# Patient Record
Sex: MALE | Race: WHITE | NOT HISPANIC OR LATINO | Employment: OTHER | URBAN - METROPOLITAN AREA
[De-identification: names, ages, dates, MRNs, and addresses within clinical notes are randomized per-mention and may not be internally consistent; named-entity substitution may affect disease eponyms.]

---

## 2017-02-21 DIAGNOSIS — I51.89 DIASTOLIC DYSFUNCTION: ICD-10-CM

## 2017-02-21 DIAGNOSIS — I10 ESSENTIAL HYPERTENSION: ICD-10-CM

## 2017-02-21 RX ORDER — VALSARTAN 160 MG/1
160 TABLET ORAL DAILY
Qty: 90 TABLET | Refills: 0 | Status: SHIPPED | OUTPATIENT
Start: 2017-02-21 | End: 2017-06-07 | Stop reason: SDUPTHER

## 2017-03-20 ENCOUNTER — CLINICAL SUPPORT (OUTPATIENT)
Dept: CARDIOLOGY | Facility: CLINIC | Age: 67
End: 2017-03-20
Payer: MEDICARE

## 2017-03-20 DIAGNOSIS — I42.9 CARDIOMYOPATHY: ICD-10-CM

## 2017-03-20 DIAGNOSIS — Z95.810 ICD (IMPLANTABLE CARDIOVERTER-DEFIBRILLATOR) IN PLACE: ICD-10-CM

## 2017-03-20 DIAGNOSIS — I47.29 PAROXYSMAL VT: ICD-10-CM

## 2017-03-20 PROCEDURE — 93296 REM INTERROG EVL PM/IDS: CPT | Mod: PBBFAC,PO | Performed by: INTERNAL MEDICINE

## 2017-03-20 PROCEDURE — 93295 DEV INTERROG REMOTE 1/2/MLT: CPT | Mod: ,,, | Performed by: INTERNAL MEDICINE

## 2017-04-10 DIAGNOSIS — I10 ESSENTIAL HYPERTENSION: ICD-10-CM

## 2017-04-10 DIAGNOSIS — I51.89 DIASTOLIC DYSFUNCTION: ICD-10-CM

## 2017-04-10 RX ORDER — FUROSEMIDE 20 MG/1
20 TABLET ORAL EVERY MORNING
Qty: 90 TABLET | Refills: 3 | Status: SHIPPED | OUTPATIENT
Start: 2017-04-10 | End: 2018-04-01 | Stop reason: SDUPTHER

## 2017-04-21 ENCOUNTER — OFFICE VISIT (OUTPATIENT)
Dept: CARDIOLOGY | Facility: CLINIC | Age: 67
End: 2017-04-21
Payer: MEDICARE

## 2017-04-21 VITALS
DIASTOLIC BLOOD PRESSURE: 74 MMHG | HEART RATE: 73 BPM | WEIGHT: 276.69 LBS | OXYGEN SATURATION: 94 % | SYSTOLIC BLOOD PRESSURE: 122 MMHG | BODY MASS INDEX: 39.61 KG/M2 | HEIGHT: 70 IN

## 2017-04-21 DIAGNOSIS — Z45.02 ICD (IMPLANTABLE CARDIOVERTER-DEFIBRILLATOR) DISCHARGE: ICD-10-CM

## 2017-04-21 DIAGNOSIS — I47.29 PAROXYSMAL VT: ICD-10-CM

## 2017-04-21 DIAGNOSIS — I43 CARDIOMYOPATHY DUE TO SYSTEMIC DISEASE: Primary | ICD-10-CM

## 2017-04-21 DIAGNOSIS — I10 ESSENTIAL HYPERTENSION: ICD-10-CM

## 2017-04-21 DIAGNOSIS — E66.01 MORBID OBESITY DUE TO EXCESS CALORIES: ICD-10-CM

## 2017-04-21 DIAGNOSIS — G47.33 OBSTRUCTIVE SLEEP APNEA SYNDROME: ICD-10-CM

## 2017-04-21 PROCEDURE — 99214 OFFICE O/P EST MOD 30 MIN: CPT | Mod: S$PBB,,, | Performed by: INTERNAL MEDICINE

## 2017-04-21 PROCEDURE — 99999 PR PBB SHADOW E&M-EST. PATIENT-LVL III: CPT | Mod: PBBFAC,,, | Performed by: INTERNAL MEDICINE

## 2017-04-21 PROCEDURE — 99213 OFFICE O/P EST LOW 20 MIN: CPT | Mod: PBBFAC,PO | Performed by: INTERNAL MEDICINE

## 2017-04-21 NOTE — PROGRESS NOTES
"  Subjective:   Patient ID:  Randy Yap is a 67 y.o. male     Chief complaint: VT    HPI  From my last note (12/14/16 ):  Background:  66 man  Frequent, Sxc PMVT related to R on T V pacing in the setting of DDD programming and junctional beats.  Last visit we DCed Sotalol and reprogrammed pacer to VVI back up  Since then, he has had no N/S Sx and no VT per his ICD records  He is feeling very well  2 months ago we decreased amio to 200 a day(but there seems to be some confusion with what he is taking-- he seems to be on 400 a day) and he still is feeling well from a PVC point of view but his wife noticed that he was retaining fluid at a time when he was c/o ALDANA-- this was last week and he increased his lasix and he felt better enough to go back to 1 pill a day  ICD eval today. He is in AAI mode at 67 and he paces 67%-- no VTNS   He does exercise now -- he has been able to swim back and forth in his pool -- which he could not do even a half a pool a few months ago.     Update on 12/14/16:  We were planning to gradually wean amio and we have decreased The dose to 200 a day so far -- no issues since then and ICD shows no VT  He has had PFT with [facility] -- results pending.   He wants to switch to this practice.   He has an ICD that is on battery advisopry -- he has darci educated and he is on Merlin remote monitoring -- vibration alert turned on    Update since then:  " I am doing fantastic"  Exercising and working in the yard like he had not done in a long time.   He is now on 100 mg amio a day  He is losing inches but not wt  Last Echo had a normal Ef (up from 40 initialy)  Current Outpatient Prescriptions   Medication Sig    acetaminophen (TYLENOL) 325 MG tablet Take 650 mg by mouth as needed.     carvedilol (COREG) 25 MG tablet Take 25 mg by mouth 2 (two) times daily.     co-enzyme Q-10 30 mg capsule Take 30 mg by mouth once daily.     FLUVIRIN [id number], PF, 45 mcg (15 mcg x 3)/0.5 mL Syrg inject 0.5 " milliliter intramuscularly    furosemide (LASIX) 20 MG tablet Take 1 tablet (20 mg total) by mouth every morning.    glucosamine-chondroitin 500-400 mg tablet Take 1 tablet by mouth once daily.     multivitamin (THERAGRAN) tablet Take 1 tablet by mouth once daily.      spironolactone (ALDACTONE) 25 MG tablet Take 1 tablet (25 mg total) by mouth once daily.    trazodone (DESYREL) 150 MG tablet Take 1 tablet (150 mg total) by mouth every evening.    valsartan (DIOVAN) 160 MG tablet Take 1 tablet (160 mg total) by mouth once daily.     No current facility-administered medications for this visit.      Review of Systems   Constitution: Negative. Negative for decreased appetite, weakness, malaise/fatigue, weight gain and weight loss.   HENT: Negative.  Negative for headaches.    Eyes: Negative.  Negative for blurred vision.   Cardiovascular: Negative.  Negative for chest pain, claudication, cyanosis, dyspnea on exertion, irregular heartbeat, leg swelling, near-syncope, orthopnea and palpitations.   Respiratory: Negative.  Negative for cough, shortness of breath, sleep disturbances due to breathing, snoring and wheezing.    Endocrine: Negative.  Negative for heat intolerance.   Hematologic/Lymphatic: Negative.  Does not bruise/bleed easily.   Skin: Negative.    Musculoskeletal: Negative.  Negative for muscle weakness and myalgias.   Gastrointestinal: Negative.  Negative for melena, nausea and vomiting.   Genitourinary: Negative.  Negative for nocturia.   Neurological: Negative.  Negative for excessive daytime sleepiness, dizziness and light-headedness.   Psychiatric/Behavioral: Negative.  Negative for depression, memory loss and substance abuse. The patient does not have insomnia and is not nervous/anxious.    Allergic/Immunologic: Negative.        Objective:   Physical Exam   Constitutional: He is oriented to person, place, and time. He appears well-developed and well-nourished.   overweight   HENT:   Head:  Normocephalic and atraumatic.   Right Ear: External ear normal.   Left Ear: External ear normal.   Eyes: Conjunctivae are normal. Pupils are equal, round, and reactive to light. Right eye exhibits no discharge. Left eye exhibits no discharge. Right conjunctiva is not injected. Left conjunctiva is not injected. Left conjunctiva has no hemorrhage.   Neck: Neck supple. No JVD present. No thyromegaly present.   Cardiovascular: Normal rate, regular rhythm, normal heart sounds and intact distal pulses.  PMI is not displaced.  Exam reveals no gallop, no friction rub, no midsystolic click and no opening snap.    No murmur heard.  Pulses:       Carotid pulses are 2+ on the right side, and 2+ on the left side.       Radial pulses are 2+ on the right side, and 2+ on the left side.        Dorsalis pedis pulses are 2+ on the right side, and 2+ on the left side.        Posterior tibial pulses are 2+ on the right side, and 2+ on the left side.   Pulmonary/Chest: Effort normal and breath sounds normal. No respiratory distress. He has no wheezes. He has no rales. He exhibits no tenderness.   Device pocket is in excellent repair     Abdominal: Soft. Normal appearance. He exhibits no pulsatile liver. There is no hepatomegaly. There is no tenderness. There is no rigidity, no rebound and no guarding.   Obese abdomen   Musculoskeletal: Normal range of motion. He exhibits no edema or tenderness.        Right knee: He exhibits no swelling.        Left knee: He exhibits no swelling.        Right ankle: He exhibits no swelling.        Left ankle: He exhibits no swelling.        Right lower leg: He exhibits no swelling.        Left lower leg: He exhibits no swelling.        Right foot: There is no swelling.        Left foot: There is no swelling.   Neurological: He is alert and oriented to person, place, and time. He has normal strength and normal reflexes. No cranial nerve deficit. Coordination normal.   Skin: Skin is warm, dry and intact.  "No rash noted. He is not diaphoretic. No cyanosis. No pallor.   Psychiatric: He has a normal mood and affect. His behavior is normal.   Nursing note and vitals reviewed.    /74 (BP Location: Right arm, Patient Position: Sitting, BP Method: Manual)  Pulse 73  Ht 5' 10" (1.778 m)  Wt 125.5 kg (276 lb 10.8 oz)  SpO2 (!) 94%  BMI 39.7 kg/m2     Assessment:      1. 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 ICD (implantable cardioverter-defibrillator) discharge    3. Obstructive sleep apnea syndrome    4. Essential hypertension    5. Paroxysmal VT    6. Morbid obesity due to excess calories        Plan:    Merlin transmissions to be set up with us   No orders of the defined types were placed in this encounter.    Return in about 1 year (around 4/21/2018), or if symptoms worsen or fail to improve.  Medications Discontinued During This Encounter   Medication Reason    amiodarone (PACERONE) 100 MG Tab Therapy completed     New Prescriptions    No medications on file     Modified Medications    No medications on file              "

## 2017-04-21 NOTE — MR AVS SNAPSHOT
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    -  Primary     ICD (implantable cardioverter-defibrillator) discharge         Obstructive sleep apnea syndrome         Essential hypertension         Paroxysmal VT         Morbid obesity due to excess calories                To Do List           Goals (5 Years of Data)              Today    16    Cut out extra servings           Increase physical activity           Reduce fat intake to X grams per day           Weight < 230 lb (104.327 kg)   125.5 kg (276 lb 10.8 oz)  125.1 kg (275 lb 12.7 oz)  126.6 kg (279 lb 1.6 oz)      Follow-Up and Disposition     Return in about 1 year (around 2018), or if symptoms worsen or fail to improve.    Follow-up and Disposition History      [facility]     [facility]  [facility]istance  Unless otherwise directed by your provider, please contact [facility], our nurse care line that is available for  assistance.     Registered nurses in the [facility] provide: appointment scheduling, clinical advisement, health education, and other advisory services.  Call: [phone] (toll free)               Medications           Message regarding Medications     Verify the changes and/or additions to your medication regime listed below are the same as discussed with your clinician today.  If any of these changes or additions are incorrect, please notify your healthcare provider.        STOP taking these medications     amiodarone (PACERONE) 100 MG Tab Take 1 tablet (100 mg total) by mouth once daily.           Verify that the below list of medications is an  "accurate representation of the medications you are currently taking.  If none reported, the list may be blank. If incorrect, please contact your healthcare provider. Carry this list with you in case of emergency.           Current Medications     acetaminophen (TYLENOL) 325 MG tablet Take 650 mg by mouth as needed.     carvedilol (COREG) 25 MG tablet Take 25 mg by mouth 2 (two) times daily.     co-enzyme Q-10 30 mg capsule Take 30 mg by mouth once daily.     FLUVIRIN [id number], PF, 45 mcg (15 mcg x 3)/0.5 mL Syrg inject 0.5 milliliter intramuscularly    furosemide (LASIX) 20 MG tablet Take 1 tablet (20 mg total) by mouth every morning.    glucosamine-chondroitin 500-400 mg tablet Take 1 tablet by mouth once daily.     multivitamin (THERAGRAN) tablet Take 1 tablet by mouth once daily.      spironolactone (ALDACTONE) 25 MG tablet Take 1 tablet (25 mg total) by mouth once daily.    trazodone (DESYREL) 150 MG tablet Take 1 tablet (150 mg total) by mouth every evening.    valsartan (DIOVAN) 160 MG tablet Take 1 tablet (160 mg total) by mouth once daily.           Clinical Reference Information           Your Vitals Were     BP Pulse Height Weight SpO2 BMI    122/74 (BP Location: Right arm, Patient Position: Sitting, BP Method: Manual) 73 5' 10" (1.778 m) 125.5 kg (276 lb 10.8 oz) 94% 39.7 kg/m2      Blood Pressure          Most Recent Value    BP  122/74      Allergies as of 4/21/2017     No Known Allergies      Immunizations Administered on Date of Encounter - 4/21/2017     None      Language Assistance Services     ATTENTION: Language assistance services are available, free of charge. Please call [phone].      ATENCIÓN: Si arabella dunn, tiene a tineo disposición servicios gratuitos de asistencia lingüística. Llame al [phone].     CHÚ Ý: N?u b?n nói Ti?ng Vi?t, có các d?ch v? h? tr? ngôn ng? mi?n phí dành cho b?n. G?i s? [phone].         [facility] complies with applicable Federal civil rights " laws and does not discriminate on the basis of race, color, national origin, age, disability, or sex.

## 2017-04-25 DIAGNOSIS — I10 ESSENTIAL HYPERTENSION: ICD-10-CM

## 2017-04-25 DIAGNOSIS — I51.89 DIASTOLIC DYSFUNCTION: ICD-10-CM

## 2017-04-25 RX ORDER — SPIRONOLACTONE 25 MG/1
25 TABLET ORAL DAILY
Qty: 90 TABLET | Refills: 0 | Status: SHIPPED | OUTPATIENT
Start: 2017-04-25 | End: 2017-08-20 | Stop reason: SDUPTHER

## 2017-05-04 RX ORDER — TRAZODONE HYDROCHLORIDE 150 MG/1
150 TABLET ORAL NIGHTLY
Qty: 90 TABLET | Refills: 3 | Status: SHIPPED | OUTPATIENT
Start: 2017-05-04 | End: 2018-05-13 | Stop reason: SDUPTHER

## 2017-06-07 DIAGNOSIS — I51.89 DIASTOLIC DYSFUNCTION: ICD-10-CM

## 2017-06-07 DIAGNOSIS — I10 ESSENTIAL HYPERTENSION: ICD-10-CM

## 2017-06-07 RX ORDER — VALSARTAN 160 MG/1
160 TABLET ORAL DAILY
Qty: 90 TABLET | Refills: 4 | Status: SHIPPED | OUTPATIENT
Start: 2017-06-07 | End: 2018-08-03 | Stop reason: RX

## 2017-06-07 NOTE — TELEPHONE ENCOUNTER
----- Message from Ryan Winchester sent at 6/7/2017  1:59 PM CDT -----  Contact: angella from [employer]   States she is calling to get a refill on valsartin 160mg and can be reached at [phone]//thanks/dbw

## 2017-07-18 DIAGNOSIS — I43 CARDIOMYOPATHY DUE TO SYSTEMIC DISEASE: ICD-10-CM

## 2017-07-18 DIAGNOSIS — Z95.810 ICD (IMPLANTABLE CARDIOVERTER-DEFIBRILLATOR) IN PLACE: Primary | ICD-10-CM

## 2017-07-18 DIAGNOSIS — I47.29 PAROXYSMAL VT: ICD-10-CM

## 2017-08-07 ENCOUNTER — CLINICAL SUPPORT (OUTPATIENT)
Dept: CARDIOLOGY | Facility: CLINIC | Age: 67
End: 2017-08-07
Payer: MEDICARE

## 2017-08-07 DIAGNOSIS — Z95.810 ICD (IMPLANTABLE CARDIOVERTER-DEFIBRILLATOR) IN PLACE: ICD-10-CM

## 2017-08-07 DIAGNOSIS — I47.29 PAROXYSMAL VT: ICD-10-CM

## 2017-08-07 DIAGNOSIS — I42.9 CARDIOMYOPATHY: ICD-10-CM

## 2017-08-07 PROCEDURE — 93296 REM INTERROG EVL PM/IDS: CPT | Mod: PBBFAC,PO | Performed by: INTERNAL MEDICINE

## 2017-08-07 PROCEDURE — 93295 DEV INTERROG REMOTE 1/2/MLT: CPT | Mod: ,,, | Performed by: INTERNAL MEDICINE

## 2017-08-18 ENCOUNTER — CLINICAL SUPPORT (OUTPATIENT)
Dept: CARDIOLOGY | Facility: CLINIC | Age: 67
End: 2017-08-18
Payer: MEDICARE

## 2017-08-18 DIAGNOSIS — I47.29 PAROXYSMAL VT: ICD-10-CM

## 2017-08-18 DIAGNOSIS — I43 CARDIOMYOPATHY DUE TO SYSTEMIC DISEASE: ICD-10-CM

## 2017-08-18 DIAGNOSIS — Z95.810 ICD (IMPLANTABLE CARDIOVERTER-DEFIBRILLATOR) IN PLACE: ICD-10-CM

## 2017-08-18 PROCEDURE — 93283 PRGRMG EVAL IMPLANTABLE DFB: CPT | Mod: 26,,, | Performed by: INTERNAL MEDICINE

## 2017-08-18 PROCEDURE — 93290 INTERROG DEV EVAL ICPMS IP: CPT | Mod: 26,,, | Performed by: INTERNAL MEDICINE

## 2017-08-20 DIAGNOSIS — I51.89 DIASTOLIC DYSFUNCTION: ICD-10-CM

## 2017-08-20 DIAGNOSIS — I10 ESSENTIAL HYPERTENSION: ICD-10-CM

## 2017-08-21 RX ORDER — SPIRONOLACTONE 25 MG/1
TABLET ORAL
Qty: 90 TABLET | Refills: 1 | Status: SHIPPED | OUTPATIENT
Start: 2017-08-21 | End: 2018-02-11 | Stop reason: SDUPTHER

## 2017-08-22 ENCOUNTER — TELEPHONE (OUTPATIENT)
Dept: FAMILY MEDICINE | Facility: CLINIC | Age: 67
End: 2017-08-22

## 2017-08-22 DIAGNOSIS — E78.5 HYPERLIPIDEMIA, UNSPECIFIED HYPERLIPIDEMIA TYPE: Primary | ICD-10-CM

## 2017-08-22 DIAGNOSIS — I10 ESSENTIAL HYPERTENSION: ICD-10-CM

## 2017-09-06 ENCOUNTER — LAB VISIT (OUTPATIENT)
Dept: LAB | Facility: HOSPITAL | Age: 67
End: 2017-09-06
Attending: FAMILY MEDICINE
Payer: MEDICARE

## 2017-09-06 DIAGNOSIS — I10 ESSENTIAL HYPERTENSION: ICD-10-CM

## 2017-09-06 DIAGNOSIS — E78.5 HYPERLIPIDEMIA, UNSPECIFIED HYPERLIPIDEMIA TYPE: ICD-10-CM

## 2017-09-06 DIAGNOSIS — Z11.59 NEED FOR HEPATITIS C SCREENING TEST: ICD-10-CM

## 2017-09-06 LAB
ALBUMIN SERPL BCP-MCNC: 3.6 G/DL
ALP SERPL-CCNC: 54 U/L
ALT SERPL W/O P-5'-P-CCNC: 35 U/L
ANION GAP SERPL CALC-SCNC: 7 MMOL/L
AST SERPL-CCNC: 25 U/L
BASOPHILS # BLD AUTO: 0.04 K/UL
BASOPHILS NFR BLD: 0.6 %
BILIRUB SERPL-MCNC: 0.6 MG/DL
BUN SERPL-MCNC: 12 MG/DL
CALCIUM SERPL-MCNC: 8.8 MG/DL
CHLORIDE SERPL-SCNC: 103 MMOL/L
CHOLEST SERPL-MCNC: 188 MG/DL
CHOLEST/HDLC SERPL: 5.5 {RATIO}
CO2 SERPL-SCNC: 28 MMOL/L
CREAT SERPL-MCNC: 1 MG/DL
DIFFERENTIAL METHOD: ABNORMAL
EOSINOPHIL # BLD AUTO: 0.1 K/UL
EOSINOPHIL NFR BLD: 1.4 %
ERYTHROCYTE [DISTWIDTH] IN BLOOD BY AUTOMATED COUNT: 14.5 %
EST. GFR  (AFRICAN AMERICAN): >60 ML/MIN/1.73 M^2
EST. GFR  (NON AFRICAN AMERICAN): >60 ML/MIN/1.73 M^2
GLUCOSE SERPL-MCNC: 104 MG/DL
HCT VFR BLD AUTO: 38.5 %
HDLC SERPL-MCNC: 34 MG/DL
HDLC SERPL: 18.1 %
HGB BLD-MCNC: 12.6 G/DL
LDLC SERPL CALC-MCNC: 124.4 MG/DL
LYMPHOCYTES # BLD AUTO: 2.1 K/UL
LYMPHOCYTES NFR BLD: 33.6 %
MCH RBC QN AUTO: 28.6 PG
MCHC RBC AUTO-ENTMCNC: 32.7 G/DL
MCV RBC AUTO: 88 FL
MONOCYTES # BLD AUTO: 0.7 K/UL
MONOCYTES NFR BLD: 10.7 %
NEUTROPHILS # BLD AUTO: 3.4 K/UL
NEUTROPHILS NFR BLD: 53.7 %
NONHDLC SERPL-MCNC: 154 MG/DL
PLATELET # BLD AUTO: 398 K/UL
PMV BLD AUTO: 8.8 FL
POTASSIUM SERPL-SCNC: 4.5 MMOL/L
PROT SERPL-MCNC: 7.9 G/DL
RBC # BLD AUTO: 4.4 M/UL
SODIUM SERPL-SCNC: 138 MMOL/L
TRIGL SERPL-MCNC: 148 MG/DL
WBC # BLD AUTO: 6.33 K/UL

## 2017-09-06 PROCEDURE — 80053 COMPREHEN METABOLIC PANEL: CPT

## 2017-09-06 PROCEDURE — 80061 LIPID PANEL: CPT

## 2017-09-06 PROCEDURE — 86803 HEPATITIS C AB TEST: CPT

## 2017-09-06 PROCEDURE — 85025 COMPLETE CBC W/AUTO DIFF WBC: CPT

## 2017-09-06 PROCEDURE — 36415 COLL VENOUS BLD VENIPUNCTURE: CPT | Mod: PO

## 2017-09-07 LAB — HCV AB SERPL QL IA: NEGATIVE

## 2017-09-11 ENCOUNTER — LAB VISIT (OUTPATIENT)
Dept: LAB | Facility: HOSPITAL | Age: 67
End: 2017-09-11
Attending: FAMILY MEDICINE
Payer: MEDICARE

## 2017-09-11 ENCOUNTER — OFFICE VISIT (OUTPATIENT)
Dept: FAMILY MEDICINE | Facility: CLINIC | Age: 67
End: 2017-09-11
Payer: MEDICARE

## 2017-09-11 VITALS
BODY MASS INDEX: 38.7 KG/M2 | HEART RATE: 78 BPM | WEIGHT: 270.31 LBS | HEIGHT: 70 IN | SYSTOLIC BLOOD PRESSURE: 92 MMHG | DIASTOLIC BLOOD PRESSURE: 62 MMHG

## 2017-09-11 DIAGNOSIS — D64.9 ANEMIA, UNSPECIFIED TYPE: ICD-10-CM

## 2017-09-11 DIAGNOSIS — I51.89 DIASTOLIC DYSFUNCTION: ICD-10-CM

## 2017-09-11 DIAGNOSIS — I10 ESSENTIAL HYPERTENSION: ICD-10-CM

## 2017-09-11 DIAGNOSIS — Z00.00 ROUTINE CHECK-UP: Primary | ICD-10-CM

## 2017-09-11 DIAGNOSIS — I43 CARDIOMYOPATHY DUE TO SYSTEMIC DISEASE: ICD-10-CM

## 2017-09-11 DIAGNOSIS — E53.8 B12 DEFICIENCY: ICD-10-CM

## 2017-09-11 DIAGNOSIS — E78.5 HYPERLIPIDEMIA LDL GOAL <100: ICD-10-CM

## 2017-09-11 LAB — IRON SERPL-MCNC: 30 UG/DL

## 2017-09-11 PROCEDURE — 82607 VITAMIN B-12: CPT

## 2017-09-11 PROCEDURE — 82746 ASSAY OF FOLIC ACID SERUM: CPT

## 2017-09-11 PROCEDURE — 99397 PER PM REEVAL EST PAT 65+ YR: CPT | Mod: S$PBB,,, | Performed by: FAMILY MEDICINE

## 2017-09-11 PROCEDURE — 99213 OFFICE O/P EST LOW 20 MIN: CPT | Mod: PBBFAC,PO | Performed by: FAMILY MEDICINE

## 2017-09-11 PROCEDURE — 99999 PR PBB SHADOW E&M-EST. PATIENT-LVL III: CPT | Mod: PBBFAC,,, | Performed by: FAMILY MEDICINE

## 2017-09-11 PROCEDURE — 36415 COLL VENOUS BLD VENIPUNCTURE: CPT | Mod: PO

## 2017-09-11 PROCEDURE — 83540 ASSAY OF IRON: CPT

## 2017-09-11 NOTE — PROGRESS NOTES
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     Colon polyp 5/2013    repeat 5/2018    Depression, recurrent     Diastolic dysfunction     DJD (degenerative joint disease) of knee 10/14/2013    Hyperlipidemia     Hypertension     Murmur     Pericarditis with effusion     Sleep apnea      Past Surgical History:   Procedure Laterality Date    CARDIAC DEFIBRILLATOR PLACEMENT  10/6/2014    COLONOSCOPY W/ POLYPECTOMY  5/21/2013    repeat 5/21/2018    HERNIA REPAIR      R inguinal    pace maker   10/6/2014    PERICARDIAL WINDOW  12-15 years ago    tonsillectomy      VARICOCELECTOMY       Review of patient's allergies indicates:  No Known Allergies  Current Outpatient Prescriptions on File Prior to Visit   Medication Sig Dispense Refill    acetaminophen (TYLENOL) 325 MG tablet Take 650 mg by mouth as needed.       carvedilol (COREG) 25 MG tablet Take 25 mg by mouth 2 (two) times daily.   0    co-enzyme Q-10 30 mg capsule Take 30 mg by mouth once daily.       furosemide (LASIX) 20 MG tablet Take 1 tablet (20 mg total) by mouth every morning. 90 tablet 3    glucosamine-chondroitin 500-400 mg tablet Take 1 tablet by mouth once daily.       multivitamin (THERAGRAN) tablet Take 1 tablet by mouth once daily.        spironolactone (ALDACTONE) 25 MG tablet take 1 tablet by mouth once daily 90 tablet 1    trazodone (DESYREL) 150 MG tablet Take 1 tablet (150 mg total) by mouth every evening. 90 tablet 3    valsartan (DIOVAN) 160 MG tablet Take 1 tablet (160 mg total) by mouth once daily. 90 tablet 4    [DISCONTINUED] FLUVIRIN [id number], PF, 45 mcg (15 mcg x 3)/0.5 mL Syrg inject 0.5 milliliter intramuscularly  0     No current facility-administered medications on file prior to visit.      Social History     Social History    Marital status:      Spouse  name: N/A    Number of children: N/A    Years of education: N/A     Occupational History    Not on file.     Social History Main Topics    Smoking status: Never Smoker    Smokeless tobacco: Never Used    Alcohol use 0.5 oz/week     1 Standard drinks or equivalent per week      Comment: occasional glass of wine on social occasions    Drug use: No    Sexual activity: Not on file     Other Topics Concern    Not on file     Social History Narrative    No narrative on file     Family History   Problem Relation Age of Onset    Hyperlipidemia Mother     Arrhythmia Mother     Heart disease Father 48    Heart attack Father 48    Hypertension Father     Hypertension Maternal Grandmother     Hypertension Maternal Grandfather     Heart disease Paternal Grandmother     Hypertension Paternal Grandmother     Heart attack Paternal Grandmother     Heart disease Paternal Grandfather     Hypertension Paternal Grandfather     Heart attack Paternal Grandfather          ROS:GENERAL: No fever, chills, fatigability or weight loss.  SKIN: No rashes, itching or changes in color or texture of skin.  HEAD: No headaches or recent head trauma.EYES: Visual acuity fine. No photophobia, ocular pain or diplopia.EARS: Denies ear pain, discharge or vertigo.NOSE: No loss of smell, no epistaxis or postnasal drip.MOUTH & THROAT: No hoarseness or change in voice. No excessive gum bleeding.NODES: Denies swollen glands.  CHEST: Denies ALDANA, cyanosis, wheezing, cough and sputum production.  CARDIOVASCULAR: Denies chest pain, PND, orthopnea or reduced exercise tolerance.  ABDOMEN: Appetite fine. No weight loss. Denies diarrhea, abdominal pain, hematemesis or blood in stool.  URINARY: No flank pain, dysuria or hematuria.  PERIPHERAL VASCULAR: No claudication or cyanosis.  MUSCULOSKELETAL: See above.  NEUROLOGIC: No history of seizures, paralysis, alteration of gait or coordination.  PE:   HEAD: Normocephalic, atraumatic.EYES: PERRL. EOMI.    EARS: TM's intact. Light reflex normal. No retraction or perforation.   NOSE: Mucosa pink. Airway clear.MOUTH & THROAT: No tonsillar enlargement. No pharyngeal erythema or exudate. No stridor.  NODES: No cervical, axillary or inguinal lymph node enlargement.  CHEST: Lungs clear to auscultation.  CARDIOVASCULAR: Normal S1, S2. No rubs, murmurs or gallops.  ABDOMEN: Bowel sounds normal. Not distended. Soft. No tenderness or masses.  MUSCULOSKELETAL: No palpable abnormality  NEUROLOGIC: Cranial Nerves: II-XII grossly intact.  Motor: 5/5 strength major flexors/extensors.  DTR's: Knees, Ankles 2+ and equal bilaterally; downgoing toes.  Sensory: Intact to light touch distally.  Gait & Posture: Normal gait and fine motion. No cerebellar signs.     Impression:HBP  HF Mild anemia   Plan:Lab eval  Rec diet and ex recs  Rev age appropriate screenings    Stool hematest

## 2017-09-12 ENCOUNTER — TELEPHONE (OUTPATIENT)
Dept: FAMILY MEDICINE | Facility: CLINIC | Age: 67
End: 2017-09-12

## 2017-09-12 DIAGNOSIS — D50.9 IRON DEFICIENCY ANEMIA, UNSPECIFIED IRON DEFICIENCY ANEMIA TYPE: Primary | ICD-10-CM

## 2017-09-12 LAB
FOLATE SERPL-MCNC: 10.9 NG/ML
VIT B12 SERPL-MCNC: 414 PG/ML

## 2017-09-21 ENCOUNTER — APPOINTMENT (OUTPATIENT)
Dept: LAB | Facility: HOSPITAL | Age: 67
End: 2017-09-21
Attending: FAMILY MEDICINE
Payer: MEDICARE

## 2017-09-21 ENCOUNTER — TELEPHONE (OUTPATIENT)
Dept: FAMILY MEDICINE | Facility: CLINIC | Age: 67
End: 2017-09-21

## 2017-09-21 DIAGNOSIS — D50.9 IRON DEFICIENCY ANEMIA, UNSPECIFIED IRON DEFICIENCY ANEMIA TYPE: Primary | ICD-10-CM

## 2017-09-21 NOTE — TELEPHONE ENCOUNTER
----- Message from Fatoumata Abdullahi sent at 9/21/2017  8:25 AM CDT -----  Contact: [phone]  Pt is dropping off a stool and orders need to be placed into the system for the lab to accept the sample/please call to advise/thanks

## 2017-10-03 RX ORDER — CARVEDILOL 25 MG/1
TABLET ORAL
Qty: 60 TABLET | Refills: 2 | Status: SHIPPED | OUTPATIENT
Start: 2017-10-03 | End: 2017-12-31 | Stop reason: SDUPTHER

## 2017-10-09 ENCOUNTER — CLINICAL SUPPORT (OUTPATIENT)
Dept: CARDIOLOGY | Facility: CLINIC | Age: 67
End: 2017-10-09
Payer: MEDICARE

## 2017-10-09 DIAGNOSIS — I47.29 PAROXYSMAL VT: ICD-10-CM

## 2017-10-09 DIAGNOSIS — I42.9 CARDIOMYOPATHY: ICD-10-CM

## 2017-10-09 DIAGNOSIS — Z95.810 ICD (IMPLANTABLE CARDIOVERTER-DEFIBRILLATOR) IN PLACE: ICD-10-CM

## 2017-10-09 PROCEDURE — 93295 DEV INTERROG REMOTE 1/2/MLT: CPT | Mod: ,,, | Performed by: INTERNAL MEDICINE

## 2017-10-09 PROCEDURE — 93297 REM INTERROG DEV EVAL ICPMS: CPT | Mod: ,,, | Performed by: INTERNAL MEDICINE

## 2017-10-09 PROCEDURE — 93296 REM INTERROG EVL PM/IDS: CPT | Mod: PBBFAC,PO | Performed by: INTERNAL MEDICINE

## 2017-10-11 DIAGNOSIS — I49.3 PVC (PREMATURE VENTRICULAR CONTRACTION): Primary | ICD-10-CM

## 2017-10-17 ENCOUNTER — CLINICAL SUPPORT (OUTPATIENT)
Dept: CARDIOLOGY | Facility: CLINIC | Age: 67
End: 2017-10-17
Payer: MEDICARE

## 2017-10-17 DIAGNOSIS — I49.3 PVC (PREMATURE VENTRICULAR CONTRACTION): ICD-10-CM

## 2017-10-17 PROCEDURE — 93227 XTRNL ECG REC<48 HR R&I: CPT | Mod: S$PBB,,, | Performed by: INTERNAL MEDICINE

## 2017-10-17 PROCEDURE — 93226 XTRNL ECG REC<48 HR SCAN A/R: CPT | Mod: PBBFAC,PO | Performed by: INTERNAL MEDICINE

## 2017-10-30 ENCOUNTER — TELEPHONE (OUTPATIENT)
Dept: CARDIOLOGY | Facility: CLINIC | Age: 67
End: 2017-10-30

## 2017-10-30 ENCOUNTER — TELEPHONE (OUTPATIENT)
Dept: FAMILY MEDICINE | Facility: CLINIC | Age: 67
End: 2017-10-30

## 2017-10-30 DIAGNOSIS — I47.29 PVT (PAROXYSMAL VENTRICULAR TACHYCARDIA): ICD-10-CM

## 2017-10-30 DIAGNOSIS — I49.3 PVC (PREMATURE VENTRICULAR CONTRACTION): Primary | ICD-10-CM

## 2017-10-30 RX ORDER — PROMETHAZINE HYDROCHLORIDE 25 MG/1
25 TABLET ORAL EVERY 6 HOURS PRN
Qty: 6 TABLET | Refills: 0 | Status: SHIPPED | OUTPATIENT
Start: 2017-10-30 | End: 2018-05-16

## 2017-10-30 RX ORDER — SODIUM, POTASSIUM,MAG SULFATES 17.5-3.13G
SOLUTION, RECONSTITUTED, ORAL ORAL
Qty: 354 ML | Refills: 0 | Status: SHIPPED | OUTPATIENT
Start: 2017-10-30 | End: 2018-05-16

## 2017-10-30 NOTE — TELEPHONE ENCOUNTER
----- Message from Nirav Baldwin MD sent at 10/30/2017 10:03 AM CDT -----  Please see comments below and call patient.  In general you do not need to route back to me.  Juany, please bring him in for a long 12 lead rhythm strip so that we can document the PVC morphology.  Tx

## 2017-10-30 NOTE — TELEPHONE ENCOUNTER
----- Message from Georgina Morales sent at 10/30/2017  9:37 AM CDT -----  Contact: self  Colon booked 11/8/17. Please call in suprep and rhina to [facility] in [place] pls advise w/pt

## 2017-10-30 NOTE — TELEPHONE ENCOUNTER
Dr. Yung,   Please advise if okay to proceed with Colonoscopy and if patient needs to hold any medications

## 2017-10-30 NOTE — TELEPHONE ENCOUNTER
Telephoned patient to schedule confirm Ekg with long rhythm strip to try and catch PVC morphology.  Scheduled for 11/1/17 at 845 am.  Also advised okay for Colonoscopy with ICD precautions

## 2017-10-30 NOTE — TELEPHONE ENCOUNTER
----- Message from Georgina Morales sent at 10/30/2017  9:34 AM CDT -----  Contact: georgina x [phone] [facility]  Pt is having colonoscopy on 11/8/17. Can I please get a surgery clearance on him and will he need to stop any meds that you gave him.  Please advise.  Thank you

## 2017-11-01 ENCOUNTER — CLINICAL SUPPORT (OUTPATIENT)
Dept: CARDIOLOGY | Facility: CLINIC | Age: 67
End: 2017-11-01
Payer: MEDICARE

## 2017-11-01 DIAGNOSIS — I49.3 PVC (PREMATURE VENTRICULAR CONTRACTION): ICD-10-CM

## 2017-11-01 DIAGNOSIS — I47.29 PVT (PAROXYSMAL VENTRICULAR TACHYCARDIA): ICD-10-CM

## 2017-11-01 PROCEDURE — 93010 ELECTROCARDIOGRAM REPORT: CPT | Mod: S$PBB,,, | Performed by: INTERNAL MEDICINE

## 2017-11-01 PROCEDURE — 93010 ELECTROCARDIOGRAM REPORT: CPT | Mod: S$PBB,76,, | Performed by: INTERNAL MEDICINE

## 2017-11-01 PROCEDURE — 93005 ELECTROCARDIOGRAM TRACING: CPT | Mod: PBBFAC,PO | Performed by: INTERNAL MEDICINE

## 2017-11-08 ENCOUNTER — DOCUMENTATION ONLY (OUTPATIENT)
Dept: FAMILY MEDICINE | Facility: CLINIC | Age: 67
End: 2017-11-08

## 2017-11-08 LAB — CRC RECOMMENDATION EXT: NORMAL

## 2017-11-08 NOTE — OP NOTE
COLONOSCOPY NOTE  11/8/2017  7:40 AM    Randy Lozavais  [address] [place] [address]  YOB: 1950  [phone] (home)     A colonoscopy was performed on this patient at [facility] in [place], [place].  PCP: Randy Stapleton MD    Indication: previous adenomatous polyp  iron deficiency anemia.  Last Colonoscopy:4 years ago.  Informed signed consent was obtained from the patient and the risks and the benefits were discussed.  Alternative evaluation methods were discussed.   After this, the patient was laid in the left lateral decubitus position and sedation was obtained using MAC anesthesia.    A rectal exam was performed. Findings: No masses or hemorrhoids were noted.     The colonoscope was then introduced into the colon and was advanced all of the way to the cecum with ease. The ileocecal valve and the appendiceal area was identified.    Insertion Time:2:53 minutes.  Withdrawal Time:14:02 minutes.    Findings:  Anus: Normal on retroflexion.  Rectum: Normal.  Sigmoid Colon: Diverticulosis was noted in this region of the colon.  Descending Colon: he had 4 polyps in the descending colon.  Two were removed via hot snare polypectomy and 2 were removed via cold biopsy polypectomy.  They were <1mm-1mm in size.  Diverticulosis was noted in this region of the colon.  Transverse Colon: Normal.  Ascending Colon: Diverticulosis was noted in this region of the colon.  Cecum: Normal.  IC Valve:Normal    The patient tolerated the procedure well.  The patient's prep was excellent.    Impression:  1. Colon polyps  2.  Diverticulosis of the colon   3.  Iron deficiency anemia  4.  History of colon polyps    Plan:  Repeat the colonoscopy in 5 years for screening purposes.  Send a copy of this note to the referring physician.  Consider an EGD and further workup of his iron deficiency anemia.

## 2017-11-13 ENCOUNTER — LAB VISIT (OUTPATIENT)
Dept: LAB | Facility: HOSPITAL | Age: 67
End: 2017-11-13
Attending: FAMILY MEDICINE
Payer: MEDICARE

## 2017-11-13 DIAGNOSIS — D50.9 IRON DEFICIENCY ANEMIA, UNSPECIFIED IRON DEFICIENCY ANEMIA TYPE: ICD-10-CM

## 2017-11-13 LAB
BASOPHILS # BLD AUTO: 0.05 K/UL
BASOPHILS NFR BLD: 0.7 %
DIFFERENTIAL METHOD: ABNORMAL
EOSINOPHIL # BLD AUTO: 0.2 K/UL
EOSINOPHIL NFR BLD: 2 %
ERYTHROCYTE [DISTWIDTH] IN BLOOD BY AUTOMATED COUNT: 13.8 %
HCT VFR BLD AUTO: 41.9 %
HGB BLD-MCNC: 13.7 G/DL
IMM GRANULOCYTES # BLD AUTO: 0.03 K/UL
IMM GRANULOCYTES NFR BLD AUTO: 0.4 %
IRON SERPL-MCNC: 57 UG/DL
LYMPHOCYTES # BLD AUTO: 2.3 K/UL
LYMPHOCYTES NFR BLD: 30.5 %
MCH RBC QN AUTO: 29.3 PG
MCHC RBC AUTO-ENTMCNC: 32.7 G/DL
MCV RBC AUTO: 90 FL
MONOCYTES # BLD AUTO: 0.8 K/UL
MONOCYTES NFR BLD: 11.1 %
NEUTROPHILS # BLD AUTO: 4.2 K/UL
NEUTROPHILS NFR BLD: 55.3 %
NRBC BLD-RTO: 0 /100 WBC
PLATELET # BLD AUTO: 384 K/UL
PMV BLD AUTO: 9 FL
RBC # BLD AUTO: 4.68 M/UL
WBC # BLD AUTO: 7.55 K/UL

## 2017-11-13 PROCEDURE — 83540 ASSAY OF IRON: CPT

## 2017-11-13 PROCEDURE — 85025 COMPLETE CBC W/AUTO DIFF WBC: CPT

## 2017-11-13 PROCEDURE — 36415 COLL VENOUS BLD VENIPUNCTURE: CPT | Mod: PO

## 2017-11-14 ENCOUNTER — TELEPHONE (OUTPATIENT)
Dept: FAMILY MEDICINE | Facility: CLINIC | Age: 67
End: 2017-11-14

## 2017-11-14 DIAGNOSIS — D64.9 ANEMIA, UNSPECIFIED TYPE: Primary | ICD-10-CM

## 2017-11-20 ENCOUNTER — CLINICAL SUPPORT (OUTPATIENT)
Dept: CARDIOLOGY | Facility: CLINIC | Age: 67
End: 2017-11-20
Payer: MEDICARE

## 2017-11-20 DIAGNOSIS — I47.29 PAROXYSMAL VT: ICD-10-CM

## 2017-11-20 DIAGNOSIS — Z95.810 ICD (IMPLANTABLE CARDIOVERTER-DEFIBRILLATOR) IN PLACE: ICD-10-CM

## 2017-11-20 DIAGNOSIS — I42.9 CARDIOMYOPATHY: ICD-10-CM

## 2017-11-20 PROCEDURE — 93295 DEV INTERROG REMOTE 1/2/MLT: CPT | Mod: ,,, | Performed by: INTERNAL MEDICINE

## 2017-11-20 PROCEDURE — 93297 REM INTERROG DEV EVAL ICPMS: CPT | Mod: ,,, | Performed by: INTERNAL MEDICINE

## 2017-11-20 PROCEDURE — 93296 REM INTERROG EVL PM/IDS: CPT | Mod: PBBFAC,PO | Performed by: INTERNAL MEDICINE

## 2017-11-20 NOTE — PROGRESS NOTES
Routine Merlin ICD transmission.  Pt had ~6.5-7 hours of AF since last check, longest single episode 3h 32m; EGM examples below.

## 2018-01-01 RX ORDER — CARVEDILOL 25 MG/1
TABLET ORAL
Qty: 60 TABLET | Refills: 12 | Status: SHIPPED | OUTPATIENT
Start: 2018-01-01 | End: 2019-01-20 | Stop reason: SDUPTHER

## 2018-02-11 DIAGNOSIS — I10 ESSENTIAL HYPERTENSION: ICD-10-CM

## 2018-02-11 DIAGNOSIS — I51.89 DIASTOLIC DYSFUNCTION: ICD-10-CM

## 2018-02-12 RX ORDER — SPIRONOLACTONE 25 MG/1
TABLET ORAL
Qty: 90 TABLET | Refills: 4 | Status: SHIPPED | OUTPATIENT
Start: 2018-02-12 | End: 2020-07-31 | Stop reason: SDUPTHER

## 2018-02-16 ENCOUNTER — LAB VISIT (OUTPATIENT)
Dept: LAB | Facility: HOSPITAL | Age: 68
End: 2018-02-16
Attending: FAMILY MEDICINE
Payer: MEDICARE

## 2018-02-16 DIAGNOSIS — D64.9 ANEMIA, UNSPECIFIED TYPE: ICD-10-CM

## 2018-02-16 LAB
BASOPHILS # BLD AUTO: 0.04 K/UL
BASOPHILS NFR BLD: 0.6 %
DIFFERENTIAL METHOD: ABNORMAL
EOSINOPHIL # BLD AUTO: 0.1 K/UL
EOSINOPHIL NFR BLD: 1.8 %
ERYTHROCYTE [DISTWIDTH] IN BLOOD BY AUTOMATED COUNT: 13.3 %
HCT VFR BLD AUTO: 39.1 %
HGB BLD-MCNC: 13 G/DL
IMM GRANULOCYTES # BLD AUTO: 0.01 K/UL
IMM GRANULOCYTES NFR BLD AUTO: 0.2 %
IRON SERPL-MCNC: 149 UG/DL
LYMPHOCYTES # BLD AUTO: 2.2 K/UL
LYMPHOCYTES NFR BLD: 35.4 %
MCH RBC QN AUTO: 30.2 PG
MCHC RBC AUTO-ENTMCNC: 33.2 G/DL
MCV RBC AUTO: 91 FL
MONOCYTES # BLD AUTO: 0.8 K/UL
MONOCYTES NFR BLD: 12.6 %
NEUTROPHILS # BLD AUTO: 3.1 K/UL
NEUTROPHILS NFR BLD: 49.4 %
NRBC BLD-RTO: 0 /100 WBC
PLATELET # BLD AUTO: 343 K/UL
PMV BLD AUTO: 9 FL
RBC # BLD AUTO: 4.31 M/UL
WBC # BLD AUTO: 6.28 K/UL

## 2018-02-16 PROCEDURE — 83540 ASSAY OF IRON: CPT

## 2018-02-16 PROCEDURE — 85025 COMPLETE CBC W/AUTO DIFF WBC: CPT

## 2018-02-16 PROCEDURE — 36415 COLL VENOUS BLD VENIPUNCTURE: CPT | Mod: PO

## 2018-02-19 ENCOUNTER — TELEPHONE (OUTPATIENT)
Dept: FAMILY MEDICINE | Facility: CLINIC | Age: 68
End: 2018-02-19

## 2018-02-19 DIAGNOSIS — D64.9 ANEMIA, UNSPECIFIED TYPE: Primary | ICD-10-CM

## 2018-02-19 NOTE — TELEPHONE ENCOUNTER
----- Message from Randy Stapleton MD sent at 2/17/2018  9:57 AM CST -----  Fe level good but still borderline anemic. Since c scope was ok (polyp-tub adenoma) last fall I'd just pamela Fe CBC in 3 m but if it doesn't get back to nl we may rec heme con

## 2018-03-23 ENCOUNTER — OFFICE VISIT (OUTPATIENT)
Dept: CARDIOLOGY | Facility: CLINIC | Age: 68
End: 2018-03-23
Payer: MEDICARE

## 2018-03-23 ENCOUNTER — CLINICAL SUPPORT (OUTPATIENT)
Dept: CARDIOLOGY | Facility: CLINIC | Age: 68
End: 2018-03-23
Attending: INTERNAL MEDICINE
Payer: MEDICARE

## 2018-03-23 VITALS
HEIGHT: 70 IN | BODY MASS INDEX: 38.38 KG/M2 | DIASTOLIC BLOOD PRESSURE: 80 MMHG | SYSTOLIC BLOOD PRESSURE: 142 MMHG | WEIGHT: 268.06 LBS

## 2018-03-23 DIAGNOSIS — I47.29 PAROXYSMAL VT: ICD-10-CM

## 2018-03-23 DIAGNOSIS — Z45.02 ICD (IMPLANTABLE CARDIOVERTER-DEFIBRILLATOR) DISCHARGE: Primary | ICD-10-CM

## 2018-03-23 DIAGNOSIS — E66.01 MORBID OBESITY: ICD-10-CM

## 2018-03-23 DIAGNOSIS — R07.89 CHEST PRESSURE: ICD-10-CM

## 2018-03-23 DIAGNOSIS — Z95.810 ICD (IMPLANTABLE CARDIOVERTER-DEFIBRILLATOR) IN PLACE: ICD-10-CM

## 2018-03-23 DIAGNOSIS — I43 CARDIOMYOPATHY DUE TO SYSTEMIC DISEASE: ICD-10-CM

## 2018-03-23 DIAGNOSIS — G47.33 OBSTRUCTIVE SLEEP APNEA SYNDROME: ICD-10-CM

## 2018-03-23 DIAGNOSIS — I48.0 PAF (PAROXYSMAL ATRIAL FIBRILLATION): ICD-10-CM

## 2018-03-23 PROCEDURE — 99214 OFFICE O/P EST MOD 30 MIN: CPT | Mod: S$PBB,,, | Performed by: INTERNAL MEDICINE

## 2018-03-23 PROCEDURE — 99213 OFFICE O/P EST LOW 20 MIN: CPT | Mod: PBBFAC,PO,25 | Performed by: INTERNAL MEDICINE

## 2018-03-23 PROCEDURE — 93290 INTERROG DEV EVAL ICPMS IP: CPT | Mod: 26,S$PBB,, | Performed by: INTERNAL MEDICINE

## 2018-03-23 PROCEDURE — 93283 PRGRMG EVAL IMPLANTABLE DFB: CPT | Mod: PBBFAC,PO | Performed by: INTERNAL MEDICINE

## 2018-03-23 PROCEDURE — 99999 PR PBB SHADOW E&M-EST. PATIENT-LVL III: CPT | Mod: PBBFAC,,, | Performed by: INTERNAL MEDICINE

## 2018-03-23 NOTE — PROGRESS NOTES
"  Subjective:   Patient ID:  Randy Yap is a 68 y.o. male     Chief complaint:Pacemaker Check      HPI  Background as recorded in my last note (4/21/17 ):    Background:  68 man  Frequent, Sxc PMVT related to R on T V pacing in the setting of DDD programming and junctional beats.    From my note on 9/14/16:  Last visit we DCed Sotalol and reprogrammed pacer to VVI back up  Since then, he has had no N/S Sx and no VT per his ICD records  He is feeling very well  2 months ago we decreased amio to 200 a day(but there seems to be some confusion with what he is taking-- he seems to be on 400 a day) and he still is feeling well from a PVC point of view but his wife noticed that he was retaining fluid at a time when he was c/o ALDANA-- this was last week and he increased his lasix and he felt better enough to go back to 1 pill a day  ICD eval today. He is in AAI mode at 67 and he paces 67%-- no VTNS   He does exercise now -- he has been able to swim back and forth in his pool -- which he could not do even a half a pool a few months ago.     Update on 12/14/16:  We were planning to gradually wean amio and we have decreased the dose to 200 a day so far -- no issues since then and ICD shows no VT  He has had PFT with [facility] -- results pending.   He wants to switch to this practice.   He has an ICD that is on battery advisory -- he has darci educated and he is on Merlin remote monitoring -- vibration alert turned on     Update 4/21/17:  " I am doing fantastic"  Exercising and working in the yard like he had not done in a long time.   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     fluticasone furoate (ARNUITY ELLIPTA) 100 mcg/actuation DsDv Inhale into the lungs. Controller    furosemide (LASIX) 20 MG tablet Take 1 tablet (20 mg total) by mouth every morning.    spironolactone (ALDACTONE) 25 MG tablet take 1 tablet by mouth once daily    trazodone (DESYREL) 150 MG tablet Take 1 tablet (150 mg total) by mouth every evening.    valsartan (DIOVAN) 160 MG tablet Take 1 tablet (160 mg total) by mouth once daily.    acetaminophen (TYLENOL) 325 MG tablet Take 650 mg by mouth as needed.     glucosamine-chondroitin 500-400 mg tablet Take 1 tablet by mouth once daily.     multivitamin (THERAGRAN) tablet Take 1 tablet by mouth once daily.      promethazine (PHENERGAN) 25 MG tablet Take 1 tablet (25 mg total) by mouth every 6 (six) hours as needed for Nausea.    sodium,potassium,mag sulfates (SUPREP BOWEL PREP KIT) 17.5-3.13-1.6 gram SolR Take as instructed on prep sheet     No current facility-administered medications for this visit.      Review of Systems   Constitution: Positive for weight loss. Negative for decreased appetite, weakness, malaise/fatigue and weight gain.   Eyes: Negative for blurred vision.   Cardiovascular: Positive for leg swelling. Negative for chest pain, claudication, cyanosis, dyspnea on exertion, irregular heartbeat, near-syncope, orthopnea and palpitations.   Respiratory: Negative for cough, shortness of breath, sleep disturbances due to breathing, snoring and wheezing.    Endocrine: Negative for heat intolerance.   Hematologic/Lymphatic: Does not bruise/bleed easily.   Musculoskeletal: Negative for muscle weakness and myalgias.    Gastrointestinal: Negative for melena, nausea and vomiting.   Genitourinary: Negative for nocturia.   Neurological: Negative for excessive daytime sleepiness, dizziness, headaches and light-headedness.   Psychiatric/Behavioral: Negative for depression, memory loss and substance abuse. The patient does not have insomnia and is not nervous/anxious.        Objective:   Physical Exam   Constitutional: He is oriented to person, place, and time. He appears well-developed and well-nourished.   overweight   HENT:   Head: Normocephalic and atraumatic.   Right Ear: External ear normal.   Left Ear: External ear normal.   Eyes: Conjunctivae are normal. Pupils are equal, round, and reactive to light. Left conjunctiva is not injected. Left conjunctiva has no hemorrhage.   Neck: Neck supple. No JVD present. No thyromegaly present.   Cardiovascular: Normal rate, regular rhythm, normal heart sounds and intact distal pulses.  PMI is not displaced.  Exam reveals no gallop, no friction rub, no midsystolic click and no opening snap.    No murmur heard.  Pulses:       Carotid pulses are 2+ on the right side, and 2+ on the left side.       Radial pulses are 2+ on the right side, and 2+ on the left side.        Dorsalis pedis pulses are 2+ on the right side, and 2+ on the left side.        Posterior tibial pulses are 2+ on the right side, and 2+ on the left side.   Pulmonary/Chest: Effort normal and breath sounds normal. No respiratory distress. He has no wheezes. He has no rales. He exhibits no tenderness.   Abdominal: Soft. Normal appearance. He exhibits no pulsatile liver. There is no hepatomegaly. There is no tenderness. There is no rigidity and no guarding.   Obese abdomen   Musculoskeletal: Normal range of motion. He exhibits no edema or tenderness.        Right knee: He exhibits no swelling.        Left knee: He exhibits no swelling.        Right ankle: He exhibits no swelling.        Left ankle: He exhibits no swelling.         "Right lower leg: He exhibits no swelling.        Left lower leg: He exhibits no swelling.        Right foot: There is no swelling.        Left foot: There is no swelling.   Neurological: He is alert and oriented to person, place, and time. He has normal strength and normal reflexes. No cranial nerve deficit. Coordination normal.   Skin: Skin is warm and dry. No rash noted. No cyanosis. No pallor.   Psychiatric: He has a normal mood and affect. His behavior is normal.   Nursing note and vitals reviewed.    BP (!) 142/80 (BP Location: Right arm, Patient Position: Sitting)   Ht 5' 10" (1.778 m)   Wt 121.6 kg (268 lb 1.3 oz)   BMI 38.47 kg/m²      Assessment:      1. ICD (implantable cardioverter-defibrillator) discharge    2. Morbid obesity    3. Obstructive sleep apnea syndrome    4. Paroxysmal VT    5. 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 PAF (paroxysmal atrial fibrillation)    7. Chest pressure        Plan:    Will enroll him in [facility] (blinded ASA vs Eliquis for SCAF)-- he agrees  Orders Placed This Encounter   Procedures    Cardiac PET Scan Stress     Standing Status:   Future     Standing Expiration Date:   3/23/2019    EKG 12-lead     Standing Status:   Future     Standing Expiration Date:   3/23/2019     Order Specific Question:   Diagnosis     Answer:   Chest discomfort [[id number]]     Follow-up in about 1 year (around 3/23/2019).  There are no discontinued medications.  New Prescriptions    No medications on file     Modified Medications    No medications on file              "

## 2018-04-01 DIAGNOSIS — I51.89 DIASTOLIC DYSFUNCTION: ICD-10-CM

## 2018-04-01 DIAGNOSIS — I10 ESSENTIAL HYPERTENSION: ICD-10-CM

## 2018-04-02 RX ORDER — FUROSEMIDE 20 MG/1
TABLET ORAL
Qty: 90 TABLET | Refills: 3 | Status: SHIPPED | OUTPATIENT
Start: 2018-04-02 | End: 2019-04-14 | Stop reason: SDUPTHER

## 2018-04-20 ENCOUNTER — CLINICAL SUPPORT (OUTPATIENT)
Dept: CARDIOLOGY | Facility: CLINIC | Age: 68
End: 2018-04-20
Attending: INTERNAL MEDICINE
Payer: MEDICARE

## 2018-04-20 DIAGNOSIS — I42.9 CARDIOMYOPATHY: ICD-10-CM

## 2018-04-20 DIAGNOSIS — Z95.810 ICD (IMPLANTABLE CARDIOVERTER-DEFIBRILLATOR) IN PLACE: ICD-10-CM

## 2018-04-20 DIAGNOSIS — I47.29 PAROXYSMAL VT: ICD-10-CM

## 2018-04-20 PROCEDURE — 93295 DEV INTERROG REMOTE 1/2/MLT: CPT | Mod: ,,, | Performed by: INTERNAL MEDICINE

## 2018-04-20 PROCEDURE — 93297 REM INTERROG DEV EVAL ICPMS: CPT | Mod: ,,, | Performed by: INTERNAL MEDICINE

## 2018-04-20 PROCEDURE — 93296 REM INTERROG EVL PM/IDS: CPT | Mod: PBBFAC,PO | Performed by: INTERNAL MEDICINE

## 2018-05-14 RX ORDER — TRAZODONE HYDROCHLORIDE 150 MG/1
TABLET ORAL
Qty: 90 TABLET | Refills: 3 | Status: SHIPPED | OUTPATIENT
Start: 2018-05-14 | End: 2019-05-18 | Stop reason: SDUPTHER

## 2018-05-16 ENCOUNTER — OFFICE VISIT (OUTPATIENT)
Dept: FAMILY MEDICINE | Facility: CLINIC | Age: 68
End: 2018-05-16
Payer: MEDICARE

## 2018-05-16 VITALS
WEIGHT: 265 LBS | BODY MASS INDEX: 37.94 KG/M2 | SYSTOLIC BLOOD PRESSURE: 106 MMHG | DIASTOLIC BLOOD PRESSURE: 65 MMHG | HEART RATE: 84 BPM | OXYGEN SATURATION: 97 % | TEMPERATURE: 99 F | HEIGHT: 70 IN

## 2018-05-16 DIAGNOSIS — J40 BRONCHITIS: Primary | ICD-10-CM

## 2018-05-16 DIAGNOSIS — R06.02 SOB (SHORTNESS OF BREATH): ICD-10-CM

## 2018-05-16 DIAGNOSIS — R05.9 COUGH: ICD-10-CM

## 2018-05-16 DIAGNOSIS — R06.2 WHEEZING: ICD-10-CM

## 2018-05-16 PROCEDURE — 99999 PR PBB SHADOW E&M-EST. PATIENT-LVL IV: CPT | Mod: PBBFAC,,, | Performed by: NURSE PRACTITIONER

## 2018-05-16 PROCEDURE — 99213 OFFICE O/P EST LOW 20 MIN: CPT | Mod: S$PBB,,, | Performed by: NURSE PRACTITIONER

## 2018-05-16 PROCEDURE — 99214 OFFICE O/P EST MOD 30 MIN: CPT | Mod: PBBFAC,PO | Performed by: NURSE PRACTITIONER

## 2018-05-16 RX ORDER — PROMETHAZINE HYDROCHLORIDE AND DEXTROMETHORPHAN HYDROBROMIDE 6.25; 15 MG/5ML; MG/5ML
5 SYRUP ORAL 2 TIMES DAILY PRN
Qty: 118 ML | Refills: 0 | Status: SHIPPED | OUTPATIENT
Start: 2018-05-16 | End: 2018-05-26

## 2018-05-16 RX ORDER — CEFDINIR 300 MG/1
300 CAPSULE ORAL 2 TIMES DAILY
Qty: 20 CAPSULE | Refills: 0 | Status: SHIPPED | OUTPATIENT
Start: 2018-05-16 | End: 2018-05-26

## 2018-05-16 RX ORDER — ALBUTEROL SULFATE 90 UG/1
2 AEROSOL, METERED RESPIRATORY (INHALATION) EVERY 6 HOURS PRN
Qty: 1 INHALER | Refills: 0 | Status: SHIPPED | OUTPATIENT
Start: 2018-05-16 | End: 2018-06-22 | Stop reason: SDUPTHER

## 2018-05-16 RX ORDER — METHYLPREDNISOLONE 4 MG/1
TABLET ORAL
Qty: 1 PACKAGE | Refills: 0 | Status: SHIPPED | OUTPATIENT
Start: 2018-05-16 | End: 2018-06-06

## 2018-05-16 NOTE — PROGRESS NOTES
Subjective:       Patient ID: Randy Yap is a 68 y.o. male.    Chief Complaint: No chief complaint on file.    Cough   This is a new problem. The current episode started 1 to 4 weeks ago. The problem has been unchanged. The cough is productive of sputum. Associated symptoms include chest pain (with cough), nasal congestion, rhinorrhea, shortness of breath and wheezing. Pertinent negatives include no chills, ear congestion, ear pain, fever, headaches, heartburn, hemoptysis, myalgias, postnasal drip, rash, sore throat, sweats or weight loss. Nothing aggravates the symptoms. He has tried nothing for the symptoms. The treatment provided no relief. There is no history of asthma, bronchiectasis, bronchitis, COPD, emphysema, environmental allergies or pneumonia.     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     Social History Main Topics    Smoking status: Never Smoker    Smokeless tobacco: Never Used    Alcohol use 0.5 oz/week     1 Standard drinks or equivalent per week      Comment: occasional glass of wine on social occasions    Drug use: No    Sexual activity: Not on file     Social History Narrative    No narrative on file     Past Surgical History:   Procedure Laterality Date    CARDIAC DEFIBRILLATOR PLACEMENT  10/6/2014    COLONOSCOPY W/ POLYPECTOMY  5/21/2013    repeat 5/21/2018    HERNIA REPAIR      R inguinal    pace maker   10/6/2014    PERICARDIAL WINDOW  12-15 years ago    tonsillectomy      VARICOCELECTOMY          Review of Systems   Constitutional: Negative.  Negative for chills, fever and weight loss.   HENT: Positive for rhinorrhea. Negative for ear pain, postnasal drip and sore throat.    Eyes: Negative.    Respiratory: Positive for cough, shortness of breath and wheezing. Negative for hemoptysis.    Cardiovascular: Positive for chest pain (with cough).   Gastrointestinal: Negative.  Negative for heartburn.   Endocrine: Negative.    Genitourinary: Negative.    Musculoskeletal: Negative.  Negative for myalgias.   Skin: Negative.  Negative for rash.   Allergic/Immunologic: Negative.  Negative for environmental allergies.   Neurological: Negative.  Negative for headaches.   Psychiatric/Behavioral: Negative.        Objective:      Physical Exam   Constitutional: He is oriented to person, place, and time. He appears well-developed and well-nourished.   HENT:   Head: Normocephalic.   Right Ear: Hearing, tympanic membrane, external ear and ear canal normal.   Left Ear: Hearing, tympanic membrane, external ear and ear canal normal.   Nose: Mucosal edema and rhinorrhea present. Right sinus exhibits no maxillary sinus tenderness and no frontal sinus tenderness. Left sinus exhibits no maxillary sinus tenderness and no frontal sinus tenderness.   Mouth/Throat: Uvula is midline, oropharynx is clear and moist and mucous membranes are normal.   Eyes: Conjunctivae are normal. Pupils are equal, round, and reactive to light.   Neck: Normal range of motion. Neck supple.   Cardiovascular: Normal rate, regular rhythm and normal heart sounds.    Pulmonary/Chest: Effort normal. He has wheezes in the right upper field, the right lower field, the left upper field and the left lower field.   Abdominal: Soft. Bowel sounds are normal.   Musculoskeletal: Normal range of motion.   Neurological: He is alert and oriented to person, place, and time.   Skin: Skin is warm and dry. Capillary refill takes 2 to 3 seconds.   Psychiatric: He has a normal  mood and affect. His behavior is normal. Judgment and thought content normal.   Nursing note and vitals reviewed.      Assessment:       1. Bronchitis    2. Cough    3. Wheezing    4. SOB (shortness of breath)        Plan:           Diagnoses and all orders for this visit:    Bronchitis  Cough  Wheezing  SOB (shortness of breath)  -     cefdinir (OMNICEF) 300 MG capsule; Take 1 capsule (300 mg total) by mouth 2 (two) times daily.  -     albuterol 90 mcg/actuation inhaler; Inhale 2 puffs into the lungs every 6 (six) hours as needed for Wheezing or Shortness of Breath. Rescue  -     methylPREDNISolone (MEDROL DOSEPACK) 4 mg tablet; use as directed  -     promethazine-dextromethorphan (PROMETHAZINE-DM) 6.25-15 mg/5 mL Syrp; Take 5 mLs by mouth 2 (two) times daily as needed.        Report to ER immediately if symptoms worsen

## 2018-05-21 ENCOUNTER — LAB VISIT (OUTPATIENT)
Dept: LAB | Facility: HOSPITAL | Age: 68
End: 2018-05-21
Attending: FAMILY MEDICINE
Payer: MEDICARE

## 2018-05-21 DIAGNOSIS — D64.9 ANEMIA, UNSPECIFIED TYPE: ICD-10-CM

## 2018-05-21 LAB
BASOPHILS # BLD AUTO: 0.05 K/UL
BASOPHILS NFR BLD: 0.6 %
DIFFERENTIAL METHOD: ABNORMAL
EOSINOPHIL # BLD AUTO: 0.1 K/UL
EOSINOPHIL NFR BLD: 1.2 %
ERYTHROCYTE [DISTWIDTH] IN BLOOD BY AUTOMATED COUNT: 12.9 %
HCT VFR BLD AUTO: 41.7 %
HGB BLD-MCNC: 13.5 G/DL
IMM GRANULOCYTES # BLD AUTO: 0.05 K/UL
IMM GRANULOCYTES NFR BLD AUTO: 0.6 %
IRON SERPL-MCNC: 61 UG/DL
LYMPHOCYTES # BLD AUTO: 2.5 K/UL
LYMPHOCYTES NFR BLD: 32.5 %
MCH RBC QN AUTO: 30.5 PG
MCHC RBC AUTO-ENTMCNC: 32.4 G/DL
MCV RBC AUTO: 94 FL
MONOCYTES # BLD AUTO: 0.8 K/UL
MONOCYTES NFR BLD: 10.2 %
NEUTROPHILS # BLD AUTO: 4.3 K/UL
NEUTROPHILS NFR BLD: 54.9 %
NRBC BLD-RTO: 0 /100 WBC
PLATELET # BLD AUTO: 368 K/UL
PMV BLD AUTO: 9 FL
RBC # BLD AUTO: 4.43 M/UL
WBC # BLD AUTO: 7.81 K/UL

## 2018-05-21 PROCEDURE — 36415 COLL VENOUS BLD VENIPUNCTURE: CPT | Mod: PO

## 2018-05-21 PROCEDURE — 83540 ASSAY OF IRON: CPT

## 2018-05-21 PROCEDURE — 85025 COMPLETE CBC W/AUTO DIFF WBC: CPT

## 2018-06-22 ENCOUNTER — OFFICE VISIT (OUTPATIENT)
Dept: PULMONOLOGY | Facility: CLINIC | Age: 68
End: 2018-06-22
Payer: MEDICARE

## 2018-06-22 VITALS
DIASTOLIC BLOOD PRESSURE: 80 MMHG | SYSTOLIC BLOOD PRESSURE: 130 MMHG | BODY MASS INDEX: 38.98 KG/M2 | HEART RATE: 92 BPM | HEIGHT: 70 IN | OXYGEN SATURATION: 96 % | RESPIRATION RATE: 16 BRPM | WEIGHT: 272.25 LBS

## 2018-06-22 DIAGNOSIS — G47.33 OSA ON CPAP: ICD-10-CM

## 2018-06-22 DIAGNOSIS — J45.30 MILD PERSISTENT ASTHMA WITHOUT COMPLICATION: Primary | ICD-10-CM

## 2018-06-22 PROCEDURE — 99999 PR PBB SHADOW E&M-EST. PATIENT-LVL III: CPT | Mod: PBBFAC,,, | Performed by: INTERNAL MEDICINE

## 2018-06-22 PROCEDURE — 99213 OFFICE O/P EST LOW 20 MIN: CPT | Mod: PBBFAC,PO | Performed by: INTERNAL MEDICINE

## 2018-06-22 PROCEDURE — 99214 OFFICE O/P EST MOD 30 MIN: CPT | Mod: S$PBB,,, | Performed by: INTERNAL MEDICINE

## 2018-06-22 RX ORDER — ALBUTEROL SULFATE 90 UG/1
2 AEROSOL, METERED RESPIRATORY (INHALATION) EVERY 4 HOURS PRN
Qty: 1 INHALER | Refills: 6 | Status: SHIPPED | OUTPATIENT
Start: 2018-06-22 | End: 2020-01-02

## 2018-06-22 NOTE — PROGRESS NOTES
Pulmonary Outpatient Follow Up Visit     Subjective:    This patient was seen and evaluated by me previously at [facility].  Last evaluation was August 7, 2017.     Patient ID: Randy Yap is a 68 y.o. male.    Chief Complaint: Sleep Apnea      HPI     Patient known with obstructive sleep apnea as of 2014 on a sleep study which showed that his CPAP is 14 cm water.  He is also known with mild asthma and air trapping according to a PFT done on August 7, 2017.  He is to be on albuterol as needed and Arnuity 100 mcg 1 puff daily.  He had some worsening of his wheezing and coughing a month ago due to upper respiratory infection now he is feeling better, using  rarely albuterol, compliant with Arnuity 100 mcg daily.    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  Used to be on amiodarone now off amiodarone since his last visit to see me in August 2017.  Requesting a new machine and new supplies.    Review of Systems   Constitutional: Negative for fever.   Respiratory: Positive for apnea and snoring.    Musculoskeletal: Positive for arthralgias and back pain.   Gastrointestinal: Negative for vomiting.   Neurological: Negative for syncope.   Hematological: Negative for adenopathy.   Psychiatric/Behavioral: Positive for sleep disturbance. Negative for confusion.        History of depression         Outpatient Encounter Prescriptions as of 6/22/2018   Medication Sig Dispense Refill    acetaminophen (TYLENOL) 325 MG tablet Take 650 mg by mouth as needed.       carvedilol (COREG) 25 MG tablet take 1 tablet by mouth twice a day 60 tablet 12    fluticasone furoate (ARNUITY ELLIPTA) 100 mcg/actuation DsDv Inhale into the lungs. Controller      furosemide (LASIX) 20 MG tablet take 1 tablet by mouth every morning 90 tablet 3    glucosamine-chondroitin 500-400 mg tablet Take 1 tablet by mouth once daily.       multivitamin  "(THERAGRAN) tablet Take 1 tablet by mouth once daily.        spironolactone (ALDACTONE) 25 MG tablet take 1 tablet by mouth once daily 90 tablet 4    traZODone (DESYREL) 150 MG tablet take 1 tablet by mouth every evening 90 tablet 3    valsartan (DIOVAN) 160 MG tablet Take 1 tablet (160 mg total) by mouth once daily. 90 tablet 4    albuterol 90 mcg/actuation inhaler Inhale 2 puffs into the lungs every 4 (four) hours as needed for Wheezing or Shortness of Breath (cough). Rescue 1 Inhaler 6    co-enzyme Q-10 30 mg capsule Take 30 mg by mouth once daily.       [DISCONTINUED] albuterol 90 mcg/actuation inhaler Inhale 2 puffs into the lungs every 6 (six) hours as needed for Wheezing or Shortness of Breath. Rescue 1 Inhaler 0     No facility-administered encounter medications on file as of 6/22/2018.        Objective:     Vital Signs (Most Recent)  Vital Signs  Pulse: 92  Resp: 16  SpO2: 96 %  BP: 130/80  Height and Weight  Height: 5' 10" (177.8 cm)  Weight: 123.5 kg (272 lb 4.3 oz)  BSA (Calculated - sq m): 2.47 sq meters  BMI (Calculated): 39.1  Weight in (lb) to have BMI = 25: 173.9]  Wt Readings from Last 3 Encounters:   06/22/18 123.5 kg (272 lb 4.3 oz)   05/16/18 120.2 kg (265 lb)   03/23/18 121.6 kg (268 lb 1.3 oz)     Temp Readings from Last 3 Encounters:   05/16/18 99.1 °F (37.3 °C)   02/17/16 98.5 °F (36.9 °C) (Oral)   01/12/15 98.3 °F (36.8 °C) (Oral)     Height: 5' 10" (177.8 cm)          Physical Exam   Constitutional: He is oriented to person, place, and time. He appears well-developed and well-nourished.   HENT:   Head: Normocephalic.   Neck: Neck supple.   Cardiovascular: Normal rate and regular rhythm.    Pulmonary/Chest: Normal expansion and effort normal. He has decreased breath sounds. He has wheezes.   Abdominal: Soft. There is no tenderness.   Musculoskeletal: He exhibits no edema.   Lymphadenopathy:     He has no cervical adenopathy.   Neurological: He is alert and oriented to person, place, " and time.   Skin: Skin is warm.   Psychiatric: He has a normal mood and affect.       Laboratory    Lab Results   Component Value Date    WBC 7.81 05/21/2018    HGB 13.5 (L) 05/21/2018    HCT 41.7 05/21/2018    MCV 94 05/21/2018     (H) 05/21/2018     BMP  Lab Results   Component Value Date     09/06/2017    K 4.5 09/06/2017     09/06/2017    CO2 28 09/06/2017    BUN 12 09/06/2017    CREATININE 1.0 09/06/2017    CALCIUM 8.8 09/06/2017    ANIONGAP 7 (L) 09/06/2017    ESTGFRAFRICA >60.0 09/06/2017    EGFRNONAA >60.0 09/06/2017     BNP  @LABRCNTIP(BNP,BNPTRIAGEBLO)@  Lab Results   Component Value Date    TSH 1.493 09/14/2016     ABG  @LABRCNTIP(PH,PO2,PCO2,HCO3,BE)@    Diagnostic Results:  CPAP titration done in 2014 September.  CPAP 14 cm water.    PFT August 7, 2017 at [facility] showed air trapping.  Mild obstruction.    Chest x-ray 2016 showed cardiomegaly and ICD otherwise within normal.  Assessment/Plan:   Mild persistent asthma without complication  -     albuterol 90 mcg/actuation inhaler; Inhale 2 puffs into the lungs every 4 (four) hours as needed for Wheezing or Shortness of Breath (cough). Rescue  Dispense: 1 Inhaler; Refill: 6  -     Complete PFT with bronchodilator; Future; Expected date: 09/22/2018    HONEY on CPAP  -     CPAP FOR HOME USE    BMI 39.0-39.9,adult  -     Ambulatory Consult to [facility]      Follow-up in about 3 months (around 9/22/2018).    This note was prepared using voice recognition system and is likely to have sound alike errors that may have been overlooked even after proof reading.  Please call me with any questions    Discussed diagnosis, its evaluation, treatment and usual course. All questions answered.    Thank you for the courtesy of participating in the care of this patient    Kaur Jones MD

## 2018-06-22 NOTE — LETTER
June 22, 2018      Nirav Baldwin MD  [address]  [facility] [address]           [facility]  [address] [place] [address]  Phone: [phone]  Fax: [phone]          Patient: Randy Yap   MR Number: [id number]   YOB: 1950   Date of Visit: 6/22/2018       Dear Dr. Nirav Baldwin:    Thank you for referring Randy Yap to me for evaluation. Attached you will find relevant portions of my assessment and plan of care.    If you have questions, please do not hesitate to call me. I look forward to following Randy Yap along with you.    Sincerely,    Kaur Jones MD    Enclosure  CC:  No Recipients    If you would like to receive this communication electronically, please contact [email or web address] or [phone] to request more information on [employer] Link access.    For providers and/or their staff who would like to refer a patient to [facility], please contact us through our one-stop-shop provider referral line, [facility], at [phone].    If you feel you have received this communication in error or would no longer like to receive these types of communications, please e-mail [email or web address]

## 2018-06-25 ENCOUNTER — CLINICAL SUPPORT (OUTPATIENT)
Dept: CARDIOLOGY | Facility: CLINIC | Age: 68
End: 2018-06-25
Attending: INTERNAL MEDICINE
Payer: MEDICARE

## 2018-06-25 DIAGNOSIS — I42.9 CARDIOMYOPATHY: ICD-10-CM

## 2018-06-25 DIAGNOSIS — I47.29 PAROXYSMAL VT: ICD-10-CM

## 2018-06-25 DIAGNOSIS — Z95.810 ICD (IMPLANTABLE CARDIOVERTER-DEFIBRILLATOR) IN PLACE: ICD-10-CM

## 2018-06-25 PROCEDURE — 93295 DEV INTERROG REMOTE 1/2/MLT: CPT | Mod: ,,, | Performed by: INTERNAL MEDICINE

## 2018-06-25 PROCEDURE — 93296 REM INTERROG EVL PM/IDS: CPT | Mod: PBBFAC,PO | Performed by: INTERNAL MEDICINE

## 2018-06-25 PROCEDURE — 93297 REM INTERROG DEV EVAL ICPMS: CPT | Mod: ,,, | Performed by: INTERNAL MEDICINE

## 2018-06-27 ENCOUNTER — TELEPHONE (OUTPATIENT)
Dept: CARDIOLOGY | Facility: CLINIC | Age: 68
End: 2018-06-27

## 2018-06-27 DIAGNOSIS — Z00.6 EXAMINATION OF PARTICIPANT IN CLINICAL TRIAL: ICD-10-CM

## 2018-06-27 DIAGNOSIS — I43 CARDIOMYOPATHY DUE TO SYSTEMIC DISEASE: Primary | ICD-10-CM

## 2018-06-28 ENCOUNTER — CLINICAL SUPPORT (OUTPATIENT)
Dept: CARDIOLOGY | Facility: CLINIC | Age: 68
End: 2018-06-28
Attending: INTERNAL MEDICINE
Payer: MEDICARE

## 2018-06-28 ENCOUNTER — TELEPHONE (OUTPATIENT)
Dept: CARDIOLOGY | Facility: CLINIC | Age: 68
End: 2018-06-28

## 2018-06-28 ENCOUNTER — HOSPITAL ENCOUNTER (OUTPATIENT)
Dept: CARDIOLOGY | Facility: CLINIC | Age: 68
Discharge: HOME | End: 2018-06-28
Payer: MEDICARE

## 2018-06-28 ENCOUNTER — RESEARCH ENCOUNTER (OUTPATIENT)
Dept: RESEARCH | Facility: HOSPITAL | Age: 68
End: 2018-06-28
Payer: MEDICARE

## 2018-06-28 DIAGNOSIS — I48.0 PAROXYSMAL ATRIAL FIBRILLATION: Primary | ICD-10-CM

## 2018-06-28 DIAGNOSIS — Z00.6 EXAMINATION OF PARTICIPANT IN CLINICAL TRIAL: ICD-10-CM

## 2018-06-28 DIAGNOSIS — R07.89 CHEST PRESSURE: ICD-10-CM

## 2018-06-28 DIAGNOSIS — I48.0 PAROXYSMAL ATRIAL FIBRILLATION: ICD-10-CM

## 2018-06-28 LAB — DIASTOLIC DYSFUNCTION: NO

## 2018-06-28 PROCEDURE — 93005 ELECTROCARDIOGRAM TRACING: CPT | Mod: PBBFAC | Performed by: INTERNAL MEDICINE

## 2018-06-28 PROCEDURE — 93016 CV STRESS TEST SUPVJ ONLY: CPT | Mod: S$PBB,,, | Performed by: INTERNAL MEDICINE

## 2018-06-28 PROCEDURE — 93010 ELECTROCARDIOGRAM REPORT: CPT | Mod: S$PBB,,, | Performed by: INTERNAL MEDICINE

## 2018-06-28 PROCEDURE — 93018 CV STRESS TEST I&R ONLY: CPT | Mod: S$PBB,,, | Performed by: INTERNAL MEDICINE

## 2018-06-28 PROCEDURE — 78492 MYOCRD IMG PET MLT RST&STRS: CPT | Mod: PBBFAC | Performed by: INTERNAL MEDICINE

## 2018-06-28 NOTE — PROGRESS NOTES
Date Consent signed: 6/28/2018     Sponsor: [employer]     Study Title/IRB Number: [id number]     Principle Investigator: Nirav Baldwin MD     Present for Discussion: Patient & CRC     Is LAR Consenting for Subject: No     Prior to the Informed Consent (IC) being signed, or any study protocol required data collection, testing, procedure, or intervention being performed, the following was done and/or discussed:  · Patient was given a copy of the IC for review   · Purpose of the study and qualifications to participate   · Study design, Follow up schedule, and tests or procedures done at each visit  · Confidentiality and HIPAA Authorization for Release of Medical Records for the research trial/ subject's rights/research related injury  · Risk, Benefits, Alternative Treatments, Compensation and Costs  · Participation in the research trial is voluntary and patient may withdraw at anytime  · Contact information for study related questions     Patient verbalizes understanding of the above: Yes  Contact information for CRC and PI given to patient: Yes  Patient able to adequately summarize: the purpose of the study, the risks associated with the study, and all procedures, testing, and follow-ups associated with the study: Yes     Patient presented to clinic for consent into the ARTESiA study.    Each page of the consent form was reviewed with the patient; after all questions were answered the patient signed the informed consent form for the ARTESiA study with an IRB approval date of 11/7/2017. The patient received a copy of the signed consent form. The original consent was scanned into electronic medical records (EPIC) and filed into the subject's research study binder.  EKG and blood work completed per protocol after consent.

## 2018-06-28 NOTE — TELEPHONE ENCOUNTER
Dr. Yung- please note      ----- Message from Marshall Fonseca RN sent at 6/28/2018 11:58 AM CDT -----  Cardiac PET Resting images only. Pt found to be wheezing upon initial assessment. Notified Dr Janet Salgado, and was agreed upon to use dobutamine as stress agent. During dobutamine infusion pt began to have elevated diastolic pressures, runs of Vtach and inability to reach target HR. Pt had no complaints. Dobutamine was stopped. Unable to obtain stress images. Pt returned to baseline and was discharged. Please notify pt with updated plan of care.

## 2018-07-02 ENCOUNTER — TELEPHONE (OUTPATIENT)
Dept: CARDIOLOGY | Facility: CLINIC | Age: 68
End: 2018-07-02

## 2018-07-02 NOTE — TELEPHONE ENCOUNTER
----- Message from Macey Dos Santos MD sent at 6/30/2018  7:45 AM CDT -----  Please Niraj put him on my schedule in clinic next week so I can meet him and plan his heart cath.  ----- Message -----  From: Nirav Baldwin MD  Sent: 6/29/2018   6:15 PM  To: Macey Dos Santos MD, Rosy Holm RN    Results are abnormal.  Please see comments below and call patient.  In general you do not need to route back to me.  Refer to Dr Dos Santos for cath

## 2018-07-02 NOTE — TELEPHONE ENCOUNTER
Patient returned phone call and confirmed appointment with Dr. Dos Santos on Friday at 9:30am  Results of lab work given also    MD Juany Samayoa LPN             Reviewed results. All OK. Please open telephone encounter, call patient and inform him/ her of results,then document in encounter.   Please do not route back to me.   Thanks.

## 2018-07-06 ENCOUNTER — OFFICE VISIT (OUTPATIENT)
Dept: CARDIOLOGY | Facility: CLINIC | Age: 68
End: 2018-07-06
Payer: MEDICARE

## 2018-07-06 ENCOUNTER — HOSPITAL ENCOUNTER (OUTPATIENT)
Dept: RADIOLOGY | Facility: HOSPITAL | Age: 68
Discharge: HOME | End: 2018-07-06
Attending: INTERNAL MEDICINE
Payer: MEDICARE

## 2018-07-06 VITALS
HEIGHT: 70 IN | SYSTOLIC BLOOD PRESSURE: 112 MMHG | HEART RATE: 60 BPM | DIASTOLIC BLOOD PRESSURE: 62 MMHG | BODY MASS INDEX: 39.04 KG/M2 | WEIGHT: 272.69 LBS

## 2018-07-06 DIAGNOSIS — Z45.02 ICD (IMPLANTABLE CARDIOVERTER-DEFIBRILLATOR) DISCHARGE: ICD-10-CM

## 2018-07-06 DIAGNOSIS — I48.0 PAF (PAROXYSMAL ATRIAL FIBRILLATION): ICD-10-CM

## 2018-07-06 DIAGNOSIS — E66.01 MORBID OBESITY: ICD-10-CM

## 2018-07-06 DIAGNOSIS — I43 CARDIOMYOPATHY DUE TO SYSTEMIC DISEASE: ICD-10-CM

## 2018-07-06 DIAGNOSIS — R07.89 CHEST PRESSURE: ICD-10-CM

## 2018-07-06 DIAGNOSIS — E78.5 HYPERLIPIDEMIA LDL GOAL <100: ICD-10-CM

## 2018-07-06 DIAGNOSIS — F33.9 DEPRESSION, RECURRENT: ICD-10-CM

## 2018-07-06 DIAGNOSIS — R93.1 ABNORMAL NUCLEAR CARDIAC IMAGING TEST: Primary | ICD-10-CM

## 2018-07-06 DIAGNOSIS — R01.1 MURMUR: ICD-10-CM

## 2018-07-06 DIAGNOSIS — G47.33 OBSTRUCTIVE SLEEP APNEA SYNDROME: ICD-10-CM

## 2018-07-06 DIAGNOSIS — I47.29 PAROXYSMAL VT: ICD-10-CM

## 2018-07-06 DIAGNOSIS — I51.89 DIASTOLIC DYSFUNCTION: ICD-10-CM

## 2018-07-06 DIAGNOSIS — R93.1 ABNORMAL NUCLEAR CARDIAC IMAGING TEST: ICD-10-CM

## 2018-07-06 DIAGNOSIS — I10 ESSENTIAL HYPERTENSION: ICD-10-CM

## 2018-07-06 PROCEDURE — 71046 X-RAY EXAM CHEST 2 VIEWS: CPT | Mod: TC,FY,PO

## 2018-07-06 PROCEDURE — 71046 X-RAY EXAM CHEST 2 VIEWS: CPT | Mod: 26,,, | Performed by: RADIOLOGY

## 2018-07-06 PROCEDURE — 99214 OFFICE O/P EST MOD 30 MIN: CPT | Mod: S$PBB,,, | Performed by: INTERNAL MEDICINE

## 2018-07-06 PROCEDURE — 99213 OFFICE O/P EST LOW 20 MIN: CPT | Mod: PBBFAC,PO,25 | Performed by: INTERNAL MEDICINE

## 2018-07-06 PROCEDURE — 99999 PR PBB SHADOW E&M-EST. PATIENT-LVL III: CPT | Mod: PBBFAC,,, | Performed by: INTERNAL MEDICINE

## 2018-07-06 RX ORDER — PRAVASTATIN SODIUM 40 MG/1
40 TABLET ORAL DAILY
Qty: 90 TABLET | Refills: 3 | Status: SHIPPED | OUTPATIENT
Start: 2018-07-06 | End: 2019-07-06 | Stop reason: SDUPTHER

## 2018-07-06 NOTE — PROGRESS NOTES
Subjective:   Patient ID:  Randy Yap is a 68 y.o. male who presents for evaluation of Shortness of Breath; Dizziness; and Consult      HPI  A 69 yo male with h/o vtach s/p aicd afib enrolled in [facility] is here referred from DR BAUER due to abnormal pet stress cardiolite and vtach during test. He had the stress test performed due to new onset shortness of breath and chest tightness with exertiona dn having sex. The patient symptoms have been ongoing for the past 6 months.he have sleep apnea that is being treated,. He has functional class2-3. He has no orthopnea pnd  No leg edema. Has no recent palpitation. Has no previous cath done. 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     Colon polyp 5/2013    repeat 5/2018    Depression, recurrent     Diastolic dysfunction     DJD (degenerative joint disease) of knee 10/14/2013    Hyperlipidemia     Hypertension     Murmur     Pericarditis with effusion     Sleep apnea        Past Surgical History:   Procedure Laterality Date    CARDIAC DEFIBRILLATOR PLACEMENT  10/6/2014    COLONOSCOPY W/ POLYPECTOMY  5/21/2013    repeat 5/21/2018    HERNIA REPAIR      R inguinal    pace maker   10/6/2014    PERICARDIAL WINDOW  12-15 years ago    tonsillectomy      VARICOCELECTOMY         Social History   Substance Use Topics    Smoking status: Never Smoker    Smokeless tobacco: Never Used    Alcohol use 0.5 oz/week     1 Standard drinks or equivalent per week      Comment: occasional glass of wine on social occasions       Family History   Problem Relation Age of Onset    Hyperlipidemia Mother     Arrhythmia Mother     Heart disease Father 48    Heart attack Father 48    Hypertension Father     Hypertension Maternal Grandmother     Hypertension Maternal Grandfather     Heart disease Paternal Grandmother     Hypertension Paternal Grandmother     Heart attack Paternal Grandmother      Heart disease Paternal Grandfather     Hypertension Paternal Grandfather     Heart attack Paternal Grandfather        Current Outpatient Prescriptions   Medication Sig    acetaminophen (TYLENOL) 325 MG tablet Take 650 mg by mouth as needed.     albuterol 90 mcg/actuation inhaler Inhale 2 puffs into the lungs every 4 (four) hours as needed for Wheezing or Shortness of Breath (cough). Rescue    carvedilol (COREG) 25 MG tablet take 1 tablet by mouth twice a day    fluticasone furoate (ARNUITY ELLIPTA) 100 mcg/actuation DsDv Inhale into the lungs. Controller    furosemide (LASIX) 20 MG tablet take 1 tablet by mouth every morning    glucosamine-chondroitin 500-400 mg tablet Take 1 tablet by mouth once daily.     INV apixaban/placebo tablet Take 5 mg by mouth 2 (two) times daily. FOR INVESTIGATIONAL USE ONLY. Protocol [place]    INV aspirin/placebo tablet Take 1 tablet (81 mg total) by mouth once daily. FOR INVESTIGATIONAL USE ONLY. Protocol [place]    multivitamin (THERAGRAN) tablet Take 1 tablet by mouth once daily.      spironolactone (ALDACTONE) 25 MG tablet take 1 tablet by mouth once daily    traZODone (DESYREL) 150 MG tablet take 1 tablet by mouth every evening    valsartan (DIOVAN) 160 MG tablet Take 1 tablet (160 mg total) by mouth once daily.    co-enzyme Q-10 30 mg capsule Take 30 mg by mouth once daily.      No current facility-administered medications for this visit.      Current Outpatient Prescriptions on File Prior to Visit   Medication Sig    acetaminophen (TYLENOL) 325 MG tablet Take 650 mg by mouth as needed.     albuterol 90 mcg/actuation inhaler Inhale 2 puffs into the lungs every 4 (four) hours as needed for Wheezing or Shortness of Breath (cough). Rescue    carvedilol (COREG) 25 MG tablet take 1 tablet by mouth twice a day    fluticasone furoate (ARNUITY ELLIPTA) 100 mcg/actuation DsDv Inhale into the lungs. Controller    furosemide (LASIX) 20 MG tablet take 1 tablet by mouth  every morning    glucosamine-chondroitin 500-400 mg tablet Take 1 tablet by mouth once daily.     INV apixaban/placebo tablet Take 5 mg by mouth 2 (two) times daily. FOR INVESTIGATIONAL USE ONLY. Protocol [place]    INV aspirin/placebo tablet Take 1 tablet (81 mg total) by mouth once daily. FOR INVESTIGATIONAL USE ONLY. Protocol [place]    multivitamin (THERAGRAN) tablet Take 1 tablet by mouth once daily.      spironolactone (ALDACTONE) 25 MG tablet take 1 tablet by mouth once daily    traZODone (DESYREL) 150 MG tablet take 1 tablet by mouth every evening    valsartan (DIOVAN) 160 MG tablet Take 1 tablet (160 mg total) by mouth once daily.    co-enzyme Q-10 30 mg capsule Take 30 mg by mouth once daily.      No current facility-administered medications on file prior to visit.        Review of patient's allergies indicates:  No Known Allergies    Review of Systems   Constitution: Negative for weakness and malaise/fatigue.   Eyes: Negative for blurred vision.   Cardiovascular: Positive for chest pain and dyspnea on exertion. Negative for claudication, cyanosis, irregular heartbeat, leg swelling, near-syncope, orthopnea, palpitations and paroxysmal nocturnal dyspnea.   Respiratory: Positive for shortness of breath and snoring. Negative for cough and hemoptysis.    Hematologic/Lymphatic: Negative for bleeding problem. Does not bruise/bleed easily.   Skin: Negative for dry skin and itching.   Musculoskeletal: Negative for falls, muscle weakness and myalgias.   Gastrointestinal: Negative for abdominal pain, diarrhea, heartburn, hematemesis, hematochezia and melena.   Genitourinary: Negative for flank pain and hematuria.   Neurological: Negative for dizziness, focal weakness, headaches, light-headedness, numbness, paresthesias and seizures.   Psychiatric/Behavioral: Negative for altered mental status and memory loss. The patient is not nervous/anxious.    Allergic/Immunologic: Negative for hives.       Objective:  "  Physical Exam   Constitutional: He is oriented to person, place, and time. He appears well-developed and well-nourished. No distress.   HENT:   Head: Normocephalic and atraumatic.   Eyes: EOM are normal. Pupils are equal, round, and reactive to light. Right eye exhibits no discharge. Left eye exhibits no discharge.   Neck: Neck supple. No JVD present. No thyromegaly present.   Cardiovascular: Normal rate, regular rhythm, normal heart sounds and intact distal pulses.  Exam reveals no gallop and no friction rub.    No murmur heard.  Pulmonary/Chest: Effort normal and breath sounds normal. No respiratory distress. He has no wheezes. He has no rales. He exhibits no tenderness.   aicd site well healed.   Abdominal: Soft. Bowel sounds are normal. He exhibits no distension. There is no tenderness.   Obese abdomen   Musculoskeletal: Normal range of motion. He exhibits no edema.   Neurological: He is alert and oriented to person, place, and time. No cranial nerve deficit.   Skin: Skin is warm and dry. No rash noted. He is not diaphoretic. No erythema.   Psychiatric: He has a normal mood and affect. His behavior is normal.   Nursing note and vitals reviewed.    Vitals:    07/06/18 0901   BP: 112/62   BP Location: Right arm   Patient Position: Sitting   Pulse: 60   Weight: 123.7 kg (272 lb 11.3 oz)   Height: 5' 10" (1.778 m)     Lab Results   Component Value Date    CHOL 188 09/06/2017    CHOL 184 09/11/2013    CHOL 200 (H) 02/08/2013     Lab Results   Component Value Date    HDL 34 (L) 09/06/2017    HDL 39 (L) 09/11/2013    HDL 34 (L) 02/08/2013     Lab Results   Component Value Date    LDLCALC 124.4 09/06/2017    LDLCALC 111.0 09/11/2013    LDLCALC 137.0 02/08/2013     Lab Results   Component Value Date    TRIG 148 09/06/2017    TRIG 170 (H) 09/11/2013    TRIG 143 02/08/2013     Lab Results   Component Value Date    CHOLHDL 18.1 (L) 09/06/2017    CHOLHDL 21.2 09/11/2013    CHOLHDL 17.0 (L) 02/08/2013       Chemistry      "   Component Value Date/Time     06/28/2018 1007    K 4.5 06/28/2018 1007     06/28/2018 1007    CO2 26 06/28/2018 1007    BUN 15 06/28/2018 1007    CREATININE 0.8 06/28/2018 1007    GLU 93 06/28/2018 1007        Component Value Date/Time    CALCIUM 9.7 06/28/2018 1007    ALKPHOS 44 (L) 06/28/2018 1007    AST 24 06/28/2018 1007    ALT 33 06/28/2018 1007    BILITOT 0.5 06/28/2018 1007    ESTGFRAFRICA >60.0 06/28/2018 1007    EGFRNONAA >60.0 06/28/2018 1007          Lab Results   Component Value Date    TSH 1.493 09/14/2016     No results found for: INR, PROTIME  Lab Results   Component Value Date    WBC 7.81 05/21/2018    HGB 13.5 (L) 05/21/2018    HCT 41.7 05/21/2018    MCV 94 05/21/2018     (H) 05/21/2018     BNP  @LABRCNTIP(BNP,BNPTRIAGEBLO)@  CrCl cannot be calculated (Patient's most recent lab result is older than the maximum 7 days allowed.).  Assessment:     1. Abnormal nuclear cardiac imaging test    2. Essential hypertension    3. Obstructive sleep apnea syndrome    4. Murmur    5. 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 Hyperlipidemia LDL goal <100    7. Diastolic dysfunction    8. Depression, recurrent    9. ICD (implantable cardioverter-defibrillator) discharge    10. Paroxysmal VT    11. PAF (paroxysmal atrial fibrillation)    12. Morbid obesity    13. Chest pressure      Has new onset angina with abnormal pet stress test needing to delineate the coronary anatomy. Will plan on angiogRAphy via left radial approach. His bp is on the low side no room top add nitrates will start pravastatin 40 mg po daily.he is on [facility] protocol will hold protocol meds and start asa ec 81 mg po daily on satUrday am.discussed with DR BAUER.  Plan:   PRAVASTATIN 40 MG PO DAILY   ASA EC 81 MG PO DAILY   HOLD [facility] MEDS   PLAN ON LHC VIA LEFT RADIAL APPROACH ON Monday.  I have explained the risks, benefits , and alternatives of the procedure in detail.the patient voices understanding and all questions  have been answered.the patient agrees to proceed as planned.

## 2018-07-06 NOTE — LETTER
July 6, 2018      Nirav Baldwin MD  [address]  [facility] [address]           [facility]  [address] [place] [address]  Phone: [phone]  Fax: [phone]          Patient: Randy Yap   MR Number: [id number]   YOB: 1950   Date of Visit: 7/6/2018       Dear Dr. Nirav Baldwin:    Thank you for referring Randy Yap to me for evaluation. Attached you will find relevant portions of my assessment and plan of care.    If you have questions, please do not hesitate to call me. I look forward to following Randy Yap along with you.    Sincerely,    Macey Dos Santos MD    Enclosure  CC:  No Recipients    If you would like to receive this communication electronically, please contact [email or web address] or [phone] to request more information on [employer] Link access.    For providers and/or their staff who would like to refer a patient to [facility], please contact us through our one-stop-shop provider referral line, [facility], at [phone].    If you feel you have received this communication in error or would no longer like to receive these types of communications, please e-mail [email or web address]

## 2018-07-09 ENCOUNTER — HOSPITAL ENCOUNTER (OUTPATIENT)
Facility: HOSPITAL | Age: 68
Discharge: HOME | End: 2018-07-09
Attending: INTERNAL MEDICINE | Admitting: INTERNAL MEDICINE
Payer: MEDICARE

## 2018-07-09 ENCOUNTER — SURGERY (OUTPATIENT)
Age: 68
End: 2018-07-09

## 2018-07-09 VITALS
WEIGHT: 270 LBS | HEIGHT: 70 IN | OXYGEN SATURATION: 95 % | DIASTOLIC BLOOD PRESSURE: 65 MMHG | BODY MASS INDEX: 38.65 KG/M2 | SYSTOLIC BLOOD PRESSURE: 130 MMHG | TEMPERATURE: 99 F | RESPIRATION RATE: 16 BRPM | HEART RATE: 75 BPM

## 2018-07-09 DIAGNOSIS — I42.9 CARDIOMYOPATHY: ICD-10-CM

## 2018-07-09 DIAGNOSIS — R94.39 ABNORMAL RESULT OF OTHER CARDIOVASCULAR FUNCTION STUDY: ICD-10-CM

## 2018-07-09 DIAGNOSIS — I25.10 CORONARY ARTERY DISEASE, ANGINA PRESENCE UNSPECIFIED, UNSPECIFIED VESSEL OR LESION TYPE, UNSPECIFIED WHETHER NATIVE OR TRANSPLANTED HEART: ICD-10-CM

## 2018-07-09 DIAGNOSIS — R93.1 ABNORMAL NUCLEAR CARDIAC IMAGING TEST: Primary | ICD-10-CM

## 2018-07-09 PROCEDURE — 93458 L HRT ARTERY/VENTRICLE ANGIO: CPT

## 2018-07-09 PROCEDURE — 25000003 PHARM REV CODE 250

## 2018-07-09 PROCEDURE — 99152 MOD SED SAME PHYS/QHP 5/>YRS: CPT

## 2018-07-09 PROCEDURE — 99152 MOD SED SAME PHYS/QHP 5/>YRS: CPT | Mod: ,,, | Performed by: INTERNAL MEDICINE

## 2018-07-09 PROCEDURE — 63600175 PHARM REV CODE 636 W HCPCS

## 2018-07-09 PROCEDURE — 93458 L HRT ARTERY/VENTRICLE ANGIO: CPT | Mod: 26,,, | Performed by: INTERNAL MEDICINE

## 2018-07-09 RX ORDER — SODIUM CHLORIDE 9 MG/ML
INJECTION, SOLUTION INTRAVENOUS CONTINUOUS
Status: DISCONTINUED | OUTPATIENT
Start: 2018-07-09 | End: 2018-07-09 | Stop reason: HOSPADM

## 2018-07-09 RX ORDER — DIPHENHYDRAMINE HCL 50 MG
50 CAPSULE ORAL ONCE
Status: COMPLETED | OUTPATIENT
Start: 2018-07-09 | End: 2018-07-09

## 2018-07-09 RX ORDER — DIAZEPAM 5 MG/1
5 TABLET ORAL
Status: DISCONTINUED | OUTPATIENT
Start: 2018-07-09 | End: 2018-07-09 | Stop reason: HOSPADM

## 2018-07-09 RX ORDER — NAPROXEN SODIUM 220 MG/1
81 TABLET, FILM COATED ORAL ONCE
Status: COMPLETED | OUTPATIENT
Start: 2018-07-09 | End: 2018-07-09

## 2018-07-09 RX ADMIN — NAPROXEN SODIUM 81 MG: 220 TABLET, FILM COATED ORAL at 01:07

## 2018-07-09 RX ADMIN — SODIUM CHLORIDE: 9 INJECTION, SOLUTION INTRAVENOUS at 01:07

## 2018-07-09 RX ADMIN — DIAZEPAM 5 MG: 5 TABLET ORAL at 01:07

## 2018-07-09 RX ADMIN — Medication 50 MG: at 01:07

## 2018-07-09 NOTE — PLAN OF CARE
1630 VSS  DIET TAKEN 100% DISCHARGE INSTRUCTIONS GIVEN TO PT AND WIFE AMB IN UNIT LT WRIST DRESSING DRY AND INTACT CM AND PIV DC'D REDRESSED 1640 DISCHARGED PER W/C WITH BELONGINGS ACCOMPANIED BY THIS RN AND WIFE

## 2018-07-09 NOTE — H&P (VIEW-ONLY)
Subjective:   Patient ID:  Randy Yap is a 68 y.o. male who presents for evaluation of Shortness of Breath; Dizziness; and Consult      HPI  A 67 yo male with h/o vtach s/p aicd afib enrolled in [facility] is here referred from DR BAUER due to abnormal pet stress cardiolite and vtach during test. He had the stress test performed due to new onset shortness of breath and chest tightness with exertiona dn having sex. The patient symptoms have been ongoing for the past 6 months.he have sleep apnea that is being treated,. He has functional class2-3. He has no orthopnea pnd  No leg edema. Has no recent palpitation. Has no previous cath done. 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     Colon polyp 5/2013    repeat 5/2018    Depression, recurrent     Diastolic dysfunction     DJD (degenerative joint disease) of knee 10/14/2013    Hyperlipidemia     Hypertension     Murmur     Pericarditis with effusion     Sleep apnea        Past Surgical History:   Procedure Laterality Date    CARDIAC DEFIBRILLATOR PLACEMENT  10/6/2014    COLONOSCOPY W/ POLYPECTOMY  5/21/2013    repeat 5/21/2018    HERNIA REPAIR      R inguinal    pace maker   10/6/2014    PERICARDIAL WINDOW  12-15 years ago    tonsillectomy      VARICOCELECTOMY         Social History   Substance Use Topics    Smoking status: Never Smoker    Smokeless tobacco: Never Used    Alcohol use 0.5 oz/week     1 Standard drinks or equivalent per week      Comment: occasional glass of wine on social occasions       Family History   Problem Relation Age of Onset    Hyperlipidemia Mother     Arrhythmia Mother     Heart disease Father 48    Heart attack Father 48    Hypertension Father     Hypertension Maternal Grandmother     Hypertension Maternal Grandfather     Heart disease Paternal Grandmother     Hypertension Paternal Grandmother     Heart attack Paternal Grandmother      Heart disease Paternal Grandfather     Hypertension Paternal Grandfather     Heart attack Paternal Grandfather        Current Outpatient Prescriptions   Medication Sig    acetaminophen (TYLENOL) 325 MG tablet Take 650 mg by mouth as needed.     albuterol 90 mcg/actuation inhaler Inhale 2 puffs into the lungs every 4 (four) hours as needed for Wheezing or Shortness of Breath (cough). Rescue    carvedilol (COREG) 25 MG tablet take 1 tablet by mouth twice a day    fluticasone furoate (ARNUITY ELLIPTA) 100 mcg/actuation DsDv Inhale into the lungs. Controller    furosemide (LASIX) 20 MG tablet take 1 tablet by mouth every morning    glucosamine-chondroitin 500-400 mg tablet Take 1 tablet by mouth once daily.     INV apixaban/placebo tablet Take 5 mg by mouth 2 (two) times daily. FOR INVESTIGATIONAL USE ONLY. Protocol [place]    INV aspirin/placebo tablet Take 1 tablet (81 mg total) by mouth once daily. FOR INVESTIGATIONAL USE ONLY. Protocol [place]    multivitamin (THERAGRAN) tablet Take 1 tablet by mouth once daily.      spironolactone (ALDACTONE) 25 MG tablet take 1 tablet by mouth once daily    traZODone (DESYREL) 150 MG tablet take 1 tablet by mouth every evening    valsartan (DIOVAN) 160 MG tablet Take 1 tablet (160 mg total) by mouth once daily.    co-enzyme Q-10 30 mg capsule Take 30 mg by mouth once daily.      No current facility-administered medications for this visit.      Current Outpatient Prescriptions on File Prior to Visit   Medication Sig    acetaminophen (TYLENOL) 325 MG tablet Take 650 mg by mouth as needed.     albuterol 90 mcg/actuation inhaler Inhale 2 puffs into the lungs every 4 (four) hours as needed for Wheezing or Shortness of Breath (cough). Rescue    carvedilol (COREG) 25 MG tablet take 1 tablet by mouth twice a day    fluticasone furoate (ARNUITY ELLIPTA) 100 mcg/actuation DsDv Inhale into the lungs. Controller    furosemide (LASIX) 20 MG tablet take 1 tablet by mouth  every morning    glucosamine-chondroitin 500-400 mg tablet Take 1 tablet by mouth once daily.     INV apixaban/placebo tablet Take 5 mg by mouth 2 (two) times daily. FOR INVESTIGATIONAL USE ONLY. Protocol [place]    INV aspirin/placebo tablet Take 1 tablet (81 mg total) by mouth once daily. FOR INVESTIGATIONAL USE ONLY. Protocol [place]    multivitamin (THERAGRAN) tablet Take 1 tablet by mouth once daily.      spironolactone (ALDACTONE) 25 MG tablet take 1 tablet by mouth once daily    traZODone (DESYREL) 150 MG tablet take 1 tablet by mouth every evening    valsartan (DIOVAN) 160 MG tablet Take 1 tablet (160 mg total) by mouth once daily.    co-enzyme Q-10 30 mg capsule Take 30 mg by mouth once daily.      No current facility-administered medications on file prior to visit.        Review of patient's allergies indicates:  No Known Allergies    Review of Systems   Constitution: Negative for weakness and malaise/fatigue.   Eyes: Negative for blurred vision.   Cardiovascular: Positive for chest pain and dyspnea on exertion. Negative for claudication, cyanosis, irregular heartbeat, leg swelling, near-syncope, orthopnea, palpitations and paroxysmal nocturnal dyspnea.   Respiratory: Positive for shortness of breath and snoring. Negative for cough and hemoptysis.    Hematologic/Lymphatic: Negative for bleeding problem. Does not bruise/bleed easily.   Skin: Negative for dry skin and itching.   Musculoskeletal: Negative for falls, muscle weakness and myalgias.   Gastrointestinal: Negative for abdominal pain, diarrhea, heartburn, hematemesis, hematochezia and melena.   Genitourinary: Negative for flank pain and hematuria.   Neurological: Negative for dizziness, focal weakness, headaches, light-headedness, numbness, paresthesias and seizures.   Psychiatric/Behavioral: Negative for altered mental status and memory loss. The patient is not nervous/anxious.    Allergic/Immunologic: Negative for hives.       Objective:  "  Physical Exam   Constitutional: He is oriented to person, place, and time. He appears well-developed and well-nourished. No distress.   HENT:   Head: Normocephalic and atraumatic.   Eyes: EOM are normal. Pupils are equal, round, and reactive to light. Right eye exhibits no discharge. Left eye exhibits no discharge.   Neck: Neck supple. No JVD present. No thyromegaly present.   Cardiovascular: Normal rate, regular rhythm, normal heart sounds and intact distal pulses.  Exam reveals no gallop and no friction rub.    No murmur heard.  Pulmonary/Chest: Effort normal and breath sounds normal. No respiratory distress. He has no wheezes. He has no rales. He exhibits no tenderness.   aicd site well healed.   Abdominal: Soft. Bowel sounds are normal. He exhibits no distension. There is no tenderness.   Obese abdomen   Musculoskeletal: Normal range of motion. He exhibits no edema.   Neurological: He is alert and oriented to person, place, and time. No cranial nerve deficit.   Skin: Skin is warm and dry. No rash noted. He is not diaphoretic. No erythema.   Psychiatric: He has a normal mood and affect. His behavior is normal.   Nursing note and vitals reviewed.    Vitals:    07/06/18 0901   BP: 112/62   BP Location: Right arm   Patient Position: Sitting   Pulse: 60   Weight: 123.7 kg (272 lb 11.3 oz)   Height: 5' 10" (1.778 m)     Lab Results   Component Value Date    CHOL 188 09/06/2017    CHOL 184 09/11/2013    CHOL 200 (H) 02/08/2013     Lab Results   Component Value Date    HDL 34 (L) 09/06/2017    HDL 39 (L) 09/11/2013    HDL 34 (L) 02/08/2013     Lab Results   Component Value Date    LDLCALC 124.4 09/06/2017    LDLCALC 111.0 09/11/2013    LDLCALC 137.0 02/08/2013     Lab Results   Component Value Date    TRIG 148 09/06/2017    TRIG 170 (H) 09/11/2013    TRIG 143 02/08/2013     Lab Results   Component Value Date    CHOLHDL 18.1 (L) 09/06/2017    CHOLHDL 21.2 09/11/2013    CHOLHDL 17.0 (L) 02/08/2013       Chemistry      "   Component Value Date/Time     06/28/2018 1007    K 4.5 06/28/2018 1007     06/28/2018 1007    CO2 26 06/28/2018 1007    BUN 15 06/28/2018 1007    CREATININE 0.8 06/28/2018 1007    GLU 93 06/28/2018 1007        Component Value Date/Time    CALCIUM 9.7 06/28/2018 1007    ALKPHOS 44 (L) 06/28/2018 1007    AST 24 06/28/2018 1007    ALT 33 06/28/2018 1007    BILITOT 0.5 06/28/2018 1007    ESTGFRAFRICA >60.0 06/28/2018 1007    EGFRNONAA >60.0 06/28/2018 1007          Lab Results   Component Value Date    TSH 1.493 09/14/2016     No results found for: INR, PROTIME  Lab Results   Component Value Date    WBC 7.81 05/21/2018    HGB 13.5 (L) 05/21/2018    HCT 41.7 05/21/2018    MCV 94 05/21/2018     (H) 05/21/2018     BNP  @LABRCNTIP(BNP,BNPTRIAGEBLO)@  CrCl cannot be calculated (Patient's most recent lab result is older than the maximum 7 days allowed.).  Assessment:     1. Abnormal nuclear cardiac imaging test    2. Essential hypertension    3. Obstructive sleep apnea syndrome    4. Murmur    5. 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 Hyperlipidemia LDL goal <100    7. Diastolic dysfunction    8. Depression, recurrent    9. ICD (implantable cardioverter-defibrillator) discharge    10. Paroxysmal VT    11. PAF (paroxysmal atrial fibrillation)    12. Morbid obesity    13. Chest pressure      Has new onset angina with abnormal pet stress test needing to delineate the coronary anatomy. Will plan on angiogRAphy via left radial approach. His bp is on the low side no room top add nitrates will start pravastatin 40 mg po daily.he is on [facility] protocol will hold protocol meds and start asa ec 81 mg po daily on satUrday am.discussed with DR BAUER.  Plan:   PRAVASTATIN 40 MG PO DAILY   ASA EC 81 MG PO DAILY   HOLD [facility] MEDS   PLAN ON LHC VIA LEFT RADIAL APPROACH ON Monday.  I have explained the risks, benefits , and alternatives of the procedure in detail.the patient voices understanding and all questions  have been answered.the patient agrees to proceed as planned.

## 2018-07-09 NOTE — BRIEF OP NOTE
Date: 07/09/2018  Surgeon/Physician: Fanta Dos Santos MD  Assistants: none    Pre Op Diagnosis: abnormal cardiolite    Post OP Diagnosis: non obs cad    Procedure Performed:[facility]    ANESTHESIA:RN IV SEDATION    COMPLICATION: none    Specimen / Tissue Removed: No    Estimated Blood Loss: <50 cc    Prostetics/Devices: None    Findings / Operative Note:   Non obs cad pda 505  Ef 45-50%         PLAN:  REASSURE MEDICAL THERAPY    Discharge Note  Short Stay      SUMMARY     Admit Date: 7/9/2018    Attending Physician: Macey Dos Santos MD     Discharge Physician: Fanta Dos Santos MD     Discharge Date: 7/9/2018    Final Diagnosis: NON OBS CAD ABNORMAL CARDIOLITE    Outcome of Stay:PATIENT TOLERATED PROCEDURE WELL TAVAREZ SNO COMPLICATION HAS NON OBS CAD WITH LOW NORMAL TO MILDY DEPRESSED EF . HE IS ON APPROPRIATE THERAPY HE WILL FOLLOW UP WITH DR BAUER.    Disposition: Home or Self Care    Patient Instructions:   Current Discharge Medication List      CONTINUE these medications which have NOT CHANGED    Details   acetaminophen (TYLENOL) 325 MG tablet Take 650 mg by mouth as needed.       albuterol 90 mcg/actuation inhaler Inhale 2 puffs into the lungs every 4 (four) hours as needed for Wheezing or Shortness of Breath (cough). Rescue  Qty: 1 Inhaler, Refills: 6    Associated Diagnoses: Mild persistent asthma without complication      carvedilol (COREG) 25 MG tablet take 1 tablet by mouth twice a day  Qty: 60 tablet, Refills: 12      furosemide (LASIX) 20 MG tablet take 1 tablet by mouth every morning  Qty: 90 tablet, Refills: 3    Associated Diagnoses: Essential hypertension; Diastolic dysfunction      glucosamine-chondroitin 500-400 mg tablet Take 1 tablet by mouth once daily.       multivitamin (THERAGRAN) tablet Take 1 tablet by mouth once daily.        pravastatin (PRAVACHOL) 40 MG tablet Take 1 tablet (40 mg total) by mouth once daily.  Qty: 90 tablet, Refills: 3    Associated Diagnoses: Hyperlipidemia LDL goal <100; Abnormal  nuclear cardiac imaging test      spironolactone (ALDACTONE) 25 MG tablet take 1 tablet by mouth once daily  Qty: 90 tablet, Refills: 4    Associated Diagnoses: Essential hypertension; Diastolic dysfunction      traZODone (DESYREL) 150 MG tablet take 1 tablet by mouth every evening  Qty: 90 tablet, Refills: 3      valsartan (DIOVAN) 160 MG tablet Take 1 tablet (160 mg total) by mouth once daily.  Qty: 90 tablet, Refills: 4    Associated Diagnoses: Essential hypertension; Diastolic dysfunction      co-enzyme Q-10 30 mg capsule Take 30 mg by mouth once daily.       fluticasone furoate (ARNUITY ELLIPTA) 100 mcg/actuation DsDv Inhale into the lungs. Controller      INV apixaban/placebo tablet Take 5 mg by mouth 2 (two) times daily. FOR INVESTIGATIONAL USE ONLY. Protocol [place]  Qty: 400 tablet, Refills: 0      INV aspirin/placebo tablet Take 1 tablet (81 mg total) by mouth once daily. FOR INVESTIGATIONAL USE ONLY. Protocol [place]  Qty: 200 tablet, Refills: 0             Discharge Procedure Orders (must include Diet, Follow-up, Activity)    Discharge Procedure Orders (must include Diet, Follow-up, Activity)  Diet general     Call MD for:  temperature >100.4     Call MD for:  persistent nausea and vomiting     Call MD for:  severe uncontrolled pain     Call MD for:  difficulty breathing, headache or visual disturbances     Call MD for:  redness, tenderness, or signs of infection (pain, swelling, redness, odor or green/yellow discharge around incision site)     Call MD for:  hives     Call MD for:  persistent dizziness or light-headedness     Call MD for:  extreme fatigue       Follow-up Information     Nirav Baldwin MD In 2 weeks.    Specialties:  Electrophysiology, Cardiology  Contact information:  [facility]  [facility] [address] [phone]

## 2018-07-09 NOTE — INTERVAL H&P NOTE
The patient has been examined and the H&P has been reviewed:    I concur with the findings and no changes have occurred since H&P was written.    Anesthesia/Surgery risks, benefits and alternative options discussed and understood by patient/family.  I have explained the risks, benefits , and alternatives of the procedure in detail.the patient voices understanding and all questions have been answered.the patient agrees to proceed as planned.          Active Hospital Problems    Diagnosis  POA    Abnormal nuclear cardiac imaging test [R93.1]  Yes     Priority: Low      Resolved Hospital Problems    Diagnosis Date Resolved POA   No resolved problems to display.

## 2018-07-16 ENCOUNTER — CLINICAL SUPPORT (OUTPATIENT)
Dept: CARDIOLOGY | Facility: CLINIC | Age: 68
End: 2018-07-16
Attending: INTERNAL MEDICINE
Payer: MEDICARE

## 2018-07-16 DIAGNOSIS — I42.9 CARDIOMYOPATHY: ICD-10-CM

## 2018-07-16 DIAGNOSIS — I47.29 PAROXYSMAL VT: ICD-10-CM

## 2018-07-16 DIAGNOSIS — Z95.810 ICD (IMPLANTABLE CARDIOVERTER-DEFIBRILLATOR) IN PLACE: ICD-10-CM

## 2018-07-16 PROCEDURE — 93295 DEV INTERROG REMOTE 1/2/MLT: CPT | Mod: ,,, | Performed by: INTERNAL MEDICINE

## 2018-07-16 PROCEDURE — 93296 REM INTERROG EVL PM/IDS: CPT | Mod: PBBFAC,PO | Performed by: INTERNAL MEDICINE

## 2018-07-16 PROCEDURE — 93297 REM INTERROG DEV EVAL ICPMS: CPT | Mod: ,,, | Performed by: INTERNAL MEDICINE

## 2018-07-19 ENCOUNTER — TELEPHONE (OUTPATIENT)
Dept: PULMONOLOGY | Facility: CLINIC | Age: 68
End: 2018-07-19

## 2018-07-19 NOTE — TELEPHONE ENCOUNTER
Amanda with [facility] call and stated progress needs to have patient is using and benefiting  For use of CPAP per insurance guidelines. Can you add and addendum to note 06/22/18.  Thank you

## 2018-07-24 ENCOUNTER — PATIENT MESSAGE (OUTPATIENT)
Dept: FAMILY MEDICINE | Facility: CLINIC | Age: 68
End: 2018-07-24

## 2018-07-24 RX ORDER — LOSARTAN POTASSIUM 100 MG/1
100 TABLET ORAL DAILY
Qty: 90 TABLET | Refills: 3 | Status: SHIPPED | OUTPATIENT
Start: 2018-07-24 | End: 2019-07-21 | Stop reason: SDUPTHER

## 2018-07-31 ENCOUNTER — RESEARCH ENCOUNTER (OUTPATIENT)
Dept: RESEARCH | Facility: HOSPITAL | Age: 68
End: 2018-07-31

## 2018-07-31 NOTE — PROGRESS NOTES
A 30 day ARTESiA study follow up was conducted over the phone. The patient was in great spirits and answered all follow up questions for the study. Patient stated all was going well and there were no side effects from the medication. We discussed procedures and interrogations that have occurred since he has been enrolled in the study. Patient did state he had an interurption of medication from Saturday July 7th through July 9th, returning to taking medication on July 10th. Lastly, we discussed his 6 month follow up where he will come into the clinic for the follow up.

## 2018-08-03 ENCOUNTER — OFFICE VISIT (OUTPATIENT)
Dept: CARDIOLOGY | Facility: CLINIC | Age: 68
End: 2018-08-03
Payer: MEDICARE

## 2018-08-03 ENCOUNTER — LAB VISIT (OUTPATIENT)
Dept: LAB | Facility: HOSPITAL | Age: 68
End: 2018-08-03
Attending: INTERNAL MEDICINE
Payer: MEDICARE

## 2018-08-03 VITALS
HEIGHT: 70 IN | HEART RATE: 62 BPM | DIASTOLIC BLOOD PRESSURE: 72 MMHG | WEIGHT: 271.19 LBS | SYSTOLIC BLOOD PRESSURE: 134 MMHG | BODY MASS INDEX: 38.82 KG/M2

## 2018-08-03 DIAGNOSIS — I48.0 PAF (PAROXYSMAL ATRIAL FIBRILLATION): ICD-10-CM

## 2018-08-03 DIAGNOSIS — I10 ESSENTIAL HYPERTENSION: ICD-10-CM

## 2018-08-03 DIAGNOSIS — G47.33 OBSTRUCTIVE SLEEP APNEA SYNDROME: ICD-10-CM

## 2018-08-03 DIAGNOSIS — E78.5 HYPERLIPIDEMIA LDL GOAL <100: ICD-10-CM

## 2018-08-03 DIAGNOSIS — I51.89 DIASTOLIC DYSFUNCTION: ICD-10-CM

## 2018-08-03 DIAGNOSIS — I49.01 VF (VENTRICULAR FIBRILLATION): Primary | ICD-10-CM

## 2018-08-03 DIAGNOSIS — I47.29 PAROXYSMAL VT: ICD-10-CM

## 2018-08-03 DIAGNOSIS — Z45.02 ICD (IMPLANTABLE CARDIOVERTER-DEFIBRILLATOR) DISCHARGE: ICD-10-CM

## 2018-08-03 DIAGNOSIS — I43 CARDIOMYOPATHY DUE TO SYSTEMIC DISEASE: ICD-10-CM

## 2018-08-03 DIAGNOSIS — E66.01 CLASS 2 SEVERE OBESITY DUE TO EXCESS CALORIES WITH SERIOUS COMORBIDITY AND BODY MASS INDEX (BMI) OF 38.0 TO 38.9 IN ADULT: ICD-10-CM

## 2018-08-03 DIAGNOSIS — R93.1 ABNORMAL NUCLEAR CARDIAC IMAGING TEST: ICD-10-CM

## 2018-08-03 LAB — BNP SERPL-MCNC: 52 PG/ML

## 2018-08-03 PROCEDURE — 99999 PR PBB SHADOW E&M-EST. PATIENT-LVL III: CPT | Mod: PBBFAC,,, | Performed by: INTERNAL MEDICINE

## 2018-08-03 PROCEDURE — 99215 OFFICE O/P EST HI 40 MIN: CPT | Mod: S$PBB,,, | Performed by: INTERNAL MEDICINE

## 2018-08-03 PROCEDURE — 83880 ASSAY OF NATRIURETIC PEPTIDE: CPT

## 2018-08-03 PROCEDURE — 99213 OFFICE O/P EST LOW 20 MIN: CPT | Mod: PBBFAC,PO | Performed by: INTERNAL MEDICINE

## 2018-08-03 PROCEDURE — 36415 COLL VENOUS BLD VENIPUNCTURE: CPT | Mod: PO

## 2018-08-03 NOTE — PROGRESS NOTES
"  Subjective:   Patient ID:  Randy Yap is a 68 y.o. male     Chief complaint:Cardiomyopathy and Pacemaker Check      HPI  Background as recorded in my last note (3/23/18 ):  68 man  Frequent, Sxc PMVT related to R on T V pacing in the setting of DDD programming and junctional beats.    He was initially on sotalol and amiodarone and the sotalol was DCed in sept 2016 all the while reprogrammed pacer to VVI back up  Since then, he has had no N/S Sx and no VT per his ICD records  We then decreased amio to 200 a day(but there seemed to be some confusion with what he was taking-- he seemed to be on 400 a day) and he still was feeling well from a PVC point of view but his wife noticed that he was retaining fluid at a time when he was c/o ALDANA-- he increased his lasix and he felt better enough to go back to 1 pill a day  ICD eval sept 2016>> He is in AAI mode at 65 and he paces 67%-- no VTNS     Update on 12/14/16:  We were planning to gradually wean amio and we have decreased the dose to 200 a day so far -- no issues since then and ICD shows no VT  He has had PFT with [place] -- results pending.   He wants to switch to this practice.   He has an ICD that is on battery advisory -- he has darci educated and he is on [place] remote monitoring -- vibration alert turned on     Update 4/21/17:  " I am doing fantastic"  Exercising and working in the yard like he had not done in a long time.   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 He had a PET scan that was markedly abnormal:  >>  1. 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   2. Stress Rb-82 imaging was aborted secondary to inability to achieve target heart rate and ventricular tachycardia which increased in frequency and duration with dobutamine titration.  3. There is abnormal wall motion at rest showing severe global hypokinesis of the left ventricle.   3. There is resting LV dysfunction with a reduced ejection fraction of 29 %.  (normal is >= 51%)  4. The left ventricular volume is mildly increased at rest.   5. The extracardiac distribution of radioactivity is normal.   6. There was no previous study available to compare.  This prompted us to obtain a coronary angiogram with the following results:  >>  Luminal irregularities in all 3 vessels  EF 50%   He has enrolled in [employer] and has been doing well -- he feels f=great and has no apparent CV restrictions  Current Outpatient Medications   Medication Sig    acetaminophen (TYLENOL) 325 MG tablet Take 650 mg by mouth as needed.     albuterol 90 mcg/actuation inhaler Inhale 2 puffs into the lungs every 4 (four) hours as needed for Wheezing or Shortness of Breath (cough). Rescue    carvedilol (COREG) 25 MG tablet take 1 tablet by mouth twice a day    co-enzyme Q-10 30 mg capsule Take 30 mg by mouth once daily.     fluticasone furoate (ARNUITY ELLIPTA) 100 mcg/actuation DsDv Inhale into the lungs. Controller    furosemide (LASIX) 20 MG tablet take 1 tablet by mouth every morning    INV apixaban/placebo tablet Take 5 mg by mouth 2 (two) times daily. FOR INVESTIGATIONAL USE ONLY. Protocol [place]    INV aspirin/placebo tablet Take 1 tablet (81 mg total) by mouth once daily. FOR  INVESTIGATIONAL USE ONLY. Protocol [place]    losartan (COZAAR) 100 MG tablet Take 1 tablet (100 mg total) by mouth once daily.    multivitamin (THERAGRAN) tablet Take 1 tablet by mouth once daily.      pravastatin (PRAVACHOL) 40 MG tablet Take 1 tablet (40 mg total) by mouth once daily.    spironolactone (ALDACTONE) 25 MG tablet take 1 tablet by mouth once daily    traZODone (DESYREL) 150 MG tablet take 1 tablet by mouth every evening    glucosamine-chondroitin 500-400 mg tablet Take 1 tablet by mouth once daily.      No current facility-administered medications for this visit.      Review of Systems   Constitution: Negative for decreased appetite, weakness, malaise/fatigue, weight gain and weight loss.   Eyes: Negative for blurred vision.   Cardiovascular: Negative for chest pain, claudication, cyanosis, dyspnea on exertion, irregular heartbeat, leg swelling, near-syncope, orthopnea and palpitations.   Respiratory: Negative for cough, shortness of breath, sleep disturbances due to breathing, snoring and wheezing.    Endocrine: Negative for heat intolerance.   Hematologic/Lymphatic: Does not bruise/bleed easily.   Musculoskeletal: Negative for muscle weakness and myalgias.   Gastrointestinal: Negative for melena, nausea and vomiting.   Genitourinary: Negative for nocturia.   Neurological: Negative for excessive daytime sleepiness, dizziness, headaches and light-headedness.   Psychiatric/Behavioral: Negative for depression, memory loss and substance abuse. The patient does not have insomnia and is not nervous/anxious.        Objective:   Physical Exam   Constitutional: He is oriented to person, place, and time. He appears well-developed and well-nourished.   overweight   HENT:   Head: Normocephalic and atraumatic.   Right Ear: External ear normal.   Left Ear: External ear normal.   Eyes: Conjunctivae are normal. Pupils are equal, round, and reactive to light. Left conjunctiva is not injected. Left conjunctiva  "has no hemorrhage.   Neck: Neck supple. No JVD present. No thyromegaly present.   Cardiovascular: Normal rate, regular rhythm, normal heart sounds and intact distal pulses.  PMI is not displaced.  Exam reveals no gallop, no friction rub, no midsystolic click and no opening snap.    No murmur heard.  Pulses:       Carotid pulses are 2+ on the right side, and 2+ on the left side.       Radial pulses are 2+ on the right side, and 2+ on the left side.        Dorsalis pedis pulses are 2+ on the right side, and 2+ on the left side.        Posterior tibial pulses are 2+ on the right side, and 2+ on the left side.   Pulmonary/Chest: Effort normal and breath sounds normal. No respiratory distress. He has no wheezes. He has no rales. He exhibits no tenderness.   Device pocket is in excellent repair     Abdominal: Soft. Normal appearance. He exhibits no pulsatile liver. There is no hepatomegaly. There is no tenderness. There is no rigidity and no guarding.   Obese abdomen   Musculoskeletal: Normal range of motion. He exhibits no edema or tenderness.        Right knee: He exhibits no swelling.        Left knee: He exhibits no swelling.        Right ankle: He exhibits no swelling.        Left ankle: He exhibits no swelling.        Right lower leg: He exhibits no swelling.        Left lower leg: He exhibits no swelling.        Right foot: There is no swelling.        Left foot: There is no swelling.   Neurological: He is alert and oriented to person, place, and time. He has normal strength and normal reflexes. No cranial nerve deficit. Coordination normal.   Skin: Skin is warm, dry and intact. No rash noted. No cyanosis. No pallor.   Psychiatric: He has a normal mood and affect. His behavior is normal.   Nursing note and vitals reviewed.    /72   Pulse 62   Ht 5' 10" (1.778 m)   Wt 123 kg (271 lb 2.7 oz)   BMI 38.91 kg/m²      Assessment:   Not sure how to fully resolve the discrpancies between the PET stress results in " terms of EF and likely non transmural CFX territory MI vs echo results, Fc status and near normal coronaries- will update echo   1. VF (ventricular fibrillation)    2. Diastolic dysfunction    3. 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 PAF (paroxysmal atrial fibrillation)    5. Paroxysmal VT    6. Essential hypertension    7. Hyperlipidemia LDL goal <100    8. Obstructive sleep apnea syndrome    9. Class 2 severe obesity due to excess calories with serious comorbidity and body mass index (BMI) of 38.0 to 38.9 in adult    10. Abnormal nuclear cardiac imaging test    11. ICD (implantable cardioverter-defibrillator) discharge        Plan:      Orders Placed This Encounter   Procedures    Brain natriuretic peptide     Standing Status:   Future     Number of Occurrences:   1     Standing Expiration Date:   10/2/2019    2D echo with color flow doppler     Standing Status:   Future     Standing Expiration Date:   8/3/2019     Follow-up in about 6 months (around 2/3/2019), or if symptoms worsen or fail to improve.  Medications Discontinued During This Encounter   Medication Reason    valsartan (DIOVAN) 160 MG tablet Availability     New Prescriptions    No medications on file     Modified Medications    No medications on file

## 2018-08-08 ENCOUNTER — TELEPHONE (OUTPATIENT)
Dept: CARDIOLOGY | Facility: CLINIC | Age: 68
End: 2018-08-08

## 2018-08-08 NOTE — TELEPHONE ENCOUNTER
----- Message from Nirav Baldwin MD sent at 8/8/2018  9:32 AM CDT -----  See comments below and call patient to discuss.   Please close encounter when done -- no need to route back to me.  Thanks    Looks OK   Await echo results

## 2018-08-12 PROBLEM — I49.01 VF (VENTRICULAR FIBRILLATION): Status: ACTIVE | Noted: 2018-08-12

## 2018-08-13 ENCOUNTER — CLINICAL SUPPORT (OUTPATIENT)
Dept: CARDIOLOGY | Facility: CLINIC | Age: 68
End: 2018-08-13
Attending: INTERNAL MEDICINE
Payer: MEDICARE

## 2018-08-13 DIAGNOSIS — I51.89 DIASTOLIC DYSFUNCTION: ICD-10-CM

## 2018-08-13 DIAGNOSIS — I43 CARDIOMYOPATHY DUE TO SYSTEMIC DISEASE: ICD-10-CM

## 2018-08-13 LAB
DIASTOLIC DYSFUNCTION: NO
ESTIMATED PA SYSTOLIC PRESSURE: 30.25
MITRAL VALVE MOBILITY: NORMAL
RETIRED EF AND QEF - SEE NOTES: 50 (ref 55–65)
TRICUSPID VALVE REGURGITATION: NORMAL

## 2018-08-13 PROCEDURE — 93306 TTE W/DOPPLER COMPLETE: CPT | Mod: PBBFAC,PO | Performed by: NUCLEAR MEDICINE

## 2018-08-15 ENCOUNTER — TELEPHONE (OUTPATIENT)
Dept: CARDIOLOGY | Facility: CLINIC | Age: 68
End: 2018-08-15

## 2018-08-15 NOTE — TELEPHONE ENCOUNTER
----- Message from Nirav Baldwin MD sent at 8/14/2018  7:46 PM CDT -----  Reviewed results. All OK. Please open telephone encounter, call patient and inform him/ her of results,then document in encounter.  Please do not route back to me.   Thanks.

## 2018-09-18 ENCOUNTER — CLINICAL SUPPORT (OUTPATIENT)
Dept: CARDIOLOGY | Facility: CLINIC | Age: 68
End: 2018-09-18
Attending: INTERNAL MEDICINE
Payer: MEDICARE

## 2018-09-18 DIAGNOSIS — I47.29 PAROXYSMAL VT: ICD-10-CM

## 2018-09-18 DIAGNOSIS — I43 CARDIOMYOPATHY DUE TO SYSTEMIC DISEASE: ICD-10-CM

## 2018-09-18 DIAGNOSIS — I49.01 VF (VENTRICULAR FIBRILLATION): ICD-10-CM

## 2018-09-18 DIAGNOSIS — Z95.810 ICD (IMPLANTABLE CARDIOVERTER-DEFIBRILLATOR) IN PLACE: Primary | ICD-10-CM

## 2018-09-18 DIAGNOSIS — Z95.810 ICD (IMPLANTABLE CARDIOVERTER-DEFIBRILLATOR) IN PLACE: ICD-10-CM

## 2018-09-18 PROCEDURE — 93295 DEV INTERROG REMOTE 1/2/MLT: CPT | Mod: ,,, | Performed by: INTERNAL MEDICINE

## 2018-09-18 PROCEDURE — 93297 REM INTERROG DEV EVAL ICPMS: CPT | Mod: ,,, | Performed by: INTERNAL MEDICINE

## 2018-09-18 PROCEDURE — 93296 REM INTERROG EVL PM/IDS: CPT | Mod: PBBFAC,PO | Performed by: INTERNAL MEDICINE

## 2018-09-19 ENCOUNTER — OFFICE VISIT (OUTPATIENT)
Dept: PULMONOLOGY | Facility: CLINIC | Age: 68
End: 2018-09-19
Payer: MEDICARE

## 2018-09-19 ENCOUNTER — PROCEDURE VISIT (OUTPATIENT)
Dept: PULMONOLOGY | Facility: CLINIC | Age: 68
End: 2018-09-19
Payer: MEDICARE

## 2018-09-19 ENCOUNTER — TELEPHONE (OUTPATIENT)
Dept: CARDIOLOGY | Facility: CLINIC | Age: 68
End: 2018-09-19

## 2018-09-19 VITALS — HEIGHT: 70 IN | BODY MASS INDEX: 38.65 KG/M2 | OXYGEN SATURATION: 98 % | WEIGHT: 270 LBS | HEART RATE: 84 BPM

## 2018-09-19 DIAGNOSIS — G47.33 OSA ON CPAP: ICD-10-CM

## 2018-09-19 DIAGNOSIS — J45.30 MILD PERSISTENT ASTHMA WITHOUT COMPLICATION: ICD-10-CM

## 2018-09-19 DIAGNOSIS — I49.01 VENTRICULAR FIBRILLATION: Primary | ICD-10-CM

## 2018-09-19 DIAGNOSIS — I47.29 PAROXYSMAL VT: ICD-10-CM

## 2018-09-19 DIAGNOSIS — Z23 NEED FOR VACCINATION WITH 13-POLYVALENT PNEUMOCOCCAL CONJUGATE VACCINE: ICD-10-CM

## 2018-09-19 DIAGNOSIS — J45.30 MILD PERSISTENT ASTHMA WITHOUT COMPLICATION: Primary | ICD-10-CM

## 2018-09-19 PROCEDURE — 94726 PLETHYSMOGRAPHY LUNG VOLUMES: CPT | Mod: PBBFAC,PO

## 2018-09-19 PROCEDURE — 94726 PLETHYSMOGRAPHY LUNG VOLUMES: CPT | Mod: 26,S$PBB,, | Performed by: INTERNAL MEDICINE

## 2018-09-19 PROCEDURE — 99999 PR PBB SHADOW E&M-EST. PATIENT-LVL III: CPT | Mod: PBBFAC,,, | Performed by: INTERNAL MEDICINE

## 2018-09-19 PROCEDURE — 99214 OFFICE O/P EST MOD 30 MIN: CPT | Mod: 25,S$PBB,, | Performed by: INTERNAL MEDICINE

## 2018-09-19 PROCEDURE — 94010 BREATHING CAPACITY TEST: CPT | Mod: 26,S$PBB,, | Performed by: INTERNAL MEDICINE

## 2018-09-19 PROCEDURE — 99213 OFFICE O/P EST LOW 20 MIN: CPT | Mod: PBBFAC,PO | Performed by: INTERNAL MEDICINE

## 2018-09-19 PROCEDURE — 94010 BREATHING CAPACITY TEST: CPT | Mod: PBBFAC,PO

## 2018-09-19 PROCEDURE — 94729 DIFFUSING CAPACITY: CPT | Mod: 26,S$PBB,, | Performed by: INTERNAL MEDICINE

## 2018-09-19 PROCEDURE — G0009 ADMIN PNEUMOCOCCAL VACCINE: HCPCS | Mod: PBBFAC,PO

## 2018-09-19 PROCEDURE — 94729 DIFFUSING CAPACITY: CPT | Mod: PBBFAC,PO

## 2018-09-19 NOTE — PROGRESS NOTES
Pulmonary Outpatient Follow Up Visit     Subjective:       Patient ID: Randy Yap is a 68 y.o. male.    Chief Complaint: Asthma    HPI     68-year-old male presenting for follow-up.    He is known with mild asthma stable on albuterol p.r.n. and Arnuity 100 mcg daily.    Patient using albuterol twice a week on average.    Rare cough no chest pain.     Review of Systems   Constitutional: Negative for fever.   HENT: Negative for nosebleeds and hearing loss.    Respiratory: Positive for apnea, snoring, cough and use of rescue inhaler.    Genitourinary: Negative for hematuria.   Endocrine: Negative for cold intolerance.    Musculoskeletal: Positive for arthralgias and back pain.   Gastrointestinal: Negative for vomiting.   Neurological: Negative for syncope.   Hematological: Negative for adenopathy.   Psychiatric/Behavioral: Positive for sleep disturbance. Negative for confusion.        History of depression         Outpatient Encounter Medications as of 9/19/2018   Medication Sig Dispense Refill    acetaminophen (TYLENOL) 325 MG tablet Take 650 mg by mouth as needed.       albuterol 90 mcg/actuation inhaler Inhale 2 puffs into the lungs every 4 (four) hours as needed for Wheezing or Shortness of Breath (cough). Rescue 1 Inhaler 6    carvedilol (COREG) 25 MG tablet take 1 tablet by mouth twice a day 60 tablet 12    co-enzyme Q-10 30 mg capsule Take 30 mg by mouth once daily.       fluticasone furoate (ARNUITY ELLIPTA) 100 mcg/actuation DsDv Inhale into the lungs. Controller      furosemide (LASIX) 20 MG tablet take 1 tablet by mouth every morning 90 tablet 3    glucosamine-chondroitin 500-400 mg tablet Take 1 tablet by mouth once daily.       INV apixaban/placebo tablet Take 5 mg by mouth 2 (two) times daily. FOR INVESTIGATIONAL USE ONLY. Protocol [place] 400 tablet 0    INV aspirin/placebo tablet Take 1 tablet (81 mg total) by mouth once daily. FOR INVESTIGATIONAL  "USE ONLY. Protocol [place] 200 tablet 0    losartan (COZAAR) 100 MG tablet Take 1 tablet (100 mg total) by mouth once daily. 90 tablet 3    multivitamin (THERAGRAN) tablet Take 1 tablet by mouth once daily.        pravastatin (PRAVACHOL) 40 MG tablet Take 1 tablet (40 mg total) by mouth once daily. 90 tablet 3    spironolactone (ALDACTONE) 25 MG tablet take 1 tablet by mouth once daily 90 tablet 4    traZODone (DESYREL) 150 MG tablet take 1 tablet by mouth every evening 90 tablet 3    [DISCONTINUED] valsartan (DIOVAN) 160 MG tablet Take 1 tablet (160 mg total) by mouth once daily. 90 tablet 4    [] aspirin chewable tablet 81 mg       [] diphenhydrAMINE capsule 50 mg       [DISCONTINUED] 0.9%  NaCl infusion       [DISCONTINUED] diazePAM tablet 5 mg        No facility-administered encounter medications on file as of 2018.        Objective:     Vital Signs (Most Recent)  Vital Signs  Pulse: 84  SpO2: 98 %  Height and Weight  Height: 5' 10" (177.8 cm)  Weight: 122.5 kg (270 lb)  BSA (Calculated - sq m): 2.46 sq meters  BMI (Calculated): 38.8  Weight in (lb) to have BMI = 25: 173.9]  Wt Readings from Last 3 Encounters:   18 122.5 kg (270 lb)   18 123 kg (271 lb 2.7 oz)   18 122.5 kg (270 lb)     Temp Readings from Last 3 Encounters:   18 98.7 °F (37.1 °C) (Oral)   18 99.1 °F (37.3 °C)   16 98.5 °F (36.9 °C) (Oral)     Height: 5' 10" (177.8 cm)          Physical Exam   Constitutional: He is oriented to person, place, and time. He appears well-developed and well-nourished.   HENT:   Head: Normocephalic.   Neck: Neck supple.   Cardiovascular: Normal rate and regular rhythm.   Pulmonary/Chest: Normal expansion and effort normal. He has decreased breath sounds. He has no wheezes.   Abdominal: Soft. There is no tenderness.   Musculoskeletal: He exhibits no edema.   Lymphadenopathy:     He has no cervical adenopathy.   Neurological: He is alert and oriented to " person, place, and time.   Skin: Skin is warm.   Psychiatric: He has a normal mood and affect.       Laboratory    Lab Results   Component Value Date    WBC 7.86 07/06/2018    HGB 13.5 (L) 07/06/2018    HCT 40.0 07/06/2018    MCV 92 07/06/2018     07/06/2018     BMP  Lab Results   Component Value Date     06/28/2018    K 4.5 06/28/2018     06/28/2018    CO2 26 06/28/2018    BUN 15 06/28/2018    CREATININE 0.8 06/28/2018    CALCIUM 9.7 06/28/2018    ANIONGAP 7 (L) 06/28/2018    ESTGFRAFRICA >60.0 06/28/2018    EGFRNONAA >60.0 06/28/2018     BNP  @LABRCNTIP(BNP,BNPTRIAGEBLO)@  Lab Results   Component Value Date    TSH 1.493 09/14/2016     ABG  @LABRCNTIP(PH,PO2,PCO2,HCO3,BE)@    Diagnostic Results:    PFT done today personally reviewed small airways disease, mild obstruction, mild air trapping.  Stable when compared to August 2017 in [place]       CPAP titration done in 2014 September.  CPAP 14 cm water.     PFT August 7, 2017 at [place] showed air trapping.  Mild obstruction.     Chest x-ray July 2018 showed cardiomegaly and ICD otherwise within normal.    Assessment/Plan:   Mild persistent asthma without complication    HONEY on CPAP    BMI 38.0-38.9,adult    Need for vaccination with 13-polyvalent pneumococcal conjugate vaccine  -     Pneumococcal Conjugate Vaccine (13 Valent) (IM)      Continue albuterol p.r.n.    Arnuity 100 mcg 1 puff daily.    Continue CPAP during sleep.  Patient has no issues with his machine and is wearing it every night.    General weight loss/lifestyle modification strategies discussed (elicit support from others; identify saboteurs; non-food rewards).  Diet interventions: low calorie (1000 kCal/d) deficit diet      Follow-up in about 6 months (around 3/19/2019).    This note was prepared using voice recognition system and is likely to have sound alike errors that may have been overlooked even after proof reading.  Please call me with any questions    Discussed  diagnosis, its evaluation, treatment and usual course. All questions answered.    Thank you for the courtesy of participating in the care of this patient    Kaur Jones MD

## 2018-09-19 NOTE — TELEPHONE ENCOUNTER
----- Message from Nirav Baldwin MD sent at 9/18/2018  8:02 PM CDT -----  Regarding: RE: HV therapy  Needs BMP, Mag  Needs Holter 48 hrs  I need to see event     ----- Message -----  From: Corina Posey RN  Sent: 9/18/2018   1:12 PM  To: Nirav Baldwin MD  Subject: HV therapy                                       Pt rec'd 25J shock for VF episode on 9/17/18 that was triggered by PVC.  ATP unsuccessful, pt denies symptoms since he was asleep.  He feels well today.  Had NSVT episode 7/13/18 also triggered by PVC (that one resolved spontaneously).  Pt states he is compliant with all medications, no recent illness.  Not scheduled to see you until March next year.  Please advise.

## 2018-09-19 NOTE — TELEPHONE ENCOUNTER
Spoke with pt, discussed Dr. Yung's recommendations.  Scheduled 48 hour holter monitor at [facility], 8am on 9/21/18 per pt request.  Non-fasting lab work scheduled same day at [facility]..

## 2018-09-20 LAB
BRPFT: ABNORMAL
DLCO ADJ PRE: 27.17 ML/(MIN*MMHG) (ref 21.11–34.97)
DLCO PRE: 26.28 ML/(MIN*MMHG) (ref 21.11–34.97)
DLCO SINGLE BREATH LLN: 21.11
DLCO SINGLE BREATH PRE REF: 93.7 %
DLCO SINGLE BREATH REF: 28.04
DLCOC SBVA LLN: 2.76
DLCOC SBVA PRE REF: 147.7 %
DLCOC SBVA REF: 3.93
DLCOC SINGLE BREATH LLN: 21.11
DLCOC SINGLE BREATH PRE REF: 96.9 %
DLCOC SINGLE BREATH REF: 28.04
DLCOVA LLN: 2.76
DLCOVA PRE REF: 142.9 %
DLCOVA PRE: 5.61 ML/(MIN*MMHG*L) (ref 2.76–5.09)
DLCOVA REF: 3.93
DLVAADJ PRE: 5.8 ML/(MIN*MMHG*L) (ref 2.76–5.09)
ERV LLN: 1.12
ERV PRE REF: 50.8 %
ERV PRE: 0.57 L (ref 1.12–1.12)
ERV REF: 1.12
ERVN2 LLN: 1.12
ERVN2 REF: 1.12
FEF 25 75 LLN: 1.19
FEF 25 75 PRE REF: 54.5 %
FEF 25 75 PRE: 1.43 L/S (ref 1.19–4.05)
FEF 25 75 REF: 2.62
FET100 PRE: 15.91 SEC
FEV1 FVC LLN: 64
FEV1 FVC PRE REF: 86.2 %
FEV1 FVC PRE: 65.91 % (ref 63.54–89.37)
FEV1 FVC REF: 76
FEV1 LLN: 2.45
FEV1 PRE REF: 90.1 %
FEV1 PRE: 3.01 L (ref 2.45–4.24)
FEV1 REF: 3.35
FEV6 LLN: 3.44
FEV6 PRE REF: 92.7 %
FEV6 PRE: 4.03 L (ref 3.44–5.25)
FEV6 REF: 4.35
FRCN2 LLN: 2.68
FRCN2 REF: 3.67
FRCPL PRE: 4.24 L
FRCPLETH LLN: 2.68
FRCPLETH PREREF: 115.6 %
FRCPLETH REF: 3.67
FVC LLN: 3.29
FVC PRE REF: 104.2 %
FVC PRE: 4.57 L (ref 3.29–5.49)
FVC REF: 4.39
IVC PRE: 2.94 L (ref 3.29–5.49)
IVC SINGLE BREATH LLN: 3.29
IVC SINGLE BREATH PRE REF: 67.1 %
IVC SINGLE BREATH REF: 4.39
MVV LLN: 111
MVV PRE REF: 33.1 %
MVV PRE: 43 L/MIN (ref 110.5–149.5)
MVV REF: 130
PEF LLN: 6.43
PEF PRE REF: 88.3 %
PEF PRE: 7.73 L/S (ref 6.43–11.08)
PEF REF: 8.75
RAW LLN: 3.06
RAW PRE REF: 118.8 %
RAW PRE: 3.63 CMH2O*S/L (ref 3.06–3.06)
RAW REF: 3.06
RV LLN: 1.88
RV PRE REF: 132.2 %
RV PRE: 3.38 L (ref 1.88–3.23)
RV REF: 2.55
RVN2 LLN: 1.88
RVN2 REF: 2.55
RVN2TLCN2 LLN: 31
RVN2TLCN2 REF: 40
RVTLC LLN: 31
RVTLC PRE REF: 107 %
RVTLC PRE: 42.48 % (ref 30.72–48.68)
RVTLC REF: 40
TLC LLN: 5.99
TLC PRE REF: 111.3 %
TLC PRE: 7.95 L (ref 5.99–8.29)
TLC REF: 7.14
TLCN2 LLN: 5.99
TLCN2 REF: 7.14
VA PRE: 4.87 L (ref 6.99–6.99)
VA SINGLE BREATH LLN: 6.99
VA SINGLE BREATH PRE REF: 69.6 %
VA SINGLE BREATH REF: 6.99
VC LLN: 3.29
VC PRE REF: 104.2 %
VC PRE: 4.57 L (ref 3.29–5.49)
VC REF: 4.39
VCMAXN2 LLN: 3.29
VCMAXN2 REF: 4.39
VTGRAWPRE: 3.94 L

## 2018-09-21 ENCOUNTER — CLINICAL SUPPORT (OUTPATIENT)
Dept: CARDIOLOGY | Facility: CLINIC | Age: 68
End: 2018-09-21
Attending: INTERNAL MEDICINE
Payer: MEDICARE

## 2018-09-21 ENCOUNTER — LAB VISIT (OUTPATIENT)
Dept: LAB | Facility: HOSPITAL | Age: 68
End: 2018-09-21
Attending: INTERNAL MEDICINE
Payer: MEDICARE

## 2018-09-21 DIAGNOSIS — I47.29 PAROXYSMAL VT: ICD-10-CM

## 2018-09-21 DIAGNOSIS — I49.01 VENTRICULAR FIBRILLATION: ICD-10-CM

## 2018-09-21 LAB
ANION GAP SERPL CALC-SCNC: 10 MMOL/L
BUN SERPL-MCNC: 14 MG/DL
CALCIUM SERPL-MCNC: 9.4 MG/DL
CHLORIDE SERPL-SCNC: 105 MMOL/L
CO2 SERPL-SCNC: 23 MMOL/L
CREAT SERPL-MCNC: 0.9 MG/DL
EST. GFR  (AFRICAN AMERICAN): >60 ML/MIN/1.73 M^2
EST. GFR  (NON AFRICAN AMERICAN): >60 ML/MIN/1.73 M^2
GLUCOSE SERPL-MCNC: 142 MG/DL
MAGNESIUM SERPL-MCNC: 2.3 MG/DL
POTASSIUM SERPL-SCNC: 4.4 MMOL/L
SODIUM SERPL-SCNC: 138 MMOL/L

## 2018-09-21 PROCEDURE — 93226 XTRNL ECG REC<48 HR SCAN A/R: CPT | Mod: PBBFAC,PO | Performed by: INTERNAL MEDICINE

## 2018-09-21 PROCEDURE — 83735 ASSAY OF MAGNESIUM: CPT

## 2018-09-21 PROCEDURE — 36415 COLL VENOUS BLD VENIPUNCTURE: CPT | Mod: PO

## 2018-09-21 PROCEDURE — 80048 BASIC METABOLIC PNL TOTAL CA: CPT

## 2018-09-21 PROCEDURE — 93227 XTRNL ECG REC<48 HR R&I: CPT | Mod: S$PBB,,, | Performed by: INTERNAL MEDICINE

## 2018-09-24 ENCOUNTER — CLINICAL SUPPORT (OUTPATIENT)
Dept: CARDIOLOGY | Facility: CLINIC | Age: 68
End: 2018-09-24
Attending: INTERNAL MEDICINE
Payer: MEDICARE

## 2018-09-24 DIAGNOSIS — I42.9 CARDIOMYOPATHY: ICD-10-CM

## 2018-09-24 DIAGNOSIS — I47.29 PAROXYSMAL VT: ICD-10-CM

## 2018-09-24 DIAGNOSIS — Z95.810 ICD (IMPLANTABLE CARDIOVERTER-DEFIBRILLATOR) IN PLACE: ICD-10-CM

## 2018-09-24 PROCEDURE — 93296 REM INTERROG EVL PM/IDS: CPT | Mod: PBBFAC,PO | Performed by: INTERNAL MEDICINE

## 2018-09-24 PROCEDURE — 93295 DEV INTERROG REMOTE 1/2/MLT: CPT | Mod: ,,, | Performed by: INTERNAL MEDICINE

## 2018-09-24 PROCEDURE — 93297 REM INTERROG DEV EVAL ICPMS: CPT | Mod: ,,, | Performed by: INTERNAL MEDICINE

## 2018-09-25 ENCOUNTER — TELEPHONE (OUTPATIENT)
Dept: CARDIOLOGY | Facility: CLINIC | Age: 68
End: 2018-09-25

## 2018-09-25 NOTE — TELEPHONE ENCOUNTER
Left voicemail message for call back on direct contact number also released to patient portal.    ---- Message from Nirav Baldwin MD sent at 9/23/2018  9:28 AM CDT -----  Please see comments below and call patient.  In general you do not need to route back to me.  All OK  Keep same

## 2018-10-04 ENCOUNTER — TELEPHONE (OUTPATIENT)
Dept: CARDIOLOGY | Facility: CLINIC | Age: 68
End: 2018-10-04

## 2018-10-04 ENCOUNTER — PATIENT MESSAGE (OUTPATIENT)
Dept: CARDIOLOGY | Facility: CLINIC | Age: 68
End: 2018-10-04

## 2018-10-04 DIAGNOSIS — I49.3 PVC'S (PREMATURE VENTRICULAR CONTRACTIONS): ICD-10-CM

## 2018-10-04 DIAGNOSIS — I49.01 VF (VENTRICULAR FIBRILLATION): ICD-10-CM

## 2018-10-04 DIAGNOSIS — I47.20 VT (VENTRICULAR TACHYCARDIA): ICD-10-CM

## 2018-10-04 DIAGNOSIS — I48.0 PAROXYSMAL ATRIAL FIBRILLATION: Primary | ICD-10-CM

## 2018-10-04 DIAGNOSIS — I42.9 CARDIOMYOPATHY, UNSPECIFIED TYPE: Primary | ICD-10-CM

## 2018-10-04 RX ORDER — AMIODARONE HYDROCHLORIDE 200 MG/1
300 TABLET ORAL DAILY
COMMUNITY
End: 2019-05-31 | Stop reason: SDUPTHER

## 2018-10-04 NOTE — TELEPHONE ENCOUNTER
Telephoned patient results and new orders, will restart Amiodarone 200 bid and repeat Echo in about 2 wks  Confirmed Pharmacy and will proceed with new plan of care.

## 2018-10-04 NOTE — TELEPHONE ENCOUNTER
----- Message from Nirav Baldwin MD sent at 10/3/2018  7:08 PM CDT -----  Results reviewed. abnormalities as listed. Please see appended comments,create telephone encounter, call patient and inform him/her of results and action to take then document in encounter and close it.   In general there will be no need to route back to   me unless there is an issue that you need to discuss. Thanks    A lot of PVCs  [place] get another echo and start him back on amiodarone -- 200 bid

## 2018-10-09 ENCOUNTER — TELEPHONE (OUTPATIENT)
Dept: ELECTROPHYSIOLOGY | Facility: CLINIC | Age: 68
End: 2018-10-09

## 2018-10-09 NOTE — TELEPHONE ENCOUNTER
Fax received from [facility] with regards to patient being on amiodarone and trazadone- potential interaction. Message sent to Mery CARRION for  in [place].      Julissa GUPTA CCM

## 2018-10-09 NOTE — TELEPHONE ENCOUNTER
----- Message from Shelley Ramirez RN sent at 10/9/2018  3:37 PM CDT -----  [facility] sent a fax today regarding patient being on amiodarone and trazadone- potential interaction, they wanted to know If MD was aware.    Thank you,  Shelley Ramirez RN

## 2018-10-19 ENCOUNTER — LAB VISIT (OUTPATIENT)
Dept: LAB | Facility: HOSPITAL | Age: 68
End: 2018-10-19
Attending: INTERNAL MEDICINE
Payer: MEDICARE

## 2018-10-19 ENCOUNTER — CLINICAL SUPPORT (OUTPATIENT)
Dept: CARDIOLOGY | Facility: CLINIC | Age: 68
End: 2018-10-19
Attending: INTERNAL MEDICINE
Payer: MEDICARE

## 2018-10-19 DIAGNOSIS — I49.01 VF (VENTRICULAR FIBRILLATION): ICD-10-CM

## 2018-10-19 DIAGNOSIS — I49.3 PVC'S (PREMATURE VENTRICULAR CONTRACTIONS): ICD-10-CM

## 2018-10-19 DIAGNOSIS — I48.0 PAROXYSMAL ATRIAL FIBRILLATION: ICD-10-CM

## 2018-10-19 DIAGNOSIS — I42.9 CARDIOMYOPATHY, UNSPECIFIED TYPE: ICD-10-CM

## 2018-10-19 LAB
DIASTOLIC DYSFUNCTION: NO
MITRAL VALVE MOBILITY: NORMAL
RETIRED EF AND QEF - SEE NOTES: 50 (ref 55–65)

## 2018-10-19 PROCEDURE — 36415 COLL VENOUS BLD VENIPUNCTURE: CPT | Mod: PO

## 2018-10-19 PROCEDURE — 93306 TTE W/DOPPLER COMPLETE: CPT | Mod: PBBFAC,PO | Performed by: INTERNAL MEDICINE

## 2018-10-19 PROCEDURE — 80299 QUANTITATIVE ASSAY DRUG: CPT

## 2018-10-22 ENCOUNTER — PATIENT MESSAGE (OUTPATIENT)
Dept: CARDIOLOGY | Facility: CLINIC | Age: 68
End: 2018-10-22

## 2018-10-22 LAB
AMIODARONE FLD-MCNC: 0.6 UG/ML (ref 1–3)
N-DESETHYL-AMIODARONE: 0.2 UG/ML

## 2018-10-24 ENCOUNTER — CLINICAL SUPPORT (OUTPATIENT)
Dept: CARDIOLOGY | Facility: CLINIC | Age: 68
End: 2018-10-24
Payer: MEDICARE

## 2018-10-24 ENCOUNTER — OFFICE VISIT (OUTPATIENT)
Dept: CARDIOLOGY | Facility: CLINIC | Age: 68
End: 2018-10-24
Payer: MEDICARE

## 2018-10-24 VITALS
BODY MASS INDEX: 39.14 KG/M2 | SYSTOLIC BLOOD PRESSURE: 130 MMHG | WEIGHT: 273.38 LBS | HEART RATE: 74 BPM | HEIGHT: 70 IN | DIASTOLIC BLOOD PRESSURE: 80 MMHG

## 2018-10-24 DIAGNOSIS — E66.01 CLASS 2 SEVERE OBESITY DUE TO EXCESS CALORIES WITH SERIOUS COMORBIDITY AND BODY MASS INDEX (BMI) OF 39.0 TO 39.9 IN ADULT: ICD-10-CM

## 2018-10-24 DIAGNOSIS — I48.0 PAF (PAROXYSMAL ATRIAL FIBRILLATION): ICD-10-CM

## 2018-10-24 DIAGNOSIS — I49.01 VF (VENTRICULAR FIBRILLATION): ICD-10-CM

## 2018-10-24 DIAGNOSIS — I47.29 PAROXYSMAL VT: ICD-10-CM

## 2018-10-24 DIAGNOSIS — I43 CARDIOMYOPATHY DUE TO SYSTEMIC DISEASE: ICD-10-CM

## 2018-10-24 DIAGNOSIS — Z91.89 AT RISK FOR AMIODARONE TOXICITY WITH LONG TERM USE: ICD-10-CM

## 2018-10-24 DIAGNOSIS — I49.01 VF (VENTRICULAR FIBRILLATION): Primary | ICD-10-CM

## 2018-10-24 DIAGNOSIS — G47.33 OBSTRUCTIVE SLEEP APNEA SYNDROME: ICD-10-CM

## 2018-10-24 DIAGNOSIS — Z45.02 ICD (IMPLANTABLE CARDIOVERTER-DEFIBRILLATOR) DISCHARGE: ICD-10-CM

## 2018-10-24 DIAGNOSIS — I10 ESSENTIAL HYPERTENSION: ICD-10-CM

## 2018-10-24 DIAGNOSIS — Z79.899 AT RISK FOR AMIODARONE TOXICITY WITH LONG TERM USE: ICD-10-CM

## 2018-10-24 DIAGNOSIS — Z95.810 ICD (IMPLANTABLE CARDIOVERTER-DEFIBRILLATOR) IN PLACE: ICD-10-CM

## 2018-10-24 PROCEDURE — 99215 OFFICE O/P EST HI 40 MIN: CPT | Mod: S$PBB,,, | Performed by: INTERNAL MEDICINE

## 2018-10-24 PROCEDURE — 99213 OFFICE O/P EST LOW 20 MIN: CPT | Mod: PBBFAC,PO | Performed by: INTERNAL MEDICINE

## 2018-10-24 PROCEDURE — 93283 PRGRMG EVAL IMPLANTABLE DFB: CPT | Mod: 26,,, | Performed by: INTERNAL MEDICINE

## 2018-10-24 PROCEDURE — 93290 INTERROG DEV EVAL ICPMS IP: CPT | Mod: 26,,, | Performed by: INTERNAL MEDICINE

## 2018-10-24 PROCEDURE — 99999 PR PBB SHADOW E&M-EST. PATIENT-LVL III: CPT | Mod: PBBFAC,,, | Performed by: INTERNAL MEDICINE

## 2018-10-24 NOTE — PROGRESS NOTES
"  Subjective:   Patient ID:  Randy Yap is a 68 y.o. male     Chief complaint:Follow-up      HPI  Background as recorded in my last note ( ):    Background as recorded in my last note (3/23/18 ):  68 man  Frequent, Sxc PMVT related to R on TV pacing in the setting of DDD programming and junctional beats.     He was initially on sotalol and amiodarone and the sotalol was DCed in sept 2016 all the while reprogrammed pacer to VVI back up  Since then, he has had no N/S Sx and no VT per his ICD records  We then decreased amio to 200 a day(but there seemed to be some confusion with what he was taking-- he seemed to be on 400 a day) and he still was feeling well from a PVC point of view but his wife noticed that he was retaining fluid at a time when he was c/o ALDANA-- he increased his lasix and he felt better enough to go back to 1 pill a day  ICD eval sept 2016>> He is in AAI mode at 65 and he paces 67%-- no VTNS      Update on 12/14/16:  We were planning to gradually wean amio and we have decreased the dose to 200 a day so far -- no issues since then and ICD shows no VT  He has had PFT with [facility] -- results pending.   He wants to switch to this practice.   He has an ICD that is on battery advisory -- he has darci educated and he is on Merlin remote monitoring -- vibration alert turned on     Update 4/21/17:  " I am doing fantastic"  Exercising and working in the yard like he had not done in a long time.   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 He had a PET scan that was markedly abnormal:  >>  1. 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   2. Stress Rb-82 imaging was aborted secondary to inability to achieve target heart rate and ventricular tachycardia which increased in frequency and duration with dobutamine titration.  3. There is abnormal wall motion at rest showing severe global hypokinesis of the left ventricle.   3. There is resting LV dysfunction with a reduced ejection fraction of 29 %.  (normal is >= 51%)  4. The left ventricular volume is mildly increased at rest.   5. The extracardiac distribution of radioactivity is normal.   6. There was no previous study available to compare.  This prompted us to obtain a coronary angiogram with the following results:  >>  Luminal irregularities in all 3 vessels  EF 50%   He has enrolled in [employer] and has been doing well -- he feels great and has no apparent CV restrictions    Update since then:  He has had an echo that also disputes the results of the PET. He feels well   His holter and GXT show polymorphic JOAQUIN with frequent VTNS, PVCs etc.  No prolonged QTs  There is a preponderance of one PVC but when complex ectopy occurs it is almost always polymorphic and irregular    Current Outpatient Medications   Medication Sig    acetaminophen (TYLENOL) 325 MG tablet Take 650 mg by mouth as needed.     albuterol 90 mcg/actuation inhaler Inhale 2 puffs into the lungs every 4 (four) hours as needed for Wheezing or Shortness of Breath (cough). Rescue    amiodarone (PACERONE) 200 MG Tab Take 200 mg by mouth 2 (two) times daily.    carvedilol (COREG) 25 MG tablet take 1 tablet by mouth twice a day    co-enzyme Q-10 30 mg capsule Take 30 mg by  mouth once daily.     fluticasone furoate (ARNUITY ELLIPTA) 100 mcg/actuation DsDv Inhale into the lungs. Controller    furosemide (LASIX) 20 MG tablet take 1 tablet by mouth every morning    glucosamine-chondroitin 500-400 mg tablet Take 1 tablet by mouth once daily.     INV apixaban/placebo tablet Take 5 mg by mouth 2 (two) times daily. FOR INVESTIGATIONAL USE ONLY. Protocol [place]    INV aspirin/placebo tablet Take 1 tablet (81 mg total) by mouth once daily. FOR INVESTIGATIONAL USE ONLY. Protocol [place]    losartan (COZAAR) 100 MG tablet Take 1 tablet (100 mg total) by mouth once daily.    multivitamin (THERAGRAN) tablet Take 1 tablet by mouth once daily.      pravastatin (PRAVACHOL) 40 MG tablet Take 1 tablet (40 mg total) by mouth once daily.    spironolactone (ALDACTONE) 25 MG tablet take 1 tablet by mouth once daily    traZODone (DESYREL) 150 MG tablet take 1 tablet by mouth every evening     No current facility-administered medications for this visit.      Review of Systems   Constitution: Negative for decreased appetite, weakness, malaise/fatigue, weight gain and weight loss.   Eyes: Negative for blurred vision.   Cardiovascular: Negative for chest pain, claudication, cyanosis, dyspnea on exertion, irregular heartbeat, leg swelling, near-syncope, orthopnea and palpitations.   Respiratory: Negative for cough, shortness of breath, sleep disturbances due to breathing, snoring and wheezing.    Endocrine: Negative for heat intolerance.   Hematologic/Lymphatic: Does not bruise/bleed easily.   Musculoskeletal: Negative for muscle weakness and myalgias.   Gastrointestinal: Negative for melena, nausea and vomiting.   Genitourinary: Negative for nocturia.   Neurological: Negative for excessive daytime sleepiness, dizziness, headaches and light-headedness.   Psychiatric/Behavioral: Negative for depression, memory loss and substance abuse. The patient does not have insomnia and is not nervous/anxious.         Objective:   Physical Exam   Constitutional: He is oriented to person, place, and time. He appears well-developed and well-nourished.   overweight   HENT:   Head: Normocephalic and atraumatic.   Right Ear: External ear normal.   Left Ear: External ear normal.   Eyes: Conjunctivae are normal. Pupils are equal, round, and reactive to light. Left conjunctiva is not injected. Left conjunctiva has no hemorrhage.   Neck: Neck supple. No JVD present. No thyromegaly present.   Cardiovascular: Normal rate, regular rhythm, normal heart sounds and intact distal pulses. PMI is not displaced. Exam reveals no gallop, no friction rub, no midsystolic click and no opening snap.   No murmur heard.  Pulses:       Carotid pulses are 2+ on the right side, and 2+ on the left side.       Radial pulses are 2+ on the right side, and 2+ on the left side.        Dorsalis pedis pulses are 2+ on the right side, and 2+ on the left side.        Posterior tibial pulses are 2+ on the right side, and 2+ on the left side.   Pulmonary/Chest: Effort normal and breath sounds normal. No respiratory distress. He has no wheezes. He has no rales. He exhibits no tenderness.   Device pocket is in excellent repair     Abdominal: Soft. Normal appearance. He exhibits no pulsatile liver. There is no hepatomegaly. There is no tenderness. There is no rigidity and no guarding.   Obese abdomen   Musculoskeletal: Normal range of motion. He exhibits no edema or tenderness.        Right knee: He exhibits no swelling.        Left knee: He exhibits no swelling.        Right ankle: He exhibits no swelling.        Left ankle: He exhibits no swelling.        Right lower leg: He exhibits no swelling.        Left lower leg: He exhibits no swelling.        Right foot: There is no swelling.        Left foot: There is no swelling.   Neurological: He is alert and oriented to person, place, and time. He has normal strength and normal reflexes. No cranial nerve deficit.  "Coordination normal.   Skin: Skin is warm, dry and intact. No rash noted. No cyanosis. No pallor.   Psychiatric: He has a normal mood and affect. His behavior is normal.   Nursing note and vitals reviewed.    /80 (BP Location: Right arm, Patient Position: Sitting, BP Method: Large (Manual))   Pulse 74   Ht 5' 10" (1.778 m)   Wt 124 kg (273 lb 5.9 oz)   BMI 39.22 kg/m²      Assessment:    He has an electrical CMP that is not fully defined to me --   There is a possibility that this could be akin to idiopathic VF in which case he would be responsive to Quinidine but the past Hx of R on T VT/VF makes this choice a bit difficult.  For now we will stay with amio   1. VF (ventricular fibrillation)    2. At risk for amiodarone toxicity with long term use    3. Paroxysmal VT    4. PAF (paroxysmal atrial fibrillation)    5. Essential hypertension    6. Obstructive sleep apnea syndrome    7. ICD (implantable cardioverter-defibrillator) discharge    8. Class 2 severe obesity due to excess calories with serious comorbidity and body mass index (BMI) of 39.0 to 39.9 in adult        Plan:      Orders Placed This Encounter   Procedures    Amiodarone level     Standing Status:   Future     Standing Expiration Date:   12/23/2019     Follow-up in about 3 months (around 1/24/2019).  There are no discontinued medications.  This SmartLink is deprecated. Use PoshlyEDLIST instead to display the medication list for a patient.  This SmartLink is deprecated. Use AVSynarcEDLIST instead to display the medication list for a patient.           "

## 2018-11-12 ENCOUNTER — TELEPHONE (OUTPATIENT)
Dept: RESEARCH | Facility: HOSPITAL | Age: 68
End: 2018-11-12

## 2018-12-12 ENCOUNTER — TELEPHONE (OUTPATIENT)
Dept: RESEARCH | Facility: HOSPITAL | Age: 68
End: 2018-12-12

## 2019-01-09 ENCOUNTER — LAB VISIT (OUTPATIENT)
Dept: LAB | Facility: HOSPITAL | Age: 69
End: 2019-01-09
Attending: INTERNAL MEDICINE
Payer: MEDICARE

## 2019-01-09 ENCOUNTER — TELEPHONE (OUTPATIENT)
Dept: RESEARCH | Facility: HOSPITAL | Age: 69
End: 2019-01-09

## 2019-01-09 DIAGNOSIS — I48.0 PAF (PAROXYSMAL ATRIAL FIBRILLATION): ICD-10-CM

## 2019-01-09 DIAGNOSIS — I48.0 PAF (PAROXYSMAL ATRIAL FIBRILLATION): Primary | ICD-10-CM

## 2019-01-09 DIAGNOSIS — Z00.6 EXAMINATION OF PARTICIPANT IN CLINICAL TRIAL: ICD-10-CM

## 2019-01-09 LAB
ALBUMIN SERPL BCP-MCNC: 4.1 G/DL
ALP SERPL-CCNC: 47 U/L
ALT SERPL W/O P-5'-P-CCNC: 45 U/L
ANION GAP SERPL CALC-SCNC: 11 MMOL/L
ANION GAP SERPL CALC-SCNC: 11 MMOL/L
AST SERPL-CCNC: 31 U/L
BILIRUB SERPL-MCNC: 0.4 MG/DL
BUN SERPL-MCNC: 15 MG/DL
BUN SERPL-MCNC: 15 MG/DL
CALCIUM SERPL-MCNC: 9.6 MG/DL
CALCIUM SERPL-MCNC: 9.6 MG/DL
CHLORIDE SERPL-SCNC: 101 MMOL/L
CHLORIDE SERPL-SCNC: 101 MMOL/L
CO2 SERPL-SCNC: 24 MMOL/L
CO2 SERPL-SCNC: 24 MMOL/L
CREAT SERPL-MCNC: 0.9 MG/DL
CREAT SERPL-MCNC: 0.9 MG/DL
EST. GFR  (AFRICAN AMERICAN): >60 ML/MIN/1.73 M^2
EST. GFR  (AFRICAN AMERICAN): >60 ML/MIN/1.73 M^2
EST. GFR  (NON AFRICAN AMERICAN): >60 ML/MIN/1.73 M^2
EST. GFR  (NON AFRICAN AMERICAN): >60 ML/MIN/1.73 M^2
GLUCOSE SERPL-MCNC: 99 MG/DL
GLUCOSE SERPL-MCNC: 99 MG/DL
POTASSIUM SERPL-SCNC: 4.4 MMOL/L
POTASSIUM SERPL-SCNC: 4.4 MMOL/L
PROT SERPL-MCNC: 8 G/DL
SODIUM SERPL-SCNC: 136 MMOL/L
SODIUM SERPL-SCNC: 136 MMOL/L

## 2019-01-09 PROCEDURE — 36415 COLL VENOUS BLD VENIPUNCTURE: CPT

## 2019-01-09 PROCEDURE — 80053 COMPREHEN METABOLIC PANEL: CPT

## 2019-01-10 ENCOUNTER — RESEARCH ENCOUNTER (OUTPATIENT)
Dept: RESEARCH | Facility: HOSPITAL | Age: 69
End: 2019-01-10

## 2019-01-10 NOTE — PROGRESS NOTES
Met with Leia irving for 6 month follow up visit. Patient was doing well. No ER visits or hospital stays. No adverse events to report from study drug. Patient was redistributed study drug in a 6 month supply. Patient will return for follow up and drug redistribution in another 6 months.

## 2019-01-21 ENCOUNTER — CLINICAL SUPPORT (OUTPATIENT)
Dept: CARDIOLOGY | Facility: CLINIC | Age: 69
End: 2019-01-21
Attending: INTERNAL MEDICINE
Payer: MEDICARE

## 2019-01-21 DIAGNOSIS — I47.29 PAROXYSMAL VT: ICD-10-CM

## 2019-01-21 DIAGNOSIS — Z95.810 ICD (IMPLANTABLE CARDIOVERTER-DEFIBRILLATOR) IN PLACE: ICD-10-CM

## 2019-01-21 DIAGNOSIS — I43 CARDIOMYOPATHY DUE TO SYSTEMIC DISEASE: ICD-10-CM

## 2019-01-21 DIAGNOSIS — I49.01 VF (VENTRICULAR FIBRILLATION): ICD-10-CM

## 2019-01-21 PROCEDURE — 93295 ICD REMOTE: ICD-10-PCS | Mod: ,,, | Performed by: INTERNAL MEDICINE

## 2019-01-21 PROCEDURE — 93295 DEV INTERROG REMOTE 1/2/MLT: CPT | Mod: ,,, | Performed by: INTERNAL MEDICINE

## 2019-01-21 PROCEDURE — 93296 REM INTERROG EVL PM/IDS: CPT | Mod: PBBFAC,PN | Performed by: INTERNAL MEDICINE

## 2019-01-21 PROCEDURE — 93297 REM INTERROG DEV EVAL ICPMS: CPT | Mod: ,,, | Performed by: INTERNAL MEDICINE

## 2019-01-21 PROCEDURE — 93297 ICD REMOTE: ICD-10-PCS | Mod: ,,, | Performed by: INTERNAL MEDICINE

## 2019-01-21 RX ORDER — CARVEDILOL 25 MG/1
TABLET ORAL
Qty: 60 TABLET | Refills: 12 | Status: SHIPPED | OUTPATIENT
Start: 2019-01-21 | End: 2020-01-26

## 2019-01-23 ENCOUNTER — OFFICE VISIT (OUTPATIENT)
Dept: FAMILY MEDICINE | Facility: CLINIC | Age: 69
End: 2019-01-23
Payer: MEDICARE

## 2019-01-23 ENCOUNTER — HOSPITAL ENCOUNTER (OUTPATIENT)
Dept: RADIOLOGY | Facility: HOSPITAL | Age: 69
Discharge: HOME | End: 2019-01-23
Attending: NURSE PRACTITIONER
Payer: MEDICARE

## 2019-01-23 VITALS
TEMPERATURE: 99 F | SYSTOLIC BLOOD PRESSURE: 126 MMHG | DIASTOLIC BLOOD PRESSURE: 73 MMHG | BODY MASS INDEX: 39.8 KG/M2 | HEIGHT: 70 IN | OXYGEN SATURATION: 96 % | WEIGHT: 278 LBS | HEART RATE: 77 BPM

## 2019-01-23 DIAGNOSIS — H10.9 CONJUNCTIVITIS OF LEFT EYE, UNSPECIFIED CONJUNCTIVITIS TYPE: ICD-10-CM

## 2019-01-23 DIAGNOSIS — R06.2 WHEEZING: ICD-10-CM

## 2019-01-23 DIAGNOSIS — R05.9 COUGH: ICD-10-CM

## 2019-01-23 DIAGNOSIS — J32.9 SINUSITIS, UNSPECIFIED CHRONICITY, UNSPECIFIED LOCATION: Primary | ICD-10-CM

## 2019-01-23 DIAGNOSIS — M25.551 RIGHT HIP PAIN: ICD-10-CM

## 2019-01-23 PROCEDURE — 99213 OFFICE O/P EST LOW 20 MIN: CPT | Mod: S$PBB,,, | Performed by: NURSE PRACTITIONER

## 2019-01-23 PROCEDURE — 73502 X-RAY EXAM HIP UNI 2-3 VIEWS: CPT | Mod: 26,RT,, | Performed by: RADIOLOGY

## 2019-01-23 PROCEDURE — 71046 XR CHEST PA AND LATERAL: ICD-10-PCS | Mod: 26,,, | Performed by: RADIOLOGY

## 2019-01-23 PROCEDURE — 73502 XR HIP 2 VIEW RIGHT: ICD-10-PCS | Mod: 26,RT,, | Performed by: RADIOLOGY

## 2019-01-23 PROCEDURE — 71046 X-RAY EXAM CHEST 2 VIEWS: CPT | Mod: TC,PO

## 2019-01-23 PROCEDURE — 99213 PR OFFICE/OUTPT VISIT, EST, LEVL III, 20-29 MIN: ICD-10-PCS | Mod: S$PBB,,, | Performed by: NURSE PRACTITIONER

## 2019-01-23 PROCEDURE — 96372 THER/PROPH/DIAG INJ SC/IM: CPT | Mod: PBBFAC,PO

## 2019-01-23 PROCEDURE — 99999 PR PBB SHADOW E&M-EST. PATIENT-LVL V: CPT | Mod: PBBFAC,,, | Performed by: NURSE PRACTITIONER

## 2019-01-23 PROCEDURE — 99215 OFFICE O/P EST HI 40 MIN: CPT | Mod: PBBFAC,PO,25 | Performed by: NURSE PRACTITIONER

## 2019-01-23 PROCEDURE — 73502 X-RAY EXAM HIP UNI 2-3 VIEWS: CPT | Mod: TC,PO,RT

## 2019-01-23 PROCEDURE — 71046 X-RAY EXAM CHEST 2 VIEWS: CPT | Mod: 26,,, | Performed by: RADIOLOGY

## 2019-01-23 PROCEDURE — 99999 PR PBB SHADOW E&M-EST. PATIENT-LVL V: ICD-10-PCS | Mod: PBBFAC,,, | Performed by: NURSE PRACTITIONER

## 2019-01-23 RX ORDER — CEFDINIR 300 MG/1
300 CAPSULE ORAL 2 TIMES DAILY
Qty: 20 CAPSULE | Refills: 0 | Status: SHIPPED | OUTPATIENT
Start: 2019-01-23 | End: 2019-02-02

## 2019-01-23 RX ORDER — BENZONATATE 200 MG/1
200 CAPSULE ORAL 3 TIMES DAILY PRN
Qty: 30 CAPSULE | Refills: 0 | Status: SHIPPED | OUTPATIENT
Start: 2019-01-23 | End: 2019-02-02

## 2019-01-23 RX ORDER — DEXAMETHASONE SODIUM PHOSPHATE 4 MG/ML
8 INJECTION, SOLUTION INTRA-ARTICULAR; INTRALESIONAL; INTRAMUSCULAR; INTRAVENOUS; SOFT TISSUE
Status: COMPLETED | OUTPATIENT
Start: 2019-01-23 | End: 2019-01-23

## 2019-01-23 RX ADMIN — DEXAMETHASONE SODIUM PHOSPHATE 8 MG: 4 INJECTION, SOLUTION INTRAMUSCULAR; INTRAVENOUS at 09:01

## 2019-01-23 NOTE — PROGRESS NOTES
Subjective:       Patient ID: Randy Yap is a 69 y.o. male.    Chief Complaint: Cough; Nasal Congestion; Chest Congestion; and Hip Pain    Cough   This is a new problem. The current episode started 1 to 4 weeks ago. The problem has been unchanged. The problem occurs every few minutes. The cough is productive of sputum. Associated symptoms include eye redness, headaches, nasal congestion, rhinorrhea and wheezing. Pertinent negatives include no chest pain, chills, ear congestion, ear pain, fever, heartburn, hemoptysis, myalgias, postnasal drip, rash, sore throat, shortness of breath, sweats or weight loss. Nothing aggravates the symptoms. He has tried a beta-agonist inhaler for the symptoms. The treatment provided mild relief. His past medical history is significant for asthma and bronchitis. There is no history of bronchiectasis, COPD, emphysema, environmental allergies or pneumonia.   Conjunctivitis   This is a new (left eye) problem. The current episode started in the past 7 days. The problem occurs daily. The problem has been unchanged. Associated symptoms include coughing and headaches. Pertinent negatives include no abdominal pain, anorexia, arthralgias, change in bowel habit, chest pain, chills, congestion, diaphoresis, fatigue, fever, joint swelling, myalgias, nausea, neck pain, numbness, rash, sore throat, swollen glands, urinary symptoms, vertigo, visual change, vomiting or weakness. Nothing aggravates the symptoms. He has tried nothing for the symptoms. The treatment provided no relief.     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     Colon polyp 5/2013    repeat 5/2018    Depression, recurrent     Diastolic dysfunction     DJD (degenerative joint disease) of knee 10/14/2013    Hyperlipidemia     Hypertension     Murmur     Paroxysmal VT 5/21/2016    Pericarditis with effusion     Sleep apnea      Social History     Socioeconomic History     Marital status:      Spouse name: Not on file    Number of children: Not on file    Years of education: Not on file    Highest education level: Not on file   Social Needs    Financial resource strain: Not on file    Food insecurity - worry: Not on file    Food insecurity - inability: Not on file    Transportation needs - medical: Not on file    Transportation needs - non-medical: Not on file   Occupational History    Not on file   Tobacco Use    Smoking status: Never Smoker    Smokeless tobacco: Never Used   Substance and Sexual Activity    Alcohol use: Yes     Alcohol/week: 0.5 oz     Types: 1 Standard drinks or equivalent per week     Comment: occasional glass of wine on social occasions    Drug use: No    Sexual activity: Not on file   Other Topics Concern    Not on file   Social History Narrative    Not on file     Past Surgical History:   Procedure Laterality Date    CARDIAC DEFIBRILLATOR PLACEMENT  10/6/2014    CATHETERIZATION, HEART, LEFT Left 7/9/2018    [facility]    COLONOSCOPY W/ POLYPECTOMY  5/21/2013    repeat 5/21/2018    HERNIA REPAIR      R inguinal    pace maker   10/6/2014    PERICARDIAL WINDOW  12-15 years ago    tonsillectomy      VARICOCELECTOMY         Review of Systems   Constitutional: Negative.  Negative for chills, diaphoresis, fatigue, fever and weight loss.   HENT: Positive for rhinorrhea, sinus pressure and sinus pain. Negative for congestion, ear pain, postnasal drip and sore throat.    Eyes: Positive for discharge and redness.   Respiratory: Positive for cough and wheezing. Negative for hemoptysis and shortness of breath.    Cardiovascular: Negative.  Negative for chest pain.   Gastrointestinal: Negative.  Negative for abdominal pain, anorexia, change in bowel habit, heartburn, nausea and vomiting.   Endocrine: Negative.    Genitourinary: Negative.    Musculoskeletal: Negative.  Negative for arthralgias, joint  swelling, myalgias and neck pain.   Skin: Negative.  Negative for rash.   Allergic/Immunologic: Negative.  Negative for environmental allergies.   Neurological: Positive for headaches. Negative for vertigo, weakness and numbness.   Psychiatric/Behavioral: Negative.        Objective:      Physical Exam   Constitutional: He is oriented to person, place, and time. He appears well-developed and well-nourished.   HENT:   Head: Normocephalic.   Right Ear: External ear normal.   Left Ear: External ear normal.   Mouth/Throat: Oropharynx is clear and moist.   Eyes: Pupils are equal, round, and reactive to light. Right eye exhibits no chemosis, no discharge, no exudate and no hordeolum. No foreign body present in the right eye. Left eye exhibits no chemosis, no discharge, no exudate and no hordeolum. No foreign body present in the left eye. Right conjunctiva is not injected. Right conjunctiva has no hemorrhage. Left conjunctiva is injected. Left conjunctiva has no hemorrhage. No scleral icterus.   Neck: Normal range of motion. Neck supple.   Cardiovascular: Normal rate, regular rhythm and normal heart sounds.   Pulmonary/Chest: Effort normal. He has wheezes in the right upper field, the right lower field, the left upper field and the left lower field.   Abdominal: Soft. Bowel sounds are normal.   Musculoskeletal: Normal range of motion.   Neurological: He is alert and oriented to person, place, and time.   Skin: Skin is warm and dry. Capillary refill takes 2 to 3 seconds.   Psychiatric: He has a normal mood and affect. His behavior is normal. Judgment and thought content normal.   Nursing note and vitals reviewed.      Assessment:       1. Sinusitis, unspecified chronicity, unspecified location    2. Cough    3. Wheezing    4. Right hip pain    5.      Conjunctivitis of left eye, unspecified conjunctivitis type   Plan:           Randy was seen today for cough, nasal congestion, chest congestion and hip pain.    Diagnoses  and all orders for this visit:    Sinusitis, unspecified chronicity, unspecified location  Cough  Wheezing  -     X-Ray Chest PA And Lateral; Future  -     dexamethasone injection 8 mg  -     cefdinir (OMNICEF) 300 MG capsule; Take 1 capsule (300 mg total) by mouth 2 (two) times daily. for 10 days  -     benzonatate (TESSALON) 200 MG capsule; Take 1 capsule (200 mg total) by mouth 3 (three) times daily as needed.        Inhalers as prescribed        Report to ER immediately if symptoms worsen    Right hip pain  -     X-Ray Hip 2 View Right; Future  -     dexamethasone injection 8 mg    Conjunctivitis of left eye, unspecified conjunctivitis type  -     tobramycin sulfate 0.3% (TOBREX) 0.3 % ophthalmic solution; Place 1 drop into the left eye every 6 (six) hours. for 7 days

## 2019-01-24 RX ORDER — TOBRAMYCIN 3 MG/ML
1 SOLUTION/ DROPS OPHTHALMIC EVERY 6 HOURS
Qty: 5 ML | Refills: 0 | Status: SHIPPED | OUTPATIENT
Start: 2019-01-24 | End: 2019-01-31

## 2019-03-07 ENCOUNTER — LAB VISIT (OUTPATIENT)
Dept: LAB | Facility: HOSPITAL | Age: 69
End: 2019-03-07
Attending: INTERNAL MEDICINE
Payer: MEDICARE

## 2019-03-07 DIAGNOSIS — Z91.89 AT RISK FOR AMIODARONE TOXICITY WITH LONG TERM USE: ICD-10-CM

## 2019-03-07 DIAGNOSIS — Z79.899 AT RISK FOR AMIODARONE TOXICITY WITH LONG TERM USE: ICD-10-CM

## 2019-03-07 PROCEDURE — 36415 COLL VENOUS BLD VENIPUNCTURE: CPT | Mod: PO

## 2019-03-07 PROCEDURE — 80299 QUANTITATIVE ASSAY DRUG: CPT

## 2019-03-11 LAB
AMIODARONE SERPL-SCNC: 1.6 UG/ML (ref 1–3)
N-DESETHYL-AMIODARONE: 0.9 UG/ML

## 2019-03-12 ENCOUNTER — TELEPHONE (OUTPATIENT)
Dept: CARDIOLOGY | Facility: CLINIC | Age: 69
End: 2019-03-12

## 2019-03-12 NOTE — TELEPHONE ENCOUNTER
----- Message from Nirav Baldwin MD sent at 3/11/2019  6:05 PM CDT -----  See comments below and call patient to discuss.   Please close encounter when done -- no need to route back to me.  Thanks    R we really giving him 200 bid?

## 2019-03-22 ENCOUNTER — OFFICE VISIT (OUTPATIENT)
Dept: CARDIOLOGY | Facility: CLINIC | Age: 69
End: 2019-03-22
Payer: MEDICARE

## 2019-03-22 ENCOUNTER — LAB VISIT (OUTPATIENT)
Dept: LAB | Facility: HOSPITAL | Age: 69
End: 2019-03-22
Attending: INTERNAL MEDICINE
Payer: MEDICARE

## 2019-03-22 VITALS
SYSTOLIC BLOOD PRESSURE: 130 MMHG | HEART RATE: 76 BPM | WEIGHT: 276.25 LBS | HEIGHT: 70 IN | DIASTOLIC BLOOD PRESSURE: 82 MMHG | BODY MASS INDEX: 39.55 KG/M2

## 2019-03-22 DIAGNOSIS — I48.0 PAF (PAROXYSMAL ATRIAL FIBRILLATION): ICD-10-CM

## 2019-03-22 DIAGNOSIS — I49.01 VF (VENTRICULAR FIBRILLATION): ICD-10-CM

## 2019-03-22 DIAGNOSIS — E66.01 CLASS 2 SEVERE OBESITY DUE TO EXCESS CALORIES WITH SERIOUS COMORBIDITY AND BODY MASS INDEX (BMI) OF 39.0 TO 39.9 IN ADULT: ICD-10-CM

## 2019-03-22 DIAGNOSIS — I51.89 DIASTOLIC DYSFUNCTION: ICD-10-CM

## 2019-03-22 DIAGNOSIS — Z91.89 AT RISK FOR AMIODARONE TOXICITY WITH LONG TERM USE: Primary | ICD-10-CM

## 2019-03-22 DIAGNOSIS — Z45.02 ICD (IMPLANTABLE CARDIOVERTER-DEFIBRILLATOR) DISCHARGE: ICD-10-CM

## 2019-03-22 DIAGNOSIS — I10 ESSENTIAL HYPERTENSION: ICD-10-CM

## 2019-03-22 DIAGNOSIS — E78.5 HYPERLIPIDEMIA LDL GOAL <100: ICD-10-CM

## 2019-03-22 DIAGNOSIS — G47.33 OBSTRUCTIVE SLEEP APNEA SYNDROME: ICD-10-CM

## 2019-03-22 DIAGNOSIS — Z79.899 AT RISK FOR AMIODARONE TOXICITY WITH LONG TERM USE: ICD-10-CM

## 2019-03-22 DIAGNOSIS — Z91.89 AT RISK FOR AMIODARONE TOXICITY WITH LONG TERM USE: ICD-10-CM

## 2019-03-22 DIAGNOSIS — Z79.899 AT RISK FOR AMIODARONE TOXICITY WITH LONG TERM USE: Primary | ICD-10-CM

## 2019-03-22 DIAGNOSIS — I47.29 PAROXYSMAL VT: ICD-10-CM

## 2019-03-22 LAB — TSH SERPL DL<=0.005 MIU/L-ACNC: 1.42 UIU/ML

## 2019-03-22 PROCEDURE — 36415 COLL VENOUS BLD VENIPUNCTURE: CPT

## 2019-03-22 PROCEDURE — 99999 PR PBB SHADOW E&M-EST. PATIENT-LVL III: ICD-10-PCS | Mod: PBBFAC,,, | Performed by: INTERNAL MEDICINE

## 2019-03-22 PROCEDURE — 99215 PR OFFICE/OUTPT VISIT, EST, LEVL V, 40-54 MIN: ICD-10-PCS | Mod: S$PBB,,, | Performed by: INTERNAL MEDICINE

## 2019-03-22 PROCEDURE — 84443 ASSAY THYROID STIM HORMONE: CPT

## 2019-03-22 PROCEDURE — 99213 OFFICE O/P EST LOW 20 MIN: CPT | Mod: PBBFAC,PN | Performed by: INTERNAL MEDICINE

## 2019-03-22 PROCEDURE — 99215 OFFICE O/P EST HI 40 MIN: CPT | Mod: S$PBB,,, | Performed by: INTERNAL MEDICINE

## 2019-03-22 PROCEDURE — 99999 PR PBB SHADOW E&M-EST. PATIENT-LVL III: CPT | Mod: PBBFAC,,, | Performed by: INTERNAL MEDICINE

## 2019-03-22 NOTE — PROGRESS NOTES
"  Subjective:   Patient ID:  Randy Yap is a 69 y.o. male     Chief complaint:Hypertension; Hyperlipidemia; and Atrial Fibrillation      HPI  Background:  69 man  Frequent, Sxc PMVT related to R on TV pacing in the setting of DDD programming and junctional beats.     He was initially on sotalol and amiodarone and the sotalol was DCed in sept 2016 all the while reprogrammed pacer to VVI back up  Since then, he has had no N/S Sx and no VT per his ICD records  We then decreased amio to 200 a day(but there seemed to be some confusion with what he was taking-- he seemed to be on 400 a day) and he still was feeling well from a PVC point of view but his wife noticed that he was retaining fluid at a time when he was c/o ALDANA-- he increased his lasix and he felt better enough to go back to 1 pill a day  ICD eval sept 2016>> He is in AAI mode at 65 and he paces 67%-- no VTNS      Update on 12/14/16:  We were planning to gradually wean amio and we have decreased the dose to 200 a day so far -- no issues since then and ICD shows no VT  He has had PFT with [facility] -- results pending.   He wants to switch to this practice.   He has an ICD that is on battery advisory -- he has darci educated and he is on Merlin remote monitoring -- vibration alert turned on     Update 4/21/17:  " I am doing fantastic"  Exercising and working in the yard like he had not done in a long time.   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 He had a PET scan that was markedly abnormal:  >>  1. 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   2. Stress Rb-82 imaging was aborted secondary to inability to achieve target heart rate and ventricular tachycardia which increased in frequency and duration with dobutamine titration.  3. There is abnormal wall motion at rest showing severe global hypokinesis of the left ventricle.   3. There is resting LV dysfunction with a reduced ejection fraction of 29 %.  (normal is >= 51%)  4. The left ventricular volume is mildly increased at rest.   5. The extracardiac distribution of radioactivity is normal.   6. There was no previous study available to compare.  This prompted us to obtain a coronary angiogram with the following results:  >>  Luminal irregularities in all 3 vessels  EF 50%   He has enrolled in [employer] and has been doing well -- he feels great and has no apparent CV restrictions     He has had an echo that also disputes the results of the PET. He feels well   His holter and GXT show polymorphic JOAQUIN with frequent VTNS, PVCs etc.  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     albuterol 90 mcg/actuation inhaler Inhale 2 puffs into the lungs every 4 (four) hours as needed for Wheezing or Shortness  of Breath (cough). Rescue    amiodarone (PACERONE) 200 MG Tab Take 200 mg by mouth 2 (two) times daily.    carvedilol (COREG) 25 MG tablet TAKE 1 TABLET BY MOUTH TWICE DAILY     co-enzyme Q-10 30 mg capsule Take 30 mg by mouth once daily.     furosemide (LASIX) 20 MG tablet take 1 tablet by mouth every morning    glucosamine-chondroitin 500-400 mg tablet Take 1 tablet by mouth once daily.     INV apixaban/placebo tablet Take 5 mg by mouth 2 (two) times daily. FOR INVESTIGATIONAL USE ONLY. Protocol : 2017.307    INV aspirin/placebo tablet Take 1 tablet (81 mg total) by mouth once daily. FOR INVESTIGATIONAL USE ONLY. Patient Study #: [id number]. Protocol : [place] 2017.307    losartan (COZAAR) 100 MG tablet Take 1 tablet (100 mg total) by mouth once daily.    multivitamin (THERAGRAN) tablet Take 1 tablet by mouth once daily.      pravastatin (PRAVACHOL) 40 MG tablet Take 1 tablet (40 mg total) by mouth once daily.    spironolactone (ALDACTONE) 25 MG tablet take 1 tablet by mouth once daily    traZODone (DESYREL) 150 MG tablet take 1 tablet by mouth every evening     No current facility-administered medications for this visit.      Review of Systems   Constitution: Negative for decreased appetite, weakness, malaise/fatigue, weight gain and weight loss.   Eyes: Negative for blurred vision.   Cardiovascular: Negative for chest pain, claudication, cyanosis, dyspnea on exertion, irregular heartbeat, leg swelling, near-syncope, orthopnea and palpitations.   Respiratory: Negative for cough, shortness of breath, sleep disturbances due to breathing, snoring and wheezing.    Endocrine: Negative for heat intolerance.   Hematologic/Lymphatic: Does not bruise/bleed easily.   Musculoskeletal: Negative for muscle weakness and myalgias.   Gastrointestinal: Negative for melena, nausea and vomiting.   Genitourinary: Negative for nocturia.   Neurological: Negative for excessive daytime sleepiness, dizziness, headaches and  light-headedness.   Psychiatric/Behavioral: Negative for depression, memory loss and substance abuse. The patient does not have insomnia and is not nervous/anxious.        Objective:   Physical Exam   Constitutional: He is oriented to person, place, and time. He appears well-developed and well-nourished.   overweight   HENT:   Head: Normocephalic and atraumatic.   Right Ear: External ear normal.   Left Ear: External ear normal.   Eyes: Conjunctivae are normal. Pupils are equal, round, and reactive to light. Left conjunctiva is not injected. Left conjunctiva has no hemorrhage.   Neck: Neck supple. No JVD present. No thyromegaly present.   Cardiovascular: Normal rate, regular rhythm, normal heart sounds and intact distal pulses. PMI is not displaced. Exam reveals no gallop, no friction rub, no midsystolic click and no opening snap.   No murmur heard.  Pulses:       Carotid pulses are 2+ on the right side, and 2+ on the left side.       Radial pulses are 2+ on the right side, and 2+ on the left side.        Dorsalis pedis pulses are 2+ on the right side, and 2+ on the left side.        Posterior tibial pulses are 2+ on the right side, and 2+ on the left side.   Pulmonary/Chest: Effort normal and breath sounds normal. No respiratory distress. He has no wheezes. He has no rales. He exhibits no tenderness.   Device pocket is in excellent repair     Abdominal: Soft. Normal appearance. He exhibits no pulsatile liver. There is no hepatomegaly. There is no tenderness. There is no rigidity and no guarding.   Obese abdomen   Musculoskeletal: Normal range of motion. He exhibits no edema or tenderness.        Right knee: He exhibits no swelling.        Left knee: He exhibits no swelling.        Right ankle: He exhibits no swelling.        Left ankle: He exhibits no swelling.        Right lower leg: He exhibits no swelling.        Left lower leg: He exhibits no swelling.        Right foot: There is no swelling.        Left foot:  "There is no swelling.   Neurological: He is alert and oriented to person, place, and time. He has normal strength and normal reflexes. No cranial nerve deficit. Coordination normal.   Skin: Skin is warm, dry and intact. No rash noted. No cyanosis. No pallor.   Psychiatric: He has a normal mood and affect. His behavior is normal.   Nursing note and vitals reviewed.    /82 (BP Location: Right arm, Patient Position: Sitting, BP Method: Large (Manual))   Pulse 76   Ht 5' 10" (1.778 m)   Wt 125.3 kg (276 lb 3.8 oz)   BMI 39.64 kg/m²      Assessment:      1. At risk for amiodarone toxicity with long term use    2. Class 2 severe obesity due to excess calories with serious comorbidity and body mass index (BMI) of 39.0 to 39.9 in adult    3. Obstructive sleep apnea syndrome    4. VF (ventricular fibrillation)    5. PAF (paroxysmal atrial fibrillation)    6. Paroxysmal VT    7. Essential hypertension    8. Diastolic dysfunction    9. Hyperlipidemia LDL goal <100    10. ICD (implantable cardioverter-defibrillator) discharge        Plan:    decrease  amio to 300 mg a day  Orders Placed This Encounter   Procedures    TSH     Standing Status:   Future     Standing Expiration Date:   5/20/2020     Follow-up in about 6 months (around 9/22/2019), or if symptoms worsen or fail to improve.   Medications Discontinued During This Encounter   Medication Reason    fluticasone furoate (ARNUITY ELLIPTA) 100 mcg/actuation DsDv Patient no longer taking        Medication List with Changes/Refills   Current Medications    ACETAMINOPHEN (TYLENOL) 325 MG TABLET    Take 650 mg by mouth as needed.     ALBUTEROL 90 MCG/ACTUATION INHALER    Inhale 2 puffs into the lungs every 4 (four) hours as needed for Wheezing or Shortness of Breath (cough). Rescue    AMIODARONE (PACERONE) 200 MG TAB    Take 200 mg by mouth 2 (two) times daily.    CARVEDILOL (COREG) 25 MG TABLET    TAKE 1 TABLET BY MOUTH TWICE DAILY     CO-ENZYME Q-10 30 MG CAPSULE    " Take 30 mg by mouth once daily.     FUROSEMIDE (LASIX) 20 MG TABLET    take 1 tablet by mouth every morning    GLUCOSAMINE-CHONDROITIN 500-400 MG TABLET    Take 1 tablet by mouth once daily.     INV APIXABAN/PLACEBO TABLET    Take 5 mg by mouth 2 (two) times daily. FOR INVESTIGATIONAL USE ONLY. Protocol : 2017.307    INV ASPIRIN/PLACEBO TABLET    Take 1 tablet (81 mg total) by mouth once daily. FOR INVESTIGATIONAL USE ONLY. Patient Study #: [id number]. Protocol : [place] 2017.307    LOSARTAN (COZAAR) 100 MG TABLET    Take 1 tablet (100 mg total) by mouth once daily.    MULTIVITAMIN (THERAGRAN) TABLET    Take 1 tablet by mouth once daily.      PRAVASTATIN (PRAVACHOL) 40 MG TABLET    Take 1 tablet (40 mg total) by mouth once daily.    SPIRONOLACTONE (ALDACTONE) 25 MG TABLET    take 1 tablet by mouth once daily    TRAZODONE (DESYREL) 150 MG TABLET    take 1 tablet by mouth every evening   Discontinued Medications    FLUTICASONE FUROATE (ARNUITY ELLIPTA) 100 MCG/ACTUATION DSDV    Take 1 puff by mouth once daily.

## 2019-03-26 ENCOUNTER — TELEPHONE (OUTPATIENT)
Dept: CARDIOLOGY | Facility: CLINIC | Age: 69
End: 2019-03-26

## 2019-03-26 NOTE — TELEPHONE ENCOUNTER
----- Message from Nirav Baldwin MD sent at 3/25/2019  2:32 PM CDT -----  Reviewed results. All OK. Please open telephone encounter, call patient and inform him/ her of results,then document in encounter.  Please do not route back to me.   Thanks.

## 2019-03-27 ENCOUNTER — OFFICE VISIT (OUTPATIENT)
Dept: PULMONOLOGY | Facility: CLINIC | Age: 69
End: 2019-03-27
Payer: MEDICARE

## 2019-03-27 VITALS
SYSTOLIC BLOOD PRESSURE: 130 MMHG | RESPIRATION RATE: 20 BRPM | HEART RATE: 100 BPM | OXYGEN SATURATION: 98 % | BODY MASS INDEX: 39.99 KG/M2 | WEIGHT: 279.31 LBS | HEIGHT: 70 IN | DIASTOLIC BLOOD PRESSURE: 80 MMHG

## 2019-03-27 DIAGNOSIS — G47.33 OSA ON CPAP: ICD-10-CM

## 2019-03-27 DIAGNOSIS — Z95.810 ICD (IMPLANTABLE CARDIOVERTER-DEFIBRILLATOR) IN PLACE: ICD-10-CM

## 2019-03-27 DIAGNOSIS — I47.20 V-TACH: ICD-10-CM

## 2019-03-27 DIAGNOSIS — J45.30 MILD PERSISTENT ASTHMA WITHOUT COMPLICATION: Primary | ICD-10-CM

## 2019-03-27 DIAGNOSIS — E66.01 SEVERE OBESITY (BMI >= 40): ICD-10-CM

## 2019-03-27 PROCEDURE — 99999 PR PBB SHADOW E&M-EST. PATIENT-LVL III: ICD-10-PCS | Mod: PBBFAC,,, | Performed by: INTERNAL MEDICINE

## 2019-03-27 PROCEDURE — 99999 PR PBB SHADOW E&M-EST. PATIENT-LVL III: CPT | Mod: PBBFAC,,, | Performed by: INTERNAL MEDICINE

## 2019-03-27 PROCEDURE — 99214 PR OFFICE/OUTPT VISIT, EST, LEVL IV, 30-39 MIN: ICD-10-PCS | Mod: S$PBB,,, | Performed by: INTERNAL MEDICINE

## 2019-03-27 PROCEDURE — 99213 OFFICE O/P EST LOW 20 MIN: CPT | Mod: PBBFAC,PN | Performed by: INTERNAL MEDICINE

## 2019-03-27 PROCEDURE — 99214 OFFICE O/P EST MOD 30 MIN: CPT | Mod: S$PBB,,, | Performed by: INTERNAL MEDICINE

## 2019-03-27 NOTE — PROGRESS NOTES
Pulmonary Outpatient Follow Up Visit     Subjective:       Patient ID: Randy Yap is a 69 y.o. male.    Chief Complaint: Asthma      HPI        69-year-old male patient presenting for follow-up.    He is known with mild obstructive airways disease/asthma on albuterol p.r.n. and ICS Arnuity 100 mcg 1 puff daily.  Seldom albuterol need.  Asthma appears well controlled.    He has obstructive sleep apnea compliant with CPAP.    Will download his usage today.    No ICD firing events.  He has history of ventricular tachycardia status post ICD.  On amiodarone and apixaban.    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  Review of Systems   Constitutional: Negative for fever, chills and night sweats.   HENT: Negative for nosebleeds and hearing loss.    Respiratory: Positive for apnea, snoring, cough and use of rescue inhaler.    Cardiovascular: Negative for chest pain.   Genitourinary: Negative for hematuria.   Endocrine: Negative for cold intolerance.    Musculoskeletal: Positive for arthralgias and back pain.   Gastrointestinal: Negative for vomiting.   Neurological: Negative for syncope.   Hematological: Negative for adenopathy. Bleeds easily and excessive bruising.   Psychiatric/Behavioral: Positive for sleep disturbance. Negative for confusion.        History of depression       Outpatient Encounter Medications as of 3/27/2019   Medication Sig Dispense Refill    acetaminophen (TYLENOL) 325 MG tablet Take 650 mg by mouth as needed.       amiodarone (PACERONE) 200 MG Tab Take 300 mg by mouth once daily. 3/22/19 Take 1.5 tablets daily (300 mg)      carvedilol (COREG) 25 MG tablet TAKE 1 TABLET BY MOUTH TWICE DAILY  60 tablet 12    co-enzyme Q-10 30 mg capsule Take 30 mg by mouth once daily.       furosemide (LASIX) 20 MG tablet take 1 tablet by mouth every morning 90 tablet 3    glucosamine-chondroitin 500-400 mg tablet Take 1 tablet by mouth  "once daily.       INV apixaban/placebo tablet Take 5 mg by mouth 2 (two) times daily. FOR INVESTIGATIONAL USE ONLY. Protocol : 2017.307 400 tablet 6    INV aspirin/placebo tablet Take 1 tablet (81 mg total) by mouth once daily. FOR INVESTIGATIONAL USE ONLY. Patient Study #: [id number]. Protocol : [place] 2017.307 200 tablet 0    losartan (COZAAR) 100 MG tablet Take 1 tablet (100 mg total) by mouth once daily. 90 tablet 3    multivitamin (THERAGRAN) tablet Take 1 tablet by mouth once daily.        pravastatin (PRAVACHOL) 40 MG tablet Take 1 tablet (40 mg total) by mouth once daily. 90 tablet 3    spironolactone (ALDACTONE) 25 MG tablet take 1 tablet by mouth once daily 90 tablet 4    traZODone (DESYREL) 150 MG tablet take 1 tablet by mouth every evening 90 tablet 3    albuterol 90 mcg/actuation inhaler Inhale 2 puffs into the lungs every 4 (four) hours as needed for Wheezing or Shortness of Breath (cough). Rescue 1 Inhaler 6    [DISCONTINUED] carvedilol (COREG) 25 MG tablet take 1 tablet by mouth twice a day 60 tablet 12    [DISCONTINUED] fluticasone furoate (ARNUITY ELLIPTA) 100 mcg/actuation DsDv Inhale into the lungs. Controller      [DISCONTINUED] fluticasone furoate (ARNUITY ELLIPTA) 100 mcg/actuation DsDv Take 1 puff by mouth once daily. 30 each 6    [DISCONTINUED] INV apixaban/placebo tablet Take 5 mg by mouth 2 (two) times daily. FOR INVESTIGATIONAL USE ONLY. Protocol [place] 400 tablet 0    [DISCONTINUED] INV aspirin/placebo tablet Take 1 tablet (81 mg total) by mouth once daily. FOR INVESTIGATIONAL USE ONLY. Protocol [place] 200 tablet 0     No facility-administered encounter medications on file as of 3/27/2019.        Objective:     Vital Signs (Most Recent)  Vital Signs  Pulse: 100  Resp: 20  SpO2: 98 %  BP: 130/80  Height and Weight  Height: 5' 10" (177.8 cm)  Weight: 126.7 kg (279 lb 5.2 oz)  BSA (Calculated - sq m): 2.5 sq meters  BMI (Calculated): 40.2  Weight in (lb) to have BMI = 25: " 173.9]  Wt Readings from Last 2 Encounters:   03/27/19 126.7 kg (279 lb 5.2 oz)   03/22/19 125.3 kg (276 lb 3.8 oz)       Physical Exam   Constitutional: He is oriented to person, place, and time. He appears well-developed and well-nourished.   HENT:   Head: Normocephalic.   Neck: Neck supple.   Cardiovascular: Normal rate, regular rhythm and normal heart sounds.   Pulmonary/Chest: Normal expansion and effort normal. No respiratory distress. He has decreased breath sounds. He has no wheezes. He has no rhonchi.   Abdominal: Soft. There is no tenderness.   Musculoskeletal: He exhibits no edema.   Lymphadenopathy:     He has no cervical adenopathy.   Neurological: He is alert and oriented to person, place, and time. Gait normal.   Skin: Skin is warm. No cyanosis. Nails show no clubbing.   Psychiatric: He has a normal mood and affect.       Laboratory  Lab Results   Component Value Date    WBC 7.86 07/06/2018    RBC 4.37 (L) 07/06/2018    HGB 13.5 (L) 07/06/2018    HCT 40.0 07/06/2018    MCV 92 07/06/2018    MCH 30.9 07/06/2018    MCHC 33.8 07/06/2018    RDW 13.1 07/06/2018     07/06/2018    MPV 8.9 (L) 07/06/2018    GRAN 3.9 07/06/2018    GRAN 48.9 07/06/2018    LYMPH 2.5 07/06/2018    LYMPH 32.3 07/06/2018    MONO 1.3 (H) 07/06/2018    MONO 16.8 (H) 07/06/2018    EOS 0.1 07/06/2018    BASO 0.02 07/06/2018    EOSINOPHIL 1.7 07/06/2018    BASOPHIL 0.3 07/06/2018       BMP  Lab Results   Component Value Date     01/09/2019     01/09/2019    K 4.4 01/09/2019    K 4.4 01/09/2019     01/09/2019     01/09/2019    CO2 24 01/09/2019    CO2 24 01/09/2019    BUN 15 01/09/2019    BUN 15 01/09/2019    CREATININE 0.9 01/09/2019    CREATININE 0.9 01/09/2019    CALCIUM 9.6 01/09/2019    CALCIUM 9.6 01/09/2019    ANIONGAP 11 01/09/2019    ANIONGAP 11 01/09/2019    ESTGFRAFRICA >60.0 01/09/2019    ESTGFRAFRICA >60.0 01/09/2019    EGFRNONAA >60.0 01/09/2019    EGFRNONAA >60.0 01/09/2019    AST 31  01/09/2019    ALT 45 (H) 01/09/2019    PROT 8.0 01/09/2019       Lab Results   Component Value Date    BNP 52 08/03/2018    BNP 20 09/14/2016       Lab Results   Component Value Date    TSH 1.422 03/22/2019       No results found for: SEDRATE    No results found for: CRP      Diagnostic Results:    I have personally reviewed today the following studies:    Chest x-ray January 2019 cardiac ICD device visible.  Clear lungs.    PFT done September 2018 personally reviewed small airways disease, mild obstruction, mild air trapping.  Stable when compared to August 2017 in [place]      CPAP titration done in 2014 September.  CPAP 14 cm water.     2D echo October 2018:       1 - Low normal to mildly depressed left ventricular systolic function (EF 50-55%).     2 - Normal left ventricular diastolic function.     3 - Normal right ventricular systolic function .     4 - No wall motion abnormalities.     5 - Eccentric hypertrophy.      Assessment/Plan:   Mild persistent asthma without complication  -     Spirometry without Bronchodilator; Future    HONEY on CPAP    V-tach    ICD (implantable cardioverter-defibrillator) in place    Severe obesity (BMI >= 40)    Continue albuterol p.r.n.    Continue ICS inhaler.    Encourage patient to continue compliance with CPAP.    Continue amiodarone and apixaban.    General weight loss/lifestyle modification strategies  (elicit support from others; identify saboteurs; non-food rewards).  Diet interventions: low calorie (1000 kCal/d) deficit diet    Up-to-date on influenza vaccination and Prevnar 13 and PPSV 23.      Follow up in about 6 months (around 9/27/2019).    This note was prepared using voice recognition system and is likely to have sound alike errors that may have been overlooked even after proof reading.  Please call me with any questions    Discussed diagnosis, its evaluation, treatment and usual course. All questions answered.      Kaur Jones MD

## 2019-04-14 DIAGNOSIS — I51.89 DIASTOLIC DYSFUNCTION: ICD-10-CM

## 2019-04-14 DIAGNOSIS — I10 ESSENTIAL HYPERTENSION: ICD-10-CM

## 2019-04-15 RX ORDER — FUROSEMIDE 20 MG/1
TABLET ORAL
Qty: 90 TABLET | Refills: 4 | Status: SHIPPED | OUTPATIENT
Start: 2019-04-15 | End: 2020-06-29

## 2019-04-29 ENCOUNTER — CLINICAL SUPPORT (OUTPATIENT)
Dept: CARDIOLOGY | Facility: CLINIC | Age: 69
End: 2019-04-29
Attending: INTERNAL MEDICINE
Payer: MEDICARE

## 2019-04-29 ENCOUNTER — CLINICAL SUPPORT (OUTPATIENT)
Dept: PULMONOLOGY | Facility: CLINIC | Age: 69
End: 2019-04-29
Payer: MEDICARE

## 2019-04-29 DIAGNOSIS — I47.29 PAROXYSMAL VT: ICD-10-CM

## 2019-04-29 DIAGNOSIS — I49.01 VF (VENTRICULAR FIBRILLATION): ICD-10-CM

## 2019-04-29 DIAGNOSIS — Z95.810 ICD (IMPLANTABLE CARDIOVERTER-DEFIBRILLATOR) IN PLACE: ICD-10-CM

## 2019-04-29 DIAGNOSIS — I43 CARDIOMYOPATHY DUE TO SYSTEMIC DISEASE: ICD-10-CM

## 2019-04-29 DIAGNOSIS — J45.30 MILD PERSISTENT ASTHMA WITHOUT COMPLICATION: ICD-10-CM

## 2019-04-29 PROCEDURE — 94010 BREATHING CAPACITY TEST: CPT | Mod: PBBFAC

## 2019-04-29 PROCEDURE — 93283 ICD PROGRAMMING: ICD-10-PCS | Mod: 26,S$PBB,, | Performed by: INTERNAL MEDICINE

## 2019-04-29 PROCEDURE — 94010 BREATHING CAPACITY TEST: CPT | Mod: 26,S$PBB,, | Performed by: INTERNAL MEDICINE

## 2019-04-29 PROCEDURE — 94010 BREATHING CAPACITY TEST: ICD-10-PCS | Mod: 26,S$PBB,, | Performed by: INTERNAL MEDICINE

## 2019-04-29 PROCEDURE — 93290 ICD PROGRAMMING: ICD-10-PCS | Mod: 26,S$PBB,, | Performed by: INTERNAL MEDICINE

## 2019-04-29 PROCEDURE — 93283 PRGRMG EVAL IMPLANTABLE DFB: CPT | Mod: PBBFAC | Performed by: INTERNAL MEDICINE

## 2019-04-29 PROCEDURE — 93290 INTERROG DEV EVAL ICPMS IP: CPT | Mod: 26,S$PBB,, | Performed by: INTERNAL MEDICINE

## 2019-04-30 LAB
BRPFT: NORMAL
FEF 25 75 LLN: 1.17
FEF 25 75 PRE REF: 54.2 %
FEF 25 75 REF: 2.59
FEV1 FVC LLN: 63
FEV1 FVC PRE REF: 89.9 %
FEV1 FVC REF: 76
FEV1 LLN: 2.43
FEV1 PRE REF: 86.8 %
FEV1 REF: 3.32
FEV6 LLN: 3.4
FEV6 PRE REF: 90.2 %
FEV6 PRE: 3.89 L (ref 3.4–5.22)
FEV6 REF: 4.31
FVC LLN: 3.27
FVC PRE REF: 96.2 %
FVC REF: 4.37
PEF LLN: 6.34
PEF PRE REF: 89.3 %
PEF REF: 8.66
PRE FEF 25 75: 1.4 L/S (ref 1.17–4.01)
PRE FET 100: 11.66 SEC
PRE FEV1 FVC: 68.68 % (ref 63.36–89.37)
PRE FEV1: 2.89 L (ref 2.43–4.22)
PRE FVC: 4.2 L (ref 3.27–5.47)
PRE PEF: 7.74 L/S (ref 6.34–10.98)

## 2019-05-18 RX ORDER — TRAZODONE HYDROCHLORIDE 150 MG/1
TABLET ORAL
Qty: 90 TABLET | Refills: 4 | Status: SHIPPED | OUTPATIENT
Start: 2019-05-18 | End: 2020-07-31 | Stop reason: SDUPTHER

## 2019-05-31 ENCOUNTER — TELEPHONE (OUTPATIENT)
Dept: CARDIOLOGY | Facility: CLINIC | Age: 69
End: 2019-05-31

## 2019-06-04 RX ORDER — AMIODARONE HYDROCHLORIDE 200 MG/1
TABLET ORAL
Qty: 60 TABLET | Refills: 5 | Status: SHIPPED | OUTPATIENT
Start: 2019-06-04 | End: 2019-11-25 | Stop reason: SDUPTHER

## 2019-07-02 ENCOUNTER — RESEARCH ENCOUNTER (OUTPATIENT)
Dept: RESEARCH | Facility: HOSPITAL | Age: 69
End: 2019-07-02

## 2019-07-02 DIAGNOSIS — Z00.6 EXAMINATION OF PARTICIPANT IN CLINICAL TRIAL: ICD-10-CM

## 2019-07-02 DIAGNOSIS — Z45.02 ICD (IMPLANTABLE CARDIOVERTER-DEFIBRILLATOR) DISCHARGE: ICD-10-CM

## 2019-07-02 DIAGNOSIS — I48.0 PAF (PAROXYSMAL ATRIAL FIBRILLATION): Primary | ICD-10-CM

## 2019-07-02 NOTE — PROGRESS NOTES
Leia     12 month follow up visit    Met with [facility] patient for 12 month follow up visit. Patient is doing well. No ER visits or hospital stays. No adverse events from study drug to report. Patient was redistributed study drug in a 6 month supply. Patient will continue care at [facility], all study information will be transferred to [facility] site. Patient will return to [facility] for follow up and drug redistribution in 6 months.

## 2019-07-06 DIAGNOSIS — R93.1 ABNORMAL NUCLEAR CARDIAC IMAGING TEST: ICD-10-CM

## 2019-07-06 DIAGNOSIS — E78.5 HYPERLIPIDEMIA LDL GOAL <100: ICD-10-CM

## 2019-07-06 RX ORDER — PRAVASTATIN SODIUM 40 MG/1
TABLET ORAL
Qty: 30 TABLET | Refills: 2 | Status: SHIPPED | OUTPATIENT
Start: 2019-07-06 | End: 2019-09-28 | Stop reason: SDUPTHER

## 2019-07-09 RX ORDER — FLUTICASONE FUROATE 100 UG/1
POWDER RESPIRATORY (INHALATION)
Qty: 30 EACH | Refills: 5 | Status: SHIPPED | OUTPATIENT
Start: 2019-07-09 | End: 2020-02-11

## 2019-07-22 RX ORDER — LOSARTAN POTASSIUM 100 MG/1
TABLET ORAL
Qty: 90 TABLET | Refills: 4 | Status: SHIPPED | OUTPATIENT
Start: 2019-07-22 | End: 2020-07-23

## 2019-07-30 ENCOUNTER — CLINICAL SUPPORT (OUTPATIENT)
Dept: CARDIOLOGY | Facility: CLINIC | Age: 69
End: 2019-07-30
Attending: INTERNAL MEDICINE
Payer: MEDICARE

## 2019-07-30 DIAGNOSIS — I43 CARDIOMYOPATHY DUE TO SYSTEMIC DISEASE: ICD-10-CM

## 2019-07-30 DIAGNOSIS — Z95.810 ICD (IMPLANTABLE CARDIOVERTER-DEFIBRILLATOR) IN PLACE: ICD-10-CM

## 2019-07-30 DIAGNOSIS — I49.01 VF (VENTRICULAR FIBRILLATION): ICD-10-CM

## 2019-07-30 DIAGNOSIS — I47.29 PAROXYSMAL VT: ICD-10-CM

## 2019-07-30 PROCEDURE — 93295 DEV INTERROG REMOTE 1/2/MLT: CPT | Mod: ,,, | Performed by: INTERNAL MEDICINE

## 2019-07-30 PROCEDURE — 93295 ICD REMOTE: ICD-10-PCS | Mod: ,,, | Performed by: INTERNAL MEDICINE

## 2019-07-30 PROCEDURE — 93297 ICD REMOTE: ICD-10-PCS | Mod: ,,, | Performed by: INTERNAL MEDICINE

## 2019-07-30 PROCEDURE — 93297 REM INTERROG DEV EVAL ICPMS: CPT | Mod: ,,, | Performed by: INTERNAL MEDICINE

## 2019-07-30 PROCEDURE — 93296 REM INTERROG EVL PM/IDS: CPT | Mod: PBBFAC | Performed by: INTERNAL MEDICINE

## 2019-09-03 DIAGNOSIS — G47.33 OSA ON CPAP: Primary | ICD-10-CM

## 2019-09-16 ENCOUNTER — OFFICE VISIT (OUTPATIENT)
Dept: PULMONOLOGY | Facility: CLINIC | Age: 69
End: 2019-09-16
Payer: MEDICARE

## 2019-09-16 VITALS
DIASTOLIC BLOOD PRESSURE: 93 MMHG | HEART RATE: 76 BPM | HEIGHT: 70 IN | RESPIRATION RATE: 20 BRPM | OXYGEN SATURATION: 96 % | BODY MASS INDEX: 40.65 KG/M2 | WEIGHT: 283.94 LBS | SYSTOLIC BLOOD PRESSURE: 150 MMHG

## 2019-09-16 DIAGNOSIS — Z79.899 ON AMIODARONE THERAPY: ICD-10-CM

## 2019-09-16 DIAGNOSIS — J98.4 SMALL AIRWAYS DISEASE: Primary | ICD-10-CM

## 2019-09-16 DIAGNOSIS — G47.33 OSA ON CPAP: ICD-10-CM

## 2019-09-16 DIAGNOSIS — Z23 NEED FOR 23-POLYVALENT PNEUMOCOCCAL POLYSACCHARIDE VACCINE: ICD-10-CM

## 2019-09-16 PROCEDURE — G0009 ADMIN PNEUMOCOCCAL VACCINE: HCPCS | Mod: PBBFAC

## 2019-09-16 PROCEDURE — 99215 PR OFFICE/OUTPT VISIT, EST, LEVL V, 40-54 MIN: ICD-10-PCS | Mod: 25,S$PBB,, | Performed by: INTERNAL MEDICINE

## 2019-09-16 PROCEDURE — 99999 PR PBB SHADOW E&M-EST. PATIENT-LVL III: ICD-10-PCS | Mod: PBBFAC,,, | Performed by: INTERNAL MEDICINE

## 2019-09-16 PROCEDURE — 99213 OFFICE O/P EST LOW 20 MIN: CPT | Mod: PBBFAC,25 | Performed by: INTERNAL MEDICINE

## 2019-09-16 PROCEDURE — 99215 OFFICE O/P EST HI 40 MIN: CPT | Mod: 25,S$PBB,, | Performed by: INTERNAL MEDICINE

## 2019-09-16 PROCEDURE — 99999 PR PBB SHADOW E&M-EST. PATIENT-LVL III: CPT | Mod: PBBFAC,,, | Performed by: INTERNAL MEDICINE

## 2019-09-16 RX ORDER — FERROUS SULFATE 325(65) MG
325 TABLET ORAL
COMMUNITY

## 2019-09-28 DIAGNOSIS — R93.1 ABNORMAL NUCLEAR CARDIAC IMAGING TEST: ICD-10-CM

## 2019-09-28 DIAGNOSIS — E78.5 HYPERLIPIDEMIA LDL GOAL <100: ICD-10-CM

## 2019-09-30 RX ORDER — PRAVASTATIN SODIUM 40 MG/1
TABLET ORAL
Qty: 30 TABLET | Refills: 1 | Status: SHIPPED | OUTPATIENT
Start: 2019-09-30 | End: 2019-11-25 | Stop reason: SDUPTHER

## 2019-11-05 ENCOUNTER — RESEARCH ENCOUNTER (OUTPATIENT)
Dept: RESEARCH | Facility: HOSPITAL | Age: 69
End: 2019-11-05

## 2019-11-07 ENCOUNTER — TELEPHONE (OUTPATIENT)
Dept: RESEARCH | Facility: HOSPITAL | Age: 69
End: 2019-11-07

## 2019-11-07 NOTE — TELEPHONE ENCOUNTER
Called patient to confirm he is willing to have [place] follow up at main campus. Patient was agreeable. Will follow up with appointment.

## 2019-11-13 ENCOUNTER — TELEPHONE (OUTPATIENT)
Dept: RESEARCH | Facility: HOSPITAL | Age: 69
End: 2019-11-13

## 2019-11-25 DIAGNOSIS — R93.1 ABNORMAL NUCLEAR CARDIAC IMAGING TEST: ICD-10-CM

## 2019-11-25 DIAGNOSIS — E78.5 HYPERLIPIDEMIA LDL GOAL <100: ICD-10-CM

## 2019-11-25 RX ORDER — AMIODARONE HYDROCHLORIDE 200 MG/1
TABLET ORAL
Qty: 60 TABLET | Refills: 4 | Status: SHIPPED | OUTPATIENT
Start: 2019-11-25 | End: 2020-04-21

## 2019-11-25 RX ORDER — PRAVASTATIN SODIUM 40 MG/1
TABLET ORAL
Qty: 30 TABLET | Refills: 0 | Status: SHIPPED | OUTPATIENT
Start: 2019-11-25 | End: 2020-01-27

## 2019-11-26 DIAGNOSIS — E78.5 HYPERLIPIDEMIA LDL GOAL <100: ICD-10-CM

## 2019-11-26 DIAGNOSIS — R93.1 ABNORMAL NUCLEAR CARDIAC IMAGING TEST: ICD-10-CM

## 2019-11-27 RX ORDER — PRAVASTATIN SODIUM 40 MG/1
TABLET ORAL
Qty: 30 TABLET | Refills: 0 | Status: SHIPPED | OUTPATIENT
Start: 2019-11-27 | End: 2020-01-02 | Stop reason: SDUPTHER

## 2019-12-30 ENCOUNTER — TELEPHONE (OUTPATIENT)
Dept: FAMILY MEDICINE | Facility: CLINIC | Age: 69
End: 2019-12-30

## 2019-12-30 NOTE — TELEPHONE ENCOUNTER
----- Message from Yuly Marroquin sent at 12/30/2019  9:50 AM CST -----  Contact: pt  .Type:  Same Day Appointment Request    Caller is requesting a same day appointment.  Caller declined first available appointment listed below.    Name of Caller: pt  When is the first available appointment? 1/21  Symptoms: chest cold  Best Call Back Number: [phone] (home)   Additional Information:

## 2020-01-02 ENCOUNTER — OFFICE VISIT (OUTPATIENT)
Dept: PULMONOLOGY | Facility: CLINIC | Age: 70
End: 2020-01-02
Payer: MEDICARE

## 2020-01-02 VITALS
WEIGHT: 279.13 LBS | BODY MASS INDEX: 39.96 KG/M2 | SYSTOLIC BLOOD PRESSURE: 108 MMHG | HEIGHT: 70 IN | DIASTOLIC BLOOD PRESSURE: 62 MMHG | TEMPERATURE: 99 F | OXYGEN SATURATION: 94 % | HEART RATE: 76 BPM

## 2020-01-02 DIAGNOSIS — G47.33 OBSTRUCTIVE SLEEP APNEA SYNDROME: Chronic | ICD-10-CM

## 2020-01-02 DIAGNOSIS — J20.9 ACUTE BRONCHITIS, UNSPECIFIED ORGANISM: Primary | ICD-10-CM

## 2020-01-02 DIAGNOSIS — E66.01 CLASS 3 SEVERE OBESITY DUE TO EXCESS CALORIES WITH SERIOUS COMORBIDITY AND BODY MASS INDEX (BMI) OF 40.0 TO 44.9 IN ADULT: Chronic | ICD-10-CM

## 2020-01-02 PROCEDURE — 99214 PR OFFICE/OUTPT VISIT, EST, LEVL IV, 30-39 MIN: ICD-10-PCS | Mod: S$PBB,,, | Performed by: INTERNAL MEDICINE

## 2020-01-02 PROCEDURE — 99214 OFFICE O/P EST MOD 30 MIN: CPT | Mod: S$PBB,,, | Performed by: INTERNAL MEDICINE

## 2020-01-02 PROCEDURE — 99999 PR PBB SHADOW E&M-EST. PATIENT-LVL III: ICD-10-PCS | Mod: PBBFAC,,, | Performed by: INTERNAL MEDICINE

## 2020-01-02 PROCEDURE — 1159F MED LIST DOCD IN RCRD: CPT | Mod: ,,, | Performed by: INTERNAL MEDICINE

## 2020-01-02 PROCEDURE — 99213 OFFICE O/P EST LOW 20 MIN: CPT | Mod: PBBFAC,25 | Performed by: INTERNAL MEDICINE

## 2020-01-02 PROCEDURE — 1159F PR MEDICATION LIST DOCUMENTED IN MEDICAL RECORD: ICD-10-PCS | Mod: ,,, | Performed by: INTERNAL MEDICINE

## 2020-01-02 PROCEDURE — 96372 THER/PROPH/DIAG INJ SC/IM: CPT | Mod: PBBFAC

## 2020-01-02 PROCEDURE — 99999 PR PBB SHADOW E&M-EST. PATIENT-LVL III: CPT | Mod: PBBFAC,,, | Performed by: INTERNAL MEDICINE

## 2020-01-02 RX ORDER — OLOPATADINE HYDROCHLORIDE 2 MG/ML
SOLUTION/ DROPS OPHTHALMIC
COMMUNITY
Start: 2019-10-08 | End: 2020-07-31

## 2020-01-02 RX ORDER — AMOXICILLIN AND CLAVULANATE POTASSIUM 875; 125 MG/1; MG/1
1 TABLET, FILM COATED ORAL 2 TIMES DAILY
Qty: 20 TABLET | Refills: 0 | Status: SHIPPED | OUTPATIENT
Start: 2020-01-02 | End: 2020-07-31

## 2020-01-02 RX ORDER — VALSARTAN 160 MG/1
TABLET ORAL
COMMUNITY
End: 2020-07-31

## 2020-01-02 RX ORDER — BENZONATATE 100 MG/1
100 CAPSULE ORAL EVERY 4 HOURS PRN
Qty: 120 CAPSULE | Refills: 3 | Status: SHIPPED | OUTPATIENT
Start: 2020-01-02 | End: 2020-07-31

## 2020-01-02 RX ORDER — ALBUTEROL SULFATE 90 UG/1
1 AEROSOL, METERED RESPIRATORY (INHALATION) DAILY
Qty: 1 INHALER | Refills: 5 | Status: SHIPPED | OUTPATIENT
Start: 2020-01-02 | End: 2020-07-31 | Stop reason: SDUPTHER

## 2020-01-02 RX ORDER — BEPOTASTINE BESILATE 15 MG/ML
SOLUTION/ DROPS OPHTHALMIC
COMMUNITY

## 2020-01-02 RX ORDER — AZITHROMYCIN 500 MG/1
500 TABLET, FILM COATED ORAL DAILY
Qty: 5 TABLET | Refills: 0 | Status: CANCELLED | OUTPATIENT
Start: 2020-01-02 | End: 2020-01-07

## 2020-01-02 RX ORDER — PREDNISONE 20 MG/1
40 TABLET ORAL DAILY
Qty: 10 TABLET | Refills: 0 | Status: CANCELLED | OUTPATIENT
Start: 2020-01-02 | End: 2020-01-07

## 2020-01-02 RX ORDER — CITALOPRAM 20 MG/1
TABLET, FILM COATED ORAL
COMMUNITY
End: 2020-07-31

## 2020-01-02 RX ORDER — TRIAMCINOLONE ACETONIDE 40 MG/ML
80 INJECTION, SUSPENSION INTRA-ARTICULAR; INTRAMUSCULAR ONCE
Status: COMPLETED | OUTPATIENT
Start: 2020-01-02 | End: 2020-01-02

## 2020-01-02 RX ORDER — PREDNISONE 10 MG/1
TABLET ORAL
Qty: 60 TABLET | Refills: 0 | Status: SHIPPED | OUTPATIENT
Start: 2020-01-02 | End: 2020-07-31

## 2020-01-02 RX ADMIN — TRIAMCINOLONE ACETONIDE 80 MG: 400 INJECTION, SUSPENSION INTRA-ARTICULAR; INTRAMUSCULAR at 10:01

## 2020-01-02 NOTE — ASSESSMENT & PLAN NOTE
Continue  CPAP of 14. [place] [facility]     Discussed therapeutic goals for positive airway pressure therapy(CPAP or BiPAP): Ideal is usage 100% of nights for 6 - 8 hours per night. Minimum usage is 70% of night for at least 4 hours per night used. Pateint expressed understanding. All Questions answered.

## 2020-01-02 NOTE — PROGRESS NOTES
Subjective:      Patient ID: Randy Yap is a 69 y.o. male.  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    Chief Complaint: Cough; Chest Congestion; and Sleep Apnea      HPI    Cold symptoms - sneezing, chest congestion. wheezing and productive cough for the past 4 weeks. He reports progressive dyspnea with exertion. He reports Orthopnea, and PND. He denies fever but endorses chill and sweats. He reports associated bilateral occular discharge and redness. He self medicates with tylenol and frequent use of his rescue inhaler with no relief.      He has HONEY. He is on CPAP 14 CMWP. He is compliant with his CPAP.   He definitely thinks PAP is beneficial to his health and he wants to continue with PAP therapy.      [place] Sleepiness Scale   EPWORTH SLEEPINESS SCALE 1/2/2020 6/22/2018   Sitting and reading 0 0   Watching TV 0 0   Sitting, inactive in a public place (e.g. a theatre or a meeting) 0 0   As a passenger in a car for an hour without a break 0 0   Lying down to rest in the afternoon when circumstances permit 0 3   Sitting and talking to someone 0 0   Sitting quietly after a lunch without alcohol 0 0   In a car, while stopped for a few minutes in traffic 0 0   Total score 0 3     Immunization status reviewed and up to date.    A full   review of systems, past , family  and social histories was performed except as mentioned in the note above, these are non contributory to the main issues discussed today.     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  He  has a past surgical history that includes Hernia repair; Pericardial window (12-15 years ago); tonsillectomy; Varicocelectomy; Colonoscopy w/ polypectomy (5/21/2013); pace maker  (10/6/2014); Cardiac defibrillator placement (10/6/2014); and Left heart catheterization (Left, 7/9/2018).  His family history includes Arrhythmia in his mother; Heart attack in his paternal grandfather and paternal grandmother; Heart attack (age of onset: 48) in his father; Heart disease in his paternal grandfather and paternal grandmother; Heart disease (age of onset: 48) in his father; Hyperlipidemia in his mother; Hypertension in his father, maternal grandfather, maternal grandmother, paternal grandfather, and paternal grandmother.  He  reports that he has never smoked. He has never used smokeless tobacco. He reports that he drinks about 0.8 standard drinks of alcohol per week. He reports that he does not use drugs.  He has a current medication list which includes the following prescription(s): acetaminophen, amiodarone, arnuity ellipta, carvedilol, co-enzyme q-10, ferrous sulfate, furosemide, glucosamine-chondroitin, inv apixaban/placebo, inv aspirin/placebo, losartan, multivitamin, pravastatin, spironolactone, trazodone, albuterol, amoxicillin-clavulanate 875-125mg, benzonatate,  "bepotastine besilate, citalopram, flu vac 2019 [id number](pf), glucosamine sulfate, olopatadine, pneumococcal 23-radha ps, prednisone, valsartan, and zoster vaccine live (pf), and the following Facility-Administered Medications: triamcinolone acetonide.  He has No Known Allergies..    Review of Systems   Constitutional: Negative for fever, chills and night sweats.   HENT: Positive for congestion. Negative for nosebleeds and hearing loss.    Respiratory: Positive for apnea, cough, sputum production, wheezing and use of rescue inhaler. Negative for snoring.    Cardiovascular: Negative for chest pain.   Genitourinary: Negative for hematuria.   Endocrine: Negative for cold intolerance.    Musculoskeletal: Positive for arthralgias and back pain.   Gastrointestinal: Negative for vomiting.   Neurological: Negative for syncope.   Hematological: Negative for adenopathy. Bleeds easily and excessive bruising.   Psychiatric/Behavioral: Negative for confusion and sleep disturbance.        Objective:   /62   Pulse 76   Temp 98.7 °F (37.1 °C) (Oral)   Ht 5' 10" (1.778 m)   Wt 126.6 kg (279 lb 1.6 oz)   SpO2 (!) 94%   BMI 40.05 kg/m²   Body mass index is 40.05 kg/m².    Physical Exam   Constitutional: He is oriented to person, place, and time. He appears well-developed and well-nourished.   HENT:   Head: Normocephalic and atraumatic.   Eyes: Pupils are equal, round, and reactive to light. Right eye exhibits discharge. Left eye exhibits discharge. Right conjunctiva is not injected. Right conjunctiva has no hemorrhage. Left conjunctiva is injected. Left conjunctiva has no hemorrhage.   Neck: Normal range of motion. Neck supple.   Cardiovascular: Normal rate, regular rhythm and normal heart sounds.   Pulmonary/Chest: Effort normal. He has wheezes in the right upper field, the right lower field, the left upper field and the left lower field.   Abdominal: Soft. Bowel sounds are normal.   Musculoskeletal: Normal range of " motion.   Neurological: He is alert and oriented to person, place, and time.   Skin: Skin is warm and dry. Capillary refill takes 2 to 3 seconds.   Psychiatric: He has a normal mood and affect. His behavior is normal.   Nursing note and vitals reviewed.      Personal Diagnostic Review  none pertinent    Assessment / plan:     Discussed diagnosis, its evaluation, treatment and usual course. All questions answered.    Problem List Items Addressed This Visit        Pulmonary    Acute bronchitis - Primary    Current Assessment & Plan     Acute Bronchitis ROS:  notes increased wheezing and dyspnea.  New concerns: Progressive dyspnea and increasing wheezing.    Exam:  wheezing noted diffusely.   Assessment:  Acute Bronchitis   Plan:   1. KENALOG 80MG IM X 1  2. AUGMENTIN 875 MG PO DAILY X 10 DAYS  3. PREDNISONE TAPER PER ORDER.  5. CONTINUE ALBUTEROL AS PRESCRIBED.             Relevant Medications    benzonatate (TESSALON) 100 MG capsule    triamcinolone acetonide injection 80 mg (Start on 1/2/2020 11:00 AM)    predniSONE (DELTASONE) 10 MG tablet    amoxicillin-clavulanate 875-125mg (AUGMENTIN) 875-125 mg per tablet    albuterol (PROVENTIL/VENTOLIN HFA) 90 mcg/actuation inhaler       Other    Class 3 severe obesity due to excess calories with serious comorbidity and body mass index (BMI) of 40.0 to 44.9 in adult (Chronic)    Current Assessment & Plan     General weight loss/lifestyle modification strategies discussed (elicit support from others; identify saboteurs; non-food rewards, etc).  Behavioral treatment: Slim Fast.  Diet interventions: low calorie (1000 kCal/d) deficit diet.  Informal exercise measures discussed, e.g. taking stairs instead of elevator.  Regular aerobic exercise program discussed.         Obstructive sleep apnea syndrome    Overview     Stable on CPAP 14 CMWP.  He is complaint with CPAP.          Current Assessment & Plan     Continue  CPAP of 14. (DME - [facility])     Discussed therapeutic goals for  positive airway pressure therapy(CPAP or BiPAP): Ideal is usage 100% of nights for 6 - 8 hours per night. Minimum usage is 70% of night for at least 4 hours per night used. Pateint expressed understanding. All Questions answered.                 TIME SPENT WITH PATIENT: Time spent: 30 minutes in face to face  discussion concerning diagnosis, prognosis, review of lab and test results, benefits of treatment as well as management of disease, counseling of patient and coordination of care between various health  care providers . Greater than half the time spent was used for coordination of care and counseling of patient.     Follow up if symptoms worsen or fail to improve, for HONEY, Morbid Obesity, Bronchitis - Dr. Jones.

## 2020-01-02 NOTE — ASSESSMENT & PLAN NOTE
Acute Bronchitis ROS:  notes increased wheezing and dyspnea.  New concerns: Progressive dyspnea and increasing wheezing.    Exam:  wheezing noted diffusely.   Assessment:  Acute Bronchitis   Plan:   1. KENALOG 80MG IM X 1  2. AUGMENTIN 875 MG PO DAILY X 10 DAYS  3. PREDNISONE TAPER PER ORDER.  5. CONTINUE ALBUTEROL AS PRESCRIBED.

## 2020-01-02 NOTE — PATIENT INSTRUCTIONS
Azithromycin tablets  What is this medicine?  AZITHROMYCIN (az ith berlin MYE sin) is a macrolide antibiotic. It is used to treat or prevent certain kinds of bacterial infections. It will not work for colds, flu, or other viral infections.  How should I use this medicine?  Take this medicine by mouth with a full glass of water. Follow the directions on the prescription label. The tablets can be taken with food or on an empty stomach. If the medicine upsets your stomach, take it with food. Take your medicine at regular intervals. Do not take your medicine more often than directed. Take all of your medicine as directed even if you think your are better. Do not skip doses or stop your medicine early. Talk to your pediatrician regarding the use of this medicine in children. Special care may be needed.  What side effects may I notice from receiving this medicine?  Side effects that you should report to your doctor or health care professional as soon as possible:  · allergic reactions like skin rash, itching or hives, swelling of the face, lips, or tongue  · confusion, nightmares or hallucinations  · dark urine  · difficulty breathing  · hearing loss  · irregular heartbeat or chest pain  · pain or difficulty passing urine  · redness, blistering, peeling or loosening of the skin, including inside the mouth  · white patches or sores in the mouth  · yellowing of the eyes or skin  Side effects that usually do not require medical attention (report to your doctor or health care professional if they continue or are bothersome):  · diarrhea  · dizziness, drowsiness  · headache  · stomach upset or vomiting  · tooth discoloration  · vaginal irritation  What may interact with this medicine?  Do not take this medicine with any of the following medications:  · lincomycin  This medicine may also interact with the following medications:  · amiodarone  · antacids  · birth control  pills  · cyclosporine  · digoxin  · magnesium  · nelfinavir  · phenytoin  · warfarin  What if I miss a dose?  If you miss a dose, take it as soon as you can. If it is almost time for your next dose, take only that dose. Do not take double or extra doses.  Where should I keep my medicine?  Keep out of the reach of children.  Store at room temperature between 15 and 30 degrees C (59 and 86 degrees F). Throw away any unused medicine after the expiration date.  What should I tell my health care provider before I take this medicine?  They need to know if you have any of these conditions:  · kidney disease  · liver disease  · irregular heartbeat or heart disease  · an unusual or allergic reaction to azithromycin, erythromycin, other macrolide antibiotics, foods, dyes, or preservatives  · pregnant or trying to get pregnant  · breast-feeding  What should I watch for while using this medicine?  Tell your doctor or health care professional if your symptoms do not improve.  Do not treat diarrhea with over the counter products. Contact your doctor if you have diarrhea that lasts more than 2 days or if it is severe and watery.  This medicine can make you more sensitive to the sun. Keep out of the sun. If you cannot avoid being in the sun, wear protective clothing and use sunscreen. Do not use sun lamps or tanning beds/booths.  NOTE:This sheet is a summary. It may not cover all possible information. If you have questions about this medicine, talk to your doctor, pharmacist, or health care provider. Copyright© 2017 [employer]

## 2020-01-21 ENCOUNTER — RESEARCH ENCOUNTER (OUTPATIENT)
Dept: RESEARCH | Facility: HOSPITAL | Age: 70
End: 2020-01-21

## 2020-01-21 NOTE — PROGRESS NOTES
18 month follow up visit     Met with Leia irving for 18 month follow up visit as he did not yet transfer to [place].Patient is doing well. No ER visits or hospital stays. No adverse events or issues with or from study drug to report. Patient was redistributed study drug in a 6 month supply. Patient returned unused study drug in study bottles. Patient will continue care at [facility], all study information will be transferred to [facility] site. Patient will return to [place] for follow up and drug redistribution in 6 months.

## 2020-01-26 DIAGNOSIS — E78.5 HYPERLIPIDEMIA LDL GOAL <100: ICD-10-CM

## 2020-01-26 DIAGNOSIS — R93.1 ABNORMAL NUCLEAR CARDIAC IMAGING TEST: ICD-10-CM

## 2020-01-26 RX ORDER — CARVEDILOL 25 MG/1
TABLET ORAL
Qty: 60 TABLET | Refills: 4 | Status: SHIPPED | OUTPATIENT
Start: 2020-01-26 | End: 2020-06-23

## 2020-01-27 RX ORDER — PRAVASTATIN SODIUM 40 MG/1
TABLET ORAL
Qty: 30 TABLET | Refills: 0 | Status: SHIPPED | OUTPATIENT
Start: 2020-01-27 | End: 2020-02-24

## 2020-02-11 RX ORDER — FLUTICASONE FUROATE 100 UG/1
POWDER RESPIRATORY (INHALATION)
Qty: 30 EACH | Refills: 11 | Status: SHIPPED | OUTPATIENT
Start: 2020-02-11 | End: 2020-07-31 | Stop reason: SDUPTHER

## 2020-02-24 DIAGNOSIS — E78.5 HYPERLIPIDEMIA LDL GOAL <100: ICD-10-CM

## 2020-02-24 DIAGNOSIS — R93.1 ABNORMAL NUCLEAR CARDIAC IMAGING TEST: ICD-10-CM

## 2020-02-24 RX ORDER — PRAVASTATIN SODIUM 40 MG/1
TABLET ORAL
Qty: 30 TABLET | Refills: 0 | Status: SHIPPED | OUTPATIENT
Start: 2020-02-24 | End: 2020-03-25

## 2020-03-20 ENCOUNTER — PATIENT MESSAGE (OUTPATIENT)
Dept: ADMINISTRATIVE | Facility: OTHER | Age: 70
End: 2020-03-20

## 2020-03-22 ENCOUNTER — PATIENT OUTREACH (OUTPATIENT)
Dept: ADMINISTRATIVE | Facility: OTHER | Age: 70
End: 2020-03-22

## 2020-03-25 DIAGNOSIS — R93.1 ABNORMAL NUCLEAR CARDIAC IMAGING TEST: ICD-10-CM

## 2020-03-25 DIAGNOSIS — E78.5 HYPERLIPIDEMIA LDL GOAL <100: ICD-10-CM

## 2020-03-25 RX ORDER — PRAVASTATIN SODIUM 40 MG/1
TABLET ORAL
Qty: 30 TABLET | Refills: 0 | Status: SHIPPED | OUTPATIENT
Start: 2020-03-25 | End: 2020-04-21

## 2020-04-01 ENCOUNTER — HOSPITAL ENCOUNTER (OUTPATIENT)
Dept: CARDIOLOGY | Facility: HOSPITAL | Age: 70
Discharge: HOME | End: 2020-04-01
Attending: INTERNAL MEDICINE
Payer: MEDICARE

## 2020-04-01 DIAGNOSIS — I49.01 VF (VENTRICULAR FIBRILLATION): ICD-10-CM

## 2020-04-01 DIAGNOSIS — I47.29 PAROXYSMAL VT: ICD-10-CM

## 2020-04-01 DIAGNOSIS — Z95.810 ICD (IMPLANTABLE CARDIOVERTER-DEFIBRILLATOR) IN PLACE: ICD-10-CM

## 2020-04-01 DIAGNOSIS — I43 CARDIOMYOPATHY DUE TO SYSTEMIC DISEASE: ICD-10-CM

## 2020-04-01 DIAGNOSIS — Z95.810 ICD (IMPLANTABLE CARDIOVERTER-DEFIBRILLATOR) IN PLACE: Primary | ICD-10-CM

## 2020-04-01 LAB
CHARGE TIME (SEC): 9.3 SEC
HV IMPEDANCE (OHM): 43 OHM
IMPEDANCE RA LEAD (NATIVE): 310 OHMS
IMPEDANCE RA LEAD: 350 OHMS
OHS CV DC PP MS1: 0.5 MS
OHS CV DC PP V1: NORMAL V
P/R-WAVE RA LEAD (NATIVE): 9 MV
P/R-WAVE RA LEAD: 3 MV
THRESHOLD MS RA LEAD: 0.5 MS
THRESHOLD V RA LEAD: 0.75 V

## 2020-04-01 PROCEDURE — 93295 CARDIAC DEVICE CHECK - REMOTE: ICD-10-PCS | Mod: ,,, | Performed by: INTERNAL MEDICINE

## 2020-04-01 PROCEDURE — 93295 DEV INTERROG REMOTE 1/2/MLT: CPT | Mod: ,,, | Performed by: INTERNAL MEDICINE

## 2020-04-01 PROCEDURE — 93296 REM INTERROG EVL PM/IDS: CPT

## 2020-04-21 DIAGNOSIS — R93.1 ABNORMAL NUCLEAR CARDIAC IMAGING TEST: ICD-10-CM

## 2020-04-21 DIAGNOSIS — E78.5 HYPERLIPIDEMIA LDL GOAL <100: ICD-10-CM

## 2020-04-21 RX ORDER — AMIODARONE HYDROCHLORIDE 200 MG/1
TABLET ORAL
Qty: 60 TABLET | Refills: 0 | Status: SHIPPED | OUTPATIENT
Start: 2020-04-21 | End: 2020-10-28 | Stop reason: SDUPTHER

## 2020-04-21 RX ORDER — PRAVASTATIN SODIUM 40 MG/1
TABLET ORAL
Qty: 30 TABLET | Refills: 0 | Status: SHIPPED | OUTPATIENT
Start: 2020-04-21 | End: 2020-05-20

## 2020-05-20 DIAGNOSIS — E78.5 HYPERLIPIDEMIA LDL GOAL <100: ICD-10-CM

## 2020-05-20 DIAGNOSIS — R93.1 ABNORMAL NUCLEAR CARDIAC IMAGING TEST: ICD-10-CM

## 2020-05-20 RX ORDER — PRAVASTATIN SODIUM 40 MG/1
TABLET ORAL
Qty: 30 TABLET | Refills: 0 | Status: SHIPPED | OUTPATIENT
Start: 2020-05-20 | End: 2020-07-27

## 2020-06-23 RX ORDER — CARVEDILOL 25 MG/1
TABLET ORAL
Qty: 60 TABLET | Refills: 0 | Status: SHIPPED | OUTPATIENT
Start: 2020-06-23 | End: 2020-07-31 | Stop reason: SDUPTHER

## 2020-06-29 DIAGNOSIS — I51.89 DIASTOLIC DYSFUNCTION: ICD-10-CM

## 2020-06-29 DIAGNOSIS — I10 ESSENTIAL HYPERTENSION: ICD-10-CM

## 2020-06-29 RX ORDER — FUROSEMIDE 20 MG/1
TABLET ORAL
Qty: 90 TABLET | Refills: 0 | Status: SHIPPED | OUTPATIENT
Start: 2020-06-29 | End: 2020-07-31 | Stop reason: SDUPTHER

## 2020-07-01 ENCOUNTER — RESEARCH ENCOUNTER (OUTPATIENT)
Dept: RESEARCH | Facility: HOSPITAL | Age: 70
End: 2020-07-01

## 2020-07-02 NOTE — PROGRESS NOTES
24 month follow up visit     Provided telephone follow up due to COVID-19 with [place] patient for 24 month follow up visit as he did not yet transfer to [place].Patient is doing well. No ER visits or hospital stays. No adverse events or issues with or from study drug to report. Patient was redistributed study drug in a 6 month supply. Patient did not return unused study drug but will do so at next appointment. Medication will be shipped to patient from [facility]  Patient will continue care at [facility], all study information will be transferred to [place] site. Patient will return to [place] for follow up and drug redistribution in 6 months.

## 2020-07-23 RX ORDER — LOSARTAN POTASSIUM 100 MG/1
TABLET ORAL
Qty: 90 TABLET | Refills: 0 | Status: SHIPPED | OUTPATIENT
Start: 2020-07-23 | End: 2020-07-31 | Stop reason: SDUPTHER

## 2020-07-30 NOTE — PROGRESS NOTES
======================================================  GOAL of current visit: [facility]    Previous PCP: Dr. Randy Stapleton  Specialists:  Recent lab work: 2019  Recent imagin  Colonoscopy History:    Health Maintenance Due   Topic Date Due    PROSTATE-SPECIFIC ANTIGEN  2013    Shingles Vaccine (2 of 3) 2014    patient refused vaccines today.  ======================================================  PLAN:      Problem List Items Addressed This Visit     Hypertension (Chronic)     Discussed hypertension disease course, DASH-diet and exercise.  Discussed medication regimen importance of treating high blood pressure.  Patient advised of risk of untreated blood pressure.    ER precautions were given for symptoms of hypertensive urgency and emergency.           Relevant Medications    furosemide (LASIX) 20 MG tablet    spironolactone (ALDACTONE) 25 MG tablet    Other Relevant Orders    CBC auto differential    Comprehensive metabolic panel    Hemoglobin A1C    Lipid Panel    TSH    Hyperlipidemia LDL goal <100 (Chronic)     Discussed hyperlipidemia disease course, healthy diet and increased need for exercise.  Discussed the risk of cardiovascular disease, increase stroke and heart attack risk.    Patient voiced understanding and understood the treatment plan. All questions were answered.              Relevant Medications    pravastatin (PRAVACHOL) 40 MG tablet    Other Relevant Orders    CBC auto differential    Comprehensive metabolic panel    Hemoglobin A1C    Lipid Panel    TSH    Diastolic dysfunction (Chronic)     Continue current medication regimen by Cardiology.  Follow-up with cardiology as scheduled.         Relevant Medications    furosemide (LASIX) 20 MG tablet    spironolactone (ALDACTONE) 25 MG tablet    PAF (paroxysmal atrial fibrillation) (Chronic)     Atrial fibrillation precautions.  ER precautions.  Follow-up with Cardiology.  Continue current medication regimen.         Relevant  Orders    CBC auto differential    Comprehensive metabolic panel    Hemoglobin A1C    Lipid Panel    TSH    At risk for amiodarone toxicity with long term use (Chronic)     Monitor labs.         Relevant Orders    CBC auto differential    Comprehensive metabolic panel    Hemoglobin A1C    Lipid Panel    TSH    Encounter for long-term (current) use of medications (Chronic)    Relevant Orders    CBC auto differential    Comprehensive metabolic panel    Hemoglobin A1C    Lipid Panel    TSH    Skin lesion     Referral to Dermatology for further evaluation of skin lesion on the back right upper arm.  See photos.         Relevant Orders    Ambulatory referral/consult to Dermatology      Other Visit Diagnoses     Encounter for general adult medical examination with abnormal findings    -  Primary    Relevant Medications    pravastatin (PRAVACHOL) 40 MG tablet    albuterol (PROVENTIL/VENTOLIN HFA) 90 mcg/actuation inhaler    furosemide (LASIX) 20 MG tablet    spironolactone (ALDACTONE) 25 MG tablet    fluticasone furoate (ARNUITY ELLIPTA) 100 mcg/actuation DsDv    carvediloL (COREG) 25 MG tablet    losartan (COZAAR) 100 MG tablet    traZODone (DESYREL) 150 MG tablet    Other Relevant Orders    CBC auto differential    Comprehensive metabolic panel    Hemoglobin A1C    Hepatitis C Antibody    Lipid Panel    TSH    Encounter for prostate cancer screening        Relevant Orders    PSA, Screening    Need for hepatitis C screening test        Relevant Orders    Hepatitis C Antibody        Future Appointments     Date Provider Specialty Appt Notes    8/10/2020  Cardiology SJM/ICD    8/31/2020 Tanisha Grimes MD Dermatology Skin lesion [L98.9]    8/2/2021 Olvin Hutson MD Family Medicine annual           Medication List with Changes/Refills   Current Medications    ACETAMINOPHEN (TYLENOL) 325 MG TABLET    Take 650 mg by mouth as needed.     AMIODARONE (PACERONE) 200 MG TAB    TAKE 1 TABLET BY MOUTH TWICE DAILY    BEPOTASTINE  BESILATE (BEPREVE) 1.5 % DROP    Bepreve 1.5 % eye drops   Apply by ophthalmic route as needed for 50 days.    CO-ENZYME Q-10 30 MG CAPSULE    Take 30 mg by mouth once daily.     FERROUS SULFATE (IRON) 325 MG (65 MG IRON) TAB TABLET    Take 325 mg by mouth daily with breakfast.    GLUCOSAMINE-CHONDROITIN 500-400 MG TABLET    Take 1 tablet by mouth once daily.     INV APIXABAN/PLACEBO TABLET    Take 1 tablets (5 mg) by mouth 2 (two) times daily. FOR INVESTIGATIONAL USE ONLY. Protocol: Leia 2017.307 subject # : [id number]    INV ASPIRIN/PLACEBO TABLET    Take 1 tablet (81 mg total) by mouth once daily. FOR INVESTIGATIONAL USE ONLY. Patient Study #: [id number] Protocol: [place] 2017.307    MULTIVITAMIN (THERAGRAN) TABLET    Take 1 tablet by mouth once daily.     Changed and/or Refilled Medications    Modified Medication Previous Medication    ALBUTEROL (PROVENTIL/VENTOLIN HFA) 90 MCG/ACTUATION INHALER albuterol (PROVENTIL/VENTOLIN HFA) 90 mcg/actuation inhaler       Inhale 1 puff into the lungs once daily. Rescue    Inhale 1 puff into the lungs once daily. Rescue    CARVEDILOL (COREG) 25 MG TABLET carvediloL (COREG) 25 MG tablet       Take 1 tablet (25 mg total) by mouth 2 (two) times daily.    TAKE 1 TABLET BY MOUTH TWICE DAILY    FLUTICASONE FUROATE (ARNUITY ELLIPTA) 100 MCG/ACTUATION DSDV ARNUITY ELLIPTA 100 mcg/actuation DsDv       Take 1 puff by mouth once daily. Controller    INHALE 1 PUFF BY MOUTH ONCE DAILY     FUROSEMIDE (LASIX) 20 MG TABLET furosemide (LASIX) 20 MG tablet       Take 1 tablet (20 mg total) by mouth every morning.    TAKE 1 TABLET BY MOUTH EVERY MORNING    LOSARTAN (COZAAR) 100 MG TABLET losartan (COZAAR) 100 MG tablet       Take 1 tablet (100 mg total) by mouth once daily.    TAKE 1 TABLET BY MOUTH ONCE DAILY    PRAVASTATIN (PRAVACHOL) 40 MG TABLET pravastatin (PRAVACHOL) 40 MG tablet       Take 1 tablet (40 mg total) by mouth once daily.    TAKE 1 TABLET BY MOUTH ONCE DAILY    SPIRONOLACTONE  (ALDACTONE) 25 MG TABLET spironolactone (ALDACTONE) 25 MG tablet       Take 1 tablet (25 mg total) by mouth once daily.    take 1 tablet by mouth once daily    TRAZODONE (DESYREL) 150 MG TABLET traZODone (DESYREL) 150 MG tablet       Take 1 tablet (150 mg total) by mouth every evening.    TAKE 1 TABLET BY MOUTH EVERY EVENING   Discontinued Medications    AMOXICILLIN-CLAVULANATE 875-125MG (AUGMENTIN) 875-125 MG PER TABLET    Take 1 tablet by mouth 2 (two) times daily.    BENZONATATE (TESSALON) 100 MG CAPSULE    Take 1 capsule (100 mg total) by mouth every 4 (four) hours as needed for Cough.    CITALOPRAM (CELEXA) 20 MG TABLET    citalopram 20 mg tablet    FLU VAC 2019 [id number],PF, 45 MCG (15 MCG X 3)/0.5 ML SYRG    Fluvirin 2016-17 (PF) 45 mcg (15 mcg x 3)/0.5 mL intramuscular syringe   inject 0.5 milliliter intramuscularly    GLUCOSAMINE SULFATE (SYNOVACIN ORAL)    Glucosamine   chondroitin 1 po q am & pm    OLOPATADINE (PATADAY) 0.2 % DROP        PNEUMOCOCCAL 23-DIMAS PS (PNEUMOVAX 23) 25 MCG/0.5 ML VACCINE    Pneumovax 23 25 mcg/0.5 mL injection syringe   inject 0.5 milliliter intramuscularly    PREDNISONE (DELTASONE) 10 MG TABLET    40 mg PO QD X 3 days then 30 mg PO QD x 3 days then 20 mg PO QD X 3 days then 10 mg PO QD X 3 days then 5 mg PO QD x 3 days then stop.    VALSARTAN (DIOVAN) 160 MG TABLET    valsartan 160 mg tablet    ZOSTER VACCINE LIVE, PF, (ZOSTAVAX, PF,) 19,400 UNIT/0.65 ML INJECTION    Zostavax (PF) 19,400 unit/0.65 mL subcutaneous suspension   inject contents of 1 vial subcutaneously       Olvin Hutson M.D.     ==========================================================================  Subjective:      Patient ID: Randy Yap is a 70 y.o. male.  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     Chief Complaint: Establish Care (Former Dr. Stapleton patient) and Annual Exam      Problem List Items Addressed This Visit     Hypertension (Chronic)    Overview     CHRONIC. STABLE. BP Reviewed.  Compliant with BP medications. No SE reported.   (-) CP, SOB, palpitations, dizziness, lightheadedness, HA, arm numbness, tingling or weakness, syncope.  Creatinine   Date Value Ref Range Status   01/09/2019 0.9 0.5 - 1.4 mg/dL Final   01/09/2019 0.9 0.5 - 1.4 mg/dL Final              Current Assessment & Plan     Discussed hypertension disease course, DASH-diet and exercise.  Discussed medication regimen importance of treating high blood pressure.  Patient advised of risk of untreated blood pressure.    ER precautions were given for symptoms of hypertensive urgency and emergency.           Hyperlipidemia LDL goal <100 (Chronic)    Overview     CHRONIC. STABLE. Lab analysis reviewed.   (-) CP, SOB, abdominal pain, N/V/D, constipation, jaundice, skin changes.  (-) Myalgias  Lab Results   Component Value Date    CHOL 188 09/06/2017    CHOL 184 09/11/2013    CHOL 200 (H) 02/08/2013     Lab Results   Component Value Date    HDL 34 (L) 09/06/2017    HDL 39 (L) 09/11/2013    HDL 34 (L) 02/08/2013     Lab Results   Component Value Date    LDLCALC 124.4 09/06/2017    LDLCALC 111.0 09/11/2013    LDLCALC 137.0 02/08/2013     Lab Results   Component Value Date    TRIG 148 09/06/2017    TRIG 170 (H) 09/11/2013    TRIG 143 02/08/2013     Lab Results   Component Value Date    CHOLHDL 18.1 (L) 09/06/2017    CHOLHDL 21.2 09/11/2013    CHOLHDL 17.0 (L) 02/08/2013     Lab Results   Component Value Date    TOTALCHOLEST 5.5 (H) 09/06/2017    TOTALCHOLEST 4.7 09/11/2013    TOTALCHOLEST 5.9 (H) 02/08/2013     Lab Results   Component Value Date    ALT 45 (H) 01/09/2019    AST 31 01/09/2019    ALKPHOS 47 (L) 01/09/2019    BILITOT 0.4 01/09/2019      ======================================================           Current Assessment & Plan     Discussed hyperlipidemia disease course, healthy diet and increased need for exercise.  Discussed the risk of cardiovascular disease, increase stroke and heart attack risk.    Patient voiced understanding and understood the treatment plan. All questions were answered.              Diastolic dysfunction (Chronic)    Overview     Chronic.  Stable.  Patient is compliant with medications by Cardiology.  Denies any shortness of breath chest pain or dyspnea on exertion.    Patient is currently undergoing a cardiology trial with Eliquis aspirin and placebo.         Current Assessment & Plan     Continue current medication regimen by Cardiology.  Follow-up with cardiology as scheduled.         PAF (paroxysmal atrial fibrillation) (Chronic)    Overview     Chronic.  Stable.  Patient is compliant with his medications.  Follows Cardiology.         Current Assessment & Plan     Atrial fibrillation precautions.  ER precautions.  Follow-up with Cardiology.  Continue current medication regimen.         At risk for amiodarone toxicity with long term use (Chronic)    Overview     Chronic use of amiodarone for atrial fibrillation.  Stable.  No side effects reported.  Symptoms are controlled         Current Assessment & Plan     Monitor labs.         Encounter for long-term (current) use of medications (Chronic)    Skin lesion    Overview     Chronic.  Uncontrolled.  Patient states that he has a nonhealing lesion on the back of his right arm.  Patient reports that it is not painful is not changing and colors and is not enlarging.  However it does itch in he scratches it occasionally.  Patient has never had evaluation.         Current Assessment & Plan     Referral to Dermatology for further evaluation of skin lesion on the back right upper arm.  See photos.           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     Colon polyp 5/2013    repeat 5/2018    Depression, recurrent     Diastolic dysfunction     DJD (degenerative joint disease) of knee 10/14/2013    Hyperlipidemia     Hypertension     Murmur     Paroxysmal VT 5/21/2016    Pericarditis with effusion     Sleep apnea      Past Surgical History:   Procedure Laterality Date    CARDIAC DEFIBRILLATOR PLACEMENT  10/6/2014    COLONOSCOPY W/ POLYPECTOMY  5/21/2013    repeat 5/21/2018    HERNIA REPAIR      R inguinal    LEFT HEART CATHETERIZATION Left 7/9/2018    Procedure: CATHETERIZATION, HEART, LEFT;  Surgeon: Macey Dos Santos MD;  Location: [facility] CATH LAB;  Service: Cardiology;  Laterality: Left;  left radial approach  Has [facility] ICD    pace maker   10/6/2014    PERICARDIAL WINDOW  12-15 years ago    tonsillectomy      VARICOCELECTOMY       Review of patient's allergies indicates:  No Known Allergies  Medication List with Changes/Refills   Current Medications    ACETAMINOPHEN (TYLENOL) 325 MG TABLET    Take 650 mg by mouth as needed.     AMIODARONE (PACERONE) 200 MG TAB    TAKE 1 TABLET BY MOUTH TWICE DAILY    BEPOTASTINE BESILATE (BEPREVE) 1.5 % DROP    Bepreve 1.5 % eye drops   Apply by ophthalmic route as needed for 50 days.    CO-ENZYME Q-10 30 MG CAPSULE    Take 30 mg by mouth once daily.     FERROUS SULFATE (IRON) 325 MG (65 MG IRON) TAB TABLET    Take 325 mg by mouth daily with breakfast.    GLUCOSAMINE-CHONDROITIN 500-400 MG TABLET    Take 1 tablet by mouth once daily.     INV APIXABAN/PLACEBO TABLET    Take 1 tablets (5 mg) by mouth 2 (two) times daily. FOR INVESTIGATIONAL USE ONLY. Protocol: Leia 2017.307 subject # : [id number]    INV ASPIRIN/PLACEBO TABLET    Take 1 tablet (81 mg  total) by mouth once daily. FOR INVESTIGATIONAL USE ONLY. Patient Study #: [id number] Protocol: [place] 2017.307    MULTIVITAMIN (THERAGRAN) TABLET    Take 1 tablet by mouth once daily.     Changed and/or Refilled Medications    Modified Medication Previous Medication    ALBUTEROL (PROVENTIL/VENTOLIN HFA) 90 MCG/ACTUATION INHALER albuterol (PROVENTIL/VENTOLIN HFA) 90 mcg/actuation inhaler       Inhale 1 puff into the lungs once daily. Rescue    Inhale 1 puff into the lungs once daily. Rescue    CARVEDILOL (COREG) 25 MG TABLET carvediloL (COREG) 25 MG tablet       Take 1 tablet (25 mg total) by mouth 2 (two) times daily.    TAKE 1 TABLET BY MOUTH TWICE DAILY    FLUTICASONE FUROATE (ARNUITY ELLIPTA) 100 MCG/ACTUATION DSDV ARNUITY ELLIPTA 100 mcg/actuation DsDv       Take 1 puff by mouth once daily. Controller    INHALE 1 PUFF BY MOUTH ONCE DAILY     FUROSEMIDE (LASIX) 20 MG TABLET furosemide (LASIX) 20 MG tablet       Take 1 tablet (20 mg total) by mouth every morning.    TAKE 1 TABLET BY MOUTH EVERY MORNING    LOSARTAN (COZAAR) 100 MG TABLET losartan (COZAAR) 100 MG tablet       Take 1 tablet (100 mg total) by mouth once daily.    TAKE 1 TABLET BY MOUTH ONCE DAILY    PRAVASTATIN (PRAVACHOL) 40 MG TABLET pravastatin (PRAVACHOL) 40 MG tablet       Take 1 tablet (40 mg total) by mouth once daily.    TAKE 1 TABLET BY MOUTH ONCE DAILY    SPIRONOLACTONE (ALDACTONE) 25 MG TABLET spironolactone (ALDACTONE) 25 MG tablet       Take 1 tablet (25 mg total) by mouth once daily.    take 1 tablet by mouth once daily    TRAZODONE (DESYREL) 150 MG TABLET traZODone (DESYREL) 150 MG tablet       Take 1 tablet (150 mg total) by mouth every evening.    TAKE 1 TABLET BY MOUTH EVERY EVENING   Discontinued Medications    AMOXICILLIN-CLAVULANATE 875-125MG (AUGMENTIN) 875-125 MG PER TABLET    Take 1 tablet by mouth 2 (two) times daily.    BENZONATATE (TESSALON) 100 MG CAPSULE    Take 1 capsule (100 mg total) by mouth every 4 (four) hours as  needed for Cough.    CITALOPRAM (CELEXA) 20 MG TABLET    citalopram 20 mg tablet    FLU VAC 2019 [id number],PF, 45 MCG (15 MCG X 3)/0.5 ML SYRG    Fluvirin 2016-17 (PF) 45 mcg (15 mcg x 3)/0.5 mL intramuscular syringe   inject 0.5 milliliter intramuscularly    GLUCOSAMINE SULFATE (SYNOVACIN ORAL)    Glucosamine   chondroitin 1 po q am & pm    OLOPATADINE (PATADAY) 0.2 % DROP        PNEUMOCOCCAL 23-DIMAS PS (PNEUMOVAX 23) 25 MCG/0.5 ML VACCINE    Pneumovax 23 25 mcg/0.5 mL injection syringe   inject 0.5 milliliter intramuscularly    PREDNISONE (DELTASONE) 10 MG TABLET    40 mg PO QD X 3 days then 30 mg PO QD x 3 days then 20 mg PO QD X 3 days then 10 mg PO QD X 3 days then 5 mg PO QD x 3 days then stop.    VALSARTAN (DIOVAN) 160 MG TABLET    valsartan 160 mg tablet    ZOSTER VACCINE LIVE, PF, (ZOSTAVAX, PF,) 19,400 UNIT/0.65 ML INJECTION    Zostavax (PF) 19,400 unit/0.65 mL subcutaneous suspension   inject contents of 1 vial subcutaneously      Social History     Tobacco Use    Smoking status: Never Smoker    Smokeless tobacco: Never Used   Substance Use Topics    Alcohol use: Yes     Alcohol/week: 0.8 standard drinks     Types: 1 Standard drinks or equivalent per week     Frequency: Monthly or less     Drinks per session: 1 or 2     Binge frequency: Never     Comment: occasional glass of wine on social occasions      Family History   Problem Relation Age of Onset    Hyperlipidemia Mother     Arrhythmia Mother     Heart disease Father 48    Heart attack Father 48    Hypertension Father     Hypertension Maternal Grandmother     Hypertension Maternal Grandfather     Heart disease Paternal Grandmother     Hypertension Paternal Grandmother     Heart attack Paternal Grandmother     Heart disease Paternal Grandfather     Hypertension Paternal Grandfather     Heart attack Paternal Grandfather        I have reviewed the complete PMH, social history, surgical history, allergies and medications.  As well as  "family history.    Review of Systems   Constitutional: Negative for chills, fatigue, fever and unexpected weight change.   HENT: Negative for ear pain and sore throat.    Eyes: Negative for redness and visual disturbance.   Respiratory: Negative for cough and shortness of breath.    Cardiovascular: Negative for chest pain and palpitations.   Gastrointestinal: Negative for nausea and vomiting.   Endocrine: Negative for cold intolerance and heat intolerance.   Genitourinary: Negative for difficulty urinating and hematuria.   Musculoskeletal: Negative for arthralgias and myalgias.   Skin: Negative for rash and wound.   Allergic/Immunologic: Negative for environmental allergies and food allergies.   Neurological: Negative for weakness and headaches.   Hematological: Negative for adenopathy. Does not bruise/bleed easily.   Psychiatric/Behavioral: Negative for sleep disturbance. The patient is not nervous/anxious.      Objective:   /81   Pulse 86   Temp 98.1 °F (36.7 °C) (Oral)   Ht 5' 10" (1.778 m)   Wt 127.4 kg (280 lb 12.8 oz)   BMI 40.29 kg/m²   Physical Exam  Vitals signs and nursing note reviewed.   Constitutional:       General: He is not in acute distress.     Appearance: He is well-developed.   HENT:      Head: Normocephalic and atraumatic.   Eyes:      Pupils: Pupils are equal, round, and reactive to light.   Neck:      Musculoskeletal: Normal range of motion and neck supple.   Cardiovascular:      Rate and Rhythm: Normal rate and regular rhythm.      Heart sounds: Normal heart sounds.      Comments: Device to left chest  Pulmonary:      Effort: Pulmonary effort is normal. No respiratory distress.      Breath sounds: Normal breath sounds. No wheezing.   Musculoskeletal: Normal range of motion.   Skin:     General: Skin is warm and dry.      Capillary Refill: Capillary refill takes less than 2 seconds.   Neurological:      Mental Status: He is alert and oriented to person, place, and time.      Cranial " Nerves: No cranial nerve deficit.   Psychiatric:         Behavior: Behavior normal.                 Assessment:     1. Encounter for general adult medical examination with abnormal findings    2. Encounter for long-term (current) use of medications    3. Essential hypertension    4. Hyperlipidemia LDL goal <100    5. PAF (paroxysmal atrial fibrillation)    6. At risk for amiodarone toxicity with long term use    7. Encounter for prostate cancer screening    8. Need for hepatitis C screening test    9. Diastolic dysfunction    10. Skin lesion      MDM:   Patient here for annual wellness exam.  Lab orders entered.  This noted specifically for chronic conditions and medication refills.  I have Reviewed and summarized old records.  I have performed thorough medication reconciliation today and discussed risk and benefits of each medication.  I have reviewed labs and discussed with patient.  All questions were answered.  I am requesting old records and will review them once they are available.    I have signed for the following orders AND/OR meds.  Orders Placed This Encounter   Procedures    CBC auto differential     Standing Status:   Standing     Number of Occurrences:   99     Standing Expiration Date:   9/28/2021    Comprehensive metabolic panel     Standing Status:   Standing     Number of Occurrences:   99     Standing Expiration Date:   9/28/2021    Hemoglobin A1C     Standing Status:   Standing     Number of Occurrences:   99     Standing Expiration Date:   9/30/2021    Hepatitis C Antibody     Standing Status:   Future     Standing Expiration Date:   9/30/2021    Lipid Panel     Standing Status:   Standing     Number of Occurrences:   99     Standing Expiration Date:   9/30/2021    TSH     Standing Status:   Standing     Number of Occurrences:   99     Standing Expiration Date:   9/30/2021    PSA, Screening     Standing Status:   Future     Standing Expiration Date:   9/29/2021    Ambulatory  referral/consult to Dermatology     Standing Status:   Future     Standing Expiration Date:   8/31/2021     Referral Priority:   Urgent     Referral Type:   Consultation     Referral Reason:   Specialty Services Required     Referred to Provider:   Tanisha Grimes MD     Requested Specialty:   Dermatology     Number of Visits Requested:   1     Medications Ordered This Encounter   Medications    albuterol (PROVENTIL/VENTOLIN HFA) 90 mcg/actuation inhaler     Sig: Inhale 1 puff into the lungs once daily. Rescue     Dispense:  18 g     Refill:  3    carvediloL (COREG) 25 MG tablet     Sig: Take 1 tablet (25 mg total) by mouth 2 (two) times daily.     Dispense:  180 tablet     Refill:  4     .    fluticasone furoate (ARNUITY ELLIPTA) 100 mcg/actuation DsDv     Sig: Take 1 puff by mouth once daily. Controller     Dispense:  3 each     Refill:  3    furosemide (LASIX) 20 MG tablet     Sig: Take 1 tablet (20 mg total) by mouth every morning.     Dispense:  90 tablet     Refill:  3    losartan (COZAAR) 100 MG tablet     Sig: Take 1 tablet (100 mg total) by mouth once daily.     Dispense:  90 tablet     Refill:  4     .    pravastatin (PRAVACHOL) 40 MG tablet     Sig: Take 1 tablet (40 mg total) by mouth once daily.     Dispense:  90 tablet     Refill:  3    spironolactone (ALDACTONE) 25 MG tablet     Sig: Take 1 tablet (25 mg total) by mouth once daily.     Dispense:  90 tablet     Refill:  4     .    traZODone (DESYREL) 150 MG tablet     Sig: Take 1 tablet (150 mg total) by mouth every evening.     Dispense:  90 tablet     Refill:  4        Follow up in about 1 year (around 7/31/2021), or if symptoms worsen or fail to improve, for Annual Wellness Exam.    If no improvement in symptoms or symptoms worsen, advised to call/follow-up at clinic or go to ER. Patient voiced understanding and all questions/concerns were addressed.     DISCLAIMER: This note was compiled by using a speech recognition dictation system and  therefore please be aware that typographical / speech recognition errors can and do occur.  Please contact me if you see any errors specifically.    Olvin Hutson M.D.       Office: [phone] [address] [place] [address]  FAX: [phone]

## 2020-07-31 ENCOUNTER — OFFICE VISIT (OUTPATIENT)
Dept: FAMILY MEDICINE | Facility: CLINIC | Age: 70
End: 2020-07-31
Payer: MEDICARE

## 2020-07-31 VITALS
TEMPERATURE: 98 F | WEIGHT: 280.81 LBS | SYSTOLIC BLOOD PRESSURE: 127 MMHG | DIASTOLIC BLOOD PRESSURE: 81 MMHG | BODY MASS INDEX: 40.2 KG/M2 | HEART RATE: 86 BPM | HEIGHT: 70 IN

## 2020-07-31 DIAGNOSIS — Z11.59 NEED FOR HEPATITIS C SCREENING TEST: ICD-10-CM

## 2020-07-31 DIAGNOSIS — Z79.899 AT RISK FOR AMIODARONE TOXICITY WITH LONG TERM USE: ICD-10-CM

## 2020-07-31 DIAGNOSIS — L98.9 SKIN LESION: ICD-10-CM

## 2020-07-31 DIAGNOSIS — I51.89 DIASTOLIC DYSFUNCTION: ICD-10-CM

## 2020-07-31 DIAGNOSIS — Z91.89 AT RISK FOR AMIODARONE TOXICITY WITH LONG TERM USE: ICD-10-CM

## 2020-07-31 DIAGNOSIS — I10 ESSENTIAL HYPERTENSION: ICD-10-CM

## 2020-07-31 DIAGNOSIS — Z79.899 ENCOUNTER FOR LONG-TERM (CURRENT) USE OF MEDICATIONS: ICD-10-CM

## 2020-07-31 DIAGNOSIS — E78.5 HYPERLIPIDEMIA LDL GOAL <100: ICD-10-CM

## 2020-07-31 DIAGNOSIS — I48.0 PAF (PAROXYSMAL ATRIAL FIBRILLATION): ICD-10-CM

## 2020-07-31 DIAGNOSIS — Z00.01 ENCOUNTER FOR GENERAL ADULT MEDICAL EXAMINATION WITH ABNORMAL FINDINGS: Primary | ICD-10-CM

## 2020-07-31 DIAGNOSIS — Z12.5 ENCOUNTER FOR PROSTATE CANCER SCREENING: ICD-10-CM

## 2020-07-31 PROBLEM — J20.9 ACUTE BRONCHITIS: Status: RESOLVED | Noted: 2020-01-02 | Resolved: 2020-07-31

## 2020-07-31 PROBLEM — I49.01 VF (VENTRICULAR FIBRILLATION): Status: RESOLVED | Noted: 2018-08-12 | Resolved: 2020-07-31

## 2020-07-31 PROBLEM — R07.89 CHEST PRESSURE: Status: RESOLVED | Noted: 2018-03-23 | Resolved: 2020-07-31

## 2020-07-31 PROBLEM — Z86.010 HISTORY OF COLON POLYPS: Status: ACTIVE | Noted: 2017-11-08

## 2020-07-31 PROBLEM — Z86.0100 HISTORY OF COLON POLYPS: Status: ACTIVE | Noted: 2017-11-08

## 2020-07-31 PROBLEM — D50.9 IRON DEFICIENCY ANEMIA: Status: ACTIVE | Noted: 2017-11-08

## 2020-07-31 PROBLEM — R93.1 ABNORMAL NUCLEAR CARDIAC IMAGING TEST: Chronic | Status: ACTIVE | Noted: 2018-07-06

## 2020-07-31 PROCEDURE — 99397 PR PREVENTIVE VISIT,EST,65 & OVER: ICD-10-PCS | Mod: S$PBB,,, | Performed by: FAMILY MEDICINE

## 2020-07-31 PROCEDURE — 99999 PR PBB SHADOW E&M-EST. PATIENT-LVL V: CPT | Mod: PBBFAC,,, | Performed by: FAMILY MEDICINE

## 2020-07-31 PROCEDURE — 99397 PER PM REEVAL EST PAT 65+ YR: CPT | Mod: S$PBB,,, | Performed by: FAMILY MEDICINE

## 2020-07-31 PROCEDURE — 99215 OFFICE O/P EST HI 40 MIN: CPT | Mod: PBBFAC,PO | Performed by: FAMILY MEDICINE

## 2020-07-31 PROCEDURE — 99999 PR PBB SHADOW E&M-EST. PATIENT-LVL V: ICD-10-PCS | Mod: PBBFAC,,, | Performed by: FAMILY MEDICINE

## 2020-07-31 RX ORDER — AMIODARONE HYDROCHLORIDE 200 MG/1
200 TABLET ORAL 2 TIMES DAILY
Qty: 180 TABLET | Refills: 3 | Status: CANCELLED | OUTPATIENT
Start: 2020-07-31

## 2020-07-31 RX ORDER — PRAVASTATIN SODIUM 40 MG/1
40 TABLET ORAL DAILY
Qty: 90 TABLET | Refills: 3 | Status: SHIPPED | OUTPATIENT
Start: 2020-07-31 | End: 2021-06-22

## 2020-07-31 RX ORDER — SPIRONOLACTONE 25 MG/1
25 TABLET ORAL DAILY
Qty: 90 TABLET | Refills: 4 | Status: SHIPPED | OUTPATIENT
Start: 2020-07-31 | End: 2021-02-09 | Stop reason: DRUGHIGH

## 2020-07-31 RX ORDER — LOSARTAN POTASSIUM 100 MG/1
100 TABLET ORAL DAILY
Qty: 90 TABLET | Refills: 4 | Status: SHIPPED | OUTPATIENT
Start: 2020-07-31 | End: 2020-10-19 | Stop reason: ALTCHOICE

## 2020-07-31 RX ORDER — CARVEDILOL 25 MG/1
25 TABLET ORAL 2 TIMES DAILY
Qty: 180 TABLET | Refills: 4 | Status: SHIPPED | OUTPATIENT
Start: 2020-07-31 | End: 2021-09-24

## 2020-07-31 RX ORDER — FUROSEMIDE 20 MG/1
20 TABLET ORAL EVERY MORNING
Qty: 90 TABLET | Refills: 3 | Status: SHIPPED | OUTPATIENT
Start: 2020-07-31 | End: 2020-09-17

## 2020-07-31 RX ORDER — TRAZODONE HYDROCHLORIDE 150 MG/1
150 TABLET ORAL NIGHTLY
Qty: 90 TABLET | Refills: 4 | Status: SHIPPED | OUTPATIENT
Start: 2020-07-31 | End: 2021-08-02 | Stop reason: SDUPTHER

## 2020-07-31 RX ORDER — FLUTICASONE FUROATE 100 UG/1
1 POWDER RESPIRATORY (INHALATION) DAILY
Qty: 3 EACH | Refills: 3 | Status: SHIPPED | OUTPATIENT
Start: 2020-07-31 | End: 2021-03-31

## 2020-07-31 RX ORDER — ALBUTEROL SULFATE 90 UG/1
1 AEROSOL, METERED RESPIRATORY (INHALATION) DAILY
Qty: 18 G | Refills: 3 | Status: SHIPPED | OUTPATIENT
Start: 2020-07-31 | End: 2021-12-22 | Stop reason: SDUPTHER

## 2020-08-02 PROBLEM — L98.9 SKIN LESION: Status: ACTIVE | Noted: 2020-08-02

## 2020-08-02 NOTE — PROGRESS NOTES
This note is specifically for wellness visit performed today.     WELLNESS EXAM      Patient ID: Randy Yap is a 70 y.o. male.  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     Chief Complaint:  Encounter for wellness exam    Well Adult Physical: Patient here for a comprehensive physical exam.The patient reports NO problems.  Do you take any herbs or supplements that were not prescribed by a doctor? no   Are you taking calcium supplements? no    History:  Varicocelectomy  UROLOGIST:  None reported    Date last PSA: Reviewed  Lab Results   Component Value Date    PSA 1.45 06/13/2012    PSA 1.3 02/11/2008    PSA 1.3 09/28/2006      No history of STDs    Health Maintenance Topics with due status: Not Due       Topic Last Completion Date    TETANUS VACCINE 11/19/2013    Influenza Vaccine 10/17/2016    Lipid Panel 09/06/2017    Colorectal Cancer Screening 11/08/2017        ==============================================  History reviewed.     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     Colon polyp 5/2013    repeat 5/2018    Depression, recurrent     Diastolic dysfunction     DJD (degenerative joint disease) of knee 10/14/2013    Hyperlipidemia     Hypertension     Murmur     Paroxysmal VT 5/21/2016    Pericarditis with effusion     Sleep apnea      Past Surgical History:   Procedure Laterality Date    CARDIAC DEFIBRILLATOR PLACEMENT  10/6/2014    COLONOSCOPY W/ POLYPECTOMY  5/21/2013    repeat 5/21/2018    HERNIA REPAIR      R inguinal     LEFT HEART CATHETERIZATION Left 7/9/2018    Procedure: CATHETERIZATION, HEART, LEFT;  Surgeon: Macey Dos Santos MD;  Location: [facility] CATH LAB;  Service: Cardiology;  Laterality: Left;  left radial approach  Has [employer] ICD    pace maker   10/6/2014    PERICARDIAL WINDOW  12-15 years ago    tonsillectomy      VARICOCELECTOMY       Review of patient's allergies indicates:  No Known Allergies  Current Outpatient Medications on File Prior to Visit   Medication Sig Dispense Refill    acetaminophen (TYLENOL) 325 MG tablet Take 650 mg by mouth as needed.       amiodarone (PACERONE) 200 MG Tab TAKE 1 TABLET BY MOUTH TWICE DAILY 60 tablet 0    bepotastine besilate (BEPREVE) 1.5 % Drop Bepreve 1.5 % eye drops   Apply by ophthalmic route as needed for 50 days.      co-enzyme Q-10 30 mg capsule Take 30 mg by mouth once daily.       ferrous sulfate (IRON) 325 mg (65 mg iron) Tab tablet Take 325 mg by mouth daily with breakfast.      glucosamine-chondroitin 500-400 mg tablet Take 1 tablet by mouth once daily.       INV apixaban/placebo tablet Take 1 tablets (5 mg) by mouth 2 (two) times daily. FOR INVESTIGATIONAL USE ONLY. Protocol: Lancing 2017.307 subject # : [id number] 400 tablet 0    INV aspirin/placebo tablet Take 1 tablet (81 mg total) by mouth once daily. FOR INVESTIGATIONAL USE ONLY. Patient Study #: [id number] Protocol: Lancing 2017.307 200 tablet 0    multivitamin (THERAGRAN) tablet Take 1 tablet by mouth once daily.         No current facility-administered medications on file prior to visit.      Social History     Socioeconomic History    Marital status:      Spouse name: Not on file    Number of children: Not on file    Years of education: Not on file    Highest education level: Not on file   Occupational History    Not on file   Social Needs    Financial resource strain: Not hard at all    Food insecurity     Worry: Never true     Inability: Never true    Transportation needs     Medical: No      Non-medical: No   Tobacco Use    Smoking status: Never Smoker    Smokeless tobacco: Never Used   Substance and Sexual Activity    Alcohol use: Yes     Alcohol/week: 0.8 standard drinks     Types: 1 Standard drinks or equivalent per week     Frequency: Monthly or less     Drinks per session: 1 or 2     Binge frequency: Never     Comment: occasional glass of wine on social occasions    Drug use: No    Sexual activity: Not on file   Lifestyle    Physical activity     Days per week: Not on file     Minutes per session: Not on file    Stress: Not on file   Relationships    Social connections     Talks on phone: Three times a week     Gets together: Twice a week     Attends Oriental orthodox service: More than 4 times per year     Active member of club or organization: Yes     Attends meetings of clubs or organizations: More than 4 times per year     Relationship status:    Other Topics Concern    Not on file   Social History Narrative    Not on file     Family History   Problem Relation Age of Onset    Hyperlipidemia Mother     Arrhythmia Mother     Heart disease Father 48    Heart attack Father 48    Hypertension Father     Hypertension Maternal Grandmother     Hypertension Maternal Grandfather     Heart disease Paternal Grandmother     Hypertension Paternal Grandmother     Heart attack Paternal Grandmother     Heart disease Paternal Grandfather     Hypertension Paternal Grandfather     Heart attack Paternal Grandfather        Vitals:    07/31/20 0825   BP: 127/81   Pulse: 86   Temp: 98.1 °F (36.7 °C)      Body mass index is 40.29 kg/m².     Review of Systems   Constitutional: Negative for chills, fatigue, fever and unexpected weight change.   HENT: Negative for ear pain and sore throat.    Eyes: Negative for redness and visual disturbance.   Respiratory: Negative for cough and shortness of breath.    Cardiovascular: Negative for chest pain and palpitations.   Gastrointestinal: Negative for nausea  and vomiting.   Endocrine: Negative for cold intolerance and heat intolerance.   Genitourinary: Negative for difficulty urinating and hematuria.   Musculoskeletal: Negative for arthralgias and myalgias.   Skin: Negative for rash and wound.   Allergic/Immunologic: Negative for environmental allergies and food allergies.   Neurological: Negative for weakness and headaches.   Hematological: Negative for adenopathy. Does not bruise/bleed easily.   Psychiatric/Behavioral: Negative for sleep disturbance. The patient is not nervous/anxious.           Objective:      Physical Exam  Vitals signs and nursing note reviewed.   Constitutional:       General: He is not in acute distress.     Appearance: He is well-developed.   HENT:      Head: Normocephalic and atraumatic.   Eyes:      Pupils: Pupils are equal, round, and reactive to light.   Neck:      Musculoskeletal: Normal range of motion and neck supple.   Cardiovascular:      Rate and Rhythm: Normal rate and regular rhythm.      Heart sounds: Normal heart sounds.      Comments: Device to left chest  Pulmonary:      Effort: Pulmonary effort is normal. No respiratory distress.      Breath sounds: Normal breath sounds. No wheezing.   Abdominal:      General: Bowel sounds are normal.      Palpations: Abdomen is soft.   Musculoskeletal: Normal range of motion.   Skin:     General: Skin is warm and dry.      Capillary Refill: Capillary refill takes less than 2 seconds.   Neurological:      Mental Status: He is alert and oriented to person, place, and time.      Cranial Nerves: No cranial nerve deficit.   Psychiatric:         Behavior: Behavior normal.          Assessment / Plan:      1.  Routine health exam-patient here for annual wellness exam.  Labs ordered.  Health maintenance was reviewed and ordered.    See other note for chronic conditions and medication refills.    Complete history and physical was completed today.  Complete and thorough medication reconciliation was  performed.  Discussed risks and benefits of medications.  Advised patient on orders and health maintenance.  We discussed old records and old labs if available.  Will request any records not available through epic.  Continue current medications listed on your summary sheet.    All questions were answered. Patient had no further concerns. Advised of diagnoses and plan. Follow up as planned or return sooner if symptoms persist or worsen.     Orders Placed This Encounter   Procedures    CBC auto differential     Standing Status:   Standing     Number of Occurrences:   99     Standing Expiration Date:   9/28/2021    Comprehensive metabolic panel     Standing Status:   Standing     Number of Occurrences:   99     Standing Expiration Date:   9/28/2021    Hemoglobin A1C     Standing Status:   Standing     Number of Occurrences:   99     Standing Expiration Date:   9/30/2021    Hepatitis C Antibody     Standing Status:   Future     Standing Expiration Date:   9/30/2021    Lipid Panel     Standing Status:   Standing     Number of Occurrences:   99     Standing Expiration Date:   9/30/2021    TSH     Standing Status:   Standing     Number of Occurrences:   99     Standing Expiration Date:   9/30/2021    PSA, Screening     Standing Status:   Future     Standing Expiration Date:   9/29/2021    Ambulatory referral/consult to Dermatology     Standing Status:   Future     Standing Expiration Date:   8/31/2021     Referral Priority:   Urgent     Referral Type:   Consultation     Referral Reason:   Specialty Services Required     Referred to Provider:   Tanisha Grimes MD     Requested Specialty:   Dermatology     Number of Visits Requested:   1       Medications Ordered This Encounter   Medications    albuterol (PROVENTIL/VENTOLIN HFA) 90 mcg/actuation inhaler     Sig: Inhale 1 puff into the lungs once daily. Rescue     Dispense:  18 g     Refill:  3    carvediloL (COREG) 25 MG tablet     Sig: Take 1 tablet (25 mg total)  by mouth 2 (two) times daily.     Dispense:  180 tablet     Refill:  4     .    fluticasone furoate (ARNUITY ELLIPTA) 100 mcg/actuation DsDv     Sig: Take 1 puff by mouth once daily. Controller     Dispense:  3 each     Refill:  3    furosemide (LASIX) 20 MG tablet     Sig: Take 1 tablet (20 mg total) by mouth every morning.     Dispense:  90 tablet     Refill:  3    losartan (COZAAR) 100 MG tablet     Sig: Take 1 tablet (100 mg total) by mouth once daily.     Dispense:  90 tablet     Refill:  4     .    pravastatin (PRAVACHOL) 40 MG tablet     Sig: Take 1 tablet (40 mg total) by mouth once daily.     Dispense:  90 tablet     Refill:  3    spironolactone (ALDACTONE) 25 MG tablet     Sig: Take 1 tablet (25 mg total) by mouth once daily.     Dispense:  90 tablet     Refill:  4     .    traZODone (DESYREL) 150 MG tablet     Sig: Take 1 tablet (150 mg total) by mouth every evening.     Dispense:  90 tablet     Refill:  4        Olvin Hutson MD

## 2020-08-02 NOTE — ASSESSMENT & PLAN NOTE
Referral to Dermatology for further evaluation of skin lesion on the back right upper arm.  See photos.

## 2020-08-02 NOTE — ASSESSMENT & PLAN NOTE
Atrial fibrillation precautions.  ER precautions.  Follow-up with Cardiology.  Continue current medication regimen.

## 2020-08-04 ENCOUNTER — LAB VISIT (OUTPATIENT)
Dept: LAB | Facility: HOSPITAL | Age: 70
End: 2020-08-04
Attending: FAMILY MEDICINE
Payer: MEDICARE

## 2020-08-04 ENCOUNTER — TELEPHONE (OUTPATIENT)
Dept: CARDIOLOGY | Facility: CLINIC | Age: 70
End: 2020-08-04

## 2020-08-04 DIAGNOSIS — Z79.899 ENCOUNTER FOR LONG-TERM (CURRENT) USE OF MEDICATIONS: ICD-10-CM

## 2020-08-04 DIAGNOSIS — E78.5 HYPERLIPIDEMIA LDL GOAL <100: ICD-10-CM

## 2020-08-04 DIAGNOSIS — I10 ESSENTIAL HYPERTENSION: ICD-10-CM

## 2020-08-04 DIAGNOSIS — Z00.01 ENCOUNTER FOR GENERAL ADULT MEDICAL EXAMINATION WITH ABNORMAL FINDINGS: ICD-10-CM

## 2020-08-04 DIAGNOSIS — Z79.899 AT RISK FOR AMIODARONE TOXICITY WITH LONG TERM USE: ICD-10-CM

## 2020-08-04 DIAGNOSIS — Z12.5 ENCOUNTER FOR PROSTATE CANCER SCREENING: ICD-10-CM

## 2020-08-04 DIAGNOSIS — Z91.89 AT RISK FOR AMIODARONE TOXICITY WITH LONG TERM USE: ICD-10-CM

## 2020-08-04 DIAGNOSIS — Z11.59 NEED FOR HEPATITIS C SCREENING TEST: ICD-10-CM

## 2020-08-04 DIAGNOSIS — I48.0 PAF (PAROXYSMAL ATRIAL FIBRILLATION): ICD-10-CM

## 2020-08-04 LAB
ALBUMIN SERPL BCP-MCNC: 4 G/DL (ref 3.5–5.2)
ALP SERPL-CCNC: 46 U/L (ref 55–135)
ALT SERPL W/O P-5'-P-CCNC: 34 U/L (ref 10–44)
ANION GAP SERPL CALC-SCNC: 8 MMOL/L (ref 8–16)
AST SERPL-CCNC: 20 U/L (ref 10–40)
BASOPHILS # BLD AUTO: 0.06 K/UL (ref 0–0.2)
BASOPHILS NFR BLD: 1 % (ref 0–1.9)
BILIRUB SERPL-MCNC: 0.6 MG/DL (ref 0.1–1)
BUN SERPL-MCNC: 13 MG/DL (ref 8–23)
CALCIUM SERPL-MCNC: 9.2 MG/DL (ref 8.7–10.5)
CHLORIDE SERPL-SCNC: 104 MMOL/L (ref 95–110)
CHOLEST SERPL-MCNC: 213 MG/DL (ref 120–199)
CHOLEST/HDLC SERPL: 4.8 {RATIO} (ref 2–5)
CO2 SERPL-SCNC: 27 MMOL/L (ref 23–29)
COMPLEXED PSA SERPL-MCNC: 1.9 NG/ML (ref 0–4)
CREAT SERPL-MCNC: 1.2 MG/DL (ref 0.5–1.4)
DIFFERENTIAL METHOD: ABNORMAL
EOSINOPHIL # BLD AUTO: 0.1 K/UL (ref 0–0.5)
EOSINOPHIL NFR BLD: 2.3 % (ref 0–8)
ERYTHROCYTE [DISTWIDTH] IN BLOOD BY AUTOMATED COUNT: 13.3 % (ref 11.5–14.5)
EST. GFR  (AFRICAN AMERICAN): >60 ML/MIN/1.73 M^2
EST. GFR  (NON AFRICAN AMERICAN): >60 ML/MIN/1.73 M^2
GLUCOSE SERPL-MCNC: 115 MG/DL (ref 70–110)
HCT VFR BLD AUTO: 43.3 % (ref 40–54)
HDLC SERPL-MCNC: 44 MG/DL (ref 40–75)
HDLC SERPL: 20.7 % (ref 20–50)
HGB BLD-MCNC: 13.7 G/DL (ref 14–18)
IMM GRANULOCYTES # BLD AUTO: 0.02 K/UL (ref 0–0.04)
IMM GRANULOCYTES NFR BLD AUTO: 0.3 % (ref 0–0.5)
LDLC SERPL CALC-MCNC: 138.6 MG/DL (ref 63–159)
LYMPHOCYTES # BLD AUTO: 1.8 K/UL (ref 1–4.8)
LYMPHOCYTES NFR BLD: 29.1 % (ref 18–48)
MCH RBC QN AUTO: 30.9 PG (ref 27–31)
MCHC RBC AUTO-ENTMCNC: 31.6 G/DL (ref 32–36)
MCV RBC AUTO: 98 FL (ref 82–98)
MONOCYTES # BLD AUTO: 0.7 K/UL (ref 0.3–1)
MONOCYTES NFR BLD: 10.8 % (ref 4–15)
NEUTROPHILS # BLD AUTO: 3.5 K/UL (ref 1.8–7.7)
NEUTROPHILS NFR BLD: 56.5 % (ref 38–73)
NONHDLC SERPL-MCNC: 169 MG/DL
NRBC BLD-RTO: 0 /100 WBC
PLATELET # BLD AUTO: 300 K/UL (ref 150–350)
PMV BLD AUTO: 9.7 FL (ref 9.2–12.9)
POTASSIUM SERPL-SCNC: 4.1 MMOL/L (ref 3.5–5.1)
PROT SERPL-MCNC: 7.5 G/DL (ref 6–8.4)
RBC # BLD AUTO: 4.44 M/UL (ref 4.6–6.2)
SODIUM SERPL-SCNC: 139 MMOL/L (ref 136–145)
TRIGL SERPL-MCNC: 152 MG/DL (ref 30–150)
TSH SERPL DL<=0.005 MIU/L-ACNC: 1.9 UIU/ML (ref 0.4–4)
WBC # BLD AUTO: 6.21 K/UL (ref 3.9–12.7)

## 2020-08-04 PROCEDURE — 83036 HEMOGLOBIN GLYCOSYLATED A1C: CPT

## 2020-08-04 PROCEDURE — 84443 ASSAY THYROID STIM HORMONE: CPT

## 2020-08-04 PROCEDURE — 36415 COLL VENOUS BLD VENIPUNCTURE: CPT | Mod: PO

## 2020-08-04 PROCEDURE — 80053 COMPREHEN METABOLIC PANEL: CPT

## 2020-08-04 PROCEDURE — 80061 LIPID PANEL: CPT

## 2020-08-04 PROCEDURE — 85025 COMPLETE CBC W/AUTO DIFF WBC: CPT

## 2020-08-04 PROCEDURE — 84153 ASSAY OF PSA TOTAL: CPT

## 2020-08-04 PROCEDURE — 86803 HEPATITIS C AB TEST: CPT

## 2020-08-04 NOTE — TELEPHONE ENCOUNTER
----- Message from Juan A Jiménez sent at 8/4/2020 10:47 AM CDT -----  Regarding: reschedule  Contact: pt  Pt is calling the staff to reschedule pt appt    Pt call  back cell first Cell  [phone] or [phone]    thanks

## 2020-08-05 LAB
ESTIMATED AVG GLUCOSE: 120 MG/DL (ref 68–131)
HBA1C MFR BLD HPLC: 5.8 % (ref 4–5.6)

## 2020-08-05 NOTE — PROGRESS NOTES
#CALL THE PATIENT# to discuss results/see if they have questions and document verification. Make FU appt if needed.    #Pend me the orders in my interpretation below.#    I have sent a message to them with the interpretation (see below).  See if they have any questions and make a follow-up appointment if not already schedule and if needed.    Also please address any outstanding health maintenance that may be due: PROSTATE-SPECIFIC ANTIGEN due on 06/13/2013  Shingles Vaccine(2 of 3) due on 07/09/2014  ====================================    My interpretation that was sent to them through [employer]:    Randy, I have reviewed your recent blood work.   Your PSA screening is within normal limits.  Your complete blood count is stable from previous..    Your metabolic panel which shows your glucose, kidney function, electrolytes, and liver function is stable and within normal limits except for elevated glucose..   Thyroid studies are normal.   Your cholesterol is borderline abnormal.  Further evaluation and discussion is needed here.    Repeat annual wellness labs in one year.

## 2020-08-05 NOTE — PROGRESS NOTES
Please CALL patient with results and Document verification.   [phone]    A1c is 5.8.  This number correlates to an average blood sugar over the last three months of 120.  This is classified as prediabetic.  Please make lifestyle modification with low-carbohydrate diet and increased exercise.  Recheck in six months.

## 2020-08-06 LAB — HCV AB SERPL QL IA: NEGATIVE

## 2020-08-24 ENCOUNTER — TELEPHONE (OUTPATIENT)
Dept: CARDIOLOGY | Facility: HOSPITAL | Age: 70
End: 2020-08-24

## 2020-08-24 NOTE — TELEPHONE ENCOUNTER
Spoke with patient, rescheduled routine [employer] [facility] defibrillator check for Monday, Aug 31st, 3pm at [facility].  Pt repeated back information correctly, [facility]

## 2020-08-31 ENCOUNTER — HOSPITAL ENCOUNTER (OUTPATIENT)
Dept: CARDIOLOGY | Facility: HOSPITAL | Age: 70
Discharge: HOME | End: 2020-08-31
Attending: INTERNAL MEDICINE
Payer: MEDICARE

## 2020-08-31 ENCOUNTER — OFFICE VISIT (OUTPATIENT)
Dept: DERMATOLOGY | Facility: CLINIC | Age: 70
End: 2020-08-31
Payer: MEDICARE

## 2020-08-31 DIAGNOSIS — I47.29 PAROXYSMAL VT: ICD-10-CM

## 2020-08-31 DIAGNOSIS — D22.9 BENIGN NEVUS: Primary | ICD-10-CM

## 2020-08-31 DIAGNOSIS — I49.01 VF (VENTRICULAR FIBRILLATION): ICD-10-CM

## 2020-08-31 DIAGNOSIS — L82.1 SEBORRHEIC KERATOSIS: ICD-10-CM

## 2020-08-31 DIAGNOSIS — Z95.810 ICD (IMPLANTABLE CARDIOVERTER-DEFIBRILLATOR) IN PLACE: ICD-10-CM

## 2020-08-31 DIAGNOSIS — I43 CARDIOMYOPATHY DUE TO SYSTEMIC DISEASE: ICD-10-CM

## 2020-08-31 DIAGNOSIS — D18.01 CHERRY ANGIOMA: ICD-10-CM

## 2020-08-31 PROCEDURE — 93283 CARDIAC DEVICE CHECK - IN CLINIC & HOSPITAL: ICD-10-PCS | Mod: 26,,, | Performed by: INTERNAL MEDICINE

## 2020-08-31 PROCEDURE — 99999 PR PBB SHADOW E&M-EST. PATIENT-LVL II: ICD-10-PCS | Mod: PBBFAC,,, | Performed by: DERMATOLOGY

## 2020-08-31 PROCEDURE — 99202 OFFICE O/P NEW SF 15 MIN: CPT | Mod: S$PBB,,, | Performed by: DERMATOLOGY

## 2020-08-31 PROCEDURE — 93283 PRGRMG EVAL IMPLANTABLE DFB: CPT | Mod: 26,,, | Performed by: INTERNAL MEDICINE

## 2020-08-31 PROCEDURE — 99202 PR OFFICE/OUTPT VISIT, NEW, LEVL II, 15-29 MIN: ICD-10-PCS | Mod: S$PBB,,, | Performed by: DERMATOLOGY

## 2020-08-31 PROCEDURE — 99212 OFFICE O/P EST SF 10 MIN: CPT | Mod: PBBFAC,PO,25 | Performed by: DERMATOLOGY

## 2020-08-31 PROCEDURE — 93283 PRGRMG EVAL IMPLANTABLE DFB: CPT

## 2020-08-31 PROCEDURE — 99999 PR PBB SHADOW E&M-EST. PATIENT-LVL II: CPT | Mod: PBBFAC,,, | Performed by: DERMATOLOGY

## 2020-08-31 NOTE — PROGRESS NOTES
Subjective:       Patient ID:  Randy Yap is a 70 y.o. male who presents for   Chief Complaint   Patient presents with    Lesion     bi-lat arms      69 y/o M presents for initial visit for lesions to bilateral arms.  They are described as rough and leathery.  He admits to picking at them.  He thinks he picked this one off.       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   Respiratory: Negative for cough and shortness of breath.    Skin: Positive for dry skin. Negative for daily sunscreen use and activity-related sunscreen use.   Hematologic/Lymphatic: Bruises/bleeds easily.        Objective:    Physical Exam   Constitutional: He appears well-developed and well-nourished.   Eyes: Lids are normal.    Neurological: He is alert and oriented to person, place, and time.   Psychiatric: He has a normal mood and affect.   Skin:   Areas Examined (abnormalities noted in diagram):   Scalp / Hair Palpated and Inspected  Head / Face Inspection Performed  Neck Inspection Performed  Back Inspection Performed  RUE Inspected  LUE Inspection Performed              Diagram Legend     Erythematous scaling macule/papule c/w actinic keratosis       Vascular papule c/w angioma      Pigmented verrucoid papule/plaque c/w seborrheic keratosis      Yellow umbilicated papule c/w sebaceous hyperplasia      Irregularly shaped tan macule c/w lentigo     1-2 mm smooth white papules consistent with Milia      Movable subcutaneous cyst with punctum c/w epidermal  inclusion cyst      Subcutaneous movable cyst c/w pilar cyst      Firm pink to brown papule c/w dermatofibroma      Pedunculated fleshy papule(s) c/w skin tag(s)      Evenly pigmented macule c/w junctional nevus     Mildly variegated pigmented, slightly irregular-bordered macule c/w mildly atypical nevus      Flesh colored to evenly pigmented papule c/w intradermal nevus       Pink pearly papule/plaque c/w basal cell carcinoma      Erythematous hyperkeratotic cursted plaque c/w SCC      Surgical scar with no sign of skin cancer recurrence      Open and closed comedones      Inflammatory papules and pustules      Verrucoid papule consistent consistent with wart     Erythematous eczematous patches and plaques     Dystrophic onycholytic nail with subungual debris c/w onychomycosis     Umbilicated papule    Erythematous-base heme-crusted tan verrucoid plaque consistent with inflamed seborrheic keratosis     Erythematous Silvery Scaling Plaque c/w Psoriasis     See annotation      Assessment / Plan:        Benign nevus  Reassurance    Seborrheic keratosis  These are benign inherited growths without a malignant potential. Reassurance given to patient. No treatment is necessary.     Cherry angioma  This is a benign vascular lesion. Reassurance given. No treatment required.            Follow up in about 1 year (around 8/31/2021).

## 2020-08-31 NOTE — LETTER
August 31, 2020      Olvin Hutson MD  [address]  [place] [address]           [facility] [address] [place] [address]  Phone: [phone]  Fax: [phone]          Patient: Randy Yap   MR Number: [id number]   YOB: 1950   Date of Visit: 8/31/2020       Dear Dr. Olvin Hutson:    Thank you for referring Randy Yap to me for evaluation. Attached you will find relevant portions of my assessment and plan of care.    If you have questions, please do not hesitate to call me. I look forward to following Randy Yap along with you.    Sincerely,    Tanisha Grimes MD    Enclosure  CC:  No Recipients    If you would like to receive this communication electronically, please contact [email or web address] or [phone] to request more information on [employer] access.    For providers and/or their staff who would like to refer a patient to [facility], please contact us through our one-stop-shop provider referral line, [facility], at [phone].    If you feel you have received this communication in error or would no longer like to receive these types of communications, please e-mail [email or web address]

## 2020-09-09 ENCOUNTER — TELEPHONE (OUTPATIENT)
Dept: CARDIOLOGY | Facility: CLINIC | Age: 70
End: 2020-09-09

## 2020-09-09 DIAGNOSIS — I50.42 CHRONIC COMBINED SYSTOLIC AND DIASTOLIC CONGESTIVE HEART FAILURE: Primary | ICD-10-CM

## 2020-09-09 DIAGNOSIS — Z79.899 ON AMIODARONE THERAPY: ICD-10-CM

## 2020-09-09 NOTE — PROGRESS NOTES
"  Subjective:   Patient ID:  Ranyd Yap is a 70 y.o. male     Chief complaint:No chief complaint on file.      HPI    Background:  70 man  Frequent, Sxc PMVT related to R on TV pacing in the setting of DDD programming and junctional beats.     He was initially on sotalol and amiodarone and the sotalol was DCed in sept 2016 all the while reprogrammed pacer to VVI back up  Since then, he has had no N/S Sx and no VT per his ICD records  We then decreased amio to 200 a day(but there seemed to be some confusion with what he was taking-- he seemed to be on 400 a day) and he still was feeling well from a PVC point of view but his wife noticed that he was retaining fluid at a time when he was c/o ALDANA-- he increased his lasix and he felt better enough to go back to 1 pill a day  ICD eval sept 2016>> He is in AAI mode at 65 and he paces 67%-- no VTNS      Update on 12/14/16:  We were planning to gradually wean amio and we have decreased the dose to 200 a day so far -- no issues since then and ICD shows no VT  He has had PFT with [employer] -- results pending.   He wants to switch to this practice.   He has an ICD that is on battery advisory -- he has darci educated and he is on Merlin remote monitoring -- vibration alert turned on     Update 4/21/17:  " I am doing fantastic"  Exercising and working in the yard like he had not done in a long time.   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 He had a PET scan that was markedly abnormal:  >>  1. 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   2. Stress Rb-82 imaging was aborted secondary to inability to achieve target heart rate and ventricular tachycardia which increased in frequency and duration with dobutamine titration.  3. There is abnormal wall motion at rest showing severe global hypokinesis of the left ventricle.   3. There is resting LV dysfunction with a reduced ejection fraction of 29 %.  (normal is >= 51%)  4. The left ventricular volume is mildly increased at rest.   5. The extracardiac distribution of radioactivity is normal.   6. There was no previous study available to compare.  This prompted us to obtain a coronary angiogram with the following results:  >>  Luminal irregularities in all 3 vessels  EF 50%   He has enrolled in [employer] and has been doing well -- he feels great and has no apparent CV restrictions     He has had an echo that also disputes the results of the PET. He feels well   His holter and GXT show polymorphic JOAQUIN with frequent VTNS, PVCs etc.  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  Still very low burden.  We obtained the BNP which was elevated for him (157) and an amiodarone level which was  relatively low (0.8/0.5)  Current Outpatient Medications   Medication Sig    acetaminophen (TYLENOL) 325 MG tablet Take 650 mg by mouth as needed.     albuterol (PROVENTIL/VENTOLIN HFA) 90 mcg/actuation inhaler Inhale 1 puff into the lungs once daily. Rescue    amiodarone (PACERONE) 200 MG Tab TAKE 1 TABLET BY MOUTH TWICE DAILY (Patient taking differently: 200 mg once daily. )    bepotastine besilate (BEPREVE) 1.5 % Drop Bepreve 1.5 % eye drops   Apply by ophthalmic route as needed for 50 days.    carvediloL (COREG) 25 MG tablet Take 1 tablet (25 mg total) by mouth 2 (two) times daily.    co-enzyme Q-10 30 mg capsule Take 30 mg by mouth once daily.     ferrous sulfate (IRON) 325 mg (65 mg iron) Tab tablet Take 325 mg by mouth daily with breakfast.    fluticasone furoate (ARNUITY ELLIPTA) 100 mcg/actuation DsDv Take 1 puff by mouth once daily. Controller    furosemide (LASIX) 20 MG tablet Take 1 tablet (20 mg total) by mouth every morning.    glucosamine-chondroitin 500-400 mg tablet Take 1 tablet by mouth once daily.     INV apixaban/placebo tablet Take 1 tablets (5 mg) by mouth 2 (two) times daily. FOR INVESTIGATIONAL USE ONLY. Protocol: [place] 2017.307 subject # : [id number]    INV aspirin/placebo tablet Take 1 tablet (81 mg total) by mouth once daily. FOR INVESTIGATIONAL USE ONLY. Patient Study #: [id number] Protocol: [place] 2017.307    losartan (COZAAR) 100 MG tablet Take 1 tablet (100 mg total) by mouth once daily.    multivitamin (THERAGRAN) tablet Take 1 tablet by mouth once daily.      pravastatin (PRAVACHOL) 40 MG tablet Take 1 tablet (40 mg total) by mouth once daily.    spironolactone (ALDACTONE) 25 MG tablet Take 1 tablet (25 mg total) by mouth once daily.    traZODone (DESYREL) 150 MG tablet Take 1 tablet (150 mg total) by mouth every evening.     No current facility-administered medications for this visit.      Review of Systems   Constitution: Negative for decreased appetite,  malaise/fatigue, weight gain and weight loss.   Eyes: Negative for blurred vision.   Cardiovascular: Negative for chest pain, claudication, cyanosis, dyspnea on exertion, irregular heartbeat, leg swelling, near-syncope, orthopnea and palpitations.   Respiratory: Positive for shortness of breath. Negative for cough, sleep disturbances due to breathing, snoring and wheezing.    Endocrine: Negative for heat intolerance.   Hematologic/Lymphatic: Does not bruise/bleed easily.   Musculoskeletal: Positive for joint pain. Negative for muscle weakness and myalgias.   Gastrointestinal: Negative for melena, nausea and vomiting.   Genitourinary: Negative for nocturia.   Neurological: Positive for light-headedness. Negative for excessive daytime sleepiness, dizziness, headaches and weakness.   Psychiatric/Behavioral: Negative for depression, memory loss and substance abuse. The patient does not have insomnia and is not nervous/anxious.        Objective:   Physical Exam   Constitutional: He is oriented to person, place, and time. He appears well-developed and well-nourished.   overweight   HENT:   Head: Normocephalic and atraumatic.   Right Ear: External ear normal.   Left Ear: External ear normal.   Eyes: Pupils are equal, round, and reactive to light. Conjunctivae are normal. Left conjunctiva is not injected. Left conjunctiva has no hemorrhage.   Neck: Neck supple. No JVD present. No thyromegaly present.   Cardiovascular: Normal rate, regular rhythm, normal heart sounds and intact distal pulses. PMI is not displaced. Exam reveals no gallop, no friction rub, no midsystolic click and no opening snap.   No murmur heard.  Pulses:       Carotid pulses are 2+ on the right side and 2+ on the left side.       Radial pulses are 2+ on the right side and 2+ on the left side.        Dorsalis pedis pulses are 2+ on the right side and 2+ on the left side.        Posterior tibial pulses are 2+ on the right side and 2+ on the left side.  "  Pulmonary/Chest: Effort normal and breath sounds normal. No respiratory distress. He has no wheezes. He has no rales. He exhibits no tenderness.   Device pocket is in excellent repair     Abdominal: Soft. Normal appearance. He exhibits no pulsatile liver. There is no hepatomegaly. There is no abdominal tenderness. There is no rigidity and no guarding.   Obese abdomen   Musculoskeletal: Normal range of motion.         General: No tenderness or edema.      Right knee: He exhibits no swelling.      Left knee: He exhibits no swelling.      Right ankle: He exhibits no swelling.      Left ankle: He exhibits no swelling.      Right lower leg: He exhibits no swelling.      Left lower leg: He exhibits no swelling.      Right foot: No swelling.      Left foot: No swelling.   Neurological: He is alert and oriented to person, place, and time. He has normal strength and normal reflexes. No cranial nerve deficit. Coordination normal.   Skin: Skin is warm, dry and intact. No rash noted. No cyanosis. No pallor.   Psychiatric: He has a normal mood and affect. His behavior is normal.   Nursing note and vitals reviewed.    /84 (BP Location: Right arm, Patient Position: Sitting)   Pulse 79   Ht 5' 10" (1.778 m)   Wt 126.7 kg (279 lb 5.2 oz)   SpO2 97%   BMI 40.08 kg/m²      Assessment:    He appears to be in diastolic heart failure.  1. Paroxysmal VT    2. Idiopathic cardiomyopathy    3. PAF (paroxysmal atrial fibrillation)    4. ICD (implantable cardioverter-defibrillator) discharge    5. Diastolic dysfunction    6. Essential hypertension    7. Hyperlipidemia LDL goal <100    8. At risk for amiodarone toxicity with long term use    9. Subacute viral endocarditis    10. Iron deficiency anemia, unspecified iron deficiency anemia type    11. Class 3 severe obesity due to excess calories with serious comorbidity and body mass index (BMI) of 40.0 to 44.9 in adult    12. Obstructive sleep apnea syndrome        Plan:    Chest " x-ray PA and lateral now  COVID testing  PFT with DLCO  Echo/Doppler  Increase Lasix to 40 mg q.a.m..  Repeat BMP and BNP in 10 days  Follow-up post workup-may increase his amiodarone dose at the time  Long term, he needs bariatric surgery will initiate referral (I would suggest gastric sleeve).  No orders of the defined types were placed in this encounter.    Follow up in about 6 weeks (around 10/26/2020), or if symptoms worsen or fail to improve.  There are no discontinued medications.     Medication List with Changes/Refills   Current Medications    ACETAMINOPHEN (TYLENOL) 325 MG TABLET    Take 650 mg by mouth as needed.     ALBUTEROL (PROVENTIL/VENTOLIN HFA) 90 MCG/ACTUATION INHALER    Inhale 1 puff into the lungs once daily. Rescue    AMIODARONE (PACERONE) 200 MG TAB    TAKE 1 TABLET BY MOUTH TWICE DAILY    BEPOTASTINE BESILATE (BEPREVE) 1.5 % DROP    Bepreve 1.5 % eye drops   Apply by ophthalmic route as needed for 50 days.    CARVEDILOL (COREG) 25 MG TABLET    Take 1 tablet (25 mg total) by mouth 2 (two) times daily.    CO-ENZYME Q-10 30 MG CAPSULE    Take 30 mg by mouth once daily.     FERROUS SULFATE (IRON) 325 MG (65 MG IRON) TAB TABLET    Take 325 mg by mouth daily with breakfast.    FLUTICASONE FUROATE (ARNUITY ELLIPTA) 100 MCG/ACTUATION DSDV    Take 1 puff by mouth once daily. Controller    FUROSEMIDE (LASIX) 20 MG TABLET    Take 1 tablet (20 mg total) by mouth every morning.    GLUCOSAMINE-CHONDROITIN 500-400 MG TABLET    Take 1 tablet by mouth once daily.     INV APIXABAN/PLACEBO TABLET    Take 1 tablets (5 mg) by mouth 2 (two) times daily. FOR INVESTIGATIONAL USE ONLY. Protocol: [place] 2017.307 subject # : [id number]    INV ASPIRIN/PLACEBO TABLET    Take 1 tablet (81 mg total) by mouth once daily. FOR INVESTIGATIONAL USE ONLY. Patient Study #: [id number] Protocol: [place] 2017.307    LOSARTAN (COZAAR) 100 MG TABLET    Take 1 tablet (100 mg total) by mouth once daily.    MULTIVITAMIN (THERAGRAN) TABLET     Take 1 tablet by mouth once daily.      PRAVASTATIN (PRAVACHOL) 40 MG TABLET    Take 1 tablet (40 mg total) by mouth once daily.    SPIRONOLACTONE (ALDACTONE) 25 MG TABLET    Take 1 tablet (25 mg total) by mouth once daily.    TRAZODONE (DESYREL) 150 MG TABLET    Take 1 tablet (150 mg total) by mouth every evening.

## 2020-09-10 ENCOUNTER — LAB VISIT (OUTPATIENT)
Dept: LAB | Facility: HOSPITAL | Age: 70
End: 2020-09-10
Attending: INTERNAL MEDICINE
Payer: MEDICARE

## 2020-09-10 DIAGNOSIS — Z79.899 ON AMIODARONE THERAPY: ICD-10-CM

## 2020-09-10 DIAGNOSIS — I50.42 CHRONIC COMBINED SYSTOLIC AND DIASTOLIC CONGESTIVE HEART FAILURE: ICD-10-CM

## 2020-09-10 PROCEDURE — 84443 ASSAY THYROID STIM HORMONE: CPT

## 2020-09-10 PROCEDURE — 80048 BASIC METABOLIC PNL TOTAL CA: CPT

## 2020-09-10 PROCEDURE — 80299 QUANTITATIVE ASSAY DRUG: CPT

## 2020-09-10 PROCEDURE — 83880 ASSAY OF NATRIURETIC PEPTIDE: CPT

## 2020-09-11 LAB
ANION GAP SERPL CALC-SCNC: 9 MMOL/L (ref 8–16)
BNP SERPL-MCNC: 157 PG/ML (ref 0–99)
BUN SERPL-MCNC: 14 MG/DL (ref 8–23)
CALCIUM SERPL-MCNC: 9.1 MG/DL (ref 8.7–10.5)
CHLORIDE SERPL-SCNC: 101 MMOL/L (ref 95–110)
CO2 SERPL-SCNC: 26 MMOL/L (ref 23–29)
CREAT SERPL-MCNC: 0.9 MG/DL (ref 0.5–1.4)
EST. GFR  (AFRICAN AMERICAN): >60 ML/MIN/1.73 M^2
EST. GFR  (NON AFRICAN AMERICAN): >60 ML/MIN/1.73 M^2
GLUCOSE SERPL-MCNC: 88 MG/DL (ref 70–110)
POTASSIUM SERPL-SCNC: 4.1 MMOL/L (ref 3.5–5.1)
SODIUM SERPL-SCNC: 136 MMOL/L (ref 136–145)
TSH SERPL DL<=0.005 MIU/L-ACNC: 1.08 UIU/ML (ref 0.4–4)

## 2020-09-14 ENCOUNTER — PATIENT OUTREACH (OUTPATIENT)
Dept: ADMINISTRATIVE | Facility: OTHER | Age: 70
End: 2020-09-14

## 2020-09-14 ENCOUNTER — OFFICE VISIT (OUTPATIENT)
Dept: CARDIOLOGY | Facility: CLINIC | Age: 70
End: 2020-09-14
Payer: MEDICARE

## 2020-09-14 VITALS
OXYGEN SATURATION: 97 % | WEIGHT: 279.31 LBS | BODY MASS INDEX: 39.99 KG/M2 | HEART RATE: 79 BPM | DIASTOLIC BLOOD PRESSURE: 84 MMHG | SYSTOLIC BLOOD PRESSURE: 126 MMHG | HEIGHT: 70 IN

## 2020-09-14 DIAGNOSIS — I48.0 PAF (PAROXYSMAL ATRIAL FIBRILLATION): Chronic | ICD-10-CM

## 2020-09-14 DIAGNOSIS — I51.89 DIASTOLIC DYSFUNCTION: Primary | Chronic | ICD-10-CM

## 2020-09-14 DIAGNOSIS — Z45.02 ICD (IMPLANTABLE CARDIOVERTER-DEFIBRILLATOR) DISCHARGE: ICD-10-CM

## 2020-09-14 DIAGNOSIS — Z91.89 AT RISK FOR AMIODARONE TOXICITY WITH LONG TERM USE: Chronic | ICD-10-CM

## 2020-09-14 DIAGNOSIS — Z79.899 AT RISK FOR AMIODARONE TOXICITY WITH LONG TERM USE: Chronic | ICD-10-CM

## 2020-09-14 DIAGNOSIS — B33.21 SUBACUTE VIRAL ENDOCARDITIS: ICD-10-CM

## 2020-09-14 DIAGNOSIS — I47.29 PAROXYSMAL VT: ICD-10-CM

## 2020-09-14 DIAGNOSIS — I10 ESSENTIAL HYPERTENSION: Chronic | ICD-10-CM

## 2020-09-14 DIAGNOSIS — I51.89 DIASTOLIC DYSFUNCTION: Chronic | ICD-10-CM

## 2020-09-14 DIAGNOSIS — I42.9 IDIOPATHIC CARDIOMYOPATHY: ICD-10-CM

## 2020-09-14 DIAGNOSIS — G47.33 OBSTRUCTIVE SLEEP APNEA SYNDROME: Chronic | ICD-10-CM

## 2020-09-14 DIAGNOSIS — D50.9 IRON DEFICIENCY ANEMIA, UNSPECIFIED IRON DEFICIENCY ANEMIA TYPE: ICD-10-CM

## 2020-09-14 DIAGNOSIS — E66.01 CLASS 3 SEVERE OBESITY DUE TO EXCESS CALORIES WITH SERIOUS COMORBIDITY AND BODY MASS INDEX (BMI) OF 40.0 TO 44.9 IN ADULT: Primary | Chronic | ICD-10-CM

## 2020-09-14 DIAGNOSIS — E78.5 HYPERLIPIDEMIA LDL GOAL <100: Chronic | ICD-10-CM

## 2020-09-14 LAB
AMIODARONE SERPL-SCNC: 0.8 UG/ML (ref 1–3)
N-DESETHYL-AMIODARONE: 0.5 UG/ML

## 2020-09-14 PROCEDURE — 99999 PR PBB SHADOW E&M-EST. PATIENT-LVL III: ICD-10-PCS | Mod: PBBFAC,,, | Performed by: INTERNAL MEDICINE

## 2020-09-14 PROCEDURE — 99215 PR OFFICE/OUTPT VISIT, EST, LEVL V, 40-54 MIN: ICD-10-PCS | Mod: S$PBB,,, | Performed by: INTERNAL MEDICINE

## 2020-09-14 PROCEDURE — 99215 OFFICE O/P EST HI 40 MIN: CPT | Mod: S$PBB,,, | Performed by: INTERNAL MEDICINE

## 2020-09-14 PROCEDURE — 99999 PR PBB SHADOW E&M-EST. PATIENT-LVL III: CPT | Mod: PBBFAC,,, | Performed by: INTERNAL MEDICINE

## 2020-09-14 PROCEDURE — 99213 OFFICE O/P EST LOW 20 MIN: CPT | Mod: PBBFAC | Performed by: INTERNAL MEDICINE

## 2020-09-16 DIAGNOSIS — I48.0 PAF (PAROXYSMAL ATRIAL FIBRILLATION): Primary | ICD-10-CM

## 2020-09-17 RX ORDER — FUROSEMIDE 40 MG/1
40 TABLET ORAL DAILY
COMMUNITY
End: 2020-09-17

## 2020-09-17 RX ORDER — FUROSEMIDE 40 MG/1
40 TABLET ORAL DAILY
Qty: 30 TABLET | Refills: 6 | Status: SHIPPED | OUTPATIENT
Start: 2020-09-17 | End: 2021-07-06

## 2020-09-25 ENCOUNTER — HOSPITAL ENCOUNTER (OUTPATIENT)
Dept: RADIOLOGY | Facility: HOSPITAL | Age: 70
Discharge: HOME | End: 2020-09-25
Attending: INTERNAL MEDICINE
Payer: MEDICARE

## 2020-09-25 DIAGNOSIS — I51.89 DIASTOLIC DYSFUNCTION: ICD-10-CM

## 2020-09-25 DIAGNOSIS — I42.9 IDIOPATHIC CARDIOMYOPATHY: ICD-10-CM

## 2020-09-25 DIAGNOSIS — I48.0 PAF (PAROXYSMAL ATRIAL FIBRILLATION): ICD-10-CM

## 2020-09-25 DIAGNOSIS — I47.29 PAROXYSMAL VT: ICD-10-CM

## 2020-09-25 PROCEDURE — 71046 XR CHEST PA AND LATERAL: ICD-10-PCS | Mod: 26,,, | Performed by: RADIOLOGY

## 2020-09-25 PROCEDURE — 71046 X-RAY EXAM CHEST 2 VIEWS: CPT | Mod: 26,,, | Performed by: RADIOLOGY

## 2020-09-25 PROCEDURE — 71046 X-RAY EXAM CHEST 2 VIEWS: CPT | Mod: TC,PO

## 2020-09-26 LAB
CHARGE TIME (SEC): 9.3 SEC
HV IMPEDANCE (OHM): 45 OHM
IMPEDANCE RA LEAD (NATIVE): 340 OHMS
IMPEDANCE RA LEAD: 360 OHMS
OHS CV DC PP MS1: 0.5 MS
OHS CV DC PP V1: 1.62 V
P/R-WAVE RA LEAD (NATIVE): 8.3 MV
P/R-WAVE RA LEAD: 2.9 MV
THRESHOLD MS RA LEAD: 0.5 MS
THRESHOLD V RA LEAD: 0.75 V

## 2020-09-28 ENCOUNTER — HOSPITAL ENCOUNTER (OUTPATIENT)
Dept: CARDIOLOGY | Facility: HOSPITAL | Age: 70
Discharge: HOME | End: 2020-09-28
Attending: INTERNAL MEDICINE
Payer: MEDICARE

## 2020-09-28 ENCOUNTER — TELEPHONE (OUTPATIENT)
Dept: CARDIOLOGY | Facility: CLINIC | Age: 70
End: 2020-09-28

## 2020-09-28 VITALS
BODY MASS INDEX: 39.94 KG/M2 | HEART RATE: 75 BPM | SYSTOLIC BLOOD PRESSURE: 126 MMHG | HEIGHT: 70 IN | WEIGHT: 279 LBS | DIASTOLIC BLOOD PRESSURE: 84 MMHG

## 2020-09-28 DIAGNOSIS — I50.42 CHRONIC COMBINED SYSTOLIC AND DIASTOLIC CONGESTIVE HEART FAILURE: ICD-10-CM

## 2020-09-28 DIAGNOSIS — Z79.899 ON AMIODARONE THERAPY: ICD-10-CM

## 2020-09-28 LAB
AV INDEX (PROSTH): 0.4
AV LVOT PEAK GRADIENT: 2 MMHG
AV MEAN GRADIENT: 7 MMHG
AV PEAK GRADIENT: 12 MMHG
AV VALVE AREA: 1.99 CM2
AV VELOCITY RATIO: 0.42
BSA FOR ECHO PROCEDURE: 2.5 M2
CV ECHO LV RWT: 0.4 CM
DOP CALC AO PEAK VEL: 1.7 M/S
DOP CALC AO VTI: 34.45 CM
DOP CALC LVOT AREA: 5 CM2
DOP CALC LVOT DIAMETER: 2.52 CM
DOP CALC LVOT PEAK VEL: 0.72 M/S
DOP CALC LVOT STROKE VOLUME: 68.49 CM3
DOP CALC RVOT PEAK VEL: 0.78 M/S
DOP CALC RVOT VTI: 14.82 CM
DOP CALCLVOT PEAK VEL VTI: 13.74 CM
E WAVE DECELERATION TIME: 128.45 MS
E/A RATIO: 2.63
E/E' RATIO: 17.87 M/S
ECHO EF ESTIMATED: 29 %
ECHO LV POSTERIOR WALL: 1.45 CM (ref 0.6–1.1)
FRACTIONAL SHORTENING: 14 % (ref 28–44)
INTERVENTRICULAR SEPTUM: 1.46 CM (ref 0.6–1.1)
IVRT: 124.57 MS
LA MAJOR: 7.3 CM
LA MINOR: 6.7 CM
LA WIDTH: 4.6 CM
LEFT ATRIUM SIZE: 4.89 CM
LEFT ATRIUM VOLUME INDEX: 55.6 ML/M2
LEFT ATRIUM VOLUME: 133.59 CM3
LEFT INTERNAL DIMENSION IN SYSTOLE: 6.3 CM (ref 2.1–4)
LEFT VENTRICLE DIASTOLIC VOLUME INDEX: 117.28 ML/M2
LEFT VENTRICLE DIASTOLIC VOLUME: 282 ML
LEFT VENTRICLE MASS INDEX: 235 G/M2
LEFT VENTRICLE SYSTOLIC VOLUME INDEX: 83.6 ML/M2
LEFT VENTRICLE SYSTOLIC VOLUME: 201 ML
LEFT VENTRICULAR INTERNAL DIMENSION IN DIASTOLE: 7.32 CM (ref 3.5–6)
LEFT VENTRICULAR MASS: 565.01 G
LV LATERAL E/E' RATIO: 14.89 M/S
LV SEPTAL E/E' RATIO: 22.33 M/S
MV PEAK A VEL: 0.51 M/S
MV PEAK E VEL: 1.34 M/S
MV STENOSIS PRESSURE HALF TIME: 37 MS
MV VALVE AREA P 1/2 METHOD: 5.95 CM2
PISA TR MAX VEL: 3.04 M/S
PROX AORTA: 4.08 CM
PV MEAN GRADIENT: 1 MMHG
PV PEAK VELOCITY: 1.03 M/S
RA MAJOR: 4.9 CM
RA PRESSURE: 8 MMHG
RA WIDTH: 4 CM
RIGHT VENTRICULAR END-DIASTOLIC DIMENSION: 3.59 CM
SINUS: 3.4 CM
STJ: 3.4 CM
TDI LATERAL: 0.09 M/S
TDI SEPTAL: 0.06 M/S
TDI: 0.08 M/S
TR MAX PG: 37 MMHG
TRICUSPID ANNULAR PLANE SYSTOLIC EXCURSION: 2.2 CM
TV REST PULMONARY ARTERY PRESSURE: 45 MMHG

## 2020-09-28 PROCEDURE — 93306 ECHO (CUPID ONLY): ICD-10-PCS | Mod: 26,,, | Performed by: INTERNAL MEDICINE

## 2020-09-28 PROCEDURE — 93306 TTE W/DOPPLER COMPLETE: CPT | Mod: 26,,, | Performed by: INTERNAL MEDICINE

## 2020-09-28 PROCEDURE — 93306 TTE W/DOPPLER COMPLETE: CPT | Mod: PO

## 2020-09-29 ENCOUNTER — TELEPHONE (OUTPATIENT)
Dept: CARDIOLOGY | Facility: CLINIC | Age: 70
End: 2020-09-29

## 2020-09-29 ENCOUNTER — PATIENT MESSAGE (OUTPATIENT)
Dept: OTHER | Facility: OTHER | Age: 70
End: 2020-09-29

## 2020-09-29 NOTE — TELEPHONE ENCOUNTER
Pt notified covid test as well as igg and iga, ekg, and PFT DLCO. Pt wants to have done at the [place] office pb    1st attempt to contact pt lw to call the office pb    ----- Message from Nirav Baldwin MD sent at 9/28/2020  5:12 PM CDT -----  Cira  See comments below and call patient to discuss.   Please close encounter when done -- no need to route back to me.  Thanks    Cira,  This is very different from previous echos  Please wait till Dr Tate reviews the echo and gives her eval   Also get Covid viral test as well as antibodies , ECG and PFT/DLCO     Vita,   Can you please take a look  At this echo  Tx

## 2020-09-30 DIAGNOSIS — I51.89 DIASTOLIC DYSFUNCTION: ICD-10-CM

## 2020-09-30 DIAGNOSIS — I50.9 CONGESTIVE HEART FAILURE, UNSPECIFIED HF CHRONICITY, UNSPECIFIED HEART FAILURE TYPE: Primary | ICD-10-CM

## 2020-09-30 DIAGNOSIS — I50.42 CHRONIC COMBINED SYSTOLIC AND DIASTOLIC CONGESTIVE HEART FAILURE: Primary | ICD-10-CM

## 2020-09-30 DIAGNOSIS — I42.9 IDIOPATHIC CARDIOMYOPATHY: ICD-10-CM

## 2020-09-30 DIAGNOSIS — I50.42 CHRONIC COMBINED SYSTOLIC AND DIASTOLIC CONGESTIVE HEART FAILURE: ICD-10-CM

## 2020-09-30 RX ORDER — SACUBITRIL AND VALSARTAN 24; 26 MG/1; MG/1
1 TABLET, FILM COATED ORAL 2 TIMES DAILY
Qty: 60 TABLET | Refills: 3 | Status: SHIPPED | OUTPATIENT
Start: 2020-09-30 | End: 2020-11-03

## 2020-09-30 NOTE — TELEPHONE ENCOUNTER
Left message for this pt to contact our office.      ----- Message from Nirav Baldwin MD sent at 9/30/2020  3:21 PM CDT -----  Tx [facility]ita [facility]  Let us start him on Entresto 24/26 BID  Have him check daily BP sitting and standing (3 min)  BMP,BNP in 2 weeks   RTC 4 weeks - virtual is OK   ----- Message -----  From: Vita Tate MD  Sent: 9/29/2020  11:12 AM CDT  To: Nirav Baldwin MD    Freedy,  I reviewed most recent study and in fact LV is low with global hypokinesis and severely enlarged LV.  I would estimate a little higher, probably in the mid 20 ties, but overall poor. I could not retrieve old images from 2018 to compare.    Y  ----- Message -----  From: Nirav Baldwin MD  Sent: 9/28/2020   5:12 PM CDT  To: Vita Tate MD, Liza Joseph LPN    Cira  See comments below and call patient to discuss.   Please close encounter when done -- no need to route back to me.  Thanks    Cira,  This is very different from previous echos  Please wait till Dr Tate reviews the echo and gives her eval   Also get Covid viral test as well as antibodies , ECG and PFT/DLCO     Vita,   Can you please take a look  At this echo  Tx

## 2020-09-30 NOTE — TELEPHONE ENCOUNTER
----- Message from Nirav Baldwin MD sent at 9/30/2020  3:21 PM CDT -----  Tx [facility]  Let us start him on Entresto 24/26 BID  Have him check daily BP sitting and standing (3 min)  BMP,BNP in 2 weeks   RTC 4 weeks - virtual is OK   ----- Message -----  From: Vita Tate MD  Sent: 9/29/2020  11:12 AM CDT  To: Nirav Baldwin MD    Freedy,  I reviewed most recent study and in fact LV is low with global hypokinesis and severely enlarged LV.  I would estimate a little higher, probably in the mid 20 ties, but overall poor. I could not retrieve old images from 2018 to compare.    Y  ----- Message -----  From: Nirav Baldwin MD  Sent: 9/28/2020   5:12 PM CDT  To: Vita Tate MD, SHEYLA Lilly  See comments below and call patient to discuss.   Please close encounter when done -- no need to route back to me.  Thanks    Cira,  This is very different from previous echos  Please wait till Dr Tate reviews the echo and gives her eval   Also get Covid viral test as well as antibodies , ECG and PFT/DLCO     Vita,   Can you please take a look  At this echo  Tx

## 2020-10-01 ENCOUNTER — TELEPHONE (OUTPATIENT)
Dept: CARDIOLOGY | Facility: CLINIC | Age: 70
End: 2020-10-01

## 2020-10-01 DIAGNOSIS — I50.9 CONGESTIVE HEART FAILURE, UNSPECIFIED HF CHRONICITY, UNSPECIFIED HEART FAILURE TYPE: Primary | ICD-10-CM

## 2020-10-01 DIAGNOSIS — I42.9 IDIOPATHIC CARDIOMYOPATHY: ICD-10-CM

## 2020-10-01 DIAGNOSIS — I50.42 CHRONIC COMBINED SYSTOLIC AND DIASTOLIC CONGESTIVE HEART FAILURE: ICD-10-CM

## 2020-10-01 DIAGNOSIS — I51.89 DIASTOLIC DYSFUNCTION: ICD-10-CM

## 2020-10-01 NOTE — TELEPHONE ENCOUNTER
Pt was notified of the orders: Entresto 24/26 BID  Have him check daily BP sitting and standing (3 min)  BMP,BNP in 2 weeks set for 10- at 10:25 pulmonary rest set up for PFT with DLCO on 10- at 10:45  RTC 4 weeks - virtual is OK  On 11-3-2020      ----- Message from Herlinda Vallejo sent at 10/1/2020  1:03 PM CDT -----  Regarding: Missed call  Contact: Patient  .Type:  Patient Returning Call    Who Called: Patient  Who Left Message for Patient: ESTHER  Does the patient know what this is regarding?:missed call  Would the patient rather a call back or a response via [facility]? Call  Best Call Back Number: [phone] (home)     Additional Information:

## 2020-10-01 NOTE — TELEPHONE ENCOUNTER
Pt was notified of the orders: Entresto 24/26 BID  Have him check daily BP sitting and standing (3 min)  BMP,BNP in 2 weeks set for 10- at 10:25  RTC 4 weeks - virtual is OK  On 11-3-2020         ----- Message from Nirav Baldwin MD sent at 9/30/2020  3:21 PM CDT -----  Tx Vita Denis  Let us start him on Entresto 24/26 BID  Have him check daily BP sitting and standing (3 min)  BMP,BNP in 2 weeks   RTC 4 weeks - virtual is OK   ----- Message -----  From: Vita Tate MD  Sent: 9/29/2020  11:12 AM CDT  To: Nirav Baldwin MD    Freedy,  I reviewed most recent study and in fact LV is low with global hypokinesis and severely enlarged LV.  I would estimate a little higher, probably in the mid 20 ties, but overall poor. I could not retrieve old images from 2018 to compare.    Y  ----- Message -----  From: Nirav Baldwin MD  Sent: 9/28/2020   5:12 PM CDT  To: Vita Tate MD, SHEYLA Lilly  See comments below and call patient to discuss.   Please close encounter when done -- no need to route back to me.  Thanks    Cira,  This is very different from previous echos  Please wait till Dr Tate reviews the echo and gives her eval   Also get Covid viral test as well as antibodies , ECG and PFT/DLCO     Vita,   Can you please take a look  At this echo  Tx

## 2020-10-07 DIAGNOSIS — I50.42 CHRONIC COMBINED SYSTOLIC AND DIASTOLIC CHF (CONGESTIVE HEART FAILURE): ICD-10-CM

## 2020-10-16 ENCOUNTER — LAB VISIT (OUTPATIENT)
Dept: LAB | Facility: HOSPITAL | Age: 70
End: 2020-10-16
Attending: INTERNAL MEDICINE
Payer: MEDICARE

## 2020-10-16 ENCOUNTER — CLINICAL SUPPORT (OUTPATIENT)
Dept: FAMILY MEDICINE | Facility: CLINIC | Age: 70
End: 2020-10-16
Payer: MEDICARE

## 2020-10-16 DIAGNOSIS — I50.42 CHRONIC COMBINED SYSTOLIC AND DIASTOLIC CONGESTIVE HEART FAILURE: ICD-10-CM

## 2020-10-16 DIAGNOSIS — I50.9 CONGESTIVE HEART FAILURE, UNSPECIFIED HF CHRONICITY, UNSPECIFIED HEART FAILURE TYPE: ICD-10-CM

## 2020-10-16 DIAGNOSIS — I50.42 CHRONIC COMBINED SYSTOLIC AND DIASTOLIC CHF (CONGESTIVE HEART FAILURE): ICD-10-CM

## 2020-10-16 DIAGNOSIS — I51.89 DIASTOLIC DYSFUNCTION: ICD-10-CM

## 2020-10-16 DIAGNOSIS — I42.9 IDIOPATHIC CARDIOMYOPATHY: ICD-10-CM

## 2020-10-16 LAB
ANION GAP SERPL CALC-SCNC: 9 MMOL/L (ref 8–16)
BNP SERPL-MCNC: 104 PG/ML (ref 0–99)
BUN SERPL-MCNC: 18 MG/DL (ref 8–23)
CALCIUM SERPL-MCNC: 8.9 MG/DL (ref 8.7–10.5)
CHLORIDE SERPL-SCNC: 102 MMOL/L (ref 95–110)
CO2 SERPL-SCNC: 27 MMOL/L (ref 23–29)
CREAT SERPL-MCNC: 1 MG/DL (ref 0.5–1.4)
EST. GFR  (AFRICAN AMERICAN): >60 ML/MIN/1.73 M^2
EST. GFR  (NON AFRICAN AMERICAN): >60 ML/MIN/1.73 M^2
GLUCOSE SERPL-MCNC: 135 MG/DL (ref 70–110)
POTASSIUM SERPL-SCNC: 4.1 MMOL/L (ref 3.5–5.1)
SARS-COV-2 RNA RESP QL NAA+PROBE: NOT DETECTED
SODIUM SERPL-SCNC: 138 MMOL/L (ref 136–145)

## 2020-10-16 PROCEDURE — 36415 COLL VENOUS BLD VENIPUNCTURE: CPT | Mod: PO

## 2020-10-16 PROCEDURE — U0003 INFECTIOUS AGENT DETECTION BY NUCLEIC ACID (DNA OR RNA); SEVERE ACUTE RESPIRATORY SYNDROME CORONAVIRUS 2 (SARS-COV-2) (CORONAVIRUS DISEASE [COVID-19]), AMPLIFIED PROBE TECHNIQUE, MAKING USE OF HIGH THROUGHPUT TECHNOLOGIES AS DESCRIBED BY CMS-2020-01-R: HCPCS

## 2020-10-16 PROCEDURE — 80048 BASIC METABOLIC PNL TOTAL CA: CPT

## 2020-10-16 PROCEDURE — 99999 PR PBB SHADOW E&M-EST. PATIENT-LVL I: ICD-10-PCS | Mod: PBBFAC,,,

## 2020-10-16 PROCEDURE — 83880 ASSAY OF NATRIURETIC PEPTIDE: CPT

## 2020-10-16 PROCEDURE — 99211 OFF/OP EST MAY X REQ PHY/QHP: CPT | Mod: PBBFAC,PO

## 2020-10-16 PROCEDURE — 99999 PR PBB SHADOW E&M-EST. PATIENT-LVL I: CPT | Mod: PBBFAC,,,

## 2020-10-19 ENCOUNTER — TELEPHONE (OUTPATIENT)
Dept: CARDIOLOGY | Facility: CLINIC | Age: 70
End: 2020-10-19

## 2020-10-19 ENCOUNTER — CLINICAL SUPPORT (OUTPATIENT)
Dept: PULMONOLOGY | Facility: CLINIC | Age: 70
End: 2020-10-19
Payer: MEDICARE

## 2020-10-19 DIAGNOSIS — I50.42 CHRONIC COMBINED SYSTOLIC AND DIASTOLIC CONGESTIVE HEART FAILURE: ICD-10-CM

## 2020-10-19 DIAGNOSIS — Z79.899 ON AMIODARONE THERAPY: ICD-10-CM

## 2020-10-19 DIAGNOSIS — I50.40 COMBINED SYSTOLIC AND DIASTOLIC CONGESTIVE HEART FAILURE, UNSPECIFIED HF CHRONICITY: Primary | ICD-10-CM

## 2020-10-19 PROCEDURE — 94010 BREATHING CAPACITY TEST: CPT | Mod: PBBFAC

## 2020-10-19 PROCEDURE — 94729 PR C02/MEMBANE DIFFUSE CAPACITY: ICD-10-PCS | Mod: 26,S$PBB,, | Performed by: INTERNAL MEDICINE

## 2020-10-19 PROCEDURE — 94726 PLETHYSMOGRAPHY LUNG VOLUMES: CPT | Mod: PBBFAC

## 2020-10-19 PROCEDURE — 94729 DIFFUSING CAPACITY: CPT | Mod: PBBFAC

## 2020-10-19 PROCEDURE — 94010 BREATHING CAPACITY TEST: ICD-10-PCS | Mod: 26,S$PBB,, | Performed by: INTERNAL MEDICINE

## 2020-10-19 PROCEDURE — 94729 DIFFUSING CAPACITY: CPT | Mod: 26,S$PBB,, | Performed by: INTERNAL MEDICINE

## 2020-10-19 PROCEDURE — 94010 BREATHING CAPACITY TEST: CPT | Mod: 26,S$PBB,, | Performed by: INTERNAL MEDICINE

## 2020-10-19 PROCEDURE — 94726 PULM FUNCT TST PLETHYSMOGRAP: ICD-10-PCS | Mod: 26,S$PBB,, | Performed by: INTERNAL MEDICINE

## 2020-10-19 PROCEDURE — 94726 PLETHYSMOGRAPHY LUNG VOLUMES: CPT | Mod: 26,S$PBB,, | Performed by: INTERNAL MEDICINE

## 2020-10-19 RX ORDER — SACUBITRIL AND VALSARTAN 49; 51 MG/1; MG/1
1 TABLET, FILM COATED ORAL 2 TIMES DAILY
COMMUNITY
End: 2020-10-19 | Stop reason: SDUPTHER

## 2020-10-19 NOTE — TELEPHONE ENCOUNTER
Mr. Yap returned call and received results with medication recommendations.  Will stop taking Losartan as of today (last dose) and he will double Entresto dosage 24-26 (two tablets) twice a day on Tuesday  Advised continue keeping b/p diary and please keep Virtual visit on 11/3/20.

## 2020-10-19 NOTE — TELEPHONE ENCOUNTER
Attempted to contact patient with results and left voice mail message for call back        ----- Message from Nirav Baldwin MD sent at 10/17/2020 11:58 PM CDT -----  See comments below and call patient to discuss.   Please close encounter when done -- no need to route back to me.  Thanks    Better  I reviewed his meds and Losartan is still listed despite the addition of Entresto  He should DC Losartan and double up on the dose of Entresto  He has a virtual visit with me - will then increase dosing further if needed/tolerated

## 2020-10-20 LAB
BRPFT: ABNORMAL
DLCO ADJ PRE: 25.04 ML/(MIN*MMHG) (ref 20.72–34.57)
DLCO SINGLE BREATH LLN: 20.72
DLCO SINGLE BREATH PRE REF: 90.6 %
DLCO SINGLE BREATH REF: 27.64
DLCOC SBVA LLN: 2.71
DLCOC SBVA PRE REF: 111.4 %
DLCOC SBVA REF: 3.87
DLCOC SINGLE BREATH LLN: 20.72
DLCOC SINGLE BREATH PRE REF: 90.6 %
DLCOC SINGLE BREATH REF: 27.64
DLCOVA LLN: 2.71
DLCOVA PRE REF: 111.4 %
DLCOVA PRE: 4.31 ML/(MIN*MMHG*L) (ref 2.71–5.03)
DLCOVA REF: 3.87
DLVAADJ PRE: 4.31 ML/(MIN*MMHG*L) (ref 2.71–5.03)
ERV LLN: -16448.91
ERV PRE REF: 77.1 %
ERV REF: 1.09
FEF 25 75 LLN: 1.11
FEF 25 75 PRE REF: 52.1 %
FEF 25 75 REF: 2.51
FEV1 FVC LLN: 63
FEV1 FVC PRE REF: 87.2 %
FEV1 FVC REF: 76
FEV1 LLN: 2.37
FEV1 PRE REF: 86 %
FEV1 REF: 3.27
FRCPLETH LLN: 2.7
FRCPLETH PREREF: 105.4 %
FRCPLETH REF: 3.69
FVC LLN: 3.21
FVC PRE REF: 98.2 %
FVC REF: 4.31
IVC PRE: 3.79 L (ref 3.21–5.41)
IVC SINGLE BREATH LLN: 3.21
IVC SINGLE BREATH PRE REF: 87.9 %
IVC SINGLE BREATH REF: 4.31
MVV LLN: 109
MVV PRE REF: 59.5 %
MVV REF: 128
PEF LLN: 6.25
PEF PRE REF: 99.5 %
PEF REF: 8.57
PRE DLCO: 25.04 ML/(MIN*MMHG) (ref 20.72–34.57)
PRE ERV: 0.84 L (ref -16448.91–16451.09)
PRE FEF 25 75: 1.31 L/S (ref 1.11–3.91)
PRE FET 100: 13.3 SEC
PRE FEV1 FVC: 66.4 % (ref 62.94–89.36)
PRE FEV1: 2.81 L (ref 2.37–4.16)
PRE FRC PL: 3.89 L
PRE FVC: 4.23 L (ref 3.21–5.41)
PRE MVV: 76 L/MIN (ref 108.53–146.83)
PRE PEF: 8.53 L/S (ref 6.25–10.89)
PRE RV: 2.88 L (ref 1.92–3.27)
PRE TLC: 7.11 L (ref 5.99–8.29)
RAW LLN: 3.06
RAW PRE REF: 87.3 %
RAW PRE: 2.67 CMH2O*S/L (ref 3.06–3.06)
RAW REF: 3.06
RV LLN: 1.92
RV PRE REF: 110.7 %
RV REF: 2.6
RVTLC LLN: 31
RVTLC PRE REF: 100 %
RVTLC PRE: 40.46 % (ref 31.5–49.46)
RVTLC REF: 40
TLC LLN: 5.99
TLC PRE REF: 99.5 %
TLC REF: 7.14
VA PRE: 5.81 L (ref 6.99–6.99)
VA SINGLE BREATH LLN: 6.99
VA SINGLE BREATH PRE REF: 83.1 %
VA SINGLE BREATH REF: 6.99
VC LLN: 3.21
VC PRE REF: 98.2 %
VC PRE: 4.23 L (ref 3.21–5.41)
VC REF: 4.31
VTGRAWPRE: 4.03 L

## 2020-10-20 RX ORDER — SACUBITRIL AND VALSARTAN 49; 51 MG/1; MG/1
1 TABLET, FILM COATED ORAL 2 TIMES DAILY
Qty: 60 TABLET | Refills: 4 | Status: SHIPPED | OUTPATIENT
Start: 2020-10-20 | End: 2021-10-01

## 2020-10-28 ENCOUNTER — TELEPHONE (OUTPATIENT)
Dept: CARDIOLOGY | Facility: CLINIC | Age: 70
End: 2020-10-28

## 2020-10-28 DIAGNOSIS — I48.0 PAF (PAROXYSMAL ATRIAL FIBRILLATION): ICD-10-CM

## 2020-10-28 DIAGNOSIS — I50.42 CHRONIC COMBINED SYSTOLIC AND DIASTOLIC CONGESTIVE HEART FAILURE: Primary | ICD-10-CM

## 2020-10-28 RX ORDER — AMIODARONE HYDROCHLORIDE 200 MG/1
TABLET ORAL
Qty: 60 TABLET | Refills: 6 | Status: SHIPPED | OUTPATIENT
Start: 2020-10-28 | End: 2021-04-23

## 2020-10-28 NOTE — TELEPHONE ENCOUNTER
Telephoned patient reviewed all instructions below and sent patient portal message      ----- Message from Nirav Baldwin MD sent at 10/22/2020  1:06 PM CDT -----  See comments below and call patient to discuss.   Please close encounter when done -- no need to route back to me.  Thanks    His PFTs are OK and he should have no issues with Amiodarone  If he is still on only 200 a day, please increase the dose to 200 bid for 6 weeks then decreases back to 300  a day (1.5 tab a day).

## 2020-10-30 ENCOUNTER — PATIENT MESSAGE (OUTPATIENT)
Dept: CARDIOLOGY | Facility: CLINIC | Age: 70
End: 2020-10-30

## 2020-10-30 DIAGNOSIS — I48.0 PAF (PAROXYSMAL ATRIAL FIBRILLATION): ICD-10-CM

## 2020-11-02 ENCOUNTER — PATIENT MESSAGE (OUTPATIENT)
Dept: CARDIOLOGY | Facility: CLINIC | Age: 70
End: 2020-11-02

## 2020-11-03 ENCOUNTER — OFFICE VISIT (OUTPATIENT)
Dept: CARDIOLOGY | Facility: CLINIC | Age: 70
End: 2020-11-03
Payer: MEDICARE

## 2020-11-03 VITALS
HEART RATE: 85 BPM | SYSTOLIC BLOOD PRESSURE: 135 MMHG | BODY MASS INDEX: 39.96 KG/M2 | HEIGHT: 70 IN | WEIGHT: 279.13 LBS | DIASTOLIC BLOOD PRESSURE: 98 MMHG

## 2020-11-03 DIAGNOSIS — Z91.89 AT RISK FOR AMIODARONE TOXICITY WITH LONG TERM USE: Chronic | ICD-10-CM

## 2020-11-03 DIAGNOSIS — E66.01 CLASS 3 SEVERE OBESITY DUE TO EXCESS CALORIES WITH SERIOUS COMORBIDITY AND BODY MASS INDEX (BMI) OF 40.0 TO 44.9 IN ADULT: Chronic | ICD-10-CM

## 2020-11-03 DIAGNOSIS — I47.29 PAROXYSMAL VT: ICD-10-CM

## 2020-11-03 DIAGNOSIS — I50.41 ACUTE COMBINED SYSTOLIC AND DIASTOLIC CONGESTIVE HEART FAILURE: Primary | ICD-10-CM

## 2020-11-03 DIAGNOSIS — Z79.899 AT RISK FOR AMIODARONE TOXICITY WITH LONG TERM USE: Chronic | ICD-10-CM

## 2020-11-03 DIAGNOSIS — I42.9 IDIOPATHIC CARDIOMYOPATHY: ICD-10-CM

## 2020-11-03 DIAGNOSIS — I48.0 PAF (PAROXYSMAL ATRIAL FIBRILLATION): Chronic | ICD-10-CM

## 2020-11-03 DIAGNOSIS — Z45.02 ICD (IMPLANTABLE CARDIOVERTER-DEFIBRILLATOR) DISCHARGE: ICD-10-CM

## 2020-11-03 PROCEDURE — 99215 OFFICE O/P EST HI 40 MIN: CPT | Mod: 95,,, | Performed by: INTERNAL MEDICINE

## 2020-11-03 PROCEDURE — 99215 PR OFFICE/OUTPT VISIT, EST, LEVL V, 40-54 MIN: ICD-10-PCS | Mod: 95,,, | Performed by: INTERNAL MEDICINE

## 2020-11-03 RX ORDER — SACUBITRIL AND VALSARTAN 97; 103 MG/1; MG/1
1 TABLET, FILM COATED ORAL 2 TIMES DAILY
Qty: 180 TABLET | Refills: 3 | Status: SHIPPED | OUTPATIENT
Start: 2020-11-03 | End: 2021-10-17

## 2020-11-03 NOTE — PROGRESS NOTES
Subjective:   Patient ID:  Randy Yap is a 70 y.o. male     The patient location is: home  The chief complaint leading to consultation is: CHF/VT/ICD    Visit type: audiovisual    Face to Face time with patient: 15  25 minutes of total time spent on the encounter, which includes face to face time and non-face to face time preparing to see the patient (eg, review of tests), Obtaining and/or reviewing separately obtained history, Documenting clinical information in the electronic or other health record, Independently interpreting results (not separately reported) and communicating results to the patient/family/caregiver, or Care coordination (not separately reported).     Each patient to whom he or she provides medical services by telemedicine is:  (1) informed of the relationship between the physician and patient and the respective role of any other health care provider with respect to management of the patient; and (2) notified that he or she may decline to receive medical services by telemedicine and may withdraw from such care at any time.        HPI  Background:  70 man  Frequent, Sxc PMVT related to R on TV pacing in the setting of DDD programming and junctional beats.     He was initially on sotalol and amiodarone and the sotalol was DCed in sept 2016 all the while reprogrammed pacer to VVI back up  Since then, he has had no N/S Sx and no VT per his ICD records  We then decreased amio to 200 a day(but there seemed to be some confusion with what he was taking-- he seemed to be on 400 a day) and he still was feeling well from a PVC point of view but his wife noticed that he was retaining fluid at a time when he was c/o ALDANA-- he increased his lasix and he felt better enough to go back to 1 pill a day  ICD eval sept 2016>> He is in AAI mode at 65 and he paces 67%-- no VTNS      Update on 12/14/16:  We were planning to gradually wean amio and we have decreased the dose to 200 a day so far -- no issues since  "then and ICD shows no VT  He has had PFT with [facility] -- results pending.   He wants to switch to this practice.   He has an ICD that is on battery advisory -- he has darci educated and he is on [facility] remote monitoring -- vibration alert turned on     Update 4/21/17:  " I am doing fantastic"  Exercising and working in the yard like he had not done in a long time.   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 He had a PET scan that was markedly abnormal:  >>  1. 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   2. Stress Rb-82 imaging was aborted secondary to inability to achieve target heart rate and ventricular tachycardia which increased in frequency and duration with dobutamine titration.  3. There is abnormal wall motion at rest showing severe global hypokinesis of the left ventricle.   3. There is resting LV dysfunction with a reduced ejection fraction of 29 %.  (normal is >= 51%)  4. The left ventricular volume is mildly increased at rest.   5. The extracardiac distribution of radioactivity is normal.   6. There was no previous study available to compare.  This prompted us to obtain a coronary angiogram with the following results:  >>  Luminal irregularities in all 3 vessels  EF 50% "   He has enrolled in [employer] and has been doing well -- he feels great and has no apparent CV restrictions     He has had an echo that also disputes the results of the PET. He feels well   His holter and GXT show polymorphic JOAQUIN with frequent VTNS, PVCs etc.  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  Still very low burden.  We obtained the BNP which was elevated for him (157) and an amiodarone level which was relatively low (0.8/0.5)    Update since then:  His CHF turned out to be due to deterioration of LV function as opposed to DDys - EF dropped to 15% - we started Entresto and now increased dose to 49/51 - he has been tolerating this well and his BPs show no orthostasis.  He laso feels a lot better - can climb 14 steps w/o stopping (as opposed to having to stop 2-3 times with ALDANA prior to entresto).   He has started on a prudent diet and has lost 4-5 lbs  He feels good in general.   Current Outpatient Medications   Medication Sig    acetaminophen (TYLENOL) 325 MG tablet Take 650 mg by mouth as needed.     albuterol (PROVENTIL/VENTOLIN HFA) 90 mcg/actuation inhaler Inhale 1 puff into the lungs once daily. Rescue    amiodarone (PACERONE) 200 MG Tab As of 10/28 increase to 200 mg twice a day for 6 weeks then decrease to 300 mg (1.5 tablet) daily    bepotastine besilate (BEPREVE) 1.5 % Drop Bepreve 1.5 % eye drops   Apply by ophthalmic route as needed for 50 days.    carvediloL  "(COREG) 25 MG tablet Take 1 tablet (25 mg total) by mouth 2 (two) times daily.    co-enzyme Q-10 30 mg capsule Take 30 mg by mouth once daily.     ferrous sulfate (IRON) 325 mg (65 mg iron) Tab tablet Take 325 mg by mouth daily with breakfast.    fluticasone furoate (ARNUITY ELLIPTA) 100 mcg/actuation DsDv Take 1 puff by mouth once daily. Controller    furosemide (LASIX) 40 MG tablet Take 1 tablet (40 mg total) by mouth once daily.    glucosamine-chondroitin 500-400 mg tablet Take 1 tablet by mouth once daily.     INV apixaban/placebo tablet Take 1 tablets (5 mg) by mouth 2 (two) times daily. FOR INVESTIGATIONAL USE ONLY. Protocol: [place] 2017.307 subject # : [id number]    INV aspirin/placebo tablet Take 1 tablet (81 mg total) by mouth once daily. FOR INVESTIGATIONAL USE ONLY. Patient Study #: [id number] Protocol: [place] 2017.307    multivitamin (THERAGRAN) tablet Take 1 tablet by mouth once daily.      pravastatin (PRAVACHOL) 40 MG tablet Take 1 tablet (40 mg total) by mouth once daily.    sacubitriL-valsartan (ENTRESTO) 49-51 mg per tablet Take 1 tablet by mouth 2 (two) times daily.    spironolactone (ALDACTONE) 25 MG tablet Take 1 tablet (25 mg total) by mouth once daily.    traZODone (DESYREL) 150 MG tablet Take 1 tablet (150 mg total) by mouth every evening.    sacubitriL-valsartan (ENTRESTO)  mg per tablet Take 1 tablet by mouth 2 (two) times daily.     No current facility-administered medications for this visit.      ROS  See HPI, otherwise, negative CV ROS including current lack of palpitations, chest pain, undue dyspnea on exertion, shortness of breath, orthopnea, PND, leg edema, syncope, near-syncope, symptoms of TIA and or peripheral emboli and claudication.    Objective:   Physical Exam  BP (!) 135/98   Pulse 85   Ht 5' 10" (1.778 m)   Wt 126.6 kg (279 lb 1.6 oz)   BMI 40.05 kg/m²    Appears to be in NAD, overweight  Color is WNL (no pallor, no facial rashes), no obvious NVC. No obvious " neck masses.   No hoarseness and no facial distortions.    No tachypnea.  No labored breathing.  Mood, affect, comprehension and speech are all WNL.   Patient denies leg edema.    Assessment:      1. Acute combined systolic and diastolic congestive heart failure    2. Paroxysmal VT    3. PAF (paroxysmal atrial fibrillation)    4. Idiopathic cardiomyopathy    5. ICD (implantable cardioverter-defibrillator) discharge    6. Class 3 severe obesity due to excess calories with serious comorbidity and body mass index (BMI) of 40.0 to 44.9 in adult    7. At risk for amiodarone toxicity with long term use        Plan:    Increase Entresto to 97/103 bid   BP with orthostatics for 2 weeks  Keep using stairs (he does 3-4 times a day).  RTC 3 months with repeat echo/BNP/ProBNP  No orders of the defined types were placed in this encounter.    Follow up in about 3 months (around 2/3/2021), or if symptoms worsen or fail to improve.  Medications Discontinued During This Encounter   Medication Reason    sacubitriL-valsartan (ENTRESTO) 24-26 mg per tablet      Medications Ordered This Encounter   Medications    sacubitriL-valsartan (ENTRESTO)  mg per tablet     Sig: Take 1 tablet by mouth 2 (two) times daily.     Dispense:  180 tablet     Refill:  3     Medication List with Changes/Refills   New Medications    SACUBITRIL-VALSARTAN (ENTRESTO)  MG PER TABLET    Take 1 tablet by mouth 2 (two) times daily.   Current Medications    ACETAMINOPHEN (TYLENOL) 325 MG TABLET    Take 650 mg by mouth as needed.     ALBUTEROL (PROVENTIL/VENTOLIN HFA) 90 MCG/ACTUATION INHALER    Inhale 1 puff into the lungs once daily. Rescue    AMIODARONE (PACERONE) 200 MG TAB    As of 10/28 increase to 200 mg twice a day for 6 weeks then decrease to 300 mg (1.5 tablet) daily    BEPOTASTINE BESILATE (BEPREVE) 1.5 % DROP    Bepreve 1.5 % eye drops   Apply by ophthalmic route as needed for 50 days.    CARVEDILOL (COREG) 25 MG TABLET    Take 1 tablet (25  mg total) by mouth 2 (two) times daily.    CO-ENZYME Q-10 30 MG CAPSULE    Take 30 mg by mouth once daily.     FERROUS SULFATE (IRON) 325 MG (65 MG IRON) TAB TABLET    Take 325 mg by mouth daily with breakfast.    FLUTICASONE FUROATE (ARNUITY ELLIPTA) 100 MCG/ACTUATION DSDV    Take 1 puff by mouth once daily. Controller    FUROSEMIDE (LASIX) 40 MG TABLET    Take 1 tablet (40 mg total) by mouth once daily.    GLUCOSAMINE-CHONDROITIN 500-400 MG TABLET    Take 1 tablet by mouth once daily.     INV APIXABAN/PLACEBO TABLET    Take 1 tablets (5 mg) by mouth 2 (two) times daily. FOR INVESTIGATIONAL USE ONLY. Protocol: [place] 2017.307 subject # : [id number]    INV ASPIRIN/PLACEBO TABLET    Take 1 tablet (81 mg total) by mouth once daily. FOR INVESTIGATIONAL USE ONLY. Patient Study #: [id number] Protocol: [place] 2017.307    MULTIVITAMIN (THERAGRAN) TABLET    Take 1 tablet by mouth once daily.      PRAVASTATIN (PRAVACHOL) 40 MG TABLET    Take 1 tablet (40 mg total) by mouth once daily.    SACUBITRIL-VALSARTAN (ENTRESTO) 49-51 MG PER TABLET    Take 1 tablet by mouth 2 (two) times daily.    SPIRONOLACTONE (ALDACTONE) 25 MG TABLET    Take 1 tablet (25 mg total) by mouth once daily.    TRAZODONE (DESYREL) 150 MG TABLET    Take 1 tablet (150 mg total) by mouth every evening.   Discontinued Medications    SACUBITRIL-VALSARTAN (ENTRESTO) 24-26 MG PER TABLET    Take 1 tablet by mouth 2 (two) times daily.

## 2020-11-04 ENCOUNTER — PATIENT MESSAGE (OUTPATIENT)
Dept: CARDIOLOGY | Facility: CLINIC | Age: 70
End: 2020-11-04

## 2020-11-30 ENCOUNTER — CLINICAL SUPPORT (OUTPATIENT)
Dept: CARDIOLOGY | Facility: HOSPITAL | Age: 70
End: 2020-11-30
Payer: MEDICARE

## 2020-11-30 DIAGNOSIS — Z95.810 PRESENCE OF AUTOMATIC (IMPLANTABLE) CARDIAC DEFIBRILLATOR: ICD-10-CM

## 2020-11-30 PROCEDURE — 93295 DEV INTERROG REMOTE 1/2/MLT: CPT | Mod: ,,, | Performed by: INTERNAL MEDICINE

## 2020-11-30 PROCEDURE — 93295 CARDIAC DEVICE CHECK - REMOTE: ICD-10-PCS | Mod: ,,, | Performed by: INTERNAL MEDICINE

## 2020-11-30 PROCEDURE — 93296 REM INTERROG EVL PM/IDS: CPT | Mod: PBBFAC | Performed by: INTERNAL MEDICINE

## 2020-12-11 ENCOUNTER — PATIENT MESSAGE (OUTPATIENT)
Dept: OTHER | Facility: OTHER | Age: 70
End: 2020-12-11

## 2021-01-18 ENCOUNTER — PATIENT MESSAGE (OUTPATIENT)
Dept: CARDIOLOGY | Facility: CLINIC | Age: 71
End: 2021-01-18

## 2021-01-19 ENCOUNTER — RESEARCH ENCOUNTER (OUTPATIENT)
Dept: RESEARCH | Facility: HOSPITAL | Age: 71
End: 2021-01-19

## 2021-01-21 ENCOUNTER — TELEPHONE (OUTPATIENT)
Dept: CARDIOLOGY | Facility: HOSPITAL | Age: 71
End: 2021-01-21

## 2021-01-25 ENCOUNTER — OFFICE VISIT (OUTPATIENT)
Dept: FAMILY MEDICINE | Facility: CLINIC | Age: 71
End: 2021-01-25
Payer: MEDICARE

## 2021-01-25 DIAGNOSIS — J32.9 SINUSITIS, UNSPECIFIED CHRONICITY, UNSPECIFIED LOCATION: Primary | ICD-10-CM

## 2021-01-25 PROCEDURE — 99213 OFFICE O/P EST LOW 20 MIN: CPT | Mod: 95,,, | Performed by: NURSE PRACTITIONER

## 2021-01-25 PROCEDURE — 99213 PR OFFICE/OUTPT VISIT, EST, LEVL III, 20-29 MIN: ICD-10-PCS | Mod: 95,,, | Performed by: NURSE PRACTITIONER

## 2021-01-25 RX ORDER — PREDNISONE 20 MG/1
20 TABLET ORAL 2 TIMES DAILY
Qty: 10 TABLET | Refills: 0 | Status: SHIPPED | OUTPATIENT
Start: 2021-01-25 | End: 2021-01-30

## 2021-02-01 ENCOUNTER — HOSPITAL ENCOUNTER (OUTPATIENT)
Dept: CARDIOLOGY | Facility: HOSPITAL | Age: 71
Discharge: HOME | End: 2021-02-01
Attending: INTERNAL MEDICINE
Payer: MEDICARE

## 2021-02-01 VITALS
DIASTOLIC BLOOD PRESSURE: 98 MMHG | TEMPERATURE: 98 F | HEIGHT: 70 IN | SYSTOLIC BLOOD PRESSURE: 135 MMHG | BODY MASS INDEX: 39.94 KG/M2 | HEART RATE: 77 BPM | WEIGHT: 279 LBS

## 2021-02-01 DIAGNOSIS — I50.41 ACUTE COMBINED SYSTOLIC AND DIASTOLIC CONGESTIVE HEART FAILURE: ICD-10-CM

## 2021-02-01 LAB
AORTIC ROOT ANNULUS: 3.73 CM
ASCENDING AORTA: 2.59 CM
AV INDEX (PROSTH): 0.4
AV MEAN GRADIENT: 4 MMHG
AV PEAK GRADIENT: 6 MMHG
AV VALVE AREA: 2.01 CM2
AV VELOCITY RATIO: 0.4
BSA FOR ECHO PROCEDURE: 2.5 M2
CV ECHO LV RWT: 0.46 CM
DOP CALC AO PEAK VEL: 1.23 M/S
DOP CALC AO VTI: 22.26 CM
DOP CALC LVOT AREA: 5.1 CM2
DOP CALC LVOT DIAMETER: 2.54 CM
DOP CALC LVOT PEAK VEL: 0.49 M/S
DOP CALC LVOT STROKE VOLUME: 44.72 CM3
DOP CALC RVOT PEAK VEL: 0.73 M/S
DOP CALC RVOT VTI: 13.9 CM
DOP CALCLVOT PEAK VEL VTI: 8.83 CM
ECHO LV POSTERIOR WALL: 1.54 CM (ref 0.6–1.1)
FRACTIONAL SHORTENING: 11 % (ref 28–44)
INTERVENTRICULAR SEPTUM: 1.62 CM (ref 0.6–1.1)
IVRT: 131.3 MSEC
LA MAJOR: 6.76 CM
LA MINOR: 6.52 CM
LA WIDTH: 4.11 CM
LEFT ATRIUM SIZE: 4.16 CM
LEFT ATRIUM VOLUME INDEX: 40.2 ML/M2
LEFT ATRIUM VOLUME: 96.47 CM3
LEFT INTERNAL DIMENSION IN SYSTOLE: 6 CM (ref 2.1–4)
LEFT VENTRICLE DIASTOLIC VOLUME INDEX: 98.48 ML/M2
LEFT VENTRICLE DIASTOLIC VOLUME: 236.35 ML
LEFT VENTRICLE MASS INDEX: 232 G/M2
LEFT VENTRICLE SYSTOLIC VOLUME INDEX: 75.1 ML/M2
LEFT VENTRICLE SYSTOLIC VOLUME: 180.2 ML
LEFT VENTRICULAR INTERNAL DIMENSION IN DIASTOLE: 6.76 CM (ref 3.5–6)
LEFT VENTRICULAR MASS: 555.77 G
MV A" WAVE DURATION": 8.85 MSEC
PISA TR MAX VEL: 2.33 M/S
PULM VEIN S/D RATIO: 1.18
PV MEAN GRADIENT: 1 MMHG
PV PEAK D VEL: 0.5 M/S
PV PEAK S VEL: 0.59 M/S
PV PEAK VELOCITY: 1.07 CM/S
RA MAJOR: 5.18 CM
RA PRESSURE: 8 MMHG
RA WIDTH: 3.87 CM
RIGHT VENTRICULAR END-DIASTOLIC DIMENSION: 2.98 CM
SINUS: 3.31 CM
STJ: 3.05 CM
TDI LATERAL: 0.07 M/S
TDI SEPTAL: 0.12 M/S
TDI: 0.1 M/S
TR MAX PG: 22 MMHG
TRICUSPID ANNULAR PLANE SYSTOLIC EXCURSION: 1.6 CM
TV REST PULMONARY ARTERY PRESSURE: 30 MMHG

## 2021-02-01 PROCEDURE — 93306 TTE W/DOPPLER COMPLETE: CPT | Mod: 26,,, | Performed by: INTERNAL MEDICINE

## 2021-02-01 PROCEDURE — 93306 TTE W/DOPPLER COMPLETE: CPT | Mod: PO

## 2021-02-01 PROCEDURE — 93306 ECHO (CUPID ONLY): ICD-10-PCS | Mod: 26,,, | Performed by: INTERNAL MEDICINE

## 2021-02-03 ENCOUNTER — TELEPHONE (OUTPATIENT)
Dept: CARDIOLOGY | Facility: CLINIC | Age: 71
End: 2021-02-03

## 2021-02-05 ENCOUNTER — LAB VISIT (OUTPATIENT)
Dept: LAB | Facility: HOSPITAL | Age: 71
End: 2021-02-05
Attending: FAMILY MEDICINE
Payer: MEDICARE

## 2021-02-05 DIAGNOSIS — E78.5 HYPERLIPIDEMIA LDL GOAL <100: ICD-10-CM

## 2021-02-05 DIAGNOSIS — Z79.899 ENCOUNTER FOR LONG-TERM (CURRENT) USE OF MEDICATIONS: ICD-10-CM

## 2021-02-05 DIAGNOSIS — Z00.01 ENCOUNTER FOR GENERAL ADULT MEDICAL EXAMINATION WITH ABNORMAL FINDINGS: ICD-10-CM

## 2021-02-05 DIAGNOSIS — I48.0 PAF (PAROXYSMAL ATRIAL FIBRILLATION): ICD-10-CM

## 2021-02-05 DIAGNOSIS — I50.41 ACUTE COMBINED SYSTOLIC AND DIASTOLIC CONGESTIVE HEART FAILURE: ICD-10-CM

## 2021-02-05 DIAGNOSIS — Z91.89 AT RISK FOR AMIODARONE TOXICITY WITH LONG TERM USE: ICD-10-CM

## 2021-02-05 DIAGNOSIS — Z79.899 AT RISK FOR AMIODARONE TOXICITY WITH LONG TERM USE: ICD-10-CM

## 2021-02-05 DIAGNOSIS — I10 ESSENTIAL HYPERTENSION: ICD-10-CM

## 2021-02-05 LAB
ANION GAP SERPL CALC-SCNC: 12 MMOL/L (ref 8–16)
BNP SERPL-MCNC: 41 PG/ML (ref 0–99)
BUN SERPL-MCNC: 15 MG/DL (ref 8–23)
CALCIUM SERPL-MCNC: 8.5 MG/DL (ref 8.7–10.5)
CHLORIDE SERPL-SCNC: 101 MMOL/L (ref 95–110)
CO2 SERPL-SCNC: 22 MMOL/L (ref 23–29)
CREAT SERPL-MCNC: 1 MG/DL (ref 0.5–1.4)
EST. GFR  (AFRICAN AMERICAN): >60 ML/MIN/1.73 M^2
EST. GFR  (NON AFRICAN AMERICAN): >60 ML/MIN/1.73 M^2
ESTIMATED AVG GLUCOSE: 123 MG/DL (ref 68–131)
GLUCOSE SERPL-MCNC: 145 MG/DL (ref 70–110)
HBA1C MFR BLD: 5.9 % (ref 4–5.6)
POTASSIUM SERPL-SCNC: 4.3 MMOL/L (ref 3.5–5.1)
SODIUM SERPL-SCNC: 135 MMOL/L (ref 136–145)

## 2021-02-05 PROCEDURE — 80048 BASIC METABOLIC PNL TOTAL CA: CPT

## 2021-02-05 PROCEDURE — 83036 HEMOGLOBIN GLYCOSYLATED A1C: CPT

## 2021-02-05 PROCEDURE — 36415 COLL VENOUS BLD VENIPUNCTURE: CPT | Mod: PO

## 2021-02-05 PROCEDURE — 83880 ASSAY OF NATRIURETIC PEPTIDE: CPT

## 2021-02-08 ENCOUNTER — PATIENT MESSAGE (OUTPATIENT)
Dept: CARDIOLOGY | Facility: CLINIC | Age: 71
End: 2021-02-08

## 2021-02-09 ENCOUNTER — OFFICE VISIT (OUTPATIENT)
Dept: CARDIOLOGY | Facility: CLINIC | Age: 71
End: 2021-02-09
Payer: MEDICARE

## 2021-02-09 ENCOUNTER — PATIENT MESSAGE (OUTPATIENT)
Dept: CARDIOLOGY | Facility: CLINIC | Age: 71
End: 2021-02-09

## 2021-02-09 VITALS
HEART RATE: 75 BPM | BODY MASS INDEX: 39.96 KG/M2 | DIASTOLIC BLOOD PRESSURE: 76 MMHG | WEIGHT: 279.13 LBS | HEIGHT: 70 IN | SYSTOLIC BLOOD PRESSURE: 110 MMHG

## 2021-02-09 DIAGNOSIS — I42.9 IDIOPATHIC CARDIOMYOPATHY: Primary | ICD-10-CM

## 2021-02-09 DIAGNOSIS — I47.29 PAROXYSMAL VT: ICD-10-CM

## 2021-02-09 DIAGNOSIS — E78.5 HYPERLIPIDEMIA LDL GOAL <100: Chronic | ICD-10-CM

## 2021-02-09 DIAGNOSIS — G47.33 OBSTRUCTIVE SLEEP APNEA SYNDROME: Chronic | ICD-10-CM

## 2021-02-09 DIAGNOSIS — I42.9 IDIOPATHIC CARDIOMYOPATHY: ICD-10-CM

## 2021-02-09 DIAGNOSIS — I10 ESSENTIAL HYPERTENSION: Chronic | ICD-10-CM

## 2021-02-09 DIAGNOSIS — E66.01 CLASS 3 SEVERE OBESITY DUE TO EXCESS CALORIES WITH SERIOUS COMORBIDITY AND BODY MASS INDEX (BMI) OF 40.0 TO 44.9 IN ADULT: Chronic | ICD-10-CM

## 2021-02-09 DIAGNOSIS — Z79.899 AT RISK FOR AMIODARONE TOXICITY WITH LONG TERM USE: Chronic | ICD-10-CM

## 2021-02-09 DIAGNOSIS — I48.0 PAF (PAROXYSMAL ATRIAL FIBRILLATION): Chronic | ICD-10-CM

## 2021-02-09 DIAGNOSIS — Z91.89 AT RISK FOR AMIODARONE TOXICITY WITH LONG TERM USE: Chronic | ICD-10-CM

## 2021-02-09 DIAGNOSIS — I50.30 CONGESTIVE HEART FAILURE WITH LEFT VENTRICULAR DIASTOLIC DYSFUNCTION, NYHA CLASS 3: Primary | ICD-10-CM

## 2021-02-09 PROCEDURE — 99215 OFFICE O/P EST HI 40 MIN: CPT | Mod: 95,,, | Performed by: INTERNAL MEDICINE

## 2021-02-09 PROCEDURE — 99215 PR OFFICE/OUTPT VISIT, EST, LEVL V, 40-54 MIN: ICD-10-PCS | Mod: 95,,, | Performed by: INTERNAL MEDICINE

## 2021-02-09 RX ORDER — SPIRONOLACTONE 25 MG/1
25 TABLET ORAL 2 TIMES DAILY
Qty: 180 TABLET | Refills: 3 | Status: SHIPPED | OUTPATIENT
Start: 2021-02-09 | End: 2022-02-15

## 2021-02-09 RX ORDER — SPIRONOLACTONE 25 MG/1
25 TABLET ORAL 2 TIMES DAILY
Qty: 60 TABLET | Refills: 11 | Status: CANCELLED | OUTPATIENT
Start: 2021-02-09 | End: 2022-02-09

## 2021-02-09 RX ORDER — DAPAGLIFLOZIN 10 MG/1
10 TABLET, FILM COATED ORAL DAILY
Qty: 90 TABLET | Refills: 3 | Status: SHIPPED | OUTPATIENT
Start: 2021-02-09 | End: 2022-02-03

## 2021-02-10 ENCOUNTER — PATIENT MESSAGE (OUTPATIENT)
Dept: CARDIOLOGY | Facility: CLINIC | Age: 71
End: 2021-02-10

## 2021-02-11 ENCOUNTER — PATIENT MESSAGE (OUTPATIENT)
Dept: CARDIOLOGY | Facility: CLINIC | Age: 71
End: 2021-02-11

## 2021-02-17 ENCOUNTER — TELEPHONE (OUTPATIENT)
Dept: CARDIOLOGY | Facility: HOSPITAL | Age: 71
End: 2021-02-17

## 2021-02-22 ENCOUNTER — PATIENT MESSAGE (OUTPATIENT)
Dept: CARDIOLOGY | Facility: CLINIC | Age: 71
End: 2021-02-22

## 2021-02-23 ENCOUNTER — LAB VISIT (OUTPATIENT)
Dept: LAB | Facility: HOSPITAL | Age: 71
End: 2021-02-23
Attending: INTERNAL MEDICINE
Payer: MEDICARE

## 2021-02-23 DIAGNOSIS — I42.9 IDIOPATHIC CARDIOMYOPATHY: ICD-10-CM

## 2021-02-23 PROCEDURE — 83880 ASSAY OF NATRIURETIC PEPTIDE: CPT

## 2021-02-23 PROCEDURE — 80048 BASIC METABOLIC PNL TOTAL CA: CPT

## 2021-02-24 ENCOUNTER — TELEPHONE (OUTPATIENT)
Dept: CARDIOLOGY | Facility: CLINIC | Age: 71
End: 2021-02-24

## 2021-02-24 LAB
ANION GAP SERPL CALC-SCNC: 10 MMOL/L (ref 8–16)
BUN SERPL-MCNC: 16 MG/DL (ref 8–23)
CALCIUM SERPL-MCNC: 9 MG/DL (ref 8.7–10.5)
CHLORIDE SERPL-SCNC: 101 MMOL/L (ref 95–110)
CO2 SERPL-SCNC: 25 MMOL/L (ref 23–29)
CREAT SERPL-MCNC: 1.1 MG/DL (ref 0.5–1.4)
EST. GFR  (AFRICAN AMERICAN): >60 ML/MIN/1.73 M^2
EST. GFR  (NON AFRICAN AMERICAN): >60 ML/MIN/1.73 M^2
GLUCOSE SERPL-MCNC: 105 MG/DL (ref 70–110)
POTASSIUM SERPL-SCNC: 4.1 MMOL/L (ref 3.5–5.1)
SODIUM SERPL-SCNC: 136 MMOL/L (ref 136–145)

## 2021-02-25 ENCOUNTER — PATIENT MESSAGE (OUTPATIENT)
Dept: CARDIOLOGY | Facility: CLINIC | Age: 71
End: 2021-02-25

## 2021-02-25 LAB — NT-PROBNP SERPL-MCNC: 139 PG/ML

## 2021-02-26 ENCOUNTER — TELEPHONE (OUTPATIENT)
Dept: CARDIOLOGY | Facility: CLINIC | Age: 71
End: 2021-02-26

## 2021-02-28 ENCOUNTER — EXTERNAL CHRONIC CARE MANAGEMENT (OUTPATIENT)
Dept: PRIMARY CARE CLINIC | Facility: CLINIC | Age: 71
End: 2021-02-28
Payer: MEDICARE

## 2021-02-28 ENCOUNTER — CLINICAL SUPPORT (OUTPATIENT)
Dept: CARDIOLOGY | Facility: HOSPITAL | Age: 71
End: 2021-02-28
Payer: MEDICARE

## 2021-02-28 DIAGNOSIS — Z95.810 PRESENCE OF AUTOMATIC (IMPLANTABLE) CARDIAC DEFIBRILLATOR: ICD-10-CM

## 2021-02-28 PROCEDURE — 99490 CHRNC CARE MGMT STAFF 1ST 20: CPT | Mod: PBBFAC,PO | Performed by: FAMILY MEDICINE

## 2021-02-28 PROCEDURE — 93295 DEV INTERROG REMOTE 1/2/MLT: CPT | Mod: ,,, | Performed by: INTERNAL MEDICINE

## 2021-02-28 PROCEDURE — 93295 CARDIAC DEVICE CHECK - REMOTE: ICD-10-PCS | Mod: ,,, | Performed by: INTERNAL MEDICINE

## 2021-02-28 PROCEDURE — 99490 PR CHRONIC CARE MGMT, 1ST 20 MIN: ICD-10-PCS | Mod: S$PBB,,, | Performed by: FAMILY MEDICINE

## 2021-02-28 PROCEDURE — 99490 CHRNC CARE MGMT STAFF 1ST 20: CPT | Mod: S$PBB,,, | Performed by: FAMILY MEDICINE

## 2021-03-31 ENCOUNTER — EXTERNAL CHRONIC CARE MANAGEMENT (OUTPATIENT)
Dept: PRIMARY CARE CLINIC | Facility: CLINIC | Age: 71
End: 2021-03-31
Payer: MEDICARE

## 2021-03-31 PROCEDURE — 99490 PR CHRONIC CARE MGMT, 1ST 20 MIN: ICD-10-PCS | Mod: S$PBB,,, | Performed by: FAMILY MEDICINE

## 2021-03-31 PROCEDURE — 99490 CHRNC CARE MGMT STAFF 1ST 20: CPT | Mod: PBBFAC,PO | Performed by: FAMILY MEDICINE

## 2021-03-31 PROCEDURE — 99490 CHRNC CARE MGMT STAFF 1ST 20: CPT | Mod: S$PBB,,, | Performed by: FAMILY MEDICINE

## 2021-04-19 ENCOUNTER — CLINICAL SUPPORT (OUTPATIENT)
Dept: CARDIOLOGY | Facility: HOSPITAL | Age: 71
End: 2021-04-19
Attending: INTERNAL MEDICINE
Payer: MEDICARE

## 2021-04-19 ENCOUNTER — HOSPITAL ENCOUNTER (OUTPATIENT)
Dept: CARDIOLOGY | Facility: HOSPITAL | Age: 71
Discharge: HOME | End: 2021-04-19
Attending: INTERNAL MEDICINE
Payer: MEDICARE

## 2021-04-19 DIAGNOSIS — Z95.810 ICD (IMPLANTABLE CARDIOVERTER-DEFIBRILLATOR) IN PLACE: ICD-10-CM

## 2021-04-19 DIAGNOSIS — I43 CARDIOMYOPATHY DUE TO SYSTEMIC DISEASE: ICD-10-CM

## 2021-04-19 DIAGNOSIS — I49.01 VF (VENTRICULAR FIBRILLATION): ICD-10-CM

## 2021-04-19 DIAGNOSIS — I47.29 PAROXYSMAL VT: ICD-10-CM

## 2021-04-19 DIAGNOSIS — I47.29 PAROXYSMAL VT: Primary | ICD-10-CM

## 2021-04-19 DIAGNOSIS — I42.9 IDIOPATHIC CARDIOMYOPATHY: ICD-10-CM

## 2021-04-19 PROCEDURE — 99211 OFF/OP EST MAY X REQ PHY/QHP: CPT | Mod: PBBFAC,25

## 2021-04-19 PROCEDURE — 99999 PR PBB SHADOW E&M-EST. PATIENT-LVL I: CPT | Mod: PBBFAC,,,

## 2021-04-19 PROCEDURE — 93283 PRGRMG EVAL IMPLANTABLE DFB: CPT

## 2021-04-19 PROCEDURE — 93283 PRGRMG EVAL IMPLANTABLE DFB: CPT | Mod: 26,,, | Performed by: INTERNAL MEDICINE

## 2021-04-19 PROCEDURE — 93005 ELECTROCARDIOGRAM TRACING: CPT

## 2021-04-19 PROCEDURE — 93283 CARDIAC DEVICE CHECK - IN CLINIC & HOSPITAL: ICD-10-PCS | Mod: 26,,, | Performed by: INTERNAL MEDICINE

## 2021-04-19 PROCEDURE — 93010 ELECTROCARDIOGRAM REPORT: CPT | Mod: ,,, | Performed by: INTERNAL MEDICINE

## 2021-04-19 PROCEDURE — 93010 EKG 12-LEAD: ICD-10-PCS | Mod: ,,, | Performed by: INTERNAL MEDICINE

## 2021-04-19 PROCEDURE — 99999 PR PBB SHADOW E&M-EST. PATIENT-LVL I: ICD-10-PCS | Mod: PBBFAC,,,

## 2021-04-23 ENCOUNTER — TELEPHONE (OUTPATIENT)
Dept: CARDIOLOGY | Facility: HOSPITAL | Age: 71
End: 2021-04-23

## 2021-04-23 DIAGNOSIS — I47.29 PAROXYSMAL VT: Primary | ICD-10-CM

## 2021-04-23 RX ORDER — AMIODARONE HYDROCHLORIDE 100 MG/1
150 TABLET ORAL DAILY
Qty: 45 TABLET | Refills: 11 | Status: SHIPPED | OUTPATIENT
Start: 2021-04-28 | End: 2022-04-25

## 2021-04-29 ENCOUNTER — PATIENT MESSAGE (OUTPATIENT)
Dept: CARDIOLOGY | Facility: CLINIC | Age: 71
End: 2021-04-29

## 2021-04-29 ENCOUNTER — TELEPHONE (OUTPATIENT)
Dept: CARDIOLOGY | Facility: CLINIC | Age: 71
End: 2021-04-29

## 2021-04-29 DIAGNOSIS — I49.01 VF (VENTRICULAR FIBRILLATION): ICD-10-CM

## 2021-04-29 DIAGNOSIS — I47.29 PAROXYSMAL VT: Primary | ICD-10-CM

## 2021-04-30 ENCOUNTER — TELEPHONE (OUTPATIENT)
Dept: CARDIOLOGY | Facility: CLINIC | Age: 71
End: 2021-04-30

## 2021-04-30 ENCOUNTER — EXTERNAL CHRONIC CARE MANAGEMENT (OUTPATIENT)
Dept: PRIMARY CARE CLINIC | Facility: CLINIC | Age: 71
End: 2021-04-30
Payer: MEDICARE

## 2021-04-30 PROCEDURE — 99490 CHRNC CARE MGMT STAFF 1ST 20: CPT | Mod: PBBFAC,PO | Performed by: FAMILY MEDICINE

## 2021-04-30 PROCEDURE — 99439 CHRNC CARE MGMT STAF EA ADDL: CPT | Mod: PBBFAC,PO | Performed by: FAMILY MEDICINE

## 2021-04-30 PROCEDURE — 99439 PR CHRONIC CARE MGMT, EA ADDTL 20 MIN: ICD-10-PCS | Mod: S$PBB,,, | Performed by: FAMILY MEDICINE

## 2021-04-30 PROCEDURE — 99490 PR CHRONIC CARE MGMT, 1ST 20 MIN: ICD-10-PCS | Mod: S$PBB,,, | Performed by: FAMILY MEDICINE

## 2021-04-30 PROCEDURE — 99490 CHRNC CARE MGMT STAFF 1ST 20: CPT | Mod: S$PBB,,, | Performed by: FAMILY MEDICINE

## 2021-04-30 PROCEDURE — 99439 CHRNC CARE MGMT STAF EA ADDL: CPT | Mod: S$PBB,,, | Performed by: FAMILY MEDICINE

## 2021-05-05 ENCOUNTER — TELEPHONE (OUTPATIENT)
Dept: CARDIOLOGY | Facility: CLINIC | Age: 71
End: 2021-05-05

## 2021-05-10 ENCOUNTER — PATIENT MESSAGE (OUTPATIENT)
Dept: CARDIOLOGY | Facility: CLINIC | Age: 71
End: 2021-05-10

## 2021-05-19 ENCOUNTER — PATIENT MESSAGE (OUTPATIENT)
Dept: CARDIOLOGY | Facility: CLINIC | Age: 71
End: 2021-05-19

## 2021-05-29 ENCOUNTER — CLINICAL SUPPORT (OUTPATIENT)
Dept: CARDIOLOGY | Facility: HOSPITAL | Age: 71
End: 2021-05-29
Payer: MEDICARE

## 2021-05-29 DIAGNOSIS — Z95.0 PRESENCE OF CARDIAC PACEMAKER: ICD-10-CM

## 2021-05-29 DIAGNOSIS — Z95.810 PRESENCE OF AUTOMATIC (IMPLANTABLE) CARDIAC DEFIBRILLATOR: ICD-10-CM

## 2021-05-29 PROCEDURE — 93295 DEV INTERROG REMOTE 1/2/MLT: CPT | Mod: ,,, | Performed by: INTERNAL MEDICINE

## 2021-05-29 PROCEDURE — 93295 CARDIAC DEVICE CHECK - REMOTE: ICD-10-PCS | Mod: ,,, | Performed by: INTERNAL MEDICINE

## 2021-05-31 ENCOUNTER — EXTERNAL CHRONIC CARE MANAGEMENT (OUTPATIENT)
Dept: PRIMARY CARE CLINIC | Facility: CLINIC | Age: 71
End: 2021-05-31
Payer: MEDICARE

## 2021-05-31 PROCEDURE — 99490 CHRNC CARE MGMT STAFF 1ST 20: CPT | Mod: PBBFAC,PO | Performed by: FAMILY MEDICINE

## 2021-05-31 PROCEDURE — 99439 CHRNC CARE MGMT STAF EA ADDL: CPT | Mod: PBBFAC,PO | Performed by: FAMILY MEDICINE

## 2021-05-31 PROCEDURE — 99439 CHRNC CARE MGMT STAF EA ADDL: CPT | Mod: S$PBB,,, | Performed by: FAMILY MEDICINE

## 2021-05-31 PROCEDURE — 99490 PR CHRONIC CARE MGMT, 1ST 20 MIN: ICD-10-PCS | Mod: S$PBB,,, | Performed by: FAMILY MEDICINE

## 2021-05-31 PROCEDURE — 99439 PR CHRONIC CARE MGMT, EA ADDTL 20 MIN: ICD-10-PCS | Mod: S$PBB,,, | Performed by: FAMILY MEDICINE

## 2021-05-31 PROCEDURE — 99490 CHRNC CARE MGMT STAFF 1ST 20: CPT | Mod: S$PBB,,, | Performed by: FAMILY MEDICINE

## 2021-06-22 ENCOUNTER — HOSPITAL ENCOUNTER (OUTPATIENT)
Dept: CARDIOLOGY | Facility: HOSPITAL | Age: 71
Discharge: HOME | End: 2021-06-22
Attending: INTERNAL MEDICINE
Payer: MEDICARE

## 2021-06-22 ENCOUNTER — OFFICE VISIT (OUTPATIENT)
Dept: CARDIOLOGY | Facility: CLINIC | Age: 71
End: 2021-06-22
Attending: INTERNAL MEDICINE
Payer: MEDICARE

## 2021-06-22 VITALS
SYSTOLIC BLOOD PRESSURE: 119 MMHG | HEART RATE: 72 BPM | DIASTOLIC BLOOD PRESSURE: 72 MMHG | OXYGEN SATURATION: 94 % | BODY MASS INDEX: 39.51 KG/M2 | WEIGHT: 275.38 LBS

## 2021-06-22 DIAGNOSIS — I43 CARDIOMYOPATHY DUE TO SYSTEMIC DISEASE: Primary | ICD-10-CM

## 2021-06-22 DIAGNOSIS — Z95.810 ICD (IMPLANTABLE CARDIOVERTER-DEFIBRILLATOR) IN PLACE: ICD-10-CM

## 2021-06-22 DIAGNOSIS — I42.9 IDIOPATHIC CARDIOMYOPATHY: ICD-10-CM

## 2021-06-22 DIAGNOSIS — E78.5 HYPERLIPIDEMIA LDL GOAL <100: ICD-10-CM

## 2021-06-22 DIAGNOSIS — I48.0 PAF (PAROXYSMAL ATRIAL FIBRILLATION): Chronic | ICD-10-CM

## 2021-06-22 DIAGNOSIS — I49.01 VF (VENTRICULAR FIBRILLATION): ICD-10-CM

## 2021-06-22 DIAGNOSIS — I47.29 PAROXYSMAL VT: ICD-10-CM

## 2021-06-22 DIAGNOSIS — E66.01 CLASS 3 SEVERE OBESITY DUE TO EXCESS CALORIES WITH SERIOUS COMORBIDITY AND BODY MASS INDEX (BMI) OF 40.0 TO 44.9 IN ADULT: Chronic | ICD-10-CM

## 2021-06-22 DIAGNOSIS — I43 CARDIOMYOPATHY DUE TO SYSTEMIC DISEASE: ICD-10-CM

## 2021-06-22 DIAGNOSIS — G47.33 OBSTRUCTIVE SLEEP APNEA SYNDROME: Chronic | ICD-10-CM

## 2021-06-22 DIAGNOSIS — Z79.899 AT RISK FOR AMIODARONE TOXICITY WITH LONG TERM USE: Chronic | ICD-10-CM

## 2021-06-22 DIAGNOSIS — E78.5 HYPERLIPIDEMIA LDL GOAL <100: Chronic | ICD-10-CM

## 2021-06-22 DIAGNOSIS — Z00.01 ENCOUNTER FOR GENERAL ADULT MEDICAL EXAMINATION WITH ABNORMAL FINDINGS: ICD-10-CM

## 2021-06-22 DIAGNOSIS — Z79.899 HIGH RISK MEDICATIONS (NOT ANTICOAGULANTS) LONG-TERM USE: ICD-10-CM

## 2021-06-22 DIAGNOSIS — Z79.899 ON AMIODARONE THERAPY: ICD-10-CM

## 2021-06-22 DIAGNOSIS — I50.30 CONGESTIVE HEART FAILURE WITH LEFT VENTRICULAR DIASTOLIC DYSFUNCTION, NYHA CLASS 3: Primary | ICD-10-CM

## 2021-06-22 DIAGNOSIS — Z45.02 ICD (IMPLANTABLE CARDIOVERTER-DEFIBRILLATOR) DISCHARGE: ICD-10-CM

## 2021-06-22 DIAGNOSIS — I47.29 PAROXYSMAL VT: Primary | ICD-10-CM

## 2021-06-22 DIAGNOSIS — Z91.89 AT RISK FOR AMIODARONE TOXICITY WITH LONG TERM USE: Chronic | ICD-10-CM

## 2021-06-22 PROCEDURE — 99215 PR OFFICE/OUTPT VISIT, EST, LEVL V, 40-54 MIN: ICD-10-PCS | Mod: S$PBB,,, | Performed by: INTERNAL MEDICINE

## 2021-06-22 PROCEDURE — 93283 CARDIAC DEVICE CHECK - IN CLINIC & HOSPITAL: ICD-10-PCS | Mod: 26,,, | Performed by: INTERNAL MEDICINE

## 2021-06-22 PROCEDURE — 93010 EKG 12-LEAD: ICD-10-PCS | Mod: ,,, | Performed by: INTERNAL MEDICINE

## 2021-06-22 PROCEDURE — 93005 ELECTROCARDIOGRAM TRACING: CPT

## 2021-06-22 PROCEDURE — 99999 PR PBB SHADOW E&M-EST. PATIENT-LVL III: ICD-10-PCS | Mod: PBBFAC,,, | Performed by: INTERNAL MEDICINE

## 2021-06-22 PROCEDURE — 93283 PRGRMG EVAL IMPLANTABLE DFB: CPT | Mod: 26,,, | Performed by: INTERNAL MEDICINE

## 2021-06-22 PROCEDURE — 93283 PRGRMG EVAL IMPLANTABLE DFB: CPT

## 2021-06-22 PROCEDURE — 99215 OFFICE O/P EST HI 40 MIN: CPT | Mod: S$PBB,,, | Performed by: INTERNAL MEDICINE

## 2021-06-22 PROCEDURE — 99213 OFFICE O/P EST LOW 20 MIN: CPT | Mod: PBBFAC,25 | Performed by: INTERNAL MEDICINE

## 2021-06-22 PROCEDURE — 99999 PR PBB SHADOW E&M-EST. PATIENT-LVL III: CPT | Mod: PBBFAC,,, | Performed by: INTERNAL MEDICINE

## 2021-06-22 PROCEDURE — 93010 ELECTROCARDIOGRAM REPORT: CPT | Mod: ,,, | Performed by: INTERNAL MEDICINE

## 2021-06-22 RX ORDER — MECLIZINE HYDROCHLORIDE 25 MG/1
25 TABLET ORAL 3 TIMES DAILY PRN
COMMUNITY
Start: 2021-06-20 | End: 2022-02-07 | Stop reason: ALTCHOICE

## 2021-06-22 RX ORDER — PRAVASTATIN SODIUM 80 MG/1
80 TABLET ORAL DAILY
Qty: 30 TABLET | Refills: 11 | Status: SHIPPED | OUTPATIENT
Start: 2021-06-22 | End: 2021-06-25 | Stop reason: SDUPTHER

## 2021-06-22 RX ORDER — ONDANSETRON 4 MG/1
4 TABLET, ORALLY DISINTEGRATING ORAL EVERY 8 HOURS PRN
COMMUNITY
Start: 2021-06-20 | End: 2022-02-07 | Stop reason: ALTCHOICE

## 2021-06-24 ENCOUNTER — PATIENT MESSAGE (OUTPATIENT)
Dept: CARDIOLOGY | Facility: CLINIC | Age: 71
End: 2021-06-24

## 2021-06-24 ENCOUNTER — PES CALL (OUTPATIENT)
Dept: ADMINISTRATIVE | Facility: CLINIC | Age: 71
End: 2021-06-24

## 2021-06-24 ENCOUNTER — TELEPHONE (OUTPATIENT)
Dept: CARDIOLOGY | Facility: CLINIC | Age: 71
End: 2021-06-24

## 2021-06-24 LAB
CHARGE TIME (SEC): 9.4 SEC
HV IMPEDANCE (OHM): 47 OHM
IMPEDANCE RA LEAD (NATIVE): 310 OHMS
IMPEDANCE RA LEAD: 350 OHMS
OHS CV DC PP MS1: 0.5 MS
OHS CV DC PP V1: NORMAL V
P/R-WAVE RA LEAD (NATIVE): 8.4 MV
P/R-WAVE RA LEAD: 3 MV
THRESHOLD MS RA LEAD: 0.5 MS
THRESHOLD V RA LEAD: 0.75 V

## 2021-06-25 DIAGNOSIS — E78.5 HYPERLIPIDEMIA LDL GOAL <100: Chronic | ICD-10-CM

## 2021-06-25 DIAGNOSIS — Z00.01 ENCOUNTER FOR GENERAL ADULT MEDICAL EXAMINATION WITH ABNORMAL FINDINGS: ICD-10-CM

## 2021-06-25 RX ORDER — PRAVASTATIN SODIUM 80 MG/1
80 TABLET ORAL DAILY
Qty: 30 TABLET | Refills: 11 | Status: SHIPPED | OUTPATIENT
Start: 2021-06-25 | End: 2022-07-05

## 2021-06-29 ENCOUNTER — LAB VISIT (OUTPATIENT)
Dept: LAB | Facility: HOSPITAL | Age: 71
End: 2021-06-29
Attending: INTERNAL MEDICINE
Payer: MEDICARE

## 2021-06-29 DIAGNOSIS — I47.29 PAROXYSMAL VT: ICD-10-CM

## 2021-06-29 DIAGNOSIS — I49.01 VF (VENTRICULAR FIBRILLATION): ICD-10-CM

## 2021-06-29 PROCEDURE — 36415 COLL VENOUS BLD VENIPUNCTURE: CPT | Mod: PO | Performed by: INTERNAL MEDICINE

## 2021-06-29 PROCEDURE — 80151 DRUG ASSAY AMIODARONE: CPT | Performed by: INTERNAL MEDICINE

## 2021-06-30 ENCOUNTER — EXTERNAL CHRONIC CARE MANAGEMENT (OUTPATIENT)
Dept: PRIMARY CARE CLINIC | Facility: CLINIC | Age: 71
End: 2021-06-30
Payer: MEDICARE

## 2021-06-30 ENCOUNTER — HOSPITAL ENCOUNTER (OUTPATIENT)
Dept: CARDIOLOGY | Facility: HOSPITAL | Age: 71
Discharge: HOME | End: 2021-06-30
Attending: INTERNAL MEDICINE
Payer: MEDICARE

## 2021-06-30 VITALS
SYSTOLIC BLOOD PRESSURE: 119 MMHG | DIASTOLIC BLOOD PRESSURE: 72 MMHG | WEIGHT: 275 LBS | BODY MASS INDEX: 39.37 KG/M2 | HEART RATE: 70 BPM | HEIGHT: 70 IN

## 2021-06-30 DIAGNOSIS — E78.5 HYPERLIPIDEMIA LDL GOAL <100: ICD-10-CM

## 2021-06-30 DIAGNOSIS — Z79.899 ON AMIODARONE THERAPY: ICD-10-CM

## 2021-06-30 DIAGNOSIS — I43 CARDIOMYOPATHY DUE TO SYSTEMIC DISEASE: ICD-10-CM

## 2021-06-30 PROCEDURE — 93306 TTE W/DOPPLER COMPLETE: CPT | Mod: PO

## 2021-06-30 PROCEDURE — 99490 PR CHRONIC CARE MGMT, 1ST 20 MIN: ICD-10-PCS | Mod: S$PBB,,, | Performed by: FAMILY MEDICINE

## 2021-06-30 PROCEDURE — 99490 CHRNC CARE MGMT STAFF 1ST 20: CPT | Mod: PBBFAC,PO | Performed by: FAMILY MEDICINE

## 2021-06-30 PROCEDURE — 93306 ECHO (CUPID ONLY): ICD-10-PCS | Mod: 26,,, | Performed by: INTERNAL MEDICINE

## 2021-06-30 PROCEDURE — 93306 TTE W/DOPPLER COMPLETE: CPT | Mod: 26,,, | Performed by: INTERNAL MEDICINE

## 2021-06-30 PROCEDURE — 99490 CHRNC CARE MGMT STAFF 1ST 20: CPT | Mod: S$PBB,,, | Performed by: FAMILY MEDICINE

## 2021-07-01 ENCOUNTER — PATIENT MESSAGE (OUTPATIENT)
Dept: CARDIOLOGY | Facility: CLINIC | Age: 71
End: 2021-07-01

## 2021-07-01 ENCOUNTER — TELEPHONE (OUTPATIENT)
Dept: CARDIOLOGY | Facility: CLINIC | Age: 71
End: 2021-07-01

## 2021-07-01 DIAGNOSIS — Z79.899 HIGH RISK MEDICATIONS (NOT ANTICOAGULANTS) LONG-TERM USE: ICD-10-CM

## 2021-07-01 DIAGNOSIS — I43 CARDIOMYOPATHY DUE TO SYSTEMIC DISEASE: Primary | ICD-10-CM

## 2021-07-01 LAB
AORTIC ROOT ANNULUS: 3.78 CM
ASCENDING AORTA: 3.15 CM
AV INDEX (PROSTH): 0.51
AV MEAN GRADIENT: 6 MMHG
AV PEAK GRADIENT: 10 MMHG
AV VALVE AREA: 2.41 CM2
AV VELOCITY RATIO: 0.48
BSA FOR ECHO PROCEDURE: 2.48 M2
CV ECHO LV RWT: 0.5 CM
DOP CALC AO PEAK VEL: 1.55 M/S
DOP CALC AO VTI: 26 CM
DOP CALC LVOT AREA: 4.8 CM2
DOP CALC LVOT DIAMETER: 2.46 CM
DOP CALC LVOT PEAK VEL: 0.74 M/S
DOP CALC LVOT STROKE VOLUME: 62.71 CM3
DOP CALC RVOT PEAK VEL: 0.7 M/S
DOP CALC RVOT VTI: 13.05 CM
DOP CALCLVOT PEAK VEL VTI: 13.2 CM
E WAVE DECELERATION TIME: 195.96 MSEC
E/A RATIO: 0.35
E/E' RATIO: 4 M/S
ECHO LV POSTERIOR WALL: 1.51 CM (ref 0.6–1.1)
EJECTION FRACTION: 20 %
FRACTIONAL SHORTENING: 17 % (ref 28–44)
INTERVENTRICULAR SEPTUM: 1.89 CM (ref 0.6–1.1)
IVRT: 188.39 MSEC
LA MAJOR: 6.94 CM
LA MINOR: 6.79 CM
LA WIDTH: 4.35 CM
LEFT ATRIUM SIZE: 4.06 CM
LEFT ATRIUM VOLUME INDEX: 43.1 ML/M2
LEFT ATRIUM VOLUME: 103.04 CM3
LEFT INTERNAL DIMENSION IN SYSTOLE: 4.97 CM (ref 2.1–4)
LEFT VENTRICLE DIASTOLIC VOLUME INDEX: 75.49 ML/M2
LEFT VENTRICLE DIASTOLIC VOLUME: 180.41 ML
LEFT VENTRICLE MASS INDEX: 215 G/M2
LEFT VENTRICLE SYSTOLIC VOLUME INDEX: 48.8 ML/M2
LEFT VENTRICLE SYSTOLIC VOLUME: 116.61 ML
LEFT VENTRICULAR INTERNAL DIMENSION IN DIASTOLE: 6.01 CM (ref 3.5–6)
LEFT VENTRICULAR MASS: 513.24 G
LV LATERAL E/E' RATIO: 3.27 M/S
LV SEPTAL E/E' RATIO: 5.14 M/S
MV A" WAVE DURATION": 7.99 MSEC
MV PEAK A VEL: 1.04 M/S
MV PEAK E VEL: 0.36 M/S
MV STENOSIS PRESSURE HALF TIME: 56.83 MS
MV VALVE AREA P 1/2 METHOD: 3.87 CM2
PISA TR MAX VEL: 1.95 M/S
PULM VEIN S/D RATIO: 1.61
PV MEAN GRADIENT: 1 MMHG
PV PEAK D VEL: 0.33 M/S
PV PEAK S VEL: 0.53 M/S
PV PEAK VELOCITY: 0.91 CM/S
RA MAJOR: 6.13 CM
RA WIDTH: 3.3 CM
RIGHT VENTRICULAR END-DIASTOLIC DIMENSION: 3.26 CM
SINUS: 3.29 CM
STJ: 3.1 CM
TDI LATERAL: 0.11 M/S
TDI SEPTAL: 0.07 M/S
TDI: 0.09 M/S
TR MAX PG: 15 MMHG
TRICUSPID ANNULAR PLANE SYSTOLIC EXCURSION: 1.6 CM

## 2021-07-02 ENCOUNTER — TELEPHONE (OUTPATIENT)
Dept: CARDIOLOGY | Facility: CLINIC | Age: 71
End: 2021-07-02

## 2021-07-02 LAB
AMIODARONE SERPL-SCNC: 1.3 UG/ML (ref 1–3)
N-DESETHYL-AMIODARONE: 0.8 UG/ML

## 2021-07-07 ENCOUNTER — PATIENT MESSAGE (OUTPATIENT)
Dept: CARDIOLOGY | Facility: CLINIC | Age: 71
End: 2021-07-07

## 2021-07-15 ENCOUNTER — TELEPHONE (OUTPATIENT)
Dept: CARDIOLOGY | Facility: HOSPITAL | Age: 71
End: 2021-07-15

## 2021-07-21 ENCOUNTER — HOSPITAL ENCOUNTER (OUTPATIENT)
Dept: CARDIOLOGY | Facility: HOSPITAL | Age: 71
Discharge: HOME | End: 2021-07-21
Attending: INTERNAL MEDICINE
Payer: MEDICARE

## 2021-07-21 VITALS
SYSTOLIC BLOOD PRESSURE: 101 MMHG | HEIGHT: 70 IN | DIASTOLIC BLOOD PRESSURE: 69 MMHG | HEART RATE: 71 BPM | WEIGHT: 272 LBS | BODY MASS INDEX: 38.94 KG/M2

## 2021-07-21 DIAGNOSIS — I43 CARDIOMYOPATHY DUE TO SYSTEMIC DISEASE: ICD-10-CM

## 2021-07-21 DIAGNOSIS — Z79.899 ON AMIODARONE THERAPY: ICD-10-CM

## 2021-07-21 DIAGNOSIS — E78.5 HYPERLIPIDEMIA LDL GOAL <100: ICD-10-CM

## 2021-07-21 LAB
CV STRESS BASE HR: 71 BPM
DIASTOLIC BLOOD PRESSURE: 69 MMHG
OHS CV CPX 1 MINUTE RECOVERY HEART RATE: 106 BPM
OHS CV CPX 85 PERCENT MAX PREDICTED HEART RATE MALE: 128
OHS CV CPX ANAEROBIC THRESHOLD DIASTOLIC BLOOD PRESSURE: 67 MMHG
OHS CV CPX ANAEROBIC THRESHOLD HEART RATE: 95
OHS CV CPX ANAEROBIC THRESHOLD RATE PRESSURE PRODUCT: NORMAL
OHS CV CPX ANAEROBIC THRESHOLD SYSTOLIC BLOOD PRESSURE: 137
OHS CV CPX DATA GRADE - AT: 3.7
OHS CV CPX DATA GRADE - PEAK: 5.5
OHS CV CPX DATA O2 SAT - PEAK: 96
OHS CV CPX DATA O2 SAT - REST: 94
OHS CV CPX DATA SPEED - AT: 2.6
OHS CV CPX DATA SPEED - PEAK: 3.2
OHS CV CPX DATA TIME - AT: 5.83
OHS CV CPX DATA TIME - PEAK: 8.48
OHS CV CPX DATA VE/VCO2 - AT: 32
OHS CV CPX DATA VE/VCO2 - PEAK: 41
OHS CV CPX DATA VE/VO2 - AT: 29
OHS CV CPX DATA VE/VO2 - PEAK: 46
OHS CV CPX DATA VO2 - AT: 13.9
OHS CV CPX DATA VO2 - PEAK: 16.5
OHS CV CPX DATA VO2 - REST: 3.9
OHS CV CPX FEV1/FVC: 0.71
OHS CV CPX FORCED EXPIRATORY VOLUME: 2.66
OHS CV CPX FORCED VITAL CAPACITY (FVC): 3.76
OHS CV CPX HIGHEST VO: 33.1
OHS CV CPX MAX PREDICTED HEART RATE: 151
OHS CV CPX MAXIMAL VOLUNTARY VENTILATION (MVV) PREDICTED: 106.4
OHS CV CPX MAXIMAL VOLUNTARY VENTILATION (MVV): 111
OHS CV CPX MAXIUMUM EXERCISE VENTILATION (VE MAX): 92.6
OHS CV CPX PATIENT AGE: 69
OHS CV CPX PATIENT HEIGHT IN: 70
OHS CV CPX PATIENT IS FEMALE AGE 11-19: 0
OHS CV CPX PATIENT IS FEMALE AGE GREATER THAN 19: 0
OHS CV CPX PATIENT IS FEMALE AGE LESS THAN 11: 0
OHS CV CPX PATIENT IS FEMALE: 0
OHS CV CPX PATIENT IS MALE AGE 11-25: 0
OHS CV CPX PATIENT IS MALE AGE GREATER THAN 25: 1
OHS CV CPX PATIENT IS MALE AGE LESS THAN 11: 0
OHS CV CPX PATIENT IS MALE GREATER THAN 18: 1
OHS CV CPX PATIENT IS MALE LESS THAN OR EQUAL TO 18: 0
OHS CV CPX PATIENT IS MALE: 1
OHS CV CPX PATIENT WEIGHT RETURNED IN OZ: 4352
OHS CV CPX PEAK DIASTOLIC BLOOD PRESSURE: 92 MMHG
OHS CV CPX PEAK HEAR RATE: 120 BPM
OHS CV CPX PEAK RATE PRESSURE PRODUCT: NORMAL
OHS CV CPX PEAK SYSTOLIC BLOOD PRESSURE: 166 MMHG
OHS CV CPX PERCENT BODY FAT: 24.7
OHS CV CPX PERCENT MAX PREDICTED HEART RATE ACHIEVED: 79
OHS CV CPX PREDICTED VO2: 33.1 ML/KG/MIN
OHS CV CPX RATE PRESSURE PRODUCT PRESENTING: 7171
OHS CV CPX REST PET CO2: 26
OHS CV CPX VE/VCO2 SLOPE: 27.5
STRESS ECHO POST EXERCISE DUR MIN: 8 MINUTES
STRESS ECHO POST EXERCISE DUR SEC: 29 SECONDS
SYSTOLIC BLOOD PRESSURE: 101 MMHG

## 2021-07-21 PROCEDURE — 94621 CARDIOPULM EXERCISE TESTING: CPT | Mod: 26,,, | Performed by: INTERNAL MEDICINE

## 2021-07-21 PROCEDURE — 94621 CARDIOPULM EXERCISE TESTING: CPT

## 2021-07-21 PROCEDURE — 94621 CARDIOPULMONARY EXERCISE TESTING (CUPID ONLY): ICD-10-PCS | Mod: 26,,, | Performed by: INTERNAL MEDICINE

## 2021-07-22 ENCOUNTER — TELEPHONE (OUTPATIENT)
Dept: CARDIOLOGY | Facility: CLINIC | Age: 71
End: 2021-07-22

## 2021-07-23 ENCOUNTER — LAB VISIT (OUTPATIENT)
Dept: LAB | Facility: HOSPITAL | Age: 71
End: 2021-07-23
Payer: MEDICARE

## 2021-07-23 DIAGNOSIS — I48.0 PAF (PAROXYSMAL ATRIAL FIBRILLATION): ICD-10-CM

## 2021-07-23 DIAGNOSIS — Z00.01 ENCOUNTER FOR GENERAL ADULT MEDICAL EXAMINATION WITH ABNORMAL FINDINGS: ICD-10-CM

## 2021-07-23 DIAGNOSIS — Z79.899 AT RISK FOR AMIODARONE TOXICITY WITH LONG TERM USE: ICD-10-CM

## 2021-07-23 DIAGNOSIS — Z91.89 AT RISK FOR AMIODARONE TOXICITY WITH LONG TERM USE: ICD-10-CM

## 2021-07-23 DIAGNOSIS — I10 ESSENTIAL HYPERTENSION: ICD-10-CM

## 2021-07-23 DIAGNOSIS — E78.5 HYPERLIPIDEMIA LDL GOAL <100: ICD-10-CM

## 2021-07-23 DIAGNOSIS — Z79.899 ENCOUNTER FOR LONG-TERM (CURRENT) USE OF MEDICATIONS: ICD-10-CM

## 2021-07-23 LAB
BASOPHILS # BLD AUTO: 0.05 K/UL (ref 0–0.2)
BASOPHILS NFR BLD: 0.6 % (ref 0–1.9)
DIFFERENTIAL METHOD: ABNORMAL
EOSINOPHIL # BLD AUTO: 0.2 K/UL (ref 0–0.5)
EOSINOPHIL NFR BLD: 1.9 % (ref 0–8)
ERYTHROCYTE [DISTWIDTH] IN BLOOD BY AUTOMATED COUNT: 13.4 % (ref 11.5–14.5)
HCT VFR BLD AUTO: 42.4 % (ref 40–54)
HGB BLD-MCNC: 13.6 G/DL (ref 14–18)
IMM GRANULOCYTES # BLD AUTO: 0.02 K/UL (ref 0–0.04)
IMM GRANULOCYTES NFR BLD AUTO: 0.3 % (ref 0–0.5)
LYMPHOCYTES # BLD AUTO: 2.1 K/UL (ref 1–4.8)
LYMPHOCYTES NFR BLD: 26.4 % (ref 18–48)
MCH RBC QN AUTO: 30.6 PG (ref 27–31)
MCHC RBC AUTO-ENTMCNC: 32.1 G/DL (ref 32–36)
MCV RBC AUTO: 96 FL (ref 82–98)
MONOCYTES # BLD AUTO: 0.9 K/UL (ref 0.3–1)
MONOCYTES NFR BLD: 11.7 % (ref 4–15)
NEUTROPHILS # BLD AUTO: 4.6 K/UL (ref 1.8–7.7)
NEUTROPHILS NFR BLD: 59.1 % (ref 38–73)
NRBC BLD-RTO: 0 /100 WBC
PLATELET # BLD AUTO: 332 K/UL (ref 150–450)
PMV BLD AUTO: 8.9 FL (ref 9.2–12.9)
RBC # BLD AUTO: 4.44 M/UL (ref 4.6–6.2)
WBC # BLD AUTO: 7.76 K/UL (ref 3.9–12.7)

## 2021-07-23 PROCEDURE — 80053 COMPREHEN METABOLIC PANEL: CPT | Performed by: FAMILY MEDICINE

## 2021-07-23 PROCEDURE — 85025 COMPLETE CBC W/AUTO DIFF WBC: CPT | Performed by: FAMILY MEDICINE

## 2021-07-23 PROCEDURE — 36415 COLL VENOUS BLD VENIPUNCTURE: CPT | Mod: PO | Performed by: FAMILY MEDICINE

## 2021-07-23 PROCEDURE — 84443 ASSAY THYROID STIM HORMONE: CPT | Performed by: FAMILY MEDICINE

## 2021-07-23 PROCEDURE — 80061 LIPID PANEL: CPT | Performed by: FAMILY MEDICINE

## 2021-07-23 PROCEDURE — 83036 HEMOGLOBIN GLYCOSYLATED A1C: CPT | Performed by: FAMILY MEDICINE

## 2021-07-24 LAB
ALBUMIN SERPL BCP-MCNC: 3.9 G/DL (ref 3.5–5.2)
ALP SERPL-CCNC: 37 U/L (ref 55–135)
ALT SERPL W/O P-5'-P-CCNC: 33 U/L (ref 10–44)
ANION GAP SERPL CALC-SCNC: 11 MMOL/L (ref 8–16)
AST SERPL-CCNC: 29 U/L (ref 10–40)
BILIRUB SERPL-MCNC: 0.6 MG/DL (ref 0.1–1)
BUN SERPL-MCNC: 16 MG/DL (ref 8–23)
CALCIUM SERPL-MCNC: 9 MG/DL (ref 8.7–10.5)
CHLORIDE SERPL-SCNC: 106 MMOL/L (ref 95–110)
CHOLEST SERPL-MCNC: 170 MG/DL (ref 120–199)
CHOLEST/HDLC SERPL: 4.4 {RATIO} (ref 2–5)
CO2 SERPL-SCNC: 21 MMOL/L (ref 23–29)
CREAT SERPL-MCNC: 1 MG/DL (ref 0.5–1.4)
EST. GFR  (AFRICAN AMERICAN): >60 ML/MIN/1.73 M^2
EST. GFR  (NON AFRICAN AMERICAN): >60 ML/MIN/1.73 M^2
ESTIMATED AVG GLUCOSE: 123 MG/DL (ref 68–131)
GLUCOSE SERPL-MCNC: 95 MG/DL (ref 70–110)
HBA1C MFR BLD: 5.9 % (ref 4–5.6)
HDLC SERPL-MCNC: 39 MG/DL (ref 40–75)
HDLC SERPL: 22.9 % (ref 20–50)
LDLC SERPL CALC-MCNC: 93.6 MG/DL (ref 63–159)
NONHDLC SERPL-MCNC: 131 MG/DL
POTASSIUM SERPL-SCNC: 4.1 MMOL/L (ref 3.5–5.1)
PROT SERPL-MCNC: 7.2 G/DL (ref 6–8.4)
SODIUM SERPL-SCNC: 138 MMOL/L (ref 136–145)
TRIGL SERPL-MCNC: 187 MG/DL (ref 30–150)
TSH SERPL DL<=0.005 MIU/L-ACNC: 1.95 UIU/ML (ref 0.4–4)

## 2021-07-26 ENCOUNTER — TELEPHONE (OUTPATIENT)
Dept: CARDIOLOGY | Facility: CLINIC | Age: 71
End: 2021-07-26

## 2021-07-27 ENCOUNTER — TELEPHONE (OUTPATIENT)
Dept: CARDIOLOGY | Facility: CLINIC | Age: 71
End: 2021-07-27

## 2021-07-27 ENCOUNTER — PATIENT MESSAGE (OUTPATIENT)
Dept: CARDIOLOGY | Facility: CLINIC | Age: 71
End: 2021-07-27

## 2021-07-27 ENCOUNTER — TELEPHONE (OUTPATIENT)
Dept: FAMILY MEDICINE | Facility: CLINIC | Age: 71
End: 2021-07-27

## 2021-07-27 ENCOUNTER — PATIENT MESSAGE (OUTPATIENT)
Dept: FAMILY MEDICINE | Facility: CLINIC | Age: 71
End: 2021-07-27

## 2021-07-28 ENCOUNTER — TELEPHONE (OUTPATIENT)
Dept: CARDIOLOGY | Facility: CLINIC | Age: 71
End: 2021-07-28

## 2021-07-31 ENCOUNTER — EXTERNAL CHRONIC CARE MANAGEMENT (OUTPATIENT)
Dept: PRIMARY CARE CLINIC | Facility: CLINIC | Age: 71
End: 2021-07-31
Payer: MEDICARE

## 2021-07-31 ENCOUNTER — PATIENT MESSAGE (OUTPATIENT)
Dept: FAMILY MEDICINE | Facility: CLINIC | Age: 71
End: 2021-07-31

## 2021-07-31 PROCEDURE — 99490 CHRNC CARE MGMT STAFF 1ST 20: CPT | Mod: S$PBB,,, | Performed by: FAMILY MEDICINE

## 2021-07-31 PROCEDURE — 99490 CHRNC CARE MGMT STAFF 1ST 20: CPT | Mod: PBBFAC,PO | Performed by: FAMILY MEDICINE

## 2021-07-31 PROCEDURE — 99490 PR CHRONIC CARE MGMT, 1ST 20 MIN: ICD-10-PCS | Mod: S$PBB,,, | Performed by: FAMILY MEDICINE

## 2021-08-02 ENCOUNTER — OFFICE VISIT (OUTPATIENT)
Dept: FAMILY MEDICINE | Facility: CLINIC | Age: 71
End: 2021-08-02
Payer: MEDICARE

## 2021-08-02 ENCOUNTER — PATIENT MESSAGE (OUTPATIENT)
Dept: PULMONOLOGY | Facility: CLINIC | Age: 71
End: 2021-08-02

## 2021-08-02 VITALS
WEIGHT: 275.5 LBS | BODY MASS INDEX: 39.44 KG/M2 | DIASTOLIC BLOOD PRESSURE: 81 MMHG | OXYGEN SATURATION: 98 % | TEMPERATURE: 99 F | HEIGHT: 70 IN | HEART RATE: 70 BPM | SYSTOLIC BLOOD PRESSURE: 128 MMHG

## 2021-08-02 DIAGNOSIS — Z13.6 ENCOUNTER FOR LIPID SCREENING FOR CARDIOVASCULAR DISEASE: ICD-10-CM

## 2021-08-02 DIAGNOSIS — Z13.220 ENCOUNTER FOR LIPID SCREENING FOR CARDIOVASCULAR DISEASE: ICD-10-CM

## 2021-08-02 DIAGNOSIS — E11.65 TYPE 2 DIABETES MELLITUS WITH HYPERGLYCEMIA, WITHOUT LONG-TERM CURRENT USE OF INSULIN: ICD-10-CM

## 2021-08-02 DIAGNOSIS — Z79.899 ENCOUNTER FOR LONG-TERM (CURRENT) USE OF MEDICATIONS: ICD-10-CM

## 2021-08-02 DIAGNOSIS — Z12.5 ENCOUNTER FOR PROSTATE CANCER SCREENING: ICD-10-CM

## 2021-08-02 DIAGNOSIS — Z00.00 WELL ADULT EXAM: Primary | ICD-10-CM

## 2021-08-02 PROBLEM — R73.03 PREDIABETES: Status: ACTIVE | Noted: 2021-08-02

## 2021-08-02 PROCEDURE — 99397 PR PREVENTIVE VISIT,EST,65 & OVER: ICD-10-PCS | Mod: S$PBB,,, | Performed by: FAMILY MEDICINE

## 2021-08-02 PROCEDURE — 99215 OFFICE O/P EST HI 40 MIN: CPT | Mod: PBBFAC,PO | Performed by: FAMILY MEDICINE

## 2021-08-02 PROCEDURE — 99397 PER PM REEVAL EST PAT 65+ YR: CPT | Mod: S$PBB,,, | Performed by: FAMILY MEDICINE

## 2021-08-02 PROCEDURE — 99999 PR PBB SHADOW E&M-EST. PATIENT-LVL V: ICD-10-PCS | Mod: PBBFAC,,, | Performed by: FAMILY MEDICINE

## 2021-08-02 PROCEDURE — 99999 PR PBB SHADOW E&M-EST. PATIENT-LVL V: CPT | Mod: PBBFAC,,, | Performed by: FAMILY MEDICINE

## 2021-08-02 RX ORDER — TRAZODONE HYDROCHLORIDE 150 MG/1
150 TABLET ORAL NIGHTLY
Qty: 90 TABLET | Refills: 4 | Status: SHIPPED | OUTPATIENT
Start: 2021-08-02 | End: 2022-08-25 | Stop reason: SDUPTHER

## 2021-08-02 RX ORDER — LIFITEGRAST 50 MG/ML
SOLUTION/ DROPS OPHTHALMIC
COMMUNITY
Start: 2021-07-28 | End: 2022-02-07 | Stop reason: ALTCHOICE

## 2021-08-03 ENCOUNTER — TELEPHONE (OUTPATIENT)
Dept: RESEARCH | Facility: HOSPITAL | Age: 71
End: 2021-08-03

## 2021-08-23 ENCOUNTER — LAB VISIT (OUTPATIENT)
Dept: LAB | Facility: HOSPITAL | Age: 71
End: 2021-08-23
Attending: INTERNAL MEDICINE
Payer: MEDICARE

## 2021-08-23 DIAGNOSIS — E78.5 HYPERLIPIDEMIA LDL GOAL <100: ICD-10-CM

## 2021-08-23 DIAGNOSIS — I43 CARDIOMYOPATHY DUE TO SYSTEMIC DISEASE: ICD-10-CM

## 2021-08-23 DIAGNOSIS — Z79.899 ON AMIODARONE THERAPY: ICD-10-CM

## 2021-08-23 LAB
CHOLEST SERPL-MCNC: 171 MG/DL (ref 120–199)
CHOLEST/HDLC SERPL: 4.2 {RATIO} (ref 2–5)
HDLC SERPL-MCNC: 41 MG/DL (ref 40–75)
HDLC SERPL: 24 % (ref 20–50)
LDLC SERPL CALC-MCNC: 104.4 MG/DL (ref 63–159)
NONHDLC SERPL-MCNC: 130 MG/DL
TRIGL SERPL-MCNC: 128 MG/DL (ref 30–150)

## 2021-08-23 PROCEDURE — 36415 COLL VENOUS BLD VENIPUNCTURE: CPT | Mod: PO | Performed by: INTERNAL MEDICINE

## 2021-08-23 PROCEDURE — 80061 LIPID PANEL: CPT | Performed by: INTERNAL MEDICINE

## 2021-08-23 PROCEDURE — 82550 ASSAY OF CK (CPK): CPT | Performed by: INTERNAL MEDICINE

## 2021-08-24 LAB — CK SERPL-CCNC: 147 U/L (ref 20–200)

## 2021-08-27 ENCOUNTER — CLINICAL SUPPORT (OUTPATIENT)
Dept: CARDIOLOGY | Facility: HOSPITAL | Age: 71
End: 2021-08-27
Payer: MEDICARE

## 2021-08-27 DIAGNOSIS — Z95.810 PRESENCE OF AUTOMATIC (IMPLANTABLE) CARDIAC DEFIBRILLATOR: ICD-10-CM

## 2021-08-27 PROCEDURE — 93295 DEV INTERROG REMOTE 1/2/MLT: CPT | Mod: ,,, | Performed by: INTERNAL MEDICINE

## 2021-08-27 PROCEDURE — 93295 CARDIAC DEVICE CHECK - REMOTE: ICD-10-PCS | Mod: ,,, | Performed by: INTERNAL MEDICINE

## 2021-08-31 ENCOUNTER — PATIENT MESSAGE (OUTPATIENT)
Dept: CARDIOLOGY | Facility: CLINIC | Age: 71
End: 2021-08-31

## 2021-08-31 ENCOUNTER — EXTERNAL CHRONIC CARE MANAGEMENT (OUTPATIENT)
Dept: PRIMARY CARE CLINIC | Facility: CLINIC | Age: 71
End: 2021-08-31
Payer: MEDICARE

## 2021-08-31 ENCOUNTER — TELEPHONE (OUTPATIENT)
Dept: CARDIOLOGY | Facility: CLINIC | Age: 71
End: 2021-08-31

## 2021-08-31 PROCEDURE — 99490 CHRNC CARE MGMT STAFF 1ST 20: CPT | Mod: S$PBB,,, | Performed by: FAMILY MEDICINE

## 2021-08-31 PROCEDURE — 99490 CHRNC CARE MGMT STAFF 1ST 20: CPT | Mod: PBBFAC,PO | Performed by: FAMILY MEDICINE

## 2021-08-31 PROCEDURE — 99490 PR CHRONIC CARE MGMT, 1ST 20 MIN: ICD-10-PCS | Mod: S$PBB,,, | Performed by: FAMILY MEDICINE

## 2021-09-24 DIAGNOSIS — Z00.01 ENCOUNTER FOR GENERAL ADULT MEDICAL EXAMINATION WITH ABNORMAL FINDINGS: ICD-10-CM

## 2021-09-24 RX ORDER — CARVEDILOL 25 MG/1
TABLET ORAL
Qty: 180 TABLET | Refills: 4 | Status: SHIPPED | OUTPATIENT
Start: 2021-09-24 | End: 2021-09-27

## 2021-09-27 ENCOUNTER — PATIENT MESSAGE (OUTPATIENT)
Dept: CARDIOLOGY | Facility: CLINIC | Age: 71
End: 2021-09-27

## 2021-09-27 DIAGNOSIS — Z00.01 ENCOUNTER FOR GENERAL ADULT MEDICAL EXAMINATION WITH ABNORMAL FINDINGS: ICD-10-CM

## 2021-09-27 RX ORDER — CARVEDILOL 25 MG/1
TABLET ORAL
Qty: 180 TABLET | Refills: 1 | Status: SHIPPED | OUTPATIENT
Start: 2021-09-27 | End: 2022-03-04

## 2021-09-28 ENCOUNTER — HOSPITAL ENCOUNTER (OUTPATIENT)
Dept: CARDIOLOGY | Facility: HOSPITAL | Age: 71
Discharge: HOME | End: 2021-09-28
Attending: INTERNAL MEDICINE
Payer: MEDICARE

## 2021-09-28 DIAGNOSIS — I49.01 VF (VENTRICULAR FIBRILLATION): ICD-10-CM

## 2021-09-28 DIAGNOSIS — I43 CARDIOMYOPATHY DUE TO SYSTEMIC DISEASE: ICD-10-CM

## 2021-09-28 DIAGNOSIS — Z95.810 ICD (IMPLANTABLE CARDIOVERTER-DEFIBRILLATOR) IN PLACE: ICD-10-CM

## 2021-09-28 DIAGNOSIS — I47.29 PAROXYSMAL VT: ICD-10-CM

## 2021-09-28 LAB
CHARGE TIME (SEC): 9.4 SEC
HV IMPEDANCE (OHM): 46 OHM
IMPEDANCE RA LEAD (NATIVE): 340 OHMS
IMPEDANCE RA LEAD: 350 OHMS
OHS CV DC PP MS1: 0.5 MS
OHS CV DC PP V1: NORMAL V
P/R-WAVE RA LEAD (NATIVE): 10.2 MV
P/R-WAVE RA LEAD: 3.2 MV
THRESHOLD MS RA LEAD: 0.5 MS
THRESHOLD V RA LEAD: 0.75 V

## 2021-09-28 PROCEDURE — 93283 PRGRMG EVAL IMPLANTABLE DFB: CPT | Mod: 26,,, | Performed by: INTERNAL MEDICINE

## 2021-09-28 PROCEDURE — 93283 CARDIAC DEVICE CHECK - IN CLINIC & HOSPITAL: ICD-10-PCS | Mod: 26,,, | Performed by: INTERNAL MEDICINE

## 2021-09-28 PROCEDURE — 93283 PRGRMG EVAL IMPLANTABLE DFB: CPT

## 2021-09-30 ENCOUNTER — PATIENT OUTREACH (OUTPATIENT)
Dept: ADMINISTRATIVE | Facility: OTHER | Age: 71
End: 2021-09-30

## 2021-09-30 ENCOUNTER — EXTERNAL CHRONIC CARE MANAGEMENT (OUTPATIENT)
Dept: PRIMARY CARE CLINIC | Facility: CLINIC | Age: 71
End: 2021-09-30
Payer: MEDICARE

## 2021-09-30 PROCEDURE — 99490 CHRNC CARE MGMT STAFF 1ST 20: CPT | Mod: PBBFAC,PO | Performed by: FAMILY MEDICINE

## 2021-09-30 PROCEDURE — 99490 CHRNC CARE MGMT STAFF 1ST 20: CPT | Mod: S$PBB,,, | Performed by: FAMILY MEDICINE

## 2021-09-30 PROCEDURE — 99490 PR CHRONIC CARE MGMT, 1ST 20 MIN: ICD-10-PCS | Mod: S$PBB,,, | Performed by: FAMILY MEDICINE

## 2021-10-01 ENCOUNTER — OFFICE VISIT (OUTPATIENT)
Dept: CARDIOLOGY | Facility: CLINIC | Age: 71
End: 2021-10-01
Payer: MEDICARE

## 2021-10-01 ENCOUNTER — LAB VISIT (OUTPATIENT)
Dept: LAB | Facility: HOSPITAL | Age: 71
End: 2021-10-01
Attending: INTERNAL MEDICINE
Payer: MEDICARE

## 2021-10-01 VITALS
DIASTOLIC BLOOD PRESSURE: 75 MMHG | HEIGHT: 70 IN | HEART RATE: 73 BPM | BODY MASS INDEX: 38.37 KG/M2 | SYSTOLIC BLOOD PRESSURE: 107 MMHG | WEIGHT: 268 LBS

## 2021-10-01 DIAGNOSIS — Z91.89 AT RISK FOR AMIODARONE TOXICITY WITH LONG TERM USE: Chronic | ICD-10-CM

## 2021-10-01 DIAGNOSIS — Z79.899 AT RISK FOR AMIODARONE TOXICITY WITH LONG TERM USE: Chronic | ICD-10-CM

## 2021-10-01 DIAGNOSIS — I48.0 PAF (PAROXYSMAL ATRIAL FIBRILLATION): Chronic | ICD-10-CM

## 2021-10-01 DIAGNOSIS — Z79.899 ENCOUNTER FOR LONG-TERM (CURRENT) USE OF MEDICATIONS: Primary | ICD-10-CM

## 2021-10-01 DIAGNOSIS — E78.5 HYPERLIPIDEMIA LDL GOAL <100: Chronic | ICD-10-CM

## 2021-10-01 DIAGNOSIS — I47.29 PAROXYSMAL VT: ICD-10-CM

## 2021-10-01 DIAGNOSIS — I51.89 DIASTOLIC DYSFUNCTION: ICD-10-CM

## 2021-10-01 DIAGNOSIS — I10 PRIMARY HYPERTENSION: Chronic | ICD-10-CM

## 2021-10-01 DIAGNOSIS — B33.21 SUBACUTE VIRAL ENDOCARDITIS: ICD-10-CM

## 2021-10-01 DIAGNOSIS — E11.65 TYPE 2 DIABETES MELLITUS WITH HYPERGLYCEMIA, WITHOUT LONG-TERM CURRENT USE OF INSULIN: ICD-10-CM

## 2021-10-01 DIAGNOSIS — E66.01 CLASS 3 SEVERE OBESITY DUE TO EXCESS CALORIES WITH SERIOUS COMORBIDITY AND BODY MASS INDEX (BMI) OF 40.0 TO 44.9 IN ADULT: Chronic | ICD-10-CM

## 2021-10-01 DIAGNOSIS — G47.33 OBSTRUCTIVE SLEEP APNEA SYNDROME: Chronic | ICD-10-CM

## 2021-10-01 DIAGNOSIS — Z79.899 ENCOUNTER FOR LONG-TERM (CURRENT) USE OF MEDICATIONS: ICD-10-CM

## 2021-10-01 DIAGNOSIS — Z45.02 ICD (IMPLANTABLE CARDIOVERTER-DEFIBRILLATOR) DISCHARGE: ICD-10-CM

## 2021-10-01 DIAGNOSIS — I50.30 CONGESTIVE HEART FAILURE WITH LEFT VENTRICULAR DIASTOLIC DYSFUNCTION, NYHA CLASS 3: Primary | ICD-10-CM

## 2021-10-01 LAB
ANION GAP SERPL CALC-SCNC: 14 MMOL/L (ref 8–16)
BNP SERPL-MCNC: 55 PG/ML (ref 0–99)
BUN SERPL-MCNC: 13 MG/DL (ref 8–23)
CALCIUM SERPL-MCNC: 9.6 MG/DL (ref 8.7–10.5)
CHLORIDE SERPL-SCNC: 102 MMOL/L (ref 95–110)
CO2 SERPL-SCNC: 22 MMOL/L (ref 23–29)
CREAT SERPL-MCNC: 1 MG/DL (ref 0.5–1.4)
EST. GFR  (AFRICAN AMERICAN): >60 ML/MIN/1.73 M^2
EST. GFR  (NON AFRICAN AMERICAN): >60 ML/MIN/1.73 M^2
GLUCOSE SERPL-MCNC: 95 MG/DL (ref 70–110)
POTASSIUM SERPL-SCNC: 4.1 MMOL/L (ref 3.5–5.1)
SODIUM SERPL-SCNC: 138 MMOL/L (ref 136–145)

## 2021-10-01 PROCEDURE — 83880 ASSAY OF NATRIURETIC PEPTIDE: CPT | Performed by: INTERNAL MEDICINE

## 2021-10-01 PROCEDURE — 99215 OFFICE O/P EST HI 40 MIN: CPT | Mod: 95,,, | Performed by: INTERNAL MEDICINE

## 2021-10-01 PROCEDURE — 36415 COLL VENOUS BLD VENIPUNCTURE: CPT | Mod: PO | Performed by: INTERNAL MEDICINE

## 2021-10-01 PROCEDURE — 99215 PR OFFICE/OUTPT VISIT, EST, LEVL V, 40-54 MIN: ICD-10-PCS | Mod: 95,,, | Performed by: INTERNAL MEDICINE

## 2021-10-01 PROCEDURE — 80048 BASIC METABOLIC PNL TOTAL CA: CPT | Performed by: INTERNAL MEDICINE

## 2021-10-04 ENCOUNTER — TELEPHONE (OUTPATIENT)
Dept: CARDIOLOGY | Facility: CLINIC | Age: 71
End: 2021-10-04

## 2021-10-13 DIAGNOSIS — E11.9 TYPE 2 DIABETES MELLITUS WITHOUT COMPLICATION: ICD-10-CM

## 2021-10-31 ENCOUNTER — EXTERNAL CHRONIC CARE MANAGEMENT (OUTPATIENT)
Dept: PRIMARY CARE CLINIC | Facility: CLINIC | Age: 71
End: 2021-10-31
Payer: MEDICARE

## 2021-10-31 PROCEDURE — 99490 CHRNC CARE MGMT STAFF 1ST 20: CPT | Mod: S$PBB,,, | Performed by: FAMILY MEDICINE

## 2021-10-31 PROCEDURE — 99490 PR CHRONIC CARE MGMT, 1ST 20 MIN: ICD-10-PCS | Mod: S$PBB,,, | Performed by: FAMILY MEDICINE

## 2021-10-31 PROCEDURE — 99490 CHRNC CARE MGMT STAFF 1ST 20: CPT | Mod: PBBFAC,PO | Performed by: FAMILY MEDICINE

## 2021-11-25 ENCOUNTER — CLINICAL SUPPORT (OUTPATIENT)
Dept: CARDIOLOGY | Facility: HOSPITAL | Age: 71
End: 2021-11-25
Payer: MEDICARE

## 2021-11-25 DIAGNOSIS — Z95.810 PRESENCE OF AUTOMATIC (IMPLANTABLE) CARDIAC DEFIBRILLATOR: ICD-10-CM

## 2021-11-25 PROCEDURE — 93295 CARDIAC DEVICE CHECK - REMOTE: ICD-10-PCS | Mod: ,,, | Performed by: INTERNAL MEDICINE

## 2021-11-25 PROCEDURE — 93295 DEV INTERROG REMOTE 1/2/MLT: CPT | Mod: ,,, | Performed by: INTERNAL MEDICINE

## 2021-11-30 ENCOUNTER — EXTERNAL CHRONIC CARE MANAGEMENT (OUTPATIENT)
Dept: PRIMARY CARE CLINIC | Facility: CLINIC | Age: 71
End: 2021-11-30
Payer: MEDICARE

## 2021-11-30 PROCEDURE — 99490 CHRNC CARE MGMT STAFF 1ST 20: CPT | Mod: PBBFAC,PO | Performed by: FAMILY MEDICINE

## 2021-11-30 PROCEDURE — 99490 PR CHRONIC CARE MGMT, 1ST 20 MIN: ICD-10-PCS | Mod: S$PBB,,, | Performed by: FAMILY MEDICINE

## 2021-11-30 PROCEDURE — 99490 CHRNC CARE MGMT STAFF 1ST 20: CPT | Mod: S$PBB,,, | Performed by: FAMILY MEDICINE

## 2021-12-22 DIAGNOSIS — Z00.01 ENCOUNTER FOR GENERAL ADULT MEDICAL EXAMINATION WITH ABNORMAL FINDINGS: ICD-10-CM

## 2021-12-22 RX ORDER — ZOSTER VACCINE RECOMBINANT, ADJUVANTED 50 MCG/0.5
KIT INTRAMUSCULAR
COMMUNITY
Start: 2021-09-21 | End: 2022-03-07

## 2021-12-22 RX ORDER — ALBUTEROL SULFATE 90 UG/1
1 AEROSOL, METERED RESPIRATORY (INHALATION) DAILY
Qty: 18 G | Refills: 4 | Status: SHIPPED | OUTPATIENT
Start: 2021-12-22 | End: 2024-01-26

## 2021-12-31 ENCOUNTER — EXTERNAL CHRONIC CARE MANAGEMENT (OUTPATIENT)
Dept: PRIMARY CARE CLINIC | Facility: CLINIC | Age: 71
End: 2021-12-31
Payer: MEDICARE

## 2021-12-31 PROCEDURE — 99490 PR CHRONIC CARE MGMT, 1ST 20 MIN: ICD-10-PCS | Mod: S$PBB,,, | Performed by: FAMILY MEDICINE

## 2021-12-31 PROCEDURE — 99490 CHRNC CARE MGMT STAFF 1ST 20: CPT | Mod: PBBFAC,PO | Performed by: FAMILY MEDICINE

## 2021-12-31 PROCEDURE — 99490 CHRNC CARE MGMT STAFF 1ST 20: CPT | Mod: S$PBB,,, | Performed by: FAMILY MEDICINE

## 2022-01-25 ENCOUNTER — HOSPITAL ENCOUNTER (OUTPATIENT)
Dept: CARDIOLOGY | Facility: HOSPITAL | Age: 72
Discharge: HOME | End: 2022-01-25
Attending: INTERNAL MEDICINE
Payer: MEDICARE

## 2022-01-25 VITALS
HEART RATE: 73 BPM | HEIGHT: 70 IN | SYSTOLIC BLOOD PRESSURE: 107 MMHG | BODY MASS INDEX: 38.37 KG/M2 | WEIGHT: 268 LBS | DIASTOLIC BLOOD PRESSURE: 75 MMHG

## 2022-01-25 DIAGNOSIS — Z79.899 ENCOUNTER FOR LONG-TERM (CURRENT) USE OF MEDICATIONS: ICD-10-CM

## 2022-01-25 DIAGNOSIS — I51.89 DIASTOLIC DYSFUNCTION: ICD-10-CM

## 2022-01-25 LAB
AORTIC ROOT ANNULUS: 4.14 CM
ASCENDING AORTA: 3.32 CM
AV INDEX (PROSTH): 0.55
AV MEAN GRADIENT: 7 MMHG
AV PEAK GRADIENT: 11 MMHG
AV VALVE AREA: 2.5 CM2
AV VELOCITY RATIO: 0.52
BSA FOR ECHO PROCEDURE: 2.45 M2
CV ECHO LV RWT: 0.63 CM
DOP CALC AO PEAK VEL: 1.64 M/S
DOP CALC AO VTI: 31.6 CM
DOP CALC LVOT AREA: 4.6 CM2
DOP CALC LVOT DIAMETER: 2.41 CM
DOP CALC LVOT PEAK VEL: 0.86 M/S
DOP CALC LVOT STROKE VOLUME: 78.88 CM3
DOP CALC RVOT PEAK VEL: 0.78 M/S
DOP CALC RVOT VTI: 14.6 CM
DOP CALCLVOT PEAK VEL VTI: 17.3 CM
E WAVE DECELERATION TIME: 285.41 MSEC
E/A RATIO: 0.46
E/E' RATIO: 5.43 M/S
ECHO EF ESTIMATED: 26 %
ECHO LV POSTERIOR WALL: 1.75 CM (ref 0.6–1.1)
EJECTION FRACTION: 30 %
FRACTIONAL SHORTENING: 12 % (ref 28–44)
INTERVENTRICULAR SEPTUM: 1.56 CM (ref 0.6–1.1)
IVRT: 99.9 MSEC
LA MAJOR: 6.12 CM
LA MINOR: 6.08 CM
LA WIDTH: 3.86 CM
LEFT ATRIUM SIZE: 4.82 CM
LEFT ATRIUM VOLUME INDEX MOD: 21.7 ML/M2
LEFT ATRIUM VOLUME INDEX: 40.9 ML/M2
LEFT ATRIUM VOLUME MOD: 51.14 CM3
LEFT ATRIUM VOLUME: 96.47 CM3
LEFT INTERNAL DIMENSION IN SYSTOLE: 4.88 CM (ref 2.1–4)
LEFT VENTRICLE DIASTOLIC VOLUME INDEX: 64.16 ML/M2
LEFT VENTRICLE DIASTOLIC VOLUME: 151.42 ML
LEFT VENTRICLE MASS INDEX: 186 G/M2
LEFT VENTRICLE SYSTOLIC VOLUME INDEX: 47.2 ML/M2
LEFT VENTRICLE SYSTOLIC VOLUME: 111.5 ML
LEFT VENTRICULAR INTERNAL DIMENSION IN DIASTOLE: 5.56 CM (ref 3.5–6)
LEFT VENTRICULAR MASS: 438.22 G
LV LATERAL E/E' RATIO: 4.38 M/S
LV SEPTAL E/E' RATIO: 7.13 M/S
LVOT MG: 2.04 MMHG
LVOT MV: 0.68 CM/S
MV PEAK A VEL: 1.24 M/S
MV PEAK E VEL: 0.57 M/S
MV STENOSIS PRESSURE HALF TIME: 82.77 MS
MV VALVE AREA P 1/2 METHOD: 2.66 CM2
PULM VEIN S/D RATIO: 1.28
PV MEAN GRADIENT: 1.48 MMHG
PV PEAK D VEL: 0.44 M/S
PV PEAK S VEL: 0.56 M/S
PV PEAK VELOCITY: 1.09 CM/S
RA MAJOR: 4.63 CM
RA WIDTH: 3.73 CM
RIGHT VENTRICULAR END-DIASTOLIC DIMENSION: 3.43 CM
SINUS: 3.68 CM
STJ: 3.06 CM
TDI LATERAL: 0.13 M/S
TDI SEPTAL: 0.08 M/S
TDI: 0.11 M/S
TRICUSPID ANNULAR PLANE SYSTOLIC EXCURSION: 2.38 CM

## 2022-01-25 PROCEDURE — 93306 TTE W/DOPPLER COMPLETE: CPT | Mod: PO

## 2022-01-25 PROCEDURE — 93306 ECHO (CUPID ONLY): ICD-10-PCS | Mod: 26,,, | Performed by: INTERNAL MEDICINE

## 2022-01-25 PROCEDURE — 93306 TTE W/DOPPLER COMPLETE: CPT | Mod: 26,,, | Performed by: INTERNAL MEDICINE

## 2022-01-31 ENCOUNTER — EXTERNAL CHRONIC CARE MANAGEMENT (OUTPATIENT)
Dept: PRIMARY CARE CLINIC | Facility: CLINIC | Age: 72
End: 2022-01-31
Payer: MEDICARE

## 2022-01-31 PROCEDURE — 99490 CHRNC CARE MGMT STAFF 1ST 20: CPT | Mod: PBBFAC,PO | Performed by: FAMILY MEDICINE

## 2022-01-31 PROCEDURE — 99490 PR CHRONIC CARE MGMT, 1ST 20 MIN: ICD-10-PCS | Mod: S$PBB,,, | Performed by: FAMILY MEDICINE

## 2022-01-31 PROCEDURE — 99490 CHRNC CARE MGMT STAFF 1ST 20: CPT | Mod: S$PBB,,, | Performed by: FAMILY MEDICINE

## 2022-02-02 DIAGNOSIS — E11.9 TYPE 2 DIABETES MELLITUS WITHOUT COMPLICATION: ICD-10-CM

## 2022-02-07 ENCOUNTER — OFFICE VISIT (OUTPATIENT)
Dept: CARDIOLOGY | Facility: CLINIC | Age: 72
End: 2022-02-07
Attending: INTERNAL MEDICINE
Payer: MEDICARE

## 2022-02-07 ENCOUNTER — HOSPITAL ENCOUNTER (OUTPATIENT)
Dept: CARDIOLOGY | Facility: HOSPITAL | Age: 72
Discharge: HOME | End: 2022-02-07
Payer: MEDICARE

## 2022-02-07 ENCOUNTER — HOSPITAL ENCOUNTER (OUTPATIENT)
Dept: CARDIOLOGY | Facility: HOSPITAL | Age: 72
Discharge: HOME | End: 2022-02-07
Attending: INTERNAL MEDICINE
Payer: MEDICARE

## 2022-02-07 VITALS
OXYGEN SATURATION: 96 % | DIASTOLIC BLOOD PRESSURE: 66 MMHG | HEIGHT: 70 IN | SYSTOLIC BLOOD PRESSURE: 118 MMHG | HEART RATE: 88 BPM | WEIGHT: 270.06 LBS | BODY MASS INDEX: 38.66 KG/M2

## 2022-02-07 DIAGNOSIS — I48.0 PAF (PAROXYSMAL ATRIAL FIBRILLATION): Chronic | ICD-10-CM

## 2022-02-07 DIAGNOSIS — Z91.89 AT RISK FOR AMIODARONE TOXICITY WITH LONG TERM USE: Chronic | ICD-10-CM

## 2022-02-07 DIAGNOSIS — I47.29 PAROXYSMAL VT: ICD-10-CM

## 2022-02-07 DIAGNOSIS — I50.30 CONGESTIVE HEART FAILURE WITH LEFT VENTRICULAR DIASTOLIC DYSFUNCTION, NYHA CLASS 3: ICD-10-CM

## 2022-02-07 DIAGNOSIS — I43 CARDIOMYOPATHY DUE TO SYSTEMIC DISEASE: ICD-10-CM

## 2022-02-07 DIAGNOSIS — Z95.810 ICD (IMPLANTABLE CARDIOVERTER-DEFIBRILLATOR) IN PLACE: ICD-10-CM

## 2022-02-07 DIAGNOSIS — I48.0 PAF (PAROXYSMAL ATRIAL FIBRILLATION): Primary | ICD-10-CM

## 2022-02-07 DIAGNOSIS — I49.01 VF (VENTRICULAR FIBRILLATION): ICD-10-CM

## 2022-02-07 DIAGNOSIS — I48.0 PAF (PAROXYSMAL ATRIAL FIBRILLATION): ICD-10-CM

## 2022-02-07 DIAGNOSIS — E66.01 CLASS 3 SEVERE OBESITY DUE TO EXCESS CALORIES WITH SERIOUS COMORBIDITY AND BODY MASS INDEX (BMI) OF 40.0 TO 44.9 IN ADULT: Chronic | ICD-10-CM

## 2022-02-07 DIAGNOSIS — B33.21 SUBACUTE VIRAL ENDOCARDITIS: ICD-10-CM

## 2022-02-07 DIAGNOSIS — E11.65 TYPE 2 DIABETES MELLITUS WITH HYPERGLYCEMIA, WITHOUT LONG-TERM CURRENT USE OF INSULIN: Primary | ICD-10-CM

## 2022-02-07 DIAGNOSIS — I42.9 IDIOPATHIC CARDIOMYOPATHY: ICD-10-CM

## 2022-02-07 DIAGNOSIS — Z79.899 AT RISK FOR AMIODARONE TOXICITY WITH LONG TERM USE: Chronic | ICD-10-CM

## 2022-02-07 PROCEDURE — 93005 ELECTROCARDIOGRAM TRACING: CPT

## 2022-02-07 PROCEDURE — 99214 OFFICE O/P EST MOD 30 MIN: CPT | Mod: PBBFAC | Performed by: NURSE PRACTITIONER

## 2022-02-07 PROCEDURE — 99999 PR PBB SHADOW E&M-EST. PATIENT-LVL IV: CPT | Mod: PBBFAC,,, | Performed by: NURSE PRACTITIONER

## 2022-02-07 PROCEDURE — 99999 PR PBB SHADOW E&M-EST. PATIENT-LVL IV: ICD-10-PCS | Mod: PBBFAC,,, | Performed by: NURSE PRACTITIONER

## 2022-02-07 PROCEDURE — 99214 PR OFFICE/OUTPT VISIT, EST, LEVL IV, 30-39 MIN: ICD-10-PCS | Mod: S$PBB,,, | Performed by: NURSE PRACTITIONER

## 2022-02-07 PROCEDURE — 93283 PRGRMG EVAL IMPLANTABLE DFB: CPT | Mod: 26,,, | Performed by: INTERNAL MEDICINE

## 2022-02-07 PROCEDURE — 99214 OFFICE O/P EST MOD 30 MIN: CPT | Mod: S$PBB,,, | Performed by: NURSE PRACTITIONER

## 2022-02-07 PROCEDURE — 93283 CARDIAC DEVICE CHECK - IN CLINIC & HOSPITAL: ICD-10-PCS | Mod: 26,,, | Performed by: INTERNAL MEDICINE

## 2022-02-07 PROCEDURE — 93010 ELECTROCARDIOGRAM REPORT: CPT | Mod: ,,, | Performed by: INTERNAL MEDICINE

## 2022-02-07 PROCEDURE — 93283 PRGRMG EVAL IMPLANTABLE DFB: CPT

## 2022-02-07 PROCEDURE — 93010 EKG 12-LEAD: ICD-10-PCS | Mod: ,,, | Performed by: INTERNAL MEDICINE

## 2022-02-08 ENCOUNTER — TELEPHONE (OUTPATIENT)
Dept: CARDIOLOGY | Facility: CLINIC | Age: 72
End: 2022-02-08
Payer: MEDICARE

## 2022-02-08 NOTE — TELEPHONE ENCOUNTER
----- Message from PALMER Chapman-JOHN sent at 2/8/2022 10:29 AM CST -----  Please notify patient A1C stable at 6%    Thanks  Virgie

## 2022-02-10 ENCOUNTER — PATIENT MESSAGE (OUTPATIENT)
Dept: CARDIOLOGY | Facility: CLINIC | Age: 72
End: 2022-02-10
Payer: MEDICARE

## 2022-02-23 ENCOUNTER — CLINICAL SUPPORT (OUTPATIENT)
Dept: CARDIOLOGY | Facility: HOSPITAL | Age: 72
End: 2022-02-23
Payer: MEDICARE

## 2022-02-23 DIAGNOSIS — Z95.810 PRESENCE OF AUTOMATIC (IMPLANTABLE) CARDIAC DEFIBRILLATOR: ICD-10-CM

## 2022-02-28 ENCOUNTER — EXTERNAL CHRONIC CARE MANAGEMENT (OUTPATIENT)
Dept: PRIMARY CARE CLINIC | Facility: CLINIC | Age: 72
End: 2022-02-28
Payer: MEDICARE

## 2022-02-28 PROCEDURE — 99490 CHRNC CARE MGMT STAFF 1ST 20: CPT | Mod: S$PBB,,, | Performed by: FAMILY MEDICINE

## 2022-02-28 PROCEDURE — 99490 CHRNC CARE MGMT STAFF 1ST 20: CPT | Mod: PBBFAC,PO | Performed by: FAMILY MEDICINE

## 2022-02-28 PROCEDURE — 99490 PR CHRONIC CARE MGMT, 1ST 20 MIN: ICD-10-PCS | Mod: S$PBB,,, | Performed by: FAMILY MEDICINE

## 2022-03-01 DIAGNOSIS — Z00.01 ENCOUNTER FOR GENERAL ADULT MEDICAL EXAMINATION WITH ABNORMAL FINDINGS: ICD-10-CM

## 2022-03-01 NOTE — TELEPHONE ENCOUNTER
No new care gaps identified.  Powered by [employer]. Reference number: [id number].   3/01/2022 1:31:46 AM CST

## 2022-03-04 RX ORDER — CARVEDILOL 25 MG/1
25 TABLET ORAL 2 TIMES DAILY
Qty: 180 TABLET | Refills: 4 | Status: SHIPPED | OUTPATIENT
Start: 2022-03-04 | End: 2023-03-06

## 2022-03-04 NOTE — TELEPHONE ENCOUNTER
Refill Routing Note   Medication(s) are not appropriate for processing by [facility] for the following reason(s):      - Drug-Disease Interaction (carvediloL and Acute combined systolic and diastolic congestive heart failure)    [facility] action(s):  Defer Medication-related problems identified: Drug-disease interaction     Medication Therapy Plan: FOV;  --->Care Gap information included in message below if applicable.   Medication reconciliation completed: No   Automatic Epic Generated Protocol Data:        Requested Prescriptions   Pending Prescriptions Disp Refills    carvediloL (COREG) 25 MG tablet [Pharmacy Med Name: Carvedilol 25 Mg Tab Auro] 180 tablet 1     Sig: Take 1 tablet (25 mg total) by mouth 2 (two) times daily.       Cardiovascular:  Beta Blockers Passed - 3/4/2022 10:39 AM        Passed - Patient is at least 18 years old        Passed - Last BP in normal range within 360 days     BP Readings from Last 1 Encounters:   02/07/22 118/66               Passed - Last Heart Rate in normal range within 360 days     Pulse Readings from Last 1 Encounters:   02/07/22 88              Passed - Valid encounter within last 15 months     Recent Visits  Date Type Provider Dept   08/02/21 Office Visit Olvin Hutson MD [facility]   07/31/20 Office Visit Olvin Hutson MD [facility]   Showing recent visits within past 720 days and meeting all other requirements  Future Appointments  No visits were found meeting these conditions.  Showing future appointments within next 150 days and meeting all other requirements      Future Appointments              In 3 days MD Chitra Sheehan - Family Medicine [facility]    In 2 months HOME MONITOR DEVICE CHECK [place] - Arrhythmia Procedures, [place]    In 5 months MD Chitra Sheehan  Family Medicine, [place]    In 5 months Nirav Baldwin MD [facility] - Cardiology, [facility]    In 5 months PACEMAKER CLINIC, ON  ARRHYTHMIA [facility] - Arrhythmia Procedures, [facility]                      Appointments  past 12m or future 3m with PCP    Date Provider   Last Visit   8/2/2021 Olvin Hutson MD   Next Visit   3/7/2022 Olvin Hutson MD   ED visits in past 90 days: 0        Note composed:10:41 AM 03/04/2022

## 2022-03-07 ENCOUNTER — PATIENT MESSAGE (OUTPATIENT)
Dept: CARDIOLOGY | Facility: CLINIC | Age: 72
End: 2022-03-07
Payer: MEDICARE

## 2022-03-07 ENCOUNTER — OFFICE VISIT (OUTPATIENT)
Dept: FAMILY MEDICINE | Facility: CLINIC | Age: 72
End: 2022-03-07
Payer: MEDICARE

## 2022-03-07 ENCOUNTER — LAB VISIT (OUTPATIENT)
Dept: LAB | Facility: HOSPITAL | Age: 72
End: 2022-03-07
Attending: FAMILY MEDICINE
Payer: MEDICARE

## 2022-03-07 VITALS
DIASTOLIC BLOOD PRESSURE: 72 MMHG | HEIGHT: 70 IN | TEMPERATURE: 98 F | WEIGHT: 268.31 LBS | OXYGEN SATURATION: 95 % | SYSTOLIC BLOOD PRESSURE: 104 MMHG | HEART RATE: 75 BPM | RESPIRATION RATE: 16 BRPM | BODY MASS INDEX: 38.41 KG/M2

## 2022-03-07 DIAGNOSIS — I15.2 HYPERTENSION ASSOCIATED WITH DIABETES: ICD-10-CM

## 2022-03-07 DIAGNOSIS — Z79.899 ENCOUNTER FOR LONG-TERM (CURRENT) USE OF MEDICATIONS: ICD-10-CM

## 2022-03-07 DIAGNOSIS — Z12.5 ENCOUNTER FOR PROSTATE CANCER SCREENING: ICD-10-CM

## 2022-03-07 DIAGNOSIS — E11.59 HYPERTENSION ASSOCIATED WITH DIABETES: ICD-10-CM

## 2022-03-07 DIAGNOSIS — A49.9 BACTERIAL INFECTION: ICD-10-CM

## 2022-03-07 DIAGNOSIS — E11.65 TYPE 2 DIABETES MELLITUS WITH HYPERGLYCEMIA, WITHOUT LONG-TERM CURRENT USE OF INSULIN: Primary | ICD-10-CM

## 2022-03-07 LAB — COMPLEXED PSA SERPL-MCNC: 2 NG/ML (ref 0–4)

## 2022-03-07 PROCEDURE — 99214 PR OFFICE/OUTPT VISIT, EST, LEVL IV, 30-39 MIN: ICD-10-PCS | Mod: S$PBB,,, | Performed by: FAMILY MEDICINE

## 2022-03-07 PROCEDURE — 99214 OFFICE O/P EST MOD 30 MIN: CPT | Mod: S$PBB,,, | Performed by: FAMILY MEDICINE

## 2022-03-07 PROCEDURE — 36415 COLL VENOUS BLD VENIPUNCTURE: CPT | Mod: PO | Performed by: FAMILY MEDICINE

## 2022-03-07 PROCEDURE — 84153 ASSAY OF PSA TOTAL: CPT | Performed by: FAMILY MEDICINE

## 2022-03-07 PROCEDURE — 99999 PR PBB SHADOW E&M-EST. PATIENT-LVL V: CPT | Mod: PBBFAC,,, | Performed by: FAMILY MEDICINE

## 2022-03-07 PROCEDURE — 99999 PR PBB SHADOW E&M-EST. PATIENT-LVL V: ICD-10-PCS | Mod: PBBFAC,,, | Performed by: FAMILY MEDICINE

## 2022-03-07 PROCEDURE — 99215 OFFICE O/P EST HI 40 MIN: CPT | Mod: PBBFAC,PO | Performed by: FAMILY MEDICINE

## 2022-03-07 RX ORDER — PREDNISONE 20 MG/1
20 TABLET ORAL DAILY
Qty: 5 TABLET | Refills: 0 | Status: SHIPPED | OUTPATIENT
Start: 2022-03-07 | End: 2022-03-12

## 2022-03-07 RX ORDER — CYCLOSPORINE 0.5 MG/ML
1 EMULSION OPHTHALMIC 2 TIMES DAILY
COMMUNITY
Start: 2022-01-05

## 2022-03-07 RX ORDER — AMOXICILLIN AND CLAVULANATE POTASSIUM 875; 125 MG/1; MG/1
1 TABLET, FILM COATED ORAL 2 TIMES DAILY
Qty: 20 TABLET | Refills: 0 | Status: SHIPPED | OUTPATIENT
Start: 2022-03-07 | End: 2022-03-17

## 2022-03-07 NOTE — PATIENT INSTRUCTIONS
Follow up in about 6 months (around 9/7/2022), or if symptoms worsen or fail to improve, for Annual Wellness Exam.     Dear patient,   As a result of recent federal legislation (The Federal Cures Act), you may receive lab or pathology results from your visit in your [facility] account before your physician is able to contact you. Your physician or their representative will relay the results to you with their recommendations at their soonest availability.     If no improvement in symptoms or symptoms worsen, please be advised to call MD, follow-up at clinic and/or go to ER if becomes severe.    Olvin Hutson M.D.        We Offer TELEHEALTH & Same Day Appointments!   Book your Telehealth appointment with me through my nurse or   Clinic appointments on [facility]!    [address] [place] [address]    Office: [phone]   FAX: [phone]    Check out my Facebook Page and Follow Me at: [email or web address]    Check out my website at [employer] by clicking on: [email or web address]    To Schedule appointments online, go to [facility]: [email or web address]

## 2022-03-07 NOTE — PROGRESS NOTES
PLAN:      Problem List Items Addressed This Visit     Hypertension associated with diabetes (Chronic)     Continue current medication regimen.  Follow-up cardiology.Counseled on importance of hypertension disease course, I recommend ongoing Education for DASH-diet and exercise.  Counseled on medication regimen importance of treating high blood pressure.  Please be advised of risk of untreated blood pressure as discussed.  Please advised of ER precautions were given for symptoms of hypertensive urgency and emergency.             Relevant Orders    Comprehensive Metabolic Panel    Encounter for long-term (current) use of medications (Chronic)     Complete history and physical was completed today.  Complete and thorough medication reconciliation was performed.  Discussed risks and benefits of medications.  Advised patient on orders and health maintenance.  We discussed old records and old labs if available.  Will request any records not available through epic.  Continue current medications listed on your summary sheet.             Relevant Orders    Comprehensive Metabolic Panel    Type 2 diabetes mellitus with hyperglycemia, without long-term current use of insulin - Primary (Chronic)     Continue Farxiga.  Check A1c in six months.  Due for urine microalbumin.We will plan to monitor hemoglobin A1c at designated intervals 3 to 6 months.  I recommend ongoing Education for diabetic diet and exercise protocol.  We will continue to monitor for side effects.    Please be advised of symptoms to monitor for and to notify me immediately if persistent or worsening.  Follow up with Ophthalmology/Optometry and Podiatry at least annually.             Relevant Orders    Comprehensive Metabolic Panel    Bacterial infection     Start Augmentin and prednisone burst which patient has tolerated in done well with in the past.Discussed condition course and signs and symptoms to expect.  Patient advised take anti-inflammatories and or  Tylenol for pain or fever.  ER precautions.  Call MD or follow-up to clinic if not improving or worsening symptoms.   - risk of corticosteroids reviewed (elevated BP/glucose, insomnia, psychosis, bone loss, etc) and patient expressed understanding             Relevant Medications    predniSONE (DELTASONE) 20 MG tablet    amoxicillin-clavulanate 875-125mg (AUGMENTIN) 875-125 mg per tablet        Future Appointments     Date Provider Specialty Appt Notes    5/24/2022  Cardiology 90 Day Remote Cardiac Device Check    8/2/2022 Olvin Hutson MD Family Medicine Annual    8/8/2022 Nirav Baldwin MD Cardiology 6 mo f/u per Virgie    8/8/2022  Cardiology FAS 1030  ABT/ICD         Medication Management for assessment above:   Medication List with Changes/Refills   New Medications    AMOXICILLIN-CLAVULANATE 875-125MG (AUGMENTIN) 875-125 MG PER TABLET    Take 1 tablet by mouth 2 (two) times daily. Take with food. for 10 days    PREDNISONE (DELTASONE) 20 MG TABLET    Take 1 tablet (20 mg total) by mouth once daily. for 5 days   Current Medications    ACETAMINOPHEN (TYLENOL) 325 MG TABLET    Take 650 mg by mouth as needed.     ALBUTEROL (PROVENTIL/VENTOLIN HFA) 90 MCG/ACTUATION INHALER    Inhale 1 puff into the lungs once daily. Rescue    AMIODARONE (PACERONE) 100 MG TAB    Take 1.5 tablets (150 mg total) by mouth once daily.    ARNUITY ELLIPTA 100 MCG/ACTUATION INHALER    INHALE 1 PUFF BY MOUTH ONCE DAILY     BEPOTASTINE BESILATE (BEPREVE) 1.5 % DROP    Bepreve 1.5 % eye drops   Apply by ophthalmic route as needed for 50 days.    CARVEDILOL (COREG) 25 MG TABLET    Take 1 tablet (25 mg total) by mouth 2 (two) times daily.    CO-ENZYME Q-10 30 MG CAPSULE    Take 30 mg by mouth once daily.     ENTRESTO  MG PER TABLET    TAKE 1 TABLET BY MOUTH TWICE DAILY    FARXIGA 10 MG TABLET    TAKE 1 TABLET BY MOUTH ONCE DAILY    FERROUS SULFATE (FEOSOL) 325 MG (65 MG IRON) TAB TABLET    Take 325 mg by mouth daily with  breakfast.    FUROSEMIDE (LASIX) 40 MG TABLET    TAKE 1 TABLET BY MOUTH ONCE DAILY    GLUCOSAMINE-CHONDROITIN 500-400 MG TABLET    Take 1 tablet by mouth once daily.     INV APIXABAN/PLACEBO TABLET    Take 1 tablets (5 mg) by mouth 2 (two) times daily. FOR INVESTIGATIONAL USE ONLY. Protocol: [place] 2017.307 subject # : [id number]    INV ASPIRIN/PLACEBO TABLET    Take 1 tablet (81 mg total) by mouth once daily. FOR INVESTIGATIONAL USE ONLY. Patient Study #: [id number] Protocol: [place] 2017.307    MULTIVITAMIN (THERAGRAN) TABLET    Take 1 tablet by mouth once daily.    PRAVASTATIN (PRAVACHOL) 80 MG TABLET    Take 1 tablet (80 mg total) by mouth once daily.    RESTASIS 0.05 % OPHTHALMIC EMULSION    Place 1 drop into both eyes 2 (two) times daily.    SPIRONOLACTONE (ALDACTONE) 25 MG TABLET    Take 1 tablet (25 mg total) by mouth 2 (two) times daily.    TRAZODONE (DESYREL) 150 MG TABLET    Take 1 tablet (150 mg total) by mouth every evening.   Discontinued Medications    SHINGRIX, PF, 50 MCG/0.5 ML INJECTION           Olvin Hutson M.D.  ==========================================================================  Subjective:   Patient ID: Randy Yap is a 72 y.o. male.  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   Chief Complaint: Follow-up and Sinusitis      Problem List Items Addressed This Visit     Hypertension associated with diabetes (Chronic)    Overview     CHRONIC. STABLE. BP Reviewed.  Compliant with BP medications. No SE reported.   March 2022:  Patient continues on clinical trial with [facility].  (-) CP, SOB, palpitations, dizziness, lightheadedness, HA, arm numbness, tingling or weakness, syncope.  Creatinine   Date Value Ref Range  Status   10/01/2021 1.0 0.5 - 1.4 mg/dL Final                Current Assessment & Plan     Continue current medication regimen.  Follow-up cardiology.Counseled on importance of hypertension disease course, I recommend ongoing Education for DASH-diet and exercise.  Counseled on medication regimen importance of treating high blood pressure.  Please be advised of risk of untreated blood pressure as discussed.  Please advised of ER precautions were given for symptoms of hypertensive urgency and emergency.             Encounter for long-term (current) use of medications (Chronic)    Overview     CHRONIC. Stable. Compliant with medications for managed conditions. See medication list. No SE reported.   Routine lab analysis is being monitored. Refills were addressed.  Lab Results   Component Value Date    WBC 7.76 07/23/2021    HGB 13.6 (L) 07/23/2021    HCT 42.4 07/23/2021    MCV 96 07/23/2021     07/23/2021         Chemistry        Component Value Date/Time     10/01/2021 1353    K 4.1 10/01/2021 1353     10/01/2021 1353    CO2 22 (L) 10/01/2021 1353    BUN 13 10/01/2021 1353    CREATININE 1.0 10/01/2021 1353    GLU 95 10/01/2021 1353        Component Value Date/Time    CALCIUM 9.6 10/01/2021 1353    ALKPHOS 37 (L) 07/23/2021 0851    AST 29 07/23/2021 0851    ALT 33 07/23/2021 0851    BILITOT 0.6 07/23/2021 0851    ESTGFRAFRICA >60.0 10/01/2021 1353    EGFRNONAA >60.0 10/01/2021 1353          Lab Results   Component Value Date    TSH 1.950 07/23/2021                Current Assessment & Plan     Complete history and physical was completed today.  Complete and thorough medication reconciliation was performed.  Discussed risks and benefits of medications.  Advised patient on orders and health maintenance.  We discussed old records and old labs if available.  Will request any records not available through epic.  Continue current medications listed on your summary sheet.             Type 2 diabetes mellitus  with hyperglycemia, without long-term current use of insulin - Primary (Chronic)    Overview     Diabetes Management Status    Statin: Taking  ACE/ARB: Not taking    Screening or Prevention Patient's value Goal Complete/Controlled?   HgA1C Testing and Control   Lab Results   Component Value Date    HGBA1C 6.0 (H) 02/07/2022      Annually/Less than 8% Yes   Lipid profile : 08/23/2021 Annually Yes   LDL control Lab Results   Component Value Date    LDLCALC 104.4 08/23/2021    Annually/Less than 100 mg/dl  No   Nephropathy screening No results found for: LABMICR  Lab Results   Component Value Date    PROTEINUA Negative 06/11/2012     No results found for: UTPCR   Annually No   Blood pressure BP Readings from Last 1 Encounters:   03/07/22 104/72    Less than 140/90 Yes   Dilated retinal exam : 10/13/2021 Annually Yes   Foot exam   Most Recent Foot Exam Date: Not Found Annually No     Patient doing well on Farxiga with comorbid heart failure.           Current Assessment & Plan     Continue Farxiga.  Check A1c in six months.  Due for urine microalbumin.We will plan to monitor hemoglobin A1c at designated intervals 3 to 6 months.  I recommend ongoing Education for diabetic diet and exercise protocol.  We will continue to monitor for side effects.    Please be advised of symptoms to monitor for and to notify me immediately if persistent or worsening.  Follow up with Ophthalmology/Optometry and Podiatry at least annually.             Bacterial infection    Overview     Subacute.  Patient has been having sinus issues for the last few days.  Patient reports that he is now having sinus pain and pressure.  He is coughing up colored sputum.  Denies any fever chills nausea vomiting diarrhea.  He is taking Claritin without relief.           Current Assessment & Plan     Start Augmentin and prednisone burst which patient has tolerated in done well with in the past.Discussed condition course and signs and symptoms to expect.   Patient advised take anti-inflammatories and or Tylenol for pain or fever.  ER precautions.  Call MD or follow-up to clinic if not improving or worsening symptoms.   - risk of corticosteroids reviewed (elevated BP/glucose, insomnia, psychosis, bone loss, etc) and patient expressed understanding                    Review of patient's allergies indicates:  No Known Allergies  Current Outpatient Medications   Medication Instructions    acetaminophen (TYLENOL) 650 mg, Oral, As needed (PRN)    albuterol (PROVENTIL/VENTOLIN HFA) 90 mcg/actuation inhaler 1 puff, Inhalation, Daily, Rescue    amiodarone (PACERONE) 150 mg, Oral, Daily    amoxicillin-clavulanate 875-125mg (AUGMENTIN) 875-125 mg per tablet 1 tablet, Oral, 2 times daily, Take with food.    ARNUITY ELLIPTA 100 mcg/actuation inhaler INHALE 1 PUFF BY MOUTH ONCE DAILY     bepotastine besilate (BEPREVE) 1.5 % Drop Bepreve 1.5 % eye drops   Apply by ophthalmic route as needed for 50 days.    carvediloL (COREG) 25 mg, Oral, 2 times daily    co-enzyme Q-10 30 mg, Oral, Daily    ENTRESTO  mg per tablet TAKE 1 TABLET BY MOUTH TWICE DAILY    FARXIGA 10 mg tablet TAKE 1 TABLET BY MOUTH ONCE DAILY    ferrous sulfate (FEOSOL) 325 mg, Oral, With breakfast    furosemide (LASIX) 40 MG tablet TAKE 1 TABLET BY MOUTH ONCE DAILY    glucosamine-chondroitin 500-400 mg tablet 1 tablet, Oral, Daily    INV apixaban/placebo tablet Take 1 tablets (5 mg) by mouth 2 (two) times daily. FOR INVESTIGATIONAL USE ONLY. Protocol: [place] 2017.307 subject # : [id number]    INV aspirin/placebo tablet Take 1 tablet (81 mg total) by mouth once daily. FOR INVESTIGATIONAL USE ONLY. Patient Study #: [id number] Protocol: [place] 2017.307    multivitamin (THERAGRAN) tablet 1 tablet, Oral, Daily,      pravastatin (PRAVACHOL) 80 mg, Oral, Daily    predniSONE (DELTASONE) 20 mg, Oral, Daily    RESTASIS 0.05 % ophthalmic emulsion 1 drop, Both Eyes, 2 times daily    spironolactone (ALDACTONE)  "25 mg, Oral, 2 times daily    traZODone (DESYREL) 150 mg, Oral, Nightly      I have reviewed the PMH, social history, FamilyHx, surgical history, allergies and medications documented / confirmed by the patient at the time of this visit.  Review of Systems   Constitutional: Negative for activity change, chills, fatigue, fever and unexpected weight change.   HENT: Positive for congestion, sinus pressure and sinus pain. Negative for ear pain, hearing loss, rhinorrhea, sore throat and trouble swallowing.    Eyes: Negative for discharge, redness and visual disturbance.   Respiratory: Negative for cough, chest tightness, shortness of breath and wheezing.    Cardiovascular: Negative for chest pain and palpitations.   Gastrointestinal: Negative for blood in stool, constipation, diarrhea, nausea and vomiting.   Endocrine: Negative for cold intolerance, heat intolerance, polydipsia and polyuria.   Genitourinary: Negative for difficulty urinating, hematuria and urgency.   Musculoskeletal: Negative for arthralgias, joint swelling, myalgias and neck pain.   Skin: Negative for rash and wound.   Allergic/Immunologic: Negative for environmental allergies and food allergies.   Neurological: Negative for weakness and headaches.   Hematological: Negative for adenopathy. Does not bruise/bleed easily.   Psychiatric/Behavioral: Negative for confusion, dysphoric mood and sleep disturbance. The patient is not nervous/anxious.      Objective:   /72   Pulse 75   Temp 97.8 °F (36.6 °C) (Temporal)   Resp 16   Ht 5' 10" (1.778 m)   Wt 121.7 kg (268 lb 4.8 oz)   SpO2 95%   BMI 38.50 kg/m²   Physical Exam  Vitals and nursing note reviewed.   Constitutional:       General: He is not in acute distress.     Appearance: He is well-developed.   HENT:      Head: Normocephalic and atraumatic.      Right Ear: Hearing, tympanic membrane, ear canal and external ear normal. There is no impacted cerumen. Tympanic membrane is not injected or " erythematous.      Left Ear: Hearing, tympanic membrane, ear canal and external ear normal. There is no impacted cerumen. Tympanic membrane is not injected or erythematous.      Nose: Mucosal edema, congestion and rhinorrhea present.      Right Sinus: Maxillary sinus tenderness and frontal sinus tenderness present.      Left Sinus: Maxillary sinus tenderness and frontal sinus tenderness present.      Mouth/Throat:      Mouth: Mucous membranes are not pale.      Pharynx: Uvula midline. Posterior oropharyngeal erythema present. No oropharyngeal exudate.      Tonsils: No tonsillar exudate or tonsillar abscesses.   Eyes:      Pupils: Pupils are equal, round, and reactive to light.   Cardiovascular:      Rate and Rhythm: Normal rate and regular rhythm.      Heart sounds: Normal heart sounds.      Comments: Device to left chest  Pulmonary:      Effort: Pulmonary effort is normal. No respiratory distress.      Breath sounds: Normal breath sounds. No wheezing.   Abdominal:      General: Bowel sounds are normal.      Palpations: Abdomen is soft.   Musculoskeletal:         General: Normal range of motion.      Cervical back: Normal range of motion and neck supple.   Skin:     General: Skin is warm and dry.      Capillary Refill: Capillary refill takes less than 2 seconds.   Neurological:      Mental Status: He is alert and oriented to person, place, and time.      Cranial Nerves: No cranial nerve deficit.   Psychiatric:         Behavior: Behavior normal.        Patient is wearing a mask which limits physical exam due to COVID restrictions.   Assessment:     1. Type 2 diabetes mellitus with hyperglycemia, without long-term current use of insulin    2. Hypertension associated with diabetes    3. Encounter for long-term (current) use of medications    4. Bacterial infection      MDM:   Moderate complexity.  Moderate risk.  Total time:32 minutes.  This includes total time spent on the encounter, which includes face to face time  and non-face to face time preparing to see the patient (eg, review of previous medical records, tests), Obtaining and/or reviewing separately obtained history, documenting clinical information in the electronic or other health record, independently interpreting results (not separately reported)/communicating results to the patient/family/caregiver, and/or care coordination (not separately reported).    I have Reviewed and summarized old records.  I have performed thorough medication reconciliation today and discussed risk and benefits of medications.  I have reviewed labs and discussed with patient.  All questions were answered.  I am requesting old records and will review them once they are available.Cardiology Nirav Baldwin MD      I have signed for the following orders AND/OR meds.  Orders Placed This Encounter   Procedures    Comprehensive Metabolic Panel     Standing Status:   Future     Standing Expiration Date:   5/6/2023     Medications Ordered This Encounter   Medications    amoxicillin-clavulanate 875-125mg (AUGMENTIN) 875-125 mg per tablet     Sig: Take 1 tablet by mouth 2 (two) times daily. Take with food. for 10 days     Dispense:  20 tablet     Refill:  0    predniSONE (DELTASONE) 20 MG tablet     Sig: Take 1 tablet (20 mg total) by mouth once daily. for 5 days     Dispense:  5 tablet     Refill:  0        Follow up in about 6 months (around 9/7/2022), or if symptoms worsen or fail to improve, for Annual Wellness Exam.    If no improvement in symptoms or symptoms worsen, advised to call/follow-up at clinic or go to ER. Patient voiced understanding and all questions/concerns were addressed.   DISCLAIMER: This note was compiled by using a speech recognition dictation system and therefore please be aware that typographical / speech recognition errors can and do occur.  Please contact me if you see any errors specifically.    Olvin Hutson M.D.       Office: [phone] [id number] [facility]  [facility] [place] [address]  FAX: [phone]

## 2022-03-07 NOTE — ASSESSMENT & PLAN NOTE
Continue Farxiga.  Check A1c in six months.  Due for urine microalbumin.We will plan to monitor hemoglobin A1c at designated intervals 3 to 6 months.  I recommend ongoing Education for diabetic diet and exercise protocol.  We will continue to monitor for side effects.    Please be advised of symptoms to monitor for and to notify me immediately if persistent or worsening.  Follow up with Ophthalmology/Optometry and Podiatry at least annually.

## 2022-03-07 NOTE — ASSESSMENT & PLAN NOTE
Continue current medication regimen.  Follow-up cardiology.Counseled on importance of hypertension disease course, I recommend ongoing Education for DASH-diet and exercise.  Counseled on medication regimen importance of treating high blood pressure.  Please be advised of risk of untreated blood pressure as discussed.  Please advised of ER precautions were given for symptoms of hypertensive urgency and emergency.

## 2022-03-07 NOTE — ASSESSMENT & PLAN NOTE
Start Augmentin and prednisone burst which patient has tolerated in done well with in the past.Discussed condition course and signs and symptoms to expect.  Patient advised take anti-inflammatories and or Tylenol for pain or fever.  ER precautions.  Call MD or follow-up to clinic if not improving or worsening symptoms.   - risk of corticosteroids reviewed (elevated BP/glucose, insomnia, psychosis, bone loss, etc) and patient expressed understanding

## 2022-03-08 NOTE — PROGRESS NOTES
1st check to see if patient has seen the results.  If not then  CALL patient with results and Document verification.  Schedule follow-up if needed.  [phone]  PSA screening is normal.  Repeat in one year.

## 2022-03-10 DIAGNOSIS — I47.29 PAROXYSMAL VT: ICD-10-CM

## 2022-03-10 DIAGNOSIS — I48.0 PAF (PAROXYSMAL ATRIAL FIBRILLATION): Primary | Chronic | ICD-10-CM

## 2022-03-11 ENCOUNTER — RESEARCH ENCOUNTER (OUTPATIENT)
Dept: RESEARCH | Facility: HOSPITAL | Age: 72
End: 2022-03-11
Payer: MEDICARE

## 2022-03-11 NOTE — PROGRESS NOTES
3/11/2022    Study: [place] 2017.307  PI: Nirav Baldwin  Visit: Medication re-supply    Patient contacted [facility] location on 3/8/2022 to let them know he was low on study drug. [facility] @  contacted main [facility] to let them know about subject needing study drug.    [facility] contacted patient on 3/10/2022, informed patient that previous CRC (Chilo Shen) was no longer at [facility] and that I would be his primary contact moving forward (provided contact information).  Study drug was ordered on 3/10/2022, picked up from pharmacy on 3/11/2022 and sent out to patient via [employer]PS priority same day.    [facility] contacted patient letting him know the study drug was on the way and to please let me know when it has been received.  Patient expressed understanding.

## 2022-03-16 DIAGNOSIS — Z00.01 ENCOUNTER FOR GENERAL ADULT MEDICAL EXAMINATION WITH ABNORMAL FINDINGS: ICD-10-CM

## 2022-03-16 RX ORDER — FLUTICASONE FUROATE 100 UG/1
POWDER RESPIRATORY (INHALATION)
Qty: 30 EACH | Refills: 0 | Status: SHIPPED | OUTPATIENT
Start: 2022-03-16 | End: 2022-04-15

## 2022-03-25 ENCOUNTER — PATIENT MESSAGE (OUTPATIENT)
Dept: PULMONOLOGY | Facility: CLINIC | Age: 72
End: 2022-03-25
Payer: MEDICARE

## 2022-03-31 ENCOUNTER — EXTERNAL CHRONIC CARE MANAGEMENT (OUTPATIENT)
Dept: PRIMARY CARE CLINIC | Facility: CLINIC | Age: 72
End: 2022-03-31
Payer: MEDICARE

## 2022-03-31 PROCEDURE — 99490 PR CHRONIC CARE MGMT, 1ST 20 MIN: ICD-10-PCS | Mod: S$PBB,,, | Performed by: FAMILY MEDICINE

## 2022-03-31 PROCEDURE — 99490 CHRNC CARE MGMT STAFF 1ST 20: CPT | Mod: PBBFAC,PO | Performed by: FAMILY MEDICINE

## 2022-03-31 PROCEDURE — 99490 CHRNC CARE MGMT STAFF 1ST 20: CPT | Mod: S$PBB,,, | Performed by: FAMILY MEDICINE

## 2022-04-22 ENCOUNTER — OFFICE VISIT (OUTPATIENT)
Dept: PULMONOLOGY | Facility: CLINIC | Age: 72
End: 2022-04-22
Payer: MEDICARE

## 2022-04-22 VITALS
OXYGEN SATURATION: 93 % | WEIGHT: 271.69 LBS | DIASTOLIC BLOOD PRESSURE: 68 MMHG | BODY MASS INDEX: 38.9 KG/M2 | SYSTOLIC BLOOD PRESSURE: 100 MMHG | HEART RATE: 72 BPM | HEIGHT: 70 IN | RESPIRATION RATE: 14 BRPM

## 2022-04-22 DIAGNOSIS — E66.01 CLASS 3 SEVERE OBESITY DUE TO EXCESS CALORIES WITH SERIOUS COMORBIDITY AND BODY MASS INDEX (BMI) OF 40.0 TO 44.9 IN ADULT: Chronic | ICD-10-CM

## 2022-04-22 DIAGNOSIS — J45.20 MILD INTERMITTENT ASTHMA WITHOUT COMPLICATION: ICD-10-CM

## 2022-04-22 DIAGNOSIS — I50.30 CONGESTIVE HEART FAILURE WITH LEFT VENTRICULAR DIASTOLIC DYSFUNCTION, NYHA CLASS 3: ICD-10-CM

## 2022-04-22 DIAGNOSIS — I48.0 PAF (PAROXYSMAL ATRIAL FIBRILLATION): Chronic | ICD-10-CM

## 2022-04-22 DIAGNOSIS — Z45.02 ICD (IMPLANTABLE CARDIOVERTER-DEFIBRILLATOR) DISCHARGE: ICD-10-CM

## 2022-04-22 DIAGNOSIS — G47.33 OSA ON CPAP: Primary | ICD-10-CM

## 2022-04-22 PROCEDURE — 99999 PR PBB SHADOW E&M-EST. PATIENT-LVL V: CPT | Mod: PBBFAC,,, | Performed by: PHYSICIAN ASSISTANT

## 2022-04-22 PROCEDURE — 99214 OFFICE O/P EST MOD 30 MIN: CPT | Mod: S$PBB,,, | Performed by: PHYSICIAN ASSISTANT

## 2022-04-22 PROCEDURE — 99215 OFFICE O/P EST HI 40 MIN: CPT | Mod: PBBFAC | Performed by: PHYSICIAN ASSISTANT

## 2022-04-22 PROCEDURE — 99214 PR OFFICE/OUTPT VISIT, EST, LEVL IV, 30-39 MIN: ICD-10-PCS | Mod: S$PBB,,, | Performed by: PHYSICIAN ASSISTANT

## 2022-04-22 PROCEDURE — 99999 PR PBB SHADOW E&M-EST. PATIENT-LVL V: ICD-10-PCS | Mod: PBBFAC,,, | Performed by: PHYSICIAN ASSISTANT

## 2022-04-22 NOTE — PROGRESS NOTES
Subjective:       Patient ID: Randy Yap is a 72 y.o. male.    Chief Complaint: Sleep Apnea      71yo male here for follow up of HONEY  Use to see Dr. Jones, last seen in 2020  History of HONEY on CPAP 14  Was seen for acute bronchitis in 2020; denies recent respiratory infection  History of mild intermittent asthma, controlled on albuterol prn  Using FFM for CPAP, requesting new supplies, is currently not due for a new machine  Feels leak on mask, using tape to hold it together  Using [facility] but wants to switch to [facility]  Patient states improved symptoms with use of CPAP. Sleeping more soundly. Waking up feeling more refreshed. Improved daytime sleepiness.  Patient is compliant with CPAP use      EPWORTH SLEEPINESS SCALE 4/22/2022   Sitting and reading 1   Watching TV 1   Sitting, inactive in a public place (e.g. a theatre or a meeting) 0   As a passenger in a car for an hour without a break 0   Lying down to rest in the afternoon when circumstances permit 1   Sitting and talking to someone 0   Sitting quietly after a lunch without alcohol 0   In a car, while stopped for a few minutes in traffic 0   Total score 3         Immunization History   Administered Date(s) Administered    COVID-19, MRNA, LN-S, PF (Pfizer) (Purple Cap) 01/29/2021, 02/25/2021, 11/03/2021    Influenza (FLUAD) - Quadrivalent - Adjuvanted - PF *Preferred* (65+) 12/04/2020    Influenza - Quadrivalent - High Dose - PF (65 years and older) 11/03/2021    Influenza - Quadrivalent - PF *Preferred* (6 months and older) 02/01/2013, 10/14/2013, 10/17/2016    Influenza - Trivalent (ADULT) 02/01/2013, 10/14/2013    Influenza Split 02/01/2013, 10/14/2013    Pneumococcal Conjugate - 13 Valent 09/19/2018    Pneumococcal Polysaccharide - 23 Valent 02/01/2013, 06/04/2015, 09/16/2019    Td (ADULT) 11/19/2013, 11/19/2013    Td (Adult), Unspecified Formulation 11/19/2013    Zoster 05/14/2014    Zoster Recombinant 09/21/2021      Tobacco Use: Low  Risk     Smoking Tobacco Use: Never Smoker    Smokeless Tobacco Use: Never Used      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       amiodarone (PACERONE) 100 MG Tab Take 1.5 tablets (150 mg total) by mouth once daily. (Patient taking differently: Take 100 mg by mouth once daily.) 45 tablet 11    ARNUITY ELLIPTA 100 mcg/actuation inhaler INHALE 1 PUFF BY MOUTH ONCE DAILY 30 each 0    bepotastine besilate (BEPREVE) 1.5 % Drop Bepreve 1.5 % eye drops   Apply by ophthalmic route as needed for 50 days.      carvediloL (COREG) 25 MG tablet Take 1 tablet (25 mg total) by mouth 2 (two) times daily. 180 tablet 4    co-enzyme Q-10 30 mg capsule Take 30 mg by mouth once daily.       ENTRESTO  mg per tablet TAKE 1 TABLET BY MOUTH TWICE DAILY 180 tablet 0    FARXIGA 10 mg tablet TAKE 1 TABLET BY MOUTH ONCE DAILY 90 tablet 3    ferrous sulfate (FEOSOL) 325 mg (65 mg iron) Tab tablet Take 325 mg by mouth daily with breakfast.      furosemide (LASIX) 40 MG tablet TAKE 1 TABLET BY MOUTH ONCE DAILY 90 tablet 0    glucosamine-chondroitin 500-400 mg tablet Take 1 tablet by mouth once daily.       INV apixaban/placebo tablet Take 1 tablets (5 mg) by mouth 2 (two) times daily. FOR INVESTIGATIONAL USE ONLY. Protocol: Leia 2017.307 subject # : [id number] 400 tablet 0    INV aspirin/placebo tablet Take 1 tablet (81 mg total) by mouth once daily. FOR INVESTIGATIONAL USE ONLY. Patient Study #: [id number] Protocol: Leia 2017.307 200  "tablet 0    multivitamin (THERAGRAN) tablet Take 1 tablet by mouth once daily.      pravastatin (PRAVACHOL) 80 MG tablet Take 1 tablet (80 mg total) by mouth once daily. 30 tablet 11    RESTASIS 0.05 % ophthalmic emulsion Place 1 drop into both eyes 2 (two) times daily.      spironolactone (ALDACTONE) 25 MG tablet Take 1 tablet (25 mg total) by mouth 2 (two) times daily. 180 tablet 0    traZODone (DESYREL) 150 MG tablet Take 1 tablet (150 mg total) by mouth every evening. 90 tablet 4    albuterol (PROVENTIL/VENTOLIN HFA) 90 mcg/actuation inhaler Inhale 1 puff into the lungs once daily. Rescue (Patient not taking: Reported on 4/22/2022) 18 g 4     No current facility-administered medications on file prior to visit.        Review of Systems   Constitutional: Negative for fever, weight loss, appetite change, fatigue and weakness.   HENT: Negative for postnasal drip, rhinorrhea, sinus pressure, trouble swallowing and congestion.    Respiratory: Negative for cough, sputum production, choking, chest tightness, shortness of breath, wheezing and dyspnea on extertion.    Cardiovascular: Negative for chest pain and leg swelling.   Musculoskeletal: Negative for arthralgias, gait problem and joint swelling.   Gastrointestinal: Negative for nausea, vomiting and abdominal pain.   Neurological: Negative for dizziness, weakness and headaches.   All other systems reviewed and are negative.      Objective:       Vitals:    04/22/22 1352   BP: 100/68   Pulse: 72   Resp: 14   SpO2: (!) 93%   Weight: 123.2 kg (271 lb 11.5 oz)   Height: 5' 10" (1.778 m)       Physical Exam   Constitutional: He is oriented to person, place, and time. He appears well-developed and well-nourished. No distress. He is obese.   HENT:   Head: Normocephalic.   Nose: Nose normal.   Mouth/Throat: Oropharynx is clear and moist.   Cardiovascular: Normal rate and regular rhythm.   Pulmonary/Chest: Effort normal and breath sounds normal. No respiratory distress. " He has no wheezes. He has no rhonchi. He has no rales.   Musculoskeletal:         General: No edema.      Cervical back: Normal range of motion and neck supple.   Lymphadenopathy: No supraclavicular adenopathy is present.     He has no cervical adenopathy.   Neurological: He is alert and oriented to person, place, and time. Gait normal.   Skin: Skin is warm and dry.   Psychiatric: He has a normal mood and affect.   Vitals reviewed.    Personal Diagnostic Review    Cardiac device [facility] - [facility]  Additional Comments  Device and leads implanted 10/6/2014  Abbott ICD Fortify Assura DR 2357-40Q, SN: [id number]  A lead: Bennett Tendril STS 2088TC / 46cm, SN: [id number]  V lead: Bennett Durata 7120Q / 58 cm, SN: [id number]    IN-OFFICE device interrogation and testing performed. Chronic left chest incision intact, no erosion noted.     Presenting egram demonstrates AsVs    Underlying rhythm c/w SR @ 71 bpm    Device fxn WNL     RA pacing 85%    Atrial arrhythmias: None since last remote    Anticoagulant: Xarelto per pt    Battery Status/Longevity: 2.2 years    Reprogramming at this visit: None    Corvue shows fluid build up from 1/30-today. Pt asymptomatic, Dr. Mathew reviewed curve.     F/U via remote interrogation in 3 mos     Report prepared by KENZIE               Assessment/Plan:       Problem List Items Addressed This Visit        Pulmonary    Mild intermittent asthma without complication     Controlled on albuterol prn  No wheezing or exacerbation today              Cardiac/Vascular    PAF (paroxysmal atrial fibrillation) (Chronic)     F/u regularly with cardiology             ICD (implantable cardioverter-defibrillator) discharge     F/u regularly with cardiology             Congestive heart failure with left ventricular diastolic dysfunction, NYHA class 3     F/u regularly with cardiology  Echo Saline Bubble? No    Interpretation Summary  · The left ventricle is moderately enlarged with concentric hypertrophy  and moderately decreased systolic function.  · Mild left atrial enlargement.  · The estimated ejection fraction is 30%.  · Grade I left ventricular diastolic dysfunction.  · There is moderate left ventricular global hypokinesis.  · Normal right ventricular size with normal right ventricular systolic function.  · Mild mitral regurgitation.                  Endocrine    Class 3 severe obesity due to excess calories with serious comorbidity and body mass index (BMI) of 40.0 to 44.9 in adult (Chronic)     Weight loss and exercise to improve overall health.                Other    HONEY on CPAP - Primary     continue CPAP 14  New supplies today  Benefits from use  Discussed therapeutic goals for CPAP: Ideal usage 100% of nights for 6-8 hours per night. Minimum usage is 70% of night for at least 4 hours per night.  Bring machine next visit             Relevant Orders    CPAP/BIPAP SUPPLIES    X-Ray Chest PA And Lateral        New supplies order today. Follow up in 3 months for CPAP review and Chest X Ray.    Discussed diagnosis, its evaluation, treatment and usual course. All questions answered.    Patient verbalized understanding of plan and left in no acute distress    Thank you for the courtesy of participating in the care of this patient    Elisabeth Freire PA-C

## 2022-04-22 NOTE — ASSESSMENT & PLAN NOTE
F/u regularly with cardiology  Echo Saline Bubble? No    Interpretation Summary  · The left ventricle is moderately enlarged with concentric hypertrophy and moderately decreased systolic function.  · Mild left atrial enlargement.  · The estimated ejection fraction is 30%.  · Grade I left ventricular diastolic dysfunction.  · There is moderate left ventricular global hypokinesis.  · Normal right ventricular size with normal right ventricular systolic function.  · Mild mitral regurgitation.

## 2022-04-22 NOTE — ASSESSMENT & PLAN NOTE
continue CPAP 14  New supplies today  Benefits from use  Discussed therapeutic goals for CPAP: Ideal usage 100% of nights for 6-8 hours per night. Minimum usage is 70% of night for at least 4 hours per night.  Bring machine next visit

## 2022-04-30 ENCOUNTER — EXTERNAL CHRONIC CARE MANAGEMENT (OUTPATIENT)
Dept: PRIMARY CARE CLINIC | Facility: CLINIC | Age: 72
End: 2022-04-30
Payer: MEDICARE

## 2022-04-30 PROCEDURE — 99490 PR CHRONIC CARE MGMT, 1ST 20 MIN: ICD-10-PCS | Mod: S$PBB,,, | Performed by: FAMILY MEDICINE

## 2022-04-30 PROCEDURE — 99490 CHRNC CARE MGMT STAFF 1ST 20: CPT | Mod: PBBFAC,PO | Performed by: FAMILY MEDICINE

## 2022-04-30 PROCEDURE — 99490 CHRNC CARE MGMT STAFF 1ST 20: CPT | Mod: S$PBB,,, | Performed by: FAMILY MEDICINE

## 2022-05-01 NOTE — PROGRESS NOTES
Cardiac PET Resting images only. Pt found to be wheezing upon initial assessment. Notified Dr Janet Salgado, and was agreed upon to use dobutamine as stress agent. During dobutamine infusion pt began to have elevated diastolic pressures, runs of Vtach and inability to reach target HR. Pt had no complaints. Dobutamine was stopped. Unable to obtain stress images. Pt returned to baseline and was discharged. Will notify Dr Baldwin office.   Xray Knee 3 Views, Left

## 2022-05-02 ENCOUNTER — PES CALL (OUTPATIENT)
Dept: ADMINISTRATIVE | Facility: CLINIC | Age: 72
End: 2022-05-02
Payer: MEDICARE

## 2022-05-18 ENCOUNTER — HOSPITAL ENCOUNTER (OUTPATIENT)
Dept: RADIOLOGY | Facility: HOSPITAL | Age: 72
Discharge: HOME | End: 2022-05-18
Attending: PHYSICIAN ASSISTANT
Payer: MEDICARE

## 2022-05-18 DIAGNOSIS — G47.33 OSA ON CPAP: ICD-10-CM

## 2022-05-18 PROCEDURE — 71046 X-RAY EXAM CHEST 2 VIEWS: CPT | Mod: TC,PO

## 2022-05-18 PROCEDURE — 71046 XR CHEST PA AND LATERAL: ICD-10-PCS | Mod: 26,,, | Performed by: RADIOLOGY

## 2022-05-18 PROCEDURE — 71046 X-RAY EXAM CHEST 2 VIEWS: CPT | Mod: 26,,, | Performed by: RADIOLOGY

## 2022-05-24 ENCOUNTER — CLINICAL SUPPORT (OUTPATIENT)
Dept: CARDIOLOGY | Facility: HOSPITAL | Age: 72
End: 2022-05-24
Payer: MEDICARE

## 2022-05-24 DIAGNOSIS — Z95.810 PRESENCE OF AUTOMATIC (IMPLANTABLE) CARDIAC DEFIBRILLATOR: ICD-10-CM

## 2022-05-24 PROCEDURE — 93295 DEV INTERROG REMOTE 1/2/MLT: CPT | Mod: ,,, | Performed by: INTERNAL MEDICINE

## 2022-05-24 PROCEDURE — 93295 CARDIAC DEVICE CHECK - REMOTE: ICD-10-PCS | Mod: ,,, | Performed by: INTERNAL MEDICINE

## 2022-05-25 ENCOUNTER — PATIENT MESSAGE (OUTPATIENT)
Dept: FAMILY MEDICINE | Facility: CLINIC | Age: 72
End: 2022-05-25
Payer: MEDICARE

## 2022-05-31 ENCOUNTER — EXTERNAL CHRONIC CARE MANAGEMENT (OUTPATIENT)
Dept: PRIMARY CARE CLINIC | Facility: CLINIC | Age: 72
End: 2022-05-31
Payer: MEDICARE

## 2022-05-31 ENCOUNTER — PATIENT MESSAGE (OUTPATIENT)
Dept: FAMILY MEDICINE | Facility: CLINIC | Age: 72
End: 2022-05-31

## 2022-05-31 PROCEDURE — 99490 PR CHRONIC CARE MGMT, 1ST 20 MIN: ICD-10-PCS | Mod: S$PBB,,, | Performed by: FAMILY MEDICINE

## 2022-05-31 PROCEDURE — 99490 CHRNC CARE MGMT STAFF 1ST 20: CPT | Mod: PBBFAC,PO | Performed by: FAMILY MEDICINE

## 2022-05-31 PROCEDURE — 99490 CHRNC CARE MGMT STAFF 1ST 20: CPT | Mod: S$PBB,,, | Performed by: FAMILY MEDICINE

## 2022-06-14 ENCOUNTER — PATIENT MESSAGE (OUTPATIENT)
Dept: PULMONOLOGY | Facility: CLINIC | Age: 72
End: 2022-06-14
Payer: MEDICARE

## 2022-06-14 DIAGNOSIS — G47.33 OSA (OBSTRUCTIVE SLEEP APNEA): Primary | ICD-10-CM

## 2022-06-15 ENCOUNTER — TELEPHONE (OUTPATIENT)
Dept: PULMONOLOGY | Facility: CLINIC | Age: 72
End: 2022-06-15
Payer: MEDICARE

## 2022-07-08 ENCOUNTER — PROCEDURE VISIT (OUTPATIENT)
Dept: SLEEP MEDICINE | Facility: CLINIC | Age: 72
End: 2022-07-08
Payer: MEDICARE

## 2022-07-08 DIAGNOSIS — G47.33 OSA (OBSTRUCTIVE SLEEP APNEA): ICD-10-CM

## 2022-07-08 DIAGNOSIS — G47.33 OSA (OBSTRUCTIVE SLEEP APNEA): Primary | ICD-10-CM

## 2022-07-08 PROCEDURE — 95806 PR SLEEP STUDY, UNATTENDED, SIMUL RECORD HR/O2 SAT/RESP FLOW/RESP EFFT: ICD-10-PCS | Mod: 26,S$PBB,, | Performed by: INTERNAL MEDICINE

## 2022-07-08 PROCEDURE — 95806 SLEEP STUDY UNATT&RESP EFFT: CPT | Mod: 26,S$PBB,, | Performed by: INTERNAL MEDICINE

## 2022-07-08 PROCEDURE — 95806 SLEEP STUDY UNATT&RESP EFFT: CPT | Mod: PBBFAC | Performed by: INTERNAL MEDICINE

## 2022-07-08 NOTE — Clinical Note
2 night study MODERATE OBSTRUCTIVE SLEEP APNEA with overall AHI 19.8/hr ( 154 events): night # 2 Oxygen desaturation: 81%. SpO2 between 80% to 84% for 3 min. Oxygen saturation was below 90% saturation for 37% of the study time. Patient snored 100% time above 50 . Heart rate range: 41 bpm - 93 bpm REC's: INLAB CPAP titration recommended/be considered. Therapy with APAP at 6-20 cm WP using mask of choice with heated humidification is an option. Weight loss/management. with regular exercise per direction of physician. Avoid drowsy driving. Follow up in sleep clinic to maximize adherence and ensure resolution of symptoms.

## 2022-07-08 NOTE — PROCEDURES
Home Sleep Studies    Date/Time: 7/8/2022 8:00 AM  Performed by: Roberto Schaeffer MD  Authorized by: Elisabeth Freire PA-C       2 night study  MODERATE OBSTRUCTIVE SLEEP APNEA with overall AHI 19.8/hr ( 154 events): night # 2  Oxygen desaturation: 81%. SpO2 between 80% to 84% for 3 min. Oxygen saturation was below 90% saturation  for 37% of the study time.  Patient snored 100% time above 50 .  Heart rate range: 41 bpm - 93 bpm  REC's:  INLAB CPAP titration recommended/be considered.  Therapy with APAP at 6-20 cm WP using mask of choice with heated humidification is an option.  Weight loss/management. with regular exercise per direction of physician.  Avoid drowsy driving.  Follow up in sleep clinic to maximize adherence and ensure resolution of symptoms.

## 2022-07-15 ENCOUNTER — RESEARCH ENCOUNTER (OUTPATIENT)
Dept: RESEARCH | Facility: HOSPITAL | Age: 72
End: 2022-07-15
Payer: MEDICARE

## 2022-07-15 NOTE — PROGRESS NOTES
7/15/2022    Study: Leia 2017.307  PI: Nirav Baldwin  Visit: 48-month     Patient contacted via phone for 48 month follow-up, Mr. Yap reports feeling well.  He also still has plenty medication, instructed patient to contact me when his medication gets down to a 1 week supply to allow time to get medication shipped to him.  He expressed understanding.  EMR reviewed, no protocol related adverse events or hospitalizations.

## 2022-07-20 ENCOUNTER — PATIENT MESSAGE (OUTPATIENT)
Dept: PULMONOLOGY | Facility: CLINIC | Age: 72
End: 2022-07-20
Payer: MEDICARE

## 2022-07-22 ENCOUNTER — OFFICE VISIT (OUTPATIENT)
Dept: PULMONOLOGY | Facility: CLINIC | Age: 72
End: 2022-07-22
Payer: MEDICARE

## 2022-07-22 VITALS
RESPIRATION RATE: 18 BRPM | WEIGHT: 271.06 LBS | BODY MASS INDEX: 38.81 KG/M2 | HEIGHT: 70 IN | OXYGEN SATURATION: 95 % | DIASTOLIC BLOOD PRESSURE: 78 MMHG | SYSTOLIC BLOOD PRESSURE: 128 MMHG | HEART RATE: 92 BPM

## 2022-07-22 DIAGNOSIS — J45.30 MILD PERSISTENT ASTHMA WITHOUT COMPLICATION: Primary | ICD-10-CM

## 2022-07-22 DIAGNOSIS — G47.33 OSA ON CPAP: ICD-10-CM

## 2022-07-22 DIAGNOSIS — E66.01 CLASS 2 SEVERE OBESITY DUE TO EXCESS CALORIES WITH SERIOUS COMORBIDITY AND BODY MASS INDEX (BMI) OF 38.0 TO 38.9 IN ADULT: ICD-10-CM

## 2022-07-22 DIAGNOSIS — E11.59 HYPERTENSION ASSOCIATED WITH DIABETES: Chronic | ICD-10-CM

## 2022-07-22 DIAGNOSIS — I15.2 HYPERTENSION ASSOCIATED WITH DIABETES: Chronic | ICD-10-CM

## 2022-07-22 DIAGNOSIS — I50.30 CONGESTIVE HEART FAILURE WITH LEFT VENTRICULAR DIASTOLIC DYSFUNCTION, NYHA CLASS 3: ICD-10-CM

## 2022-07-22 PROCEDURE — 99214 PR OFFICE/OUTPT VISIT, EST, LEVL IV, 30-39 MIN: ICD-10-PCS | Mod: S$PBB,,, | Performed by: PHYSICIAN ASSISTANT

## 2022-07-22 PROCEDURE — 99999 PR PBB SHADOW E&M-EST. PATIENT-LVL V: ICD-10-PCS | Mod: PBBFAC,,, | Performed by: PHYSICIAN ASSISTANT

## 2022-07-22 PROCEDURE — 99999 PR PBB SHADOW E&M-EST. PATIENT-LVL V: CPT | Mod: PBBFAC,,, | Performed by: PHYSICIAN ASSISTANT

## 2022-07-22 PROCEDURE — 99215 OFFICE O/P EST HI 40 MIN: CPT | Mod: PBBFAC | Performed by: PHYSICIAN ASSISTANT

## 2022-07-22 PROCEDURE — 99214 OFFICE O/P EST MOD 30 MIN: CPT | Mod: S$PBB,,, | Performed by: PHYSICIAN ASSISTANT

## 2022-07-22 NOTE — ASSESSMENT & PLAN NOTE
Somewhat controlled on Arnuity 100 daily  Will increase to 200 for better control  Albuterol prn  FeNO, walk, wolf in 6 months

## 2022-07-22 NOTE — PROGRESS NOTES
Subjective:       Patient ID: Randy Yap is a 72 y.o. male.    Chief Complaint: Asthma and Apnea      7/22/22  HONEY on CPAP follow up  Received new FFM today, had to repeat sleep study to get new supplies here  MODERATE OBSTRUCTIVE SLEEP APNEA with overall AHI 19.8/hr ( 154 events)  Patient states improved symptoms with use of CPAP. Sleeping more soundly. Waking up feeling more refreshed. Improved daytime sleepiness.  Also with history of asthma, Arnuity 100 daily, uses albuterol every night  Never smoker  No recent exacerbations  Asthma score 14  Dent score 2    4/22/22  73yo male here for follow up of HONEY  Use to see Dr. Jones, last seen in 2020  History of HONEY on CPAP 14  Was seen for acute bronchitis in 2020; denies recent respiratory infection  History of mild intermittent asthma, controlled on albuterol prn  Using FFM for CPAP, requesting new supplies, is currently not due for a new machine  Feels leak on mask, using tape to hold it together  Using [facility] but wants to switch to [facility]  Patient states improved symptoms with use of CPAP. Sleeping more soundly. Waking up feeling more refreshed. Improved daytime sleepiness.  Patient is compliant with CPAP use      EPWORTH SLEEPINESS SCALE 4/22/2022   Sitting and reading 1   Watching TV 1   Sitting, inactive in a public place (e.g. a theatre or a meeting) 0   As a passenger in a car for an hour without a break 0   Lying down to rest in the afternoon when circumstances permit 1   Sitting and talking to someone 0   Sitting quietly after a lunch without alcohol 0   In a car, while stopped for a few minutes in traffic 0   Total score 3         Immunization History   Administered Date(s) Administered    COVID-19, MRNA, LN-S, PF (Pfizer) (Purple Cap) 01/29/2021, 02/25/2021, 11/03/2021    Influenza (FLUAD) - Quadrivalent - Adjuvanted - PF *Preferred* (65+) 12/04/2020    Influenza - Quadrivalent - High Dose - PF (65 years and older) 11/03/2021    Influenza  - Quadrivalent - PF *Preferred* (6 months and older) 02/01/2013, 10/14/2013, 10/17/2016    Influenza - Trivalent (ADULT) 02/01/2013, 10/14/2013    Influenza Split 02/01/2013, 10/14/2013    Pneumococcal Conjugate - 13 Valent 09/19/2018    Pneumococcal Polysaccharide - 23 Valent 02/01/2013, 06/04/2015, 09/16/2019    Td (ADULT) 11/19/2013, 11/19/2013    Td (Adult), Unspecified Formulation 11/19/2013    Zoster 05/14/2014    Zoster Recombinant 09/21/2021      Tobacco Use: Low Risk     Smoking Tobacco Use: Never Smoker    Smokeless Tobacco Use: Never Used      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       amiodarone (PACERONE) 100 MG Tab TAKE 1 AND 1/2 TABLETS BY MOUTH ONCE DAILY 45 tablet 0    bepotastine besilate (BEPREVE) 1.5 % Drop Bepreve 1.5 % eye drops   Apply by ophthalmic route as needed for 50 days.      carvediloL (COREG) 25 MG tablet Take 1 tablet (25 mg total) by mouth 2 (two) times daily. 180 tablet 4    co-enzyme Q-10 30 mg capsule Take 30 mg by mouth once daily.       ENTRESTO  mg per tablet TAKE 1 TABLET BY MOUTH TWICE DAILY 180 tablet 0    FARXIGA 10 mg tablet TAKE 1 TABLET BY MOUTH ONCE DAILY 90 tablet 3    ferrous sulfate (FEOSOL) 325 mg (65 mg iron) Tab tablet Take 325 mg by mouth daily with breakfast.      furosemide (LASIX) 40 MG tablet TAKE 1 TABLET BY MOUTH ONCE DAILY 90 tablet 0    glucosamine-chondroitin 500-400 mg tablet Take 1 tablet by mouth once daily.       INV  "apixaban/placebo tablet Take 1 tablets (5 mg) by mouth 2 (two) times daily. FOR INVESTIGATIONAL USE ONLY. Protocol: Leia 2017.307 subject # : [id number] 400 tablet 0    INV aspirin/placebo tablet Take 1 tablet (81 mg total) by mouth once daily. FOR INVESTIGATIONAL USE ONLY. Patient Study #: [id number] Protocol: Leia 2017.307 200 tablet 0    multivitamin (THERAGRAN) tablet Take 1 tablet by mouth once daily.      pravastatin (PRAVACHOL) 80 MG tablet TAKE 1 TABLET BY MOUTH ONCE DAILY 30 tablet 0    RESTASIS 0.05 % ophthalmic emulsion Place 1 drop into both eyes 2 (two) times daily.      spironolactone (ALDACTONE) 25 MG tablet TAKE 1 TABLET BY MOUTH TWICE DAILY 180 tablet 0    traZODone (DESYREL) 150 MG tablet Take 1 tablet (150 mg total) by mouth every evening. 90 tablet 4    [DISCONTINUED] ARNUITY ELLIPTA 100 mcg/actuation inhaler INHALE 1 PUFF BY MOUTH ONCE DAILY 30 each 0    albuterol (PROVENTIL/VENTOLIN HFA) 90 mcg/actuation inhaler Inhale 1 puff into the lungs once daily. Rescue 18 g 4     No current facility-administered medications on file prior to visit.        Review of Systems   Constitutional: Negative for fever, weight loss, appetite change, fatigue and weakness.   HENT: Negative for postnasal drip, rhinorrhea, sinus pressure, trouble swallowing and congestion.    Respiratory: Positive for dyspnea on extertion and use of rescue inhaler. Negative for cough, sputum production, choking, chest tightness, shortness of breath and wheezing.    Cardiovascular: Negative for chest pain and leg swelling.   Musculoskeletal: Negative for arthralgias, gait problem and joint swelling.   Gastrointestinal: Negative for nausea, vomiting and abdominal pain.   Neurological: Negative for dizziness, weakness and headaches.   All other systems reviewed and are negative.      Objective:       Vitals:    07/22/22 1032   BP: 128/78   Pulse: 92   Resp: 18   SpO2: 95%   Weight: 123 kg (271 lb 0.9 oz)   Height: 5' 10" (1.778 m) "       Physical Exam   Constitutional: He is oriented to person, place, and time. He appears well-developed and well-nourished. No distress. He is obese.   HENT:   Head: Normocephalic.   Mouth/Throat: Oropharynx is clear and moist.   Cardiovascular: Normal rate and regular rhythm.   Pulmonary/Chest: Effort normal and breath sounds normal. No respiratory distress. He has no wheezes. He has no rhonchi. He has no rales.   Musculoskeletal:         General: No edema.      Cervical back: Normal range of motion and neck supple.   Neurological: He is alert and oriented to person, place, and time. Gait normal.   Skin: Skin is warm and dry.   Psychiatric: He has a normal mood and affect.   Vitals reviewed.    Personal Diagnostic Review    Cardiac device check - Remote  Battery and Leads (BL)  Normal parameters noted on battery and lead(s)    Presenting Rhythm (KS)  Atrial Pacing-Ventricular Sensing (AP-VS)    Arrhythmic events (AE)  Undersensing is noted  Paroxysmal atrial fibrillation or flutter    Anticoagulation  (AC)  Patient on anticoagulant therapy  Current anticoagulation status obtained from data maintained by practitioner    Heart Failure (HF)  No significant change in activity level is noted on trending  No significant decrease in transthoracic impedance    Transmission Information (TI)  Device Summary Report    Follow Up (FU)  Continue remote monitoring quarterly    Additional Comments  ICD Report  Last Diagnostic Reset 4/1/22    AT/AF burden <1%. Longest on 5/7/22, 44sec in recorded duration, average A/V rates 373/107 bpm. EGM illustrates AF/AFL. Undersensing noted.    Prepared by NOÉ Hanson          Assessment/Plan:       Problem List Items Addressed This Visit        Pulmonary    Mild persistent asthma without complication - Primary     Somewhat controlled on Arnuity 100 daily  Will increase to 200 for better control  Albuterol prn  FeNO, walk, wolf in 6 months           Relevant Medications    fluticasone furoate  (ARNUITY ELLIPTA) 200 mcg/actuation inhaler    Other Relevant Orders    Spirometry with/without bronchodilator    Fraction of  Nitric Oxide    Stress test, pulmonary       Cardiac/Vascular    Hypertension associated with diabetes (Chronic)     controlled           Congestive heart failure with left ventricular diastolic dysfunction, NYHA class 3     F/u regularly with cardiology    Echo Saline Bubble? No    Interpretation Summary  · The left ventricle is moderately enlarged with concentric hypertrophy and moderately decreased systolic function.  · Mild left atrial enlargement.  · The estimated ejection fraction is 30%.  · Grade I left ventricular diastolic dysfunction.  · There is moderate left ventricular global hypokinesis.  · Normal right ventricular size with normal right ventricular systolic function.  · Mild mitral regurgitation.                Endocrine    Class 2 severe obesity due to excess calories with serious comorbidity and body mass index (BMI) of 38.0 to 38.9 in adult     Weight loss and exercise to improve overall health.                Other    HONEY on CPAP     continue CPAP 14  New supplies today  Benefits from use  Discussed therapeutic goals for CPAP: Ideal usage 100% of nights for 6-8 hours per night. Minimum usage is 70% of night for at least 4 hours per night.  Bring machine next visit                 Follow up in about 6 months (around 2023) for FeNO, walk, wolf 6 months.    Discussed diagnosis, its evaluation, treatment and usual course. All questions answered.    Patient verbalized understanding of plan and left in no acute distress    Thank you for the courtesy of participating in the care of this patient    Elisabeth Freire PA-C

## 2022-07-29 DIAGNOSIS — I51.89 DIASTOLIC DYSFUNCTION: Primary | ICD-10-CM

## 2022-07-29 DIAGNOSIS — I48.0 PAF (PAROXYSMAL ATRIAL FIBRILLATION): ICD-10-CM

## 2022-08-08 ENCOUNTER — HOSPITAL ENCOUNTER (OUTPATIENT)
Dept: CARDIOLOGY | Facility: HOSPITAL | Age: 72
Discharge: HOME | End: 2022-08-08
Attending: INTERNAL MEDICINE
Payer: MEDICARE

## 2022-08-08 ENCOUNTER — OFFICE VISIT (OUTPATIENT)
Dept: CARDIOLOGY | Facility: CLINIC | Age: 72
End: 2022-08-08
Payer: MEDICARE

## 2022-08-08 VITALS
WEIGHT: 271.19 LBS | HEART RATE: 71 BPM | BODY MASS INDEX: 38.82 KG/M2 | OXYGEN SATURATION: 96 % | HEIGHT: 70 IN | DIASTOLIC BLOOD PRESSURE: 68 MMHG | SYSTOLIC BLOOD PRESSURE: 104 MMHG

## 2022-08-08 DIAGNOSIS — I48.0 PAF (PAROXYSMAL ATRIAL FIBRILLATION): ICD-10-CM

## 2022-08-08 DIAGNOSIS — Z91.89 AT RISK FOR AMIODARONE TOXICITY WITH LONG TERM USE: Chronic | ICD-10-CM

## 2022-08-08 DIAGNOSIS — I50.30 CHF WITH LEFT VENTRICULAR DIASTOLIC DYSFUNCTION, NYHA CLASS 2: Primary | ICD-10-CM

## 2022-08-08 DIAGNOSIS — I51.89 DIASTOLIC DYSFUNCTION: ICD-10-CM

## 2022-08-08 DIAGNOSIS — I15.2 HYPERTENSION ASSOCIATED WITH DIABETES: Chronic | ICD-10-CM

## 2022-08-08 DIAGNOSIS — I49.01 VF (VENTRICULAR FIBRILLATION): ICD-10-CM

## 2022-08-08 DIAGNOSIS — Z95.810 ICD (IMPLANTABLE CARDIOVERTER-DEFIBRILLATOR) IN PLACE: ICD-10-CM

## 2022-08-08 DIAGNOSIS — Z79.899 AT RISK FOR AMIODARONE TOXICITY WITH LONG TERM USE: Chronic | ICD-10-CM

## 2022-08-08 DIAGNOSIS — I51.89 DIASTOLIC DYSFUNCTION: Primary | ICD-10-CM

## 2022-08-08 DIAGNOSIS — I47.29 PAROXYSMAL VT: ICD-10-CM

## 2022-08-08 DIAGNOSIS — E11.59 HYPERTENSION ASSOCIATED WITH DIABETES: Chronic | ICD-10-CM

## 2022-08-08 DIAGNOSIS — I43 CARDIOMYOPATHY DUE TO SYSTEMIC DISEASE: ICD-10-CM

## 2022-08-08 DIAGNOSIS — E66.01 CLASS 2 SEVERE OBESITY DUE TO EXCESS CALORIES WITH SERIOUS COMORBIDITY AND BODY MASS INDEX (BMI) OF 38.0 TO 38.9 IN ADULT: ICD-10-CM

## 2022-08-08 DIAGNOSIS — E78.5 HYPERLIPIDEMIA LDL GOAL <100: Chronic | ICD-10-CM

## 2022-08-08 DIAGNOSIS — B33.21 SUBACUTE VIRAL ENDOCARDITIS: ICD-10-CM

## 2022-08-08 DIAGNOSIS — G47.33 OSA ON CPAP: ICD-10-CM

## 2022-08-08 DIAGNOSIS — I51.89 DIASTOLIC DYSFUNCTION: Chronic | ICD-10-CM

## 2022-08-08 DIAGNOSIS — I42.9 IDIOPATHIC CARDIOMYOPATHY: ICD-10-CM

## 2022-08-08 PROCEDURE — 93010 EKG 12-LEAD: ICD-10-PCS | Mod: ,,, | Performed by: INTERNAL MEDICINE

## 2022-08-08 PROCEDURE — 99999 PR PBB SHADOW E&M-EST. PATIENT-LVL IV: CPT | Mod: PBBFAC,,, | Performed by: INTERNAL MEDICINE

## 2022-08-08 PROCEDURE — 93283 PRGRMG EVAL IMPLANTABLE DFB: CPT

## 2022-08-08 PROCEDURE — 93010 ELECTROCARDIOGRAM REPORT: CPT | Mod: ,,, | Performed by: INTERNAL MEDICINE

## 2022-08-08 PROCEDURE — 93283 PRGRMG EVAL IMPLANTABLE DFB: CPT | Mod: 26,,, | Performed by: INTERNAL MEDICINE

## 2022-08-08 PROCEDURE — 99214 OFFICE O/P EST MOD 30 MIN: CPT | Mod: PBBFAC | Performed by: INTERNAL MEDICINE

## 2022-08-08 PROCEDURE — 99215 PR OFFICE/OUTPT VISIT, EST, LEVL V, 40-54 MIN: ICD-10-PCS | Mod: S$PBB,,, | Performed by: INTERNAL MEDICINE

## 2022-08-08 PROCEDURE — 99999 PR PBB SHADOW E&M-EST. PATIENT-LVL IV: ICD-10-PCS | Mod: PBBFAC,,, | Performed by: INTERNAL MEDICINE

## 2022-08-08 PROCEDURE — 93283 CARDIAC DEVICE CHECK - IN CLINIC & HOSPITAL: ICD-10-PCS | Mod: 26,,, | Performed by: INTERNAL MEDICINE

## 2022-08-08 PROCEDURE — 93005 ELECTROCARDIOGRAM TRACING: CPT | Mod: 59

## 2022-08-08 PROCEDURE — 99215 OFFICE O/P EST HI 40 MIN: CPT | Mod: S$PBB,,, | Performed by: INTERNAL MEDICINE

## 2022-08-08 NOTE — PROGRESS NOTES
Subjective:   Patient ID:  Randy Yap is a 72 y.o. male     Chief complaint:  CHF    HPI  72 year old male who presents to Arrhythmia clinic for 6 month follow up with device check. His current medical conditions include CHF, HFrEF of 30%, PVT on Amiodarone,HTN, HLP, Obesity, HONEY on CPAP  See granular details in my note from 10/1/21    Update 8/8/2022:  Had echo in Sept 2021:  >>  · The left ventricle is moderately enlarged with concentric hypertrophy and moderately decreased systolic function.  · Mild left atrial enlargement.  · The estimated ejection fraction is 30%.  · Grade I left ventricular diastolic dysfunction.  · There is moderate left ventricular global hypokinesis.  · Normal right ventricular size with normal right ventricular systolic function.  · Mild mitral regurgitation.     Also, I had his echo reviewed by Dr EDWARDS and had a quantitative EF calculated - it was 38% - which was in keeping with Dr EDWARDS's qualitative estimation  I have reviewed the actual image of the ECG tracing obtained today and it shows NSR with normal intervals and occ PVC.    ICD eval today:  Device and leads implanted 10/6/2014   [employer] ICD Fortify [place] KHALIL 2357-40Q, SN: [id number]   A lead: Bennett Tendril STS 2088TC / 46cm, SN: [id number]   V lead: Bennett Durata 7120Q / 58 cm, SN: [id number]    All in good repair    Last BNP was 55 (10/1/21)    Clinically, he feels as well as he has ever had.  His CHF medications at all at maximized doses.  He has rare and mild lightheadedness.  Due to the extreme eat, has backed off on some of his outdoor exercise but he keeps quite active.  He has some family stress now due to the recent passing of his mother in law and the Alzheimer's of  his sister-in-law  Current Outpatient Medications   Medication Sig    acetaminophen (TYLENOL) 325 MG tablet Take 650 mg by mouth as needed.     albuterol (PROVENTIL/VENTOLIN HFA) 90 mcg/actuation inhaler Inhale 1 puff into the lungs once daily. Rescue     amiodarone (PACERONE) 100 MG Tab TAKE 1 AND 1/2 TABLETS BY MOUTH ONCE DAILY    bepotastine besilate (BEPREVE) 1.5 % Drop Bepreve 1.5 % eye drops   Apply by ophthalmic route as needed for 50 days.    carvediloL (COREG) 25 MG tablet Take 1 tablet (25 mg total) by mouth 2 (two) times daily.    co-enzyme Q-10 30 mg capsule Take 30 mg by mouth once daily.     ENTRESTO  mg per tablet TAKE 1 TABLET BY MOUTH TWICE DAILY    FARXIGA 10 mg tablet TAKE 1 TABLET BY MOUTH ONCE DAILY    ferrous sulfate (FEOSOL) 325 mg (65 mg iron) Tab tablet Take 325 mg by mouth daily with breakfast.    furosemide (LASIX) 40 MG tablet TAKE 1 TABLET BY MOUTH ONCE DAILY    glucosamine-chondroitin 500-400 mg tablet Take 1 tablet by mouth once daily.     INV apixaban/placebo tablet Take 1 tablets (5 mg) by mouth 2 (two) times daily. FOR INVESTIGATIONAL USE ONLY. Protocol: Leia 2017.307 subject # : [id number]    INV aspirin/placebo tablet Take 1 tablet (81 mg total) by mouth once daily. FOR INVESTIGATIONAL USE ONLY. Patient Study #: [id number] Protocol: Leia 2017.307    multivitamin (THERAGRAN) tablet Take 1 tablet by mouth once daily.    pravastatin (PRAVACHOL) 80 MG tablet TAKE 1 TABLET BY MOUTH ONCE DAILY    RESTASIS 0.05 % ophthalmic emulsion Place 1 drop into both eyes 2 (two) times daily.    spironolactone (ALDACTONE) 25 MG tablet TAKE 1 TABLET BY MOUTH TWICE DAILY    traZODone (DESYREL) 150 MG tablet Take 1 tablet (150 mg total) by mouth every evening.    fluticasone furoate (ARNUITY ELLIPTA) 200 mcg/actuation inhaler Inhale 1 puff into the lungs once daily. Controller. Wash out mouth after use     No current facility-administered medications for this visit.     Review of Systems   Constitutional: Negative for decreased appetite, malaise/fatigue, weight gain and weight loss.   Eyes: Negative for blurred vision.   Cardiovascular: Positive for dyspnea on exertion. Negative for chest pain, claudication, cyanosis, irregular  heartbeat, leg swelling, near-syncope, orthopnea and palpitations.   Respiratory: Negative for cough, shortness of breath, sleep disturbances due to breathing, snoring and wheezing.    Endocrine: Negative for heat intolerance.   Hematologic/Lymphatic: Does not bruise/bleed easily.   Musculoskeletal: Negative for muscle weakness and myalgias.   Gastrointestinal: Negative for melena, nausea and vomiting.   Genitourinary: Positive for frequency. Negative for nocturia.   Neurological: Negative for excessive daytime sleepiness, dizziness, headaches, light-headedness and weakness.   Psychiatric/Behavioral: Negative for depression, memory loss and substance abuse. The patient does not have insomnia and is not nervous/anxious.      Social History     Tobacco Use   Smoking Status Never Smoker   Smokeless Tobacco Never Used        Objective:     Physical Exam  Vitals and nursing note reviewed.   Constitutional:       Appearance: Normal appearance. He is well-developed.      Comments: overweight   HENT:      Head: Normocephalic and atraumatic.      Right Ear: External ear normal.      Left Ear: External ear normal.   Eyes:      Conjunctiva/sclera: Conjunctivae normal.      Left eye: Left conjunctiva is not injected. No hemorrhage.     Pupils: Pupils are equal, round, and reactive to light.   Neck:      Thyroid: No thyromegaly.      Vascular: No JVD.   Cardiovascular:      Rate and Rhythm: Normal rate and regular rhythm.      Chest Wall: PMI is not displaced.      Pulses: Intact distal pulses.           Carotid pulses are 2+ on the right side and 2+ on the left side.       Radial pulses are 2+ on the right side and 2+ on the left side.        Dorsalis pedis pulses are 2+ on the right side and 2+ on the left side.        Posterior tibial pulses are 2+ on the right side and 2+ on the left side.      Heart sounds: Normal heart sounds. No midsystolic click and no opening snap. No murmur heard.    No friction rub. No gallop.  "  Pulmonary:      Effort: Pulmonary effort is normal. No respiratory distress.      Breath sounds: Normal breath sounds. No wheezing or rales.   Chest:      Chest wall: No tenderness.      Comments: Device pocket is in excellent repair.  Abdominal:      Palpations: Abdomen is soft. Abdomen is not rigid. There is no hepatomegaly.      Tenderness: There is no abdominal tenderness. There is no guarding.      Comments: Obese abdomen   Musculoskeletal:         General: No tenderness. Normal range of motion.      Cervical back: Neck supple.      Right knee: No swelling.      Left knee: No swelling.      Right lower leg: No swelling.      Left lower leg: No swelling.      Right ankle: No swelling.      Left ankle: No swelling.      Right foot: No swelling.      Left foot: No swelling.   Skin:     General: Skin is warm and dry.      Coloration: Skin is not pale.      Findings: No rash.   Neurological:      Mental Status: He is alert and oriented to person, place, and time.      Cranial Nerves: No cranial nerve deficit.      Coordination: Coordination normal.      Deep Tendon Reflexes: Reflexes are normal and symmetric.   Psychiatric:         Behavior: Behavior normal.       /68   Pulse 71   Ht 5' 10" (1.778 m)   Wt 123 kg (271 lb 2.7 oz)   SpO2 96%   BMI 38.91 kg/m²      reports current alcohol use.  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     Colon polyp 5/2013    repeat 5/2018    Depression, recurrent     Diastolic dysfunction     DJD (degenerative joint disease) of knee 10/14/2013    Hyperlipidemia     Hypertension     Murmur     Paroxysmal VT 5/21/2016    Pericarditis with effusion     Sleep apnea      Past Surgical History:   Procedure Laterality Date    CARDIAC DEFIBRILLATOR PLACEMENT  10/06/2014    COLONOSCOPY W/ POLYPECTOMY  05/21/2013    repeat 5/21/2018    COSMETIC SURGERY      EYE SURGERY  Cataract    HERNIA REPAIR      R inguinal    " LEFT HEART CATHETERIZATION Left 07/09/2018    Procedure: CATHETERIZATION, HEART, LEFT;  Surgeon: Macey Dos Santos MD;  Location: [facility] CATH LAB;  Service: Cardiology;  Laterality: Left;  left radial approach  Has [employer] ICD    pace maker   10/06/2014    PERICARDIAL WINDOW  12-15 years ago    tonsillectomy      TONSILLECTOMY      VARICOCELECTOMY       Family History   Problem Relation Age of Onset    Hyperlipidemia Mother     Arrhythmia Mother     Arthritis Mother     Asthma Mother     COPD Mother     Heart disease Father 48    Heart attack Father 48    Hypertension Father     Arthritis Father     Diabetes Father     Hypertension Maternal Grandmother     Hypertension Maternal Grandfather     Heart disease Paternal Grandmother     Hypertension Paternal Grandmother     Heart attack Paternal Grandmother     Stroke Paternal Grandmother     Heart disease Paternal Grandfather     Hypertension Paternal Grandfather     Heart attack Paternal Grandfather     Cancer Paternal Grandfather        Assessment:    Doing very well.  He may actually be class 1 but our list him as class 2 open (mainly due to limitations related to obesity).    1. CHF with left ventricular diastolic dysfunction, NYHA class 2    2. Diastolic dysfunction    3. Hypertension associated with diabetes    4. Idiopathic cardiomyopathy    5. Hyperlipidemia LDL goal <100    6. Paroxysmal VT    7. Subacute viral endocarditis    8. Class 2 severe obesity due to excess calories with serious comorbidity and body mass index (BMI) of 38.0 to 38.9 in adult    9. At risk for amiodarone toxicity with long term use    10. HONEY on CPAP        Plan:      No orders of the defined types were placed in this encounter.      Follow up in about 6 months (around 2/8/2023), or if symptoms worsen or fail to improve, for n may.    There are no discontinued medications.         Medication List with Changes/Refills   Current Medications    ACETAMINOPHEN (TYLENOL)  325 MG TABLET    Take 650 mg by mouth as needed.     ALBUTEROL (PROVENTIL/VENTOLIN HFA) 90 MCG/ACTUATION INHALER    Inhale 1 puff into the lungs once daily. Rescue    AMIODARONE (PACERONE) 100 MG TAB    TAKE 1 AND 1/2 TABLETS BY MOUTH ONCE DAILY    BEPOTASTINE BESILATE (BEPREVE) 1.5 % DROP    Bepreve 1.5 % eye drops   Apply by ophthalmic route as needed for 50 days.    CARVEDILOL (COREG) 25 MG TABLET    Take 1 tablet (25 mg total) by mouth 2 (two) times daily.    CO-ENZYME Q-10 30 MG CAPSULE    Take 30 mg by mouth once daily.     ENTRESTO  MG PER TABLET    TAKE 1 TABLET BY MOUTH TWICE DAILY    FARXIGA 10 MG TABLET    TAKE 1 TABLET BY MOUTH ONCE DAILY    FERROUS SULFATE (FEOSOL) 325 MG (65 MG IRON) TAB TABLET    Take 325 mg by mouth daily with breakfast.    FLUTICASONE FUROATE (ARNUITY ELLIPTA) 200 MCG/ACTUATION INHALER    Inhale 1 puff into the lungs once daily. Controller. Wash out mouth after use    FUROSEMIDE (LASIX) 40 MG TABLET    TAKE 1 TABLET BY MOUTH ONCE DAILY    GLUCOSAMINE-CHONDROITIN 500-400 MG TABLET    Take 1 tablet by mouth once daily.     INV APIXABAN/PLACEBO TABLET    Take 1 tablets (5 mg) by mouth 2 (two) times daily. FOR INVESTIGATIONAL USE ONLY. Protocol: Leia 2017.307 subject # : [id number]    INV ASPIRIN/PLACEBO TABLET    Take 1 tablet (81 mg total) by mouth once daily. FOR INVESTIGATIONAL USE ONLY. Patient Study #: [id number] Protocol: Leia 2017.307    MULTIVITAMIN (THERAGRAN) TABLET    Take 1 tablet by mouth once daily.    PRAVASTATIN (PRAVACHOL) 80 MG TABLET    TAKE 1 TABLET BY MOUTH ONCE DAILY    RESTASIS 0.05 % OPHTHALMIC EMULSION    Place 1 drop into both eyes 2 (two) times daily.    SPIRONOLACTONE (ALDACTONE) 25 MG TABLET    TAKE 1 TABLET BY MOUTH TWICE DAILY    TRAZODONE (DESYREL) 150 MG TABLET    Take 1 tablet (150 mg total) by mouth every evening.

## 2022-08-11 ENCOUNTER — TELEPHONE (OUTPATIENT)
Dept: CARDIOLOGY | Facility: HOSPITAL | Age: 72
End: 2022-08-11
Payer: MEDICARE

## 2022-08-11 NOTE — TELEPHONE ENCOUNTER
"Called pt in reference to the following alert:    "ICD Report  Last Diagnostic Reset 22  Alerts: A. Noise Reversion Alert, High voltage therapy delivered, VT/VF Episode Occurred  1 VF episode 8/10/22 at 11:02 pm, 11.1 sec duration, average V rate 230bpm. EGM illustrates VT treated with ATP while charging, unsuccessful, accelerating to VF with 25J shock, successful.  VF treated with successful shock escalated to follow up tab (RED) and emailed clinic on 22 8:34 AM CST."    Called pt who states he was sleeping and laying on a vibrating heating pad due to back pain. Pt states he rolled on his right side and was shocked. "I think that's what did it," states the pt. Pt also states he was asymptomatic before and after the shock. "I just buried my mother the day before yesterday. Then, I found out a  friend of mine . It was a real shock. I've been under a lot of stress." RN made pt aware the arrhythmia looks real and unrelated to the vibrating pad. Pt recently seen in clinic on 22 and states he is still taking all of his medication as prescribed including amiodarone, carvedilol, and diuretics. Pt aware to call clinic if he ever becomes symptomatic or he thinks he has sustained a shock. Dr. Yung aware and will review report.  "

## 2022-08-22 ENCOUNTER — CLINICAL SUPPORT (OUTPATIENT)
Dept: CARDIOLOGY | Facility: HOSPITAL | Age: 72
End: 2022-08-22
Payer: MEDICARE

## 2022-08-22 DIAGNOSIS — Z95.810 PRESENCE OF AUTOMATIC (IMPLANTABLE) CARDIAC DEFIBRILLATOR: ICD-10-CM

## 2022-08-29 ENCOUNTER — DOCUMENTATION ONLY (OUTPATIENT)
Dept: CARDIOLOGY | Facility: HOSPITAL | Age: 72
End: 2022-08-29
Payer: MEDICARE

## 2022-08-29 ENCOUNTER — HOSPITAL ENCOUNTER (OUTPATIENT)
Dept: CARDIOLOGY | Facility: HOSPITAL | Age: 72
Discharge: HOME | End: 2022-08-29
Attending: INTERNAL MEDICINE
Payer: MEDICARE

## 2022-08-29 DIAGNOSIS — Z79.899 HIGH RISK MEDICATION USE: Primary | ICD-10-CM

## 2022-08-29 DIAGNOSIS — Z95.810 ICD (IMPLANTABLE CARDIOVERTER-DEFIBRILLATOR) IN PLACE: ICD-10-CM

## 2022-08-29 DIAGNOSIS — I43 CARDIOMYOPATHY DUE TO SYSTEMIC DISEASE: ICD-10-CM

## 2022-08-29 DIAGNOSIS — I47.29 PAROXYSMAL VT: ICD-10-CM

## 2022-08-29 DIAGNOSIS — I49.01 VF (VENTRICULAR FIBRILLATION): ICD-10-CM

## 2022-08-29 RX ORDER — MEXILETINE HYDROCHLORIDE 150 MG/1
150 CAPSULE ORAL EVERY 12 HOURS
Qty: 60 CAPSULE | Refills: 11 | Status: SHIPPED | OUTPATIENT
Start: 2022-08-29 | End: 2022-09-13 | Stop reason: SDUPTHER

## 2022-08-29 RX ORDER — MEXILETINE HYDROCHLORIDE 150 MG/1
150 CAPSULE ORAL EVERY 12 HOURS
COMMUNITY
End: 2022-08-29 | Stop reason: SDUPTHER

## 2022-08-29 RX ORDER — SACUBITRIL AND VALSARTAN 97; 103 MG/1; MG/1
1 TABLET, FILM COATED ORAL 2 TIMES DAILY
Qty: 180 TABLET | Refills: 0 | Status: SHIPPED | OUTPATIENT
Start: 2022-08-29 | End: 2022-10-10

## 2022-08-29 NOTE — PROGRESS NOTES
Pt presented to device clinic for interrogation and possible med change per Dr. Yung following VT/VF event and shock. Pt's LR increased from 70-->75 bpm and rest rate from 60-->70 bpm per Dr. Yung. Point of AV wenckebach testing done, could not induce wenckebach with AAI up to 120bpm. Dr. Yung ordered a refill of pt's Entresto (he had been out for 1 week) and ordered new prescription of Mexitil 150mg BID with two week blood trough. Cira SALGADON aware, ordered meds, scheduled lab, and reviewed with pt before he left clinic. Pt aware of programming changes made. AVA visible on 7/4/22 in the afternoon. Pt states he was likely in the pool with his grandkids at the time. Per Dr. Yung, pt is to avoid whatever caused the AVA. Pt consented. Remote interrogation in 3mos.

## 2022-08-30 ENCOUNTER — TELEPHONE (OUTPATIENT)
Dept: CARDIOLOGY | Facility: CLINIC | Age: 72
End: 2022-08-30
Payer: MEDICARE

## 2022-08-30 NOTE — TELEPHONE ENCOUNTER
----- Message from DIANA Chapman sent at 8/30/2022  9:26 AM CDT -----  Contact: Ana  yes  ----- Message -----  From: Liza Joseph LPN  Sent: 8/30/2022   9:23 AM CDT  To: DIANA Chapman    Ok I remember we added it yesterday. They want to make sure he is to continue the amiodarone?  ----- Message -----  From: DIANA Chapman  Sent: 8/30/2022   9:19 AM CDT  To: Liza Joseph LPN    Yes we added mexitil yesterday due to arrhythmia.     Thanks  Virgie  ----- Message -----  From: Liza Joseph LPN  Sent: 8/30/2022   9:14 AM CDT  To: DIANA Chapman    Does this pt need both of these medications. The pharmacy is wanting a clarification pb  ----- Message -----  From: Yudi Schafer  Sent: 8/30/2022   9:12 AM CDT  To: Ella Garvin Staff    Type:  Pharmacy Calling to Clarify an RX    Name of Caller:Tanisha  Pharmacy Name:[facility]  Prescription Name: mexiletine (MEXITIL) 150 MG Cap  What do they need to clarify?:Drug interactions  Best Call Back Number:[phone]  Additional Information: Reports prescription has been received for mexiletine (MEXITIL) 150 MG Cap and request to be informed if patient will continue taking amiodarone (PACERONE) 100 MG Tab.   Thank you,  GH

## 2022-08-31 DIAGNOSIS — E11.9 TYPE 2 DIABETES MELLITUS WITHOUT COMPLICATION: ICD-10-CM

## 2022-09-09 ENCOUNTER — LAB VISIT (OUTPATIENT)
Dept: LAB | Facility: HOSPITAL | Age: 72
End: 2022-09-09
Attending: FAMILY MEDICINE
Payer: MEDICARE

## 2022-09-09 DIAGNOSIS — Z79.899 HIGH RISK MEDICATION USE: ICD-10-CM

## 2022-09-09 PROCEDURE — 80299 QUANTITATIVE ASSAY DRUG: CPT | Performed by: INTERNAL MEDICINE

## 2022-09-09 PROCEDURE — 36415 COLL VENOUS BLD VENIPUNCTURE: CPT | Mod: PO | Performed by: INTERNAL MEDICINE

## 2022-09-12 LAB — MEXILETINE TROUGH SERPL-MCNC: <0.2 MCG/ML

## 2022-09-13 DIAGNOSIS — Z79.899 HIGH RISK MEDICATION USE: Primary | ICD-10-CM

## 2022-09-13 RX ORDER — MEXILETINE HYDROCHLORIDE 150 MG/1
300 CAPSULE ORAL EVERY 12 HOURS
Qty: 60 CAPSULE | Refills: 11 | Status: SHIPPED | OUTPATIENT
Start: 2022-09-13 | End: 2022-11-14 | Stop reason: SDUPTHER

## 2022-09-13 NOTE — PROGRESS NOTES
Navi level is undetectable   He is either not taking the med or the dose is too small  Please call and check  If he has been taking the med then increase to 300 bid and repeat level in 10 days

## 2022-09-13 NOTE — TELEPHONE ENCOUNTER
Pt states that he has been taking the 150 mg bid as ordered dr reza christie wants to increase to 300 mg bid and he verbalized understanding. Navi level order 10 days pb    ----- Message from Nirav Baldwin MD sent at 9/13/2022  9:45 AM CDT -----  Navi level is undetectable   He is either not taking the med or the dose is too small  Please call and check  If he has been taking the med then increase to 300 bid and repeat level in 10 days

## 2022-09-13 NOTE — TELEPHONE ENCOUNTER
----- Message from Nirav Baldwin MD sent at 9/13/2022  9:45 AM CDT -----  Navi level is undetectable   He is either not taking the med or the dose is too small  Please call and check  If he has been taking the med then increase to 300 bid and repeat level in 10 days

## 2022-09-22 ENCOUNTER — PATIENT MESSAGE (OUTPATIENT)
Dept: CARDIOLOGY | Facility: CLINIC | Age: 72
End: 2022-09-22
Payer: MEDICARE

## 2022-09-23 ENCOUNTER — LAB VISIT (OUTPATIENT)
Dept: LAB | Facility: HOSPITAL | Age: 72
End: 2022-09-23
Attending: INTERNAL MEDICINE
Payer: MEDICARE

## 2022-09-23 DIAGNOSIS — Z79.899 HIGH RISK MEDICATION USE: ICD-10-CM

## 2022-09-23 PROCEDURE — 36415 COLL VENOUS BLD VENIPUNCTURE: CPT | Mod: PO | Performed by: INTERNAL MEDICINE

## 2022-09-23 PROCEDURE — 80299 QUANTITATIVE ASSAY DRUG: CPT | Performed by: INTERNAL MEDICINE

## 2022-09-27 ENCOUNTER — OFFICE VISIT (OUTPATIENT)
Dept: FAMILY MEDICINE | Facility: CLINIC | Age: 72
End: 2022-09-27
Payer: MEDICARE

## 2022-09-27 VITALS
WEIGHT: 271 LBS | HEART RATE: 76 BPM | TEMPERATURE: 97 F | SYSTOLIC BLOOD PRESSURE: 110 MMHG | DIASTOLIC BLOOD PRESSURE: 66 MMHG | BODY MASS INDEX: 38.8 KG/M2 | HEIGHT: 70 IN | OXYGEN SATURATION: 99 %

## 2022-09-27 DIAGNOSIS — I50.42 CHRONIC COMBINED SYSTOLIC AND DIASTOLIC CHF (CONGESTIVE HEART FAILURE): ICD-10-CM

## 2022-09-27 DIAGNOSIS — Z00.00 WELL ADULT EXAM: Primary | ICD-10-CM

## 2022-09-27 DIAGNOSIS — Z79.899 ENCOUNTER FOR LONG-TERM (CURRENT) USE OF MEDICATIONS: ICD-10-CM

## 2022-09-27 DIAGNOSIS — Z12.11 COLON CANCER SCREENING: ICD-10-CM

## 2022-09-27 DIAGNOSIS — Z86.010 HISTORY OF COLON POLYPS: ICD-10-CM

## 2022-09-27 DIAGNOSIS — I15.2 HYPERTENSION ASSOCIATED WITH DIABETES: ICD-10-CM

## 2022-09-27 DIAGNOSIS — E11.65 TYPE 2 DIABETES MELLITUS WITH HYPERGLYCEMIA, WITHOUT LONG-TERM CURRENT USE OF INSULIN: ICD-10-CM

## 2022-09-27 DIAGNOSIS — Z13.6 ENCOUNTER FOR LIPID SCREENING FOR CARDIOVASCULAR DISEASE: ICD-10-CM

## 2022-09-27 DIAGNOSIS — Z23 FLU VACCINE NEED: ICD-10-CM

## 2022-09-27 DIAGNOSIS — Z13.220 ENCOUNTER FOR LIPID SCREENING FOR CARDIOVASCULAR DISEASE: ICD-10-CM

## 2022-09-27 DIAGNOSIS — E11.59 HYPERTENSION ASSOCIATED WITH DIABETES: ICD-10-CM

## 2022-09-27 LAB — MEXILETINE TROUGH SERPL-MCNC: 0.7 MCG/ML

## 2022-09-27 PROCEDURE — 99999 PR PBB SHADOW E&M-EST. PATIENT-LVL V: CPT | Mod: PBBFAC,,, | Performed by: FAMILY MEDICINE

## 2022-09-27 PROCEDURE — 99397 PR PREVENTIVE VISIT,EST,65 & OVER: ICD-10-PCS | Mod: S$PBB,GZ,, | Performed by: FAMILY MEDICINE

## 2022-09-27 PROCEDURE — 99999 PR PBB SHADOW E&M-EST. PATIENT-LVL V: ICD-10-PCS | Mod: PBBFAC,,, | Performed by: FAMILY MEDICINE

## 2022-09-27 PROCEDURE — 99215 OFFICE O/P EST HI 40 MIN: CPT | Mod: PBBFAC,PO,25 | Performed by: FAMILY MEDICINE

## 2022-09-27 PROCEDURE — G0008 ADMIN INFLUENZA VIRUS VAC: HCPCS | Mod: PBBFAC,PO

## 2022-09-27 PROCEDURE — 99397 PER PM REEVAL EST PAT 65+ YR: CPT | Mod: S$PBB,GZ,, | Performed by: FAMILY MEDICINE

## 2022-09-27 RX ORDER — SEMAGLUTIDE 1.34 MG/ML
INJECTION, SOLUTION SUBCUTANEOUS
Qty: 1 PEN | Refills: 1 | Status: SHIPPED | OUTPATIENT
Start: 2022-09-27 | End: 2022-11-26

## 2022-09-27 NOTE — PROGRESS NOTES
This note is specifically for wellness visit performed today.     WELLNESS EXAM      Patient ID: Randy Yap is a 72 y.o. male.  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     Chief Complaint:  Encounter for wellness exam    Well Adult Physical: Patient here for a comprehensive physical exam.The patient reports chronic problems.  The patient's last visit with me was on 3/7/2022.    Reviewed Care team:  Previous PCP: Dr. Anibal Thorne   Colonoscopy Dr. Pope   Cardiology Nirav Baldwin MD   Optometry Douglas Cowan   Derm Tanisha Grimes MD   [facility]    September 2022:  Patient is on Farxiga.  Patient reports abnormal weight gain.  He does have history of diabetes.  He is not currently on GLP 1.  Patient denies any history of thyroid cancer, pancreatitis, MEN syndrome.     Diabetes Management Status    Statin: Taking  ACE/ARB: Not taking    Screening or Prevention Patient's value Goal Complete/Controlled?   HgA1C Testing and Control   Lab Results   Component Value Date    HGBA1C 6.0 (H) 02/07/2022      Annually/Less than 8% Yes     Lipid profile : 08/23/2021 Annually No     LDL control Lab Results   Component Value Date    LDLCALC 104.4 08/23/2021    Annually/Less than 100 mg/dl  No     Nephropathy screening Lab Results   Component Value Date    LABMICR <5.0 03/07/2022     Lab Results   Component Value Date    PROTEINUA Negative 06/11/2012     No results found for: UTPCR   Annually Yes     Blood pressure BP Readings from Last 1 Encounters:   09/27/22 110/66    Less than 140/90 Yes     Dilated retinal exam : 10/13/2021 Annually Yes     Foot exam   Most Recent Foot Exam Date: Not Found Annually No         CHRONIC. STABLE. BP  Reviewed.  Compliant with BP medications. No SE reported.   (-) CP, SOB, palpitations, dizziness, lightheadedness, HA, arm numbness, tingling or weakness, syncope.  Creatinine   Date Value Ref Range Status   10/01/2021 1.0 0.5 - 1.4 mg/dL Final       CHRONIC. STABLE. Lab analysis reviewed.   (-) CP, SOB, abdominal pain, N/V/D, constipation, jaundice, skin changes.  (-) Myalgias  Lab Results   Component Value Date    CHOL 171 08/23/2021    CHOL 170 07/23/2021    CHOL 213 (H) 08/04/2020     Lab Results   Component Value Date    HDL 41 08/23/2021    HDL 39 (L) 07/23/2021    HDL 44 08/04/2020     Lab Results   Component Value Date    LDLCALC 104.4 08/23/2021    LDLCALC 93.6 07/23/2021    LDLCALC 138.6 08/04/2020     Lab Results   Component Value Date    TRIG 128 08/23/2021    TRIG 187 (H) 07/23/2021    TRIG 152 (H) 08/04/2020     Lab Results   Component Value Date    CHOLHDL 24.0 08/23/2021    CHOLHDL 22.9 07/23/2021    CHOLHDL 20.7 08/04/2020     Lab Results   Component Value Date    TOTALCHOLEST 4.2 08/23/2021    TOTALCHOLEST 4.4 07/23/2021    TOTALCHOLEST 4.8 08/04/2020     Lab Results   Component Value Date    ALT 33 07/23/2021    AST 29 07/23/2021    ALKPHOS 37 (L) 07/23/2021    BILITOT 0.6 07/23/2021     ======================================================  The 10-year ASCVD risk score (Manjit GILLESPIE, et al., 2019) is: 29.4%    Values used to calculate the score:      Age: 72 years      Sex: Male      Is Non- : No      Diabetic: Yes      Tobacco smoker: No      Systolic Blood Pressure: 110 mmHg      Is BP treated: Yes      HDL Cholesterol: 41 mg/dL      Total Cholesterol: 171 mg/dL    Do you take any herbs or supplements that were not prescribed by a doctor? no   Are you taking calcium supplements? no    History:  Varicocelectomy  UROLOGIST:  None reported    Date last PSA: Reviewed  Lab Results   Component Value Date    PSA 2.0 03/07/2022    PSA 1.9 08/04/2020    PSA 1.45 06/13/2012       No history of STDs  Colonoscopy:  Performed in 2017.  Patient is due for colonoscopy.  History of polyps.  Discussed screening modalities for colorectal cancer screening.  Patient agrees to proceed with colonoscopy.  Health Maintenance Topics with due status: Not Due       Topic Last Completion Date    TETANUS VACCINE 11/19/2013    PROSTATE-SPECIFIC ANTIGEN 03/07/2022    Diabetes Urine Screening 03/07/2022    Low Dose Statin 08/08/2022        ==============================================  History reviewed.     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     Colon polyp 5/2013    repeat 5/2018    Depression, recurrent     Diastolic dysfunction     DJD (degenerative joint disease) of knee 10/14/2013    Hyperlipidemia     Hypertension     Murmur     Paroxysmal VT 5/21/2016    Pericarditis with effusion     Sleep apnea      Past Surgical History:   Procedure Laterality Date    CARDIAC DEFIBRILLATOR PLACEMENT  10/06/2014    COLONOSCOPY W/ POLYPECTOMY  05/21/2013    repeat 5/21/2018    COSMETIC SURGERY      EYE SURGERY  Cataract    HERNIA REPAIR      R inguinal    LEFT HEART CATHETERIZATION Left 07/09/2018    Procedure: CATHETERIZATION, HEART, LEFT;  Surgeon: Macey Dos Santos MD;  Location: [facility] CATH LAB;  Service: Cardiology;  Laterality: Left;  left radial approach  Has [employer] g[employer] ICD    pace maker   10/06/2014    PERICARDIAL WINDOW  12-15 years ago    tonsillectomy      TONSILLECTOMY      VARICOCELECTOMY       Review of patient's allergies indicates:  No Known Allergies  Current Outpatient Medications on File Prior to Visit   Medication Sig Dispense Refill    acetaminophen (TYLENOL) 325 MG tablet  Take 650 mg by mouth as needed.       albuterol (PROVENTIL/VENTOLIN HFA) 90 mcg/actuation inhaler Inhale 1 puff into the lungs once daily. Rescue 18 g 4    amiodarone (PACERONE) 100 MG Tab TAKE 1 AND 1/2 TABLETS BY MOUTH ONCE DAILY 45 tablet 0    ARNUITY ELLIPTA 100 mcg/actuation inhaler INHALE 1 PUFF BY MOUTH ONCE DAILY 30 each 0    bepotastine besilate (BEPREVE) 1.5 % Drop Bepreve 1.5 % eye drops   Apply by ophthalmic route as needed for 50 days.      carvediloL (COREG) 25 MG tablet Take 1 tablet (25 mg total) by mouth 2 (two) times daily. 180 tablet 4    co-enzyme Q-10 30 mg capsule Take 30 mg by mouth once daily.       FARXIGA 10 mg tablet TAKE 1 TABLET BY MOUTH ONCE DAILY 90 tablet 3    ferrous sulfate (FEOSOL) 325 mg (65 mg iron) Tab tablet Take 325 mg by mouth daily with breakfast.      furosemide (LASIX) 40 MG tablet TAKE 1 TABLET BY MOUTH ONCE DAILY 90 tablet 0    glucosamine-chondroitin 500-400 mg tablet Take 1 tablet by mouth once daily.       INV apixaban/placebo tablet Take 1 tablets (5 mg) by mouth 2 (two) times daily. FOR INVESTIGATIONAL USE ONLY. Protocol: [place] 2017.307 subject # : [id number] 400 tablet 0    INV aspirin/placebo tablet Take 1 tablet (81 mg total) by mouth once daily. FOR INVESTIGATIONAL USE ONLY. Patient Study #: [id number] Protocol: [place] 2017.307 200 tablet 0    mexiletine (MEXITIL) 150 MG Cap Take 2 capsules (300 mg total) by mouth every 12 (twelve) hours. 60 capsule 11    multivitamin (THERAGRAN) tablet Take 1 tablet by mouth once daily.      pravastatin (PRAVACHOL) 80 MG tablet TAKE 1 TABLET BY MOUTH ONCE DAILY 30 tablet 11    RESTASIS 0.05 % ophthalmic emulsion Place 1 drop into both eyes 2 (two) times daily.      sacubitriL-valsartan (ENTRESTO)  mg per tablet Take 1 tablet by mouth 2 (two) times daily. 180 tablet 0    spironolactone (ALDACTONE) 25 MG tablet TAKE 1 TABLET BY MOUTH TWICE DAILY 180 tablet 0    traZODone (DESYREL) 150 MG tablet Take 1 tablet by mouth every  evening. 90 tablet 1    [DISCONTINUED] fluticasone furoate (ARNUITY ELLIPTA) 200 mcg/actuation inhaler Inhale 1 puff into the lungs once daily. Controller. Wash out mouth after use 30 each 6     No current facility-administered medications on file prior to visit.     Social History     Socioeconomic History    Marital status:    Tobacco Use    Smoking status: Never    Smokeless tobacco: Never   Substance and Sexual Activity    Alcohol use: Yes     Comment: occasional glass of wine on social occasions    Drug use: No    Sexual activity: Not Currently     Family History   Problem Relation Age of Onset    Hyperlipidemia Mother     Arrhythmia Mother     Arthritis Mother     Asthma Mother     COPD Mother     Heart disease Father 48    Heart attack Father 48    Hypertension Father     Arthritis Father     Diabetes Father     Hypertension Maternal Grandmother     Hypertension Maternal Grandfather     Heart disease Paternal Grandmother     Hypertension Paternal Grandmother     Heart attack Paternal Grandmother     Stroke Paternal Grandmother     Heart disease Paternal Grandfather     Hypertension Paternal Grandfather     Heart attack Paternal Grandfather     Cancer Paternal Grandfather        Vitals:    09/27/22 0944   BP: 110/66   Pulse: 76   Temp: 97 °F (36.1 °C)      Body mass index is 38.88 kg/m².     Review of Systems   Constitutional:  Positive for fatigue and unexpected weight change. Negative for activity change, chills and fever.   HENT:  Negative for ear pain, hearing loss, rhinorrhea, sore throat and trouble swallowing.    Eyes:  Negative for discharge, redness and visual disturbance.   Respiratory:  Negative for cough, chest tightness, shortness of breath and wheezing.    Cardiovascular:  Negative for chest pain and palpitations.   Gastrointestinal:  Negative for blood in stool, constipation, diarrhea, nausea and vomiting.   Endocrine: Negative for cold intolerance, heat intolerance, polydipsia and  polyuria.   Genitourinary:  Negative for difficulty urinating, hematuria and urgency.   Musculoskeletal:  Negative for arthralgias, joint swelling, myalgias and neck pain.   Skin:  Negative for rash and wound.   Allergic/Immunologic: Negative for environmental allergies and food allergies.   Neurological:  Negative for weakness and headaches.   Hematological:  Negative for adenopathy. Does not bruise/bleed easily.   Psychiatric/Behavioral:  Negative for confusion, dysphoric mood and sleep disturbance. The patient is not nervous/anxious.         Objective:      Physical Exam  Vitals and nursing note reviewed.   Constitutional:       General: He is not in acute distress.     Appearance: He is well-developed. He is obese. He is not diaphoretic.   HENT:      Head: Normocephalic and atraumatic.      Right Ear: External ear normal.      Left Ear: External ear normal.      Nose: Nose normal. No rhinorrhea.   Eyes:      Extraocular Movements: Extraocular movements intact.      Pupils: Pupils are equal, round, and reactive to light.   Cardiovascular:      Rate and Rhythm: Normal rate and regular rhythm.      Pulses: Normal pulses.      Heart sounds: Murmur heard.      Comments: Device to left chest  Pulmonary:      Effort: Pulmonary effort is normal. No respiratory distress.      Breath sounds: Normal breath sounds. No wheezing.   Abdominal:      General: Bowel sounds are normal.      Palpations: Abdomen is soft.   Musculoskeletal:         General: Normal range of motion.      Cervical back: Normal range of motion and neck supple.   Skin:     General: Skin is warm and dry.      Capillary Refill: Capillary refill takes less than 2 seconds.      Findings: No rash.   Neurological:      General: No focal deficit present.      Mental Status: He is alert and oriented to person, place, and time. Mental status is at baseline.      Cranial Nerves: No cranial nerve deficit.      Motor: No weakness.      Gait: Gait normal.    Psychiatric:         Attention and Perception: He is attentive.         Mood and Affect: Mood normal. Mood is not anxious or depressed. Affect is not labile, blunt, angry or inappropriate.         Speech: He is communicative. Speech is not rapid and pressured, delayed, slurred or tangential.         Behavior: Behavior normal. Behavior is not agitated, slowed, aggressive, withdrawn, hyperactive or combative.         Thought Content: Thought content normal. Thought content is not paranoid or delusional. Thought content does not include homicidal or suicidal ideation. Thought content does not include homicidal or suicidal plan.         Cognition and Memory: Memory is not impaired.         Judgment: Judgment normal. Judgment is not impulsive or inappropriate.        Lab Results   Component Value Date    WBC 7.76 07/23/2021    HGB 13.6 (L) 07/23/2021    HCT 42.4 07/23/2021    MCV 96 07/23/2021     07/23/2021         Chemistry        Component Value Date/Time     10/01/2021 1353    K 4.1 10/01/2021 1353     10/01/2021 1353    CO2 22 (L) 10/01/2021 1353    BUN 13 10/01/2021 1353    CREATININE 1.0 10/01/2021 1353    GLU 95 10/01/2021 1353        Component Value Date/Time    CALCIUM 9.6 10/01/2021 1353    ALKPHOS 37 (L) 07/23/2021 0851    AST 29 07/23/2021 0851    ALT 33 07/23/2021 0851    BILITOT 0.6 07/23/2021 0851    ESTGFRAFRICA >60.0 10/01/2021 1353    EGFRNONAA >60.0 10/01/2021 1353          Lab Results   Component Value Date    TSH 1.950 07/23/2021     BMI Readings from Last 10 Encounters:   09/27/22 38.88 kg/m²   08/08/22 38.91 kg/m²   07/22/22 38.89 kg/m²   04/22/22 38.99 kg/m²   03/07/22 38.50 kg/m²   02/07/22 38.75 kg/m²   01/25/22 38.45 kg/m²   10/01/21 38.45 kg/m²   08/02/21 39.53 kg/m²   07/21/21 39.03 kg/m²       Assessment / Plan:      1.  Routine health exam-patient here for annual wellness exam.  Labs ordered.  Health maintenance was reviewed and ordered.    Chronic conditions are  stable:  Continue current medication regimen.  Follow-up with all specialist as scheduled.  Diabetes with heart failure:  Adding Ozempic for weight loss and better control.  Monitor for hypoglycemia.  Continue Farxiga.  Continue current medication regimen.  Follow-up with cardiology as scheduled.We will plan to monitor hemoglobin A1c at designated intervals 3 to 6 months.  I recommend ongoing Education for diabetic diet and exercise protocol.  We will continue to monitor for side effects.    Please be advised of symptoms to monitor for and to notify me immediately if persistent or worsening.  Follow up with Ophthalmology/Optometry and Podiatry at least annually.    Complete history and physical was completed today.  Complete and thorough medication reconciliation was performed.  Discussed risks and benefits of medications.  Advised patient on orders and health maintenance.  We discussed old records and old labs if available.  Will request any records not available through epic.  Continue current medications listed on your summary sheet.  The natural history of prostate cancer and ongoing controversy regarding screening and potential treatment outcomes of prostate cancer has been discussed with the patient. The meaning of a false positive PSA and a false negative PSA has been discussed. He indicates understanding of the limitations of this screening test and wishes to proceed with screening PSA testing.  COVID vaccine has been updated with Pfizer.  All questions were answered. Patient had no further concerns. Advised of diagnoses and plan. Follow up as planned or return sooner if symptoms persist or worsen.     Orders Placed This Encounter   Procedures    Influenza - Quadrivalent (Adjuvanted)    Hemoglobin     Standing Status:   Standing     Number of Occurrences:   99     Standing Expiration Date:   10/23/2041    Comprehensive Metabolic Panel     Standing Status:   Standing     Number of Occurrences:   99      Standing Expiration Date:   10/23/2041    Lipid Panel     Standing Status:   Standing     Number of Occurrences:   99     Standing Expiration Date:   10/23/2041    Hemoglobin A1C     Standing Status:   Standing     Number of Occurrences:   99     Standing Expiration Date:   10/23/2041    Ambulatory referral/consult to Endo Procedure      Standing Status:   Future     Standing Expiration Date:   3/27/2023     Referral Priority:   Routine     Referral Type:   Consultation     Number of Visits Requested:   1       Medications Ordered This Encounter   Medications    semaglutide (OZEMPIC) 0.25 mg or 0.5 mg(2 mg/1.5 mL) pen injector     Sig: Inject 0.25 mg into the skin once a week for 30 days, THEN 0.5 mg once a week.     Dispense:  1 pen     Refill:  1      Future Appointments       Date Provider Specialty Appt Notes    9/28/2022  Pre-Admission Testing [phone]    9/29/2022  Lab     11/20/2022  Cardiology 90 Day Remote Cardiac Device Check    1/23/2023  Pulmonology Follow up in about 6 months (around 1/22/2023) for FeNO, walk, wolf 6 months.    1/23/2023  Pulmonology Follow up in about 6 months (around 1/22/2023) for FeNO, walk, wolf 6 months.    1/23/2023  Pulmonology Follow up in about 6 months (around 1/22/2023) for FeNO, walk, wolf 6 months.    1/23/2023 Elisabeth Freire PA-C Pulmonology Follow up in about 6 months (around 1/22/2023) for FeNO, walk, wolf 6 months.    2/3/2023  Lab bnp    2/8/2023 DIANA Chapman Cardiology 6mo f/Bnp    2/20/2023  Cardiology move to [facility] on 2/3/23  ABT/ICD              Olvin Hutson MD

## 2022-09-27 NOTE — PATIENT INSTRUCTIONS
Follow up in about 1 year (around 9/27/2023) for annual wellness.     Dear patient,   As a result of recent federal legislation (The Federal Cures Act), you may receive lab or pathology results from your visit in your [facility] account before your physician is able to contact you. Your physician or their representative will relay the results to you with their recommendations at their soonest availability.     If no improvement in symptoms or symptoms worsen, please be advised to call MD, follow-up at clinic and/or go to ER if becomes severe.    Olvin Hutson M.D.        We Offer TELEHEALTH & Same Day Appointments!   Book your Telehealth appointment with me through my nurse or   Clinic appointments on [facility]!    [address] [place] [address]    Office: [phone]   FAX: [phone]    Check out my Facebook Page and Follow Me at: [email or web address]    Check out my website at [facility] by clicking on: [email or web address]    To Schedule appointments online, go to [email or web address]: [email or web address]

## 2022-09-28 ENCOUNTER — HOSPITAL ENCOUNTER (OUTPATIENT)
Dept: PREADMISSION TESTING | Facility: HOSPITAL | Age: 72
Discharge: HOME | End: 2022-09-28
Attending: FAMILY MEDICINE
Payer: MEDICARE

## 2022-09-28 ENCOUNTER — TELEPHONE (OUTPATIENT)
Dept: CARDIOLOGY | Facility: CLINIC | Age: 72
End: 2022-09-28
Payer: MEDICARE

## 2022-09-28 ENCOUNTER — TELEPHONE (OUTPATIENT)
Dept: PREADMISSION TESTING | Facility: HOSPITAL | Age: 72
End: 2022-09-28
Payer: MEDICARE

## 2022-09-28 ENCOUNTER — PATIENT MESSAGE (OUTPATIENT)
Dept: CARDIOLOGY | Facility: CLINIC | Age: 72
End: 2022-09-28
Payer: MEDICARE

## 2022-09-28 ENCOUNTER — E-CONSULT (OUTPATIENT)
Dept: CARDIOLOGY | Facility: HOSPITAL | Age: 72
End: 2022-09-28
Payer: MEDICARE

## 2022-09-28 DIAGNOSIS — Z12.11 COLON CANCER SCREENING: Primary | ICD-10-CM

## 2022-09-28 DIAGNOSIS — I48.0 PAF (PAROXYSMAL ATRIAL FIBRILLATION): Primary | Chronic | ICD-10-CM

## 2022-09-28 DIAGNOSIS — Z01.810 PREOP CARDIOVASCULAR EXAM: Primary | ICD-10-CM

## 2022-09-28 DIAGNOSIS — R93.1 ABNORMAL NUCLEAR CARDIAC IMAGING TEST: Chronic | ICD-10-CM

## 2022-09-28 PROCEDURE — 99499 UNLISTED E&M SERVICE: CPT | Mod: ,,, | Performed by: FAMILY MEDICINE

## 2022-09-28 PROCEDURE — 99499 NO LOS: ICD-10-PCS | Mod: ,,, | Performed by: FAMILY MEDICINE

## 2022-09-28 PROCEDURE — 99451 NTRPROF PH1/NTRNET/EHR 5/>: CPT | Mod: ,,, | Performed by: INTERNAL MEDICINE

## 2022-09-28 PROCEDURE — 99451 PR INTERPROF, PHONE/INTERNET/EHR, CONSULT, >= 5MINS: ICD-10-PCS | Mod: ,,, | Performed by: INTERNAL MEDICINE

## 2022-09-28 RX ORDER — POLYETHYLENE GLYCOL 3350, SODIUM SULFATE ANHYDROUS, SODIUM BICARBONATE, SODIUM CHLORIDE, POTASSIUM CHLORIDE 236; 22.74; 6.74; 5.86; 2.97 G/4L; G/4L; G/4L; G/4L; G/4L
4 POWDER, FOR SOLUTION ORAL ONCE
Qty: 4000 ML | Refills: 0 | Status: SHIPPED | OUTPATIENT
Start: 2022-09-28 | End: 2022-09-28

## 2022-09-28 NOTE — TELEPHONE ENCOUNTER
Left message to contact the office about results    ----- Message from Nirav Baldwin MD sent at 9/27/2022  7:36 PM CDT -----  See comments below and call patient to discuss.   Please close encounter when done -- no need to route back to me.  Thanks  This is a Rxc level of mexitil - keep same dosage

## 2022-09-28 NOTE — PROGRESS NOTES
[facility] CARDIOLOGY  Response for E-Consult     Patient Name: Randy Yap  MRN: [id number]  Primary Care Provider: Olvin Hutson MD   Requesting Provider: Lakeshia Vance L*      Findings:     71 yo male, E consult for preop clearance of colonoscopy  PMHNICM CHFrEF VT s/p ICD, HTN HLD DM   The chart reviewed and f/u with Dr. Candy Cobb cardiologist at [facility]  BNP was 55 in .  Ekg  A pacing, old inferior MI, PVCs    Plan   Elevated periop risk of CV events for non-high risk procedure.  Ok to proceed the scheduled procedure without further cardiac study.  Continue Coreg and Statin periop.  Avoid periop fluid overloaded.     I did not speak to the requesting provider verbally about this.     Total time of Consultation: 22 minute    Percentage of time spent on verbal/written discussion: 50%     Thank you for your consult.     Richard Ho MD  [facility] CARDIOLOGY

## 2022-09-28 NOTE — TELEPHONE ENCOUNTER
This patient is being scheduled for one of the following procedures: Colonoscopy    Please indicate if the patient is cleared for surgery from a cardiac standpoint.    Patient gave verbal consent for e-consult Yes    Cardiac PMHx: Hypertension associated with diabetes  Hyperlipidemia LDL goal <100  Diastolic dysfunction  PAF (paroxysmal atrial fibrillation)  Abnormal nuclear cardiac imaging test  Chronic combined systolic and diastolic CHF (congestive heart failure)  Murmur  Idiopathic cardiomyopathy  ICD (implantable cardioverter-defibrillator) discharge  Paroxysmal VT  Viral endocarditis  Acute combined systolic and diastolic congestive heart failure  CHF with left ventricular diastolic dysfunction, NYHA class 2    Last Echo: Results for orders placed during the hospital encounter of 01/25/22    Echo Saline Bubble? No    Interpretation Summary  · The left ventricle is moderately enlarged with concentric hypertrophy and moderately decreased systolic function.  · Mild left atrial enlargement.  · The estimated ejection fraction is 30%.  · Grade I left ventricular diastolic dysfunction.  · There is moderate left ventricular global hypokinesis.  · Normal right ventricular size with normal right ventricular systolic function.  · Mild mitral regurgitation.      Anticoagulants: ls anticoag list: none    If you have questions or concerns, please call us at [phone].

## 2022-09-29 ENCOUNTER — LAB VISIT (OUTPATIENT)
Dept: LAB | Facility: HOSPITAL | Age: 72
End: 2022-09-29
Attending: FAMILY MEDICINE
Payer: MEDICARE

## 2022-09-29 DIAGNOSIS — Z00.00 WELL ADULT EXAM: ICD-10-CM

## 2022-09-29 DIAGNOSIS — Z13.220 ENCOUNTER FOR LIPID SCREENING FOR CARDIOVASCULAR DISEASE: ICD-10-CM

## 2022-09-29 DIAGNOSIS — E11.65 TYPE 2 DIABETES MELLITUS WITH HYPERGLYCEMIA, WITHOUT LONG-TERM CURRENT USE OF INSULIN: ICD-10-CM

## 2022-09-29 DIAGNOSIS — Z13.6 ENCOUNTER FOR LIPID SCREENING FOR CARDIOVASCULAR DISEASE: ICD-10-CM

## 2022-09-29 DIAGNOSIS — Z12.5 ENCOUNTER FOR PROSTATE CANCER SCREENING: ICD-10-CM

## 2022-09-29 DIAGNOSIS — Z79.899 ENCOUNTER FOR LONG-TERM (CURRENT) USE OF MEDICATIONS: ICD-10-CM

## 2022-09-29 LAB
ALBUMIN SERPL BCP-MCNC: 4.1 G/DL (ref 3.5–5.2)
ALP SERPL-CCNC: 39 U/L (ref 55–135)
ALT SERPL W/O P-5'-P-CCNC: 37 U/L (ref 10–44)
ANION GAP SERPL CALC-SCNC: 9 MMOL/L (ref 8–16)
AST SERPL-CCNC: 23 U/L (ref 10–40)
BILIRUB SERPL-MCNC: 0.4 MG/DL (ref 0.1–1)
BUN SERPL-MCNC: 17 MG/DL (ref 8–23)
CALCIUM SERPL-MCNC: 9.7 MG/DL (ref 8.7–10.5)
CHLORIDE SERPL-SCNC: 106 MMOL/L (ref 95–110)
CHOLEST SERPL-MCNC: 184 MG/DL (ref 120–199)
CHOLEST/HDLC SERPL: 4.8 {RATIO} (ref 2–5)
CO2 SERPL-SCNC: 22 MMOL/L (ref 23–29)
COMPLEXED PSA SERPL-MCNC: 1.5 NG/ML (ref 0–4)
CREAT SERPL-MCNC: 1.1 MG/DL (ref 0.5–1.4)
EST. GFR  (NO RACE VARIABLE): >60 ML/MIN/1.73 M^2
ESTIMATED AVG GLUCOSE: 126 MG/DL (ref 68–131)
GLUCOSE SERPL-MCNC: 119 MG/DL (ref 70–110)
HBA1C MFR BLD: 6 % (ref 4–5.6)
HDLC SERPL-MCNC: 38 MG/DL (ref 40–75)
HDLC SERPL: 20.7 % (ref 20–50)
HGB BLD-MCNC: 14.7 G/DL (ref 14–18)
LDLC SERPL CALC-MCNC: 105.6 MG/DL (ref 63–159)
NONHDLC SERPL-MCNC: 146 MG/DL
POTASSIUM SERPL-SCNC: 4.4 MMOL/L (ref 3.5–5.1)
PROT SERPL-MCNC: 7.3 G/DL (ref 6–8.4)
SODIUM SERPL-SCNC: 137 MMOL/L (ref 136–145)
TRIGL SERPL-MCNC: 202 MG/DL (ref 30–150)

## 2022-09-29 PROCEDURE — 85018 HEMOGLOBIN: CPT | Mod: PO | Performed by: FAMILY MEDICINE

## 2022-09-29 PROCEDURE — 80053 COMPREHEN METABOLIC PANEL: CPT | Performed by: FAMILY MEDICINE

## 2022-09-29 PROCEDURE — 80061 LIPID PANEL: CPT | Performed by: FAMILY MEDICINE

## 2022-09-29 PROCEDURE — 36415 COLL VENOUS BLD VENIPUNCTURE: CPT | Mod: PO | Performed by: FAMILY MEDICINE

## 2022-09-29 PROCEDURE — 83036 HEMOGLOBIN GLYCOSYLATED A1C: CPT | Performed by: FAMILY MEDICINE

## 2022-09-29 PROCEDURE — 84153 ASSAY OF PSA TOTAL: CPT | Performed by: FAMILY MEDICINE

## 2022-10-07 ENCOUNTER — PATIENT MESSAGE (OUTPATIENT)
Dept: FAMILY MEDICINE | Facility: CLINIC | Age: 72
End: 2022-10-07
Payer: MEDICARE

## 2022-10-07 NOTE — PROGRESS NOTES
Make follow-up lab appointment per recommendation below.  Check to see if patient has seen the results through my chart.  If not then,  #CALL THE PATIENT# to discuss results/see if they have questions and document verification of contact. Make F/U appt if needed. [phone]    #My interpretation that was sent to them through [employer]:  Randy, I have reviewed your recent blood work.     PSA screening for prostate cancer is within normal limits.  Repeat annually.  Your hemoglobin is normal.  Your metabolic panel which shows your glucose, kidney function, electrolytes, and liver function is stable.   Your cholesterol is stable.    Your hemoglobin A1c is stable.  This test is gold standard screening test for diabetes.  It is a measures 3 months of your average blood sugar.    I recommend that she continue current medication regimen.  Recheck annual wellness labs in one year a few days prior to appointment with me.  =========================  Also please address any outstanding health maintenance that may be due: Foot Exam Never done  Eye Exam due on 10/13/2022  Colorectal Cancer Screening due on 11/08/2022

## 2022-10-18 ENCOUNTER — PATIENT MESSAGE (OUTPATIENT)
Dept: ADMINISTRATIVE | Facility: HOSPITAL | Age: 72
End: 2022-10-18
Payer: MEDICARE

## 2022-10-18 NOTE — LETTER
October 31, 2022      Douglas Cowan OD             [facility]  [address]  [facility] [address]  Phone: [phone] October 31, 2022     Patient: Randy Yap    YOB: 1950   Date of Visit: 10/18/2022     To whom it may concern     We are seeing Randy Yap, LARA.O.B is 1950, at [facility]. Olvin Hutson MD is their primary care physician. To help with our health maintenance records could you please send the following:     Thank you for sending eye exam please state if Positive or Negative Retinopathy.    Please send fax to [phone] Attention Amina HARLEY LPN Care Coordination.    Thank-you in advance for your assistance. If you have any questions or concerns, please don't hesitate to contact me at [phone].     Amina HARLEY LPN  Care Coordination Department  [facility]  Phone number [phone]

## 2022-10-18 NOTE — LETTER
October 20, 2022    Dr. Camryn Lim - [facility]  [address]  [facility] [address]  Phone: [phone] October 20, 2022     Patient: Randy Yap    YOB: 1950   Date of Visit: 10/18/2022   To whom it may concern     We are seeing Randy Yap, LARA.O.B is 1950, at [facility]. Olvin Hutson MD is their primary care physician. To help with our health maintenance records could you please send the following:     Last Diabetic Eye Exam Report that states Positive or Negative Retinopathy.    Please send fax to [phone] Attention Amina HARLEY LPN Care Coordination.    Thank-you in advance for your assistance. If you have any questions or concerns, please don't hesitate to contact me at [phone].     Amina HARLEY LPN  Care Coordination Department  [facility]  Phone number [phone]

## 2022-10-19 ENCOUNTER — RESEARCH ENCOUNTER (OUTPATIENT)
Dept: RESEARCH | Facility: HOSPITAL | Age: 72
End: 2022-10-19
Payer: MEDICARE

## 2022-10-19 NOTE — PROGRESS NOTES
10/16/2022     Study: [place] 2017.307  PI: Nirav Baldwin          Patient contacted [facility] for [facility] refill.   Rx refill put into [email or web address] and rounted to Corina Silva NP for authorization.  CRC picked up Rx from investigational pharmacy & shipped out to patient via [employer].  [facility] also included a prepaid shipping label in box for patient to return empty study drug bottles.  As always, he will contact me when medication gets down to a 1 week supply to allow time to get medication shipped to him.  He expressed understanding.    11/20/2022  Empty study drug bottles received back from patient, recorded in [facility].

## 2022-10-20 ENCOUNTER — PATIENT MESSAGE (OUTPATIENT)
Dept: ADMINISTRATIVE | Facility: HOSPITAL | Age: 72
End: 2022-10-20
Payer: MEDICARE

## 2022-10-21 ENCOUNTER — PATIENT OUTREACH (OUTPATIENT)
Dept: ADMINISTRATIVE | Facility: HOSPITAL | Age: 72
End: 2022-10-21
Payer: MEDICARE

## 2022-10-28 DIAGNOSIS — Z79.899 HIGH RISK MEDICATION USE: Primary | ICD-10-CM

## 2022-10-29 ENCOUNTER — NURSE TRIAGE (OUTPATIENT)
Dept: ADMINISTRATIVE | Facility: CLINIC | Age: 72
End: 2022-10-29
Payer: MEDICARE

## 2022-10-29 ENCOUNTER — HOSPITAL ENCOUNTER (EMERGENCY)
Facility: HOSPITAL | Age: 72
Discharge: HOME | End: 2022-10-29
Attending: EMERGENCY MEDICINE
Payer: MEDICARE

## 2022-10-29 VITALS
WEIGHT: 286.38 LBS | TEMPERATURE: 99 F | HEART RATE: 69 BPM | RESPIRATION RATE: 19 BRPM | DIASTOLIC BLOOD PRESSURE: 69 MMHG | BODY MASS INDEX: 41.09 KG/M2 | OXYGEN SATURATION: 96 % | SYSTOLIC BLOOD PRESSURE: 122 MMHG

## 2022-10-29 DIAGNOSIS — R55 SYNCOPE: ICD-10-CM

## 2022-10-29 DIAGNOSIS — Z79.899 POLYPHARMACY: Primary | ICD-10-CM

## 2022-10-29 DIAGNOSIS — R55 NEAR SYNCOPE: ICD-10-CM

## 2022-10-29 LAB
ALBUMIN SERPL BCP-MCNC: 3.9 G/DL (ref 3.5–5.2)
ALP SERPL-CCNC: 40 U/L (ref 55–135)
ALT SERPL W/O P-5'-P-CCNC: 33 U/L (ref 10–44)
ANION GAP SERPL CALC-SCNC: 11 MMOL/L (ref 8–16)
AST SERPL-CCNC: 24 U/L (ref 10–40)
BASOPHILS # BLD AUTO: 0.06 K/UL (ref 0–0.2)
BASOPHILS NFR BLD: 0.7 % (ref 0–1.9)
BILIRUB SERPL-MCNC: 0.5 MG/DL (ref 0.1–1)
BNP SERPL-MCNC: 12 PG/ML (ref 0–99)
BUN SERPL-MCNC: 16 MG/DL (ref 8–23)
CALCIUM SERPL-MCNC: 9.1 MG/DL (ref 8.7–10.5)
CHLORIDE SERPL-SCNC: 104 MMOL/L (ref 95–110)
CO2 SERPL-SCNC: 20 MMOL/L (ref 23–29)
CREAT SERPL-MCNC: 1.1 MG/DL (ref 0.5–1.4)
DIFFERENTIAL METHOD: ABNORMAL
EOSINOPHIL # BLD AUTO: 0.2 K/UL (ref 0–0.5)
EOSINOPHIL NFR BLD: 1.9 % (ref 0–8)
ERYTHROCYTE [DISTWIDTH] IN BLOOD BY AUTOMATED COUNT: 12.8 % (ref 11.5–14.5)
EST. GFR  (NO RACE VARIABLE): >60 ML/MIN/1.73 M^2
GLUCOSE SERPL-MCNC: 113 MG/DL (ref 70–110)
HCT VFR BLD AUTO: 40.7 % (ref 40–54)
HGB BLD-MCNC: 13.6 G/DL (ref 14–18)
IMM GRANULOCYTES # BLD AUTO: 0.03 K/UL (ref 0–0.04)
IMM GRANULOCYTES NFR BLD AUTO: 0.4 % (ref 0–0.5)
LYMPHOCYTES # BLD AUTO: 2.5 K/UL (ref 1–4.8)
LYMPHOCYTES NFR BLD: 31.2 % (ref 18–48)
MAGNESIUM SERPL-MCNC: 2.1 MG/DL (ref 1.6–2.6)
MCH RBC QN AUTO: 31.1 PG (ref 27–31)
MCHC RBC AUTO-ENTMCNC: 33.4 G/DL (ref 32–36)
MCV RBC AUTO: 93 FL (ref 82–98)
MONOCYTES # BLD AUTO: 1.1 K/UL (ref 0.3–1)
MONOCYTES NFR BLD: 13.2 % (ref 4–15)
NEUTROPHILS # BLD AUTO: 4.2 K/UL (ref 1.8–7.7)
NEUTROPHILS NFR BLD: 52.6 % (ref 38–73)
NRBC BLD-RTO: 0 /100 WBC
PLATELET # BLD AUTO: 324 K/UL (ref 150–450)
PMV BLD AUTO: 8.5 FL (ref 9.2–12.9)
POTASSIUM SERPL-SCNC: 4.2 MMOL/L (ref 3.5–5.1)
PROT SERPL-MCNC: 7.4 G/DL (ref 6–8.4)
RBC # BLD AUTO: 4.37 M/UL (ref 4.6–6.2)
SODIUM SERPL-SCNC: 135 MMOL/L (ref 136–145)
TROPONIN I SERPL DL<=0.01 NG/ML-MCNC: <0.006 NG/ML (ref 0–0.03)
TSH SERPL DL<=0.005 MIU/L-ACNC: 1.55 UIU/ML (ref 0.4–4)
WBC # BLD AUTO: 8.04 K/UL (ref 3.9–12.7)

## 2022-10-29 PROCEDURE — 93010 ELECTROCARDIOGRAM REPORT: CPT | Mod: ,,, | Performed by: INTERNAL MEDICINE

## 2022-10-29 PROCEDURE — 99285 EMERGENCY DEPT VISIT HI MDM: CPT | Mod: 25

## 2022-10-29 PROCEDURE — 96360 HYDRATION IV INFUSION INIT: CPT

## 2022-10-29 PROCEDURE — 85025 COMPLETE CBC W/AUTO DIFF WBC: CPT | Performed by: EMERGENCY MEDICINE

## 2022-10-29 PROCEDURE — 84484 ASSAY OF TROPONIN QUANT: CPT | Performed by: EMERGENCY MEDICINE

## 2022-10-29 PROCEDURE — 96361 HYDRATE IV INFUSION ADD-ON: CPT

## 2022-10-29 PROCEDURE — 80053 COMPREHEN METABOLIC PANEL: CPT | Performed by: EMERGENCY MEDICINE

## 2022-10-29 PROCEDURE — 25000003 PHARM REV CODE 250: Performed by: EMERGENCY MEDICINE

## 2022-10-29 PROCEDURE — 83735 ASSAY OF MAGNESIUM: CPT | Performed by: EMERGENCY MEDICINE

## 2022-10-29 PROCEDURE — 83880 ASSAY OF NATRIURETIC PEPTIDE: CPT | Performed by: EMERGENCY MEDICINE

## 2022-10-29 PROCEDURE — 93010 EKG 12-LEAD: ICD-10-PCS | Mod: ,,, | Performed by: INTERNAL MEDICINE

## 2022-10-29 PROCEDURE — 93005 ELECTROCARDIOGRAM TRACING: CPT

## 2022-10-29 PROCEDURE — 84443 ASSAY THYROID STIM HORMONE: CPT | Performed by: EMERGENCY MEDICINE

## 2022-10-29 RX ORDER — SACUBITRIL AND VALSARTAN 49; 51 MG/1; MG/1
1 TABLET, FILM COATED ORAL 2 TIMES DAILY
Qty: 60 TABLET | Refills: 0 | Status: SHIPPED | OUTPATIENT
Start: 2022-10-29 | End: 2022-11-29 | Stop reason: SDUPTHER

## 2022-10-29 RX ADMIN — SODIUM CHLORIDE 250 ML: 0.9 INJECTION, SOLUTION INTRAVENOUS at 06:10

## 2022-10-29 NOTE — TELEPHONE ENCOUNTER
Reason for Disposition   [1] Received an ICD SHOCK AND [2] feels unwell afterwards    Additional Information   Negative: Received 2 or more ICD SHOCKS within a 4-hour period   Negative: [1] Received an ICD SHOCK AND [2] chest pain before or after  (Exception: The momentary ICD shock sensation that the patient feels in their chest.)   Negative: [1] Received an ICD SHOCK AND [2] any concerning symptoms (e.g., extreme fatigue, lightheadedness, palpitations, shortness of breath)    Protocols used: ICD and Pacemaker Symptoms and [id number]  pt called re thurs pm had episode with ICD shock. Pt states just now was moving something and boom was on the floor. BP 83/56. pt reporting he just has medicine. rec EMS. Pt agrees.

## 2022-10-29 NOTE — ED PROVIDER NOTES
SCRIBE #1 NOTE: I, Elvia Malhotra, am scribing for, and in the presence of, Damian Crook MD. I have scribed the entire note.       History     Chief Complaint   Patient presents with    Fatigue     Near syncope     Review of patient's allergies indicates:  No Known Allergies      History of Present Illness     HPI    10/29/2022, 6:20 PM  History obtained from the patient      History of Present Illness: Randy Yap is a 72 y.o. male patient with a PMHx of Asthma, HTN, Hyperlipidemia who presents to the Emergency Department for evaluation of fatique which onset gradually after bending over and falling over two nights ago. Pt states he was bending over to move something when he all the sudden felt light-headed and fell over. Pt states his blood pressure was very low after the fall, and that since the fall, pt has felt tired and slightly weak. Symptoms are constant and moderate in severity. No mitigating or exacerbating factors reported. Patient denies any nausea, vomiting, fever, and all other sxs at this time. No further complaints or concerns at this time.       Arrival mode: EMS    PCP: Olvin Hutson MD        Past Medical History:  Past Medical History:   Diagnosis Date    Asthma     Cardiomyopathy due to systemic disease     Colon polyp 5/2013    repeat 5/2018    Depression, recurrent     Diastolic dysfunction     DJD (degenerative joint disease) of knee 10/14/2013    Hyperlipidemia     Hypertension     Murmur     Paroxysmal VT 5/21/2016    Pericarditis with effusion     Sleep apnea        Past Surgical History:  Past Surgical History:   Procedure Laterality Date    CARDIAC DEFIBRILLATOR PLACEMENT  10/06/2014    COLONOSCOPY W/ POLYPECTOMY  05/21/2013    repeat 5/21/2018    COSMETIC SURGERY      EYE SURGERY  Cataract    HERNIA REPAIR      R inguinal    LEFT HEART CATHETERIZATION Left 07/09/2018    Procedure: CATHETERIZATION, HEART, LEFT;  Surgeon: Macey Dos Santos MD;  Location: [facility] CATH LAB;   Service: Cardiology;  Laterality: Left;  left radial approach  Has [employer] g[employer] ICD    pace maker   10/06/2014    PERICARDIAL WINDOW  12-15 years ago    tonsillectomy      TONSILLECTOMY      VARICOCELECTOMY           Family History:  Family History   Problem Relation Age of Onset    Hyperlipidemia Mother     Arrhythmia Mother     Arthritis Mother     Asthma Mother     COPD Mother     Heart disease Father 48    Heart attack Father 48    Hypertension Father     Arthritis Father     Diabetes Father     Hypertension Maternal Grandmother     Hypertension Maternal Grandfather     Heart disease Paternal Grandmother     Hypertension Paternal Grandmother     Heart attack Paternal Grandmother     Stroke Paternal Grandmother     Heart disease Paternal Grandfather     Hypertension Paternal Grandfather     Heart attack Paternal Grandfather     Cancer Paternal Grandfather        Social History:  Social History     Tobacco Use    Smoking status: Never    Smokeless tobacco: Never   Substance and Sexual Activity    Alcohol use: Yes     Comment: occasional glass of wine on social occasions    Drug use: No    Sexual activity: Not Currently        Review of Systems     Review of Systems   Constitutional:  Positive for fatigue. Negative for fever.   HENT:  Negative for sore throat.    Respiratory:  Negative for shortness of breath.    Cardiovascular:  Negative for chest pain.   Gastrointestinal:  Negative for diarrhea and nausea.   Genitourinary:  Negative for dysuria.   Musculoskeletal:  Negative for back pain.   Skin:  Negative for rash.   Neurological:  Negative for weakness.   Hematological:  Does not bruise/bleed easily.   All other systems reviewed and are negative.     Physical Exam     Initial Vitals [10/29/22 1742]   BP Pulse Resp Temp SpO2   120/70 75 18 99 °F (37.2 °C) (!) 94 %      MAP       --          Physical Exam  Nursing Notes and Vital Signs Reviewed.  Constitutional: Patient is in no apparent distress. Well-developed and  well-nourished.  Head: Atraumatic. Normocephalic.  Eyes: PERRL. EOM intact. Conjunctivae are not pale. No scleral icterus.  ENT: Mucous membranes are moist. Oropharynx is clear and symmetric.    Neck: Supple. Full ROM.   Cardiovascular: Regular rate. Regular rhythm. No murmurs, rubs, or gallops. Distal pulses are 2+ and symmetric.  Pulmonary/Chest: No respiratory distress. Clear to auscultation bilaterally. No wheezing or rales.  Abdominal: Soft and non-distended.  There is no tenderness.  No rebound, guarding, or rigidity.   Musculoskeletal: Moves all extremities. No obvious deformities. No edema.   Skin: Warm and dry.  Neurological:  Alert, awake, and appropriate.  Normal speech.  No acute focal neurological deficits are appreciated.  Psychiatric: Normal affect. Good eye contact. Appropriate in content.     ED Course   Procedures  ED Vital Signs:  Vitals:    10/29/22 1742 10/29/22 1800 10/29/22 2132 10/29/22 2203   BP: 120/70 110/61 122/69    Pulse: 75 73 69    Resp: 18 19     Temp: 99 °F (37.2 °C)   98.8 °F (37.1 °C)   TempSrc: Oral   Oral   SpO2: (!) 94% 96% 96%    Weight: 129.9 kg (286 lb 6 oz)          Abnormal Lab Results:  Labs Reviewed   CBC W/ AUTO DIFFERENTIAL - Abnormal; Notable for the following components:       Result Value    RBC 4.37 (*)     Hemoglobin 13.6 (*)     MCH 31.1 (*)     MPV 8.5 (*)     Mono # 1.1 (*)     All other components within normal limits   COMPREHENSIVE METABOLIC PANEL - Abnormal; Notable for the following components:    Sodium 135 (*)     CO2 20 (*)     Glucose 113 (*)     Alkaline Phosphatase 40 (*)     All other components within normal limits   MAGNESIUM   TROPONIN I   TSH   B-TYPE NATRIURETIC PEPTIDE        All Lab Results:  Results for orders placed or performed during the hospital encounter of 10/29/22   CBC auto differential   Result Value Ref Range    WBC 8.04 3.90 - 12.70 K/uL    RBC 4.37 (L) 4.60 - 6.20 M/uL    Hemoglobin 13.6 (L) 14.0 - 18.0 g/dL    Hematocrit 40.7  40.0 - 54.0 %    MCV 93 82 - 98 fL    MCH 31.1 (H) 27.0 - 31.0 pg    MCHC 33.4 32.0 - 36.0 g/dL    RDW 12.8 11.5 - 14.5 %    Platelets 324 150 - 450 K/uL    MPV 8.5 (L) 9.2 - 12.9 fL    Immature Granulocytes 0.4 0.0 - 0.5 %    Gran # (ANC) 4.2 1.8 - 7.7 K/uL    Immature Grans (Abs) 0.03 0.00 - 0.04 K/uL    Lymph # 2.5 1.0 - 4.8 K/uL    Mono # 1.1 (H) 0.3 - 1.0 K/uL    Eos # 0.2 0.0 - 0.5 K/uL    Baso # 0.06 0.00 - 0.20 K/uL    nRBC 0 0 /100 WBC    Gran % 52.6 38.0 - 73.0 %    Lymph % 31.2 18.0 - 48.0 %    Mono % 13.2 4.0 - 15.0 %    Eosinophil % 1.9 0.0 - 8.0 %    Basophil % 0.7 0.0 - 1.9 %    Differential Method Automated    Comprehensive metabolic panel   Result Value Ref Range    Sodium 135 (L) 136 - 145 mmol/L    Potassium 4.2 3.5 - 5.1 mmol/L    Chloride 104 95 - 110 mmol/L    CO2 20 (L) 23 - 29 mmol/L    Glucose 113 (H) 70 - 110 mg/dL    BUN 16 8 - 23 mg/dL    Creatinine 1.1 0.5 - 1.4 mg/dL    Calcium 9.1 8.7 - 10.5 mg/dL    Total Protein 7.4 6.0 - 8.4 g/dL    Albumin 3.9 3.5 - 5.2 g/dL    Total Bilirubin 0.5 0.1 - 1.0 mg/dL    Alkaline Phosphatase 40 (L) 55 - 135 U/L    AST 24 10 - 40 U/L    ALT 33 10 - 44 U/L    Anion Gap 11 8 - 16 mmol/L    eGFR >60 >60 mL/min/1.73 m^2   Magnesium   Result Value Ref Range    Magnesium 2.1 1.6 - 2.6 mg/dL   Troponin I   Result Value Ref Range    Troponin I <0.006 0.000 - 0.026 ng/mL   TSH   Result Value Ref Range    TSH 1.554 0.400 - 4.000 uIU/mL   Brain natriuretic peptide   Result Value Ref Range    BNP 12 0 - 99 pg/mL         Imaging Results:  Imaging Results              X-Ray Chest AP Portable (Final result)  Result time 10/29/22 19:47:04      Final result by Juanis Oneil MD (10/29/22 19:47:04)                   Impression:      No acute abnormality.      Electronically signed by: Thad Olivarez  Date:    10/29/2022  Time:    19:47               Narrative:    EXAMINATION:  XR CHEST AP PORTABLE    CLINICAL HISTORY:  Syncope and collapse    TECHNIQUE:  Single frontal  view of the chest was performed.    COMPARISON:  None    FINDINGS:  Left chest pacemaker.The lungs are clear, with normal appearance of pulmonary vasculature and no pleural effusion or pneumothorax.    The cardiac silhouette is normal in size. The hilar and mediastinal contours are unremarkable.    Bones are intact.                                       The EKG was ordered, reviewed, and independently interpreted by the ED provider.  Interpretation time: 17:52:02  Rate: 75 BPM  Rhythm:  Atrial-paced rhythm with prolonged AV conduction  Interpretation: Left axis Deviation. Nonspecific intraventricular block. Possible inferior infarct, age undetermined. Abnormal ECG. No STEMI.           The Emergency Provider reviewed the vital signs and test results, which are outlined above.     ED Discussion       9:07 PM: Reassessed pt at this time.      Will decrease Entresto dose by half.     Pt states his condition has improved at this time. Discussed with pt all pertinent ED information and results. Discussed pt dx and plan of tx. Gave pt all f/u and return to the ED instructions. All questions and concerns were addressed at this time. Pt expresses understanding of information and instructions, and is comfortable with plan to discharge. Pt is stable for discharge.    I discussed with patient and/or family/caretaker that evaluation in the ED does not suggest any emergent or life threatening medical conditions requiring immediate intervention beyond what was provided in the ED, and I believe patient is safe for discharge.  Regardless, an unremarkable evaluation in the ED does not preclude the development or presence of a serious of life threatening condition. As such, patient was instructed to return immediately for any worsening or change in current symptoms.         Medical Decision Making:   Clinical Tests:   Lab Tests: Ordered and Reviewed  Radiological Study: Ordered and Reviewed  Medical Tests: Ordered and Reviewed          ED Medication(s):  Medications   sodium chloride 0.9% bolus 250 mL (0 mLs Intravenous Stopped 10/29/22 2149)       Discharge Medication List as of 10/29/2022  9:09 PM        START taking these medications    Details   sacubitriL-valsartan (ENTRESTO) 49-51 mg per tablet Take 1 tablet by mouth 2 (two) times daily., Starting Sat 10/29/2022, Until Mon 11/28/2022, Print              Follow-up Information       Olvin Hutson MD. Schedule an appointment as soon as possible for a visit in 3 days.    Specialty: Family Medicine  Contact information:  [address] [facility] [place] [address]  [phone]               Schedule an appointment as soon as possible for a visit  with with your cardiologist.               [facility] - Emergency Dept..    Specialty: Emergency Medicine  Why: As needed, If symptoms worsen  Contact information:  [address] [facility] [address] [phone]                               Scribe Attestation:   Scribe #1: I performed the above scribed service and the documentation accurately describes the services I performed. I attest to the accuracy of the note.     Attending:   Physician Attestation Statement for Scribe #1: I, Damian Crook MD, personally performed the services described in this documentation, as scribed by Elvia Malhotra, in my presence, and it is both accurate and complete.           Clinical Impression       ICD-10-CM ICD-9-CM   1. Polypharmacy  Z79.899 V58.69   2. Syncope  R55 780.2   3. Near syncope  R55 780.2       Disposition:   Disposition: Discharged  Condition: Stable       Damian Crook MD  10/30/22 0046

## 2022-10-30 ENCOUNTER — PATIENT MESSAGE (OUTPATIENT)
Dept: ADMINISTRATIVE | Facility: HOSPITAL | Age: 72
End: 2022-10-30
Payer: MEDICARE

## 2022-10-31 ENCOUNTER — TELEPHONE (OUTPATIENT)
Dept: CARDIOLOGY | Facility: HOSPITAL | Age: 72
End: 2022-10-31
Payer: MEDICARE

## 2022-10-31 NOTE — TELEPHONE ENCOUNTER
Called pt in reference to message that he was symptomatic with low BP on Saturday. Pt states he started feeling bad around 3pm. Pt states ED told him he was slightly dehydrated and decreased his entresto dose. Per home monitor report, pt did have an episode of VT at 3:15pm on Saturday (while he was symptomatic with hypotension) that converted to sinus after one round of ATP. Dr. Yung reviewed episode, labs, and meds and recommended pt stop driving for the time being. RN educated pt on MD's recommendation and pt verbalized understanding.

## 2022-11-07 ENCOUNTER — LAB VISIT (OUTPATIENT)
Dept: LAB | Facility: HOSPITAL | Age: 72
End: 2022-11-07
Attending: INTERNAL MEDICINE
Payer: MEDICARE

## 2022-11-07 DIAGNOSIS — Z79.899 HIGH RISK MEDICATION USE: ICD-10-CM

## 2022-11-07 PROCEDURE — 80299 QUANTITATIVE ASSAY DRUG: CPT | Performed by: INTERNAL MEDICINE

## 2022-11-07 PROCEDURE — 80151 DRUG ASSAY AMIODARONE: CPT | Performed by: INTERNAL MEDICINE

## 2022-11-09 LAB — MEXILETINE TROUGH SERPL-MCNC: 0.6 MCG/ML

## 2022-11-10 ENCOUNTER — TELEPHONE (OUTPATIENT)
Dept: CARDIOLOGY | Facility: HOSPITAL | Age: 72
End: 2022-11-10
Payer: MEDICARE

## 2022-11-10 ENCOUNTER — TELEPHONE (OUTPATIENT)
Dept: CARDIOLOGY | Facility: CLINIC | Age: 72
End: 2022-11-10
Payer: MEDICARE

## 2022-11-10 ENCOUNTER — PATIENT MESSAGE (OUTPATIENT)
Dept: CARDIOLOGY | Facility: CLINIC | Age: 72
End: 2022-11-10
Payer: MEDICARE

## 2022-11-10 NOTE — TELEPHONE ENCOUNTER
----- Message from Nirav Baldwin MD sent at 11/10/2022  7:39 AM CST -----  See comments below and call patient to discuss.   Please close encounter when done -- no need to route back to me.  Thanks  Mexitil blood level is 0.6 - OK but a bit on the lower side of Rxc   Current dose is 300 Q 12 hrs   If he gets more VT, will increase to 300 Q 8hrs - as long as there are no side effects

## 2022-11-10 NOTE — TELEPHONE ENCOUNTER
----- Message from Nirav Baldwin MD sent at 11/10/2022  1:26 PM CST -----  Regarding: RE: red alert - sustained VT  Increase mexitil to 300 Q 8hs  ----- Message -----  From: Corina Posey RN  Sent: 2022  12:34 PM CST  To: Nirav Baldwin MD, Virgie Jaramillo, DEEPALIP-C  Subject: red alert - sustained VT                         Please advise regarding sustained VT episode on 22:    Called pt, confirmed he is compliant with cardiac medications.  States he felt 'lightheaded' last night after supper, could not pinpoint exact time frame.  Denies recent illness but is under lots of family stress.  Recently buried his mother in law and his sister in law  earlier this week.  Will review with provider and schedule device check if needed.//sharyn Sawant ICD Report  Monitoring period: 10/30/22-22  Battery/Lead Status: 1.8 years / AAIR    Ap: 91%  Device Defined Counters:   Ventricular Events:   VT 32sec duration escalated to follow up tab (RED) on 2022, 8:14 am CST.

## 2022-11-10 NOTE — TELEPHONE ENCOUNTER
Called pt to advise him of medication change per Dr. Yung.  Med card indicates pt is taking Mexitil 300mg twice daily.  Provider wants pt to take 300mg three times daily.  Mr. Yap states that he is currently taking Mexitil 300mg 3 times daily as he was instructed when discharged after ED visit on 10/29/2022.

## 2022-11-11 ENCOUNTER — TELEPHONE (OUTPATIENT)
Dept: ADMINISTRATIVE | Facility: HOSPITAL | Age: 72
End: 2022-11-11
Payer: MEDICARE

## 2022-11-11 ENCOUNTER — PATIENT MESSAGE (OUTPATIENT)
Dept: CARDIOLOGY | Facility: CLINIC | Age: 72
End: 2022-11-11
Payer: MEDICARE

## 2022-11-11 LAB
AMIODARONE SERPL-SCNC: 0.4 UG/ML (ref 1–3)
N-DESETHYL-AMIODARONE: 0.2 UG/ML

## 2022-11-11 NOTE — TELEPHONE ENCOUNTER
Pt notified and verbalized understanding pb      ----- Message from Nirav Baldwin MD sent at 11/10/2022  7:39 AM CST -----  See comments below and call patient to discuss.   Please close encounter when done -- no need to route back to me.  Thanks  Mexitil blood level is 0.6 - OK but a bit on the lower side of Rxc   Current dose is 300 Q 12 hrs   If he gets more VT, will increase to 300 Q 8hrs - as long as there are no side effects

## 2022-11-13 RX ORDER — AMIODARONE HYDROCHLORIDE 200 MG/1
200 TABLET ORAL 2 TIMES DAILY
Qty: 180 TABLET | Refills: 3 | Status: SHIPPED | OUTPATIENT
Start: 2022-11-13 | End: 2023-02-27

## 2022-11-14 ENCOUNTER — LAB VISIT (OUTPATIENT)
Dept: LAB | Facility: HOSPITAL | Age: 72
End: 2022-11-14
Payer: MEDICARE

## 2022-11-14 ENCOUNTER — TELEPHONE (OUTPATIENT)
Dept: CARDIOLOGY | Facility: CLINIC | Age: 72
End: 2022-11-14
Payer: MEDICARE

## 2022-11-14 ENCOUNTER — PATIENT MESSAGE (OUTPATIENT)
Dept: CARDIOLOGY | Facility: CLINIC | Age: 72
End: 2022-11-14
Payer: MEDICARE

## 2022-11-14 DIAGNOSIS — I47.29 PAROXYSMAL VT: ICD-10-CM

## 2022-11-14 DIAGNOSIS — Z12.11 SCREEN FOR COLON CANCER: Primary | ICD-10-CM

## 2022-11-14 PROCEDURE — 36415 COLL VENOUS BLD VENIPUNCTURE: CPT | Mod: PO | Performed by: NURSE PRACTITIONER

## 2022-11-14 PROCEDURE — 80299 QUANTITATIVE ASSAY DRUG: CPT | Performed by: NURSE PRACTITIONER

## 2022-11-14 RX ORDER — MEXILETINE HYDROCHLORIDE 150 MG/1
300 CAPSULE ORAL 3 TIMES DAILY
Qty: 60 CAPSULE | Refills: 11 | Status: SHIPPED | OUTPATIENT
Start: 2022-11-14 | End: 2023-03-13

## 2022-11-14 NOTE — TELEPHONE ENCOUNTER
Pt notified and verbalized understanding pt      Amio blood level is too  low at 0.4/0.2   I reviewed his meds and he is apparently using 100 mg tabs instead of 200 mg tabs. He is taking 1.5 tab - so 150 mg   Will increase dose to 200 bid for 6 weeks then 300 mg a day   I will send a new prescription

## 2022-11-14 NOTE — TELEPHONE ENCOUNTER
Pt notified to put the colonoscopy on hold. Pt was notified to increase mexitile 1 tid. Pt states the needs a new script for it. pb

## 2022-11-14 NOTE — TELEPHONE ENCOUNTER
Pt notified of the change pb    ----- Message from Nirav Baldwin MD sent at 11/13/2022 12:00 AM CST -----  See comments below and call patient to discuss.   Please close encounter when done -- no need to route back to me.  Thanks  Srinivas blood level is too  low at 0.4/0.2  I reviewed his meds and he is apparently using 100 mg tabs instead of 200 mg tabs. He is taking 1.5 tab - so 150 mg  Will increase dose to 200 bid for 6 weeks then 300 mg a day  I will send a new prescription

## 2022-11-15 ENCOUNTER — TELEPHONE (OUTPATIENT)
Dept: CARDIOLOGY | Facility: CLINIC | Age: 72
End: 2022-11-15
Payer: MEDICARE

## 2022-11-15 NOTE — TELEPHONE ENCOUNTER
Called to pharmacy got there recorder stating that pharmacy staff of busy and leave a message. I left the message about the dosing listed below. pb      Amio blood level is too  low at 0.4/0.2   I reviewed his meds and he is apparently using 100 mg tabs instead of 200 mg tabs. He is taking 1.5 tab - so 150 mg   Will increase dose to 200 bid for 6 weeks then 300 mg a day   I will send a new prescription

## 2022-11-16 ENCOUNTER — PATIENT MESSAGE (OUTPATIENT)
Dept: CARDIOLOGY | Facility: CLINIC | Age: 72
End: 2022-11-16
Payer: MEDICARE

## 2022-11-16 LAB — MEXILETINE TROUGH SERPL-MCNC: 0.7 MCG/ML

## 2022-11-20 ENCOUNTER — CLINICAL SUPPORT (OUTPATIENT)
Dept: CARDIOLOGY | Facility: HOSPITAL | Age: 72
End: 2022-11-20
Payer: MEDICARE

## 2022-11-20 DIAGNOSIS — Z95.810 PRESENCE OF AUTOMATIC (IMPLANTABLE) CARDIAC DEFIBRILLATOR: ICD-10-CM

## 2022-11-20 PROCEDURE — 93295 CARDIAC DEVICE CHECK - REMOTE: ICD-10-PCS | Mod: ,,, | Performed by: INTERNAL MEDICINE

## 2022-11-20 PROCEDURE — 93295 DEV INTERROG REMOTE 1/2/MLT: CPT | Mod: ,,, | Performed by: INTERNAL MEDICINE

## 2022-11-22 ENCOUNTER — TELEPHONE (OUTPATIENT)
Dept: CARDIOLOGY | Facility: CLINIC | Age: 72
End: 2022-11-22
Payer: MEDICARE

## 2022-11-22 NOTE — TELEPHONE ENCOUNTER
Pt notified of appt date and time as well as to remind him he is not to be driving. Pt verbalized understanding pb

## 2022-11-25 ENCOUNTER — TELEPHONE (OUTPATIENT)
Dept: CARDIOLOGY | Facility: HOSPITAL | Age: 72
End: 2022-11-25
Payer: MEDICARE

## 2022-11-25 NOTE — TELEPHONE ENCOUNTER
----- Message from Nirav Baldwin MD sent at 11/21/2022 11:26 PM CST -----  Regarding: RE: VT episodes  Needs clinic visit asap  No driving   ----- Message -----  From: Corina Posey RN  Sent: 11/21/2022   2:31 PM CST  To: Nirav Baldwin MD, Virgie Jaramillo, DEEPALIP-C  Subject: VT episodes                                      11/21/22--Pt now taking Amiodarone 300mg twice daily as well as Mexilitine 300mg three times daily.  Still has dizzy, lightheaded spells once or twice daily but do not seem to be as intense.  Episode on 11/19/22, 8:10am  - VT unknown duration, cannot see onset on EGM.  -370ms, reported duration 42 sec.  Episode on 11/19/22, 8:36am  - VT unknown duration, cannot see onset on EGM.  -390ms, reported duration 52 sec.  Please advise if you would like to continue to monitor or if pt needs clinic appt.    Thanks,  Chandni Sawant alert  ICD Report  Monitoring period: 11/12/22-11/20/22    Battery/Lead Status: 1.8 years    Ap: 91%    Device Defined Counters:   Ventricular Events: 18  EGMs illustrate VT and nsVT, longest available 52sec in duration.    VT > 30sec in duration escalated to follow up tab(RED) on November 21st, 2022, 11:35 am CST.      Episode list from Merlin:  11- 05:18 PM      VT-1                         360 (Monitor Only)                         0:28 11- 04:05 PM      Non-sustained Event  347 No therapies were delivered 0:10 11- 08:36 AM      VT-1                         365 (Monitor Only)                         0:52  11- 08:10 AM      VT-1                         365 (Monitor Only)                         0:42 11- 04:08 PM      Non-sustained Event 355 No therapies were delivered 0:10 11- 01:16 AM      Non-sustained Event 351 No therapies were delivered 0:08   11- 01:32 PM      Non-sustained Event 355 No therapies were delivered 0:08 11- 12:19 PM      Non-sustained Event 363 No therapies were delivered 0:06   11- 06:51 PM      Non-sustained Event 339 No therapies were delivered 0:12  11- 06:48 PM      Non-sustained Event 324 No therapies were delivered 0:10  11- 02:19 PM      Non-sustained Event 359 No therapies were delivered 0:08  11- 11:43 AM      Non-sustained Event 347 No therapies were delivered 0:08  11- 11:22 PM      Non-sustained Event 339 No therapies were delivered 0:08  11- 11:20 PM      Non-sustained Event 351 No therapies were delivered 0:08  11- 11:10 AM      Non-sustained Event 320 No therapies were delivered 0:10  11- 10:57 AM      Non-sustained Event 339 No therapies were delivered 0:10  11- 11:03 PM      Non-sustained Event 312 No therapies were delivered 0:12  11- 12:00 PM      Non-sustained Event 324 No therapies were delivered 0:10

## 2022-11-25 NOTE — TELEPHONE ENCOUNTER
Called pt to confirm his appt with Dr. Yung on 11/28/22, '[facility], 10am.  Merlin home monitor updated this morning, no arrhythmias reported since 11/19.  Pt states he is compliant with cardiac medications. Wife will be driving him to appt on Monday as per Dr. Yung's instructions.  Pt expressed appreciation for phone call, all questions answered.

## 2022-11-28 ENCOUNTER — RESEARCH ENCOUNTER (OUTPATIENT)
Dept: RESEARCH | Facility: HOSPITAL | Age: 72
End: 2022-11-28

## 2022-11-28 ENCOUNTER — OFFICE VISIT (OUTPATIENT)
Dept: CARDIOLOGY | Facility: CLINIC | Age: 72
End: 2022-11-28
Payer: MEDICARE

## 2022-11-28 ENCOUNTER — PATIENT MESSAGE (OUTPATIENT)
Dept: FAMILY MEDICINE | Facility: CLINIC | Age: 72
End: 2022-11-28
Payer: MEDICARE

## 2022-11-28 VITALS
OXYGEN SATURATION: 95 % | SYSTOLIC BLOOD PRESSURE: 112 MMHG | HEART RATE: 75 BPM | WEIGHT: 265.63 LBS | HEIGHT: 70 IN | BODY MASS INDEX: 38.03 KG/M2 | DIASTOLIC BLOOD PRESSURE: 70 MMHG

## 2022-11-28 DIAGNOSIS — E11.65 TYPE 2 DIABETES MELLITUS WITH HYPERGLYCEMIA, WITHOUT LONG-TERM CURRENT USE OF INSULIN: Chronic | ICD-10-CM

## 2022-11-28 DIAGNOSIS — I50.42 CHRONIC COMBINED SYSTOLIC AND DIASTOLIC CHF (CONGESTIVE HEART FAILURE): Chronic | ICD-10-CM

## 2022-11-28 DIAGNOSIS — E11.59 HYPERTENSION ASSOCIATED WITH DIABETES: Chronic | ICD-10-CM

## 2022-11-28 DIAGNOSIS — E78.5 HYPERLIPIDEMIA LDL GOAL <100: Chronic | ICD-10-CM

## 2022-11-28 DIAGNOSIS — I47.29 PAROXYSMAL VT: Primary | ICD-10-CM

## 2022-11-28 DIAGNOSIS — E66.01 CLASS 2 SEVERE OBESITY DUE TO EXCESS CALORIES WITH SERIOUS COMORBIDITY AND BODY MASS INDEX (BMI) OF 38.0 TO 38.9 IN ADULT: ICD-10-CM

## 2022-11-28 DIAGNOSIS — Z91.89 AT RISK FOR AMIODARONE TOXICITY WITH LONG TERM USE: Chronic | ICD-10-CM

## 2022-11-28 DIAGNOSIS — Z79.899 AT RISK FOR AMIODARONE TOXICITY WITH LONG TERM USE: Chronic | ICD-10-CM

## 2022-11-28 DIAGNOSIS — I48.0 PAF (PAROXYSMAL ATRIAL FIBRILLATION): Chronic | ICD-10-CM

## 2022-11-28 DIAGNOSIS — G47.33 OSA ON CPAP: ICD-10-CM

## 2022-11-28 DIAGNOSIS — Z45.02 ICD (IMPLANTABLE CARDIOVERTER-DEFIBRILLATOR) DISCHARGE: ICD-10-CM

## 2022-11-28 DIAGNOSIS — I15.2 HYPERTENSION ASSOCIATED WITH DIABETES: Chronic | ICD-10-CM

## 2022-11-28 DIAGNOSIS — I49.01 VF (VENTRICULAR FIBRILLATION): ICD-10-CM

## 2022-11-28 PROCEDURE — 99215 OFFICE O/P EST HI 40 MIN: CPT | Mod: S$PBB,,, | Performed by: INTERNAL MEDICINE

## 2022-11-28 PROCEDURE — 99215 PR OFFICE/OUTPT VISIT, EST, LEVL V, 40-54 MIN: ICD-10-PCS | Mod: S$PBB,,, | Performed by: INTERNAL MEDICINE

## 2022-11-28 PROCEDURE — 99999 PR PBB SHADOW E&M-EST. PATIENT-LVL IV: CPT | Mod: PBBFAC,,, | Performed by: INTERNAL MEDICINE

## 2022-11-28 PROCEDURE — 99214 OFFICE O/P EST MOD 30 MIN: CPT | Mod: PBBFAC | Performed by: INTERNAL MEDICINE

## 2022-11-28 PROCEDURE — 99999 PR PBB SHADOW E&M-EST. PATIENT-LVL IV: ICD-10-PCS | Mod: PBBFAC,,, | Performed by: INTERNAL MEDICINE

## 2022-11-28 NOTE — PROGRESS NOTES
Subjective:   Patient ID:  Randy Yap is a 72 y.o. male     Chief complaint:VT/VF    HPI  72 year old male who presents to Arrhythmia clinic for 6 month follow up with device check. His current medical conditions include CHF, HFrEF of 30%, PVT on Amiodarone,HTN, HLP, Obesity, HONEY on CPAP  See granular details in my note from 10/1/21     Update 8/8/2022:  Had echo in Sept 2021:  >>  The left ventricle is moderately enlarged with concentric hypertrophy and moderately decreased systolic function.  Mild left atrial enlargement.  The estimated ejection fraction is 30%.  Grade I left ventricular diastolic dysfunction.  There is moderate left ventricular global hypokinesis.  Normal right ventricular size with normal right ventricular systolic function.  Mild mitral regurgitation.      Also, I had his echo reviewed by Dr EDWARDS and had a quantitative EF calculated - it was 38% - which was in keeping with Dr EDWARDS's qualitative estimation  I have reviewed the actual image of the ECG tracing obtained today and it shows NSR with normal intervals and occ PVC.     ICD eval today:  Device and leads implanted 10/6/2014   [employer] ICD Fortify [place] KHALIL 2357-40Q, SN: [id number]   A lead: Bennett Tendril STS 2088TC / 46cm, SN: [id number]   V lead: Bennett Durata 7120Q / 58 cm, SN: [id number]     All in good repair     Last BNP was 55 (10/1/21)     Clinically, he feels as well as he has ever had.  His CHF medications at all at maximized doses.  He has rare and mild lightheadedness.  Due to the extreme eat, has backed off on some of his outdoor exercise but he keeps quite active.  He has some family stress now due to the recent passing of his mother in law and the Alzheimer's of  his sister-in-law  >>   Doing very well.  He may actually be class 1 but our list him as class 2 A (mainly due to limitations related to obesity).      Update 11/28/2022:  He has had issues with recurrent VF and shocks.  The most recent episode occurred on 10/22/2022.   Since then we realized that he was not taking enough amiodarone as the blood level was less than 0.2.  After reviewing drug dosing with him it appeared that he was using the 100 mg tablets in do the 200.  Accordingly, and for a week now, we have increased his amiodarone which to 300 p.o. b.i.d. and will keep this for a total of 6 weeks.    Prior to increasing the amiodarone, he was having repetitive episodes of severe dizziness/near-syncope.  These were associated with palpitations and runs of monomorphic ventricular tachycardia that eventually would either be ATP terminated or terminate spontaneously as they were deliberately left untreated in the monitoring zone.  He now has had no such clinical events for a week or so.    Variation of his ICD diagnostics, suggest that with the addition of amiodarone, he may have now VT that are on detected because they are below the monitoring zone of 160.  Otherwise, now that he does not have the near syncopal events, he feels well.  His wife is wondering whether Ozempic has something to do with his VT VF as was started approximately a month prior to the VF episode.    Current Outpatient Medications   Medication Sig    acetaminophen (TYLENOL) 325 MG tablet Take 650 mg by mouth as needed.     albuterol (PROVENTIL/VENTOLIN HFA) 90 mcg/actuation inhaler Inhale 1 puff into the lungs once daily. Rescue    amiodarone (PACERONE) 100 MG Tab TAKE 1 AND 1/2 TABLETS BY MOUTH ONCE DAILY    amiodarone (PACERONE) 200 MG Tab Take 1 tablet (200 mg total) by mouth 2 (two) times daily. 1 tab po bid for 6 weeks then 1.5 tb a day (total dose = 300 mg)    bepotastine besilate (BEPREVE) 1.5 % Drop Bepreve 1.5 % eye drops   Apply by ophthalmic route as needed for 50 days.    carvediloL (COREG) 25 MG tablet Take 1 tablet (25 mg total) by mouth 2 (two) times daily.    co-enzyme Q-10 30 mg capsule Take 30 mg by mouth once daily.     FARXIGA 10 mg tablet TAKE 1 TABLET BY MOUTH ONCE DAILY    ferrous  sulfate (FEOSOL) 325 mg (65 mg iron) Tab tablet Take 325 mg by mouth daily with breakfast.    fluticasone furoate (ARNUITY ELLIPTA) 100 mcg/actuation inhaler Inhale 1 puff into the lungs once daily.    furosemide (LASIX) 40 MG tablet TAKE 1 TABLET BY MOUTH ONCE DAILY    glucosamine-chondroitin 500-400 mg tablet Take 1 tablet by mouth once daily.     INV apixaban/placebo tablet Take 1 tablets (5 mg) by mouth 2 (two) times daily. FOR INVESTIGATIONAL USE ONLY. Protocol: Leia 2017.307 subject # : [id number]    INV aspirin/placebo tablet Take 1 tablet (81 mg total) by mouth once daily. FOR INVESTIGATIONAL USE ONLY. Patient Study #: [id number] Protocol: [place] 2017.307    mexiletine (MEXITIL) 150 MG Cap Take 2 capsules (300 mg total) by mouth 3 (three) times daily.    multivitamin (THERAGRAN) tablet Take 1 tablet by mouth once daily.    pravastatin (PRAVACHOL) 80 MG tablet TAKE 1 TABLET BY MOUTH ONCE DAILY    RESTASIS 0.05 % ophthalmic emulsion Place 1 drop into both eyes 2 (two) times daily.    spironolactone (ALDACTONE) 25 MG tablet TAKE 1 TABLET BY MOUTH TWICE DAILY    traZODone (DESYREL) 150 MG tablet Take 1 tablet by mouth every evening.    sacubitriL-valsartan (ENTRESTO) 49-51 mg per tablet Take 1 tablet by mouth 2 (two) times daily.     No current facility-administered medications for this visit.     Review of Systems   Constitutional: Negative for decreased appetite, malaise/fatigue, weight gain and weight loss.   Eyes:  Negative for blurred vision.   Cardiovascular:  Positive for near-syncope and palpitations. Negative for chest pain, claudication, cyanosis, dyspnea on exertion, irregular heartbeat, leg swelling and orthopnea.   Respiratory:  Negative for cough, shortness of breath, sleep disturbances due to breathing, snoring and wheezing.    Endocrine: Negative for heat intolerance.   Hematologic/Lymphatic: Does not bruise/bleed easily.   Musculoskeletal:  Negative for muscle weakness and myalgias.    Gastrointestinal:  Negative for melena, nausea and vomiting.   Genitourinary:  Negative for nocturia.   Neurological:  Negative for excessive daytime sleepiness, dizziness, headaches, light-headedness and weakness.   Psychiatric/Behavioral:  Negative for depression, memory loss and substance abuse. The patient does not have insomnia and is not nervous/anxious.    Social History     Tobacco Use   Smoking Status Never   Smokeless Tobacco Never        Objective:     Physical Exam  Vitals and nursing note reviewed.   Constitutional:       Appearance: Normal appearance. He is well-developed.      Comments: overweight   HENT:      Head: Normocephalic and atraumatic.      Right Ear: External ear normal.      Left Ear: External ear normal.   Eyes:      Conjunctiva/sclera: Conjunctivae normal.      Left eye: Left conjunctiva is not injected. No hemorrhage.     Pupils: Pupils are equal, round, and reactive to light.   Neck:      Thyroid: No thyromegaly.      Vascular: No JVD.   Cardiovascular:      Rate and Rhythm: Normal rate and regular rhythm.      Chest Wall: PMI is not displaced.      Pulses: Intact distal pulses.           Carotid pulses are 2+ on the right side and 2+ on the left side.       Radial pulses are 2+ on the right side and 2+ on the left side.        Dorsalis pedis pulses are 2+ on the right side and 2+ on the left side.        Posterior tibial pulses are 2+ on the right side and 2+ on the left side.      Heart sounds: Normal heart sounds. No midsystolic click and no opening snap. No murmur heard.    No friction rub. No gallop.   Pulmonary:      Effort: Pulmonary effort is normal. No respiratory distress.      Breath sounds: Normal breath sounds. No wheezing or rales.   Chest:      Chest wall: No tenderness.      Comments: Device pocket is in excellent repair.  Abdominal:      Palpations: Abdomen is soft. Abdomen is not rigid. There is no hepatomegaly.      Tenderness: There is no abdominal tenderness.  "There is no guarding.      Comments: Obese abdomen   Musculoskeletal:         General: No tenderness. Normal range of motion.      Cervical back: Neck supple.      Right knee: No swelling.      Left knee: No swelling.      Right lower leg: No swelling.      Left lower leg: No swelling.      Right ankle: No swelling.      Left ankle: No swelling.      Right foot: No swelling.      Left foot: No swelling.   Skin:     General: Skin is warm and dry.      Coloration: Skin is not pale.      Findings: No rash.   Neurological:      General: No focal deficit present.      Mental Status: He is alert and oriented to person, place, and time.      Cranial Nerves: No cranial nerve deficit.      Coordination: Coordination normal.      Deep Tendon Reflexes: Reflexes are normal and symmetric.   Psychiatric:         Mood and Affect: Mood normal.         Behavior: Behavior normal.     /70   Pulse 75   Ht 5' 10" (1.778 m)   Wt 120.5 kg (265 lb 10.5 oz)   SpO2 95%   BMI 38.12 kg/m²      reports current alcohol use.  Past Medical History:   Diagnosis Date    Asthma     Cardiomyopathy due to systemic disease     Colon polyp 5/2013    repeat 5/2018    Depression, recurrent     Diastolic dysfunction     DJD (degenerative joint disease) of knee 10/14/2013    Hyperlipidemia     Hypertension     Murmur     Paroxysmal VT 5/21/2016    Pericarditis with effusion     Sleep apnea      Past Surgical History:   Procedure Laterality Date    CARDIAC DEFIBRILLATOR PLACEMENT  10/06/2014    COLONOSCOPY W/ POLYPECTOMY  05/21/2013    repeat 5/21/2018    COSMETIC SURGERY      EYE SURGERY  Cataract    HERNIA REPAIR      R inguinal    LEFT HEART CATHETERIZATION Left 07/09/2018    Procedure: CATHETERIZATION, HEART, LEFT;  Surgeon: Macey Dos Santos MD;  Location: [facility] CATH LAB;  Service: Cardiology;  Laterality: Left;  left radial approach  Has [employer] gissel ICD    pace maker   10/06/2014    PERICARDIAL WINDOW  12-15 years ago    tonsillectomy      " TONSILLECTOMY      VARICOCELECTOMY       Family History   Problem Relation Age of Onset    Hyperlipidemia Mother     Arrhythmia Mother     Arthritis Mother     Asthma Mother     COPD Mother     Heart disease Father 48    Heart attack Father 48    Hypertension Father     Arthritis Father     Diabetes Father     Hypertension Maternal Grandmother     Hypertension Maternal Grandfather     Heart disease Paternal Grandmother     Hypertension Paternal Grandmother     Heart attack Paternal Grandmother     Stroke Paternal Grandmother     Heart disease Paternal Grandfather     Hypertension Paternal Grandfather     Heart attack Paternal Grandfather     Cancer Paternal Grandfather        Assessment:      1. Paroxysmal VT    2. ICD (implantable cardioverter-defibrillator) discharge    3. Chronic combined systolic and diastolic CHF (congestive heart failure)    4. PAF (paroxysmal atrial fibrillation)    5. Hyperlipidemia LDL goal <100    6. Hypertension associated with diabetes    7. VF (ventricular fibrillation)    8. Class 2 severe obesity due to excess calories with serious comorbidity and body mass index (BMI) of 38.0 to 38.9 in adult    9. Type 2 diabetes mellitus with hyperglycemia, without long-term current use of insulin    10. At risk for amiodarone toxicity with long term use    11. HONEY on CPAP        Plan:    Change TDI 02/01/2034 the monitoring zone, 160 for the VT 1 zone and 222 for the VF zone.  Otherwise, keep therapies unchanged.  Continue on 600 mg a day of amiodarone until December 31st as of January 1st decrease to 300 mg a day.  Get amiodarone level in 3 months.  No orders of the defined types were placed in this encounter.      Follow up in about 14 weeks (around 3/6/2023), or if symptoms worsen or fail to improve.    There are no discontinued medications.         Medication List with Changes/Refills   Current Medications    ACETAMINOPHEN (TYLENOL) 325 MG TABLET    Take 650 mg by mouth as needed.      ALBUTEROL (PROVENTIL/VENTOLIN HFA) 90 MCG/ACTUATION INHALER    Inhale 1 puff into the lungs once daily. Rescue    AMIODARONE (PACERONE) 100 MG TAB    TAKE 1 AND 1/2 TABLETS BY MOUTH ONCE DAILY    AMIODARONE (PACERONE) 200 MG TAB    Take 1 tablet (200 mg total) by mouth 2 (two) times daily. 1 tab po bid for 6 weeks then 1.5 tb a day (total dose = 300 mg)    BEPOTASTINE BESILATE (BEPREVE) 1.5 % DROP    Bepreve 1.5 % eye drops   Apply by ophthalmic route as needed for 50 days.    CARVEDILOL (COREG) 25 MG TABLET    Take 1 tablet (25 mg total) by mouth 2 (two) times daily.    CO-ENZYME Q-10 30 MG CAPSULE    Take 30 mg by mouth once daily.     FARXIGA 10 MG TABLET    TAKE 1 TABLET BY MOUTH ONCE DAILY    FERROUS SULFATE (FEOSOL) 325 MG (65 MG IRON) TAB TABLET    Take 325 mg by mouth daily with breakfast.    FLUTICASONE FUROATE (ARNUITY ELLIPTA) 100 MCG/ACTUATION INHALER    Inhale 1 puff into the lungs once daily.    FUROSEMIDE (LASIX) 40 MG TABLET    TAKE 1 TABLET BY MOUTH ONCE DAILY    GLUCOSAMINE-CHONDROITIN 500-400 MG TABLET    Take 1 tablet by mouth once daily.     INV APIXABAN/PLACEBO TABLET    Take 1 tablets (5 mg) by mouth 2 (two) times daily. FOR INVESTIGATIONAL USE ONLY. Protocol: Leia 2017.307 subject # : [id number]    INV ASPIRIN/PLACEBO TABLET    Take 1 tablet (81 mg total) by mouth once daily. FOR INVESTIGATIONAL USE ONLY. Patient Study #: [id number] Protocol: Leia 2017.307    MEXILETINE (MEXITIL) 150 MG CAP    Take 2 capsules (300 mg total) by mouth 3 (three) times daily.    MULTIVITAMIN (THERAGRAN) TABLET    Take 1 tablet by mouth once daily.    PRAVASTATIN (PRAVACHOL) 80 MG TABLET    TAKE 1 TABLET BY MOUTH ONCE DAILY    RESTASIS 0.05 % OPHTHALMIC EMULSION    Place 1 drop into both eyes 2 (two) times daily.    SACUBITRIL-VALSARTAN (ENTRESTO) 49-51 MG PER TABLET    Take 1 tablet by mouth 2 (two) times daily.    SPIRONOLACTONE (ALDACTONE) 25 MG TABLET    TAKE 1 TABLET BY MOUTH TWICE DAILY    TRAZODONE (DESYREL)  150 MG TABLET    Take 1 tablet by mouth every evening.

## 2022-11-29 DIAGNOSIS — I47.29 PAROXYSMAL VT: Primary | ICD-10-CM

## 2022-11-29 RX ORDER — SACUBITRIL AND VALSARTAN 49; 51 MG/1; MG/1
1 TABLET, FILM COATED ORAL 2 TIMES DAILY
Qty: 60 TABLET | Refills: 0 | Status: SHIPPED | OUTPATIENT
Start: 2022-11-29 | End: 2022-12-27

## 2022-12-03 DIAGNOSIS — Z71.89 COMPLEX CARE COORDINATION: ICD-10-CM

## 2023-01-03 ENCOUNTER — PATIENT MESSAGE (OUTPATIENT)
Dept: CARDIOLOGY | Facility: CLINIC | Age: 73
End: 2023-01-03
Payer: MEDICARE

## 2023-01-05 ENCOUNTER — TELEPHONE (OUTPATIENT)
Dept: CARDIOLOGY | Facility: CLINIC | Age: 73
End: 2023-01-05
Payer: MEDICARE

## 2023-01-05 NOTE — TELEPHONE ENCOUNTER
Initiated PA for Mexiletine 150mg through CoverMyMeds.    Your information has been submitted to [employer]. [employer] will review the request and will issue a decision, typically within 1-3 days from your submission. You can check the updated outcome later by reopening this request.    If [employer] has not responded in 1-3 days or if you have any questions about your ePA request, please contact [employer] at [phone]. If you think there may be a problem with your PA request, use our live chat feature at the bottom right.

## 2023-01-11 ENCOUNTER — TELEPHONE (OUTPATIENT)
Dept: CARDIOLOGY | Facility: CLINIC | Age: 73
End: 2023-01-11
Payer: MEDICARE

## 2023-01-11 NOTE — TELEPHONE ENCOUNTER
----- Message from DIANA Chapman sent at 1/11/2023  2:06 PM CST -----  Any update on his mexiletine PA?    Thanks  Virgie

## 2023-01-11 NOTE — TELEPHONE ENCOUNTER
Mexiletine 150mg approved    This request has been approved.    Please note any additional information provided by [employer] at the bottom of your screen.

## 2023-01-12 ENCOUNTER — TELEPHONE (OUTPATIENT)
Dept: CARDIOLOGY | Facility: CLINIC | Age: 73
End: 2023-01-12
Payer: MEDICARE

## 2023-01-12 ENCOUNTER — PATIENT MESSAGE (OUTPATIENT)
Dept: FAMILY MEDICINE | Facility: CLINIC | Age: 73
End: 2023-01-12
Payer: MEDICARE

## 2023-01-12 DIAGNOSIS — E11.65 TYPE 2 DIABETES MELLITUS WITH HYPERGLYCEMIA, WITHOUT LONG-TERM CURRENT USE OF INSULIN: Primary | Chronic | ICD-10-CM

## 2023-01-12 RX ORDER — SEMAGLUTIDE 1.34 MG/ML
1 INJECTION, SOLUTION SUBCUTANEOUS
Qty: 1 PEN | Refills: 2 | Status: SHIPPED | OUTPATIENT
Start: 2023-01-12 | End: 2023-05-08 | Stop reason: SDUPTHER

## 2023-01-12 NOTE — TELEPHONE ENCOUNTER
Notified the patient that his medicine was ready for  and the patient confirmed that he has been able to pick his medications up.

## 2023-01-13 ENCOUNTER — TELEPHONE (OUTPATIENT)
Dept: FAMILY MEDICINE | Facility: CLINIC | Age: 73
End: 2023-01-13
Payer: MEDICARE

## 2023-01-13 NOTE — TELEPHONE ENCOUNTER
Dose increased. Please keep us updated. I have signed for the following orders AND/OR meds.  Please call the patient and ask the patient to schedule the testing AND/OR inform about any medications that were sent. Medications have been sent to pharmacy listed below      Medications Ordered This Encounter   Medications    semaglutide (OZEMPIC) 1 mg/dose (2 mg/1.5 mL) PnIj     Sig: Inject 1 mg into the skin every 7 days.     Dispense:  1 pen     Refill:  2     [id number] [place], [place] - [address] [address]  Phone: [phone] Fax: [phone]

## 2023-01-13 NOTE — TELEPHONE ENCOUNTER
----- Message from Juliano Min sent at 1/13/2023  9:21 AM CST -----  Regarding: Pharmacy Authorization  Contact: [facility]  Type:  Pharmacy Calling to Clarify an RX    Name of Caller: Tanisha  Pharmacy Name: [facility]  Prescription Name: semaglutide (OZEMPIC) 1 mg/dose (2 mg/1.5 mL) PnIj  What do they need to clarify?: If they can fill prescription for more than 2ML?  Best Call Back Number: [phone]  Additional Information:

## 2023-01-23 ENCOUNTER — OFFICE VISIT (OUTPATIENT)
Dept: PULMONOLOGY | Facility: CLINIC | Age: 73
End: 2023-01-23
Payer: MEDICARE

## 2023-01-23 ENCOUNTER — CLINICAL SUPPORT (OUTPATIENT)
Dept: PULMONOLOGY | Facility: CLINIC | Age: 73
End: 2023-01-23
Payer: MEDICARE

## 2023-01-23 VITALS
WEIGHT: 255.19 LBS | HEIGHT: 70 IN | HEIGHT: 70 IN | BODY MASS INDEX: 36.53 KG/M2 | HEART RATE: 80 BPM | BODY MASS INDEX: 36.55 KG/M2 | WEIGHT: 255.31 LBS | RESPIRATION RATE: 18 BRPM | OXYGEN SATURATION: 96 % | SYSTOLIC BLOOD PRESSURE: 115 MMHG | DIASTOLIC BLOOD PRESSURE: 70 MMHG

## 2023-01-23 DIAGNOSIS — E66.01 CLASS 2 SEVERE OBESITY DUE TO EXCESS CALORIES WITH SERIOUS COMORBIDITY AND BODY MASS INDEX (BMI) OF 38.0 TO 38.9 IN ADULT: Primary | ICD-10-CM

## 2023-01-23 DIAGNOSIS — I50.42 CHRONIC COMBINED SYSTOLIC AND DIASTOLIC CHF (CONGESTIVE HEART FAILURE): Chronic | ICD-10-CM

## 2023-01-23 DIAGNOSIS — E11.59 HYPERTENSION ASSOCIATED WITH DIABETES: Chronic | ICD-10-CM

## 2023-01-23 DIAGNOSIS — I15.2 HYPERTENSION ASSOCIATED WITH DIABETES: Chronic | ICD-10-CM

## 2023-01-23 DIAGNOSIS — G47.33 OSA ON CPAP: ICD-10-CM

## 2023-01-23 DIAGNOSIS — J45.30 MILD PERSISTENT ASTHMA WITHOUT COMPLICATION: ICD-10-CM

## 2023-01-23 DIAGNOSIS — I48.0 PAF (PAROXYSMAL ATRIAL FIBRILLATION): Chronic | ICD-10-CM

## 2023-01-23 DIAGNOSIS — E11.65 TYPE 2 DIABETES MELLITUS WITH HYPERGLYCEMIA, WITHOUT LONG-TERM CURRENT USE OF INSULIN: Chronic | ICD-10-CM

## 2023-01-23 LAB
BRPFT: NORMAL
FEF 25 75 CHG: -1.1 %
FEF 25 75 LLN: 1.03
FEF 25 75 POST REF: 64.2 %
FEF 25 75 PRE REF: 64.9 %
FEF 25 75 REF: 2.4
FET100 CHG: 17.7 %
FEV1 CHG: 2.4 %
FEV1 FVC CHG: -0.2 %
FEV1 FVC LLN: 62
FEV1 FVC POST REF: 88.3 %
FEV1 FVC PRE REF: 88.4 %
FEV1 FVC REF: 76
FEV1 LLN: 2.29
FEV1 POST REF: 103.5 %
FEV1 PRE REF: 101.1 %
FEV1 REF: 3.19
FVC CHG: 2.6 %
FVC LLN: 3.12
FVC POST REF: 116.7 %
FVC PRE REF: 113.8 %
FVC REF: 4.22
PEF CHG: -4.9 %
PEF LLN: 5.95
PEF POST REF: 91.8 %
PEF PRE REF: 96.5 %
PEF REF: 8.28
POST FEF 25 75: 1.54 L/S (ref 1.03–3.77)
POST FET 100: 15.12 SEC
POST FEV1 FVC: 66.92 % (ref 62.29–89.35)
POST FEV1: 3.3 L (ref 2.29–4.08)
POST FVC: 4.93 L (ref 3.12–5.32)
POST PEF: 7.59 L/S (ref 5.95–10.6)
PRE FEF 25 75: 1.56 L/S (ref 1.03–3.77)
PRE FET 100: 12.85 SEC
PRE FEV1 FVC: 67.05 % (ref 62.29–89.35)
PRE FEV1: 3.22 L (ref 2.29–4.08)
PRE FVC: 4.8 L (ref 3.12–5.32)
PRE PEF: 7.99 L/S (ref 5.95–10.6)

## 2023-01-23 PROCEDURE — 99214 OFFICE O/P EST MOD 30 MIN: CPT | Mod: S$PBB,25,, | Performed by: PHYSICIAN ASSISTANT

## 2023-01-23 PROCEDURE — 94060 PR EVAL OF BRONCHOSPASM: ICD-10-PCS | Mod: 26,S$PBB,59, | Performed by: INTERNAL MEDICINE

## 2023-01-23 PROCEDURE — 99999 PR PBB SHADOW E&M-EST. PATIENT-LVL V: CPT | Mod: PBBFAC,,, | Performed by: PHYSICIAN ASSISTANT

## 2023-01-23 PROCEDURE — 99215 OFFICE O/P EST HI 40 MIN: CPT | Mod: PBBFAC,27,25 | Performed by: PHYSICIAN ASSISTANT

## 2023-01-23 PROCEDURE — 94060 EVALUATION OF WHEEZING: CPT | Mod: 26,S$PBB,59, | Performed by: INTERNAL MEDICINE

## 2023-01-23 PROCEDURE — 94618 PULMONARY STRESS TESTING: CPT | Mod: PBBFAC

## 2023-01-23 PROCEDURE — 99211 OFF/OP EST MAY X REQ PHY/QHP: CPT | Mod: PBBFAC,25

## 2023-01-23 PROCEDURE — 95012 NITRIC OXIDE EXP GAS DETER: CPT | Mod: PBBFAC

## 2023-01-23 PROCEDURE — 94060 EVALUATION OF WHEEZING: CPT | Mod: PBBFAC

## 2023-01-23 PROCEDURE — 99999 PR PBB SHADOW E&M-EST. PATIENT-LVL I: CPT | Mod: PBBFAC,,,

## 2023-01-23 PROCEDURE — 99214 PR OFFICE/OUTPT VISIT, EST, LEVL IV, 30-39 MIN: ICD-10-PCS | Mod: S$PBB,25,, | Performed by: PHYSICIAN ASSISTANT

## 2023-01-23 PROCEDURE — 99999 PR PBB SHADOW E&M-EST. PATIENT-LVL V: ICD-10-PCS | Mod: PBBFAC,,, | Performed by: PHYSICIAN ASSISTANT

## 2023-01-23 PROCEDURE — 94618 PULMONARY STRESS TESTING: ICD-10-PCS | Mod: 26,S$PBB,, | Performed by: INTERNAL MEDICINE

## 2023-01-23 PROCEDURE — 94618 PULMONARY STRESS TESTING: CPT | Mod: 26,S$PBB,, | Performed by: INTERNAL MEDICINE

## 2023-01-23 PROCEDURE — 99999 PR PBB SHADOW E&M-EST. PATIENT-LVL I: ICD-10-PCS | Mod: PBBFAC,,,

## 2023-01-23 NOTE — PROCEDURES
Clinical Guide to Interpretation or FeNO Levels :    FeNO  (ppb) LOW INTERMEDIATE HIGH   ADULT VALUES < 25 25-50          > 50   Th2-driven Inflammation Unlikely Likely Significant     Patients FeNO level at this visit : _22___ (ppb)    Interpretation of FeNO measurement in adults:  [] FENO is less than 25 ppb implies non eosinophilic airway inflammation or the absence of airway inflammation.    Comment: Low FENO (<25 ppb) in adult asthmatics with persistent symptoms suggests other etiologies for these symptoms and a lower likelihood of benefit from adding or increasing inhaled glucocorticoids.    [] FENO between 25 and 50 ppb in adults should be interpreted cautiously with reference to the clinical situation (eg, symptomatic, on or off therapy, current smoking).    [] FENO greater than 50 ppb in adults  suggests eosinophilic airway inflammation   Comment: High FENO (>50 ppb) in adult asthmatics even with atypical symptoms suggests glucocorticoid responsiveness. High FENO (>50 ppb) can help identify poor adherence or uncontrolled inflammation in asthma patients with otherwise seemingly controlled asthma.    Discussion:  A FENO less than 25 ppb in adults and less than 20 ppb in children younger than 12 years of age implies noneosinophilic airway inflammation or the absence of airway inflammation.  A FENO greater than 50 ppb in adults or greater than 35 ppb in children suggests eosinophilic airway inflammation.   Values of FENO between 25 and 50 ppb in adults (20 to 35 ppb in children) should be interpreted cautiously with reference to the clinical situation (eg, symptomatic, on or off therapy, current smoking).  A rising FENO with a greater than 20 percent change and more than 25 ppb (20 ppb in children) from a previously stable level suggests increasing eosinophilic airway inflammation, but there are wide inter-individual differences.  A decrease in FENO greater than 20 percent for values over 50 ppb or more than  10 ppb for values less than 50 ppb may be clinically important.  ?FENO in other respiratory diseases - Several other diseases are associated with altered levels of exhaled NO: low levels of FENO have been noted in cystic fibrosis, current smoking, pulmonary hypertension, hypothermia, primary ciliary dyskinesia, and bronchopulmonary dysplasia. Elevated FENO has been noted in atopy, nonasthmatic eosinophilic bronchitis, COPD exacerbations, noncystic fibrosis bronchiectasis, and viral upper respiratory infections.    REFERENCE:  ATS Board of Directors, December 2004, and by the ERS Executive Committee, June 2004. ATS/ERS Recommendations for Standardized Procedures for the Online and Offline Measurement of Exhaled Lower Respiratory Nitric Oxide and Nasal Nitric Oxide. Guideline 2005

## 2023-01-23 NOTE — PROGRESS NOTES
Subjective:       Patient ID: Randy Yap is a 73 y.o. male.    Chief Complaint: HONEY on CPAP and Asthma      1/23/23  Here for asthma/HONEY follow up  [place], walk, FeNO today  Using Arnuity 200, asthma well controlled  No exacerbation, no signs of infection  Using CPAP 14 and benefits from use  Patient states improved symptoms with use of CPAP. Sleeping more soundly. Waking up feeling more refreshed. Improved daytime sleepiness.  Compliance download reviewed, days with usage > 4 hours is 90%, average APNEA HYPOPNEA INDEX is 1.2    7/22/22  HONEY on CPAP follow up  Received new FFM today, had to repeat sleep study to get new supplies here  MODERATE OBSTRUCTIVE SLEEP APNEA with overall AHI 19.8/hr ( 154 events)  Patient states improved symptoms with use of CPAP. Sleeping more soundly. Waking up feeling more refreshed. Improved daytime sleepiness.  Also with history of asthma, Arnuity 100 daily, uses albuterol every night  Never smoker  No recent exacerbations  Asthma score 14  [place] score 2    4/22/22  71yo male here for follow up of HONEY  Use to see Dr. Jones, last seen in 2020  History of HONEY on CPAP 14  Was seen for acute bronchitis in 2020; denies recent respiratory infection  History of mild intermittent asthma, controlled on albuterol prn  Using FFM for CPAP, requesting new supplies, is currently not due for a new machine  Feels leak on mask, using tape to hold it together  Using [facility] but wants to switch to [facility]  Patient states improved symptoms with use of CPAP. Sleeping more soundly. Waking up feeling more refreshed. Improved daytime sleepiness.  Patient is compliant with CPAP use      EPWORTH SLEEPINESS SCALE 1/23/2023   Sitting and reading 0   Watching TV 0   Sitting, inactive in a public place (e.g. a theatre or a meeting) 0   As a passenger in a car for an hour without a break 0   Lying down to rest in the afternoon when circumstances permit 0   Sitting and talking to someone 0   Sitting quietly  after a lunch without alcohol 0   In a car, while stopped for a few minutes in traffic 0   Total score 0     Asthma Control Test  In the past 4  weeks, how much of the time did your asthma keep you from getting as much done at work, school or at home?: None of the time  During the past 4 weeks, how often have you had shortness of breath?: Once a day  During the past 4 weeks, how often did your asthma symptoms (wheezing, couging, shortness of breath, chest tightness or pain) wake you up at night or earlier than usual in the morning?: Once or twice  During the past 4 weeks, how often have you used your rescue inhaler or nebulizer medication (such as albuterol)?: 2 or 3 times a week  How would you rate your asthma control during the past 4 weeks?: Well controlled  If your score is 19 or less, your asthma may not be under control: 18     Immunization History   Administered Date(s) Administered    COVID-19, MRNA, LN-S, PF (Pfizer) (Purple Cap) 01/29/2021, 02/25/2021, 11/03/2021    Influenza (FLUAD) - Quadrivalent - Adjuvanted - PF *Preferred* (65+) 12/04/2020, 09/27/2022    Influenza - Quadrivalent - High Dose - PF (65 years and older) 11/03/2021    Influenza - Quadrivalent - PF *Preferred* (6 months and older) 02/01/2013, 10/14/2013, 10/17/2016    Influenza - Trivalent (ADULT) 02/01/2013, 10/14/2013    Influenza Split 02/01/2013, 10/14/2013    Pneumococcal Conjugate - 13 Valent 09/19/2018    Pneumococcal Polysaccharide - 23 Valent 02/01/2013, 06/04/2015, 09/16/2019    Td (ADULT) 11/19/2013, 11/19/2013    Td (Adult), Unspecified Formulation 11/19/2013    Zoster 05/14/2014    Zoster Recombinant 09/21/2021, 07/28/2022      Tobacco Use: Low Risk     Smoking Tobacco Use: Never    Smokeless Tobacco Use: Never    Passive Exposure: Not on file      Past Medical History:   Diagnosis Date    Asthma     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       albuterol (PROVENTIL/VENTOLIN HFA) 90 mcg/actuation inhaler Inhale 1 puff into the lungs once daily. Rescue 18 g 4    amiodarone (PACERONE) 100 MG Tab TAKE 1 AND 1/2 TABLETS BY MOUTH ONCE DAILY 45 tablet 0    amiodarone (PACERONE) 200 MG Tab Take 1 tablet (200 mg total) by mouth 2 (two) times daily. 1 tab po bid for 6 weeks then 1.5 tb a day (total dose = 300 mg) 180 tablet 3    carvediloL (COREG) 25 MG tablet Take 1 tablet (25 mg total) by mouth 2 (two) times daily. 180 tablet 4    co-enzyme Q-10 30 mg capsule Take 30 mg by mouth once daily.       FARXIGA 10 mg tablet TAKE 1 TABLET BY MOUTH ONCE DAILY 90 tablet 3    ferrous sulfate (FEOSOL) 325 mg (65 mg iron) Tab tablet Take 325 mg by mouth daily with breakfast.      fluticasone furoate (ARNUITY ELLIPTA) 100 mcg/actuation inhaler Inhale 1 puff into the lungs once daily. 30 each 5    furosemide (LASIX) 40 MG tablet TAKE 1 TABLET BY MOUTH ONCE DAILY 90 tablet 0    glucosamine-chondroitin 500-400 mg tablet Take 1 tablet by mouth once daily.       INV apixaban/placebo tablet Take 1 tablets (5 mg) by mouth 2 (two) times daily. FOR INVESTIGATIONAL USE ONLY. Protocol: [place] 2017.307 subject # : [id number] 400 tablet 0    INV aspirin/placebo tablet Take 1 tablet (81 mg total) by mouth once daily. FOR INVESTIGATIONAL USE ONLY. Patient Study #: [id number] Protocol: [place] 2017.307 200 tablet 0    mexiletine (MEXITIL) 150 MG Cap Take 2 capsules (300 mg total) by mouth 3 (three) times daily. 60 capsule 11    multivitamin (THERAGRAN) tablet Take 1 tablet by mouth once daily.      pravastatin (PRAVACHOL) 80 MG tablet TAKE 1 TABLET BY MOUTH  "ONCE DAILY 30 tablet 11    RESTASIS 0.05 % ophthalmic emulsion Place 1 drop into both eyes 2 (two) times daily.      semaglutide (OZEMPIC) 1 mg/dose (2 mg/1.5 mL) PnIj Inject 1 mg into the skin every 7 days. 1 pen 2    spironolactone (ALDACTONE) 25 MG tablet TAKE 1 TABLET BY MOUTH TWICE DAILY 180 tablet 0    traZODone (DESYREL) 150 MG tablet Take 1 tablet by mouth every evening. 90 tablet 1    bepotastine besilate (BEPREVE) 1.5 % Drop Bepreve 1.5 % eye drops   Apply by ophthalmic route as needed for 50 days.      [DISCONTINUED] ENTRESTO 49-51 mg per tablet TAKE 1 TABLET BY MOUTH TWICE DAILY 60 tablet 0     No current facility-administered medications on file prior to visit.        Review of Systems   Constitutional:  Negative for fever, weight loss, appetite change, fatigue and weakness.   HENT:  Negative for postnasal drip, rhinorrhea, sinus pressure, trouble swallowing and congestion.    Respiratory:  Negative for cough, sputum production, choking, chest tightness, shortness of breath, wheezing, dyspnea on extertion and use of rescue inhaler.    Cardiovascular:  Negative for chest pain and leg swelling.   Musculoskeletal:  Negative for arthralgias, gait problem and joint swelling.   Gastrointestinal:  Negative for nausea, vomiting and abdominal pain.   Neurological:  Negative for dizziness, weakness and headaches.   All other systems reviewed and are negative.    Objective:       Vitals:    01/23/23 1027   BP: 115/70   Pulse: 80   Resp: 18   SpO2: 96%   Weight: 115.8 kg (255 lb 4.7 oz)   Height: 5' 10" (1.778 m)         Physical Exam   Constitutional: He is oriented to person, place, and time. He appears well-developed and well-nourished. No distress. He is obese.   HENT:   Head: Normocephalic.   Mouth/Throat: Oropharynx is clear and moist.   Cardiovascular: Normal rate and regular rhythm.   Pulmonary/Chest: Effort normal and breath sounds normal. No respiratory distress. He has no wheezes. He has no rhonchi. He " has no rales.   Musculoskeletal:         General: No edema.      Cervical back: Normal range of motion and neck supple.   Neurological: He is alert and oriented to person, place, and time. Gait normal.   Skin: Skin is warm and dry.   Psychiatric: He has a normal mood and affect.   Vitals reviewed.  Personal Diagnostic Review    1/23/23  Oxygen Assessment Supplemental O2 Heart   Rate Blood Pressure Glenny Dyspnea Scale Rating    Resting 96 % Room Air 80 bpm 115/70 4   Exercise             Minute             1 94 % Room Air 89 bpm       2 95 % Room Air 89 bpm       3 95 % Room Air 89 bpm       4 96 % Room Air 89 bpm       5 96 % Room Air 89 bpm       6  96 % Room Air 96 bpm 129/65 5-6   Recovery             Minute             1 96 % Room Air 96 bpm       2 96 % Room Air 82 bpm       3 96 % Room Air 82 bpm       4 96 % Room Air 82 bpm 111/76 3      Six Minute Walk Summary  6MWT Status: completed without stopping  Patient Reported: Dyspnea         Interpretation:  Did the patient stop or pause?: No  Total Time Walked (Calculated): 360 seconds  Final Partial Lap Distance (feet): 25 feet  Total Distance Meters (Calculated): 373.38 meters  Predicted Distance Meters (Calculated): 476.81 meters  Percentage of Predicted (Calculated): 78.31  Peak VO2 (Calculated): 15.18  Mets: 4.34  Has The Patient Had a Previous Six Minute Walk Test?: No     Previous 6MWT Results  Has The Patient Had a Previous Six Minute Walk Test?: No    1/23/23 1/23/23     FeNO  (ppb) LOW INTERMEDIATE HIGH   ADULT VALUES < 25 25-50          > 50   Th2-driven Inflammation Unlikely Likely Significant      Patients FeNO level at this visit : _22___ (ppb)    Alfredo, 6mwd, and FeNo personally reviewed, normal results.      Assessment/Plan:       Problem List Items Addressed This Visit          Pulmonary    Mild persistent asthma without complication     Controlled on Arnuity 200  ACT score 18            Cardiac/Vascular    Hypertension associated with diabetes  (Chronic)     Controlled, F/u regularly with PCP           PAF (paroxysmal atrial fibrillation) (Chronic)     F/u regularly with cardiology           Chronic combined systolic and diastolic CHF (congestive heart failure) (Chronic)     F/u regularly with cardiology      Echo Saline Bubble? No    Interpretation Summary  · The left ventricle is moderately enlarged with concentric hypertrophy and moderately decreased systolic function.  · Mild left atrial enlargement.  · The estimated ejection fraction is 30%.  · Grade I left ventricular diastolic dysfunction.  · There is moderate left ventricular global hypokinesis.  · Normal right ventricular size with normal right ventricular systolic function.  · Mild mitral regurgitation.              Endocrine    Type 2 diabetes mellitus with hyperglycemia, without long-term current use of insulin (Chronic)     F/u regularly with PCP           Class 2 severe obesity due to excess calories with serious comorbidity and body mass index (BMI) of 38.0 to 38.9 in adult - Primary     Weight loss and exercise to improve overall health.              Other    HONEY on CPAP     Compliant with CPAP  Benefits from use  Discussed therapeutic goals for CPAP: Ideal usage 100% of nights for 6-8 hours per night. Minimum usage is 70% of night for at least 4 hours per night.  Will need new supply order in 6 months            Follow up in about 6 months (around 7/23/2023) for HONEY follow up.    Discussed diagnosis, its evaluation, treatment and usual course. All questions answered.    Patient verbalized understanding of plan and left in no acute distress    Thank you for the courtesy of participating in the care of this patient    Elisabeth Freire PA-C

## 2023-01-23 NOTE — PROCEDURES
"[facility] - Pulmonary Function  Six Minute Walk     SUMMARY     Ordering Provider: ALLISON Freire   Interpreting Provider:   Performing nurse/tech/RT: GEORGE Short RRT  Diagnosis:  (Mild Persistent Asthma without Complication)  Height: 5' 10" (177.8 cm)  Weight: 115.8 kg (255 lb 2.9 oz)  BMI (Calculated): 36.6   Patient Race:             Phase Oxygen Assessment Supplemental O2 Heart   Rate Blood Pressure [place]enny Dyspnea Scale Rating   Resting 96 % Room Air 80 bpm 115/70 4   Exercise        Minute        1 94 % Room Air 89 bpm     2 95 % Room Air 89 bpm     3 95 % Room Air 89 bpm     4 96 % Room Air 89 bpm     5 96 % Room Air 89 bpm     6  96 % Room Air 96 bpm 129/65 5-6   Recovery        Minute        1 96 % Room Air 96 bpm     2 96 % Room Air 82 bpm     3 96 % Room Air 82 bpm     4 96 % Room Air 82 bpm 111/76 3     Six Minute Walk Summary  6MWT Status: completed without stopping  Patient Reported: Dyspnea     Interpretation:  Did the patient stop or pause?: No                                         Total Time Walked (Calculated): 360 seconds  Final Partial Lap Distance (feet): 25 feet  Total Distance Meters (Calculated): 373.38 meters  Predicted Distance Meters (Calculated): 476.81 meters  Percentage of Predicted (Calculated): 78.31  Peak VO2 (Calculated): 15.18  Mets: 4.34  Has The Patient Had a Previous Six Minute Walk Test?: No       Previous 6MWT Results  Has The Patient Had a Previous Six Minute Walk Test?: No    "

## 2023-01-23 NOTE — ASSESSMENT & PLAN NOTE
Compliant with CPAP  Benefits from use  Discussed therapeutic goals for CPAP: Ideal usage 100% of nights for 6-8 hours per night. Minimum usage is 70% of night for at least 4 hours per night.  Will need new supply order in 6 months

## 2023-01-30 ENCOUNTER — TELEPHONE (OUTPATIENT)
Dept: PREADMISSION TESTING | Facility: HOSPITAL | Age: 73
End: 2023-01-30
Payer: MEDICARE

## 2023-01-31 ENCOUNTER — PATIENT MESSAGE (OUTPATIENT)
Dept: CARDIOLOGY | Facility: CLINIC | Age: 73
End: 2023-01-31
Payer: MEDICARE

## 2023-02-03 ENCOUNTER — LAB VISIT (OUTPATIENT)
Dept: LAB | Facility: HOSPITAL | Age: 73
End: 2023-02-03
Attending: INTERNAL MEDICINE
Payer: MEDICARE

## 2023-02-03 DIAGNOSIS — I51.89 DIASTOLIC DYSFUNCTION: ICD-10-CM

## 2023-02-03 LAB — BNP SERPL-MCNC: <10 PG/ML (ref 0–99)

## 2023-02-03 PROCEDURE — 83880 ASSAY OF NATRIURETIC PEPTIDE: CPT | Performed by: INTERNAL MEDICINE

## 2023-02-03 PROCEDURE — 36415 COLL VENOUS BLD VENIPUNCTURE: CPT | Mod: PO | Performed by: INTERNAL MEDICINE

## 2023-02-04 NOTE — PROGRESS NOTES
Reviewed results. All OK. Please open telephone encounter, call patient and inform him/ her of results,then document in encounter.  Please do not route back to me.   Thanks.  BNP is now <10 - this is excellent  Keep same

## 2023-02-07 ENCOUNTER — PATIENT MESSAGE (OUTPATIENT)
Dept: CARDIOLOGY | Facility: CLINIC | Age: 73
End: 2023-02-07
Payer: MEDICARE

## 2023-02-07 NOTE — PROGRESS NOTES
11/28/2022     Study: [place] 2017.307  PI: Nirav Baldwin  Visit: 54-month                 Patient seen by Dr. Baldwin for 54-month follow-up (please see his clinic note for full details)  Patient issued study drug in October (see [facility] note dated 10/19/2022).  EMR reviewed, no protocol related adverse events or hospitalizations.

## 2023-02-09 DIAGNOSIS — I47.29 PAROXYSMAL VT: ICD-10-CM

## 2023-02-09 DIAGNOSIS — I49.01 VF (VENTRICULAR FIBRILLATION): ICD-10-CM

## 2023-02-09 DIAGNOSIS — I50.42 CHRONIC COMBINED SYSTOLIC AND DIASTOLIC CHF (CONGESTIVE HEART FAILURE): ICD-10-CM

## 2023-02-09 DIAGNOSIS — Z45.02 ICD (IMPLANTABLE CARDIOVERTER-DEFIBRILLATOR) DISCHARGE: Primary | ICD-10-CM

## 2023-02-12 DIAGNOSIS — Z79.899 ENCOUNTER FOR LONG-TERM (CURRENT) USE OF MEDICATIONS: ICD-10-CM

## 2023-02-12 NOTE — TELEPHONE ENCOUNTER
No new care gaps identified.  [facility] Embedded Care Gaps. Reference number: [id number]. 2/12/2023   1:33:06 AM CST

## 2023-02-13 RX ORDER — TRAZODONE HYDROCHLORIDE 150 MG/1
TABLET ORAL
Qty: 90 TABLET | Refills: 0 | Status: SHIPPED | OUTPATIENT
Start: 2023-02-13 | End: 2023-05-08 | Stop reason: SDUPTHER

## 2023-02-14 ENCOUNTER — TELEPHONE (OUTPATIENT)
Dept: PREADMISSION TESTING | Facility: HOSPITAL | Age: 73
End: 2023-02-14
Payer: MEDICARE

## 2023-02-18 ENCOUNTER — CLINICAL SUPPORT (OUTPATIENT)
Dept: CARDIOLOGY | Facility: HOSPITAL | Age: 73
End: 2023-02-18
Payer: MEDICARE

## 2023-02-18 DIAGNOSIS — Z95.810 PRESENCE OF AUTOMATIC (IMPLANTABLE) CARDIAC DEFIBRILLATOR: ICD-10-CM

## 2023-02-20 ENCOUNTER — LAB VISIT (OUTPATIENT)
Dept: LAB | Facility: HOSPITAL | Age: 73
End: 2023-02-20
Attending: INTERNAL MEDICINE
Payer: MEDICARE

## 2023-02-20 DIAGNOSIS — I47.29 PAROXYSMAL VT: ICD-10-CM

## 2023-02-20 LAB — MTX SERPL-SCNC: <0.04 UMOL/L (ref 0.5–5)

## 2023-02-20 PROCEDURE — 80151 DRUG ASSAY AMIODARONE: CPT | Performed by: INTERNAL MEDICINE

## 2023-02-20 PROCEDURE — 80204 DRUG ASSAY METHOTREXATE: CPT | Performed by: INTERNAL MEDICINE

## 2023-02-20 PROCEDURE — 36415 COLL VENOUS BLD VENIPUNCTURE: CPT | Mod: PO | Performed by: INTERNAL MEDICINE

## 2023-02-21 ENCOUNTER — TELEPHONE (OUTPATIENT)
Dept: CARDIOLOGY | Facility: CLINIC | Age: 73
End: 2023-02-21
Payer: MEDICARE

## 2023-02-21 ENCOUNTER — PATIENT MESSAGE (OUTPATIENT)
Dept: CARDIOLOGY | Facility: CLINIC | Age: 73
End: 2023-02-21
Payer: MEDICARE

## 2023-02-21 DIAGNOSIS — Z95.810 PRESENCE OF AUTOMATIC (IMPLANTABLE) CARDIAC DEFIBRILLATOR: ICD-10-CM

## 2023-02-21 DIAGNOSIS — I50.42 CHRONIC COMBINED SYSTOLIC AND DIASTOLIC CHF (CONGESTIVE HEART FAILURE): ICD-10-CM

## 2023-02-21 DIAGNOSIS — I47.29 PAROXYSMAL VT: Primary | ICD-10-CM

## 2023-02-21 RX ORDER — SACUBITRIL AND VALSARTAN 49; 51 MG/1; MG/1
TABLET, FILM COATED ORAL
Qty: 60 TABLET | Refills: 0 | Status: SHIPPED | OUTPATIENT
Start: 2023-02-21 | End: 2023-03-17

## 2023-02-21 NOTE — PROGRESS NOTES
See comments below and call patient to discuss.   Please close encounter when done -- no need to route back to me.  Thanks  What is going on with these Mexitil levels this is yet 1 more time were we get the methotrexate level instead.  The cost of this test should be avoided. please repeat blood sampling for a Mexitil level (trough).

## 2023-02-23 DIAGNOSIS — I49.01 VF (VENTRICULAR FIBRILLATION): ICD-10-CM

## 2023-02-23 DIAGNOSIS — I48.0 PAF (PAROXYSMAL ATRIAL FIBRILLATION): ICD-10-CM

## 2023-02-23 DIAGNOSIS — Z45.02 ICD (IMPLANTABLE CARDIOVERTER-DEFIBRILLATOR) DISCHARGE: ICD-10-CM

## 2023-02-23 DIAGNOSIS — I47.29 PAROXYSMAL VT: Primary | ICD-10-CM

## 2023-02-23 DIAGNOSIS — I50.42 CHRONIC COMBINED SYSTOLIC AND DIASTOLIC CHF (CONGESTIVE HEART FAILURE): ICD-10-CM

## 2023-02-24 ENCOUNTER — LAB VISIT (OUTPATIENT)
Dept: LAB | Facility: HOSPITAL | Age: 73
End: 2023-02-24
Attending: INTERNAL MEDICINE
Payer: MEDICARE

## 2023-02-24 DIAGNOSIS — I47.29 PAROXYSMAL VT: ICD-10-CM

## 2023-02-24 DIAGNOSIS — I50.42 CHRONIC COMBINED SYSTOLIC AND DIASTOLIC CHF (CONGESTIVE HEART FAILURE): ICD-10-CM

## 2023-02-24 LAB
AMIODARONE SERPL-SCNC: 1.2 UG/ML (ref 1–3)
N-DESETHYL-AMIODARONE: 0.7 UG/ML

## 2023-02-24 PROCEDURE — 80299 QUANTITATIVE ASSAY DRUG: CPT | Performed by: INTERNAL MEDICINE

## 2023-02-24 PROCEDURE — 36415 COLL VENOUS BLD VENIPUNCTURE: CPT | Mod: PO | Performed by: INTERNAL MEDICINE

## 2023-02-25 NOTE — PROGRESS NOTES
See comments below and call patient to discuss.   Please close encounter when done -- no need to route back to me.  Thanks  Amio level is Rxc at 1.2/0.7  Dose is not clear from Med Card - please call him and make the appropriate adjustments to the card - keep same dose as he is taking now

## 2023-02-27 ENCOUNTER — HOSPITAL ENCOUNTER (OUTPATIENT)
Dept: CARDIOLOGY | Facility: HOSPITAL | Age: 73
Discharge: HOME | End: 2023-02-27
Attending: INTERNAL MEDICINE
Payer: MEDICARE

## 2023-02-27 ENCOUNTER — PATIENT MESSAGE (OUTPATIENT)
Dept: CARDIOLOGY | Facility: CLINIC | Age: 73
End: 2023-02-27

## 2023-02-27 ENCOUNTER — OFFICE VISIT (OUTPATIENT)
Dept: CARDIOLOGY | Facility: CLINIC | Age: 73
End: 2023-02-27
Payer: MEDICARE

## 2023-02-27 ENCOUNTER — TELEPHONE (OUTPATIENT)
Dept: CARDIOLOGY | Facility: CLINIC | Age: 73
End: 2023-02-27
Payer: MEDICARE

## 2023-02-27 VITALS
DIASTOLIC BLOOD PRESSURE: 74 MMHG | HEIGHT: 70 IN | WEIGHT: 248 LBS | SYSTOLIC BLOOD PRESSURE: 110 MMHG | BODY MASS INDEX: 35.5 KG/M2 | OXYGEN SATURATION: 95 % | HEART RATE: 76 BPM

## 2023-02-27 DIAGNOSIS — I50.42 CHRONIC COMBINED SYSTOLIC AND DIASTOLIC CHF (CONGESTIVE HEART FAILURE): ICD-10-CM

## 2023-02-27 DIAGNOSIS — D50.9 IRON DEFICIENCY ANEMIA, UNSPECIFIED IRON DEFICIENCY ANEMIA TYPE: ICD-10-CM

## 2023-02-27 DIAGNOSIS — I47.29 PAROXYSMAL VT: ICD-10-CM

## 2023-02-27 DIAGNOSIS — E66.01 CLASS 2 SEVERE OBESITY DUE TO EXCESS CALORIES WITH SERIOUS COMORBIDITY AND BODY MASS INDEX (BMI) OF 38.0 TO 38.9 IN ADULT: ICD-10-CM

## 2023-02-27 DIAGNOSIS — I49.01 VF (VENTRICULAR FIBRILLATION): ICD-10-CM

## 2023-02-27 DIAGNOSIS — Z79.899 AT RISK FOR AMIODARONE TOXICITY WITH LONG TERM USE: Chronic | ICD-10-CM

## 2023-02-27 DIAGNOSIS — Z45.02 ICD (IMPLANTABLE CARDIOVERTER-DEFIBRILLATOR) DISCHARGE: ICD-10-CM

## 2023-02-27 DIAGNOSIS — I48.0 PAF (PAROXYSMAL ATRIAL FIBRILLATION): ICD-10-CM

## 2023-02-27 DIAGNOSIS — I50.42 CHRONIC COMBINED SYSTOLIC AND DIASTOLIC CHF (CONGESTIVE HEART FAILURE): Primary | Chronic | ICD-10-CM

## 2023-02-27 DIAGNOSIS — I42.9 IDIOPATHIC CARDIOMYOPATHY: ICD-10-CM

## 2023-02-27 DIAGNOSIS — E11.59 HYPERTENSION ASSOCIATED WITH DIABETES: Chronic | ICD-10-CM

## 2023-02-27 DIAGNOSIS — I51.89 DIASTOLIC DYSFUNCTION: Chronic | ICD-10-CM

## 2023-02-27 DIAGNOSIS — I15.2 HYPERTENSION ASSOCIATED WITH DIABETES: Chronic | ICD-10-CM

## 2023-02-27 DIAGNOSIS — G47.33 OSA ON CPAP: ICD-10-CM

## 2023-02-27 DIAGNOSIS — Z91.89 AT RISK FOR AMIODARONE TOXICITY WITH LONG TERM USE: Chronic | ICD-10-CM

## 2023-02-27 LAB — MEXILETINE TROUGH SERPL-MCNC: 0.4 MCG/ML

## 2023-02-27 PROCEDURE — 93010 ELECTROCARDIOGRAM REPORT: CPT | Mod: ,,, | Performed by: INTERNAL MEDICINE

## 2023-02-27 PROCEDURE — 93010 EKG 12-LEAD: ICD-10-PCS | Mod: ,,, | Performed by: INTERNAL MEDICINE

## 2023-02-27 PROCEDURE — 99215 OFFICE O/P EST HI 40 MIN: CPT | Mod: PBBFAC | Performed by: INTERNAL MEDICINE

## 2023-02-27 PROCEDURE — 93283 PRGRMG EVAL IMPLANTABLE DFB: CPT | Mod: 26,,, | Performed by: INTERNAL MEDICINE

## 2023-02-27 PROCEDURE — 99215 PR OFFICE/OUTPT VISIT, EST, LEVL V, 40-54 MIN: ICD-10-PCS | Mod: S$PBB,25,, | Performed by: INTERNAL MEDICINE

## 2023-02-27 PROCEDURE — 93005 ELECTROCARDIOGRAM TRACING: CPT

## 2023-02-27 PROCEDURE — 99999 PR PBB SHADOW E&M-EST. PATIENT-LVL V: ICD-10-PCS | Mod: PBBFAC,,, | Performed by: INTERNAL MEDICINE

## 2023-02-27 PROCEDURE — 93283 PRGRMG EVAL IMPLANTABLE DFB: CPT

## 2023-02-27 PROCEDURE — 99215 OFFICE O/P EST HI 40 MIN: CPT | Mod: S$PBB,25,, | Performed by: INTERNAL MEDICINE

## 2023-02-27 PROCEDURE — 99999 PR PBB SHADOW E&M-EST. PATIENT-LVL V: CPT | Mod: PBBFAC,,, | Performed by: INTERNAL MEDICINE

## 2023-02-27 PROCEDURE — 93283 CARDIAC DEVICE CHECK - IN CLINIC & HOSPITAL: ICD-10-PCS | Mod: 26,,, | Performed by: INTERNAL MEDICINE

## 2023-02-27 RX ORDER — AMIODARONE HYDROCHLORIDE 200 MG/1
300 TABLET ORAL DAILY
Qty: 135 TABLET | Refills: 3
Start: 2023-02-27 | End: 2023-12-15

## 2023-02-27 NOTE — PROGRESS NOTES
Subjective:   Patient ID:  Randy Yap is a 73 y.o. male     Chief complaint:No chief complaint on file.      HPI  72 year old male who presents to Arrhythmia clinic for 6 month follow up with device check. His current medical conditions include CHF, HFrEF of 30%, PVT on Amiodarone,HTN, HLP, Obesity, HONEY on CPAP  See granular details in my note from 10/1/21     Update 8/8/2022:  Had echo in Sept 2021:  >>  The left ventricle is moderately enlarged with concentric hypertrophy and moderately decreased systolic function.  Mild left atrial enlargement.  The estimated ejection fraction is 30%.  Grade I left ventricular diastolic dysfunction.  There is moderate left ventricular global hypokinesis.  Normal right ventricular size with normal right ventricular systolic function.  Mild mitral regurgitation.      Also, I had his echo reviewed by Dr EDWARDS and had a quantitative EF calculated - it was 38% - which was in keeping with Dr EDWARDS's qualitative estimation  I have reviewed the actual image of the ECG tracing obtained today and it shows NSR with normal intervals and occ PVC.     ICD eval today:  Device and leads implanted 10/6/2014   [employer] ICD Fortify [place] KHALIL 2357-40Q, SN: [id number]   A lead: Bennett Tendril STS 2088TC / 46cm, SN: [id number]   V lead: Bennett Durata 7120Q / 58 cm, SN: [id number]     All in good repair     Last BNP was 55 (10/1/21)     Clinically, he feels as well as he has ever had.  His CHF medications at all at maximized doses.  He has rare and mild lightheadedness.  Due to the extreme eat, has backed off on some of his outdoor exercise but he keeps quite active.  He has some family stress now due to the recent passing of his mother in law and the Alzheimer's of  his sister-in-law    Update 11/28/2022:  He has had issues with recurrent VF and shocks.  The most recent episode occurred on 10/22/2022.  Since then we realized that he was not taking enough amiodarone as the blood level was less than 0.2.   After reviewing drug dosing with him it appeared that he was using the 100 mg tablets in do the 200.  Accordingly, and for a week now, we have increased his amiodarone which to 300 p.o. b.i.d. and will keep this for a total of 6 weeks.    Prior to increasing the amiodarone, he was having repetitive episodes of severe dizziness/near-syncope.  These were associated with palpitations and runs of monomorphic ventricular tachycardia that eventually would either be ATP terminated or terminate spontaneously as they were deliberately left untreated in the monitoring zone.  He now has had no such clinical events for a week or so.    Variation of his ICD diagnostics, suggest that with the addition of amiodarone, he may have now VT that are on detected because they are below the monitoring zone of 160.  Otherwise, now that he does not have the near syncopal events, he feels well.  His wife is wondering whether Ozempic has something to do with his VT VF as was started approximately a month prior to the VF episode.    >>  Change TDI to 123 for the monitoring zone, 160 for the VT 1 zone and 222 for the VF zone.  Otherwise, keep therapies unchanged.  Continue on 600 mg a day of amiodarone until December 31st as of January 1st decrease to 300 mg a day.  Get amiodarone level in 3 months.    Update 03/05/2023 :  Since the last visit, we have had multiple communications via the portal.  Is currently on amiodarone 300 mg per day.  He has not had new ICD shocks.    He has been losing weight (Ozempic).  He is down by 24 lb and currently weighs 248 lb.  This has all happened over the past 2 months.    He was started on dapagliflozin.  Initially he did not want to take it due to cost issues.  This has been resolved.   He feels reasonably well.  He can walk for 2 to 300 ft without issues.  He can now go up the stairs at home without stopping.  These are 16 steps.  He has had a lightheaded spell yesterday.  This was very fast.  ICD  evaluation shows a 2nd VT at the time of the complaint.  ICD evaluation was performed today.  All is in good repair.  The battery life is expected to be about 1.5 years.    Current Outpatient Medications   Medication Sig    acetaminophen (TYLENOL) 325 MG tablet Take 650 mg by mouth as needed.     albuterol (PROVENTIL/VENTOLIN HFA) 90 mcg/actuation inhaler Inhale 1 puff into the lungs once daily. Rescue    bepotastine besilate (BEPREVE) 1.5 % Drop Bepreve 1.5 % eye drops   Apply by ophthalmic route as needed for 50 days.    carvediloL (COREG) 25 MG tablet Take 1 tablet (25 mg total) by mouth 2 (two) times daily.    co-enzyme Q-10 30 mg capsule Take 30 mg by mouth once daily.     dapagliflozin (FARXIGA) 10 mg tablet TAKE 1 TABLET BY MOUTH ONCE DAILY    ENTRESTO 49-51 mg per tablet TAKE 1 TABLET BY MOUTH TWICE DAILY    ferrous sulfate (FEOSOL) 325 mg (65 mg iron) Tab tablet Take 325 mg by mouth daily with breakfast.    fluticasone furoate (ARNUITY ELLIPTA) 100 mcg/actuation inhaler Inhale 1 puff into the lungs once daily.    furosemide (LASIX) 40 MG tablet TAKE 1 TABLET BY MOUTH ONCE DAILY    glucosamine-chondroitin 500-400 mg tablet Take 1 tablet by mouth once daily.     INV apixaban/placebo tablet Take 1 tablets (5 mg) by mouth 2 (two) times daily. FOR INVESTIGATIONAL USE ONLY. Protocol: Leia 2017.307 subject # : [id number]    INV aspirin/placebo tablet Take 1 tablet (81 mg total) by mouth once daily. FOR INVESTIGATIONAL USE ONLY. Patient Study #: [id number] Protocol: Leia 2017.307    mexiletine (MEXITIL) 150 MG Cap Take 2 capsules (300 mg total) by mouth 3 (three) times daily.    multivitamin (THERAGRAN) tablet Take 1 tablet by mouth once daily.    pravastatin (PRAVACHOL) 80 MG tablet TAKE 1 TABLET BY MOUTH ONCE DAILY    RESTASIS 0.05 % ophthalmic emulsion Place 1 drop into both eyes 2 (two) times daily.    semaglutide (OZEMPIC) 1 mg/dose (2 mg/1.5 mL) PnIj Inject 1 mg into the skin every 7 days.    spironolactone  (ALDACTONE) 25 MG tablet TAKE 1 TABLET BY MOUTH TWICE DAILY    traZODone (DESYREL) 150 MG tablet TAKE 1 TABLET BY MOUTH EVERY EVENING    amiodarone (PACERONE) 200 MG Tab Take 1.5 tablets (300 mg total) by mouth once daily. 1 tab po bid for 6 weeks then 1.5 tb a day (total dose = 300 mg)     No current facility-administered medications for this visit.       Review of Systems     Constitutional: Reviewed  for decreased appetite, weight gain and weight loss.   HENT: Reviewed for nosebleeds.    Eyes:  Reviewed for blurred vision and visual disturbance.   Cardiovascular: Reviewed for chest pain, claudication, cyanosis,dyspnea on exertion, leg swelling, orthopnea,paroxysmal nocturnal dyspnearregular heartbeats, palpitations, near-syncope, and syncope.   Respiratory: Reviewed for cough, shortness of breath, wheezing, sleep disturbances due to breathing and snoring, .    Endocrine: Reviewed for heat intolerance.   Hematologic/Lymphatic: Reviewed for easy bruisability/bleeding.   Skin: Reviewed for rash.   Musculoskeletal: Reviewed for muscle weakness and myalgias.   Gastrointestinal: Reviewed for abdominal pain, anorexia, melena, nausea and vomiting.   Genitourinary: Reviewed for hesitancy, frequency, nocturia and incontinence.   Neurological: Reviewed for excessive daytime sleepiness, dizziness, vertigo, weakness, headaches, loss of balance and seizures,   Psychiatric/Behavioral:  Reviewed for insomnia, altered mental status, depression, anxiety and nervousness.       All symptoms reviewed above were negative except for some dyspnea on exertion, constipation, nocturia, occasional lightheadedness.  He also endorses a feeling of depression.     Social History     Tobacco Use   Smoking Status Never   Smokeless Tobacco Never        Objective:     Physical Exam  Vitals and nursing note reviewed.   Constitutional:       Appearance: Normal appearance. He is well-developed.      Comments: overweight   HENT:      Head:  Normocephalic and atraumatic.      Right Ear: External ear normal.      Left Ear: External ear normal.   Eyes:      Conjunctiva/sclera: Conjunctivae normal.      Left eye: Left conjunctiva is not injected. No hemorrhage.     Pupils: Pupils are equal, round, and reactive to light.   Neck:      Thyroid: No thyromegaly.      Vascular: No JVD.   Cardiovascular:      Rate and Rhythm: Normal rate and regular rhythm.      Chest Wall: PMI is not displaced.      Pulses: Intact distal pulses.           Carotid pulses are 2+ on the right side and 2+ on the left side.       Radial pulses are 2+ on the right side and 2+ on the left side.        Dorsalis pedis pulses are 2+ on the right side and 2+ on the left side.        Posterior tibial pulses are 2+ on the right side and 2+ on the left side.      Heart sounds: Normal heart sounds. No midsystolic click and no opening snap. No murmur heard.    No friction rub. No gallop.   Pulmonary:      Effort: Pulmonary effort is normal. No respiratory distress.      Breath sounds: Normal breath sounds. No wheezing or rales.   Chest:      Chest wall: No tenderness.      Comments: Device pocket is in excellent repair.  Abdominal:      Palpations: Abdomen is soft. Abdomen is not rigid. There is no hepatomegaly.      Tenderness: There is no abdominal tenderness. There is no guarding.      Comments: Obese abdomen   Musculoskeletal:         General: No tenderness. Normal range of motion.      Cervical back: Neck supple.      Right knee: No swelling.      Left knee: No swelling.      Right lower leg: No swelling.      Left lower leg: No swelling.      Right ankle: No swelling.      Left ankle: No swelling.      Right foot: No swelling.      Left foot: No swelling.   Skin:     General: Skin is warm and dry.      Coloration: Skin is not pale.      Findings: No rash.   Neurological:      General: No focal deficit present.      Mental Status: He is alert and oriented to person, place, and time.       "Cranial Nerves: No cranial nerve deficit.      Coordination: Coordination normal.      Deep Tendon Reflexes: Reflexes are normal and symmetric.   Psychiatric:         Mood and Affect: Mood normal.         Behavior: Behavior normal.     /74   Pulse 76   Ht 5' 10" (1.778 m)   Wt 112.5 kg (248 lb 0.3 oz)   SpO2 95%   BMI 35.59 kg/m²       Results for orders placed during the hospital encounter of 01/25/22    Echo Saline Bubble? No    Interpretation Summary  · The left ventricle is moderately enlarged with concentric hypertrophy and moderately decreased systolic function.  · Mild left atrial enlargement.  · The estimated ejection fraction is 30%.  · Grade I left ventricular diastolic dysfunction.  · There is moderate left ventricular global hypokinesis.  · Normal right ventricular size with normal right ventricular systolic function.  · Mild mitral regurgitation.    WBC   Date Value Ref Range Status   02/27/2023 7.80 3.90 - 12.70 K/uL Final     Hematocrit   Date Value Ref Range Status   02/27/2023 42.2 40.0 - 54.0 % Final     Hemoglobin   Date Value Ref Range Status   02/27/2023 13.7 (L) 14.0 - 18.0 g/dL Final     Lab Results   Component Value Date     02/27/2023     Lab Results   Component Value Date    CREATININE 1.0 02/27/2023    EGFRNORACEVR >60.0 02/27/2023    K 4.7 02/27/2023     Lab Results   Component Value Date    BNP <10 02/03/2023            reports current alcohol use.  Past Medical History:   Diagnosis Date    Asthma     Cardiomyopathy due to systemic disease     Colon polyp 5/2013    repeat 5/2018    Depression, recurrent     Diastolic dysfunction     DJD (degenerative joint disease) of knee 10/14/2013    Hyperlipidemia     Hypertension     Murmur     Paroxysmal VT 5/21/2016    Pericarditis with effusion     Sleep apnea      Past Surgical History:   Procedure Laterality Date    CARDIAC DEFIBRILLATOR PLACEMENT  10/06/2014    COLONOSCOPY W/ POLYPECTOMY  05/21/2013    repeat 5/21/2018    " COSMETIC SURGERY      EYE SURGERY  Cataract    HERNIA REPAIR      R inguinal    LEFT HEART CATHETERIZATION Left 07/09/2018    Procedure: CATHETERIZATION, HEART, LEFT;  Surgeon: Macey Dos Santos MD;  Location: [facility] CATH LAB;  Service: Cardiology;  Laterality: Left;  left radial approach  Has [employer] g[employer] ICD    pace maker   10/06/2014    PERICARDIAL WINDOW  12-15 years ago    tonsillectomy      TONSILLECTOMY      VARICOCELECTOMY       Family History   Problem Relation Age of Onset    Hyperlipidemia Mother     Arrhythmia Mother     Arthritis Mother     Asthma Mother     COPD Mother     Heart disease Father 48    Heart attack Father 48    Hypertension Father     Arthritis Father     Diabetes Father     Hypertension Maternal Grandmother     Hypertension Maternal Grandfather     Heart disease Paternal Grandmother     Hypertension Paternal Grandmother     Heart attack Paternal Grandmother     Stroke Paternal Grandmother     Heart disease Paternal Grandfather     Hypertension Paternal Grandfather     Heart attack Paternal Grandfather     Cancer Paternal Grandfather        Assessment:    Stable.  Current management seems to be effective.  1. Chronic combined systolic and diastolic CHF (congestive heart failure)    2. VF (ventricular fibrillation)    3. Paroxysmal VT    4. Idiopathic cardiomyopathy    5. ICD (implantable cardioverter-defibrillator) discharge    6. Hypertension associated with diabetes    7. Diastolic dysfunction    8. Iron deficiency anemia, unspecified iron deficiency anemia type    9. Class 2 severe obesity due to excess calories with serious comorbidity and body mass index (BMI) of 38.0 to 38.9 in adult    10. At risk for amiodarone toxicity with long term use    11. HONEY on CPAP        Plan:    Continue as is.  Orders Placed This Encounter   Procedures    CBC Auto Differential     Standing Status:   Future     Number of Occurrences:   1     Standing Expiration Date:   4/27/2024    COMPREHENSIVE METABOLIC PANEL      Standing Status:   Future     Number of Occurrences:   1     Standing Expiration Date:   4/27/2024    Echo     Standing Status:   Future     Standing Expiration Date:   2/27/2024     Order Specific Question:   Release to patient     Answer:   Immediate       Follow up in about 3 months (around 5/27/2023) for N May.    Medications Discontinued During This Encounter   Medication Reason    amiodarone (PACERONE) 100 MG Tab     amiodarone (PACERONE) 200 MG Tab        Medications Ordered This Encounter   Medications    amiodarone (PACERONE) 200 MG Tab     Sig: Take 1.5 tablets (300 mg total) by mouth once daily. 1 tab po bid for 6 weeks then 1.5 tb a day (total dose = 300 mg)     Dispense:  135 tablet     Refill:  3       Medication List with Changes/Refills   Current Medications    ACETAMINOPHEN (TYLENOL) 325 MG TABLET    Take 650 mg by mouth as needed.     ALBUTEROL (PROVENTIL/VENTOLIN HFA) 90 MCG/ACTUATION INHALER    Inhale 1 puff into the lungs once daily. Rescue    BEPOTASTINE BESILATE (BEPREVE) 1.5 % DROP    Bepreve 1.5 % eye drops   Apply by ophthalmic route as needed for 50 days.    CARVEDILOL (COREG) 25 MG TABLET    Take 1 tablet (25 mg total) by mouth 2 (two) times daily.    CO-ENZYME Q-10 30 MG CAPSULE    Take 30 mg by mouth once daily.     DAPAGLIFLOZIN (FARXIGA) 10 MG TABLET    TAKE 1 TABLET BY MOUTH ONCE DAILY    ENTRESTO 49-51 MG PER TABLET    TAKE 1 TABLET BY MOUTH TWICE DAILY    FERROUS SULFATE (FEOSOL) 325 MG (65 MG IRON) TAB TABLET    Take 325 mg by mouth daily with breakfast.    FLUTICASONE FUROATE (ARNUITY ELLIPTA) 100 MCG/ACTUATION INHALER    Inhale 1 puff into the lungs once daily.    FUROSEMIDE (LASIX) 40 MG TABLET    TAKE 1 TABLET BY MOUTH ONCE DAILY    GLUCOSAMINE-CHONDROITIN 500-400 MG TABLET    Take 1 tablet by mouth once daily.     INV APIXABAN/PLACEBO TABLET    Take 1 tablets (5 mg) by mouth 2 (two) times daily. FOR INVESTIGATIONAL USE ONLY. Protocol: Leia 2017.307 subject # : [id number]     INV ASPIRIN/PLACEBO TABLET    Take 1 tablet (81 mg total) by mouth once daily. FOR INVESTIGATIONAL USE ONLY. Patient Study #: [id number] Protocol: Leia 2017.307    MEXILETINE (MEXITIL) 150 MG CAP    Take 2 capsules (300 mg total) by mouth 3 (three) times daily.    MULTIVITAMIN (THERAGRAN) TABLET    Take 1 tablet by mouth once daily.    PRAVASTATIN (PRAVACHOL) 80 MG TABLET    TAKE 1 TABLET BY MOUTH ONCE DAILY    RESTASIS 0.05 % OPHTHALMIC EMULSION    Place 1 drop into both eyes 2 (two) times daily.    SEMAGLUTIDE (OZEMPIC) 1 MG/DOSE (2 MG/1.5 ML) PNIJ    Inject 1 mg into the skin every 7 days.    SPIRONOLACTONE (ALDACTONE) 25 MG TABLET    TAKE 1 TABLET BY MOUTH TWICE DAILY    TRAZODONE (DESYREL) 150 MG TABLET    TAKE 1 TABLET BY MOUTH EVERY EVENING   Changed and/or Refilled Medications    Modified Medication Previous Medication    AMIODARONE (PACERONE) 200 MG TAB amiodarone (PACERONE) 200 MG Tab       Take 1.5 tablets (300 mg total) by mouth once daily. 1 tab po bid for 6 weeks then 1.5 tb a day (total dose = 300 mg)    Take 1 tablet (200 mg total) by mouth 2 (two) times daily. 1 tab po bid for 6 weeks then 1.5 tb a day (total dose = 300 mg)   Discontinued Medications    AMIODARONE (PACERONE) 100 MG TAB    TAKE 1 AND 1/2 TABLETS BY MOUTH ONCE DAILY       This note is at least partially dictated using the M*Modal Fluency Direct word recognition program. There are word recognition mistakes that are occasionally missed on review.

## 2023-02-28 ENCOUNTER — PATIENT MESSAGE (OUTPATIENT)
Dept: CARDIOLOGY | Facility: CLINIC | Age: 73
End: 2023-02-28
Payer: MEDICARE

## 2023-03-01 ENCOUNTER — PATIENT MESSAGE (OUTPATIENT)
Dept: CARDIOLOGY | Facility: CLINIC | Age: 73
End: 2023-03-01
Payer: MEDICARE

## 2023-03-01 NOTE — PROGRESS NOTES
See comments below and call patient to discuss.   Please close encounter when done -- no need to route back to me.  Thanks  Mexitil level is low at 0.4.   As long as there are no frequent PVCs nor serious VT events we can keep dose as is - If not, then we would increase the dose to 450 Q 8 hrs   Jonna?   Tx

## 2023-03-07 ENCOUNTER — OFFICE VISIT (OUTPATIENT)
Dept: FAMILY MEDICINE | Facility: CLINIC | Age: 73
End: 2023-03-07
Payer: MEDICARE

## 2023-03-07 VITALS
TEMPERATURE: 98 F | BODY MASS INDEX: 35.5 KG/M2 | SYSTOLIC BLOOD PRESSURE: 111 MMHG | DIASTOLIC BLOOD PRESSURE: 65 MMHG | RESPIRATION RATE: 16 BRPM | WEIGHT: 248 LBS | HEART RATE: 79 BPM | HEIGHT: 70 IN

## 2023-03-07 DIAGNOSIS — R74.01 ELEVATED ALT MEASUREMENT: ICD-10-CM

## 2023-03-07 DIAGNOSIS — E78.5 HYPERLIPIDEMIA LDL GOAL <100: Chronic | ICD-10-CM

## 2023-03-07 DIAGNOSIS — D53.9 NUTRITIONAL ANEMIA: Primary | ICD-10-CM

## 2023-03-07 DIAGNOSIS — Z79.899 ENCOUNTER FOR LONG-TERM (CURRENT) USE OF MEDICATIONS: ICD-10-CM

## 2023-03-07 DIAGNOSIS — G47.00 INSOMNIA, UNSPECIFIED TYPE: ICD-10-CM

## 2023-03-07 DIAGNOSIS — E11.65 TYPE 2 DIABETES MELLITUS WITH HYPERGLYCEMIA, WITHOUT LONG-TERM CURRENT USE OF INSULIN: ICD-10-CM

## 2023-03-07 PROCEDURE — 99214 PR OFFICE/OUTPT VISIT, EST, LEVL IV, 30-39 MIN: ICD-10-PCS | Mod: S$PBB,,, | Performed by: FAMILY MEDICINE

## 2023-03-07 PROCEDURE — 99215 OFFICE O/P EST HI 40 MIN: CPT | Mod: PBBFAC,PO | Performed by: FAMILY MEDICINE

## 2023-03-07 PROCEDURE — 99999 PR PBB SHADOW E&M-EST. PATIENT-LVL V: ICD-10-PCS | Mod: PBBFAC,,, | Performed by: FAMILY MEDICINE

## 2023-03-07 PROCEDURE — 99999 PR PBB SHADOW E&M-EST. PATIENT-LVL V: CPT | Mod: PBBFAC,,, | Performed by: FAMILY MEDICINE

## 2023-03-07 PROCEDURE — 99214 OFFICE O/P EST MOD 30 MIN: CPT | Mod: S$PBB,,, | Performed by: FAMILY MEDICINE

## 2023-03-07 RX ORDER — SEMAGLUTIDE 1.34 MG/ML
1 INJECTION, SOLUTION SUBCUTANEOUS
COMMUNITY
Start: 2023-02-19 | End: 2023-03-07

## 2023-03-07 RX ORDER — PRAVASTATIN SODIUM 80 MG/1
80 TABLET ORAL DAILY
Qty: 90 TABLET | Refills: 4 | Status: SHIPPED | OUTPATIENT
Start: 2023-03-07

## 2023-03-07 RX ORDER — TRAZODONE HYDROCHLORIDE 300 MG/1
300 TABLET ORAL NIGHTLY
Qty: 30 TABLET | Refills: 0 | Status: SHIPPED | OUTPATIENT
Start: 2023-03-07 | End: 2023-04-10 | Stop reason: SDUPTHER

## 2023-03-07 RX ORDER — SACUBITRIL AND VALSARTAN 97; 103 MG/1; MG/1
1 TABLET, FILM COATED ORAL 2 TIMES DAILY
COMMUNITY
Start: 2022-12-21 | End: 2023-03-07

## 2023-03-07 NOTE — ASSESSMENT & PLAN NOTE
Patient is compliant with iron.  Patient with mild chronic anemia.  Check vitamin levels.  Anemia precautions.

## 2023-03-07 NOTE — PROGRESS NOTES
PLAN:      Problem List Items Addressed This Visit       Hyperlipidemia LDL goal <100 (Chronic)     Counseled on hyperlipidemia disease course, healthy diet and increased need for exercise.  Please be advised of the risk of cardiovascular disease, increase stroke and heart attack risk with uncontrolled/untreated hyperlipidemia.     Patient voiced understanding and understood the treatment plan. All questions were answered.              Relevant Medications    pravastatin (PRAVACHOL) 80 MG tablet    Type 2 diabetes mellitus with hyperglycemia, without long-term current use of insulin (Chronic)     Continue current regimen.  Diabetes Medications               dapagliflozin (FARXIGA) 10 mg tablet TAKE 1 TABLET BY MOUTH ONCE DAILY    semaglutide (OZEMPIC) 1 mg/dose (2 mg/1.5 mL) PnIj Inject 1 mg into the skin every 7 days.   We will plan to monitor hemoglobin A1c at designated intervals 3 to 6 months.  I recommend ongoing Education for diabetic diet and exercise protocol.  We will continue to monitor for side effects.    Please be advised of symptoms to monitor for and to notify me immediately if persistent or worsening.  Follow up with Ophthalmology/Optometry and Podiatry at least annually.           Relevant Orders    Microalbumin/Creatinine Ratio, Urine    Nutritional anemia - Primary     Patient is compliant with iron.  Patient with mild chronic anemia.  Check vitamin levels.  Anemia precautions.         Relevant Orders    CBC Auto Differential    Iron and TIBC    Ferritin    Vitamin B12    Folate    Encounter for long-term (current) use of medications     Complete history and physical was completed today.  Complete and thorough medication reconciliation was performed.  Discussed risks and benefits of medications.  Advised patient on orders and health maintenance.  We discussed old records and old labs if available.  Will request any records not available through epic.  Continue current medications listed on your  summary sheet.           Relevant Medications    traZODone (DESYREL) 300 MG tablet    Elevated ALT measurement     Monitor liver enzymes.  Recheck liver enzymes.  Consider ultrasound of the liver if needed.         Relevant Orders    Hepatic Function Panel    Insomnia     Increase trazodone dosage.  Follow-up sooner if no improvement.Discussed insomnia condition course.  Advised of first-line medications for this condition.  Also discussed sleep hygiene.  Information was given below.  Good sleep habits (sometimes referred to as sleep hygiene) can help you get a good nights sleep.    Some habits that can improve your sleep health:  -Be consistent. Go to bed at the same time each night and get up at the same time each morning, including on the weekends  -Make sure your bedroom is quiet, dark, relaxing, and at a comfortable temperature  -Remove electronic devices, such as TVs, computers, and smart phones, from the bedroom  -Avoid large meals, caffeine, and alcohol before bedtime  -Get some exercise. Being physically active during the day can help you fall asleep more easily at night.            Future Appointments       Date Provider Specialty Appt Notes    3/24/2023  Lab     3/24/2023  Cardiology echo    3/24/2023  Lab     5/19/2023  Cardiology 90 Day Remote Cardiac Device Check    7/21/2023 Elisabeth Freire PA-C Pulmonology Follow up in about 6 months (around 7/23/2023) for HONEY follow up.    8/28/2023 Nirav Baldwin MD Cardiology 6 month           Medication Management for assessment above:   Medication List with Changes/Refills   Current Medications    ACETAMINOPHEN (TYLENOL) 325 MG TABLET    Take 650 mg by mouth as needed.     ALBUTEROL (PROVENTIL/VENTOLIN HFA) 90 MCG/ACTUATION INHALER    Inhale 1 puff into the lungs once daily. Rescue    AMIODARONE (PACERONE) 200 MG TAB    Take 1.5 tablets (300 mg total) by mouth once daily. 1 tab po bid for 6 weeks then 1.5 tb a day (total dose = 300 mg)     BEPOTASTINE BESILATE (BEPREVE) 1.5 % DROP    Bepreve 1.5 % eye drops   Apply by ophthalmic route as needed for 50 days.    CARVEDILOL (COREG) 25 MG TABLET    Take 1 tablet (25 mg total) by mouth 2 (two) times daily.    CO-ENZYME Q-10 30 MG CAPSULE    Take 30 mg by mouth once daily.     DAPAGLIFLOZIN (FARXIGA) 10 MG TABLET    TAKE 1 TABLET BY MOUTH ONCE DAILY    ENTRESTO 49-51 MG PER TABLET    TAKE 1 TABLET BY MOUTH TWICE DAILY    FERROUS SULFATE (FEOSOL) 325 MG (65 MG IRON) TAB TABLET    Take 325 mg by mouth daily with breakfast.    FLUTICASONE FUROATE (ARNUITY ELLIPTA) 100 MCG/ACTUATION INHALER    Inhale 1 puff into the lungs once daily.    FUROSEMIDE (LASIX) 40 MG TABLET    TAKE 1 TABLET BY MOUTH ONCE DAILY    GLUCOSAMINE-CHONDROITIN 500-400 MG TABLET    Take 1 tablet by mouth once daily.     INV APIXABAN/PLACEBO TABLET    Take 1 tablets (5 mg) by mouth 2 (two) times daily. FOR INVESTIGATIONAL USE ONLY. Protocol: [facility] 2017.307 subject # : [id number]    INV ASPIRIN/PLACEBO TABLET    Take 1 tablet (81 mg total) by mouth once daily. FOR INVESTIGATIONAL USE ONLY. Patient Study #: [id number] Protocol: [place] 2017.307    MEXILETINE (MEXITIL) 150 MG CAP    Take 2 capsules (300 mg total) by mouth 3 (three) times daily.    MULTIVITAMIN (THERAGRAN) TABLET    Take 1 tablet by mouth once daily.    RESTASIS 0.05 % OPHTHALMIC EMULSION    Place 1 drop into both eyes 2 (two) times daily.    SEMAGLUTIDE (OZEMPIC) 1 MG/DOSE (2 MG/1.5 ML) PNIJ    Inject 1 mg into the skin every 7 days.    SPIRONOLACTONE (ALDACTONE) 25 MG TABLET    TAKE 1 TABLET BY MOUTH TWICE DAILY   Changed and/or Refilled Medications    Modified Medication Previous Medication    PRAVASTATIN (PRAVACHOL) 80 MG TABLET pravastatin (PRAVACHOL) 80 MG tablet       Take 1 tablet (80 mg total) by mouth once daily.    TAKE 1 TABLET BY MOUTH ONCE DAILY    TRAZODONE (DESYREL) 300 MG TABLET traZODone (DESYREL) 150 MG tablet       Take 1 tablet (300 mg total) by mouth nightly.     TAKE 1 TABLET BY MOUTH EVERY EVENING   Discontinued Medications    ENTRESTO  MG PER TABLET    Take 1 tablet by mouth 2 (two) times daily.    OZEMPIC 1 MG/DOSE (4 MG/3 ML)    Inject 1 mg into the skin every 7 days.       Olvin Hutson M.D.  ==========================================================================  Subjective:   Patient ID: Randy Yap is a 73 y.o. male.  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   Chief Complaint: Results      Problem List Items Addressed This Visit       Hyperlipidemia LDL goal <100 (Chronic)    Overview     CHRONIC.  March 2023:  Doing well on pravastatin 80 milligrams daily.  STABLE. Lab analysis reviewed.   (-) CP, SOB, abdominal pain, N/V/D, constipation, jaundice, skin changes.  (-) Myalgias  Lab Results   Component Value Date    CHOL 184 09/29/2022    CHOL 171 08/23/2021    CHOL 170 07/23/2021     Lab Results   Component Value Date    HDL 38 (L) 09/29/2022    HDL 41 08/23/2021    HDL 39 (L) 07/23/2021     Lab Results   Component Value Date    LDLCALC 105.6 09/29/2022    LDLCALC 104.4 08/23/2021    LDLCALC 93.6 07/23/2021     Lab Results   Component Value Date    TRIG 202 (H) 09/29/2022    TRIG 128 08/23/2021    TRIG 187 (H) 07/23/2021     Lab Results   Component Value Date    CHOLHDL 20.7 09/29/2022    CHOLHDL 24.0 08/23/2021    CHOLHDL 22.9 07/23/2021     Lab Results   Component Value Date    TOTALCHOLEST 4.8 09/29/2022    TOTALCHOLEST 4.2 08/23/2021    TOTALCHOLEST 4.4 07/23/2021     Lab Results   Component Value Date    ALT 46 (H) 02/27/2023    AST 28 02/27/2023    ALKPHOS 42 (L) 02/27/2023    BILITOT 0.5 02/27/2023   ======================================================           Current  Assessment & Plan     Counseled on hyperlipidemia disease course, healthy diet and increased need for exercise.  Please be advised of the risk of cardiovascular disease, increase stroke and heart attack risk with uncontrolled/untreated hyperlipidemia.     Patient voiced understanding and understood the treatment plan. All questions were answered.              Type 2 diabetes mellitus with hyperglycemia, without long-term current use of insulin (Chronic)    Overview     March 2023:  Patient doing well on Ozempic 1 milligram weekly.  Patient denies any history of thyroid cancer, pancreatitis, MEN syndrome.   Diabetes Management StatusPatient doing well on Farxiga with comorbid heart failure.  Screening or Prevention Patient's value Goal Complete/Controlled?   HgA1C Testing and Control   Lab Results   Component Value Date    HGBA1C 6.0 (H) 09/29/2022      Annually/Less than 8% Yes   Lipid profile : 09/29/2022 Annually Yes   LDL control Lab Results   Component Value Date    LDLCALC 105.6 09/29/2022    Annually/Less than 100 mg/dl  No   Nephropathy screening Lab Results   Component Value Date    LABMICR <5.0 03/07/2022     Lab Results   Component Value Date    PROTEINUA Negative 06/11/2012     No results found for: UTPCR   Annually Yes   Blood pressure BP Readings from Last 1 Encounters:   03/07/23 111/65    Less than 140/90 Yes   Dilated retinal exam : 10/28/2022 Annually Yes   Foot exam   Most Recent Foot Exam Date: Not Found Annually No              Current Assessment & Plan     Continue current regimen.  Diabetes Medications               dapagliflozin (FARXIGA) 10 mg tablet TAKE 1 TABLET BY MOUTH ONCE DAILY    semaglutide (OZEMPIC) 1 mg/dose (2 mg/1.5 mL) PnIj Inject 1 mg into the skin every 7 days.   We will plan to monitor hemoglobin A1c at designated intervals 3 to 6 months.  I recommend ongoing Education for diabetic diet and exercise protocol.  We will continue to monitor for side effects.    Please be advised  of symptoms to monitor for and to notify me immediately if persistent or worsening.  Follow up with Ophthalmology/Optometry and Podiatry at least annually.           Nutritional anemia - Primary    Overview     Lab Results   Component Value Date    IRON 61 05/21/2018      Lab Results   Component Value Date    [id number] 414 09/11/2017     Lab Results   Component Value Date    FOLATE 10.9 09/11/2017     Lab Results   Component Value Date    WBC 7.80 02/27/2023    HGB 13.7 (L) 02/27/2023    HCT 42.2 02/27/2023    MCV 98 02/27/2023     02/27/2023              Current Assessment & Plan     Patient is compliant with iron.  Patient with mild chronic anemia.  Check vitamin levels.  Anemia precautions.         Encounter for long-term (current) use of medications    Overview     March 2023:  CHRONIC. Stable. Compliant with medications for managed conditions. See medication list. No SE reported.   Routine lab analysis is being monitored. Refills were addressed.  Lab Results   Component Value Date    WBC 7.80 02/27/2023    HGB 13.7 (L) 02/27/2023    HCT 42.2 02/27/2023    MCV 98 02/27/2023     02/27/2023       Chemistry        Component Value Date/Time     (L) 02/27/2023 1136    K 4.7 02/27/2023 1136    CL 98 02/27/2023 1136    CO2 27 02/27/2023 1136    BUN 13 02/27/2023 1136    CREATININE 1.0 02/27/2023 1136    GLU 84 02/27/2023 1136        Component Value Date/Time    CALCIUM 9.8 02/27/2023 1136    ALKPHOS 42 (L) 02/27/2023 1136    AST 28 02/27/2023 1136    ALT 46 (H) 02/27/2023 1136    BILITOT 0.5 02/27/2023 1136    ESTGFRAFRICA >60.0 10/01/2021 1353    EGFRNONAA >60.0 10/01/2021 1353          Lab Results   Component Value Date    TSH 1.554 10/29/2022            Current Assessment & Plan     Complete history and physical was completed today.  Complete and thorough medication reconciliation was performed.  Discussed risks and benefits of medications.  Advised patient on orders and health maintenance.  We  discussed old records and old labs if available.  Will request any records not available through epic.  Continue current medications listed on your summary sheet.           Elevated ALT measurement    Overview     Lab Results   Component Value Date    ALT 46 (H) 02/27/2023    AST 28 02/27/2023    ALKPHOS 42 (L) 02/27/2023    BILITOT 0.5 02/27/2023            Current Assessment & Plan     Monitor liver enzymes.  Recheck liver enzymes.  Consider ultrasound of the liver if needed.         Insomnia    Overview     Chronic.  Intermittent control.  Patient with increased stress and anxiety lately.  Has been taking 1-1/2 trazodone to help with sleep.  Denies any SI HI or hallucinations.         Current Assessment & Plan     Increase trazodone dosage.  Follow-up sooner if no improvement.Discussed insomnia condition course.  Advised of first-line medications for this condition.  Also discussed sleep hygiene.  Information was given below.  Good sleep habits (sometimes referred to as sleep hygiene) can help you get a good nights sleep.    Some habits that can improve your sleep health:  -Be consistent. Go to bed at the same time each night and get up at the same time each morning, including on the weekends  -Make sure your bedroom is quiet, dark, relaxing, and at a comfortable temperature  -Remove electronic devices, such as TVs, computers, and smart phones, from the bedroom  -Avoid large meals, caffeine, and alcohol before bedtime  -Get some exercise. Being physically active during the day can help you fall asleep more easily at night.               Review of patient's allergies indicates:  No Known Allergies  Current Outpatient Medications   Medication Instructions    acetaminophen (TYLENOL) 650 mg, Oral, As needed (PRN)    albuterol (PROVENTIL/VENTOLIN HFA) 90 mcg/actuation inhaler 1 puff, Inhalation, Daily, Rescue    amiodarone (PACERONE) 300 mg, Oral, Daily, 1 tab po bid for 6 weeks then 1.5 tb a day (total dose = 300  mg)    bepotastine besilate (BEPREVE) 1.5 % Drop Bepreve 1.5 % eye drops   Apply by ophthalmic route as needed for 50 days.    carvediloL (COREG) 25 mg, Oral, 2 times daily    co-enzyme Q-10 30 mg, Oral, Daily    dapagliflozin (FARXIGA) 10 mg tablet TAKE 1 TABLET BY MOUTH ONCE DAILY    ENTRESTO 49-51 mg per tablet TAKE 1 TABLET BY MOUTH TWICE DAILY    ferrous sulfate (FEOSOL) 325 mg, Oral, With breakfast    fluticasone furoate (ARNUITY ELLIPTA) 100 mcg/actuation inhaler 1 puff, Inhalation, Daily    furosemide (LASIX) 40 MG tablet TAKE 1 TABLET BY MOUTH ONCE DAILY    glucosamine-chondroitin 500-400 mg tablet 1 tablet, Oral, Daily    INV apixaban/placebo tablet Take 1 tablets (5 mg) by mouth 2 (two) times daily. FOR INVESTIGATIONAL USE ONLY. Protocol: [facility] 2017.307 subject # : [id number]    INV aspirin/placebo tablet Take 1 tablet (81 mg total) by mouth once daily. FOR INVESTIGATIONAL USE ONLY. Patient Study #: [id number] Protocol: [facility] 2017.307    mexiletine (MEXITIL) 300 mg, Oral, 3 times daily    multivitamin (THERAGRAN) tablet 1 tablet, Oral, Daily,      OZEMPIC 1 mg, Subcutaneous, Every 7 days    pravastatin (PRAVACHOL) 80 mg, Oral, Daily    RESTASIS 0.05 % ophthalmic emulsion 1 drop, Both Eyes, 2 times daily    spironolactone (ALDACTONE) 25 MG tablet TAKE 1 TABLET BY MOUTH TWICE DAILY    traZODone (DESYREL) 300 mg, Oral, Nightly      I have reviewed the PMH, social history, FamilyHx, surgical history, allergies and medications documented / confirmed by the patient at the time of this visit.  Review of Systems   Constitutional:  Negative for activity change, chills, fatigue, fever and unexpected weight change.   HENT:  Negative for congestion, ear pain, hearing loss, rhinorrhea, sinus pressure, sinus pain, sore throat and trouble swallowing.    Eyes:  Negative for discharge, redness and visual disturbance.   Respiratory:  Negative for cough, chest tightness, shortness of breath and wheezing.    Cardiovascular:   "Negative for chest pain and palpitations.   Gastrointestinal:  Negative for blood in stool, constipation, diarrhea, nausea and vomiting.   Endocrine: Negative for cold intolerance, heat intolerance, polydipsia and polyuria.   Genitourinary:  Negative for difficulty urinating, hematuria and urgency.   Musculoskeletal:  Negative for arthralgias, joint swelling, myalgias and neck pain.   Skin:  Negative for rash and wound.   Allergic/Immunologic: Negative for environmental allergies and food allergies.   Neurological:  Negative for weakness and headaches.   Hematological:  Negative for adenopathy. Does not bruise/bleed easily.   Psychiatric/Behavioral:  Positive for sleep disturbance. Negative for confusion and dysphoric mood. The patient is nervous/anxious.    Objective:   /65 (BP Location: Left arm, Patient Position: Sitting, BP Method: Medium (Automatic))   Pulse 79   Temp 98.4 °F (36.9 °C)   Resp 16   Ht 5' 10" (1.778 m)   Wt 112.5 kg (248 lb)   BMI 35.58 kg/m²   Physical Exam  Vitals and nursing note reviewed.   Constitutional:       General: He is not in acute distress.     Appearance: He is well-developed. He is not diaphoretic.   HENT:      Head: Normocephalic and atraumatic.      Right Ear: External ear normal.      Left Ear: External ear normal.      Nose: Nose normal. No rhinorrhea.   Eyes:      Extraocular Movements: Extraocular movements intact.      Pupils: Pupils are equal, round, and reactive to light.   Cardiovascular:      Rate and Rhythm: Normal rate.      Pulses: Normal pulses.   Pulmonary:      Effort: Pulmonary effort is normal. No respiratory distress.      Breath sounds: Normal breath sounds.   Abdominal:      General: Bowel sounds are normal.      Palpations: Abdomen is soft.   Musculoskeletal:         General: Normal range of motion.      Cervical back: Normal range of motion and neck supple.   Skin:     General: Skin is warm and dry.      Capillary Refill: Capillary refill takes " less than 2 seconds.      Findings: No rash.   Neurological:      General: No focal deficit present.      Mental Status: He is alert and oriented to person, place, and time. Mental status is at baseline.      Cranial Nerves: No cranial nerve deficit.      Motor: No weakness.      Gait: Gait normal.   Psychiatric:         Attention and Perception: He is attentive.         Mood and Affect: Mood normal. Mood is not anxious or depressed. Affect is not labile, blunt, angry or inappropriate.         Speech: He is communicative. Speech is not rapid and pressured, delayed, slurred or tangential.         Behavior: Behavior normal. Behavior is not agitated, slowed, aggressive, withdrawn, hyperactive or combative.         Thought Content: Thought content normal. Thought content is not paranoid or delusional. Thought content does not include homicidal or suicidal ideation. Thought content does not include homicidal or suicidal plan.         Cognition and Memory: Memory is not impaired.         Judgment: Judgment normal. Judgment is not impulsive or inappropriate.      Patient is wearing a mask which limits physical exam due to COVID restrictions.   Assessment:     1. Nutritional anemia    2. Type 2 diabetes mellitus with hyperglycemia, without long-term current use of insulin    3. Encounter for long-term (current) use of medications    4. Elevated ALT measurement    5. Hyperlipidemia LDL goal <100    6. Insomnia, unspecified type      MDM:   Moderate complexity.  Moderate risk.  Total time:32 minutes.  This includes total time spent on the encounter, which includes face to face time and non-face to face time preparing to see the patient (eg, review of previous medical records, tests), Obtaining and/or reviewing separately obtained history, documenting clinical information in the electronic or other health record, independently interpreting results (not separately reported)/communicating results to the patient/family/caregiver,  and/or care coordination (not separately reported).    I have Reviewed and summarized old records.  I have performed thorough medication reconciliation today and discussed risk and benefits of medications.  I have reviewed labs and discussed with patient.  All questions were answered.  I am requesting old records and will review them once they are available.Cardiology Nirav Baldwin MD      I have signed for the following orders AND/OR meds.  Orders Placed This Encounter   Procedures    CBC Auto Differential     Standing Status:   Future     Standing Expiration Date:   5/5/2024    Iron and TIBC     Standing Status:   Future     Standing Expiration Date:   5/5/2024    Ferritin     Standing Status:   Future     Standing Expiration Date:   5/5/2024    Vitamin B12     Standing Status:   Future     Standing Expiration Date:   5/5/2024    Folate     Standing Status:   Future     Standing Expiration Date:   5/5/2024    Hepatic Function Panel     Standing Status:   Future     Standing Expiration Date:   3/7/2024    Microalbumin/Creatinine Ratio, Urine     Standing Status:   Future     Standing Expiration Date:   5/5/2024     Order Specific Question:   Specimen Source     Answer:   Urine     Medications Ordered This Encounter   Medications    pravastatin (PRAVACHOL) 80 MG tablet     Sig: Take 1 tablet (80 mg total) by mouth once daily.     Dispense:  90 tablet     Refill:  4    traZODone (DESYREL) 300 MG tablet     Sig: Take 1 tablet (300 mg total) by mouth nightly.     Dispense:  30 tablet     Refill:  0        Follow up in about 6 months (around 9/7/2023), or if symptoms worsen or fail to improve, for Med refills.    If no improvement in symptoms or symptoms worsen, advised to call/follow-up at clinic or go to ER. Patient voiced understanding and all questions/concerns were addressed.   DISCLAIMER: This note was compiled by using a speech recognition dictation system and therefore please be aware that typographical /  speech recognition errors can and do occur.  Please contact me if you see any errors specifically.    Olvin Hutson M.D.       Office: [phone] [id number] [place] [address]  FAX: [phone]

## 2023-03-07 NOTE — ASSESSMENT & PLAN NOTE
Increase trazodone dosage.  Follow-up sooner if no improvement.Discussed insomnia condition course.  Advised of first-line medications for this condition.  Also discussed sleep hygiene.  Information was given below.  Good sleep habits (sometimes referred to as sleep hygiene) can help you get a good nights sleep.    Some habits that can improve your sleep health:  -Be consistent. Go to bed at the same time each night and get up at the same time each morning, including on the weekends  -Make sure your bedroom is quiet, dark, relaxing, and at a comfortable temperature  -Remove electronic devices, such as TVs, computers, and smart phones, from the bedroom  -Avoid large meals, caffeine, and alcohol before bedtime  -Get some exercise. Being physically active during the day can help you fall asleep more easily at night.

## 2023-03-07 NOTE — ASSESSMENT & PLAN NOTE
Continue current regimen.  Diabetes Medications             dapagliflozin (FARXIGA) 10 mg tablet TAKE 1 TABLET BY MOUTH ONCE DAILY    semaglutide (OZEMPIC) 1 mg/dose (2 mg/1.5 mL) PnIj Inject 1 mg into the skin every 7 days.        We will plan to monitor hemoglobin A1c at designated intervals 3 to 6 months.  I recommend ongoing Education for diabetic diet and exercise protocol.  We will continue to monitor for side effects.    Please be advised of symptoms to monitor for and to notify me immediately if persistent or worsening.  Follow up with Ophthalmology/Optometry and Podiatry at least annually.

## 2023-03-07 NOTE — PATIENT INSTRUCTIONS
Follow up in about 6 months (around 9/7/2023), or if symptoms worsen or fail to improve, for Med refills.     Dear patient,   As a result of recent federal legislation (The Federal Cures Act), you may receive lab or pathology results from your visit in your [facility] account before your physician is able to contact you. Your physician or their representative will relay the results to you with their recommendations at their soonest availability.     If no improvement in symptoms or symptoms worsen, please be advised to call MD, follow-up at clinic and/or go to ER if becomes severe.    Olvin Hutson M.D.        We Offer TELEHEALTH & Same Day Appointments!   Book your Telehealth appointment with me through my nurse or   Clinic appointments on [employer]!    [address] [place] [address]    Office: [phone]   FAX: [phone]    Check out my Facebook Page and Follow Me at: [email or web address]    Check out my website at [employer] by clicking on: [email or web address]    To Schedule appointments online, go to [employer]: [email or web address]

## 2023-03-16 ENCOUNTER — PATIENT MESSAGE (OUTPATIENT)
Dept: CARDIOLOGY | Facility: CLINIC | Age: 73
End: 2023-03-16
Payer: MEDICARE

## 2023-03-21 ENCOUNTER — TELEPHONE (OUTPATIENT)
Dept: PREADMISSION TESTING | Facility: HOSPITAL | Age: 73
End: 2023-03-21
Payer: MEDICARE

## 2023-03-21 DIAGNOSIS — Z12.11 COLON CANCER SCREENING: Primary | ICD-10-CM

## 2023-03-23 ENCOUNTER — TELEPHONE (OUTPATIENT)
Dept: CARDIOLOGY | Facility: HOSPITAL | Age: 73
End: 2023-03-23
Payer: MEDICARE

## 2023-03-24 ENCOUNTER — LAB VISIT (OUTPATIENT)
Dept: LAB | Facility: HOSPITAL | Age: 73
End: 2023-03-24
Attending: FAMILY MEDICINE
Payer: MEDICARE

## 2023-03-24 DIAGNOSIS — E11.65 TYPE 2 DIABETES MELLITUS WITH HYPERGLYCEMIA, WITHOUT LONG-TERM CURRENT USE OF INSULIN: ICD-10-CM

## 2023-03-24 LAB
ALBUMIN/CREAT UR: NORMAL UG/MG (ref 0–30)
CREAT UR-MCNC: 62 MG/DL (ref 23–375)
MICROALBUMIN UR DL<=1MG/L-MCNC: <5 UG/ML

## 2023-03-24 PROCEDURE — 82570 ASSAY OF URINE CREATININE: CPT | Performed by: FAMILY MEDICINE

## 2023-04-04 ENCOUNTER — TELEPHONE (OUTPATIENT)
Dept: PREADMISSION TESTING | Facility: HOSPITAL | Age: 73
End: 2023-04-04
Payer: MEDICARE

## 2023-04-04 ENCOUNTER — E-CONSULT (OUTPATIENT)
Dept: CARDIOLOGY | Facility: CLINIC | Age: 73
End: 2023-04-04
Payer: MEDICARE

## 2023-04-04 DIAGNOSIS — Z95.810 ICD (IMPLANTABLE CARDIOVERTER-DEFIBRILLATOR) IN PLACE: Primary | ICD-10-CM

## 2023-04-04 DIAGNOSIS — I43 CARDIOMYOPATHY DUE TO SYSTEMIC DISEASE: ICD-10-CM

## 2023-04-05 NOTE — CONSULTS
[facility] - Cardiology  Response for E-Consult     Patient Name: Randy Yap  MRN: [id number]  Primary Care Provider: Olvin Hutson MD   Requesting Provider: Chery Laughlin NP  Consults    Findings:  73-year-old male with past medical history of obesity, CHF s/p ICD, VFib, atrial fibrillation on Eliquis, hypertension, HLD who will be going for colonoscopy.    Recently seen by Cardiology, had no complaints, doing well- denies chest pain, shortness of breath  Echo 1/22 EF 30%  Patient is moderate CV risk for colonoscopy   Okay to hold Eliquis 2 days prior to procedure then restart as soon as possible    I did not speak to the requesting provider verbally about this.     Total time of Consultation: 30 minute    Percentage of time spent on verbal/written discussion: 100%     *This eConsult is based on the clinical data available to me and is furnished without benefit of a physical examination. The eConsult will need to be interpreted in light of any clinical issues or changes in patient status not available to me at the time of filing this eConsults. Significant changes in patient condition or level of acuity should result in immediate formal consultation and reevaluation. Please alert me if you have further questions.    Thank you for your consult.     MD [facility] - Cardiology

## 2023-04-17 ENCOUNTER — HOSPITAL ENCOUNTER (OUTPATIENT)
Dept: CARDIOLOGY | Facility: HOSPITAL | Age: 73
Discharge: HOME | End: 2023-04-17
Attending: INTERNAL MEDICINE
Payer: MEDICARE

## 2023-04-17 VITALS
SYSTOLIC BLOOD PRESSURE: 111 MMHG | HEART RATE: 76 BPM | DIASTOLIC BLOOD PRESSURE: 65 MMHG | HEIGHT: 70 IN | BODY MASS INDEX: 35.5 KG/M2 | WEIGHT: 248 LBS

## 2023-04-17 DIAGNOSIS — I50.42 CHRONIC COMBINED SYSTOLIC AND DIASTOLIC CHF (CONGESTIVE HEART FAILURE): ICD-10-CM

## 2023-04-17 LAB
AORTIC ROOT ANNULUS: 3.97 CM
ASCENDING AORTA: 2.99 CM
AV INDEX (PROSTH): 0.56
AV MEAN GRADIENT: 3 MMHG
AV PEAK GRADIENT: 6 MMHG
AV VALVE AREA: 2.5 CM2
AV VELOCITY RATIO: 0.58
BSA FOR ECHO PROCEDURE: 2.36 M2
CV ECHO LV RWT: 0.29 CM
DOP CALC AO PEAK VEL: 1.25 M/S
DOP CALC AO VTI: 21.5 CM
DOP CALC LVOT AREA: 4.4 CM2
DOP CALC LVOT DIAMETER: 2.38 CM
DOP CALC LVOT PEAK VEL: 0.73 M/S
DOP CALC LVOT STROKE VOLUME: 53.8 CM3
DOP CALC RVOT PEAK VEL: 0.9 M/S
DOP CALC RVOT VTI: 15.9 CM
DOP CALCLVOT PEAK VEL VTI: 12.1 CM
E WAVE DECELERATION TIME: 166.18 MSEC
E/A RATIO: 0.4
E/E' RATIO: 4.75 M/S
ECHO LV POSTERIOR WALL: 0.94 CM (ref 0.6–1.1)
EJECTION FRACTION: 50 %
FRACTIONAL SHORTENING: 14 % (ref 28–44)
INTERVENTRICULAR SEPTUM: 1.36 CM (ref 0.6–1.1)
IVRT: 74.22 MSEC
LA MAJOR: 6.46 CM
LA MINOR: 5.47 CM
LA WIDTH: 3.1 CM
LEFT ATRIUM SIZE: 3.73 CM
LEFT ATRIUM VOLUME INDEX MOD: 17.1 ML/M2
LEFT ATRIUM VOLUME INDEX: 25.4 ML/M2
LEFT ATRIUM VOLUME MOD: 39.07 CM3
LEFT ATRIUM VOLUME: 58.22 CM3
LEFT INTERNAL DIMENSION IN SYSTOLE: 5.54 CM (ref 2.1–4)
LEFT VENTRICLE DIASTOLIC VOLUME INDEX: 96.18 ML/M2
LEFT VENTRICLE DIASTOLIC VOLUME: 220.26 ML
LEFT VENTRICLE MASS INDEX: 147 G/M2
LEFT VENTRICLE SYSTOLIC VOLUME INDEX: 58.8 ML/M2
LEFT VENTRICLE SYSTOLIC VOLUME: 134.66 ML
LEFT VENTRICULAR INTERNAL DIMENSION IN DIASTOLE: 6.46 CM (ref 3.5–6)
LEFT VENTRICULAR MASS: 335.59 G
LV LATERAL E/E' RATIO: 4.22 M/S
LV SEPTAL E/E' RATIO: 5.43 M/S
LVOT MG: 1.36 MMHG
LVOT MV: 0.56 CM/S
MV PEAK A VEL: 0.94 M/S
MV PEAK E VEL: 0.38 M/S
MV STENOSIS PRESSURE HALF TIME: 48.19 MS
MV VALVE AREA P 1/2 METHOD: 4.57 CM2
PISA TR MAX VEL: 1.91 M/S
PULM VEIN S/D RATIO: 1.13
PV MEAN GRADIENT: 1.62 MMHG
PV PEAK D VEL: 0.48 M/S
PV PEAK S VEL: 0.54 M/S
PV PEAK VELOCITY: 1.02 CM/S
RA MAJOR: 4.47 CM
RA PRESSURE: 3 MMHG
RIGHT VENTRICULAR END-DIASTOLIC DIMENSION: 3.02 CM
STJ: 3.07 CM
TDI LATERAL: 0.09 M/S
TDI SEPTAL: 0.07 M/S
TDI: 0.08 M/S
TR MAX PG: 15 MMHG
TV REST PULMONARY ARTERY PRESSURE: 18 MMHG

## 2023-04-17 PROCEDURE — 93306 TTE W/DOPPLER COMPLETE: CPT | Mod: 26,,, | Performed by: INTERNAL MEDICINE

## 2023-04-17 PROCEDURE — 93306 ECHO (CUPID ONLY): ICD-10-PCS | Mod: 26,,, | Performed by: INTERNAL MEDICINE

## 2023-04-17 PROCEDURE — 93306 TTE W/DOPPLER COMPLETE: CPT | Mod: PO

## 2023-04-17 RX ORDER — SACUBITRIL AND VALSARTAN 49; 51 MG/1; MG/1
TABLET, FILM COATED ORAL
Qty: 180 TABLET | Refills: 4 | Status: ON HOLD | OUTPATIENT
Start: 2023-04-17 | End: 2023-04-19 | Stop reason: CLARIF

## 2023-04-17 NOTE — TELEPHONE ENCOUNTER
Refill Routing Note   Medication(s) are not appropriate for processing by [facility] for the following reason(s):      Medication outside of protocol    [facility] action(s):  Route          Medication reconciliation completed: No     Appointments  past 12m or future 3m with PCP    Date Provider   Last Visit   3/7/2023 Olvin Hutson MD   Next Visit   Visit date not found Olvin Hutson MD   ED visits in past 90 days: 0        Note composed:8:09 AM 04/17/2023

## 2023-04-19 ENCOUNTER — ANESTHESIA EVENT (OUTPATIENT)
Dept: ENDOSCOPY | Facility: HOSPITAL | Age: 73
End: 2023-04-19
Payer: MEDICARE

## 2023-04-19 ENCOUNTER — HOSPITAL ENCOUNTER (OUTPATIENT)
Facility: HOSPITAL | Age: 73
Discharge: HOME | End: 2023-04-19
Attending: INTERNAL MEDICINE | Admitting: INTERNAL MEDICINE
Payer: MEDICARE

## 2023-04-19 ENCOUNTER — PATIENT MESSAGE (OUTPATIENT)
Dept: ADMINISTRATIVE | Facility: HOSPITAL | Age: 73
End: 2023-04-19
Payer: MEDICARE

## 2023-04-19 ENCOUNTER — ANESTHESIA (OUTPATIENT)
Dept: ENDOSCOPY | Facility: HOSPITAL | Age: 73
End: 2023-04-19
Payer: MEDICARE

## 2023-04-19 DIAGNOSIS — Z86.010 HISTORY OF COLON POLYPS: ICD-10-CM

## 2023-04-19 DIAGNOSIS — K57.30 DIVERTICULOSIS OF COLON: Primary | ICD-10-CM

## 2023-04-19 PROCEDURE — 27201012 HC FORCEPS, HOT/COLD, DISP: Performed by: INTERNAL MEDICINE

## 2023-04-19 PROCEDURE — 88305 TISSUE EXAM BY PATHOLOGIST: CPT | Mod: 26,,, | Performed by: PATHOLOGY

## 2023-04-19 PROCEDURE — 63600175 PHARM REV CODE 636 W HCPCS: Performed by: NURSE ANESTHETIST, CERTIFIED REGISTERED

## 2023-04-19 PROCEDURE — 25000003 PHARM REV CODE 250: Performed by: INTERNAL MEDICINE

## 2023-04-19 PROCEDURE — 45380 COLONOSCOPY AND BIOPSY: CPT | Mod: 59,PT | Performed by: INTERNAL MEDICINE

## 2023-04-19 PROCEDURE — 45380 COLONOSCOPY AND BIOPSY: CPT | Mod: 59,PT,, | Performed by: INTERNAL MEDICINE

## 2023-04-19 PROCEDURE — 37000009 HC ANESTHESIA EA ADD 15 MINS: Performed by: INTERNAL MEDICINE

## 2023-04-19 PROCEDURE — 45385 COLONOSCOPY W/LESION REMOVAL: CPT | Mod: PT,,, | Performed by: INTERNAL MEDICINE

## 2023-04-19 PROCEDURE — 27201089 HC SNARE, DISP (ANY): Performed by: INTERNAL MEDICINE

## 2023-04-19 PROCEDURE — 88305 TISSUE EXAM BY PATHOLOGIST: CPT | Mod: 59 | Performed by: PATHOLOGY

## 2023-04-19 PROCEDURE — 45385 COLONOSCOPY W/LESION REMOVAL: CPT | Mod: PT | Performed by: INTERNAL MEDICINE

## 2023-04-19 PROCEDURE — 37000008 HC ANESTHESIA 1ST 15 MINUTES: Performed by: INTERNAL MEDICINE

## 2023-04-19 PROCEDURE — 88305 TISSUE EXAM BY PATHOLOGIST: ICD-10-PCS | Mod: 26,,, | Performed by: PATHOLOGY

## 2023-04-19 PROCEDURE — 45385 PR COLONOSCOPY,REMV LESN,SNARE: ICD-10-PCS | Mod: PT,,, | Performed by: INTERNAL MEDICINE

## 2023-04-19 PROCEDURE — 45380 PR COLONOSCOPY,BIOPSY: ICD-10-PCS | Mod: 59,PT,, | Performed by: INTERNAL MEDICINE

## 2023-04-19 RX ORDER — SODIUM CHLORIDE, SODIUM LACTATE, POTASSIUM CHLORIDE, CALCIUM CHLORIDE 600; 310; 30; 20 MG/100ML; MG/100ML; MG/100ML; MG/100ML
INJECTION, SOLUTION INTRAVENOUS CONTINUOUS PRN
Status: DISCONTINUED | OUTPATIENT
Start: 2023-04-19 | End: 2023-04-19

## 2023-04-19 RX ORDER — SODIUM CHLORIDE, SODIUM LACTATE, POTASSIUM CHLORIDE, CALCIUM CHLORIDE 600; 310; 30; 20 MG/100ML; MG/100ML; MG/100ML; MG/100ML
INJECTION, SOLUTION INTRAVENOUS CONTINUOUS
Status: DISCONTINUED | OUTPATIENT
Start: 2023-04-19 | End: 2023-04-19 | Stop reason: HOSPADM

## 2023-04-19 RX ORDER — DEXTROMETHORPHAN/PSEUDOEPHED 2.5-7.5/.8
DROPS ORAL
Status: DISCONTINUED | OUTPATIENT
Start: 2023-04-19 | End: 2023-04-19 | Stop reason: HOSPADM

## 2023-04-19 RX ORDER — PROPOFOL 10 MG/ML
VIAL (ML) INTRAVENOUS
Status: DISCONTINUED | OUTPATIENT
Start: 2023-04-19 | End: 2023-04-19

## 2023-04-19 RX ADMIN — PROPOFOL 80 MG: 10 INJECTION, EMULSION INTRAVENOUS at 08:04

## 2023-04-19 RX ADMIN — PROPOFOL 20 MG: 10 INJECTION, EMULSION INTRAVENOUS at 08:04

## 2023-04-19 RX ADMIN — PROPOFOL 40 MG: 10 INJECTION, EMULSION INTRAVENOUS at 08:04

## 2023-04-19 RX ADMIN — PROPOFOL 20 MG: 10 INJECTION, EMULSION INTRAVENOUS at 09:04

## 2023-04-19 RX ADMIN — PROPOFOL 30 MG: 10 INJECTION, EMULSION INTRAVENOUS at 09:04

## 2023-04-19 RX ADMIN — SODIUM CHLORIDE, SODIUM LACTATE, POTASSIUM CHLORIDE, AND CALCIUM CHLORIDE: .6; .31; .03; .02 INJECTION, SOLUTION INTRAVENOUS at 08:04

## 2023-04-19 NOTE — ANESTHESIA PREPROCEDURE EVALUATION
04/19/2023  Randy Yap is a 73 y.o., male.      Pre-op Assessment    I have reviewed the Patient Summary Reports.     I have reviewed the Nursing Notes. I have reviewed the NPO Status.   I have reviewed the Medications.     Review of Systems  Anesthesia Hx:  No problems with previous Anesthesia  Denies Family Hx of Anesthesia complications.   Denies Personal Hx of Anesthesia complications.   Social:  Social Alcohol Use, Non-Smoker    Hematology/Oncology:     Oncology Normal    -- Anemia:   Cardiovascular:   Pacemaker Hypertension Valvular problems/Murmurs Denies MI.   Denies CABG/stent. Dysrhythmias atrial fibrillation CHF hyperlipidemia ECG has been reviewed. ekg 2/2023:  Atrial-paced rhythm with prolonged AV conduction 77  Right bundle branch block   Left anterior fascicular block    Bifascicular block   Abnormal ECG   When compared with ECG of 29-OCT-2022 17:52,   No significant change was found    Echo 4/2023:  ? The left ventricle is moderately enlarged with eccentric hypertrophy and low normal systolic function.  ? The estimated ejection fraction is 50-55%  ? Normal left ventricular diastolic function.  ? Normal right ventricular size.  ? The aortic valve appears structurally normal. There is normal leaflet mobility.  ? Mild mitral regurgitation.  ? Mild tricuspid regurgitation.  ? Normal central venous pressure (3 mmHg).  ? The estimated PA systolic pressure is 18 mmHg.  ? 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 Asthma Sleep Apnea    Renal/:  Renal/ Normal     Hepatic/GI:   Bowel Prep. Denies GERD. Denies Liver Disease.  Denies Hepatitis.    Musculoskeletal:   DJD   Neurological:   Denies CVA. Denies Seizures.    Endocrine:   Diabetes Denies Hypothyroidism. Denies Hyperthyroidism.   Obesity / BMI > 30  Psych:   depression Insomnia         Physical Exam    Airway:  Mallampati: II   Mouth Opening: Normal    Dental:  Intact    Chest/Lungs:  Clear to auscultation, Normal Respiratory Rate    Heart:  Rate: Normal  Rhythm: Regular Rhythm        Anesthesia Plan  Type of Anesthesia, risks & benefits discussed:    Anesthesia Type: MAC  Intra-op Monitoring Plan: Standard ASA Monitors  Induction:  IV  Informed Consent: Informed consent signed with the Patient and all parties understand the risks and agree with anesthesia plan.  All questions answered.   ASA Score: 3  Day of Surgery Review of History & Physical: H&P Update referred to the surgeon/provider.    Ready For Surgery From Anesthesia Perspective.     .

## 2023-04-19 NOTE — ANESTHESIA POSTPROCEDURE EVALUATION
Anesthesia Post Evaluation    Patient: Randy Yap    Procedure(s) Performed: Procedure(s) (LRB):  COLONOSCOPY 4/4-cc/bt clearance recedived (N/A)    Final Anesthesia Type: MAC      Patient location during evaluation: PACU  Patient participation: No - Unable to Participate, Sedation  Level of consciousness: sedated  Post-procedure vital signs: reviewed and stable  Pain management: adequate  Airway patency: patent    PONV status at discharge: No PONV  Anesthetic complications: no      Cardiovascular status: blood pressure returned to baseline and hemodynamically stable  Respiratory status: unassisted and spontaneous ventilation  Hydration status: euvolemic  Follow-up not needed.          No case tracking events are documented in the log.      Pain/Armand Score: No data recorded

## 2023-04-19 NOTE — PROVATION PATIENT INSTRUCTIONS
Discharge Summary/Instructions after an Endoscopic Procedure  Patient Name: Randy Yap  Patient MRN: [id number]  Patient YOB: 1950 Wednesday, April 19, 2023 Isabella Del Cid MD  Dear patient,  As a result of recent federal legislation (The Federal Cures Act), you may   receive lab or pathology results from your procedure in your [facility]   account before your physician is able to contact you. Your physician or   their representative will relay the results to you with their   recommendations at their soonest availability.  Thank you,  RESTRICTIONS:  During your procedure today, you received medications for sedation.  These   medications may affect your judgment, balance and coordination.  Therefore,   for 24 hours, you have the following restrictions:   - DO NOT drive a car, operate machinery, make legal/financial decisions,   sign important papers or drink alcohol.    ACTIVITY:  Today: no heavy lifting, straining or running due to procedural   sedation/anesthesia.  The following day: return to full activity including work.  DIET:  Eat and drink normally unless instructed otherwise.     TREATMENT FOR COMMON SIDE EFFECTS:  - Mild abdominal pain, nausea, belching, bloating or excessive gas:  rest,   eat lightly and use a heating pad.  - Sore Throat: treat with throat lozenges and/or gargle with warm salt   water.  - Because air was used during the procedure, expelling large amounts of air   from your rectum or belching is normal.  - If a bowel prep was taken, you may not have a bowel movement for 1-3 days.    This is normal.  SYMPTOMS TO WATCH FOR AND REPORT TO YOUR PHYSICIAN:  1. Abdominal pain or bloating, other than gas cramps.  2. Chest pain.  3. Back pain.  4. Signs of infection such as: chills or fever occurring within 24 hours   after the procedure.  5. Rectal bleeding, which would show as bright red, maroon, or black stools.   (A tablespoon of blood from the rectum is not serious, especially  if   hemorrhoids are present.)  6. Vomiting.  7. Weakness or dizziness.  GO DIRECTLY TO THE NEAREST EMERGENCY ROOM IF YOU HAVE ANY OF THE FOLLOWING:      Difficulty breathing              Chills and/or fever over 101 F   Persistent vomiting and/or vomiting blood   Severe abdominal pain   Severe chest pain   Black, tarry stools   Bleeding- more than one tablespoon   Any other symptom or condition that you feel may need urgent attention  Your doctor recommends these additional instructions:  If any biopsies were taken, your doctors clinic will contact you in 1 to 2   weeks with any results.  - Discharge patient to home (with escort).   - Resume previous diet.   - Continue present medications.   - No ibuprofen, naproxen, or other non-steroidal anti-inflammatory drugs for   3 weeks after polyp removal.   - Hold ASA for 5 days.   - Await pathology results.   - Repeat colonoscopy in 3 years for surveillance based on pathology results.     - Aggressive bowel prep for all future colonoscopies.  - Return to primary care physician.  For questions, problems or results please call your physician Isabella Del Cid MD at Work:  [phone]  If you have any questions about the above instructions, call the GI   department at [phone] or call the endoscopy unit at [phone]   from 7am until 3 pm.  [facility], EMERGENCY ROOM PHONE NUMBER:   [phone]  IF A COMPLICATION OR EMERGENCY SITUATION ARISES AND YOU ARE UNABLE TO REACH   YOUR PHYSICIAN - GO DIRECTLY TO THE EMERGENCY ROOM.  I have read or have had read to me these discharge instructions for my   procedure and have received a written copy.  I understand these   instructions and will follow-up with my physician if I have any questions.     __________________________________       _____________________________________  Nurse Signature                                          Patient/Designated   Responsible Party Signature  Isabella Del Cid  MD Isabella Del Cid MD  4/19/2023 9:23:20 AM  This report has been verified and signed electronically.  Dear patient,  As a result of recent federal legislation (The Federal Cures Act), you may   receive lab or pathology results from your procedure in your [facility]   account before your physician is able to contact you. Your physician or   their representative will relay the results to you with their   recommendations at their soonest availability.  Thank you,  PROVATION

## 2023-04-19 NOTE — H&P
PRE PROCEDURE H&P    Patient Name: Randy Yap  MRN: [id number]  : 1950  Date of Procedure:  2023  Referring Physician: Olvin Hutson MD  Primary Physician: Olvin Hutson MD  Procedure Physician: Isabella Del Cid MD       Planned Procedure: Colonoscopy  Diagnosis: previous adenomatous polyp     Chief Complaint: Same as above    HPI: Patient is an 73 y.o. male is here for the above. Last colonoscopy in 2017 at [facility]. Three polyps removed. No path available for review. Has hx CHF and ICD. On ASA only. Wife at bedside.    He is part of a study with CV, EP comparing Eliquis vs ASA. He is unaware if he is on Eliquis or Placebo however stopped the trial medicine this past Friday.     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     Colon polyp 2013    repeat 2018    Depression, recurrent     Diastolic dysfunction     DJD (degenerative joint disease) of knee 10/14/2013    Hyperlipidemia     Hypertension     Murmur     Paroxysmal VT 2016    Pericarditis with effusion     Sleep apnea         Past Surgical History:  Past Surgical History:   Procedure Laterality Date    CARDIAC DEFIBRILLATOR PLACEMENT  10/06/2014    COLONOSCOPY W/ POLYPECTOMY  2013    repeat 2018    COSMETIC SURGERY      EYE SURGERY  Cataract    HERNIA REPAIR      R inguinal    LEFT HEART CATHETERIZATION Left 2018    Procedure: CATHETERIZATION, HEART, LEFT;  Surgeon: Macey Dos Santos MD;  Location[facility] CATH LAB;  Service: Cardiology;  Laterality: Left;  left radial approach  Has [employer] gissel ICD    pace maker   10/06/2014    PERICARDIAL WINDOW  12-15 years ago    tonsillectomy      TONSILLECTOMY      VARICOCELECTOMY          Home Medications:  Prior to Admission medications    Medication Sig Start Date End Date Taking? Authorizing Provider   acetaminophen (TYLENOL) 325  MG tablet Take 650 mg by mouth as needed.  10/7/14  Yes Historical Provider   albuterol (PROVENTIL/VENTOLIN HFA) 90 mcg/actuation inhaler Inhale 1 puff into the lungs once daily. Rescue 12/22/21  Yes Olvin Hutson MD   amiodarone (PACERONE) 200 MG Tab Take 1.5 tablets (300 mg total) by mouth once daily. 1 tab po bid for 6 weeks then 1.5 tb a day (total dose = 300 mg) 2/27/23 2/27/24 Yes Nirav Baldwin MD   bepotastine besilate (BEPREVE) 1.5 % Drop Bepreve 1.5 % eye drops   Apply by ophthalmic route as needed for 50 days.   Yes Historical Provider   carvediloL (COREG) 25 MG tablet Take 1 tablet (25 mg total) by mouth 2 (two) times daily. 3/6/23  Yes Belinda Yang NP   co-enzyme Q-10 30 mg capsule Take 30 mg by mouth once daily.    Yes Historical Provider   dapagliflozin (FARXIGA) 10 mg tablet TAKE 1 TABLET BY MOUTH ONCE DAILY 2/1/23  Yes DIANA Chapman   ENTRESTO  mg per tablet TAKE 1 TABLET BY MOUTH TWICE DAILY 3/17/23  Yes DIANA Chapman   ferrous sulfate (FEOSOL) 325 mg (65 mg iron) Tab tablet Take 325 mg by mouth daily with breakfast.   Yes Historical Provider   fluticasone furoate (ARNUITY ELLIPTA) 100 mcg/actuation inhaler Inhale 1 puff into the lungs once daily. 10/17/22  Yes Kaur Jones MD   furosemide (LASIX) 40 MG tablet TAKE 1 TABLET BY MOUTH ONCE DAILY 3/24/23  Yes Nirav Baldwin MD   glucosamine-chondroitin 500-400 mg tablet Take 1 tablet by mouth once daily.    Yes Historical Provider   INV apixaban/placebo tablet Take 1 tablets (5 mg) by mouth 2 (two) times daily. FOR INVESTIGATIONAL USE ONLY. Protocol: [place] 2017.307 subject # : [id number] 10/19/22  Yes Corina Silva NP   INV aspirin/placebo tablet Take 1 tablet (81 mg total) by mouth once daily. FOR INVESTIGATIONAL USE ONLY. Patient Study #: [id number] Protocol: [place] 2017.307 10/19/22  Yes Corina Silva NP   mexiletine (MEXITIL) 150 MG Cap TAKE 2 CAPSULES BY MOUTH 3 TIMES DAILY 4/12/23  Yes  Virgie Jaramillo, DEEPALIP-C   multivitamin (THERAGRAN) tablet Take 1 tablet by mouth once daily.   Yes Historical Provider   pravastatin (PRAVACHOL) 80 MG tablet Take 1 tablet (80 mg total) by mouth once daily. 3/7/23  Yes Olvin Hutson MD   RESTASIS 0.05 % ophthalmic emulsion Place 1 drop into both eyes 2 (two) times daily. 1/5/22  Yes Historical Provider   spironolactone (ALDACTONE) 25 MG tablet TAKE 1 TABLET BY MOUTH TWICE DAILY 2/9/23  Yes Nirav Baldwin MD   traZODone (DESYREL) 300 MG tablet Take 1 tablet  by mouth nightly. 4/10/23  Yes Olvin Hutson MD   sacubitriL-valsartan (ENTRESTO) 49-51 mg per tablet TAKE 1 TABLET BY MOUTH TWICE DAILY 4/17/23 4/19/23  Olvin Hutson MD        Allergies:  Review of patient's allergies indicates:  No Known Allergies     Social History:   Social History     Socioeconomic History    Marital status:    Tobacco Use    Smoking status: Never    Smokeless tobacco: Never   Substance and Sexual Activity    Alcohol use: Yes     Comment: occasional glass of wine on social occasions    Drug use: No    Sexual activity: Not Currently       Family History:  Family History   Problem Relation Age of Onset    Hyperlipidemia Mother     Arrhythmia Mother     Arthritis Mother     Asthma Mother     COPD Mother     Heart disease Father 48    Heart attack Father 48    Hypertension Father     Arthritis Father     Diabetes Father     Hypertension Maternal Grandmother     Hypertension Maternal Grandfather     Heart disease Paternal Grandmother     Hypertension Paternal Grandmother     Heart attack Paternal Grandmother     Stroke Paternal Grandmother     Heart disease Paternal Grandfather     Hypertension Paternal Grandfather     Heart attack Paternal Grandfather     Cancer Paternal Grandfather        ROS: No acute cardiac events, no acute respiratory complaints.     Physical Exam (all patients):    BP 98/66 (BP Location: Left arm, Patient Position: Lying)   Pulse 77   Temp 98.1  "°F (36.7 °C) (Temporal)   Resp 18   Ht 5' 9" (1.753 m)   Wt 108.9 kg (240 lb)   SpO2 96%   BMI 35.44 kg/m²   Lungs: Clear to auscultation bilaterally, respirations unlabored  Heart: Regular rate and rhythm, S1 and S2 normal, no obvious murmurs  Abdomen:         Soft, non-tender, bowel sounds normal, no masses, no organomegaly    Lab Results   Component Value Date    WBC 6.26 03/24/2023    MCV 97 03/24/2023    RDW 12.9 03/24/2023     03/24/2023    INR 1.0 07/06/2018    GLU 84 02/27/2023    HGBA1C 6.0 (H) 09/29/2022    BUN 13 02/27/2023     (L) 02/27/2023    K 4.7 02/27/2023    CL 98 02/27/2023        SEDATION PLAN: per anesthesia      History reviewed, vital signs satisfactory, cardiopulmonary status satisfactory, sedation options, risks and plans have been discussed with the patient  All their questions were answered and the patient agrees to the sedation procedures as planned and the patient is deemed an appropriate candidate for the sedation as planned.    The risks, benefits and alternatives of the procedure were discussed with the patient in detail. This discussion was had in the presence of his wife and endoscopy staff. The risks include, risks of adverse reaction to sedation requiring the use of reversal agents, bleeding requiring blood transfusion, perforation requiring surgical intervention and technical failure. Other risks include aspiration leading to respiratory distress and respiratory failure resulting in endotracheal intubation and mechanical ventilation including death. If anesthesia is being utilized for this procedure, it is up to the anesthesiologist to determine airway safety including elective endotracheal intubation. Questions were answered, they agree to proceed. There was no language barriers.      Procedure explained to patient, informed consent obtained and placed in chart.    Isabella Del Cid  4/19/2023  8:37 AM     "

## 2023-04-19 NOTE — TRANSFER OF CARE
"Anesthesia Transfer of Care Note    Patient: Randy Yap    Procedure(s) Performed: Procedure(s) (LRB):  COLONOSCOPY 4/4-cc/bt clearance recedived (N/A)    Patient location: PACU    Anesthesia Type: MAC    Transport from OR: Transported from OR on room air with adequate spontaneous ventilation    Post pain: adequate analgesia    Post assessment: no apparent anesthetic complications and tolerated procedure well    Post vital signs: stable    Level of consciousness: sedated    Nausea/Vomiting: no nausea/vomiting    Complications: none    Transfer of care protocol was followed      Last vitals:   Visit Vitals  BP 98/66 (BP Location: Left arm, Patient Position: Lying)   Pulse 77   Temp 36.7 °C (98.1 °F) (Temporal)   Resp 18   Ht 5' 9" (1.753 m)   Wt 108.9 kg (240 lb)   SpO2 96%   BMI 35.44 kg/m²     "

## 2023-04-20 ENCOUNTER — PATIENT MESSAGE (OUTPATIENT)
Dept: CARDIOLOGY | Facility: CLINIC | Age: 73
End: 2023-04-20
Payer: MEDICARE

## 2023-04-20 VITALS
WEIGHT: 240 LBS | OXYGEN SATURATION: 96 % | SYSTOLIC BLOOD PRESSURE: 111 MMHG | DIASTOLIC BLOOD PRESSURE: 78 MMHG | HEIGHT: 69 IN | RESPIRATION RATE: 18 BRPM | BODY MASS INDEX: 35.55 KG/M2 | TEMPERATURE: 97 F | HEART RATE: 78 BPM

## 2023-04-20 NOTE — PROGRESS NOTES
See comments below and call patient to discuss.   Please close encounter when done -- no need to route back to me.  Thanks  EF has once again normalized - keep same meds

## 2023-04-26 LAB
FINAL PATHOLOGIC DIAGNOSIS: NORMAL
Lab: NORMAL

## 2023-04-29 NOTE — PROGRESS NOTES
The polyps removed were precancerous also known as adenomas. They were completely removed. Guidelines recommend a repeat colonoscopy in 3 years. We will send you a reminder as the time nears.      Thanks for trusting us with your healthcare needs and using [facility].     Sincerely,    Isabella Del Cid M.D.

## 2023-05-07 DIAGNOSIS — Z79.899 ENCOUNTER FOR LONG-TERM (CURRENT) USE OF MEDICATIONS: ICD-10-CM

## 2023-05-07 DIAGNOSIS — E11.65 TYPE 2 DIABETES MELLITUS WITH HYPERGLYCEMIA, WITHOUT LONG-TERM CURRENT USE OF INSULIN: Chronic | ICD-10-CM

## 2023-05-07 NOTE — TELEPHONE ENCOUNTER
No care due was identified.  Health [facility] Embedded Care Due Messages. Reference number: [id number].   5/07/2023 1:32:07 AM CDT

## 2023-05-08 RX ORDER — MEXILETINE HYDROCHLORIDE 150 MG/1
CAPSULE ORAL
Qty: 60 CAPSULE | Refills: 0 | Status: SHIPPED | OUTPATIENT
Start: 2023-05-08 | End: 2023-05-15

## 2023-05-08 RX ORDER — SEMAGLUTIDE 1.34 MG/ML
INJECTION, SOLUTION SUBCUTANEOUS
Qty: 3 ML | Refills: 0 | Status: SHIPPED | OUTPATIENT
Start: 2023-05-08 | End: 2023-06-19

## 2023-05-08 RX ORDER — TRAZODONE HYDROCHLORIDE 300 MG/1
TABLET ORAL
Qty: 30 TABLET | Refills: 0 | OUTPATIENT
Start: 2023-05-08

## 2023-05-08 RX ORDER — TRAZODONE HYDROCHLORIDE 150 MG/1
TABLET ORAL
Qty: 90 TABLET | Refills: 0 | Status: SHIPPED | OUTPATIENT
Start: 2023-05-08 | End: 2023-08-03

## 2023-05-08 RX ORDER — SPIRONOLACTONE 25 MG/1
TABLET ORAL
Qty: 180 TABLET | Refills: 0 | Status: ON HOLD | OUTPATIENT
Start: 2023-05-08 | End: 2023-08-19 | Stop reason: HOSPADM

## 2023-05-08 NOTE — TELEPHONE ENCOUNTER
No care due was identified.  Health [facility] Embedded Care Due Messages. Reference number: [id number].   5/08/2023 9:42:20 AM CDT

## 2023-05-08 NOTE — TELEPHONE ENCOUNTER
Refill Decision Note   Randy Yap  is requesting a refill authorization.  Brief Assessment and Rationale for Refill:  Quick Discontinue     Medication Therapy Plan:  Duplicate      Comments:     Note composed:9:40 AM 05/08/2023

## 2023-05-08 NOTE — TELEPHONE ENCOUNTER
Refill Routing Note   Medication(s) are not appropriate for processing by [facility] for the following reason(s):      New or recently adjusted medication: Ozempic, Trazodone  No active prescription written by PCP: Ozempic    [facility] action(s):  Defer Care Due:  None identified          Appointments  past 12m or future 3m with PCP    Date Provider   Last Visit   3/7/2023 Olvin Hutson MD   Next Visit   5/7/2023 Olvin Hutson MD   ED visits in past 90 days: 0        Note composed:9:43 AM 05/08/2023

## 2023-05-15 RX ORDER — MEXILETINE HYDROCHLORIDE 150 MG/1
CAPSULE ORAL
Qty: 60 CAPSULE | Refills: 0 | Status: SHIPPED | OUTPATIENT
Start: 2023-05-15 | End: 2023-05-23

## 2023-05-19 ENCOUNTER — CLINICAL SUPPORT (OUTPATIENT)
Dept: CARDIOLOGY | Facility: HOSPITAL | Age: 73
End: 2023-05-19
Payer: MEDICARE

## 2023-05-19 DIAGNOSIS — Z95.810 PRESENCE OF AUTOMATIC (IMPLANTABLE) CARDIAC DEFIBRILLATOR: ICD-10-CM

## 2023-05-19 PROCEDURE — 93295 CARDIAC DEVICE CHECK - REMOTE: ICD-10-PCS | Mod: ,,, | Performed by: INTERNAL MEDICINE

## 2023-05-19 PROCEDURE — 93295 DEV INTERROG REMOTE 1/2/MLT: CPT | Mod: ,,, | Performed by: INTERNAL MEDICINE

## 2023-05-19 PROCEDURE — 93296 REM INTERROG EVL PM/IDS: CPT | Performed by: INTERNAL MEDICINE

## 2023-05-23 RX ORDER — MEXILETINE HYDROCHLORIDE 150 MG/1
CAPSULE ORAL
Qty: 60 CAPSULE | Refills: 0 | Status: SHIPPED | OUTPATIENT
Start: 2023-05-23 | End: 2023-05-31

## 2023-05-31 ENCOUNTER — EXTERNAL CHRONIC CARE MANAGEMENT (OUTPATIENT)
Dept: PRIMARY CARE CLINIC | Facility: CLINIC | Age: 73
End: 2023-05-31
Payer: MEDICARE

## 2023-05-31 PROCEDURE — 99439 PR CHRONIC CARE MGMT, EA ADDTL 20 MIN: ICD-10-PCS | Mod: S$PBB,,, | Performed by: FAMILY MEDICINE

## 2023-05-31 PROCEDURE — 99439 CHRNC CARE MGMT STAF EA ADDL: CPT | Mod: PBBFAC,PO | Performed by: FAMILY MEDICINE

## 2023-05-31 PROCEDURE — 99490 CHRNC CARE MGMT STAFF 1ST 20: CPT | Mod: S$PBB,,, | Performed by: FAMILY MEDICINE

## 2023-05-31 PROCEDURE — 99490 CHRNC CARE MGMT STAFF 1ST 20: CPT | Mod: PBBFAC,25,PO | Performed by: FAMILY MEDICINE

## 2023-05-31 PROCEDURE — 99490 PR CHRONIC CARE MGMT, 1ST 20 MIN: ICD-10-PCS | Mod: S$PBB,,, | Performed by: FAMILY MEDICINE

## 2023-05-31 PROCEDURE — 99439 CHRNC CARE MGMT STAF EA ADDL: CPT | Mod: S$PBB,,, | Performed by: FAMILY MEDICINE

## 2023-05-31 RX ORDER — MEXILETINE HYDROCHLORIDE 150 MG/1
CAPSULE ORAL
Qty: 60 CAPSULE | Refills: 0 | Status: SHIPPED | OUTPATIENT
Start: 2023-05-31 | End: 2023-06-13 | Stop reason: SDUPTHER

## 2023-06-13 RX ORDER — MEXILETINE HYDROCHLORIDE 150 MG/1
300 CAPSULE ORAL EVERY 8 HOURS
Qty: 540 CAPSULE | Refills: 3 | Status: ON HOLD | OUTPATIENT
Start: 2023-06-13 | End: 2023-08-19 | Stop reason: HOSPADM

## 2023-06-13 RX ORDER — MEXILETINE HYDROCHLORIDE 150 MG/1
300 CAPSULE ORAL 3 TIMES DAILY
Qty: 60 CAPSULE | Refills: 0 | Status: SHIPPED | OUTPATIENT
Start: 2023-06-13 | End: 2023-06-13 | Stop reason: CLARIF

## 2023-06-18 DIAGNOSIS — E11.65 TYPE 2 DIABETES MELLITUS WITH HYPERGLYCEMIA, WITHOUT LONG-TERM CURRENT USE OF INSULIN: Chronic | ICD-10-CM

## 2023-06-18 NOTE — TELEPHONE ENCOUNTER
Care Due:                  Date            Visit Type   Department     Provider  --------------------------------------------------------------------------------                                EP -                              PRIMARY      [facility] FAMILY  Last Visit: 03-      CARE ([facility])   MEDICINE       Olvin Hutson  Next Visit: None Scheduled  None         None Found                                                            Last  Test          Frequency    Reason                     Performed    Due Date  --------------------------------------------------------------------------------    HBA1C.......  6 months...  OZEMPIC..................  09- 03-    [facility] Embedded Care Due Messages. Reference number: [id number].   6/18/2023 1:01:19 PM CDT

## 2023-06-19 DIAGNOSIS — I48.0 PAF (PAROXYSMAL ATRIAL FIBRILLATION): ICD-10-CM

## 2023-06-19 RX ORDER — FUROSEMIDE 40 MG/1
40 TABLET ORAL DAILY
Qty: 90 TABLET | Refills: 3 | Status: SHIPPED | OUTPATIENT
Start: 2023-06-19

## 2023-06-19 RX ORDER — SEMAGLUTIDE 1.34 MG/ML
INJECTION, SOLUTION SUBCUTANEOUS
Qty: 9 ML | Refills: 0 | Status: SHIPPED | OUTPATIENT
Start: 2023-06-19 | End: 2023-06-22

## 2023-06-19 NOTE — TELEPHONE ENCOUNTER
Refill Routing Note   Medication(s) are not appropriate for processing by [facility] for the following reason(s):      Required labs outdated    [facility] action(s):  Defer Care Due:  Labs due          Appointments  past 12m or future 3m with PCP    Date Provider   Last Visit   3/7/2023 Olvin Hutson MD   Next Visit   Visit date not found Olvin Hutson MD   ED visits in past 90 days: 0        Note composed:10:52 AM 06/19/2023

## 2023-06-21 DIAGNOSIS — E11.65 TYPE 2 DIABETES MELLITUS WITH HYPERGLYCEMIA, WITHOUT LONG-TERM CURRENT USE OF INSULIN: Chronic | ICD-10-CM

## 2023-06-21 NOTE — TELEPHONE ENCOUNTER
No care due was identified.  Health [facility] Embedded Care Due Messages. Reference number: [id number].   6/21/2023 1:01:49 PM CDT

## 2023-06-22 RX ORDER — SEMAGLUTIDE 1.34 MG/ML
INJECTION, SOLUTION SUBCUTANEOUS
Qty: 9 ML | Refills: 0 | Status: SHIPPED | OUTPATIENT
Start: 2023-06-22 | End: 2023-10-01

## 2023-06-22 NOTE — TELEPHONE ENCOUNTER
Refill Routing Note   Medication(s) are not appropriate for processing by [facility] for the following reason(s):      Required labs outdated    [facility] action(s):  Defer Care Due:  None identified          Appointments  past 12m or future 3m with PCP    Date Provider   Last Visit   3/7/2023 Olvin Hutson MD   Next Visit   Visit date not found Ovlin Hutson MD   ED visits in past 90 days: 0        Note composed:8:58 AM 06/22/2023

## 2023-06-27 ENCOUNTER — PATIENT MESSAGE (OUTPATIENT)
Dept: ADMINISTRATIVE | Facility: HOSPITAL | Age: 73
End: 2023-06-27
Payer: MEDICARE

## 2023-06-30 ENCOUNTER — EXTERNAL CHRONIC CARE MANAGEMENT (OUTPATIENT)
Dept: PRIMARY CARE CLINIC | Facility: CLINIC | Age: 73
End: 2023-06-30
Payer: MEDICARE

## 2023-06-30 DIAGNOSIS — Z00.01 ENCOUNTER FOR GENERAL ADULT MEDICAL EXAMINATION WITH ABNORMAL FINDINGS: ICD-10-CM

## 2023-06-30 PROCEDURE — 99439 CHRNC CARE MGMT STAF EA ADDL: CPT | Mod: PBBFAC,PO | Performed by: FAMILY MEDICINE

## 2023-06-30 PROCEDURE — 99439 PR CHRONIC CARE MGMT, EA ADDTL 20 MIN: ICD-10-PCS | Mod: S$PBB,,, | Performed by: FAMILY MEDICINE

## 2023-06-30 PROCEDURE — 99439 CHRNC CARE MGMT STAF EA ADDL: CPT | Mod: S$PBB,,, | Performed by: FAMILY MEDICINE

## 2023-06-30 PROCEDURE — 99490 CHRNC CARE MGMT STAFF 1ST 20: CPT | Mod: S$PBB,,, | Performed by: FAMILY MEDICINE

## 2023-06-30 PROCEDURE — 99490 PR CHRONIC CARE MGMT, 1ST 20 MIN: ICD-10-PCS | Mod: S$PBB,,, | Performed by: FAMILY MEDICINE

## 2023-06-30 PROCEDURE — 99490 CHRNC CARE MGMT STAFF 1ST 20: CPT | Mod: PBBFAC,25,PO | Performed by: FAMILY MEDICINE

## 2023-07-02 RX ORDER — FLUTICASONE FUROATE 100 UG/1
1 POWDER RESPIRATORY (INHALATION) DAILY
Qty: 30 EACH | Refills: 5 | Status: SHIPPED | OUTPATIENT
Start: 2023-07-02 | End: 2024-03-26

## 2023-07-07 ENCOUNTER — LAB VISIT (OUTPATIENT)
Dept: LAB | Facility: HOSPITAL | Age: 73
End: 2023-07-07
Attending: NURSE PRACTITIONER
Payer: MEDICARE

## 2023-07-07 ENCOUNTER — PATIENT MESSAGE (OUTPATIENT)
Dept: INFECTIOUS DISEASES | Facility: CLINIC | Age: 73
End: 2023-07-07
Payer: MEDICARE

## 2023-07-07 ENCOUNTER — RESEARCH ENCOUNTER (OUTPATIENT)
Dept: RESEARCH | Facility: HOSPITAL | Age: 73
End: 2023-07-07
Payer: MEDICARE

## 2023-07-07 ENCOUNTER — OFFICE VISIT (OUTPATIENT)
Dept: FAMILY MEDICINE | Facility: CLINIC | Age: 73
End: 2023-07-07
Payer: MEDICARE

## 2023-07-07 VITALS
OXYGEN SATURATION: 98 % | BODY MASS INDEX: 36.36 KG/M2 | TEMPERATURE: 98 F | DIASTOLIC BLOOD PRESSURE: 66 MMHG | SYSTOLIC BLOOD PRESSURE: 92 MMHG | HEART RATE: 76 BPM | WEIGHT: 246.19 LBS

## 2023-07-07 DIAGNOSIS — H65.90 FLUID LEVEL BEHIND TYMPANIC MEMBRANE, UNSPECIFIED LATERALITY: ICD-10-CM

## 2023-07-07 DIAGNOSIS — R42 LIGHTHEADEDNESS: ICD-10-CM

## 2023-07-07 DIAGNOSIS — F43.9 STRESS: ICD-10-CM

## 2023-07-07 DIAGNOSIS — R68.89 OTHER GENERAL SYMPTOMS AND SIGNS: ICD-10-CM

## 2023-07-07 DIAGNOSIS — I95.9 HYPOTENSION, UNSPECIFIED HYPOTENSION TYPE: ICD-10-CM

## 2023-07-07 DIAGNOSIS — I95.9 HYPOTENSION, UNSPECIFIED HYPOTENSION TYPE: Primary | ICD-10-CM

## 2023-07-07 LAB
ANION GAP SERPL CALC-SCNC: 8 MMOL/L (ref 8–16)
BASOPHILS # BLD AUTO: 0.06 K/UL (ref 0–0.2)
BASOPHILS NFR BLD: 0.7 % (ref 0–1.9)
BUN SERPL-MCNC: 14 MG/DL (ref 8–23)
CALCIUM SERPL-MCNC: 9.5 MG/DL (ref 8.7–10.5)
CHLORIDE SERPL-SCNC: 96 MMOL/L (ref 95–110)
CO2 SERPL-SCNC: 28 MMOL/L (ref 23–29)
CREAT SERPL-MCNC: 1.1 MG/DL (ref 0.5–1.4)
DIFFERENTIAL METHOD: ABNORMAL
EOSINOPHIL # BLD AUTO: 0.1 K/UL (ref 0–0.5)
EOSINOPHIL NFR BLD: 1.3 % (ref 0–8)
ERYTHROCYTE [DISTWIDTH] IN BLOOD BY AUTOMATED COUNT: 12.2 % (ref 11.5–14.5)
EST. GFR  (NO RACE VARIABLE): >60 ML/MIN/1.73 M^2
GLUCOSE SERPL-MCNC: 88 MG/DL (ref 70–110)
HCT VFR BLD AUTO: 43.2 % (ref 40–54)
HGB BLD-MCNC: 14 G/DL (ref 14–18)
IMM GRANULOCYTES # BLD AUTO: 0.02 K/UL (ref 0–0.04)
IMM GRANULOCYTES NFR BLD AUTO: 0.2 % (ref 0–0.5)
LYMPHOCYTES # BLD AUTO: 2.4 K/UL (ref 1–4.8)
LYMPHOCYTES NFR BLD: 27.8 % (ref 18–48)
MCH RBC QN AUTO: 32.4 PG (ref 27–31)
MCHC RBC AUTO-ENTMCNC: 32.4 G/DL (ref 32–36)
MCV RBC AUTO: 100 FL (ref 82–98)
MONOCYTES # BLD AUTO: 1.2 K/UL (ref 0.3–1)
MONOCYTES NFR BLD: 13.3 % (ref 4–15)
NEUTROPHILS # BLD AUTO: 5 K/UL (ref 1.8–7.7)
NEUTROPHILS NFR BLD: 56.7 % (ref 38–73)
NRBC BLD-RTO: 0 /100 WBC
PLATELET # BLD AUTO: 347 K/UL (ref 150–450)
PMV BLD AUTO: 8.4 FL (ref 9.2–12.9)
POTASSIUM SERPL-SCNC: 4.6 MMOL/L (ref 3.5–5.1)
RBC # BLD AUTO: 4.32 M/UL (ref 4.6–6.2)
SODIUM SERPL-SCNC: 132 MMOL/L (ref 136–145)
TSH SERPL DL<=0.005 MIU/L-ACNC: 2.42 UIU/ML (ref 0.4–4)
WBC # BLD AUTO: 8.73 K/UL (ref 3.9–12.7)

## 2023-07-07 PROCEDURE — 93010 EKG 12-LEAD: ICD-10-PCS | Mod: S$PBB,,, | Performed by: INTERNAL MEDICINE

## 2023-07-07 PROCEDURE — 99214 PR OFFICE/OUTPT VISIT, EST, LEVL IV, 30-39 MIN: ICD-10-PCS | Mod: S$PBB,,, | Performed by: NURSE PRACTITIONER

## 2023-07-07 PROCEDURE — 99214 OFFICE O/P EST MOD 30 MIN: CPT | Mod: S$PBB,,, | Performed by: NURSE PRACTITIONER

## 2023-07-07 PROCEDURE — 99215 OFFICE O/P EST HI 40 MIN: CPT | Mod: PBBFAC,PO | Performed by: NURSE PRACTITIONER

## 2023-07-07 PROCEDURE — 85025 COMPLETE CBC W/AUTO DIFF WBC: CPT | Performed by: NURSE PRACTITIONER

## 2023-07-07 PROCEDURE — 36415 COLL VENOUS BLD VENIPUNCTURE: CPT | Mod: PO | Performed by: NURSE PRACTITIONER

## 2023-07-07 PROCEDURE — 93010 ELECTROCARDIOGRAM REPORT: CPT | Mod: S$PBB,,, | Performed by: INTERNAL MEDICINE

## 2023-07-07 PROCEDURE — 93005 ELECTROCARDIOGRAM TRACING: CPT | Mod: PBBFAC,PO | Performed by: INTERNAL MEDICINE

## 2023-07-07 PROCEDURE — 99999 PR PBB SHADOW E&M-EST. PATIENT-LVL V: ICD-10-PCS | Mod: PBBFAC,,, | Performed by: NURSE PRACTITIONER

## 2023-07-07 PROCEDURE — 80048 BASIC METABOLIC PNL TOTAL CA: CPT | Performed by: NURSE PRACTITIONER

## 2023-07-07 PROCEDURE — 84443 ASSAY THYROID STIM HORMONE: CPT | Performed by: NURSE PRACTITIONER

## 2023-07-07 PROCEDURE — 99999 PR PBB SHADOW E&M-EST. PATIENT-LVL V: CPT | Mod: PBBFAC,,, | Performed by: NURSE PRACTITIONER

## 2023-07-07 RX ORDER — MECLIZINE HCL 12.5 MG 12.5 MG/1
12.5 TABLET ORAL 3 TIMES DAILY PRN
Qty: 30 TABLET | Refills: 0 | Status: SHIPPED | OUTPATIENT
Start: 2023-07-07

## 2023-07-07 NOTE — PATIENT INSTRUCTIONS
Hydrate well  Rest  Meclizine cause drowsiness  F/U with cardiology ASAP  Report to ER immediately if symptoms worsen or persist

## 2023-07-07 NOTE — PROGRESS NOTES
"Subjective     Patient ID: Randy Yap is a 73 y.o. male.    Chief Complaint: Ear Drainage    Ear Fullness   There is pain in both ears. This is a new problem. The current episode started in the past 7 days. The problem occurs constantly. The problem has been unchanged. There has been no fever. The pain is at a severity of 0/10. The patient is experiencing no pain. Pertinent negatives include no abdominal pain, coughing, diarrhea, ear discharge, headaches, hearing loss, neck pain, rash, rhinorrhea, sore throat or vomiting. He has tried nothing for the symptoms. The treatment provided no relief. There is no history of a chronic ear infection, hearing loss or a tympanostomy tube.   Dizziness:   Chronicity:  New  Onset:  In the past 7 days  Progression since onset:  Waxing and waning  Frequency:  Every few hours  Severity:  Mild  Duration:  Off/on all day  Dizziness characteristics:  Lightheaded/impending faint, sensation of movement and off-balance   Associated symptoms: ear congestion, aural fullness and light-headedness.no hearing loss, no ear pain, no fever, no headaches, no tinnitus, no nausea, no vomiting, no diaphoresis, no weakness, no visual disturbances, no syncope, no palpitations, no panic, no facial weakness, no slurred speech, no numbness in extremities and no chest pain.  Aggravated by:  Position changes  Treatments tried:  Nothing (Pt states has had vertigo and has taken meclizine in the past, which helped.)   PMH includes: cardiac surgery.no strokes, no neurologic disease, no head trauma, no balance testing, no ear trauma, no ear surgery, no head trauma, no ear infections, no anxiety, no ear tubes, no environmental allergies, no MRI head and no CT head.  BP low; pt has extensive cardiac history; sees cardiology. Pt states, "my blood pressure has been running in the 90s/60s over the past month. Pt also states has been experiencing stress at home; has had multiple losses and is currently caring for " an ill family member; denies depression/anxiety/ SI/HI. Pt has no other complaints today.  Past Medical History:   Diagnosis Date    Asthma     Cardiomyopathy due to systemic disease     Colon polyp 5/2013    repeat 5/2018    Depression, recurrent     Diastolic dysfunction     DJD (degenerative joint disease) of knee 10/14/2013    Hyperlipidemia     Hypertension     Murmur     Paroxysmal VT 5/21/2016    Pericarditis with effusion     Sleep apnea      Social History     Socioeconomic History    Marital status:    Tobacco Use    Smoking status: Never    Smokeless tobacco: Never   Substance and Sexual Activity    Alcohol use: Yes     Comment: occasional glass of wine on social occasions    Drug use: No    Sexual activity: Not Currently     Past Surgical History:   Procedure Laterality Date    CARDIAC DEFIBRILLATOR PLACEMENT  10/06/2014    COLONOSCOPY N/A 4/19/2023    Procedure: COLONOSCOPY 4/4-cc/bt clearance recedived;  Surgeon: Isabella Del Cid MD;  Location: [facility] ENDO;  Service: Endoscopy;  Laterality: N/A;    COLONOSCOPY W/ POLYPECTOMY  05/21/2013    repeat 5/21/2018    COSMETIC SURGERY      EYE SURGERY  Cataract    HERNIA REPAIR      R inguinal    LEFT HEART CATHETERIZATION Left 07/09/2018    Procedure: CATHETERIZATION, HEART, LEFT;  Surgeon: Macey Dos Santos MD;  Location: [facility] CATH LAB;  Service: Cardiology;  Laterality: Left;  left radial approach  Has [employer] g[employer] ICD    pace maker   10/06/2014    PERICARDIAL WINDOW  12-15 years ago    tonsillectomy      TONSILLECTOMY      VARICOCELECTOMY         Review of Systems   Constitutional: Negative.  Negative for diaphoresis and fever.   HENT:  Negative for ear discharge, ear pain, hearing loss, rhinorrhea, sore throat and tinnitus.         Bilateral ear fullness   Eyes: Negative.    Respiratory: Negative.  Negative for cough.    Cardiovascular: Negative.  Negative for chest pain, palpitations and syncope.   Gastrointestinal: Negative.  Negative for abdominal pain,  diarrhea, nausea and vomiting.   Endocrine: Negative.    Genitourinary: Negative.    Musculoskeletal: Negative.  Negative for neck pain.   Integumentary:  Negative for rash. Negative.   Allergic/Immunologic: Negative.  Negative for environmental allergies.   Neurological:  Positive for dizziness and light-headedness. Negative for weakness and headaches.   Psychiatric/Behavioral: Negative.          Objective     Physical Exam  Vitals and nursing note reviewed.   Constitutional:       Appearance: Normal appearance.   HENT:      Head: Normocephalic.      Right Ear: Ear canal and external ear normal. A middle ear effusion is present.      Left Ear: Ear canal and external ear normal. A middle ear effusion is present.      Nose: Nose normal.      Mouth/Throat:      Mouth: Mucous membranes are moist.      Pharynx: Oropharynx is clear.   Eyes:      Conjunctiva/sclera: Conjunctivae normal.      Pupils: Pupils are equal, round, and reactive to light.   Cardiovascular:      Rate and Rhythm: Normal rate and regular rhythm.      Pulses: Normal pulses.      Heart sounds: Normal heart sounds.   Pulmonary:      Effort: Pulmonary effort is normal.      Breath sounds: Normal breath sounds.   Abdominal:      General: Bowel sounds are normal.      Palpations: Abdomen is soft.   Musculoskeletal:         General: Normal range of motion.      Cervical back: Normal range of motion and neck supple.   Skin:     General: Skin is warm and dry.      Capillary Refill: Capillary refill takes 2 to 3 seconds.   Neurological:      Mental Status: He is alert and oriented to person, place, and time.   Psychiatric:         Mood and Affect: Mood normal.         Behavior: Behavior normal.         Thought Content: Thought content normal.         Judgment: Judgment normal.          Assessment and Plan     1. Hypotension, unspecified hypotension type  2. Lightheadedness  -     IN OFFICE EKG 12-LEAD (to Muse)  -     Basic Metabolic Panel; Future; Expected  date: 07/07/2023  -     TSH; Future; Expected date: 07/07/2023  -     CBC W/ AUTO DIFFERENTIAL; Future; Expected date: 07/07/2023  -     Ambulatory referral/consult to Cardiology; Future; Expected date: 07/14/2023  -     Orthostatic blood pressure  Hydrate well  Rest  F/U with cardiology ASAP  Report to ER immediately if symptoms worsen or persist    3. Stress  -     Ambulatory referral/consult to Psychology; Future; Expected date: 07/14/2023    4. Other general symptoms and signs  -     TSH; Future; Expected date: 07/07/2023    5. Fluid level behind tympanic membrane, unspecified laterality  6. Vertigo  -     meclizine (ANTIVERT) 12.5 mg tablet; Take 1 tablet (12.5 mg total) by mouth 3 (three) times daily as needed for Dizziness.  Dispense: 30 tablet; Refill: 0                       No follow-ups on file.

## 2023-07-10 ENCOUNTER — TELEPHONE (OUTPATIENT)
Dept: FAMILY MEDICINE | Facility: CLINIC | Age: 73
End: 2023-07-10
Payer: MEDICARE

## 2023-07-10 DIAGNOSIS — Z79.899 OTHER LONG TERM (CURRENT) DRUG THERAPY: ICD-10-CM

## 2023-07-10 DIAGNOSIS — R79.89 ABNORMAL CBC: Primary | ICD-10-CM

## 2023-07-10 NOTE — TELEPHONE ENCOUNTER
----- Message from Jhony Zapata sent at 7/10/2023  4:04 PM CDT -----  Contact: Randy  .Type:  Patient Returning Call    Who Called:Randy   Who Left Message for Patient:nurse   Does the patient know what this is regarding?:results   Would the patient rather a call back or a response via [employer]? Call   Best Call Back Number:[phone]  Additional Information: returning call

## 2023-07-10 NOTE — TELEPHONE ENCOUNTER
----- Message from Marielle Schilling DNP sent at 7/10/2023  3:52 PM CDT -----  Reviewed; sodium below normal range; may by r/t diuretic; recommend increase electrolyte intake (eg. Gatorade/small amount); f/u with cardiology as advised. CBC abnormal; recommend b12, folate check. TSH WNL.

## 2023-07-11 ENCOUNTER — PATIENT MESSAGE (OUTPATIENT)
Dept: FAMILY MEDICINE | Facility: CLINIC | Age: 73
End: 2023-07-11
Payer: MEDICARE

## 2023-07-12 ENCOUNTER — LAB VISIT (OUTPATIENT)
Dept: LAB | Facility: HOSPITAL | Age: 73
End: 2023-07-12
Payer: MEDICARE

## 2023-07-12 DIAGNOSIS — R79.89 ABNORMAL CBC: ICD-10-CM

## 2023-07-12 DIAGNOSIS — Z79.899 OTHER LONG TERM (CURRENT) DRUG THERAPY: ICD-10-CM

## 2023-07-12 LAB
FOLATE SERPL-MCNC: 14.5 NG/ML (ref 4–24)
VIT B12 SERPL-MCNC: 643 PG/ML (ref 210–950)

## 2023-07-12 PROCEDURE — 82746 ASSAY OF FOLIC ACID SERUM: CPT | Performed by: NURSE PRACTITIONER

## 2023-07-12 PROCEDURE — 82607 VITAMIN B-12: CPT | Performed by: NURSE PRACTITIONER

## 2023-07-12 PROCEDURE — 36415 COLL VENOUS BLD VENIPUNCTURE: CPT | Mod: PO | Performed by: NURSE PRACTITIONER

## 2023-07-20 NOTE — PROGRESS NOTES
7/20/2023     Study: Leia   OnCore/IRB: 2017.307     Sponsor: [employer]     Visit: FINAL VISIT     Patient presented to clinic on 7/07/2023 for visit with Family Medicine (please see note from Marielle Schilling DNP)  Final visit completed today via final EMR review (7/20/2023) CRC sent FedEx pack for patient to return remaining bottles, all CRFs completed in EDC and once bottles have been received CRC will return to pharmacy and reconcile in [facility].

## 2023-07-21 ENCOUNTER — CLINICAL SUPPORT (OUTPATIENT)
Dept: PULMONOLOGY | Facility: CLINIC | Age: 73
End: 2023-07-21
Payer: MEDICARE

## 2023-07-21 ENCOUNTER — OFFICE VISIT (OUTPATIENT)
Dept: PULMONOLOGY | Facility: CLINIC | Age: 73
End: 2023-07-21
Payer: MEDICARE

## 2023-07-21 ENCOUNTER — HOSPITAL ENCOUNTER (OUTPATIENT)
Dept: RADIOLOGY | Facility: HOSPITAL | Age: 73
Discharge: HOME | End: 2023-07-21
Attending: PHYSICIAN ASSISTANT
Payer: MEDICARE

## 2023-07-21 VITALS
HEART RATE: 81 BPM | OXYGEN SATURATION: 95 % | BODY MASS INDEX: 36.2 KG/M2 | DIASTOLIC BLOOD PRESSURE: 62 MMHG | HEIGHT: 69 IN | WEIGHT: 244.38 LBS | RESPIRATION RATE: 18 BRPM | SYSTOLIC BLOOD PRESSURE: 120 MMHG

## 2023-07-21 DIAGNOSIS — R06.02 SOB (SHORTNESS OF BREATH): ICD-10-CM

## 2023-07-21 DIAGNOSIS — J06.9 ACUTE URI: ICD-10-CM

## 2023-07-21 DIAGNOSIS — J45.31 MILD PERSISTENT ASTHMA WITH ACUTE EXACERBATION: Primary | ICD-10-CM

## 2023-07-21 DIAGNOSIS — G47.33 OSA (OBSTRUCTIVE SLEEP APNEA): ICD-10-CM

## 2023-07-21 PROCEDURE — 99999 PR PBB SHADOW E&M-EST. PATIENT-LVL V: CPT | Mod: PBBFAC,,, | Performed by: PHYSICIAN ASSISTANT

## 2023-07-21 PROCEDURE — 71046 X-RAY EXAM CHEST 2 VIEWS: CPT | Mod: TC

## 2023-07-21 PROCEDURE — 99999 PR PBB SHADOW E&M-EST. PATIENT-LVL V: ICD-10-PCS | Mod: PBBFAC,,, | Performed by: PHYSICIAN ASSISTANT

## 2023-07-21 PROCEDURE — 99214 PR OFFICE/OUTPT VISIT, EST, LEVL IV, 30-39 MIN: ICD-10-PCS | Mod: S$PBB,,, | Performed by: PHYSICIAN ASSISTANT

## 2023-07-21 PROCEDURE — 71046 XR CHEST PA AND LATERAL: ICD-10-PCS | Mod: 26,,, | Performed by: RADIOLOGY

## 2023-07-21 PROCEDURE — 95012 NITRIC OXIDE EXP GAS DETER: CPT | Mod: PBBFAC

## 2023-07-21 PROCEDURE — 99214 OFFICE O/P EST MOD 30 MIN: CPT | Mod: S$PBB,,, | Performed by: PHYSICIAN ASSISTANT

## 2023-07-21 PROCEDURE — 71046 X-RAY EXAM CHEST 2 VIEWS: CPT | Mod: 26,,, | Performed by: RADIOLOGY

## 2023-07-21 PROCEDURE — 99215 OFFICE O/P EST HI 40 MIN: CPT | Mod: PBBFAC,25 | Performed by: PHYSICIAN ASSISTANT

## 2023-07-21 RX ORDER — PREDNISONE 20 MG/1
TABLET ORAL
Qty: 20 TABLET | Refills: 0 | Status: SHIPPED | OUTPATIENT
Start: 2023-07-21 | End: 2023-08-02

## 2023-07-21 RX ORDER — AMOXICILLIN AND CLAVULANATE POTASSIUM 875; 125 MG/1; MG/1
1 TABLET, FILM COATED ORAL EVERY 12 HOURS
Qty: 20 TABLET | Refills: 0 | Status: SHIPPED | OUTPATIENT
Start: 2023-07-21 | End: 2023-07-31

## 2023-07-21 NOTE — PROGRESS NOTES
Subjective:       Patient ID: Randy Yap is a 73 y.o. male.    Chief Complaint: Sleep Apnea      7/21/23  72yo male here for 6 months asthma/HONEY follow up  Compliant with CPAP and benefits from use    Complains today of increased shortness of breath, cough, wheeze x 2 weeks, using albuterol inhaler twice daily  Arnuity once daily  Symptoms worsened after he cut grass outside  Also with nasal/sinus congestion      1/23/23  Here for asthma/HONEY follow up  Alfredo, walk, FeNO today  Using Arnuity 200, asthma well controlled  No exacerbation, no signs of infection  Using CPAP 14 and benefits from use  Patient states improved symptoms with use of CPAP. Sleeping more soundly. Waking up feeling more refreshed. Improved daytime sleepiness.  Compliance download reviewed, days with usage > 4 hours is 90%, average APNEA HYPOPNEA INDEX is 1.2    7/22/22  HONEY on CPAP follow up  Received new FFM today, had to repeat sleep study to get new supplies here  MODERATE OBSTRUCTIVE SLEEP APNEA with overall AHI 19.8/hr ( 154 events)  Patient states improved symptoms with use of CPAP. Sleeping more soundly. Waking up feeling more refreshed. Improved daytime sleepiness.  Also with history of asthma, Arnuity 100 daily, uses albuterol every night  Never smoker  No recent exacerbations  Asthma score 14  Manor score 2    4/22/22  73yo male here for follow up of HONEY  Use to see Dr. Jones, last seen in 2020  History of HONEY on CPAP 14  Was seen for acute bronchitis in 2020; denies recent respiratory infection  History of mild intermittent asthma, controlled on albuterol prn  Using FFM for CPAP, requesting new supplies, is currently not due for a new machine  Feels leak on mask, using tape to hold it together  Using [facility] but wants to switch to [facility]  Patient states improved symptoms with use of CPAP. Sleeping more soundly. Waking up feeling more refreshed. Improved daytime sleepiness.  Patient is compliant with CPAP use      EPWORTH  SLEEPINESS SCALE 1/23/2023   Sitting and reading 0   Watching TV 0   Sitting, inactive in a public place (e.g. a theatre or a meeting) 0   As a passenger in a car for an hour without a break 0   Lying down to rest in the afternoon when circumstances permit 0   Sitting and talking to someone 0   Sitting quietly after a lunch without alcohol 0   In a car, while stopped for a few minutes in traffic 0   Total score 0         Immunization History   Administered Date(s) Administered    COVID-19, MRNA, LN-S, PF (Pfizer) (Purple Cap) 01/29/2021, 02/25/2021, 11/03/2021    Influenza (FLUAD) - Quadrivalent - Adjuvanted - PF *Preferred* (65+) 12/04/2020, 09/27/2022    Influenza - Quadrivalent - High Dose - PF (65 years and older) 11/03/2021    Influenza - Quadrivalent - PF *Preferred* (6 months and older) 02/01/2013, 10/14/2013, 10/17/2016    Influenza - Trivalent (ADULT) 02/01/2013, 10/14/2013    Influenza Split 02/01/2013, 10/14/2013    Pneumococcal Conjugate - 13 Valent 09/19/2018    Pneumococcal Polysaccharide - 23 Valent 02/01/2013, 06/04/2015, 09/16/2019    Td (ADULT) 11/19/2013, 11/19/2013    Td (Adult), Unspecified Formulation 11/19/2013    Zoster 05/14/2014    Zoster Recombinant 09/21/2021, 07/28/2022      Tobacco Use: Low Risk     Smoking Tobacco Use: Never    Smokeless Tobacco Use: Never    Passive Exposure: Not on file      Past Medical History:   Diagnosis Date    Asthma     Cardiomyopathy due to systemic disease     Colon polyp 5/2013    repeat 5/2018    Depression, recurrent     Diastolic dysfunction     DJD (degenerative joint disease) of knee 10/14/2013    Hyperlipidemia     Hypertension     Murmur     Paroxysmal VT 5/21/2016    Pericarditis with effusion     Sleep apnea       Current Outpatient Medications on File Prior to Visit   Medication Sig Dispense Refill    acetaminophen (TYLENOL) 325 MG tablet Take 650 mg by mouth as needed.       albuterol (PROVENTIL/VENTOLIN HFA) 90 mcg/actuation inhaler Inhale 1  puff into the lungs once daily. Rescue 18 g 4    amiodarone (PACERONE) 200 MG Tab Take 1.5 tablets (300 mg total) by mouth once daily. 1 tab po bid for 6 weeks then 1.5 tb a day (total dose = 300 mg) 135 tablet 3    bepotastine besilate (BEPREVE) 1.5 % Drop Bepreve 1.5 % eye drops   Apply by ophthalmic route as needed for 50 days.      carvediloL (COREG) 25 MG tablet Take 1 tablet (25 mg total) by mouth 2 (two) times daily. 180 tablet 3    co-enzyme Q-10 30 mg capsule Take 30 mg by mouth once daily.       dapagliflozin (FARXIGA) 10 mg tablet TAKE 1 TABLET BY MOUTH ONCE DAILY 90 tablet 4    ENTRESTO  mg per tablet TAKE 1 TABLET BY MOUTH TWICE DAILY 180 tablet 0    ferrous sulfate (FEOSOL) 325 mg (65 mg iron) Tab tablet Take 325 mg by mouth daily with breakfast.      fluticasone furoate (ARNUITY ELLIPTA) 100 mcg/actuation inhaler Inhale 1 puff into the lungs once daily. 30 each 5    furosemide (LASIX) 40 MG tablet Take 1 tablet (40 mg total) by mouth once daily. 90 tablet 3    glucosamine-chondroitin 500-400 mg tablet Take 1 tablet by mouth once daily.       meclizine (ANTIVERT) 12.5 mg tablet Take 1 tablet (12.5 mg total) by mouth 3 (three) times daily as needed for Dizziness. 30 tablet 0    mexiletine (MEXITIL) 150 MG Cap Take 2 capsules (300 mg total) by mouth every 8 (eight) hours. 540 capsule 3    multivitamin (THERAGRAN) tablet Take 1 tablet by mouth once daily.      OZEMPIC 1 mg/dose (4 mg/3 mL) Inject 1 mg into the skin every 7 days. 9 mL 0    pravastatin (PRAVACHOL) 80 MG tablet Take 1 tablet (80 mg total) by mouth once daily. 90 tablet 4    RESTASIS 0.05 % ophthalmic emulsion Place 1 drop into both eyes 2 (two) times daily.      spironolactone (ALDACTONE) 25 MG tablet TAKE 1 TABLET BY MOUTH TWICE DAILY 180 tablet 0    traZODone (DESYREL) 150 MG tablet TAKE 1 TABLET BY MOUTH EVERY EVENING 90 tablet 0    traZODone (DESYREL) 300 MG tablet Take 1 tablet  by mouth nightly. (Patient not taking: Reported on  "7/7/2023) 30 tablet 0     No current facility-administered medications on file prior to visit.        Review of Systems   Constitutional:  Positive for fatigue. Negative for fever, weight loss, appetite change and weakness.   HENT:  Positive for postnasal drip, rhinorrhea and congestion. Negative for sinus pressure and trouble swallowing.    Respiratory:  Positive for cough, wheezing, dyspnea on extertion and use of rescue inhaler. Negative for sputum production, choking, chest tightness and shortness of breath.    Cardiovascular:  Negative for chest pain and leg swelling.   Musculoskeletal:  Negative for joint swelling.   Gastrointestinal:  Negative for nausea, vomiting and abdominal pain.   Neurological:  Negative for dizziness, weakness and headaches.   All other systems reviewed and are negative.    Objective:       Vitals:    07/21/23 1028   BP: 120/62   Pulse: 81   Resp: 18   SpO2: 95%   Weight: 110.9 kg (244 lb 6.1 oz)   Height: 5' 9" (1.753 m)           Physical Exam   Constitutional: He is oriented to person, place, and time. He appears well-developed and well-nourished. No distress. He is obese.   HENT:   Head: Normocephalic.   Mouth/Throat: Oropharynx is clear and moist.   Cardiovascular: Normal rate and regular rhythm.   Pulmonary/Chest: Effort normal. No respiratory distress. He has no wheezes. He has no rhonchi. He has no rales.   Musculoskeletal:         General: No edema.      Cervical back: Normal range of motion and neck supple.   Neurological: He is alert and oriented to person, place, and time. Gait normal.   Skin: Skin is warm and dry.   Psychiatric: He has a normal mood and affect.   Vitals reviewed.  Personal Diagnostic Review    1/23/23  Oxygen Assessment Supplemental O2 Heart   Rate Blood Pressure Glenny Dyspnea Scale Rating    Resting 96 % Room Air 80 bpm 115/70 4   Exercise             Minute             1 94 % Room Air 89 bpm       2 95 % Room Air 89 bpm       3 95 % Room Air 89 bpm       4 " 96 % Room Air 89 bpm       5 96 % Room Air 89 bpm       6  96 % Room Air 96 bpm 129/65 5-6   Recovery             Minute             1 96 % Room Air 96 bpm       2 96 % Room Air 82 bpm       3 96 % Room Air 82 bpm       4 96 % Room Air 82 bpm 111/76 3      Six Minute Walk Summary  6MWT Status: completed without stopping  Patient Reported: Dyspnea         Interpretation:  Did the patient stop or pause?: No  Total Time Walked (Calculated): 360 seconds  Final Partial Lap Distance (feet): 25 feet  Total Distance Meters (Calculated): 373.38 meters  Predicted Distance Meters (Calculated): 476.81 meters  Percentage of Predicted (Calculated): 78.31  Peak VO2 (Calculated): 15.18  Mets: 4.34  Has The Patient Had a Previous Six Minute Walk Test?: No     Previous 6MWT Results  Has The Patient Had a Previous Six Minute Walk Test?: No    1/23/23 1/23/23     FeNO  (ppb) LOW INTERMEDIATE HIGH   ADULT VALUES < 25 25-50          > 50   Th2-driven Inflammation Unlikely Likely Significant      Patients FeNO level at this visit : _22___ (ppb)    [place], 6mwd, and FeNo personally reviewed, normal results.    7/21/23  FeNO  (ppb) LOW INTERMEDIATE HIGH   ADULT VALUES < 25 25-50          > 50   Th2-driven Inflammation Unlikely Likely Significant      Patients FeNO level at this visit : __74__ (ppb)     Interpretation of FeNO measurement in adults:  [x] FENO greater than 50 ppb in adults  suggests eosinophilic airway inflammation              Comment: High FENO (>50 ppb) in adult asthmatics even with atypical symptoms suggests glucocorticoid responsiveness. High FENO (>50 ppb) can help identify poor adherence or uncontrolled inflammation in asthma patients with otherwise seemingly controlled asthma      Assessment/Plan:       Problem List Items Addressed This Visit          ENT    Acute URI    Relevant Medications    predniSONE (DELTASONE) 20 MG tablet    amoxicillin-clavulanate 875-125mg (AUGMENTIN) 875-125 mg per tablet       Pulmonary     Mild persistent asthma with acute exacerbation - Primary     FeNO today 77  Lungs clear on exam  Prednisone, augmentin  Follow up 4 weeks  Continue Arnuity 100 daily, albuterol prn  ER for worsening symptoms           SOB (shortness of breath)    Relevant Orders    Fraction of  Nitric Oxide (Completed)    X-Ray Chest PA And Lateral       Other    HONEY (obstructive sleep apnea)    Relevant Orders    CPAP/BIPAP SUPPLIES         Follow up in about 4 weeks (around 2023) for cxr today; asthma follow up next.    Discussed diagnosis, its evaluation, treatment and usual course. All questions answered.    Patient verbalized understanding of plan and left in no acute distress    Thank you for the courtesy of participating in the care of this patient    CLAUDIA CaliC

## 2023-07-21 NOTE — ASSESSMENT & PLAN NOTE
FeNO today 77  Lungs clear on exam  Prednisone, augmentin  Follow up 4 weeks  Continue Arnuity 100 daily, albuterol prn  ER for worsening symptoms

## 2023-07-21 NOTE — PROCEDURES
Clinical Guide to Interpretation or FeNO Levels :    FeNO  (ppb) LOW INTERMEDIATE HIGH   ADULT VALUES < 25 25-50          > 50   Th2-driven Inflammation Unlikely Likely Significant     Patients FeNO level at this visit : __74__ (ppb)    Interpretation of FeNO measurement in adults:  [] FENO is less than 25 ppb implies non eosinophilic airway inflammation or the absence of airway inflammation.    Comment: Low FENO (<25 ppb) in adult asthmatics with persistent symptoms suggests other etiologies for these symptoms and a lower likelihood of benefit from adding or increasing inhaled glucocorticoids.    [] FENO between 25 and 50 ppb in adults should be interpreted cautiously with reference to the clinical situation (eg, symptomatic, on or off therapy, current smoking).    [x] FENO greater than 50 ppb in adults  suggests eosinophilic airway inflammation   Comment: High FENO (>50 ppb) in adult asthmatics even with atypical symptoms suggests glucocorticoid responsiveness. High FENO (>50 ppb) can help identify poor adherence or uncontrolled inflammation in asthma patients with otherwise seemingly controlled asthma.    Discussion:  A FENO less than 25 ppb in adults and less than 20 ppb in children younger than 12 years of age implies noneosinophilic airway inflammation or the absence of airway inflammation.  A FENO greater than 50 ppb in adults or greater than 35 ppb in children suggests eosinophilic airway inflammation.   Values of FENO between 25 and 50 ppb in adults (20 to 35 ppb in children) should be interpreted cautiously with reference to the clinical situation (eg, symptomatic, on or off therapy, current smoking).  A rising FENO with a greater than 20 percent change and more than 25 ppb (20 ppb in children) from a previously stable level suggests increasing eosinophilic airway inflammation, but there are wide inter-individual differences.  A decrease in FENO greater than 20 percent for values over 50 ppb or more than  10 ppb for values less than 50 ppb may be clinically important.  ?FENO in other respiratory diseases - Several other diseases are associated with altered levels of exhaled NO: low levels of FENO have been noted in cystic fibrosis, current smoking, pulmonary hypertension, hypothermia, primary ciliary dyskinesia, and bronchopulmonary dysplasia. Elevated FENO has been noted in atopy, nonasthmatic eosinophilic bronchitis, COPD exacerbations, noncystic fibrosis bronchiectasis, and viral upper respiratory infections.    REFERENCE:  ATS Board of Directors, December 2004, and by the ERS Executive Committee, June 2004. ATS/ERS Recommendations for Standardized Procedures for the Online and Offline Measurement of Exhaled Lower Respiratory Nitric Oxide and Nasal Nitric Oxide. Guideline 2005

## 2023-07-31 ENCOUNTER — EXTERNAL CHRONIC CARE MANAGEMENT (OUTPATIENT)
Dept: PRIMARY CARE CLINIC | Facility: CLINIC | Age: 73
End: 2023-07-31
Payer: MEDICARE

## 2023-07-31 PROCEDURE — 99439 CHRNC CARE MGMT STAF EA ADDL: CPT | Mod: S$PBB,,, | Performed by: FAMILY MEDICINE

## 2023-07-31 PROCEDURE — 99490 PR CHRONIC CARE MGMT, 1ST 20 MIN: ICD-10-PCS | Mod: S$PBB,,, | Performed by: FAMILY MEDICINE

## 2023-07-31 PROCEDURE — 99490 CHRNC CARE MGMT STAFF 1ST 20: CPT | Mod: S$PBB,,, | Performed by: FAMILY MEDICINE

## 2023-07-31 PROCEDURE — 99439 PR CHRONIC CARE MGMT, EA ADDTL 20 MIN: ICD-10-PCS | Mod: S$PBB,,, | Performed by: FAMILY MEDICINE

## 2023-07-31 PROCEDURE — 99439 CHRNC CARE MGMT STAF EA ADDL: CPT | Mod: PBBFAC,PO | Performed by: FAMILY MEDICINE

## 2023-07-31 PROCEDURE — 99490 CHRNC CARE MGMT STAFF 1ST 20: CPT | Mod: PBBFAC,PO | Performed by: FAMILY MEDICINE

## 2023-08-03 ENCOUNTER — TELEPHONE (OUTPATIENT)
Dept: FAMILY MEDICINE | Facility: CLINIC | Age: 73
End: 2023-08-03
Payer: MEDICARE

## 2023-08-03 DIAGNOSIS — Z79.899 ENCOUNTER FOR LONG-TERM (CURRENT) USE OF MEDICATIONS: ICD-10-CM

## 2023-08-03 RX ORDER — TRAZODONE HYDROCHLORIDE 300 MG/1
300 TABLET ORAL NIGHTLY
Qty: 90 TABLET | Refills: 4 | Status: SHIPPED | OUTPATIENT
Start: 2023-08-03 | End: 2023-08-17

## 2023-08-03 RX ORDER — TRAZODONE HYDROCHLORIDE 150 MG/1
TABLET ORAL
Qty: 90 TABLET | Refills: 2 | OUTPATIENT
Start: 2023-08-03

## 2023-08-03 NOTE — TELEPHONE ENCOUNTER
Refill Routing Note   Medication(s) are not appropriate for processing by [facility] for the following reason(s):      Clarification of medication (Rx) details    [facility] action(s):  Defer Care Due:  Labs due     Medication Therapy Plan: Trazodone dose increased on 3/7/23 to 300 mg nightly. Trazodone 150 mg dosage was not discontinued.  Is patient taking both strengths, MD please advise        Appointments  past 12m or future 3m with PCP    Date Provider   Last Visit   3/7/2023 Olvin Hutson MD   Next Visit   Visit date not found Olvin Hutson MD   ED visits in past 90 days: 0        Note composed:11:17 AM 08/03/2023

## 2023-08-03 NOTE — TELEPHONE ENCOUNTER
Care Due:                  Date            Visit Type   Department     Provider  --------------------------------------------------------------------------------                                EP -                              PRIMARY      [facility] FAMILY  Last Visit: 03-      CARE ([facility])   MEDICINE       Olvin Hutson  Next Visit: None Scheduled  None         None Found                                                            Last  Test          Frequency    Reason                     Performed    Due Date  --------------------------------------------------------------------------------    Lipid Panel.  12 months..  pravastatin..............  09- 09-    [facility] Embedded Care Due Messages. Reference number: [id number].   8/03/2023 1:32:56 AM CDT

## 2023-08-03 NOTE — TELEPHONE ENCOUNTER
----- Message from Lulu Rose sent at 8/3/2023  1:48 PM CDT -----    .Type:  Patient Returning Call    Who Called:.Randy Yap   Who Left Message for Patient:  Does the patient know what this is regarding?:  Would the patient rather a call back or a response via [facility]? Call back  Best Call Back Number:.[phone]   Additional Information: call the pt back regarding a denial for traZODone (DESYREL) 300 MG tablet

## 2023-08-06 DIAGNOSIS — Z79.899 ENCOUNTER FOR LONG-TERM (CURRENT) USE OF MEDICATIONS: ICD-10-CM

## 2023-08-07 RX ORDER — TRAZODONE HYDROCHLORIDE 150 MG/1
TABLET ORAL
Qty: 90 TABLET | Refills: 0 | OUTPATIENT
Start: 2023-08-07

## 2023-08-09 ENCOUNTER — PATIENT MESSAGE (OUTPATIENT)
Dept: CARDIOLOGY | Facility: CLINIC | Age: 73
End: 2023-08-09
Payer: MEDICARE

## 2023-08-14 ENCOUNTER — OFFICE VISIT (OUTPATIENT)
Dept: CARDIOLOGY | Facility: CLINIC | Age: 73
End: 2023-08-14
Payer: MEDICARE

## 2023-08-14 ENCOUNTER — HOSPITAL ENCOUNTER (OUTPATIENT)
Dept: CARDIOLOGY | Facility: HOSPITAL | Age: 73
Discharge: HOME | End: 2023-08-14
Attending: INTERNAL MEDICINE
Payer: MEDICARE

## 2023-08-14 VITALS
HEART RATE: 72 BPM | SYSTOLIC BLOOD PRESSURE: 110 MMHG | BODY MASS INDEX: 36.8 KG/M2 | DIASTOLIC BLOOD PRESSURE: 62 MMHG | OXYGEN SATURATION: 95 % | HEIGHT: 69 IN | WEIGHT: 248.44 LBS

## 2023-08-14 DIAGNOSIS — I50.42 CHRONIC COMBINED SYSTOLIC AND DIASTOLIC CHF (CONGESTIVE HEART FAILURE): ICD-10-CM

## 2023-08-14 DIAGNOSIS — I49.01 VF (VENTRICULAR FIBRILLATION): ICD-10-CM

## 2023-08-14 DIAGNOSIS — I51.89 DIASTOLIC DYSFUNCTION: Chronic | ICD-10-CM

## 2023-08-14 DIAGNOSIS — E11.65 TYPE 2 DIABETES MELLITUS WITH HYPERGLYCEMIA, WITHOUT LONG-TERM CURRENT USE OF INSULIN: Chronic | ICD-10-CM

## 2023-08-14 DIAGNOSIS — Z91.89 AT RISK FOR AMIODARONE TOXICITY WITH LONG TERM USE: Chronic | ICD-10-CM

## 2023-08-14 DIAGNOSIS — R42 LIGHTHEADEDNESS: ICD-10-CM

## 2023-08-14 DIAGNOSIS — I48.0 PAF (PAROXYSMAL ATRIAL FIBRILLATION): Chronic | ICD-10-CM

## 2023-08-14 DIAGNOSIS — G47.33 OSA (OBSTRUCTIVE SLEEP APNEA): ICD-10-CM

## 2023-08-14 DIAGNOSIS — I95.9 HYPOTENSION, UNSPECIFIED HYPOTENSION TYPE: ICD-10-CM

## 2023-08-14 DIAGNOSIS — Z79.899 AT RISK FOR AMIODARONE TOXICITY WITH LONG TERM USE: Chronic | ICD-10-CM

## 2023-08-14 DIAGNOSIS — I47.29 PAROXYSMAL VT: ICD-10-CM

## 2023-08-14 DIAGNOSIS — I15.2 HYPERTENSION ASSOCIATED WITH DIABETES: Chronic | ICD-10-CM

## 2023-08-14 DIAGNOSIS — Z45.02 ICD (IMPLANTABLE CARDIOVERTER-DEFIBRILLATOR) DISCHARGE: ICD-10-CM

## 2023-08-14 DIAGNOSIS — I42.9 IDIOPATHIC CARDIOMYOPATHY: ICD-10-CM

## 2023-08-14 DIAGNOSIS — E11.59 HYPERTENSION ASSOCIATED WITH DIABETES: Chronic | ICD-10-CM

## 2023-08-14 DIAGNOSIS — I50.30 CHF WITH LEFT VENTRICULAR DIASTOLIC DYSFUNCTION, NYHA CLASS 2: Primary | ICD-10-CM

## 2023-08-14 DIAGNOSIS — E66.01 CLASS 2 SEVERE OBESITY DUE TO EXCESS CALORIES WITH SERIOUS COMORBIDITY AND BODY MASS INDEX (BMI) OF 38.0 TO 38.9 IN ADULT: ICD-10-CM

## 2023-08-14 DIAGNOSIS — E78.5 HYPERLIPIDEMIA LDL GOAL <100: Chronic | ICD-10-CM

## 2023-08-14 PROCEDURE — 99999 PR PBB SHADOW E&M-EST. PATIENT-LVL III: CPT | Mod: PBBFAC,,, | Performed by: INTERNAL MEDICINE

## 2023-08-14 PROCEDURE — 99999 PR PBB SHADOW E&M-EST. PATIENT-LVL III: ICD-10-PCS | Mod: PBBFAC,,, | Performed by: INTERNAL MEDICINE

## 2023-08-14 PROCEDURE — 93283 CARDIAC DEVICE CHECK - IN CLINIC & HOSPITAL: ICD-10-PCS | Mod: 26,,, | Performed by: INTERNAL MEDICINE

## 2023-08-14 PROCEDURE — 93283 PRGRMG EVAL IMPLANTABLE DFB: CPT | Mod: 26,,, | Performed by: INTERNAL MEDICINE

## 2023-08-14 PROCEDURE — 99215 PR OFFICE/OUTPT VISIT, EST, LEVL V, 40-54 MIN: ICD-10-PCS | Mod: S$PBB,,, | Performed by: INTERNAL MEDICINE

## 2023-08-14 PROCEDURE — 93283 PRGRMG EVAL IMPLANTABLE DFB: CPT

## 2023-08-14 PROCEDURE — 99213 OFFICE O/P EST LOW 20 MIN: CPT | Mod: PBBFAC | Performed by: INTERNAL MEDICINE

## 2023-08-14 PROCEDURE — 99215 OFFICE O/P EST HI 40 MIN: CPT | Mod: S$PBB,,, | Performed by: INTERNAL MEDICINE

## 2023-08-14 RX ORDER — ONDANSETRON 4 MG/1
4 TABLET, FILM COATED ORAL EVERY 8 HOURS PRN
COMMUNITY

## 2023-08-14 NOTE — PROGRESS NOTES
Subjective:   Patient ID:  Randy Yap is a 73 y.o. male     Chief complaint:Shortness of Breath      HPI  72 year old male who presents to Arrhythmia clinic for 6 month follow up with device check. His current medical conditions include CHF, HFrEF of 30%, PVT on Amiodarone,HTN, HLP, Obesity, HONEY on CPAP  See granular details in my note from 10/1/21     Update 8/8/2022:  Had echo in Sept 2021:  >>  The left ventricle is moderately enlarged with concentric hypertrophy and moderately decreased systolic function.  Mild left atrial enlargement.  The estimated ejection fraction is 30%.  Grade I left ventricular diastolic dysfunction.  There is moderate left ventricular global hypokinesis.  Normal right ventricular size with normal right ventricular systolic function.  Mild mitral regurgitation.      Also, I had his echo reviewed by Dr EDWARDS and had a quantitative EF calculated - it was 38% - which was in keeping with Dr EDWARDS's qualitative estimation  I have reviewed the actual image of the ECG tracing obtained today and it shows NSR with normal intervals and occ PVC.     ICD eval today:  Device and leads implanted 10/6/2014   [employer] ICD Fortify [place] KHALIL 2357-40Q, SN: [id number]   A lead: Bennett Tendril STS 2088TC / 46cm, SN: [id number]   V lead: Bennett Durata 7120Q / 58 cm, SN: [id number]     All in good repair     Last BNP was 55 (10/1/21)     Clinically, he feels as well as he has ever had.  His CHF medications at all at maximized doses.  He has rare and mild lightheadedness.  Due to the extreme eat, has backed off on some of his outdoor exercise but he keeps quite active.  He has some family stress now due to the recent passing of his mother in law and the Alzheimer's of  his sister-in-law     Update 11/28/2022:  He has had issues with recurrent VF and shocks.  The most recent episode occurred on 10/22/2022.  Since then we realized that he was not taking enough amiodarone as the blood level was less than 0.2.  After  reviewing drug dosing with him it appeared that he was using the 100 mg tablets in do the 200.  Accordingly, and for a week now, we have increased his amiodarone which to 300 p.o. b.i.d. and will keep this for a total of 6 weeks.    Prior to increasing the amiodarone, he was having repetitive episodes of severe dizziness/near-syncope.  These were associated with palpitations and runs of monomorphic ventricular tachycardia that eventually would either be ATP terminated or terminate spontaneously as they were deliberately left untreated in the monitoring zone.  He now has had no such clinical events for a week or so.    Variation of his ICD diagnostics, suggest that with the addition of amiodarone, he may have now VT that are on detected because they are below the monitoring zone of 160.  Otherwise, now that he does not have the near syncopal events, he feels well.  His wife is wondering whether Ozempic has something to do with his VT VF as was started approximately a month prior to the VF episode.    >>  Change TDI to 123 for the monitoring zone, 160 for the VT 1 zone and 222 for the VF zone.  Otherwise, keep therapies unchanged.  Continue on 600 mg a day of amiodarone until December 31st as of January 1st decrease to 300 mg a day.  Get amiodarone level in 3 months.     Update 03/05/2023 :  Since the last visit, we have had multiple communications via the portal.  Is currently on amiodarone 300 mg per day.  He has not had new ICD shocks.    He has been losing weight (Ozempic).  He is down by 24 lb and currently weighs 248 lb.  This has all happened over the past 2 months.    He was started on dapagliflozin.  Initially he did not want to take it due to cost issues.  This has been resolved.   He feels reasonably well.  He can walk for 2 to 300 ft without issues.  He can now go up the stairs at home without stopping.  These are 16 steps.  He has had a lightheaded spell yesterday.  This was very fast.  ICD evaluation  shows a 2nd VT at the time of the complaint.  ICD evaluation was performed today.  All is in good repair.  The battery life is expected to be about 1.5 years.       Update 08/14/2023 :  Aside from a lot of illnesses in death in the family, he has been doing quite well.  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  He wants to go back and work is in which up.  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  He has no significant cardiovascular symptoms at this time.  He has had no ICD shocks.  ICD evaluation has shown a few nonsustained ventricular tachycardias that were not intervened upon and seemingly asymptomatic to the patient.  Battery longevity appears to be about 10 months to RRT.  Has no side effects from his medications.  He was started on Ozempic.  He is on 1 mg per week dose.  He has not lost much weight.  Has no side effects from semaglutide.    Current Outpatient Medications   Medication Sig    acetaminophen (TYLENOL) 325 MG tablet Take 650 mg by mouth as needed.     albuterol (PROVENTIL/VENTOLIN HFA) 90 mcg/actuation inhaler Inhale 1 puff into the lungs once daily. Rescue    amiodarone (PACERONE) 200 MG Tab Take 1.5 tablets (300 mg total) by mouth once daily. 1 tab po bid for 6 weeks then 1.5 tb a day (total dose = 300 mg)    bepotastine besilate (BEPREVE) 1.5 % Drop Bepreve 1.5 % eye drops   Apply by ophthalmic route as needed for 50 days.    carvediloL (COREG) 25 MG tablet Take 1 tablet (25 mg total) by mouth 2 (two) times daily.    co-enzyme Q-10 30 mg capsule Take 30 mg by mouth once daily.     dapagliflozin (FARXIGA) 10 mg tablet TAKE 1 TABLET BY MOUTH ONCE DAILY    ENTRESTO  mg per tablet TAKE 1 TABLET BY MOUTH TWICE DAILY    ferrous sulfate (FEOSOL) 325 mg (65 mg iron) Tab tablet Take 325 mg by mouth daily with breakfast.    fluticasone furoate (ARNUITY ELLIPTA) 100 mcg/actuation inhaler Inhale 1 puff into the lungs once daily.    furosemide (LASIX) 40 MG tablet  Take 1 tablet (40 mg total) by mouth once daily.    glucosamine-chondroitin 500-400 mg tablet Take 1 tablet by mouth once daily.     meclizine (ANTIVERT) 12.5 mg tablet Take 1 tablet (12.5 mg total) by mouth 3 (three) times daily as needed for Dizziness.    mexiletine (MEXITIL) 150 MG Cap Take 2 capsules (300 mg total) by mouth every 8 (eight) hours.    multivitamin (THERAGRAN) tablet Take 1 tablet by mouth once daily.    ondansetron (ZOFRAN) 4 MG tablet Take 4 mg by mouth every 8 (eight) hours as needed for Nausea.    OZEMPIC 1 mg/dose (4 mg/3 mL) Inject 1 mg into the skin every 7 days.    pravastatin (PRAVACHOL) 80 MG tablet Take 1 tablet (80 mg total) by mouth once daily.    RESTASIS 0.05 % ophthalmic emulsion Place 1 drop into both eyes 2 (two) times daily.    spironolactone (ALDACTONE) 25 MG tablet TAKE 1 TABLET BY MOUTH TWICE DAILY    traZODone (DESYREL) 300 MG tablet Take 1 tablet (300 mg total) by mouth every evening.     No current facility-administered medications for this visit.       Review of Systems     Constitutional: Reviewed  for decreased appetite, weight gain and weight loss.   HENT: Reviewed for nosebleeds.    Eyes:  Reviewed for blurred vision and visual disturbance.   Cardiovascular: Reviewed for chest pain, claudication, cyanosis,dyspnea on exertion, leg swelling, orthopnea,paroxysmal nocturnal dyspnearregular heartbeats, palpitations, near-syncope, and syncope.   Respiratory: Reviewed for cough, shortness of breath, wheezing, sleep disturbances due to breathing and snoring, .    Endocrine: Reviewed for heat intolerance.   Hematologic/Lymphatic: Reviewed for easy bruisability/bleeding.   Skin: Reviewed for rash.   Musculoskeletal: Reviewed for muscle weakness and myalgias.   Gastrointestinal: Reviewed for abdominal pain, anorexia, melena, nausea and vomiting.   Genitourinary: Reviewed for hesitancy, frequency, nocturia and incontinence.   Neurological: Reviewed for excessive daytime  sleepiness, dizziness, vertigo, weakness, headaches, loss of balance and seizures,   Psychiatric/Behavioral:  Reviewed for insomnia, altered mental status, depression, anxiety and nervousness.       All symptoms reviewed above were negative except for dyspnea on exertion, constipation urinary frequency and easy bruisability as well as occasional lightheadedness.  Has arthritic complaints and some anxiety.       Social History     Tobacco Use   Smoking Status Never   Smokeless Tobacco Never        Objective:     Physical Exam  Vitals and nursing note reviewed.   Constitutional:       Appearance: Normal appearance. He is well-developed.      Comments: overweight   HENT:      Head: Normocephalic and atraumatic.      Right Ear: External ear normal.      Left Ear: External ear normal.   Eyes:      Conjunctiva/sclera: Conjunctivae normal.      Left eye: Left conjunctiva is not injected. No hemorrhage.     Pupils: Pupils are equal, round, and reactive to light.   Neck:      Thyroid: No thyromegaly.      Vascular: No JVD.   Cardiovascular:      Rate and Rhythm: Normal rate and regular rhythm.      Chest Wall: PMI is not displaced.      Pulses: Intact distal pulses.           Carotid pulses are 2+ on the right side and 2+ on the left side.       Radial pulses are 2+ on the right side and 2+ on the left side.        Dorsalis pedis pulses are 2+ on the right side and 2+ on the left side.        Posterior tibial pulses are 2+ on the right side and 2+ on the left side.      Heart sounds: Normal heart sounds. No midsystolic click and no opening snap. No murmur heard.     No friction rub. No gallop.   Pulmonary:      Effort: Pulmonary effort is normal. No respiratory distress.      Breath sounds: Normal breath sounds. No wheezing or rales.   Chest:      Chest wall: No tenderness.      Comments: Device pocket is in excellent repair.  Abdominal:      Palpations: Abdomen is soft. Abdomen is not rigid. There is no hepatomegaly.       "Tenderness: There is no abdominal tenderness. There is no guarding.      Comments: Obese abdomen   Musculoskeletal:         General: No tenderness. Normal range of motion.      Cervical back: Neck supple.      Right knee: No swelling.      Left knee: No swelling.      Right lower leg: No swelling.      Left lower leg: No swelling.      Right ankle: No swelling.      Left ankle: No swelling.      Right foot: No swelling.      Left foot: No swelling.   Skin:     General: Skin is warm and dry.      Coloration: Skin is not pale.      Findings: No rash.   Neurological:      General: No focal deficit present.      Mental Status: He is alert and oriented to person, place, and time.      Cranial Nerves: No cranial nerve deficit.      Coordination: Coordination normal.      Deep Tendon Reflexes: Reflexes are normal and symmetric.   Psychiatric:         Mood and Affect: Mood normal.         Behavior: Behavior normal.       /62 (BP Location: Left arm, Patient Position: Sitting, BP Method: Large (Manual))   Pulse 72   Ht 5' 9" (1.753 m)   Wt 112.7 kg (248 lb 7.3 oz)   SpO2 95%   BMI 36.69 kg/m²       Results for orders placed during the hospital encounter of 04/17/23    Echo    Interpretation Summary  · The left ventricle is moderately enlarged with eccentric hypertrophy and low normal systolic function.  · The estimated ejection fraction is 50-55%  · Normal left ventricular diastolic function.  · Normal right ventricular size.  · The aortic valve appears structurally normal. There is normal leaflet mobility.  · Mild mitral regurgitation.  · Mild tricuspid regurgitation.  · Normal central venous pressure (3 mmHg).  · The estimated PA systolic pressure is 18 mmHg.  · Considerable improvement in systolic function compared to study of 1-    WBC   Date Value Ref Range Status   07/07/2023 8.73 3.90 - 12.70 K/uL Final     Hematocrit   Date Value Ref Range Status   07/07/2023 43.2 40.0 - 54.0 % Final     Hemoglobin "   Date Value Ref Range Status   07/07/2023 14.0 14.0 - 18.0 g/dL Final     Lab Results   Component Value Date     07/07/2023     Lab Results   Component Value Date    CREATININE 1.1 07/07/2023    EGFRNORACEVR >60.0 07/07/2023    K 4.6 07/07/2023     Lab Results   Component Value Date    BNP <10 02/03/2023            reports current alcohol use.  Past Medical History:   Diagnosis Date    Asthma     Cardiomyopathy due to systemic disease     Colon polyp 5/2013    repeat 5/2018    Depression, recurrent     Diastolic dysfunction     DJD (degenerative joint disease) of knee 10/14/2013    Hyperlipidemia     Hypertension     Murmur     Paroxysmal VT 5/21/2016    Pericarditis with effusion     Sleep apnea      Past Surgical History:   Procedure Laterality Date    CARDIAC DEFIBRILLATOR PLACEMENT  10/06/2014    COLONOSCOPY N/A 4/19/2023    Procedure: COLONOSCOPY 4/4-cc/bt clearance recedived;  Surgeon: Isabella Del Cid MD;  Location: [facility] ENDO;  Service: Endoscopy;  Laterality: N/A;    COLONOSCOPY W/ POLYPECTOMY  05/21/2013    repeat 5/21/2018    COSMETIC SURGERY      EYE SURGERY  Cataract    HERNIA REPAIR      R inguinal    LEFT HEART CATHETERIZATION Left 07/09/2018    Procedure: CATHETERIZATION, HEART, LEFT;  Surgeon: Macey Dos Santos MD;  Location: [facility] CATH LAB;  Service: Cardiology;  Laterality: Left;  left radial approach  Has [employer] g[employer] ICD    pace maker   10/06/2014    PERICARDIAL WINDOW  12-15 years ago    tonsillectomy      TONSILLECTOMY      VARICOCELECTOMY       Family History   Problem Relation Age of Onset    Hyperlipidemia Mother     Arrhythmia Mother     Arthritis Mother     Asthma Mother     COPD Mother     Heart disease Father 48    Heart attack Father 48    Hypertension Father     Arthritis Father     Diabetes Father     Hypertension Maternal Grandmother     Hypertension Maternal Grandfather     Heart disease Paternal Grandmother     Hypertension Paternal Grandmother     Heart attack Paternal  Grandmother     Stroke Paternal Grandmother     Heart disease Paternal Grandfather     Hypertension Paternal Grandfather     Heart attack Paternal Grandfather     Cancer Paternal Grandfather        Assessment:    Doing quite well in general.  Significant family stresses.  He is handling though.  1. CHF with left ventricular diastolic dysfunction, NYHA class 2    2. Lightheadedness    3. Hypotension, unspecified hypotension type    4. VF (ventricular fibrillation)    5. Paroxysmal VT    6. PAF (paroxysmal atrial fibrillation)    7. Idiopathic cardiomyopathy    8. ICD (implantable cardioverter-defibrillator) discharge    9. Hypertension associated with diabetes    10. Hyperlipidemia LDL goal <100    11. Diastolic dysfunction    12. Type 2 diabetes mellitus with hyperglycemia, without long-term current use of insulin    13. Class 2 severe obesity due to excess calories with serious comorbidity and body mass index (BMI) of 38.0 to 38.9 in adult    14. HONEY (obstructive sleep apnea)    15. At risk for amiodarone toxicity with long term use        Plan:    Continue same.  He needs an updated DLCO.  Should go back to his PCP for any increase in his Ozempic dose.  Orders Placed This Encounter   Procedures    Spirometry with/without bronchodilator & DLCO     Standing Status:   Future     Standing Expiration Date:   8/14/2024     Order Specific Question:   Release to patient     Answer:   Immediate       No follow-ups on file.    There are no discontinued medications.         Medication List with Changes/Refills   Current Medications    ACETAMINOPHEN (TYLENOL) 325 MG TABLET    Take 650 mg by mouth as needed.     ALBUTEROL (PROVENTIL/VENTOLIN HFA) 90 MCG/ACTUATION INHALER    Inhale 1 puff into the lungs once daily. Rescue    AMIODARONE (PACERONE) 200 MG TAB    Take 1.5 tablets (300 mg total) by mouth once daily. 1 tab po bid for 6 weeks then 1.5 tb a day (total dose = 300 mg)    BEPOTASTINE BESILATE (BEPREVE) 1.5 % DROP     Bepreve 1.5 % eye drops   Apply by ophthalmic route as needed for 50 days.    CARVEDILOL (COREG) 25 MG TABLET    Take 1 tablet (25 mg total) by mouth 2 (two) times daily.    CO-ENZYME Q-10 30 MG CAPSULE    Take 30 mg by mouth once daily.     DAPAGLIFLOZIN (FARXIGA) 10 MG TABLET    TAKE 1 TABLET BY MOUTH ONCE DAILY    ENTRESTO  MG PER TABLET    TAKE 1 TABLET BY MOUTH TWICE DAILY    FERROUS SULFATE (FEOSOL) 325 MG (65 MG IRON) TAB TABLET    Take 325 mg by mouth daily with breakfast.    FLUTICASONE FUROATE (ARNUITY ELLIPTA) 100 MCG/ACTUATION INHALER    Inhale 1 puff into the lungs once daily.    FUROSEMIDE (LASIX) 40 MG TABLET    Take 1 tablet (40 mg total) by mouth once daily.    GLUCOSAMINE-CHONDROITIN 500-400 MG TABLET    Take 1 tablet by mouth once daily.     MECLIZINE (ANTIVERT) 12.5 MG TABLET    Take 1 tablet (12.5 mg total) by mouth 3 (three) times daily as needed for Dizziness.    MEXILETINE (MEXITIL) 150 MG CAP    Take 2 capsules (300 mg total) by mouth every 8 (eight) hours.    MULTIVITAMIN (THERAGRAN) TABLET    Take 1 tablet by mouth once daily.    ONDANSETRON (ZOFRAN) 4 MG TABLET    Take 4 mg by mouth every 8 (eight) hours as needed for Nausea.    OZEMPIC 1 MG/DOSE (4 MG/3 ML)    Inject 1 mg into the skin every 7 days.    PRAVASTATIN (PRAVACHOL) 80 MG TABLET    Take 1 tablet (80 mg total) by mouth once daily.    RESTASIS 0.05 % OPHTHALMIC EMULSION    Place 1 drop into both eyes 2 (two) times daily.    SPIRONOLACTONE (ALDACTONE) 25 MG TABLET    TAKE 1 TABLET BY MOUTH TWICE DAILY    TRAZODONE (DESYREL) 300 MG TABLET    Take 1 tablet (300 mg total) by mouth every evening.       This note is at least partially dictated using the M*Modal Fluency Direct word recognition program. There are word recognition mistakes that are occasionally missed on review.

## 2023-08-16 ENCOUNTER — TELEPHONE (OUTPATIENT)
Dept: CARDIOLOGY | Facility: HOSPITAL | Age: 73
End: 2023-08-16
Payer: MEDICARE

## 2023-08-17 ENCOUNTER — CLINICAL SUPPORT (OUTPATIENT)
Dept: CARDIOLOGY | Facility: HOSPITAL | Age: 73
End: 2023-08-17
Payer: MEDICARE

## 2023-08-17 ENCOUNTER — TELEPHONE (OUTPATIENT)
Dept: CARDIOLOGY | Facility: CLINIC | Age: 73
End: 2023-08-17
Payer: MEDICARE

## 2023-08-17 ENCOUNTER — HOSPITAL ENCOUNTER (INPATIENT)
Facility: HOSPITAL | Age: 73
LOS: 1 days | Discharge: HOME | DRG: 308 | End: 2023-08-19
Attending: EMERGENCY MEDICINE | Admitting: FAMILY MEDICINE
Payer: MEDICARE

## 2023-08-17 DIAGNOSIS — Z45.02 DEFIBRILLATOR DISCHARGE: ICD-10-CM

## 2023-08-17 DIAGNOSIS — R07.9 CHEST PAIN: ICD-10-CM

## 2023-08-17 DIAGNOSIS — R07.81 RIB PAIN ON LEFT SIDE: ICD-10-CM

## 2023-08-17 DIAGNOSIS — R55 SYNCOPE: Primary | ICD-10-CM

## 2023-08-17 DIAGNOSIS — Z95.810 PRESENCE OF AUTOMATIC (IMPLANTABLE) CARDIAC DEFIBRILLATOR: ICD-10-CM

## 2023-08-17 LAB
ALBUMIN SERPL BCP-MCNC: 3.9 G/DL (ref 3.5–5.2)
ALP SERPL-CCNC: 37 U/L (ref 55–135)
ALT SERPL W/O P-5'-P-CCNC: 50 U/L (ref 10–44)
ANION GAP SERPL CALC-SCNC: 11 MMOL/L (ref 8–16)
AST SERPL-CCNC: 30 U/L (ref 10–40)
BASOPHILS # BLD AUTO: 0.05 K/UL (ref 0–0.2)
BASOPHILS NFR BLD: 0.4 % (ref 0–1.9)
BILIRUB SERPL-MCNC: 0.5 MG/DL (ref 0.1–1)
BNP SERPL-MCNC: 23 PG/ML (ref 0–99)
BUN SERPL-MCNC: 14 MG/DL (ref 8–23)
CALCIUM SERPL-MCNC: 9 MG/DL (ref 8.7–10.5)
CHLORIDE SERPL-SCNC: 101 MMOL/L (ref 95–110)
CO2 SERPL-SCNC: 21 MMOL/L (ref 23–29)
CREAT SERPL-MCNC: 1 MG/DL (ref 0.5–1.4)
DIFFERENTIAL METHOD: ABNORMAL
EOSINOPHIL # BLD AUTO: 0.1 K/UL (ref 0–0.5)
EOSINOPHIL NFR BLD: 0.6 % (ref 0–8)
ERYTHROCYTE [DISTWIDTH] IN BLOOD BY AUTOMATED COUNT: 12.5 % (ref 11.5–14.5)
EST. GFR  (NO RACE VARIABLE): >60 ML/MIN/1.73 M^2
GLUCOSE SERPL-MCNC: 92 MG/DL (ref 70–110)
HCT VFR BLD AUTO: 41 % (ref 40–54)
HCV AB SERPL QL IA: NEGATIVE
HEP C VIRUS HOLD SPECIMEN: NORMAL
HGB BLD-MCNC: 14.1 G/DL (ref 14–18)
HIV 1+2 AB+HIV1 P24 AG SERPL QL IA: NEGATIVE
IMM GRANULOCYTES # BLD AUTO: 0.07 K/UL (ref 0–0.04)
IMM GRANULOCYTES NFR BLD AUTO: 0.6 % (ref 0–0.5)
LYMPHOCYTES # BLD AUTO: 1.8 K/UL (ref 1–4.8)
LYMPHOCYTES NFR BLD: 15.7 % (ref 18–48)
MAGNESIUM SERPL-MCNC: 2 MG/DL (ref 1.6–2.6)
MCH RBC QN AUTO: 32.6 PG (ref 27–31)
MCHC RBC AUTO-ENTMCNC: 34.4 G/DL (ref 32–36)
MCV RBC AUTO: 95 FL (ref 82–98)
MONOCYTES # BLD AUTO: 1.4 K/UL (ref 0.3–1)
MONOCYTES NFR BLD: 11.5 % (ref 4–15)
NEUTROPHILS # BLD AUTO: 8.3 K/UL (ref 1.8–7.7)
NEUTROPHILS NFR BLD: 71.2 % (ref 38–73)
NRBC BLD-RTO: 0 /100 WBC
PHOSPHATE SERPL-MCNC: 2.4 MG/DL (ref 2.7–4.5)
PLATELET # BLD AUTO: 268 K/UL (ref 150–450)
PMV BLD AUTO: 7.8 FL (ref 9.2–12.9)
POTASSIUM SERPL-SCNC: 4.2 MMOL/L (ref 3.5–5.1)
PROT SERPL-MCNC: 7.1 G/DL (ref 6–8.4)
RBC # BLD AUTO: 4.33 M/UL (ref 4.6–6.2)
SODIUM SERPL-SCNC: 133 MMOL/L (ref 136–145)
TROPONIN I SERPL DL<=0.01 NG/ML-MCNC: <0.006 NG/ML (ref 0–0.03)
TROPONIN I SERPL DL<=0.01 NG/ML-MCNC: <0.006 NG/ML (ref 0–0.03)
WBC # BLD AUTO: 11.72 K/UL (ref 3.9–12.7)

## 2023-08-17 PROCEDURE — 63600175 PHARM REV CODE 636 W HCPCS: Performed by: INTERNAL MEDICINE

## 2023-08-17 PROCEDURE — 85025 COMPLETE CBC W/AUTO DIFF WBC: CPT | Performed by: EMERGENCY MEDICINE

## 2023-08-17 PROCEDURE — 25000003 PHARM REV CODE 250: Performed by: INTERNAL MEDICINE

## 2023-08-17 PROCEDURE — 83735 ASSAY OF MAGNESIUM: CPT | Performed by: EMERGENCY MEDICINE

## 2023-08-17 PROCEDURE — 25000003 PHARM REV CODE 250: Performed by: EMERGENCY MEDICINE

## 2023-08-17 PROCEDURE — 86803 HEPATITIS C AB TEST: CPT | Performed by: EMERGENCY MEDICINE

## 2023-08-17 PROCEDURE — G0378 HOSPITAL OBSERVATION PER HR: HCPCS

## 2023-08-17 PROCEDURE — 84100 ASSAY OF PHOSPHORUS: CPT | Performed by: EMERGENCY MEDICINE

## 2023-08-17 PROCEDURE — 87389 HIV-1 AG W/HIV-1&-2 AB AG IA: CPT | Performed by: EMERGENCY MEDICINE

## 2023-08-17 PROCEDURE — 93010 ELECTROCARDIOGRAM REPORT: CPT | Mod: ,,, | Performed by: INTERNAL MEDICINE

## 2023-08-17 PROCEDURE — 93010 EKG 12-LEAD: ICD-10-PCS | Mod: ,,, | Performed by: INTERNAL MEDICINE

## 2023-08-17 PROCEDURE — 63600175 PHARM REV CODE 636 W HCPCS: Performed by: EMERGENCY MEDICINE

## 2023-08-17 PROCEDURE — 99285 EMERGENCY DEPT VISIT HI MDM: CPT | Mod: 25

## 2023-08-17 PROCEDURE — 96372 THER/PROPH/DIAG INJ SC/IM: CPT | Performed by: INTERNAL MEDICINE

## 2023-08-17 PROCEDURE — 83880 ASSAY OF NATRIURETIC PEPTIDE: CPT | Performed by: EMERGENCY MEDICINE

## 2023-08-17 PROCEDURE — 80053 COMPREHEN METABOLIC PANEL: CPT | Performed by: EMERGENCY MEDICINE

## 2023-08-17 PROCEDURE — 93005 ELECTROCARDIOGRAM TRACING: CPT

## 2023-08-17 PROCEDURE — 84484 ASSAY OF TROPONIN QUANT: CPT | Performed by: EMERGENCY MEDICINE

## 2023-08-17 RX ORDER — ACETAMINOPHEN 325 MG/1
650 TABLET ORAL EVERY 4 HOURS PRN
Status: DISCONTINUED | OUTPATIENT
Start: 2023-08-17 | End: 2023-08-19 | Stop reason: HOSPADM

## 2023-08-17 RX ORDER — METHOCARBAMOL 500 MG/1
500 TABLET, FILM COATED ORAL 4 TIMES DAILY PRN
Status: DISCONTINUED | OUTPATIENT
Start: 2023-08-17 | End: 2023-08-18

## 2023-08-17 RX ORDER — IBUPROFEN 200 MG
16 TABLET ORAL
Status: DISCONTINUED | OUTPATIENT
Start: 2023-08-17 | End: 2023-08-19 | Stop reason: HOSPADM

## 2023-08-17 RX ORDER — POLYETHYLENE GLYCOL 3350 17 G/17G
17 POWDER, FOR SOLUTION ORAL DAILY
Status: DISCONTINUED | OUTPATIENT
Start: 2023-08-18 | End: 2023-08-19 | Stop reason: HOSPADM

## 2023-08-17 RX ORDER — IBUPROFEN 200 MG
24 TABLET ORAL
Status: DISCONTINUED | OUTPATIENT
Start: 2023-08-17 | End: 2023-08-19 | Stop reason: HOSPADM

## 2023-08-17 RX ORDER — CARVEDILOL 12.5 MG/1
25 TABLET ORAL 2 TIMES DAILY
Status: DISCONTINUED | OUTPATIENT
Start: 2023-08-17 | End: 2023-08-19 | Stop reason: HOSPADM

## 2023-08-17 RX ORDER — NALOXONE HCL 0.4 MG/ML
0.02 VIAL (ML) INJECTION
Status: DISCONTINUED | OUTPATIENT
Start: 2023-08-17 | End: 2023-08-19 | Stop reason: HOSPADM

## 2023-08-17 RX ORDER — ONDANSETRON 2 MG/ML
4 INJECTION INTRAMUSCULAR; INTRAVENOUS EVERY 8 HOURS PRN
Status: DISCONTINUED | OUTPATIENT
Start: 2023-08-17 | End: 2023-08-17

## 2023-08-17 RX ORDER — AMOXICILLIN 250 MG
1 CAPSULE ORAL 2 TIMES DAILY
Status: DISCONTINUED | OUTPATIENT
Start: 2023-08-17 | End: 2023-08-19 | Stop reason: HOSPADM

## 2023-08-17 RX ORDER — ACETAMINOPHEN 325 MG/1
650 TABLET ORAL
Status: COMPLETED | OUTPATIENT
Start: 2023-08-17 | End: 2023-08-17

## 2023-08-17 RX ORDER — HYDROCODONE BITARTRATE AND ACETAMINOPHEN 10; 325 MG/1; MG/1
1 TABLET ORAL EVERY 6 HOURS PRN
Status: DISCONTINUED | OUTPATIENT
Start: 2023-08-17 | End: 2023-08-19 | Stop reason: HOSPADM

## 2023-08-17 RX ORDER — HYDROCODONE BITARTRATE AND ACETAMINOPHEN 5; 325 MG/1; MG/1
1 TABLET ORAL EVERY 6 HOURS PRN
Status: DISCONTINUED | OUTPATIENT
Start: 2023-08-17 | End: 2023-08-19 | Stop reason: HOSPADM

## 2023-08-17 RX ORDER — ACETAMINOPHEN 325 MG/1
650 TABLET ORAL EVERY 8 HOURS PRN
Status: DISCONTINUED | OUTPATIENT
Start: 2023-08-17 | End: 2023-08-19 | Stop reason: HOSPADM

## 2023-08-17 RX ORDER — MEXILETINE HYDROCHLORIDE 150 MG/1
450 CAPSULE ORAL EVERY 8 HOURS
Status: DISCONTINUED | OUTPATIENT
Start: 2023-08-17 | End: 2023-08-19 | Stop reason: HOSPADM

## 2023-08-17 RX ORDER — PRAVASTATIN SODIUM 20 MG/1
80 TABLET ORAL DAILY
Status: DISCONTINUED | OUTPATIENT
Start: 2023-08-18 | End: 2023-08-19 | Stop reason: HOSPADM

## 2023-08-17 RX ORDER — GLUCAGON 1 MG
1 KIT INJECTION
Status: DISCONTINUED | OUTPATIENT
Start: 2023-08-17 | End: 2023-08-19 | Stop reason: HOSPADM

## 2023-08-17 RX ORDER — METHOCARBAMOL 500 MG/1
500 TABLET, FILM COATED ORAL
Status: COMPLETED | OUTPATIENT
Start: 2023-08-17 | End: 2023-08-17

## 2023-08-17 RX ORDER — ENOXAPARIN SODIUM 100 MG/ML
40 INJECTION SUBCUTANEOUS EVERY 24 HOURS
Status: DISCONTINUED | OUTPATIENT
Start: 2023-08-17 | End: 2023-08-19 | Stop reason: HOSPADM

## 2023-08-17 RX ORDER — TALC
6 POWDER (GRAM) TOPICAL NIGHTLY PRN
Status: DISCONTINUED | OUTPATIENT
Start: 2023-08-17 | End: 2023-08-19 | Stop reason: HOSPADM

## 2023-08-17 RX ORDER — SODIUM CHLORIDE 0.9 % (FLUSH) 0.9 %
3 SYRINGE (ML) INJECTION EVERY 6 HOURS PRN
Status: DISCONTINUED | OUTPATIENT
Start: 2023-08-17 | End: 2023-08-19 | Stop reason: HOSPADM

## 2023-08-17 RX ORDER — FUROSEMIDE 10 MG/ML
40 INJECTION INTRAMUSCULAR; INTRAVENOUS
Status: COMPLETED | OUTPATIENT
Start: 2023-08-17 | End: 2023-08-17

## 2023-08-17 RX ADMIN — FUROSEMIDE 40 MG: 10 INJECTION, SOLUTION INTRAMUSCULAR; INTRAVENOUS at 04:08

## 2023-08-17 RX ADMIN — CARVEDILOL 25 MG: 12.5 TABLET, FILM COATED ORAL at 08:08

## 2023-08-17 RX ADMIN — MEXILETINE HYDROCHLORIDE 450 MG: 150 CAPSULE ORAL at 09:08

## 2023-08-17 RX ADMIN — METHOCARBAMOL 500 MG: 500 TABLET ORAL at 04:08

## 2023-08-17 RX ADMIN — ENOXAPARIN SODIUM 40 MG: 40 INJECTION SUBCUTANEOUS at 06:08

## 2023-08-17 RX ADMIN — ACETAMINOPHEN 650 MG: 325 TABLET ORAL at 04:08

## 2023-08-17 RX ADMIN — SPIRONOLACTONE 12.5 MG: 25 TABLET ORAL at 04:08

## 2023-08-17 RX ADMIN — HYDROCODONE BITARTRATE AND ACETAMINOPHEN 1 TABLET: 10; 325 TABLET ORAL at 08:08

## 2023-08-17 RX ADMIN — SACUBITRIL AND VALSARTAN 2 TABLET: 49; 51 TABLET, FILM COATED ORAL at 08:08

## 2023-08-17 RX ADMIN — SENNOSIDES AND DOCUSATE SODIUM 1 TABLET: 50; 8.6 TABLET ORAL at 08:08

## 2023-08-17 NOTE — ASSESSMENT & PLAN NOTE
"Patient's FSGs are controlled on current medication regimen.  Last A1c reviewed-   Lab Results   Component Value Date    HGBA1C 6.0 (H) 09/29/2022     Most recent fingerstick glucose reviewed- No results for input(s): "POCTGLUCOSE" in the last 24 hours.  Current correctional scale  none  none anti-hyperglycemic dose as follows-   Antihyperglycemics (From admission, onward)    None        Hold Oral hypoglycemics while patient is in the hospital.  "

## 2023-08-17 NOTE — ASSESSMENT & PLAN NOTE
Patient is identified as having Combined Systolic and Diastolic heart failure that is Acute on chronic. CHF is currently controlled. Latest ECHO performed and demonstrates- Results for orders placed during the hospital encounter of 04/17/23    Echo    Interpretation Summary  · The left ventricle is moderately enlarged with eccentric hypertrophy and low normal systolic function.  · The estimated ejection fraction is 50-55%  · Normal left ventricular diastolic function.  · Normal right ventricular size.  · The aortic valve appears structurally normal. There is normal leaflet mobility.  · Mild mitral regurgitation.  · Mild tricuspid regurgitation.  · Normal central venous pressure (3 mmHg).  · The estimated PA systolic pressure is 18 mmHg.  · Considerable improvement in systolic function compared to study of 1-  . Continue Beta Blocker and ARNI and monitor clinical status closely. Monitor on telemetry. Patient is off CHF pathway.  Monitor strict Is&Os and daily weights.  Place on fluid restriction of 2 L. Cardiology has been consulted. Continue to stress to patient importance of self efficacy and  on diet for CHF. Last BNP reviewed- and noted below   Recent Labs   Lab 08/17/23  1341   BNP 23   .

## 2023-08-17 NOTE — ASSESSMENT & PLAN NOTE
Multiple defibrillations and past 2 days  Consulted EP Cardiology  -recommendation of DC trazodone (recently increased dose from 150-300 q.h.s.)  -increase mexiletine to 450 mg q.8  -Lasix 40 mg IV x1  -Aldactone 12.5 mg daily (patient has history of hyperkalemia we will need monitor closely)

## 2023-08-17 NOTE — SUBJECTIVE & OBJECTIVE
Past Medical History:   Diagnosis Date    Asthma     Cardiomyopathy due to systemic disease     Colon polyp 5/2013    repeat 5/2018    Depression, recurrent     Diastolic dysfunction     DJD (degenerative joint disease) of knee 10/14/2013    Hyperlipidemia     Hypertension     Murmur     Paroxysmal VT 5/21/2016    Pericarditis with effusion     Sleep apnea        Past Surgical History:   Procedure Laterality Date    CARDIAC DEFIBRILLATOR PLACEMENT  10/06/2014    COLONOSCOPY N/A 4/19/2023    Procedure: COLONOSCOPY 4/4-cc/bt clearance recedived;  Surgeon: Isabella Del Cid MD;  Location: [facility] ENDO;  Service: Endoscopy;  Laterality: N/A;    COLONOSCOPY W/ POLYPECTOMY  05/21/2013    repeat 5/21/2018    COSMETIC SURGERY      EYE SURGERY  Cataract    HERNIA REPAIR      R inguinal    LEFT HEART CATHETERIZATION Left 07/09/2018    Procedure: CATHETERIZATION, HEART, LEFT;  Surgeon: Macey Dos Santos MD;  Location: [facility] CATH LAB;  Service: Cardiology;  Laterality: Left;  left radial approach  Has [employer] ICD    pace maker   10/06/2014    PERICARDIAL WINDOW  12-15 years ago    tonsillectomy      TONSILLECTOMY      VARICOCELECTOMY         Review of patient's allergies indicates:  No Known Allergies    No current facility-administered medications on file prior to encounter.     Current Outpatient Medications on File Prior to Encounter   Medication Sig    amiodarone (PACERONE) 200 MG Tab Take 1.5 tablets (300 mg total) by mouth once daily. 1 tab po bid for 6 weeks then 1.5 tb a day (total dose = 300 mg)    carvediloL (COREG) 25 MG tablet Take 1 tablet (25 mg total) by mouth 2 (two) times daily.    co-enzyme Q-10 30 mg capsule Take 30 mg by mouth once daily.     dapagliflozin (FARXIGA) 10 mg tablet TAKE 1 TABLET BY MOUTH ONCE DAILY    ENTRESTO  mg per tablet TAKE 1 TABLET BY MOUTH TWICE DAILY    ferrous sulfate (FEOSOL) 325 mg (65 mg iron) Tab tablet Take 325 mg by mouth daily with breakfast.    furosemide (LASIX) 40 MG tablet  Take 1 tablet (40 mg total) by mouth once daily.    glucosamine-chondroitin 500-400 mg tablet Take 1 tablet by mouth once daily.     multivitamin (THERAGRAN) tablet Take 1 tablet by mouth once daily.    pravastatin (PRAVACHOL) 80 MG tablet Take 1 tablet (80 mg total) by mouth once daily.    spironolactone (ALDACTONE) 25 MG tablet TAKE 1 TABLET BY MOUTH TWICE DAILY    [DISCONTINUED] traZODone (DESYREL) 300 MG tablet Take 1 tablet (300 mg total) by mouth every evening.    acetaminophen (TYLENOL) 325 MG tablet Take 650 mg by mouth as needed.     albuterol (PROVENTIL/VENTOLIN HFA) 90 mcg/actuation inhaler Inhale 1 puff into the lungs once daily. Rescue    bepotastine besilate (BEPREVE) 1.5 % Drop Bepreve 1.5 % eye drops   Apply by ophthalmic route as needed for 50 days.    fluticasone furoate (ARNUITY ELLIPTA) 100 mcg/actuation inhaler Inhale 1 puff into the lungs once daily.    meclizine (ANTIVERT) 12.5 mg tablet Take 1 tablet (12.5 mg total) by mouth 3 (three) times daily as needed for Dizziness.    mexiletine (MEXITIL) 150 MG Cap Take 2 capsules (300 mg total) by mouth every 8 (eight) hours.    ondansetron (ZOFRAN) 4 MG tablet Take 4 mg by mouth every 8 (eight) hours as needed for Nausea.    OZEMPIC 1 mg/dose (4 mg/3 mL) Inject 1 mg into the skin every 7 days.    RESTASIS 0.05 % ophthalmic emulsion Place 1 drop into both eyes 2 (two) times daily.     Family History       Problem Relation (Age of Onset)    Arrhythmia Mother    Arthritis Mother, Father    Asthma Mother    COPD Mother    Cancer Paternal Grandfather    Diabetes Father    Heart attack Father (48), Paternal Grandmother, Paternal Grandfather    Heart disease Father (48), Paternal Grandmother, Paternal Grandfather    Hyperlipidemia Mother    Hypertension Father, Maternal Grandmother, Maternal Grandfather, Paternal Grandmother, Paternal Grandfather    Stroke Paternal Grandmother          Tobacco Use    Smoking status: Never    Smokeless tobacco: Never    Substance and Sexual Activity    Alcohol use: Yes     Comment: occasional glass of wine on social occasions    Drug use: No    Sexual activity: Not Currently     Review of Systems   Constitutional:  Negative for fatigue and fever.   Respiratory:  Negative for chest tightness and shortness of breath.    Cardiovascular:  Negative for chest pain, palpitations and leg swelling.   Gastrointestinal:  Positive for abdominal distention. Negative for abdominal pain, diarrhea and nausea.   Genitourinary:  Negative for difficulty urinating and dysuria.   Musculoskeletal:  Positive for back pain. Arthralgias: At site of fall.  All other systems reviewed and are negative.    Objective:     Vital Signs (Most Recent):  Temp: 99 °F (37.2 °C) (08/17/23 1256)  Pulse: 77 (08/17/23 1800)  Resp: 20 (08/17/23 1800)  BP: 104/73 (08/17/23 1800)  SpO2: 97 % (08/17/23 1800) Vital Signs (24h Range):  Temp:  [99 °F (37.2 °C)] 99 °F (37.2 °C)  Pulse:  [73-79] 77  Resp:  [20-24] 20  SpO2:  [95 %-97 %] 97 %  BP: ()/(51-73) 104/73     Weight: 118.1 kg (260 lb 5.8 oz)  Body mass index is 38.45 kg/m².     Physical Exam  Vitals reviewed.   Constitutional:       Appearance: Normal appearance.   HENT:      Head: Normocephalic and atraumatic.      Mouth/Throat:      Mouth: Mucous membranes are moist.      Pharynx: Oropharynx is clear.   Eyes:      Extraocular Movements: Extraocular movements intact.      Conjunctiva/sclera: Conjunctivae normal.   Cardiovascular:      Rate and Rhythm: Normal rate and regular rhythm.      Pulses: Normal pulses.      Heart sounds: Normal heart sounds.   Pulmonary:      Effort: Pulmonary effort is normal.      Breath sounds: Normal breath sounds.   Abdominal:      General: Bowel sounds are normal.      Palpations: Abdomen is soft.      Tenderness: There is abdominal tenderness.   Musculoskeletal:         General: Tenderness present. Normal range of motion.      Cervical back: Normal range of motion and neck supple.    Skin:     General: Skin is warm and dry.   Neurological:      General: No focal deficit present.      Mental Status: He is alert and oriented to person, place, and time. Mental status is at baseline.   Psychiatric:         Mood and Affect: Mood normal.         Behavior: Behavior normal.         Thought Content: Thought content normal.                Significant Labs: All pertinent labs within the past 24 hours have been reviewed.  CBC:   Recent Labs   Lab 08/17/23  1341   WBC 11.72   HGB 14.1   HCT 41.0        CMP:   Recent Labs   Lab 08/17/23  1341   *   K 4.2      CO2 21*   GLU 92   BUN 14   CREATININE 1.0   CALCIUM 9.0   PROT 7.1   ALBUMIN 3.9   BILITOT 0.5   ALKPHOS 37*   AST 30   ALT 50*   ANIONGAP 11     Cardiac Markers:   Recent Labs   Lab 08/17/23  1341   BNP 23     Magnesium:   Recent Labs   Lab 08/17/23  1341   MG 2.0     Troponin:   Recent Labs   Lab 08/17/23  1341   TROPONINI <0.006  <0.006       Significant Imaging: I have reviewed all pertinent imaging results/findings within the past 24 hours.

## 2023-08-17 NOTE — HPI
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   Patient was at home trying to move a tire out of his truck and had a syncopal episode.  He woke up on the ground.  He also states that yesterday morning approximately 3:00 a.m. he had a defibrillator fire.  He was seen in the emergency department where was noted that he had a successful shock, ATP x4 and a decrease in T1 for 9 days. Episodes occurred at 10:57 p.m. on 08/15 3:05 a.m. on 08/16.  He had 2 VT episodes that converted with 1 round of ATP each.  Patient had a VT event that transitioned to VF and had primary conversion after 1 shock.  Polymorphic VT was noted.  Patient reports that he has been compliant with his medications  and the only change in his medications was recent increasing his trazodone.  BNP was obtained which was negative.  He also had a PMVT event that happened at 9:24 a.m. today.  He was seen in consultation by  who recommended checking electrolytes including magnesium and phosphorus, given 1 dose of IV Lasix and starting Aldactone 12.5 mg a day.  He also recommended increasing mexiletine to 450 mg q.8 hours.    Patient's only complaint is pain in his left ribcage and back where he fell.  He denies any shortness a breath, chest pain, nausea, vomiting.

## 2023-08-17 NOTE — ED PROVIDER NOTES
SCRIBE #1 NOTE: I, Mitchell Cuello, am scribing for, and in the presence of, Maryan Bales MD. I have scribed the entire note.      History      Chief Complaint   Patient presents with    Loss of Consciousness     Syncope outside, left rib pain       Review of patient's allergies indicates:  No Known Allergies     HPI   HPI    8/17/2023, 12:41 PM   History obtained from the patient      History of Present Illness: Randy Yap is a 73 y.o. male patient with a PMHx of HTN, paroxysmal VT s/p cardiac defibrillator placement who presents to the Emergency Department for evaluation following a syncopal episode just PTA. Pt states that he was moving a tire out of his truck when he suddenly felt weak, causing him to lose consciousness and fall. Pt reports L-sided rib pain and L-sided back pain. Symptoms are constant and moderate in severity. No mitigating or exacerbating factors reported. Patient denies any fever, chills, n/v/d, SOB, CP, weakness, numbness, headache, and all other sxs at this time. Pt also notes that his defibrillator fired yesterday morning at 3 AM; pt had his cardiac device interrogated by Dr. Baldwin (Cardiology) 3 days ago, and is scheduled to follow up with him again tomorrow. Pt is on 81 mg ASA daily. No further complaints or concerns at this time.     Arrival mode: EMS     PCP: Olvin Hutson MD       Past Medical History:  Past Medical History:   Diagnosis Date    Asthma     Cardiomyopathy due to systemic disease     Colon polyp 5/2013    repeat 5/2018    Depression, recurrent     Diastolic dysfunction     DJD (degenerative joint disease) of knee 10/14/2013    Hyperlipidemia     Hypertension     Murmur     Paroxysmal VT 5/21/2016    Pericarditis with effusion     Sleep apnea        Past Surgical History:  Past Surgical History:   Procedure Laterality Date    CARDIAC DEFIBRILLATOR PLACEMENT  10/06/2014    COLONOSCOPY N/A 4/19/2023    Procedure: COLONOSCOPY 4/4-cc/bt clearance recedived;   Surgeon: Isabella Del Cid MD;  Location: [facility] ENDO;  Service: Endoscopy;  Laterality: N/A;    COLONOSCOPY W/ POLYPECTOMY  05/21/2013    repeat 5/21/2018    COSMETIC SURGERY      EYE SURGERY  Cataract    HERNIA REPAIR      R inguinal    LEFT HEART CATHETERIZATION Left 07/09/2018    Procedure: CATHETERIZATION, HEART, LEFT;  Surgeon: Macey Dos Santos MD;  Location: [facility] CATH LAB;  Service: Cardiology;  Laterality: Left;  left radial approach  Has [employer] ICD    pace maker   10/06/2014    PERICARDIAL WINDOW  12-15 years ago    tonsillectomy      TONSILLECTOMY      VARICOCELECTOMY           Family History:  Family History   Problem Relation Age of Onset    Hyperlipidemia Mother     Arrhythmia Mother     Arthritis Mother     Asthma Mother     COPD Mother     Heart disease Father 48    Heart attack Father 48    Hypertension Father     Arthritis Father     Diabetes Father     Hypertension Maternal Grandmother     Hypertension Maternal Grandfather     Heart disease Paternal Grandmother     Hypertension Paternal Grandmother     Heart attack Paternal Grandmother     Stroke Paternal Grandmother     Heart disease Paternal Grandfather     Hypertension Paternal Grandfather     Heart attack Paternal Grandfather     Cancer Paternal Grandfather        Social History:  Social History     Tobacco Use    Smoking status: Never    Smokeless tobacco: Never   Substance and Sexual Activity    Alcohol use: Yes     Comment: occasional glass of wine on social occasions    Drug use: No    Sexual activity: Not Currently       ROS   Review of Systems   Constitutional:  Negative for chills and fever.   HENT:  Negative for sore throat.    Respiratory:  Negative for shortness of breath.    Cardiovascular:  Negative for chest pain.   Gastrointestinal:  Negative for diarrhea, nausea and vomiting.   Genitourinary:  Negative for dysuria.   Musculoskeletal:  Positive for back pain (L) and myalgias (L rib).   Skin:  Negative for rash.   Neurological:   Positive for syncope. Negative for dizziness, weakness, numbness and headaches.   Hematological:  Does not bruise/bleed easily.   All other systems reviewed and are negative.    Physical Exam      Initial Vitals   BP Pulse Resp Temp SpO2   08/17/23 1225 08/17/23 1225 08/17/23 1225 08/17/23 1256 08/17/23 1225   97/64 74 20 99 °F (37.2 °C) 95 %      MAP       --                 Physical Exam  Nursing Notes and Vital Signs Reviewed.  Constitutional: Patient is in no acute distress. Well-developed and well-nourished.  Head: Small contusion and abrasion to the R parietal region of the scalp.  Eyes: PERRL. EOM intact. Conjunctivae are not pale. No scleral icterus.  ENT: Mucous membranes are moist. Oropharynx is clear and symmetric.    Neck: Supple. Full ROM.   Cardiovascular: Regular rate. Regular rhythm. No murmurs, rubs, or gallops. Distal pulses are 2+ and symmetric.  Pulmonary/Chest: No respiratory distress. Clear to auscultation bilaterally. No wheezing or rales.  Abdominal: Soft and non-distended.  There is no tenderness.  No rebound, guarding, or rigidity.   Musculoskeletal: Moves all extremities. No obvious deformities. No edema. Small skin tear to the R elbow, no bony deformities.  Skin: Warm and dry.  Neurological:  Alert, awake, and appropriate.  Normal speech.  No acute focal neurological deficits are appreciated.  Psychiatric: Normal affect. Good eye contact. Appropriate in content.    ED Course    Critical Care    Date/Time: 8/17/2023 4:54 PM    Performed by: Maryan Bales MD  Authorized by: Maryan Bales MD  Direct patient critical care time: 15 minutes  Additional history critical care time: 5 minutes  Ordering / reviewing critical care time: 10 minutes  Documentation critical care time: 5 minutes  Consulting other physicians critical care time: 5 minutes  Total critical care time (exclusive of procedural time) : 40 minutes  Critical care time was exclusive of separately billable procedures and  treating other patients and teaching time.  Critical care was necessary to treat or prevent imminent or life-threatening deterioration of the following conditions: Syncope, defibrillatior discharge.  Critical care was time spent personally by me on the following activities: blood draw for specimens, development of treatment plan with patient or surrogate, discussions with consultants, interpretation of cardiac output measurements, evaluation of patient's response to treatment, examination of patient, obtaining history from patient or surrogate, ordering and performing treatments and interventions, ordering and review of laboratory studies, ordering and review of radiographic studies, pulse oximetry, re-evaluation of patient's condition and review of old charts.        ED Vital Signs:  Vitals:    08/17/23 1225 08/17/23 1256 08/17/23 1349 08/17/23 1350   BP: 97/64  (!) 102/59 (!) 96/54   Pulse: 74  78 73   Resp: 20  (!) 24 (!) 23   Temp:  99 °F (37.2 °C)     TempSrc:  Oral     SpO2: 95%  97% 97%   Weight:        08/17/23 1353 08/17/23 1516 08/17/23 1517 08/17/23 1656   BP: (!) 88/51 101/60  126/73   Pulse: 79 77 78    Resp: (!) 24 (!) 22     Temp:       TempSrc:       SpO2: 96% 97%     Weight:  118.1 kg (260 lb 5.8 oz)      08/17/23 1800   BP: 104/73   Pulse: 77   Resp: 20   Temp:    TempSrc:    SpO2: 97%   Weight:        Abnormal Lab Results:  Labs Reviewed   CBC W/ AUTO DIFFERENTIAL - Abnormal; Notable for the following components:       Result Value    RBC 4.33 (*)     MCH 32.6 (*)     MPV 7.8 (*)     Immature Granulocytes 0.6 (*)     Gran # (ANC) 8.3 (*)     Immature Grans (Abs) 0.07 (*)     Mono # 1.4 (*)     Lymph % 15.7 (*)     All other components within normal limits   COMPREHENSIVE METABOLIC PANEL - Abnormal; Notable for the following components:    Sodium 133 (*)     CO2 21 (*)     Alkaline Phosphatase 37 (*)     ALT 50 (*)     All other components within normal limits   PHOSPHORUS - Abnormal; Notable for  the following components:    Phosphorus 2.4 (*)     All other components within normal limits   TROPONIN I   TROPONIN I   B-TYPE NATRIURETIC PEPTIDE   HEPATITIS C ANTIBODY    Narrative:     Release to patient->Immediate   HEP C VIRUS HOLD SPECIMEN    Narrative:     Release to patient->Immediate   HIV 1 / 2 ANTIBODY    Narrative:     Release to patient->Immediate   MAGNESIUM   MAGNESIUM   PHOSPHORUS        All Lab Results:  Results for orders placed or performed during the hospital encounter of 08/17/23   CBC auto differential   Result Value Ref Range    WBC 11.72 3.90 - 12.70 K/uL    RBC 4.33 (L) 4.60 - 6.20 M/uL    Hemoglobin 14.1 14.0 - 18.0 g/dL    Hematocrit 41.0 40.0 - 54.0 %    MCV 95 82 - 98 fL    MCH 32.6 (H) 27.0 - 31.0 pg    MCHC 34.4 32.0 - 36.0 g/dL    RDW 12.5 11.5 - 14.5 %    Platelets 268 150 - 450 K/uL    MPV 7.8 (L) 9.2 - 12.9 fL    Immature Granulocytes 0.6 (H) 0.0 - 0.5 %    Gran # (ANC) 8.3 (H) 1.8 - 7.7 K/uL    Immature Grans (Abs) 0.07 (H) 0.00 - 0.04 K/uL    Lymph # 1.8 1.0 - 4.8 K/uL    Mono # 1.4 (H) 0.3 - 1.0 K/uL    Eos # 0.1 0.0 - 0.5 K/uL    Baso # 0.05 0.00 - 0.20 K/uL    nRBC 0 0 /100 WBC    Gran % 71.2 38.0 - 73.0 %    Lymph % 15.7 (L) 18.0 - 48.0 %    Mono % 11.5 4.0 - 15.0 %    Eosinophil % 0.6 0.0 - 8.0 %    Basophil % 0.4 0.0 - 1.9 %    Differential Method Automated    Comprehensive metabolic panel   Result Value Ref Range    Sodium 133 (L) 136 - 145 mmol/L    Potassium 4.2 3.5 - 5.1 mmol/L    Chloride 101 95 - 110 mmol/L    CO2 21 (L) 23 - 29 mmol/L    Glucose 92 70 - 110 mg/dL    BUN 14 8 - 23 mg/dL    Creatinine 1.0 0.5 - 1.4 mg/dL    Calcium 9.0 8.7 - 10.5 mg/dL    Total Protein 7.1 6.0 - 8.4 g/dL    Albumin 3.9 3.5 - 5.2 g/dL    Total Bilirubin 0.5 0.1 - 1.0 mg/dL    Alkaline Phosphatase 37 (L) 55 - 135 U/L    AST 30 10 - 40 U/L    ALT 50 (H) 10 - 44 U/L    eGFR >60 >60 mL/min/1.73 m^2    Anion Gap 11 8 - 16 mmol/L   Troponin I #1   Result Value Ref Range    Troponin I <0.006  0.000 - 0.026 ng/mL   Troponin I #2   Result Value Ref Range    Troponin I <0.006 0.000 - 0.026 ng/mL   BNP   Result Value Ref Range    BNP 23 0 - 99 pg/mL   Hepatitis C Antibody   Result Value Ref Range    Hepatitis C Ab Negative Negative   HCV Virus Hold Specimen   Result Value Ref Range    HEP C Virus Hold Specimen Hold for HCV sendout    HIV 1/2 Ag/Ab (4th Gen)   Result Value Ref Range    HIV 1/2 Ag/Ab Negative Negative   Magnesium   Result Value Ref Range    Magnesium 2.0 1.6 - 2.6 mg/dL   Phosphorus   Result Value Ref Range    Phosphorus 2.4 (L) 2.7 - 4.5 mg/dL     Imaging Results:  Imaging Results              X-Ray Ribs 2 View Left (Final result)  Result time 08/17/23 17:51:01      Final result by Thad Olivarez MD (08/17/23 17:51:01)                   Impression:      No acute process      Electronically signed by: Thad Olivarez  Date:    08/17/2023  Time:    17:51               Narrative:    EXAMINATION:  XR RIBS 2 VIEW LEFT    CLINICAL HISTORY:  Pleurodynia    TECHNIQUE:  Two views of the left ribs were performed.    COMPARISON:  None.    FINDINGS:  Senescent changes.  No pneumothorax.  Left chest pacemaker.  Left pacemaker.  Cardiomegaly.  No acute fracture or dislocation                                       CT Head Without Contrast (Final result)  Result time 08/17/23 14:47:54      Final result by Kennedy Romero MD (08/17/23 14:47:54)                   Impression:      No acute findings.      Electronically signed by: Kennedy Romero MD  Date:    08/17/2023  Time:    14:47               Narrative:    EXAMINATION:  CT HEAD WITHOUT CONTRAST    CLINICAL HISTORY:  Syncope.  Loss of consciousness.    TECHNIQUE:  Standard noncontrast CT of the brain.    All CT scans at this facility are performed  using dose modulation techniques as appropriate to performed exam including the following:  automated exposure control; adjustment of mA and/or kV according to the patients size (this includes techniques  or standardized protocols for targeted exams where dose is matched to indication/reason for exam: i.e. extremities or head);  iterative reconstruction technique.    COMPARISON:  CT brain 06/19/2021.    FINDINGS:  Brain: The ventricles are nonenlarged.  No acute hemorrhage, edema or mass effect is identified.  Intracranial vascular calcification is noted.    Skull: The skull is grossly normal.                                       X-Ray Chest AP Portable (Final result)  Result time 08/17/23 13:17:58      [facility] (08/17/23 13:17:58)                   Impression:      Lungs appear clear.      Electronically signed by: Rob Francisco MD  Date:    08/17/2023  Time:    13:17               Narrative:    EXAMINATION:  XR CHEST AP PORTABLE    CLINICAL HISTORY:  <Diagnosis>, syncope;    COMPARISON:  Comparison 07/21/2023.    FINDINGS:  Heart size is normal.  AICD leads are present.  The lung fields are clear. No acute cardiopulmonary infiltrate.                                     The EKG was ordered, reviewed, and independently interpreted by the ED provider.  Interpretation time: 13:15  Rate: 79 BPM  Rhythm: Atrial-paced rhythm with prolonged AV conduction  Interpretation: Left axis deviation. Nonspecific intraventricular block. Inferior infarct, age undetermined. No STEMI.  When compared to EKG performed on 7/7/23, there are no significant changes.           The Emergency Provider reviewed the vital signs and test results, which are outlined above.    ED Discussion     3:28 PM: Discussed pt's case with Dr. Baldwin (Cardiology), who will come evaluate pt at bedside.    4:17 PM: Dr. Baldwin (Cardiology) is at bedside.    4:25 PM: Dr. Baldwin (Cardiology) has evaluated pt at bedside and recommends giving the pt a dose of lasix 40 mg and starting aldactone 12.5 mg daily.    4:53 PM: Discussed case with Dr. Guajardo ([facility]). Dr. José agrees with current care and  management of pt and accepts admission.   Admitting Service: Hospital Medicine  Admitting Physician: Dr. José  Admit to: Obs    4:54 PM: Re-evaluated pt. I have discussed test results, shared treatment plan, and the need for admission with patient and family at bedside. Pt and family express understanding at this time and agree with all information. All questions answered. Pt and family have no further questions or concerns at this time. Pt is ready for admit.         ED Medication(s):  Medications   spironolactone split tablet 12.5 mg (12.5 mg Oral Given 8/17/23 1656)   sodium chloride 0.9% flush 3 mL (has no administration in time range)   melatonin tablet 6 mg (has no administration in time range)   ondansetron injection 4 mg (has no administration in time range)   polyethylene glycol packet 17 g (has no administration in time range)   senna-docusate 8.6-50 mg per tablet 1 tablet (has no administration in time range)   acetaminophen tablet 650 mg (has no administration in time range)   acetaminophen tablet 650 mg (has no administration in time range)   naloxone 0.4 mg/mL injection 0.02 mg (has no administration in time range)   glucose chewable tablet 16 g (has no administration in time range)   glucose chewable tablet 24 g (has no administration in time range)   dextrose 10% bolus 125 mL 125 mL (has no administration in time range)   dextrose 10% bolus 250 mL 250 mL (has no administration in time range)   glucagon (human recombinant) injection 1 mg (has no administration in time range)   enoxaparin injection 40 mg (40 mg Subcutaneous Given 8/17/23 1801)   HYDROcodone-acetaminophen 5-325 mg per tablet 1 tablet (has no administration in time range)   HYDROcodone-acetaminophen  mg per tablet 1 tablet (has no administration in time range)   amiodarone tablet 300 mg (has no administration in time range)   carvediloL tablet 25 mg (has no administration in time range)   sacubitriL-valsartan 49-51 mg per  tablet 2 tablet (has no administration in time range)   mexiletine capsule 450 mg (has no administration in time range)   pravastatin tablet 80 mg (has no administration in time range)   furosemide injection 40 mg (40 mg Intravenous Given 8/17/23 1657)   methocarbamoL tablet 500 mg (500 mg Oral Given 8/17/23 1656)   acetaminophen tablet 650 mg (650 mg Oral Given 8/17/23 1655)       New Prescriptions    No medications on file           Medical Decision Making    Medical Decision Making:   Differential Diagnosis:   Dysrhythmia E  Electrolyte dysfunction  ACS  Dehydration  Clinical Tests:   Lab Tests: Ordered and Reviewed  Radiological Study: Ordered and Reviewed  Medical Tests: Ordered and Reviewed  ED Management:  Patient of Dr. Baldwin has extensive cardiac hx, AICD which appears to have been interrogated on Monday, had a syncopal event while outside today, states he was outside, felt lightheaded and then woke up on the ground, no chest pain or shortness of breath, he states around 3 am yesterday morning his defibrillator shocked him.  His lab workup and CT of the head otherwise normal, ECG appears unchanged from baseline.  Case discussed at length with cardiology and Dr. Baldwin, MAUREEN interrogated and shows patient had an episode this morning of polymorphic V Tach that self terminated, Dr. Baldwin put consult note in and recommendations and hospital medicine has been consulted for observation.           Scribe Attestation:   Scribe #1: I performed the above scribed service and the documentation accurately describes the services I performed. I attest to the accuracy of the note.    Attending:   Physician Attestation Statement for Scribe #1: I, Maryan Bales MD, personally performed the services described in this documentation, as scribed by Mitchell Cuello, in my presence, and it is both accurate and complete.          Clinical Impression       ICD-10-CM ICD-9-CM   1. Syncope  R55 780.2   2. Rib pain on left  side  R07.81 786.50   3. Defibrillator discharge  Z45.02 V71.89   4. Chest pain  R07.9 786.50       Disposition:   Disposition: Placed in Observation  Condition: Maryan Mackey MD  08/17/23 [id number]

## 2023-08-17 NOTE — H&P
[facility] Medicine  History & Physical    Patient Name: Randy Yap  MRN: [id number]  Patient Class: OP- Observation  Admission Date: 8/17/2023  Attending Physician: Elvira José MD   Primary Care Provider: Olvin Hutson MD         Patient information was obtained from patient, spouse/SO, past medical records and ER records.     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   Patient was at home trying to move a tire out of his truck and had a syncopal episode.  He woke up on the ground.  He also states that yesterday morning approximately 3:00 a.m. he had a defibrillator fire.  He was seen in the emergency department where was noted that he had a successful shock, ATP x4 and a decrease in T1 for 9 days. Episodes occurred at 10:57 p.m. on 08/15 3:05 a.m. on 08/16.  He had 2 VT episodes that converted with 1 round of ATP each.  Patient had a VT event that transitioned to VF and had primary conversion after 1 shock.  Polymorphic VT was noted.  Patient reports that he has been compliant with his medications  and the only change in his medications was recent increasing his trazodone.  BNP was obtained which was negative.  He also had a PMVT event that happened at 9:24 a.m. today.  He was seen in consultation by  who recommended checking electrolytes including magnesium and phosphorus, given 1 dose of IV Lasix and starting Aldactone 12.5 mg a day.  He also recommended increasing  mexiletine to 450 mg q.8 hours.    Patient's only complaint is pain in his left ribcage and back where he fell.  He denies any shortness a breath, chest pain, nausea, vomiting.        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     Colon polyp 5/2013    repeat 5/2018    Depression, recurrent     Diastolic dysfunction     DJD (degenerative joint disease) of knee 10/14/2013    Hyperlipidemia     Hypertension     Murmur     Paroxysmal VT 5/21/2016    Pericarditis with effusion     Sleep apnea        Past Surgical History:   Procedure Laterality Date    CARDIAC DEFIBRILLATOR PLACEMENT  10/06/2014    COLONOSCOPY N/A 4/19/2023    Procedure: COLONOSCOPY 4/4-cc/bt clearance recedived;  Surgeon: Isabella Del Cid MD;  Location: [facility] ENDO;  Service: Endoscopy;  Laterality: N/A;    COLONOSCOPY W/ POLYPECTOMY  05/21/2013    repeat 5/21/2018    COSMETIC SURGERY      EYE SURGERY  Cataract    HERNIA REPAIR      R inguinal    LEFT HEART CATHETERIZATION Left 07/09/2018    Procedure: CATHETERIZATION, HEART, LEFT;  Surgeon: Macey Dos Santos MD;  Location: [facility] CATH LAB;  Service: Cardiology;  Laterality: Left;  left radial approach  Has [facility] ICD    pace maker   10/06/2014    PERICARDIAL WINDOW  12-15 years ago    tonsillectomy      TONSILLECTOMY      VARICOCELECTOMY         Review of patient's allergies indicates:  No Known Allergies    No current facility-administered medications on file prior to encounter.     Current Outpatient Medications on File Prior to Encounter   Medication Sig    amiodarone (PACERONE) 200 MG Tab Take 1.5 tablets (300 mg total) by mouth once daily. 1 tab po bid for 6 weeks then 1.5 tb a day (total dose = 300 mg)    carvediloL (COREG) 25 MG tablet Take 1 tablet (25 mg total) by mouth 2 (two) times daily.    co-enzyme Q-10 30 mg capsule Take 30 mg by mouth once daily.     dapagliflozin (FARXIGA) 10 mg tablet TAKE 1 TABLET BY MOUTH  ONCE DAILY    ENTRESTO  mg per tablet TAKE 1 TABLET BY MOUTH TWICE DAILY    ferrous sulfate (FEOSOL) 325 mg (65 mg iron) Tab tablet Take 325 mg by mouth daily with breakfast.    furosemide (LASIX) 40 MG tablet Take 1 tablet (40 mg total) by mouth once daily.    glucosamine-chondroitin 500-400 mg tablet Take 1 tablet by mouth once daily.     multivitamin (THERAGRAN) tablet Take 1 tablet by mouth once daily.    pravastatin (PRAVACHOL) 80 MG tablet Take 1 tablet (80 mg total) by mouth once daily.    spironolactone (ALDACTONE) 25 MG tablet TAKE 1 TABLET BY MOUTH TWICE DAILY    [DISCONTINUED] traZODone (DESYREL) 300 MG tablet Take 1 tablet (300 mg total) by mouth every evening.    acetaminophen (TYLENOL) 325 MG tablet Take 650 mg by mouth as needed.     albuterol (PROVENTIL/VENTOLIN HFA) 90 mcg/actuation inhaler Inhale 1 puff into the lungs once daily. Rescue    bepotastine besilate (BEPREVE) 1.5 % Drop Bepreve 1.5 % eye drops   Apply by ophthalmic route as needed for 50 days.    fluticasone furoate (ARNUITY ELLIPTA) 100 mcg/actuation inhaler Inhale 1 puff into the lungs once daily.    meclizine (ANTIVERT) 12.5 mg tablet Take 1 tablet (12.5 mg total) by mouth 3 (three) times daily as needed for Dizziness.    mexiletine (MEXITIL) 150 MG Cap Take 2 capsules (300 mg total) by mouth every 8 (eight) hours.    ondansetron (ZOFRAN) 4 MG tablet Take 4 mg by mouth every 8 (eight) hours as needed for Nausea.    OZEMPIC 1 mg/dose (4 mg/3 mL) Inject 1 mg into the skin every 7 days.    RESTASIS 0.05 % ophthalmic emulsion Place 1 drop into both eyes 2 (two) times daily.     Family History       Problem Relation (Age of Onset)    Arrhythmia Mother    Arthritis Mother, Father    Asthma Mother    COPD Mother    Cancer Paternal Grandfather    Diabetes Father    Heart attack Father (48), Paternal Grandmother, Paternal Grandfather    Heart disease Father (48), Paternal Grandmother, Paternal Grandfather     Hyperlipidemia Mother    Hypertension Father, Maternal Grandmother, Maternal Grandfather, Paternal Grandmother, Paternal Grandfather    Stroke Paternal Grandmother          Tobacco Use    Smoking status: Never    Smokeless tobacco: Never   Substance and Sexual Activity    Alcohol use: Yes     Comment: occasional glass of wine on social occasions    Drug use: No    Sexual activity: Not Currently     Review of Systems   Constitutional:  Negative for fatigue and fever.   Respiratory:  Negative for chest tightness and shortness of breath.    Cardiovascular:  Negative for chest pain, palpitations and leg swelling.   Gastrointestinal:  Positive for abdominal distention. Negative for abdominal pain, diarrhea and nausea.   Genitourinary:  Negative for difficulty urinating and dysuria.   Musculoskeletal:  Positive for back pain. Arthralgias: At site of fall.  All other systems reviewed and are negative.    Objective:     Vital Signs (Most Recent):  Temp: 99 °F (37.2 °C) (08/17/23 1256)  Pulse: 77 (08/17/23 1800)  Resp: 20 (08/17/23 1800)  BP: 104/73 (08/17/23 1800)  SpO2: 97 % (08/17/23 1800) Vital Signs (24h Range):  Temp:  [99 °F (37.2 °C)] 99 °F (37.2 °C)  Pulse:  [73-79] 77  Resp:  [20-24] 20  SpO2:  [95 %-97 %] 97 %  BP: ()/(51-73) 104/73     Weight: 118.1 kg (260 lb 5.8 oz)  Body mass index is 38.45 kg/m².     Physical Exam  Vitals reviewed.   Constitutional:       Appearance: Normal appearance.   HENT:      Head: Normocephalic and atraumatic.      Mouth/Throat:      Mouth: Mucous membranes are moist.      Pharynx: Oropharynx is clear.   Eyes:      Extraocular Movements: Extraocular movements intact.      Conjunctiva/sclera: Conjunctivae normal.   Cardiovascular:      Rate and Rhythm: Normal rate and regular rhythm.      Pulses: Normal pulses.      Heart sounds: Normal heart sounds.   Pulmonary:      Effort: Pulmonary effort is normal.      Breath sounds: Normal breath sounds.   Abdominal:      General:  Bowel sounds are normal.      Palpations: Abdomen is soft.      Tenderness: There is abdominal tenderness.   Musculoskeletal:         General: Tenderness present. Normal range of motion.      Cervical back: Normal range of motion and neck supple.   Skin:     General: Skin is warm and dry.   Neurological:      General: No focal deficit present.      Mental Status: He is alert and oriented to person, place, and time. Mental status is at baseline.   Psychiatric:         Mood and Affect: Mood normal.         Behavior: Behavior normal.         Thought Content: Thought content normal.                Significant Labs: All pertinent labs within the past 24 hours have been reviewed.  CBC:   Recent Labs   Lab 08/17/23  1341   WBC 11.72   HGB 14.1   HCT 41.0        CMP:   Recent Labs   Lab 08/17/23  1341   *   K 4.2      CO2 21*   GLU 92   BUN 14   CREATININE 1.0   CALCIUM 9.0   PROT 7.1   ALBUMIN 3.9   BILITOT 0.5   ALKPHOS 37*   AST 30   ALT 50*   ANIONGAP 11     Cardiac Markers:   Recent Labs   Lab 08/17/23  1341   BNP 23     Magnesium:   Recent Labs   Lab 08/17/23  1341   MG 2.0     Troponin:   Recent Labs   Lab 08/17/23  1341   TROPONINI <0.006  <0.006       Significant Imaging: I have reviewed all pertinent imaging results/findings within the past 24 hours.    Assessment/Plan:     * VF (ventricular fibrillation)    Multiple defibrillations and past 2 days  Consulted EP Cardiology  -recommendation of DC trazodone (recently increased dose from 150-300 q.h.s.)  -increase mexiletine to 450 mg q.8  -Lasix 40 mg IV x1  -Aldactone 12.5 mg daily (patient has history of hyperkalemia we will need monitor closely)    Encounter for long-term (current) use of medications    Amiodarone, mexiletine    Chronic combined systolic and diastolic CHF (congestive heart failure)  Patient is identified as having Combined Systolic and Diastolic heart failure that is Acute on chronic. CHF is currently controlled. Latest ECHO  "performed and demonstrates- Results for orders placed during the hospital encounter of 04/17/23    Echo    Interpretation Summary  · The left ventricle is moderately enlarged with eccentric hypertrophy and low normal systolic function.  · The estimated ejection fraction is 50-55%  · Normal left ventricular diastolic function.  · Normal right ventricular size.  · The aortic valve appears structurally normal. There is normal leaflet mobility.  · Mild mitral regurgitation.  · Mild tricuspid regurgitation.  · Normal central venous pressure (3 mmHg).  · The estimated PA systolic pressure is 18 mmHg.  · Considerable improvement in systolic function compared to study of 1-  . Continue Beta Blocker and ARNI and monitor clinical status closely. Monitor on telemetry. Patient is off CHF pathway.  Monitor strict Is&Os and daily weights.  Place on fluid restriction of 2 L. Cardiology has been consulted. Continue to stress to patient importance of self efficacy and  on diet for CHF. Last BNP reviewed- and noted below   Recent Labs   Lab 08/17/23  1341   BNP 23   .    Type 2 diabetes mellitus with hyperglycemia, without long-term current use of insulin  Patient's FSGs are controlled on current medication regimen.  Last A1c reviewed-   Lab Results   Component Value Date    HGBA1C 6.0 (H) 09/29/2022     Most recent fingerstick glucose reviewed- No results for input(s): "POCTGLUCOSE" in the last 24 hours.  Current correctional scale  none  none anti-hyperglycemic dose as follows-   Antihyperglycemics (From admission, onward)    None        Hold Oral hypoglycemics while patient is in the hospital.    ICD (implantable cardioverter-defibrillator) discharge  Status post defibrillator discharge with resultant syncopal episode  EP cardiology consulted      DJD (degenerative joint disease) of knee  Significant pain in left knee  Symptomatic treatment      Hyperlipidemia LDL goal <100     Continue outpatient statin    HONEY " (obstructive sleep apnea)  Patient with history of HONEY compliant with CPAP  Continues while in the hospital        VTE Risk Mitigation (From admission, onward)         Ordered     enoxaparin injection 40 mg  Daily         08/17/23 1723     IP VTE HIGH RISK PATIENT  Once         08/17/23 1723     Place sequential compression device  Until discontinued         08/17/23 1723                   On 08/17/2023, patient should be placed in hospital observation services under my care.        Elvira Diaz MD  Department of Hospital Medicine  [facility] - Telemetry ([facility])

## 2023-08-18 LAB
ALBUMIN SERPL BCP-MCNC: 3.5 G/DL (ref 3.5–5.2)
ALP SERPL-CCNC: 38 U/L (ref 55–135)
ALT SERPL W/O P-5'-P-CCNC: 38 U/L (ref 10–44)
ANION GAP SERPL CALC-SCNC: 11 MMOL/L (ref 8–16)
AST SERPL-CCNC: 24 U/L (ref 10–40)
BILIRUB SERPL-MCNC: 0.5 MG/DL (ref 0.1–1)
BUN SERPL-MCNC: 16 MG/DL (ref 8–23)
CALCIUM SERPL-MCNC: 8.5 MG/DL (ref 8.7–10.5)
CHLORIDE SERPL-SCNC: 98 MMOL/L (ref 95–110)
CO2 SERPL-SCNC: 25 MMOL/L (ref 23–29)
CREAT SERPL-MCNC: 1.2 MG/DL (ref 0.5–1.4)
EST. GFR  (NO RACE VARIABLE): >60 ML/MIN/1.73 M^2
GLUCOSE SERPL-MCNC: 96 MG/DL (ref 70–110)
MAGNESIUM SERPL-MCNC: 2 MG/DL (ref 1.6–2.6)
PHOSPHATE SERPL-MCNC: 4.4 MG/DL (ref 2.7–4.5)
POTASSIUM SERPL-SCNC: 4.2 MMOL/L (ref 3.5–5.1)
PROT SERPL-MCNC: 6.4 G/DL (ref 6–8.4)
SODIUM SERPL-SCNC: 134 MMOL/L (ref 136–145)

## 2023-08-18 PROCEDURE — 80053 COMPREHEN METABOLIC PANEL: CPT | Performed by: INTERNAL MEDICINE

## 2023-08-18 PROCEDURE — 63600175 PHARM REV CODE 636 W HCPCS: Performed by: INTERNAL MEDICINE

## 2023-08-18 PROCEDURE — 36415 COLL VENOUS BLD VENIPUNCTURE: CPT | Performed by: INTERNAL MEDICINE

## 2023-08-18 PROCEDURE — 84100 ASSAY OF PHOSPHORUS: CPT | Performed by: INTERNAL MEDICINE

## 2023-08-18 PROCEDURE — 99223 1ST HOSP IP/OBS HIGH 75: CPT | Mod: ,,, | Performed by: INTERNAL MEDICINE

## 2023-08-18 PROCEDURE — 25000003 PHARM REV CODE 250: Performed by: INTERNAL MEDICINE

## 2023-08-18 PROCEDURE — 83735 ASSAY OF MAGNESIUM: CPT | Performed by: INTERNAL MEDICINE

## 2023-08-18 PROCEDURE — 21400001 HC TELEMETRY ROOM

## 2023-08-18 PROCEDURE — 99223 PR INITIAL HOSPITAL CARE,LEVL III: ICD-10-PCS | Mod: ,,, | Performed by: INTERNAL MEDICINE

## 2023-08-18 PROCEDURE — 25000003 PHARM REV CODE 250: Performed by: EMERGENCY MEDICINE

## 2023-08-18 RX ADMIN — ENOXAPARIN SODIUM 40 MG: 40 INJECTION SUBCUTANEOUS at 04:08

## 2023-08-18 RX ADMIN — PRAVASTATIN SODIUM 80 MG: 20 TABLET ORAL at 08:08

## 2023-08-18 RX ADMIN — MEXILETINE HYDROCHLORIDE 450 MG: 150 CAPSULE ORAL at 05:08

## 2023-08-18 RX ADMIN — POLYETHYLENE GLYCOL 3350 17 G: 17 POWDER, FOR SOLUTION ORAL at 08:08

## 2023-08-18 RX ADMIN — SENNOSIDES AND DOCUSATE SODIUM 1 TABLET: 50; 8.6 TABLET ORAL at 08:08

## 2023-08-18 RX ADMIN — HYDROCODONE BITARTRATE AND ACETAMINOPHEN 1 TABLET: 10; 325 TABLET ORAL at 08:08

## 2023-08-18 RX ADMIN — CARVEDILOL 25 MG: 12.5 TABLET, FILM COATED ORAL at 08:08

## 2023-08-18 RX ADMIN — SPIRONOLACTONE 12.5 MG: 25 TABLET ORAL at 08:08

## 2023-08-18 RX ADMIN — SACUBITRIL AND VALSARTAN 2 TABLET: 49; 51 TABLET, FILM COATED ORAL at 08:08

## 2023-08-18 RX ADMIN — MEXILETINE HYDROCHLORIDE 450 MG: 150 CAPSULE ORAL at 09:08

## 2023-08-18 RX ADMIN — AMIODARONE HYDROCHLORIDE 300 MG: 200 TABLET ORAL at 08:08

## 2023-08-18 RX ADMIN — MEXILETINE HYDROCHLORIDE 450 MG: 150 CAPSULE ORAL at 01:08

## 2023-08-18 NOTE — HOSPITAL COURSE
Patient admitted with multiple episodes of VT and fibrillation with his AICD. He recently had VT which was unresponsive to ATP followed by syncope and an ICD shock.   He was given Lasix and begun on aldactone; his mexiletine was increased to 450 mg q.8 hours due to low levels in the past.  Immediately after admission he had another VT episode with defibrillation.  His electrolytes have been corrected any skin continue to be monitored on telemetry.  Seen in consultation by EP Cardiology and after another day of loading he was stable for dc home with close follow up with EP.  Pt seen and examined on day of DC and stable for dc home.

## 2023-08-18 NOTE — PROGRESS NOTES
Subjective:   Patient ID:  Randy Yap is a 73 y.o. 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  Has had recurrent disease with the VT at to be treated with the various manipulations including avoidance of V pacing and avoidance of anything that would prolong his QT.  After a period of the relative quiescence for a couple years, he has again started having recurrent VT.  He has recently had a syncopal event just a few days ago.  This was related to a VT that was not responsive to ATP and resulted in syncope and a rescue by an ICD shock.  He had another event that had been ATP lead.  He was supposed to see me on 08/18/2023 to deal with the stress.  However, on 08/17/2023, at 9:24 a.m. in the morning, while he was out by his truck, he suddenly passed out.  ICD evaluation revealed another VT episode that was terminated by ATP but only after syncope.  This was initially somewhat polymorphic but then organized somewhat and was responsive to ATP.  Concomitantly, his thoracic impedance is suggestive of some fluid overload.  Patient does not feel more short of breath than usual.  However on physical exam, his breath sounds are somewhat tight.  The for from the syncopal spell resulted in various bumps and bruises in his back is now hurting him quite a bit with back spasms whenever he moves.    8/18/2023- Patient seen and examined in room, sitting in bedside chair. He has been doing well after dose of IV lasix with noted increased urinary output. His Mexitil was increased yesterday per Dr Baldwin. Needs additional day of monitoring and potential D/C home tomorrow with follow up in EP clinic afterwards.     Past Medical History:   Diagnosis Date    Asthma     Cardiomyopathy due to systemic disease     Colon polyp 5/2013    repeat 5/2018    Depression,  recurrent     Diastolic dysfunction     DJD (degenerative joint disease) of knee 10/14/2013    Hyperlipidemia     Hypertension     Murmur     Paroxysmal VT 5/21/2016    Pericarditis with effusion     Sleep apnea        Past Surgical History:   Procedure Laterality Date    CARDIAC DEFIBRILLATOR PLACEMENT  10/06/2014    COLONOSCOPY N/A 4/19/2023    Procedure: COLONOSCOPY 4/4-cc/bt clearance recedived;  Surgeon: Isabella Del Cid MD;  Location: [facility] ENDO;  Service: Endoscopy;  Laterality: N/A;    COLONOSCOPY W/ POLYPECTOMY  05/21/2013    repeat 5/21/2018    COSMETIC SURGERY      EYE SURGERY  Cataract    HERNIA REPAIR      R inguinal    LEFT HEART CATHETERIZATION Left 07/09/2018    Procedure: CATHETERIZATION, HEART, LEFT;  Surgeon: Macey Dos Santos MD;  Location: [facility] CATH LAB;  Service: Cardiology;  Laterality: Left;  left radial approach  Has [facility] [employer] ICD    pace maker   10/06/2014    PERICARDIAL WINDOW  12-15 years ago    tonsillectomy      TONSILLECTOMY      VARICOCELECTOMY         Social History     Tobacco Use    Smoking status: Never    Smokeless tobacco: Never   Substance Use Topics    Alcohol use: Yes     Comment: occasional glass of wine on social occasions    Drug use: No       Family History   Problem Relation Age of Onset    Hyperlipidemia Mother     Arrhythmia Mother     Arthritis Mother     Asthma Mother     COPD Mother     Heart disease Father 48    Heart attack Father 48    Hypertension Father     Arthritis Father     Diabetes Father     Hypertension Maternal Grandmother     Hypertension Maternal Grandfather     Heart disease Paternal Grandmother     Hypertension Paternal Grandmother     Heart attack Paternal Grandmother     Stroke Paternal Grandmother     Heart disease Paternal Grandfather     Hypertension Paternal Grandfather     Heart attack Paternal Grandfather     Cancer Paternal Grandfather        Wt Readings from Last 3 Encounters:   08/18/23 110 kg (242 lb 8.1 oz)   08/14/23 112.7 kg (248 lb 7.3  oz)   07/21/23 110.9 kg (244 lb 6.1 oz)     Temp Readings from Last 3 Encounters:   08/18/23 97.6 °F (36.4 °C)   07/07/23 97.5 °F (36.4 °C)   04/19/23 97 °F (36.1 °C)     BP Readings from Last 3 Encounters:   08/18/23 128/63   08/14/23 110/62   07/21/23 120/62     Pulse Readings from Last 3 Encounters:   08/18/23 80   08/14/23 72   07/21/23 81       No current facility-administered medications on file prior to encounter.     Current Outpatient Medications on File Prior to Encounter   Medication Sig Dispense Refill    amiodarone (PACERONE) 200 MG Tab Take 1.5 tablets (300 mg total) by mouth once daily. 1 tab po bid for 6 weeks then 1.5 tb a day (total dose = 300 mg) 135 tablet 3    carvediloL (COREG) 25 MG tablet Take 1 tablet (25 mg total) by mouth 2 (two) times daily. 180 tablet 3    co-enzyme Q-10 30 mg capsule Take 30 mg by mouth once daily.       dapagliflozin (FARXIGA) 10 mg tablet TAKE 1 TABLET BY MOUTH ONCE DAILY 90 tablet 4    ENTRESTO  mg per tablet TAKE 1 TABLET BY MOUTH TWICE DAILY 180 tablet 0    ferrous sulfate (FEOSOL) 325 mg (65 mg iron) Tab tablet Take 325 mg by mouth daily with breakfast.      furosemide (LASIX) 40 MG tablet Take 1 tablet (40 mg total) by mouth once daily. 90 tablet 3    glucosamine-chondroitin 500-400 mg tablet Take 1 tablet by mouth once daily.       multivitamin (THERAGRAN) tablet Take 1 tablet by mouth once daily.      pravastatin (PRAVACHOL) 80 MG tablet Take 1 tablet (80 mg total) by mouth once daily. 90 tablet 4    spironolactone (ALDACTONE) 25 MG tablet TAKE 1 TABLET BY MOUTH TWICE DAILY 180 tablet 0    acetaminophen (TYLENOL) 325 MG tablet Take 650 mg by mouth as needed.       albuterol (PROVENTIL/VENTOLIN HFA) 90 mcg/actuation inhaler Inhale 1 puff into the lungs once daily. Rescue 18 g 4    bepotastine besilate (BEPREVE) 1.5 % Drop Bepreve 1.5 % eye drops   Apply by ophthalmic route as needed for 50 days.      fluticasone furoate (ARNUITY ELLIPTA) 100 mcg/actuation  inhaler Inhale 1 puff into the lungs once daily. 30 each 5    meclizine (ANTIVERT) 12.5 mg tablet Take 1 tablet (12.5 mg total) by mouth 3 (three) times daily as needed for Dizziness. 30 tablet 0    mexiletine (MEXITIL) 150 MG Cap Take 2 capsules (300 mg total) by mouth every 8 (eight) hours. 540 capsule 3    ondansetron (ZOFRAN) 4 MG tablet Take 4 mg by mouth every 8 (eight) hours as needed for Nausea.      OZEMPIC 1 mg/dose (4 mg/3 mL) Inject 1 mg into the skin every 7 days. 9 mL 0    RESTASIS 0.05 % ophthalmic emulsion Place 1 drop into both eyes 2 (two) times daily.         No cardiac monitor results found for the past 12 months    Results for orders placed during the hospital encounter of 04/17/23    Echo    Interpretation Summary  · The left ventricle is moderately enlarged with eccentric hypertrophy and low normal systolic function.  · The estimated ejection fraction is 50-55%  · Normal left ventricular diastolic function.  · Normal right ventricular size.  · The aortic valve appears structurally normal. There is normal leaflet mobility.  · Mild mitral regurgitation.  · Mild tricuspid regurgitation.  · Normal central venous pressure (3 mmHg).  · The estimated PA systolic pressure is 18 mmHg.  · Considerable improvement in systolic function compared to study of 1-    Results for orders placed during the hospital encounter of 07/21/21    CPX Study    Interpretation Summary  · The patient exercised for 8 minutes and 29 seconds on a medium ramp protocol, achieving a peak heart rate of 120 bpm, which is 79% of the age predicted maximum heart rate.  · There were no arrhythmias during stress.  · There was no ST segment deviation noted during stress.  · The peak VO2 was 16.5 ml/kg/min which is 49.85% of predicted equating to a functional capacity of 4.71 METS indicating moderate functional impairment.  · The anaerobic threshold (AT), which occurred at a heart rate of 95bpm, was 13.9 ml/kg/min, which is 41.99%  of the predicted VO2 and is borderline reduced.  · Moderate functional impairment associated with a reduced breathing reserve, normal oxygen stauration, suboptimal effort, and a borderline reduced AT. These findings are indicative of functional impairment secondary to circulatory insufficiency, deconditioning.        Results for orders placed or performed during the hospital encounter of 08/17/23   EKG 12-lead    Collection Time: 08/17/23  1:15 PM    Narrative    Test Reason : R55,    Vent. Rate : 079 BPM     Atrial Rate : 080 BPM     P-R Int : 246 ms          QRS Dur : 130 ms      QT Int : 432 ms       P-R-T Axes : 083 -43 041 degrees     QTc Int : 495 ms    Atrial-paced rhythm with prolonged AV conduction  Left axis deviation  Nonspecific intraventricular block  Inferior infarct (cited on or before 07-JUL-2023)  Abnormal ECG  When compared with ECG of 07-JUL-2023 08:32,  Nonspecific T wave abnormality, improved in Anterior-lateral leads  Confirmed by OLIMPIA ALFARO MD ([id number]) on 8/17/2023 8:53:05 PM    Referred By: [email or web address]   SELF           Confirmed By:OLIMPIA ALFARO MD         Review of Systems   Constitutional: Positive for malaise/fatigue.   HENT:  Negative for hearing loss and hoarse voice.    Eyes:  Negative for blurred vision and visual disturbance.   Cardiovascular:  Positive for syncope. Negative for chest pain, claudication, dyspnea on exertion, irregular heartbeat, leg swelling, near-syncope, orthopnea, palpitations and paroxysmal nocturnal dyspnea.   Respiratory:  Negative for cough, hemoptysis, shortness of breath, sleep disturbances due to breathing, snoring and wheezing.    Endocrine: Negative for cold intolerance and heat intolerance.   Hematologic/Lymphatic: Does not bruise/bleed easily.   Skin:  Negative for color change, dry skin and nail changes.   Musculoskeletal:  Positive for arthritis and back pain. Negative for joint pain and myalgias.   Gastrointestinal:  Negative for bloating, abdominal pain,  constipation, nausea and vomiting.   Genitourinary:  Negative for dysuria, flank pain, hematuria and hesitancy.   Neurological:  Negative for headaches, light-headedness, loss of balance, numbness, paresthesias and weakness.   Psychiatric/Behavioral:  Negative for altered mental status.    Allergic/Immunologic: Negative for environmental allergies.         Objective:   Physical Exam  Vitals and nursing note reviewed.   Constitutional:       General: He is not in acute distress.     Appearance: Normal appearance. He is well-developed. He is obese. He is not ill-appearing.   HENT:      Head: Normocephalic and atraumatic.      Nose: Nose normal.      Mouth/Throat:      Mouth: Mucous membranes are moist.   Eyes:      Pupils: Pupils are equal, round, and reactive to light.   Neck:      Thyroid: No thyromegaly.      Vascular: No JVD.      Trachea: No tracheal deviation.   Cardiovascular:      Rate and Rhythm: Normal rate and regular rhythm.      Chest Wall: PMI is not displaced.      Pulses: Intact distal pulses.           Radial pulses are 2+ on the right side and 2+ on the left side.        Dorsalis pedis pulses are 2+ on the right side and 2+ on the left side.      Heart sounds: S1 normal and S2 normal. Heart sounds not distant. No murmur heard.     Comments: ICD in excellent repair  S/P Recent ICD shock  Pulmonary:      Effort: Pulmonary effort is normal. No respiratory distress.      Breath sounds: Normal breath sounds. No wheezing.   Abdominal:      General: Bowel sounds are normal. There is no distension.      Palpations: Abdomen is soft.      Tenderness: There is no abdominal tenderness.   Musculoskeletal:         General: No swelling. Normal range of motion.      Cervical back: Full passive range of motion without pain, normal range of motion and neck supple.      Right lower leg: No edema.      Left lower leg: No edema.      Right ankle: No swelling.      Left ankle: No swelling.   Skin:     General: Skin is  warm and dry.      Capillary Refill: Capillary refill takes less than 2 seconds.      Nails: There is no clubbing.   Neurological:      General: No focal deficit present.      Mental Status: He is alert and oriented to person, place, and time.      Motor: No weakness.   Psychiatric:         Speech: Speech normal.         Behavior: Behavior normal.         Thought Content: Thought content normal.         Judgment: Judgment normal.         Lab Results   Component Value Date    CHOL 184 09/29/2022    CHOL 171 08/23/2021    CHOL 170 07/23/2021     Lab Results   Component Value Date    HDL 38 (L) 09/29/2022    HDL 41 08/23/2021    HDL 39 (L) 07/23/2021     Lab Results   Component Value Date    LDLCALC 105.6 09/29/2022    LDLCALC 104.4 08/23/2021    LDLCALC 93.6 07/23/2021     Lab Results   Component Value Date    TRIG 202 (H) 09/29/2022    TRIG 128 08/23/2021    TRIG 187 (H) 07/23/2021     Lab Results   Component Value Date    CHOLHDL 20.7 09/29/2022    CHOLHDL 24.0 08/23/2021    CHOLHDL 22.9 07/23/2021     [unfilled]  Lab Results   Component Value Date    TSH 2.417 07/07/2023     Lab Results   Component Value Date    INR 1.0 07/06/2018     Lab Results   Component Value Date    WBC 11.72 08/17/2023    HGB 14.1 08/17/2023    HCT 41.0 08/17/2023    MCV 95 08/17/2023     08/17/2023     BNP  Recent Labs   Lab 08/17/23  1341   BNP 23     Estimated Creatinine Clearance: 69 mL/min (based on SCr of 1.2 mg/dL).     Assessment:      1. Syncope    2. Rib pain on left side    3. Defibrillator discharge    4. Chest pain        Plan:      1. VT Storm    Continue Mexitil 450 mg every 8 hours  Continue Amiodarone 300 mg daily  Continue Coreg 25 mg BID  Continue Entresto 49/51 BID  Continue Aldactone 12.5 mg daily    Daily CMP,Mag, Phosph  Keep K+>4 and Mag >2    Dash Diet 2 gm sodium restriction  1500 ml fluid restriction  Continue monitoring for another 24 hours  Follow up in EP clinic after discharge     Virgie Jaramillo  FNP-C [facility]

## 2023-08-18 NOTE — CONSULTS
Asked to see patient for syncope by Dr Bales  I reviewed the chart in detail including all relevant clinical notes, cardiac study reports, lab data and Xrays.  I have reviewed the actual images of all relevant ECG, rhythm, holter and long term monitoring tracings obtained during current hospitalization and in past records.   I have reviewed the actual images of all relevant CXRays.   I have directly evaluated all relevant data pertaining to any CIED.   Patient was then seen, interviewed and evaluated.   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  Has had recurrent disease with the VT at to be treated with the various manipulations including avoidance of V pacing and avoidance of anything that would prolong his QT.  After a period of the relative quiescence for a couple years, he has again started having recurrent VT.  He has recently had a syncopal event just a few days ago.  This was related to a VT that was not responsive to ATP and resulted in syncope and a rescue by an ICD shock.  He had another event that had been ATP lead.  He was supposed to see me on 08/18/2023 to deal with the stress.  However, on 08/17/2023, at 9:24 a.m. in the morning, while he was out by his truck, he suddenly passed out.  ICD evaluation revealed another VT episode that was terminated by ATP but only after syncope.  This was initially somewhat polymorphic but then organized somewhat and was responsive to ATP.  Concomitantly, his thoracic impedance is suggestive of some fluid overload.  Patient does not feel more short of breath than usual.  However on physical exam, his breath sounds are somewhat tight.  The for from the syncopal spell resulted in various bumps and bruises in his back is now hurting him quite a bit with back spasms whenever he moves.    Assessment:  Recurrent VT.  The frequency qualifies as a VT storm.  Potential triggers include a recent  increase in his trazodone dose as well as fluid retention.  Of note, his Mexitil level has been quite low at 0.3 despite 300 mg p.o. Q 8 hours.  This dose has not been increased because his VT had been under relative control for a long period of time.    Recommendation:  Admit for a period of observation.  At least 24 hours.  IV Lasix-40 mg, now  Increase Mexitil to 450  Q 8 .  Avoid any QT prolonging drugs including muscle spasm relievers.  Hot packs to bruised areas.  Measure serum phosphate.

## 2023-08-18 NOTE — PLAN OF CARE
[facility] - Telemetry (Hospital)  Initial Discharge Assessment       Primary Care Provider: Olvin Hutson MD    Admission Diagnosis: Syncope [R55]  Defibrillator discharge [Z45.02]  Rib pain on left side [R07.81]  Chest pain [R07.9]    Admission Date: 8/17/2023  Expected Discharge Date:     Transition of Care Barriers: None    Payor: MEDICARE / Plan: MEDICARE PART A & B / Product Type: Government /     Extended Emergency Contact Information  Primary Emergency Contact: Tran Yap  Address: [address]           [place], [place] [id number] [place] [address]  Home Phone: [phone]  Mobile Phone: [phone]  Relation: Spouse              [address] [place] [address] [employer]  [email or web address] [facility] [address]  Phone: [phone] Fax: [phone]    [address] [place] [address]  [email or web address] [facility] [address]  Phone: [phone] Fax: [phone]    [id number] [place] [address] [address]  Phone: [phone] Fax: [phone]    [facility]  [address] [place] [id number]        Initial Assessment (most recent)       Adult Discharge Assessment - 08/18/23 1252          Discharge Assessment    Assessment Type Discharge Planning Assessment     Confirmed/corrected address, phone number and insurance Yes     Confirmed Demographics Correct on Facesheet     Source of Information patient     When was your last doctors appointment? 08/14/23     Communicated EDDA with patient/caregiver Date not available/Unable to determine     Reason For Admission Cardiac arrythmias     People in Home spouse     Facility Arrived From: home     Do you expect to return to your current living situation? Yes     Do you have help at home or someone to help you manage your care at home? Yes     Who are your  caregiver(s) and their phone number(s)? spouse     Prior to hospitilization cognitive status: Alert/Oriented     Current cognitive status: Alert/Oriented     Equipment Currently Used at Home none     Readmission within 30 days? No     Patient currently being followed by outpatient case management? No     Do you currently have service(s) that help you manage your care at home? No     Do you take prescription medications? Yes     Do you have prescription coverage? Yes     Coverage Part D     Do you have any problems affording any of your prescribed medications? No     Is the patient taking medications as prescribed? yes     Who is going to help you get home at discharge? spouse     How do you get to doctors appointments? car, drives self     Are you on dialysis? No     Do you take coumadin? No     DME Needed Upon Discharge  none     Discharge Plan discussed with: Patient;Spouse/sig other     Transition of Care Barriers None                   Met with patient and spouse.  Patient lives with spouse and is independent with ADL's.  Spouse can be his help at home.  Currently no discharge needs.

## 2023-08-18 NOTE — ASSESSMENT & PLAN NOTE
Patient is identified as having Combined Systolic and Diastolic heart failure that is Acute on chronic. CHF is currently controlled. Latest ECHO performed and demonstrates- Results for orders placed during the hospital encounter of 04/17/23    Echo    Interpretation Summary  · The left ventricle is moderately enlarged with eccentric hypertrophy and low normal systolic function.  · The estimated ejection fraction is 50-55%  · Normal left ventricular diastolic function.  · Normal right ventricular size.  · The aortic valve appears structurally normal. There is normal leaflet mobility.  · Mild mitral regurgitation.  · Mild tricuspid regurgitation.  · Normal central venous pressure (3 mmHg).  · The estimated PA systolic pressure is 18 mmHg.  · Considerable improvement in systolic function compared to study of 1-  . Continue Beta Blocker and ARNI and monitor clinical status closely. Monitor on telemetry. Patient is off CHF pathway.  Monitor strict Is&Os and daily weights.  Place on fluid restriction of 2 L. Cardiology has been consulted. Continue to stress to patient importance of self efficacy and  on diet for CHF. Last BNP reviewed- and noted below   Recent Labs   Lab 08/17/23  1341   BNP 23   .  Patient's spironolactone restarted

## 2023-08-18 NOTE — PROGRESS NOTES
Called by MT room that patient's HR in the 190s, went to assess patient, AICD had fired. Patient reported that he felt light headed and dizzy and could feel the defibrillator fire. Vitals stable upon assessment. Dr. Faust notified.

## 2023-08-18 NOTE — SUBJECTIVE & OBJECTIVE
Interval History:  Patient is without complaint care.  He had another defibrillation last night.  Has had none since.  Discussed with EP Cardiology.    Review of Systems   Constitutional:  Negative for fatigue and fever.   Respiratory:  Negative for chest tightness and shortness of breath.    Cardiovascular:  Negative for chest pain, palpitations and leg swelling.   Gastrointestinal:  Negative for abdominal distention, abdominal pain, diarrhea and nausea.   Genitourinary:  Negative for difficulty urinating and dysuria.   Musculoskeletal:  Positive for back pain. Arthralgias: At site of fall.  All other systems reviewed and are negative.    Objective:     Vital Signs (Most Recent):  Temp: 97.4 °F (36.3 °C) (08/18/23 1452)  Pulse: 79 (08/18/23 1452)  Resp: 18 (08/18/23 1452)  BP: 117/69 (08/18/23 1452)  SpO2: 97 % (08/18/23 1452) Vital Signs (24h Range):  Temp:  [97 °F (36.1 °C)-98.2 °F (36.8 °C)] 97.4 °F (36.3 °C)  Pulse:  [] 79  Resp:  [15-20] 18  SpO2:  [89 %-98 %] 97 %  BP: ()/(50-78) 117/69     Weight: 110 kg (242 lb 8.1 oz)  Body mass index is 35.81 kg/m².    Intake/Output Summary (Last 24 hours) at 8/18/2023 1740  Last data filed at 8/18/2023 0715  Gross per 24 hour   Intake 480 ml   Output 400 ml   Net 80 ml         Physical Exam  Vitals reviewed.   Constitutional:       Appearance: Normal appearance.   HENT:      Head: Normocephalic and atraumatic.      Mouth/Throat:      Mouth: Mucous membranes are moist.      Pharynx: Oropharynx is clear.   Eyes:      Extraocular Movements: Extraocular movements intact.      Conjunctiva/sclera: Conjunctivae normal.   Cardiovascular:      Rate and Rhythm: Normal rate and regular rhythm.      Pulses: Normal pulses.      Heart sounds: Normal heart sounds.   Pulmonary:      Effort: Pulmonary effort is normal.      Breath sounds: Normal breath sounds.   Abdominal:      General: Bowel sounds are normal.      Palpations: Abdomen is soft.      Tenderness: There is  abdominal tenderness.   Musculoskeletal:         General: Tenderness present. Normal range of motion.      Cervical back: Normal range of motion and neck supple.   Skin:     General: Skin is warm and dry.   Neurological:      General: No focal deficit present.      Mental Status: He is alert and oriented to person, place, and time. Mental status is at baseline.   Psychiatric:         Mood and Affect: Mood normal.         Behavior: Behavior normal.         Thought Content: Thought content normal.             Significant Labs: All pertinent labs within the past 24 hours have been reviewed.  CBC:   Recent Labs   Lab 08/17/23  1341   WBC 11.72   HGB 14.1   HCT 41.0        CMP:   Recent Labs   Lab 08/17/23  1341 08/18/23  0350   * 134*   K 4.2 4.2    98   CO2 21* 25   GLU 92 96   BUN 14 16   CREATININE 1.0 1.2   CALCIUM 9.0 8.5*   PROT 7.1 6.4   ALBUMIN 3.9 3.5   BILITOT 0.5 0.5   ALKPHOS 37* 38*   AST 30 24   ALT 50* 38   ANIONGAP 11 11     Magnesium:   Recent Labs   Lab 08/17/23  1341 08/18/23  0350   MG 2.0 2.0     Troponin:   Recent Labs   Lab 08/17/23  1341   TROPONINI <0.006  <0.006       Significant Imaging: I have reviewed all pertinent imaging results/findings within the past 24 hours.

## 2023-08-18 NOTE — PROGRESS NOTES
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   Patient was at home trying to move a tire out of his truck and had a syncopal episode.  He woke up on the ground.  He also states that yesterday morning approximately 3:00 a.m. he had a defibrillator fire.  He was seen in the emergency department where was noted that he had a successful shock, ATP x4 and a decrease in T1 for 9 days. Episodes occurred at 10:57 p.m. on 08/15 3:05 a.m. on 08/16.  He had 2 VT episodes that converted with 1 round of ATP each.  Patient had a VT event that transitioned to VF and had primary conversion after 1 shock.  Polymorphic VT was noted.  Patient reports that he has been compliant with his medications  and the only change in his medications was recent increasing his trazodone.  BNP was obtained which was negative.  He also had a PMVT event that happened at 9:24 a.m. today.  He was seen in consultation by  who recommended checking electrolytes including magnesium and phosphorus, given 1 dose of IV Lasix and starting Aldactone 12.5 mg a day.  He also recommended increasing mexiletine to 450 mg q.8 hours.    Patient's only complaint is pain in his left ribcage and back where he fell.  He denies any shortness a breath, chest pain, nausea, vomiting.        Overview/Hospital  Course:  Patient admitted with multiple episodes of VT and fibrillation with his AICD.  He was given Lasix and begun on aldactone his mexiletine was increased to 450 mg q.8 hours due to low levels in the past.  Immediately after admission he had another VT episode with defibrillation.  His electrolytes have been corrected any skin continue to be monitored on telemetry.  Seen in consultation by EP Cardiology.      Interval History:  Patient is without complaint care.  He had another defibrillation last night.  Has had none since.  Discussed with EP Cardiology.    Review of Systems   Constitutional:  Negative for fatigue and fever.   Respiratory:  Negative for chest tightness and shortness of breath.    Cardiovascular:  Negative for chest pain, palpitations and leg swelling.   Gastrointestinal:  Negative for abdominal distention, abdominal pain, diarrhea and nausea.   Genitourinary:  Negative for difficulty urinating and dysuria.   Musculoskeletal:  Positive for back pain. Arthralgias: At site of fall.  All other systems reviewed and are negative.    Objective:     Vital Signs (Most Recent):  Temp: 97.4 °F (36.3 °C) (08/18/23 1452)  Pulse: 79 (08/18/23 1452)  Resp: 18 (08/18/23 1452)  BP: 117/69 (08/18/23 1452)  SpO2: 97 % (08/18/23 1452) Vital Signs (24h Range):  Temp:  [97 °F (36.1 °C)-98.2 °F (36.8 °C)] 97.4 °F (36.3 °C)  Pulse:  [] 79  Resp:  [15-20] 18  SpO2:  [89 %-98 %] 97 %  BP: ()/(50-78) 117/69     Weight: 110 kg (242 lb 8.1 oz)  Body mass index is 35.81 kg/m².    Intake/Output Summary (Last 24 hours) at 8/18/2023 1740  Last data filed at 8/18/2023 0715  Gross per 24 hour   Intake 480 ml   Output 400 ml   Net 80 ml         Physical Exam  Vitals reviewed.   Constitutional:       Appearance: Normal appearance.   HENT:      Head: Normocephalic and atraumatic.      Mouth/Throat:      Mouth: Mucous membranes are moist.      Pharynx: Oropharynx is clear.   Eyes:      Extraocular Movements: Extraocular  movements intact.      Conjunctiva/sclera: Conjunctivae normal.   Cardiovascular:      Rate and Rhythm: Normal rate and regular rhythm.      Pulses: Normal pulses.      Heart sounds: Normal heart sounds.   Pulmonary:      Effort: Pulmonary effort is normal.      Breath sounds: Normal breath sounds.   Abdominal:      General: Bowel sounds are normal.      Palpations: Abdomen is soft.      Tenderness: There is abdominal tenderness.   Musculoskeletal:         General: Tenderness present. Normal range of motion.      Cervical back: Normal range of motion and neck supple.   Skin:     General: Skin is warm and dry.   Neurological:      General: No focal deficit present.      Mental Status: He is alert and oriented to person, place, and time. Mental status is at baseline.   Psychiatric:         Mood and Affect: Mood normal.         Behavior: Behavior normal.         Thought Content: Thought content normal.             Significant Labs: All pertinent labs within the past 24 hours have been reviewed.  CBC:   Recent Labs   Lab 08/17/23  1341   WBC 11.72   HGB 14.1   HCT 41.0        CMP:   Recent Labs   Lab 08/17/23  1341 08/18/23  0350   * 134*   K 4.2 4.2    98   CO2 21* 25   GLU 92 96   BUN 14 16   CREATININE 1.0 1.2   CALCIUM 9.0 8.5*   PROT 7.1 6.4   ALBUMIN 3.9 3.5   BILITOT 0.5 0.5   ALKPHOS 37* 38*   AST 30 24   ALT 50* 38   ANIONGAP 11 11     Magnesium:   Recent Labs   Lab 08/17/23  1341 08/18/23  0350   MG 2.0 2.0     Troponin:   Recent Labs   Lab 08/17/23  1341   TROPONINI <0.006  <0.006       Significant Imaging: I have reviewed all pertinent imaging results/findings within the past 24 hours.      Assessment/Plan:      * VF (ventricular fibrillation)    Multiple defibrillations and past 2 days  Consulted EP Cardiology  -recommendation of DC trazodone (recently increased dose from 150-300 q.h.s.)  -increase mexiletine to 450 mg q.8  -Lasix 40 mg IV x1  -Aldactone 12.5 mg daily (patient has  "history of hyperkalemia we will need monitor closely)    Encounter for long-term (current) use of medications    Amiodarone, mexiletine    Chronic combined systolic and diastolic CHF (congestive heart failure)  Patient is identified as having Combined Systolic and Diastolic heart failure that is Acute on chronic. CHF is currently controlled. Latest ECHO performed and demonstrates- Results for orders placed during the hospital encounter of 04/17/23    Echo    Interpretation Summary  · The left ventricle is moderately enlarged with eccentric hypertrophy and low normal systolic function.  · The estimated ejection fraction is 50-55%  · Normal left ventricular diastolic function.  · Normal right ventricular size.  · The aortic valve appears structurally normal. There is normal leaflet mobility.  · Mild mitral regurgitation.  · Mild tricuspid regurgitation.  · Normal central venous pressure (3 mmHg).  · The estimated PA systolic pressure is 18 mmHg.  · Considerable improvement in systolic function compared to study of 1-  . Continue Beta Blocker and ARNI and monitor clinical status closely. Monitor on telemetry. Patient is off CHF pathway.  Monitor strict Is&Os and daily weights.  Place on fluid restriction of 2 L. Cardiology has been consulted. Continue to stress to patient importance of self efficacy and  on diet for CHF. Last BNP reviewed- and noted below   Recent Labs   Lab 08/17/23  1341   BNP 23   .  Patient's spironolactone restarted    Type 2 diabetes mellitus with hyperglycemia, without long-term current use of insulin  Patient's FSGs are controlled on current medication regimen.  Last A1c reviewed-   Lab Results   Component Value Date    HGBA1C 6.0 (H) 09/29/2022     Most recent fingerstick glucose reviewed- No results for input(s): "POCTGLUCOSE" in the last 24 hours.  Current correctional scale  none  none anti-hyperglycemic dose as follows-   Antihyperglycemics (From admission, onward)    None    "     Hold Oral hypoglycemics while patient is in the hospital.    ICD (implantable cardioverter-defibrillator) discharge  Status post defibrillator discharge with resultant syncopal episode  EP cardiology consulted      DJD (degenerative joint disease) of knee  Significant pain in left knee  Symptomatic treatment      Hyperlipidemia LDL goal <100     Continue outpatient statin    HONEY (obstructive sleep apnea)  Patient with history of HONEY compliant with CPAP  Continues while in the hospital        VTE Risk Mitigation (From admission, onward)         Ordered     enoxaparin injection 40 mg  Daily         08/17/23 1723     IP VTE HIGH RISK PATIENT  Once         08/17/23 1723     Place sequential compression device  Until discontinued         08/17/23 1723                Discharge Planning   EDDA:      Code Status: Full Code   Is the patient medically ready for discharge?:     Reason for patient still in hospital (select all that apply): Patient trending condition, Laboratory test and Consult recommendations                     Elvira Diaz MD  Department of Hospital Medicine   [facility] Telemetry ([facility])

## 2023-08-19 VITALS
WEIGHT: 242.5 LBS | HEIGHT: 69 IN | DIASTOLIC BLOOD PRESSURE: 73 MMHG | TEMPERATURE: 98 F | SYSTOLIC BLOOD PRESSURE: 111 MMHG | HEART RATE: 82 BPM | OXYGEN SATURATION: 92 % | BODY MASS INDEX: 35.92 KG/M2 | RESPIRATION RATE: 18 BRPM

## 2023-08-19 LAB
ALBUMIN SERPL BCP-MCNC: 3.6 G/DL (ref 3.5–5.2)
ALP SERPL-CCNC: 37 U/L (ref 55–135)
ALT SERPL W/O P-5'-P-CCNC: 39 U/L (ref 10–44)
ANION GAP SERPL CALC-SCNC: 7 MMOL/L (ref 8–16)
AST SERPL-CCNC: 22 U/L (ref 10–40)
BILIRUB SERPL-MCNC: 0.5 MG/DL (ref 0.1–1)
BUN SERPL-MCNC: 11 MG/DL (ref 8–23)
CALCIUM SERPL-MCNC: 9.1 MG/DL (ref 8.7–10.5)
CHLORIDE SERPL-SCNC: 103 MMOL/L (ref 95–110)
CO2 SERPL-SCNC: 25 MMOL/L (ref 23–29)
CREAT SERPL-MCNC: 0.8 MG/DL (ref 0.5–1.4)
EST. GFR  (NO RACE VARIABLE): >60 ML/MIN/1.73 M^2
GLUCOSE SERPL-MCNC: 99 MG/DL (ref 70–110)
MAGNESIUM SERPL-MCNC: 2.2 MG/DL (ref 1.6–2.6)
PHOSPHATE SERPL-MCNC: 3.7 MG/DL (ref 2.7–4.5)
POTASSIUM SERPL-SCNC: 4.8 MMOL/L (ref 3.5–5.1)
PROT SERPL-MCNC: 6.9 G/DL (ref 6–8.4)
SODIUM SERPL-SCNC: 135 MMOL/L (ref 136–145)

## 2023-08-19 PROCEDURE — 84100 ASSAY OF PHOSPHORUS: CPT | Performed by: INTERNAL MEDICINE

## 2023-08-19 PROCEDURE — 83735 ASSAY OF MAGNESIUM: CPT | Performed by: INTERNAL MEDICINE

## 2023-08-19 PROCEDURE — 25000003 PHARM REV CODE 250: Performed by: EMERGENCY MEDICINE

## 2023-08-19 PROCEDURE — 36415 COLL VENOUS BLD VENIPUNCTURE: CPT | Performed by: INTERNAL MEDICINE

## 2023-08-19 PROCEDURE — 25000003 PHARM REV CODE 250: Performed by: INTERNAL MEDICINE

## 2023-08-19 PROCEDURE — 80053 COMPREHEN METABOLIC PANEL: CPT | Performed by: INTERNAL MEDICINE

## 2023-08-19 RX ORDER — SPIRONOLACTONE 25 MG/1
12.5 TABLET ORAL DAILY
Qty: 15 TABLET | Refills: 0 | Status: SHIPPED | OUTPATIENT
Start: 2023-08-20 | End: 2023-11-02

## 2023-08-19 RX ORDER — MEXILETINE HYDROCHLORIDE 150 MG/1
450 CAPSULE ORAL EVERY 8 HOURS
Qty: 270 CAPSULE | Refills: 0 | Status: SHIPPED | OUTPATIENT
Start: 2023-08-19 | End: 2023-10-27 | Stop reason: SDUPTHER

## 2023-08-19 RX ADMIN — SENNOSIDES AND DOCUSATE SODIUM 1 TABLET: 50; 8.6 TABLET ORAL at 08:08

## 2023-08-19 RX ADMIN — MEXILETINE HYDROCHLORIDE 450 MG: 150 CAPSULE ORAL at 05:08

## 2023-08-19 RX ADMIN — POLYETHYLENE GLYCOL 3350 17 G: 17 POWDER, FOR SOLUTION ORAL at 08:08

## 2023-08-19 RX ADMIN — AMIODARONE HYDROCHLORIDE 300 MG: 200 TABLET ORAL at 08:08

## 2023-08-19 RX ADMIN — CARVEDILOL 25 MG: 12.5 TABLET, FILM COATED ORAL at 08:08

## 2023-08-19 RX ADMIN — SPIRONOLACTONE 12.5 MG: 25 TABLET ORAL at 09:08

## 2023-08-19 RX ADMIN — SACUBITRIL AND VALSARTAN 2 TABLET: 49; 51 TABLET, FILM COATED ORAL at 08:08

## 2023-08-19 NOTE — PLAN OF CARE
[facility] - Telemetry (Hospital)  Discharge Final Note    Primary Care Provider: Olvin Hutson MD    Expected Discharge Date: 8/19/2023    Final Discharge Note (most recent)       Final Note - 08/19/23 1029          Final Note    Assessment Type Final Discharge Note (P)      Anticipated Discharge Disposition Home or Self Care (P)         Post-Acute Status    Coverage MEDICARE PART A & B (P)      Discharge Delays None known at this time (P)                      Important Message from Medicare             Contact Info       Olvin Hutson MD   Specialty: Family Medicine   Relationship: PCP - General    [address] [place] [id number]   Phone: [phone]       Next Steps: Follow up in 1 week(s)    Nirav Baldwin MD   Specialty: Electrophysiology, Cardiology    [address] [place] [id number]   Phone: [phone]       Next Steps: Follow up in 3 day(s)            Cherry Miranda LMSW 8/19/2023 10:31 AM

## 2023-08-20 ENCOUNTER — TELEPHONE (OUTPATIENT)
Dept: ADMINISTRATIVE | Facility: HOSPITAL | Age: 73
End: 2023-08-20
Payer: MEDICARE

## 2023-08-22 ENCOUNTER — PATIENT MESSAGE (OUTPATIENT)
Dept: CARDIOLOGY | Facility: CLINIC | Age: 73
End: 2023-08-22
Payer: MEDICARE

## 2023-08-22 ENCOUNTER — PATIENT OUTREACH (OUTPATIENT)
Dept: ADMINISTRATIVE | Facility: CLINIC | Age: 73
End: 2023-08-22
Payer: MEDICARE

## 2023-08-22 NOTE — PROGRESS NOTES
C3 nurse spoke with Randy Yap for a TCC post hospital discharge follow up call. The patient has a scheduled HOSFU appointment with Olvin Hutson MD on 08/24/23 @ 7233.

## 2023-08-24 ENCOUNTER — OFFICE VISIT (OUTPATIENT)
Dept: FAMILY MEDICINE | Facility: CLINIC | Age: 73
End: 2023-08-24
Payer: MEDICARE

## 2023-08-24 VITALS
HEART RATE: 79 BPM | BODY MASS INDEX: 35.07 KG/M2 | HEIGHT: 70 IN | SYSTOLIC BLOOD PRESSURE: 86 MMHG | DIASTOLIC BLOOD PRESSURE: 70 MMHG | WEIGHT: 245 LBS | OXYGEN SATURATION: 95 %

## 2023-08-24 DIAGNOSIS — L98.9 SKIN LESION: ICD-10-CM

## 2023-08-24 DIAGNOSIS — G47.00 INSOMNIA, UNSPECIFIED TYPE: ICD-10-CM

## 2023-08-24 DIAGNOSIS — Z79.899 ENCOUNTER FOR LONG-TERM (CURRENT) USE OF MEDICATIONS: ICD-10-CM

## 2023-08-24 DIAGNOSIS — E11.65 TYPE 2 DIABETES MELLITUS WITH HYPERGLYCEMIA, WITHOUT LONG-TERM CURRENT USE OF INSULIN: Chronic | ICD-10-CM

## 2023-08-24 DIAGNOSIS — Z09 HOSPITAL DISCHARGE FOLLOW-UP: Primary | ICD-10-CM

## 2023-08-24 PROCEDURE — 99999 PR PBB SHADOW E&M-EST. PATIENT-LVL V: CPT | Mod: PBBFAC,,, | Performed by: FAMILY MEDICINE

## 2023-08-24 PROCEDURE — 99215 OFFICE O/P EST HI 40 MIN: CPT | Mod: PBBFAC,PO | Performed by: FAMILY MEDICINE

## 2023-08-24 PROCEDURE — 99214 OFFICE O/P EST MOD 30 MIN: CPT | Mod: S$PBB,,, | Performed by: FAMILY MEDICINE

## 2023-08-24 PROCEDURE — 99214 PR OFFICE/OUTPT VISIT, EST, LEVL IV, 30-39 MIN: ICD-10-PCS | Mod: S$PBB,,, | Performed by: FAMILY MEDICINE

## 2023-08-24 PROCEDURE — 99999 PR PBB SHADOW E&M-EST. PATIENT-LVL V: ICD-10-PCS | Mod: PBBFAC,,, | Performed by: FAMILY MEDICINE

## 2023-08-24 NOTE — PROGRESS NOTES
PLAN:      Problem List Items Addressed This Visit       Type 2 diabetes mellitus with hyperglycemia, without long-term current use of insulin (Chronic)     Continue current regimen. We will plan to monitor hemoglobin A1c at designated intervals 3 to 6 months.  I recommend ongoing Education for diabetic diet and exercise protocol.  We will continue to monitor for side effects.    Please be advised of symptoms to monitor for and to notify me immediately if persistent or worsening.  Follow up with Ophthalmology/Optometry and Podiatry at least annually.           Relevant Orders    Hemoglobin A1C    Skin lesion     Referral to Dermatology.  Patient with abnormal appearing skin lesion to the upper back.  See photo.         Relevant Orders    Ambulatory referral/consult to Dermatology    Encounter for long-term (current) use of medications     Complete history and physical was completed today.  Complete and thorough medication reconciliation was performed.  Discussed risks and benefits of medications.  Advised patient on orders and health maintenance.  We discussed old records and old labs if available.  Will request any records not available through epic.  Continue current medications listed on your summary sheet.           Insomnia     Consider melatonin or hydroxyzine.  Patient will clear these with his Cardiology prior to taking.  Discussed insomnia condition course.  Advised of first-line medications for this condition.  Also discussed sleep hygiene.  Information was given below.  Good sleep habits (sometimes referred to as sleep hygiene) can help you get a good nights sleep.    Some habits that can improve your sleep health:  -Be consistent. Go to bed at the same time each night and get up at the same time each morning, including on the weekends  -Make sure your bedroom is quiet, dark, relaxing, and at a comfortable temperature  -Remove electronic devices, such as TVs, computers, and smart phones, from the  bedroom  -Avoid large meals, caffeine, and alcohol before bedtime  -Get some exercise. Being physically active during the day can help you fall asleep more easily at night.           Hospital discharge follow-up - Primary     Transitional Care Note    Family and/or Caretaker present at visit?  Yes.  Diagnostic tests reviewed/disposition: I have reviewed all completed as well as pending diagnostic tests at the time of discharge.  Disease/illness education:  Ventricular tachycardia  Home health/community services discussion/referrals: Patient does not have home health established from hospital visit.  They do not need home health.  If needed, we will set up home health for the patient.   Establishment or re-establishment of referral orders for community resources: No other necessary community resources.   Discussion with other health care providers: No discussion with other health care providers necessary.                   Future Appointments       Date Provider Specialty Appt Notes    9/11/2023  Pulmonology [facility]    11/14/2023 Misty Barajas LCSW Psychiatry Consult (Marielle Schilling, IRENE )    11/15/2023  Cardiology 90 Day Remote Cardiac Device Check    11/30/2023 Misty Barajas LCSW Psychiatry Therapy (1)    12/14/2023 Misty Barajas LCSW Psychiatry Therapy (2)    2/19/2024 Nirav Baldwin MD Cardiology 6 month follow up           Medication Management for assessment above:   Medication List with Changes/Refills   Current Medications    ACETAMINOPHEN (TYLENOL) 325 MG TABLET    Take 650 mg by mouth as needed.     ALBUTEROL (PROVENTIL/VENTOLIN HFA) 90 MCG/ACTUATION INHALER    Inhale 1 puff into the lungs once daily. Rescue    AMIODARONE (PACERONE) 200 MG TAB    Take 1.5 tablets (300 mg total) by mouth once daily. 1 tab po bid for 6 weeks then 1.5 tb a day (total dose = 300 mg)    BEPOTASTINE BESILATE (BEPREVE) 1.5 % DROP    Bepreve 1.5 % eye drops   Apply by ophthalmic route as  needed for 50 days.    CARVEDILOL (COREG) 25 MG TABLET    Take 1 tablet (25 mg total) by mouth 2 (two) times daily.    CO-ENZYME Q-10 30 MG CAPSULE    Take 30 mg by mouth once daily.     DAPAGLIFLOZIN (FARXIGA) 10 MG TABLET    TAKE 1 TABLET BY MOUTH ONCE DAILY    ENTRESTO  MG PER TABLET    TAKE 1 TABLET BY MOUTH TWICE DAILY    FERROUS SULFATE (FEOSOL) 325 MG (65 MG IRON) TAB TABLET    Take 325 mg by mouth daily with breakfast.    FLUTICASONE FUROATE (ARNUITY ELLIPTA) 100 MCG/ACTUATION INHALER    Inhale 1 puff into the lungs once daily.    FUROSEMIDE (LASIX) 40 MG TABLET    Take 1 tablet (40 mg total) by mouth once daily.    GLUCOSAMINE-CHONDROITIN 500-400 MG TABLET    Take 1 tablet by mouth once daily.     MECLIZINE (ANTIVERT) 12.5 MG TABLET    Take 1 tablet (12.5 mg total) by mouth 3 (three) times daily as needed for Dizziness.    MEXILETINE (MEXITIL) 150 MG CAP    Take 3 capsules (450 mg total) by mouth every 8 (eight) hours.    MULTIVITAMIN (THERAGRAN) TABLET    Take 1 tablet by mouth once daily.    ONDANSETRON (ZOFRAN) 4 MG TABLET    Take 4 mg by mouth every 8 (eight) hours as needed for Nausea.    OZEMPIC 1 MG/DOSE (4 MG/3 ML)    Inject 1 mg into the skin every 7 days.    PRAVASTATIN (PRAVACHOL) 80 MG TABLET    Take 1 tablet (80 mg total) by mouth once daily.    RESTASIS 0.05 % OPHTHALMIC EMULSION    Place 1 drop into both eyes 2 (two) times daily.    SPIRONOLACTONE (ALDACTONE) 25 MG TABLET    Take 0.5 tablets (12.5 mg total) by mouth once daily.       Olvin Hutson M.D.  ==========================================================================  Subjective:   Patient ID: Randy Yap is a 73 y.o. male.  has a past medical history of Asthma, Cardiomyopathy due to systemic disease, Colon polyp (5/2013), Depression, recurrent, Diastolic dysfunction, DJD (degenerative joint disease) of knee (10/14/2013), Hyperlipidemia, Hypertension, Murmur, Paroxysmal VT (5/21/2016), Pericarditis with effusion, and  "Sleep apnea.   Chief Complaint: Hospital Follow Up      Problem List Items Addressed This Visit       Type 2 diabetes mellitus with hyperglycemia, without long-term current use of insulin (Chronic)    Overview     August 2023:    Diabetes Medications               dapagliflozin (FARXIGA) 10 mg tablet TAKE 1 TABLET BY MOUTH ONCE DAILY    OZEMPIC 1 mg/dose (4 mg/3 mL) Inject 1 mg into the skin every 7 days.   March 2023:  Patient doing well on Ozempic 1 milligram weekly.  Patient denies any history of thyroid cancer, pancreatitis, MEN syndrome.   Diabetes Management StatusPatient doing well on Farxiga with comorbid heart failure.  Diabetes Management Status    Statin: Taking  ACE/ARB: Not taking    Screening or Prevention Patient's value Goal Complete/Controlled?   HgA1C Testing and Control   Lab Results   Component Value Date    HGBA1C 6.0 (H) 09/29/2022      Annually/Less than 8% Yes   Lipid profile : 09/29/2022 Annually Yes   LDL control Lab Results   Component Value Date    LDLCALC 105.6 09/29/2022    Annually/Less than 100 mg/dl  No   Nephropathy screening Lab Results   Component Value Date    LABMICR <5.0 03/24/2023     Lab Results   Component Value Date    PROTEINUA Negative 06/11/2012     No results found for: "UTPCR"   Annually Yes   Blood pressure BP Readings from Last 1 Encounters:   08/24/23 (!) 86/70    Less than 140/90 Yes   Dilated retinal exam : 10/28/2022 Annually Yes   Foot exam   Most Recent Foot Exam Date: Not Found Annually No            Current Assessment & Plan     Continue current regimen. We will plan to monitor hemoglobin A1c at designated intervals 3 to 6 months.  I recommend ongoing Education for diabetic diet and exercise protocol.  We will continue to monitor for side effects.    Please be advised of symptoms to monitor for and to notify me immediately if persistent or worsening.  Follow up with Ophthalmology/Optometry and Podiatry at least annually.           Skin lesion    Overview     " August 2023:  Patient with skin lesion on the upper back.  Patient reports that is becoming larger.  Starting to have discomfort.    Chronic.  Uncontrolled.  Patient states that he has a nonhealing lesion on the back of his right arm.  Patient reports that it is not painful is not changing and colors and is not enlarging.  However it does itch in he scratches it occasionally.  Patient has never had evaluation.         Current Assessment & Plan     Referral to Dermatology.  Patient with abnormal appearing skin lesion to the upper back.  See photo.         Encounter for long-term (current) use of medications    Overview     August 2023: Reviewed labs.  March 2023:  CHRONIC. Stable. Compliant with medications for managed conditions. See medication list. No SE reported.   Routine lab analysis is being monitored. Refills were addressed.  Lab Results   Component Value Date    WBC 11.72 08/17/2023    HGB 14.1 08/17/2023    HCT 41.0 08/17/2023    MCV 95 08/17/2023     08/17/2023       Chemistry        Component Value Date/Time     (L) 08/19/2023 0557    K 4.8 08/19/2023 0557     08/19/2023 0557    CO2 25 08/19/2023 0557    BUN 11 08/19/2023 0557    CREATININE 0.8 08/19/2023 0557    GLU 99 08/19/2023 0557        Component Value Date/Time    CALCIUM 9.1 08/19/2023 0557    ALKPHOS 37 (L) 08/19/2023 0557    AST 22 08/19/2023 0557    ALT 39 08/19/2023 0557    BILITOT 0.5 08/19/2023 0557    ESTGFRAFRICA >60.0 10/01/2021 1353    EGFRNONAA >60.0 10/01/2021 1353          Lab Results   Component Value Date    TSH 2.417 07/07/2023            Current Assessment & Plan     Complete history and physical was completed today.  Complete and thorough medication reconciliation was performed.  Discussed risks and benefits of medications.  Advised patient on orders and health maintenance.  We discussed old records and old labs if available.  Will request any records not available through epic.  Continue current medications  listed on your summary sheet.           Insomnia    Overview     August 2023:  Patient is not sleeping well.  Patient reports he was taken off of trazodone 300 milligrams.  There was some concern that this may have led to a flare up of his arrhythmia.  Chronic.  Intermittent control.  Patient with increased stress and anxiety lately.  Has been taking 1-1/2 trazodone to help with sleep.  Denies any SI HI or hallucinations.         Current Assessment & Plan     Consider melatonin or hydroxyzine.  Patient will clear these with his Cardiology prior to taking.  Discussed insomnia condition course.  Advised of first-line medications for this condition.  Also discussed sleep hygiene.  Information was given below.  Good sleep habits (sometimes referred to as sleep hygiene) can help you get a good nights sleep.    Some habits that can improve your sleep health:  -Be consistent. Go to bed at the same time each night and get up at the same time each morning, including on the weekends  -Make sure your bedroom is quiet, dark, relaxing, and at a comfortable temperature  -Remove electronic devices, such as TVs, computers, and smart phones, from the bedroom  -Avoid large meals, caffeine, and alcohol before bedtime  -Get some exercise. Being physically active during the day can help you fall asleep more easily at night.           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   Patient was at home trying to move a tire out of his truck and had a syncopal episode.  He woke up on the ground.  He also states that yesterday morning approximately 3:00 a.m. he had a defibrillator fire.  He was seen in the emergency department where was noted that he had a successful shock, ATP x4 and a decrease in T1 for 9 days. Episodes occurred at 10:57 p.m. on 08/15 3:05 a.m. on 08/16.  He had 2 VT episodes that converted with 1 round of ATP each.  Patient had a VT event that transitioned to VF and had primary conversion after 1 shock.  Polymorphic VT was noted.  Patient reports that he has been compliant with his medications  and the only change in his medications was recent increasing his trazodone.  BNP was obtained which was negative.  He also had a PMVT event that happened at 9:24 a.m. today.  He was seen in consultation by  who recommended checking electrolytes including magnesium and phosphorus, given 1 dose of IV Lasix and starting Aldactone 12.5 mg a day.  He also recommended increasing mexiletine to 450 mg q.8 hours.     Patient's only complaint is pain in his left ribcage and back where he fell.  He denies any shortness a breath, chest pain, nausea, vomiting.           Overview/Hospital Course:  Patient admitted with multiple episodes of VT and fibrillation with his AICD.  He was given Lasix and begun on aldactone his mexiletine was increased to 450 mg q.8 hours due to low levels in the past.  Immediately after admission he had another VT episode with defibrillation.  His electrolytes have been corrected any skin continue to be monitored on telemetry.  Seen in consultation by EP Cardiology.        Interval History:  Patient is without complaint care.  He had another defibrillation last night.  Has had none since.   Discussed with EP Cardiology.     Review of Systems   Constitutional:  Negative for fatigue and fever.   Respiratory:  Negative for chest tightness and shortness of breath.    Cardiovascular:  Negative for chest pain, palpitations and leg swelling.   Gastrointestinal:  Negative for abdominal distention, abdominal pain, diarrhea and nausea.   Genitourinary:  Negative for difficulty urinating and dysuria.   Musculoskeletal:  Positive for back pain. Arthralgias: At site of fall.  All other systems reviewed and are negative.     Objective:      Vital Signs (Most Recent):  Temp: 97.4 °F (36.3 °C) (08/18/23 1452)  Pulse: 79 (08/18/23 1452)  Resp: 18 (08/18/23 1452)  BP: 117/69 (08/18/23 1452)  SpO2: 97 % (08/18/23 1452) Vital Signs (24h Range):  Temp:  [97 °F (36.1 °C)-98.2 °F (36.8 °C)] 97.4 °F (36.3 °C)  Pulse:  [] 79  Resp:  [15-20] 18  SpO2:  [89 %-98 %] 97 %  BP: ()/(50-78) 117/69      Weight: 110 kg (242 lb 8.1 oz)  Body mass index is 35.81 kg/m².     Intake/Output Summary (Last 24 hours) at 8/18/2023 1740  Last data filed at 8/18/2023 0715      Gross per 24 hour   Intake 480 ml   Output 400 ml   Net 80 ml         Physical Exam  Vitals reviewed.   Constitutional:       Appearance: Normal appearance.   HENT:      Head: Normocephalic and atraumatic.      Mouth/Throat:      Mouth: Mucous membranes are moist.      Pharynx: Oropharynx is clear.   Eyes:      Extraocular Movements: Extraocular movements intact.      Conjunctiva/sclera: Conjunctivae normal.   Cardiovascular:      Rate and Rhythm: Normal rate and regular rhythm.      Pulses: Normal pulses.      Heart sounds: Normal heart sounds.   Pulmonary:      Effort: Pulmonary effort is normal.      Breath sounds: Normal breath sounds.   Abdominal:      General: Bowel sounds are normal.      Palpations: Abdomen is soft.      Tenderness: There is abdominal tenderness.   Musculoskeletal:         General: Tenderness present. Normal range of motion.      Cervical  back: Normal range of motion and neck supple.   Skin:     General: Skin is warm and dry.   Neurological:      General: No focal deficit present.      Mental Status: He is alert and oriented to person, place, and time. Mental status is at baseline.   Psychiatric:         Mood and Affect: Mood normal.         Behavior: Behavior normal.         Thought Content: Thought content normal.                Significant Labs: All pertinent labs within the past 24 hours have been reviewed.  CBC:       Recent Labs   Lab 08/17/23  1341   WBC 11.72   HGB 14.1   HCT 41.0         CMP:        Recent Labs   Lab 08/17/23  1341 08/18/23  0350   * 134*   K 4.2 4.2    98   CO2 21* 25   GLU 92 96   BUN 14 16   CREATININE 1.0 1.2   CALCIUM 9.0 8.5*   PROT 7.1 6.4   ALBUMIN 3.9 3.5   BILITOT 0.5 0.5   ALKPHOS 37* 38*   AST 30 24   ALT 50* 38   ANIONGAP 11 11      Magnesium:        Recent Labs   Lab 08/17/23  1341 08/18/23  0350   MG 2.0 2.0      Troponin:       Recent Labs   Lab 08/17/23  1341   TROPONINI <0.006  <0.006         Significant Imaging: I have reviewed all pertinent imaging results/findings within the past 24 hours.        Assessment/Plan:      * VF (ventricular fibrillation)     Multiple defibrillations and past 2 days  Consulted EP Cardiology  -recommendation of DC trazodone (recently increased dose from 150-300 q.h.s.)  -increase mexiletine to 450 mg q.8  -Lasix 40 mg IV x1  -Aldactone 12.5 mg daily (patient has history of hyperkalemia we will need monitor closely)     Encounter for long-term (current) use of medications     Amiodarone, mexiletine     Chronic combined systolic and diastolic CHF (congestive heart failure)  Patient is identified as having Combined Systolic and Diastolic heart failure that is Acute on chronic. CHF is currently controlled. Latest ECHO performed and demonstrates- Results for orders placed during the hospital encounter of 04/17/23     Echo     Interpretation Summary  · The left  "ventricle is moderately enlarged with eccentric hypertrophy and low normal systolic function.  · The estimated ejection fraction is 50-55%  · Normal left ventricular diastolic function.  · Normal right ventricular size.  · The aortic valve appears structurally normal. There is normal leaflet mobility.  · Mild mitral regurgitation.  · Mild tricuspid regurgitation.  · Normal central venous pressure (3 mmHg).  · The estimated PA systolic pressure is 18 mmHg.  · Considerable improvement in systolic function compared to study of 1-  . Continue Beta Blocker and ARNI and monitor clinical status closely. Monitor on telemetry. Patient is off CHF pathway.  Monitor strict Is&Os and daily weights.  Place on fluid restriction of 2 L. Cardiology has been consulted. Continue to stress to patient importance of self efficacy and  on diet for CHF. Last BNP reviewed- and noted below       Recent Labs   Lab 08/17/23  1341   BNP 23   .  Patient's spironolactone restarted     Type 2 diabetes mellitus with hyperglycemia, without long-term current use of insulin  Patient's FSGs are controlled on current medication regimen.  Last A1c reviewed-         Lab Results   Component Value Date     HGBA1C 6.0 (H) 09/29/2022      Most recent fingerstick glucose reviewed- No results for input(s): "POCTGLUCOSE" in the last 24 hours.  Current correctional scale  none  none anti-hyperglycemic dose as follows-       Antihyperglycemics (From admission, onward)     None          Hold Oral hypoglycemics while patient is in the hospital.     ICD (implantable cardioverter-defibrillator) discharge  Status post defibrillator discharge with resultant syncopal episode  EP cardiology consulted        DJD (degenerative joint disease) of knee  Significant pain in left knee  Symptomatic treatment        Hyperlipidemia LDL goal <100             Continue outpatient statin     HONEY (obstructive sleep apnea)  Patient with history of HONEY compliant with " CPAP  Continues while in the hospital               VTE Risk Mitigation (From admission, onward)           Ordered       enoxaparin injection 40 mg  Daily         08/17/23 1723       IP VTE HIGH RISK PATIENT  Once         08/17/23 1723       Place sequential compression device  Until discontinued         08/17/23 1723                    Discharge Planning   EDDA:      Code Status: Full Code   Is the patient medically ready for discharge?:     Reason for patient still in hospital (select all that apply): Patient trending condition, Laboratory test and Consult recommendations                        Elvira Diaz MD  Department of Hospital Medicine   [facility] ([facility])        Electronically signed by Elvira Diaz MD at 8/18/2023  5:43 PM           Current Assessment & Plan     Transitional Care Note    Family and/or Caretaker present at visit?  Yes.  Diagnostic tests reviewed/disposition: I have reviewed all completed as well as pending diagnostic tests at the time of discharge.  Disease/illness education:  Ventricular tachycardia  Home health/community services discussion/referrals: Patient does not have home health established from hospital visit.  They do not need home health.  If needed, we will set up home health for the patient.   Establishment or re-establishment of referral orders for community resources: No other necessary community resources.   Discussion with other health care providers: No discussion with other health care providers necessary.                      Review of patient's allergies indicates:  No Known Allergies  Current Outpatient Medications   Medication Instructions    acetaminophen (TYLENOL) 650 mg, Oral, As needed (PRN)    albuterol (PROVENTIL/VENTOLIN HFA) 90 mcg/actuation inhaler 1 puff, Inhalation, Daily, Rescue    amiodarone (PACERONE) 300 mg, Oral, Daily, 1 tab po bid for 6 weeks then 1.5 tb a day (total dose = 300 mg)    bepotastine besilate (BEPREVE) 1.5 %  Drop Bepreve 1.5 % eye drops   Apply by ophthalmic route as needed for 50 days.    carvediloL (COREG) 25 mg, Oral, 2 times daily    co-enzyme Q-10 30 mg, Oral, Daily    dapagliflozin (FARXIGA) 10 mg tablet TAKE 1 TABLET BY MOUTH ONCE DAILY    ENTRESTO  mg per tablet TAKE 1 TABLET BY MOUTH TWICE DAILY    ferrous sulfate (FEOSOL) 325 mg, Oral, With breakfast    fluticasone furoate (ARNUITY ELLIPTA) 100 mcg/actuation inhaler 1 puff, Inhalation, Daily    furosemide (LASIX) 40 mg, Oral, Daily    glucosamine-chondroitin 500-400 mg tablet 1 tablet, Oral, Daily    meclizine (ANTIVERT) 12.5 mg, Oral, 3 times daily PRN    mexiletine (MEXITIL) 450 mg, Oral, Every 8 hours    multivitamin (THERAGRAN) tablet 1 tablet, Oral, Daily,      ondansetron (ZOFRAN) 4 mg, Oral, Every 8 hours PRN    OZEMPIC 1 mg/dose (4 mg/3 mL) Inject 1 mg into the skin every 7 days.    pravastatin (PRAVACHOL) 80 mg, Oral, Daily    RESTASIS 0.05 % ophthalmic emulsion 1 drop, Both Eyes, 2 times daily    spironolactone (ALDACTONE) 12.5 mg, Oral, Daily      I have reviewed the PMH, social history, FamilyHx, surgical history, allergies and medications documented / confirmed by the patient at the time of this visit.  Review of Systems   Constitutional:  Positive for fatigue. Negative for chills, fever and unexpected weight change.   HENT:  Negative for ear pain and sore throat.    Eyes:  Negative for redness and visual disturbance.   Respiratory:  Negative for cough and shortness of breath.    Cardiovascular:  Negative for chest pain and palpitations.   Gastrointestinal:  Negative for nausea and vomiting.   Endocrine: Negative for cold intolerance and heat intolerance.   Genitourinary:  Negative for difficulty urinating and hematuria.   Musculoskeletal:  Negative for arthralgias and myalgias.   Skin:  Negative for rash and wound.   Allergic/Immunologic: Negative for environmental allergies and food allergies.   Neurological:  Positive for weakness.  "Negative for headaches.   Hematological:  Negative for adenopathy. Does not bruise/bleed easily.   Psychiatric/Behavioral:  Negative for sleep disturbance. The patient is not nervous/anxious.      Objective:   BP (!) 86/70   Pulse 79   Ht 5' 10" (1.778 m)   Wt 111.1 kg (245 lb)   SpO2 95%   BMI 35.15 kg/m²   Physical Exam  Vitals and nursing note reviewed.   Constitutional:       General: He is not in acute distress.     Appearance: He is well-developed. He is not diaphoretic.   HENT:      Head: Normocephalic and atraumatic.      Right Ear: External ear normal.      Left Ear: External ear normal.      Nose: Nose normal. No rhinorrhea.   Eyes:      Extraocular Movements: Extraocular movements intact.      Pupils: Pupils are equal, round, and reactive to light.   Cardiovascular:      Rate and Rhythm: Normal rate.      Pulses: Normal pulses.   Pulmonary:      Effort: Pulmonary effort is normal. No respiratory distress.      Breath sounds: Normal breath sounds.   Abdominal:      General: Bowel sounds are normal.      Palpations: Abdomen is soft.   Musculoskeletal:         General: Normal range of motion.      Cervical back: Normal range of motion and neck supple.   Skin:     General: Skin is warm and dry.      Capillary Refill: Capillary refill takes less than 2 seconds.      Findings: Lesion present. No rash.   Neurological:      General: No focal deficit present.      Mental Status: He is alert and oriented to person, place, and time. Mental status is at baseline.      Cranial Nerves: No cranial nerve deficit.      Motor: No weakness.      Gait: Gait normal.   Psychiatric:         Attention and Perception: He is attentive.         Mood and Affect: Mood normal. Mood is not anxious or depressed. Affect is not labile, blunt, angry or inappropriate.         Speech: He is communicative. Speech is not rapid and pressured, delayed, slurred or tangential.         Behavior: Behavior normal. Behavior is not agitated, " slowed, aggressive, withdrawn, hyperactive or combative.         Thought Content: Thought content normal. Thought content is not paranoid or delusional. Thought content does not include homicidal or suicidal ideation. Thought content does not include homicidal or suicidal plan.         Cognition and Memory: Memory is not impaired.         Judgment: Judgment normal. Judgment is not impulsive or inappropriate.           Assessment:     1. Hospital discharge follow-up    2. Encounter for long-term (current) use of medications    3. Insomnia, unspecified type    4. Type 2 diabetes mellitus with hyperglycemia, without long-term current use of insulin    5. Skin lesion      MDM:   Moderate medical complexity.  Moderate risk.  Total time: 34 minutes.  This includes total time spent on the encounter, which includes face to face time and non-face to face time preparing to see the patient (eg, review of previous medical records, tests), Obtaining and/or reviewing separately obtained history, documenting clinical information in the electronic or other health record, independently interpreting results (not separately reported)/communicating results to the patient/family/caregiver, and/or care coordination (not separately reported).    I have Reviewed and summarized old records.  I have performed thorough medication reconciliation today and discussed risk and benefits of medications.  I have reviewed labs and discussed with patient.  All questions were answered.  I am requesting old records and will review them once they are available.Cardiology Nirav Baldwin MD    I have signed for the following orders AND/OR meds.  Orders Placed This Encounter   Procedures    Hemoglobin A1C     Standing Status:   Future     Standing Expiration Date:   10/22/2024    Ambulatory referral/consult to Dermatology     Standing Status:   Future     Standing Expiration Date:   9/24/2024     Referral Priority:   Routine     Referral Type:    Consultation     Referral Reason:   Specialty Services Required     Referred to Provider:   Le Lawrence MD     Requested Specialty:   Dermatology     Number of Visits Requested:   1           Follow up in about 6 months (around 2/24/2024), or if symptoms worsen or fail to improve, for Med refills.  Future Appointments       Date Provider Specialty Appt Notes    9/11/2023  Pulmonology [facility]    11/14/2023 Misty Barajas LCSW Psychiatry Consult (Marielle Schilling NP )    11/15/2023  Cardiology 90 Day Remote Cardiac Device Check    11/30/2023 Misty Barajas LCSW Psychiatry Therapy (1)    12/14/2023 Misty Barajas LCSW Psychiatry Therapy (2)    2/19/2024 Nirav Baldwin MD Cardiology 6 month follow up          If no improvement in symptoms or symptoms worsen, advised to call/follow-up at clinic or go to ER. Patient voiced understanding and all questions/concerns were addressed.   DISCLAIMER: This note was compiled by using a speech recognition dictation system and therefore please be aware that typographical / speech recognition errors can and do occur.  Please contact me if you see any errors specifically.    Olvin Hutson M.D.       Office: [phone] [address] [place] [address]  FAX: [phone]

## 2023-08-24 NOTE — PATIENT INSTRUCTIONS
Follow up in about 6 months (around 2/24/2024), or if symptoms worsen or fail to improve, for Med refills.     Dear patient,   As a result of recent federal legislation (The Federal Cures Act), you may receive lab or pathology results from your visit in your [facility] account before your physician is able to contact you. Your physician or their representative will relay the results to you with their recommendations at their soonest availability.     If no improvement in symptoms or symptoms worsen, please be advised to call MD, follow-up at clinic and/or go to ER if becomes severe.    Olvin Hutson M.D.        We Offer TELEHEALTH & Same Day Appointments!   Book your Telehealth appointment with me through my nurse or   Clinic appointments on [employer]!    [address] [place] [address]    Office: [phone]   FAX: [phone]    Check out my Facebook Page and Follow Me at: [email or web address]    Check out my website at [employer] by clicking on: [email or web address]    To Schedule appointments online, go to [employer]: [email or web address]

## 2023-08-25 ENCOUNTER — LAB VISIT (OUTPATIENT)
Dept: LAB | Facility: HOSPITAL | Age: 73
End: 2023-08-25
Attending: INTERNAL MEDICINE
Payer: MEDICARE

## 2023-08-25 ENCOUNTER — OFFICE VISIT (OUTPATIENT)
Dept: CARDIOLOGY | Facility: CLINIC | Age: 73
End: 2023-08-25
Payer: MEDICARE

## 2023-08-25 VITALS
BODY MASS INDEX: 34.72 KG/M2 | HEIGHT: 70 IN | OXYGEN SATURATION: 97 % | HEART RATE: 77 BPM | DIASTOLIC BLOOD PRESSURE: 62 MMHG | WEIGHT: 242.5 LBS | SYSTOLIC BLOOD PRESSURE: 88 MMHG

## 2023-08-25 DIAGNOSIS — I50.30 CHF WITH LEFT VENTRICULAR DIASTOLIC DYSFUNCTION, NYHA CLASS 2: ICD-10-CM

## 2023-08-25 DIAGNOSIS — I47.29 PAROXYSMAL VT: Primary | ICD-10-CM

## 2023-08-25 DIAGNOSIS — Z45.02 ICD (IMPLANTABLE CARDIOVERTER-DEFIBRILLATOR) DISCHARGE: ICD-10-CM

## 2023-08-25 DIAGNOSIS — E66.9 OBESITY, CLASS I, BMI 30-34.9: ICD-10-CM

## 2023-08-25 DIAGNOSIS — Z79.899 AT RISK FOR AMIODARONE TOXICITY WITH LONG TERM USE: Chronic | ICD-10-CM

## 2023-08-25 DIAGNOSIS — I42.9 IDIOPATHIC CARDIOMYOPATHY: ICD-10-CM

## 2023-08-25 DIAGNOSIS — I50.42 CHRONIC COMBINED SYSTOLIC AND DIASTOLIC CHF (CONGESTIVE HEART FAILURE): Chronic | ICD-10-CM

## 2023-08-25 DIAGNOSIS — E11.65 TYPE 2 DIABETES MELLITUS WITH HYPERGLYCEMIA, WITHOUT LONG-TERM CURRENT USE OF INSULIN: Chronic | ICD-10-CM

## 2023-08-25 DIAGNOSIS — I49.01 VF (VENTRICULAR FIBRILLATION): Primary | ICD-10-CM

## 2023-08-25 DIAGNOSIS — I48.0 PAF (PAROXYSMAL ATRIAL FIBRILLATION): Chronic | ICD-10-CM

## 2023-08-25 DIAGNOSIS — I47.29 PAROXYSMAL VT: ICD-10-CM

## 2023-08-25 DIAGNOSIS — E78.5 HYPERLIPIDEMIA LDL GOAL <100: Chronic | ICD-10-CM

## 2023-08-25 DIAGNOSIS — G47.33 OSA (OBSTRUCTIVE SLEEP APNEA): ICD-10-CM

## 2023-08-25 DIAGNOSIS — I49.01 VF (VENTRICULAR FIBRILLATION): ICD-10-CM

## 2023-08-25 DIAGNOSIS — Z91.89 AT RISK FOR AMIODARONE TOXICITY WITH LONG TERM USE: Chronic | ICD-10-CM

## 2023-08-25 LAB
ANION GAP SERPL CALC-SCNC: 8 MMOL/L (ref 8–16)
BNP SERPL-MCNC: 48 PG/ML (ref 0–99)
BUN SERPL-MCNC: 12 MG/DL (ref 8–23)
CALCIUM SERPL-MCNC: 9.2 MG/DL (ref 8.7–10.5)
CHLORIDE SERPL-SCNC: 97 MMOL/L (ref 95–110)
CO2 SERPL-SCNC: 26 MMOL/L (ref 23–29)
CREAT SERPL-MCNC: 1.1 MG/DL (ref 0.5–1.4)
EST. GFR  (NO RACE VARIABLE): >60 ML/MIN/1.73 M^2
GLUCOSE SERPL-MCNC: 67 MG/DL (ref 70–110)
POTASSIUM SERPL-SCNC: 4.2 MMOL/L (ref 3.5–5.1)
SODIUM SERPL-SCNC: 131 MMOL/L (ref 136–145)

## 2023-08-25 PROCEDURE — 99999 PR PBB SHADOW E&M-EST. PATIENT-LVL IV: ICD-10-PCS | Mod: PBBFAC,,, | Performed by: INTERNAL MEDICINE

## 2023-08-25 PROCEDURE — 99215 OFFICE O/P EST HI 40 MIN: CPT | Mod: S$PBB,,, | Performed by: INTERNAL MEDICINE

## 2023-08-25 PROCEDURE — 99999 PR PBB SHADOW E&M-EST. PATIENT-LVL IV: CPT | Mod: PBBFAC,,, | Performed by: INTERNAL MEDICINE

## 2023-08-25 PROCEDURE — 36415 COLL VENOUS BLD VENIPUNCTURE: CPT | Performed by: INTERNAL MEDICINE

## 2023-08-25 PROCEDURE — 99214 OFFICE O/P EST MOD 30 MIN: CPT | Mod: PBBFAC | Performed by: INTERNAL MEDICINE

## 2023-08-25 PROCEDURE — 80299 QUANTITATIVE ASSAY DRUG: CPT | Performed by: INTERNAL MEDICINE

## 2023-08-25 PROCEDURE — 83880 ASSAY OF NATRIURETIC PEPTIDE: CPT | Performed by: INTERNAL MEDICINE

## 2023-08-25 PROCEDURE — 80048 BASIC METABOLIC PNL TOTAL CA: CPT | Performed by: INTERNAL MEDICINE

## 2023-08-25 PROCEDURE — 99215 PR OFFICE/OUTPT VISIT, EST, LEVL V, 40-54 MIN: ICD-10-PCS | Mod: S$PBB,,, | Performed by: INTERNAL MEDICINE

## 2023-08-25 NOTE — ASSESSMENT & PLAN NOTE
Consider melatonin or hydroxyzine.  Patient will clear these with his Cardiology prior to taking.  Discussed insomnia condition course.  Advised of first-line medications for this condition.  Also discussed sleep hygiene.  Information was given below.  Good sleep habits (sometimes referred to as sleep hygiene) can help you get a good nights sleep.    Some habits that can improve your sleep health:  -Be consistent. Go to bed at the same time each night and get up at the same time each morning, including on the weekends  -Make sure your bedroom is quiet, dark, relaxing, and at a comfortable temperature  -Remove electronic devices, such as TVs, computers, and smart phones, from the bedroom  -Avoid large meals, caffeine, and alcohol before bedtime  -Get some exercise. Being physically active during the day can help you fall asleep more easily at night.

## 2023-08-25 NOTE — ASSESSMENT & PLAN NOTE
Referral to Dermatology.  Patient with abnormal appearing skin lesion to the upper back.  See photo.

## 2023-08-25 NOTE — ASSESSMENT & PLAN NOTE
Continue current regimen. We will plan to monitor hemoglobin A1c at designated intervals 3 to 6 months.  I recommend ongoing Education for diabetic diet and exercise protocol.  We will continue to monitor for side effects.    Please be advised of symptoms to monitor for and to notify me immediately if persistent or worsening.  Follow up with Ophthalmology/Optometry and Podiatry at least annually.

## 2023-08-25 NOTE — ASSESSMENT & PLAN NOTE
Transitional Care Note    Family and/or Caretaker present at visit?  Yes.  Diagnostic tests reviewed/disposition: I have reviewed all completed as well as pending diagnostic tests at the time of discharge.  Disease/illness education:  Ventricular tachycardia  Home health/community services discussion/referrals: Patient does not have home health established from hospital visit.  They do not need home health.  If needed, we will set up home health for the patient.   Establishment or re-establishment of referral orders for community resources: No other necessary community resources.   Discussion with other health care providers: No discussion with other health care providers necessary.

## 2023-08-28 ENCOUNTER — PATIENT MESSAGE (OUTPATIENT)
Dept: CARDIOLOGY | Facility: CLINIC | Age: 73
End: 2023-08-28
Payer: MEDICARE

## 2023-08-28 NOTE — PROGRESS NOTES
See comments below and call patient to discuss.   Please close encounter when done -- no need to route back to me.  Thanks  BNP is stable at 131 - keep same meds

## 2023-08-29 LAB — MEXILETINE TROUGH SERPL-MCNC: 1.5 MCG/ML

## 2023-08-31 ENCOUNTER — EXTERNAL CHRONIC CARE MANAGEMENT (OUTPATIENT)
Dept: PRIMARY CARE CLINIC | Facility: CLINIC | Age: 73
End: 2023-08-31
Payer: MEDICARE

## 2023-08-31 PROCEDURE — 99489 PR COMPLX CHRON CARE MGMT, EA ADDTL 30 MIN, PER MONTH: ICD-10-PCS | Mod: S$PBB,,, | Performed by: FAMILY MEDICINE

## 2023-08-31 PROCEDURE — 99487 CPLX CHRNC CARE 1ST 60 MIN: CPT | Mod: PBBFAC,25,PO | Performed by: FAMILY MEDICINE

## 2023-08-31 PROCEDURE — 99487 CPLX CHRNC CARE 1ST 60 MIN: CPT | Mod: S$PBB,,, | Performed by: FAMILY MEDICINE

## 2023-08-31 PROCEDURE — 99489 CPLX CHRNC CARE EA ADDL 30: CPT | Mod: PBBFAC,PO | Performed by: FAMILY MEDICINE

## 2023-08-31 PROCEDURE — 99487 PR COMPLX CHRON CARE MGMT, 1ST HR, PER MONTH: ICD-10-PCS | Mod: S$PBB,,, | Performed by: FAMILY MEDICINE

## 2023-08-31 PROCEDURE — 99489 CPLX CHRNC CARE EA ADDL 30: CPT | Mod: S$PBB,,, | Performed by: FAMILY MEDICINE

## 2023-08-31 RX ORDER — ALPRAZOLAM 0.25 MG/1
0.25 TABLET ORAL 2 TIMES DAILY PRN
Qty: 60 TABLET | Refills: 2 | Status: SHIPPED | OUTPATIENT
Start: 2023-08-31 | End: 2024-01-23

## 2023-09-01 ENCOUNTER — PATIENT MESSAGE (OUTPATIENT)
Dept: CARDIOLOGY | Facility: CLINIC | Age: 73
End: 2023-09-01
Payer: MEDICARE

## 2023-09-01 NOTE — ASSESSMENT & PLAN NOTE
Multiple defibrillations 2 days prior to admit and on day of admit  Consulted EP Cardiology  -recommendation of DC trazodone (recently increased dose from 150-300 q.h.s.)  -increase mexiletine to 450 mg q.8  -Lasix 40 mg IV x1  -Aldactone 12.5 mg daily (patient has history of hyperkalemia we will need monitor closely)

## 2023-09-01 NOTE — ASSESSMENT & PLAN NOTE
Status post defibrillator discharge with resultant syncopal episode  EP cardiology consulted with above recs

## 2023-09-01 NOTE — PROGRESS NOTES
See comments below and call patient to discuss.   Please close encounter when done -- no need to route back to me.  Thanks    Mexilitine level is acceptable - keep same dosage of 450 tid

## 2023-09-01 NOTE — ASSESSMENT & PLAN NOTE
Patient is identified as having Combined Systolic and Diastolic heart failure that is Acute on chronic. CHF is currently controlled. Latest ECHO performed and demonstrates- Results for orders placed during the hospital encounter of 04/17/23    Echo    Interpretation Summary  · The left ventricle is moderately enlarged with eccentric hypertrophy and low normal systolic function.  · The estimated ejection fraction is 50-55%  · Normal left ventricular diastolic function.  · Normal right ventricular size.  · The aortic valve appears structurally normal. There is normal leaflet mobility.  · Mild mitral regurgitation.  · Mild tricuspid regurgitation.  · Normal central venous pressure (3 mmHg).  · The estimated PA systolic pressure is 18 mmHg.  · Considerable improvement in systolic function compared to study of 1-  . Continue Beta Blocker and ARNI and monitor clinical status closely. Monitor on telemetry. Patient is off CHF pathway.  Monitor strict Is&Os and daily weights.  Place on fluid restriction of 2 L. Cardiology has been consulted. Continue to stress to patient importance of self efficacy and  on diet for CHF. Last BNP reviewed- and noted below   Recent Labs   Lab 08/25/23  1416   BNP 48   .  Patient's spironolactone restarted

## 2023-09-01 NOTE — DISCHARGE SUMMARY
[facility] Medicine  Discharge Summary      Patient Name: Randy Yap  MRN: [id number]  [facility]: [id number]  Patient Class: IP- Inpatient  Admission Date: 8/17/2023  Hospital Length of Stay: 1 days  Discharge Date and Time: 8/19/2023 12:20 PM  Attending Physician: No att. 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   Patient was at home trying to move a tire out of his truck and had a syncopal episode.  He woke up on the ground.  He also states that yesterday morning approximately 3:00 a.m. he had a defibrillator fire.  He was seen in the emergency department where was noted that he had a successful shock, ATP x4 and a decrease in T1 for 9 days. Episodes occurred at 10:57 p.m. on 08/15 3:05 a.m. on 08/16.  He had 2 VT episodes that converted with 1 round of ATP each.  Patient had a VT event that transitioned to VF and had primary conversion after 1 shock.  Polymorphic VT was noted.  Patient reports that he has been compliant with his medications  and the only change in his medications was recent increasing his trazodone.  BNP was obtained which was negative.  He also had a PMVT event that happened at 9:24 a.m. today.  He was seen in consultation by  who recommended checking electrolytes including magnesium and phosphorus, given 1 dose of IV Lasix and starting Aldactone 12.5 mg a day.  He also recommended increasing mexiletine to 450 mg q.8 hours.    Patient's only complaint is pain in his left ribcage and back where he fell.   He denies any shortness a breath, chest pain, nausea, vomiting.        * No surgery found *      Hospital Course:   Patient admitted with multiple episodes of VT and fibrillation with his AICD. He recently had VT which was unresponsive to ATP followed by syncope and an ICD shock.   He was given Lasix and begun on aldactone; his mexiletine was increased to 450 mg q.8 hours due to low levels in the past.  Immediately after admission he had another VT episode with defibrillation.  His electrolytes have been corrected any skin continue to be monitored on telemetry.  Seen in consultation by EP Cardiology and after another day of loading he was stable for dc home with close follow up with EP.  Pt seen and examined on day of DC and stable for dc home.        Goals of Care Treatment Preferences:  Code Status: Full Code      Consults:     Cardiac/Vascular  * VF (ventricular fibrillation)    Multiple defibrillations 2 days prior to admit and on day of admit  Consulted EP Cardiology  -recommendation of DC trazodone (recently increased dose from 150-300 q.h.s.)  -increase mexiletine to 450 mg q.8  -Lasix 40 mg IV x1  -Aldactone 12.5 mg daily (patient has history of hyperkalemia we will need monitor closely)    Chronic combined systolic and diastolic CHF (congestive heart failure)  Patient is identified as having Combined Systolic and Diastolic heart failure that is Acute on chronic. CHF is currently controlled. Latest ECHO performed and demonstrates- Results for orders placed during the hospital encounter of 04/17/23    Echo    Interpretation Summary  · The left ventricle is moderately enlarged with eccentric hypertrophy and low normal systolic function.  · The estimated ejection fraction is 50-55%  · Normal left ventricular diastolic function.  · Normal right ventricular size.  · The aortic valve appears structurally normal. There is normal leaflet mobility.  · Mild mitral regurgitation.  · Mild tricuspid regurgitation.  ·  "Normal central venous pressure (3 mmHg).  · The estimated PA systolic pressure is 18 mmHg.  · Considerable improvement in systolic function compared to study of 1-  . Continue Beta Blocker and ARNI and monitor clinical status closely. Monitor on telemetry. Patient is off CHF pathway.  Monitor strict Is&Os and daily weights.  Place on fluid restriction of 2 L. Cardiology has been consulted. Continue to stress to patient importance of self efficacy and  on diet for CHF. Last BNP reviewed- and noted below   Recent Labs   Lab 08/25/23  1416   BNP 48   .  Patient's spironolactone restarted    ICD (implantable cardioverter-defibrillator) discharge  Status post defibrillator discharge with resultant syncopal episode  EP cardiology consulted with above recs      Hyperlipidemia LDL goal <100     Continue outpatient statin    Endocrine  Type 2 diabetes mellitus with hyperglycemia, without long-term current use of insulin  Patient's FSGs are controlled on current medication regimen.  Last A1c reviewed-   Lab Results   Component Value Date    HGBA1C 6.0 (H) 09/29/2022     Most recent fingerstick glucose reviewed- No results for input(s): "POCTGLUCOSE" in the last 24 hours.  Current correctional scale  none  none anti-hyperglycemic dose as follows-   Antihyperglycemics (From admission, onward)    None        Hold Oral hypoglycemics while patient is in the hospital.    Orthopedic  DJD (degenerative joint disease) of knee  Significant pain in left knee  Symptomatic treatment      Other  Encounter for long-term (current) use of medications    Amiodarone, mexiletine    HONEY (obstructive sleep apnea)  Patient with history of HONEY compliant with CPAP  Continues while in the hospital        Final Active Diagnoses:    Diagnosis Date Noted POA    PRINCIPAL PROBLEM:  VF (ventricular fibrillation) [I49.01] 08/12/2018 Yes    Encounter for long-term (current) use of medications [Z79.899] 03/07/2023 Not Applicable    Chronic " combined systolic and diastolic CHF (congestive heart failure) [I50.42] 09/27/2022 Yes     Chronic    Type 2 diabetes mellitus with hyperglycemia, without long-term current use of insulin [E11.65] 08/02/2021 Yes     Chronic    ICD (implantable cardioverter-defibrillator) discharge [Z45.02] 05/18/2016 Not Applicable    DJD (degenerative joint disease) of knee [M17.9] 10/14/2013 Yes    Hyperlipidemia LDL goal <100 [E78.5] 07/13/2012 Yes     Chronic    HONEY (obstructive sleep apnea) [G47.33] 06/11/2012 Yes      Problems Resolved During this Admission:     Imaging Results          X-Ray Ribs 2 View Left (Final result)  Result time 08/17/23 17:51:01    Final result by Thad Olivarez MD (08/17/23 17:51:01)                 Impression:      No acute process      Electronically signed by: Thad Olivarez  Date:    08/17/2023  Time:    17:51             Narrative:    EXAMINATION:  XR RIBS 2 VIEW LEFT    CLINICAL HISTORY:  Pleurodynia    TECHNIQUE:  Two views of the left ribs were performed.    COMPARISON:  None.    FINDINGS:  Senescent changes.  No pneumothorax.  Left chest pacemaker.  Left pacemaker.  Cardiomegaly.  No acute fracture or dislocation                               CT Head Without Contrast (Final result)  Result time 08/17/23 14:47:54    Final result by Kennedy Romero MD (08/17/23 14:47:54)                 Impression:      No acute findings.      Electronically signed by: Kennedy Romero MD  Date:    08/17/2023  Time:    14:47             Narrative:    EXAMINATION:  CT HEAD WITHOUT CONTRAST    CLINICAL HISTORY:  Syncope.  Loss of consciousness.    TECHNIQUE:  Standard noncontrast CT of the brain.    All CT scans at this facility are performed  using dose modulation techniques as appropriate to performed exam including the following:  automated exposure control; adjustment of mA and/or kV according to the patients size (this includes techniques or standardized protocols for targeted exams where dose  is matched to indication/reason for exam: i.e. extremities or head);  iterative reconstruction technique.    COMPARISON:  CT brain 06/19/2021.    FINDINGS:  Brain: The ventricles are nonenlarged.  No acute hemorrhage, edema or mass effect is identified.  Intracranial vascular calcification is noted.    Skull: The skull is grossly normal.                               X-Ray Chest AP Portable (Final result)  Result time 08/17/23 13:17:58    Final result by Rob Francisco MD (Timothy) (08/17/23 13:17:58)                 Impression:      Lungs appear clear.      Electronically signed by: Rob Francisco MD  Date:    08/17/2023  Time:    13:17             Narrative:    EXAMINATION:  XR CHEST AP PORTABLE    CLINICAL HISTORY:  <Diagnosis>, syncope;    COMPARISON:  Comparison 07/21/2023.    FINDINGS:  Heart size is normal.  AICD leads are present.  The lung fields are clear. No acute cardiopulmonary infiltrate.                                  Discharged Condition: fair    Disposition: Home or Self Care    Follow Up:   Follow-up Information     Callihan, Olvin T., MD Follow up in 1 week(s).    Specialty: Family Medicine  Contact information:  [address] [facility] [id number] [phone]             Nirav Baldwin MD Follow up in 3 day(s).    Specialties: Electrophysiology, Cardiology  Contact information:  [address] [facility] [address] [phone]                       Patient Instructions:      BASIC METABOLIC PANEL   Standing Status: Future Standing Exp. Date: 10/17/24     Diet Cardiac     Notify your health care provider if you experience any of the following:  temperature >100.4     Notify your health care provider if you experience any of the following:  persistent nausea and vomiting or diarrhea     Notify your health care provider if you experience any of the following:  difficulty breathing or increased cough     Notify your health care provider if you experience any of the  following:  persistent dizziness, light-headedness, or visual disturbances     Notify your health care provider if you experience any of the following:  increased confusion or weakness     Activity as tolerated       Significant Diagnostic Studies: Labs: All labs within the past 24 hours have been reviewed    Pending Diagnostic Studies:     None         Medications:  Reconciled Home Medications:      Medication List      CHANGE how you take these medications    mexiletine 150 MG Cap  Commonly known as: MEXITIL  Take 3 capsules (450 mg total) by mouth every 8 (eight) hours.  What changed: how much to take     spironolactone 25 MG tablet  Commonly known as: ALDACTONE  Take 0.5 tablets (12.5 mg total) by mouth once daily.  What changed:   · how much to take  · when to take this        CONTINUE taking these medications    acetaminophen 325 MG tablet  Commonly known as: TYLENOL  Take 650 mg by mouth as needed.     albuterol 90 mcg/actuation inhaler  Commonly known as: PROVENTIL/VENTOLIN HFA  Inhale 1 puff into the lungs once daily. Rescue     amiodarone 200 MG Tab  Commonly known as: PACERONE  Take 1.5 tablets (300 mg total) by mouth once daily. 1 tab po bid for 6 weeks then 1.5 tb a day (total dose = 300 mg)     ARNUITY ELLIPTA 100 mcg/actuation inhaler  Generic drug: fluticasone furoate  Inhale 1 puff into the lungs once daily.     bepotastine besilate 1.5 % Drop  Commonly known as: BEPREVE  Bepreve 1.5 % eye drops   Apply by ophthalmic route as needed for 50 days.     carvediloL 25 MG tablet  Commonly known as: COREG  Take 1 tablet (25 mg total) by mouth 2 (two) times daily.     co-enzyme Q-10 30 mg capsule  Take 30 mg by mouth once daily.     ENTRESTO  mg per tablet  Generic drug: sacubitriL-valsartan  TAKE 1 TABLET BY MOUTH TWICE DAILY     FARXIGA 10 mg tablet  Generic drug: dapagliflozin propanediol  TAKE 1 TABLET BY MOUTH ONCE DAILY     ferrous sulfate 325 mg (65 mg iron) Tab tablet  Commonly known as:  FEOSOL  Take 325 mg by mouth daily with breakfast.     furosemide 40 MG tablet  Commonly known as: LASIX  Take 1 tablet (40 mg total) by mouth once daily.     glucosamine-chondroitin 500-400 mg tablet  Take 1 tablet by mouth once daily.     meclizine 12.5 mg tablet  Commonly known as: ANTIVERT  Take 1 tablet (12.5 mg total) by mouth 3 (three) times daily as needed for Dizziness.     multivitamin tablet  Commonly known as: THERAGRAN  Take 1 tablet by mouth once daily.     ondansetron 4 MG tablet  Commonly known as: ZOFRAN  Take 4 mg by mouth every 8 (eight) hours as needed for Nausea.     OZEMPIC 1 mg/dose (4 mg/3 mL)  Generic drug: semaglutide  Inject 1 mg into the skin every 7 days.     pravastatin 80 MG tablet  Commonly known as: PRAVACHOL  Take 1 tablet (80 mg total) by mouth once daily.     RESTASIS 0.05 % ophthalmic emulsion  Generic drug: cycloSPORINE  Place 1 drop into both eyes 2 (two) times daily.            Indwelling Lines/Drains at time of discharge:   Lines/Drains/Airways     None                 Time spent on the discharge of patient: 36 minutes         Elvira Diaz MD  Department of Hospital Medicine  [facility] - Telemetry ([facility])

## 2023-09-11 ENCOUNTER — TELEPHONE (OUTPATIENT)
Dept: CARDIOLOGY | Facility: CLINIC | Age: 73
End: 2023-09-11
Payer: MEDICARE

## 2023-09-11 ENCOUNTER — CLINICAL SUPPORT (OUTPATIENT)
Dept: PULMONOLOGY | Facility: CLINIC | Age: 73
End: 2023-09-11
Payer: MEDICARE

## 2023-09-11 DIAGNOSIS — Z79.899 AT RISK FOR AMIODARONE TOXICITY WITH LONG TERM USE: Chronic | ICD-10-CM

## 2023-09-11 DIAGNOSIS — Z91.89 AT RISK FOR AMIODARONE TOXICITY WITH LONG TERM USE: Chronic | ICD-10-CM

## 2023-09-11 LAB
BRPFT: ABNORMAL
DLCO ADJ PRE: 22.16 ML/(MIN*MMHG) (ref 20.13–33.98)
DLCO SINGLE BREATH LLN: 20.13
DLCO SINGLE BREATH PRE REF: 81.9 %
DLCO SINGLE BREATH REF: 27.05
DLCOC SBVA LLN: 2.64
DLCOC SBVA PRE REF: 99.3 %
DLCOC SBVA REF: 3.79
DLCOC SINGLE BREATH LLN: 20.13
DLCOC SINGLE BREATH PRE REF: 81.9 %
DLCOC SINGLE BREATH REF: 27.05
DLCOVA LLN: 2.64
DLCOVA PRE REF: 99.3 %
DLCOVA PRE: 3.76 ML/(MIN*MMHG*L) (ref 2.64–4.94)
DLCOVA REF: 3.79
DLVAADJ PRE: 3.76 ML/(MIN*MMHG*L) (ref 2.64–4.94)
FEF 25 75 CHG: -13.4 %
FEF 25 75 LLN: 1.01
FEF 25 75 POST REF: 87.5 %
FEF 25 75 PRE REF: 101.1 %
FEF 25 75 REF: 2.37
FET100 CHG: -0.9 %
FEV1 CHG: -4.6 %
FEV1 FVC CHG: -2.4 %
FEV1 FVC LLN: 62
FEV1 FVC POST REF: 91.8 %
FEV1 FVC PRE REF: 94 %
FEV1 FVC REF: 76
FEV1 LLN: 2.27
FEV1 POST REF: 104.5 %
FEV1 PRE REF: 109.5 %
FEV1 REF: 3.16
FVC CHG: -2.3 %
FVC LLN: 3.1
FVC POST REF: 113.3 %
FVC PRE REF: 115.9 %
FVC REF: 4.2
IVC PRE: 3.79 L (ref 3.1–5.3)
IVC SINGLE BREATH LLN: 3.1
IVC SINGLE BREATH PRE REF: 90.2 %
IVC SINGLE BREATH REF: 4.2
PEF CHG: -4.3 %
PEF LLN: 5.95
PEF POST REF: 79.6 %
PEF PRE REF: 83.2 %
PEF REF: 8.28
POST FEF 25 75: 2.08 L/S (ref 1.01–3.74)
POST FET 100: 12.82 SEC
POST FEV1 FVC: 69.49 % (ref 62.11–89.34)
POST FEV1: 3.31 L (ref 2.27–4.06)
POST FVC: 4.76 L (ref 3.1–5.3)
POST PEF: 6.59 L/S (ref 5.95–10.6)
PRE DLCO: 22.16 ML/(MIN*MMHG) (ref 20.13–33.98)
PRE FEF 25 75: 2.4 L/S (ref 1.01–3.74)
PRE FET 100: 12.93 SEC
PRE FEV1 FVC: 71.2 % (ref 62.11–89.34)
PRE FEV1: 3.47 L (ref 2.27–4.06)
PRE FVC: 4.87 L (ref 3.1–5.3)
PRE PEF: 6.88 L/S (ref 5.95–10.6)
VA PRE: 5.9 L (ref 6.99–6.99)
VA SINGLE BREATH LLN: 6.99
VA SINGLE BREATH PRE REF: 84.4 %
VA SINGLE BREATH REF: 6.99

## 2023-09-11 PROCEDURE — 94729 DIFFUSING CAPACITY: CPT | Mod: PBBFAC

## 2023-09-11 PROCEDURE — 94729 PR C02/MEMBANE DIFFUSE CAPACITY: ICD-10-PCS | Mod: 26,S$PBB,, | Performed by: INTERNAL MEDICINE

## 2023-09-11 PROCEDURE — 94060 EVALUATION OF WHEEZING: CPT | Mod: 26,S$PBB,, | Performed by: INTERNAL MEDICINE

## 2023-09-11 PROCEDURE — 94060 EVALUATION OF WHEEZING: CPT | Mod: PBBFAC

## 2023-09-11 PROCEDURE — 94060 PR EVAL OF BRONCHOSPASM: ICD-10-PCS | Mod: 26,S$PBB,, | Performed by: INTERNAL MEDICINE

## 2023-09-11 PROCEDURE — 94729 DIFFUSING CAPACITY: CPT | Mod: 26,S$PBB,, | Performed by: INTERNAL MEDICINE

## 2023-09-11 NOTE — TELEPHONE ENCOUNTER
Pt was contacted about results:       PFTs reviewed   Spirometry normal   DLCO is normal     Continue same dose of Amio     Thanks   Virgie       Pt verbalized understanding with no questions or concerns.

## 2023-09-11 NOTE — TELEPHONE ENCOUNTER
----- Message from DIANA Chapman sent at 9/11/2023 10:30 AM CDT -----  Please call patient    PFTs reviewed  Spirometry normal  DLCO is normal    Continue same dose of Amio    Thanks  Virgie

## 2023-09-13 ENCOUNTER — OFFICE VISIT (OUTPATIENT)
Dept: CARDIOLOGY | Facility: CLINIC | Age: 73
End: 2023-09-13
Payer: MEDICARE

## 2023-09-13 VITALS
HEART RATE: 95 BPM | SYSTOLIC BLOOD PRESSURE: 104 MMHG | OXYGEN SATURATION: 97 % | HEIGHT: 70 IN | BODY MASS INDEX: 34.3 KG/M2 | DIASTOLIC BLOOD PRESSURE: 76 MMHG | WEIGHT: 239.63 LBS

## 2023-09-13 DIAGNOSIS — E11.59 HYPERTENSION ASSOCIATED WITH DIABETES: Chronic | ICD-10-CM

## 2023-09-13 DIAGNOSIS — E78.5 HYPERLIPIDEMIA LDL GOAL <100: Chronic | ICD-10-CM

## 2023-09-13 DIAGNOSIS — Z45.02 ICD (IMPLANTABLE CARDIOVERTER-DEFIBRILLATOR) DISCHARGE: ICD-10-CM

## 2023-09-13 DIAGNOSIS — I15.2 HYPERTENSION ASSOCIATED WITH DIABETES: Chronic | ICD-10-CM

## 2023-09-13 DIAGNOSIS — I50.42 CHRONIC COMBINED SYSTOLIC AND DIASTOLIC CHF (CONGESTIVE HEART FAILURE): Primary | Chronic | ICD-10-CM

## 2023-09-13 DIAGNOSIS — I50.41 ACUTE COMBINED SYSTOLIC AND DIASTOLIC CONGESTIVE HEART FAILURE: ICD-10-CM

## 2023-09-13 DIAGNOSIS — I48.0 PAF (PAROXYSMAL ATRIAL FIBRILLATION): Chronic | ICD-10-CM

## 2023-09-13 DIAGNOSIS — I50.30 CHF WITH LEFT VENTRICULAR DIASTOLIC DYSFUNCTION, NYHA CLASS 2: ICD-10-CM

## 2023-09-13 DIAGNOSIS — R01.1 MURMUR: ICD-10-CM

## 2023-09-13 DIAGNOSIS — I49.01 VF (VENTRICULAR FIBRILLATION): ICD-10-CM

## 2023-09-13 DIAGNOSIS — I47.29 PAROXYSMAL VT: ICD-10-CM

## 2023-09-13 DIAGNOSIS — I42.9 IDIOPATHIC CARDIOMYOPATHY: ICD-10-CM

## 2023-09-13 PROCEDURE — 99214 PR OFFICE/OUTPT VISIT, EST, LEVL IV, 30-39 MIN: ICD-10-PCS | Mod: S$PBB,,,

## 2023-09-13 PROCEDURE — 99214 OFFICE O/P EST MOD 30 MIN: CPT | Mod: PBBFAC

## 2023-09-13 PROCEDURE — 99999 PR PBB SHADOW E&M-EST. PATIENT-LVL IV: CPT | Mod: PBBFAC,,,

## 2023-09-13 PROCEDURE — 99999 PR PBB SHADOW E&M-EST. PATIENT-LVL IV: ICD-10-PCS | Mod: PBBFAC,,,

## 2023-09-13 PROCEDURE — 99214 OFFICE O/P EST MOD 30 MIN: CPT | Mod: S$PBB,,,

## 2023-09-13 RX ORDER — SACUBITRIL AND VALSARTAN 97; 103 MG/1; MG/1
1 TABLET, FILM COATED ORAL 2 TIMES DAILY
Qty: 180 TABLET | Refills: 6 | Status: SHIPPED | OUTPATIENT
Start: 2023-09-13

## 2023-09-13 NOTE — PROGRESS NOTES
Subjective:   Patient ID:  Randy Yap is a 73 y.o. male who presents for evaluation of No chief complaint on file.      HPI73y/o male with PMHx of HFrEF (50-55% 4/23'), PVT, HTN, HLP, HONEY on CPAP, obesity, DD who presents to clinic today for CV follow up. Followed by EP in cards clinic. Denies any CP angina or anginal equivalent at this time. Reports he is sleeping good. BP controlled, compliant with medications and diet. 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     Colon polyp 5/2013    repeat 5/2018    Depression, recurrent     Diastolic dysfunction     DJD (degenerative joint disease) of knee 10/14/2013    Hyperlipidemia     Hypertension     Murmur     Paroxysmal VT 5/21/2016    Pericarditis with effusion     Sleep apnea        Past Surgical History:   Procedure Laterality Date    CARDIAC DEFIBRILLATOR PLACEMENT  10/06/2014    COLONOSCOPY N/A 4/19/2023    Procedure: COLONOSCOPY 4/4-cc/bt clearance recedived;  Surgeon: Isabella Del Cid MD;  Location: [facility] ENDO;  Service: Endoscopy;  Laterality: N/A;    COLONOSCOPY W/ POLYPECTOMY  05/21/2013    repeat 5/21/2018    COSMETIC SURGERY      EYE SURGERY  Cataract    HERNIA REPAIR      R inguinal    LEFT HEART CATHETERIZATION Left 07/09/2018    Procedure: CATHETERIZATION, HEART, LEFT;  Surgeon: Macey Dos Santos MD;  Location: [facility] CATH LAB;  Service: Cardiology;  Laterality: Left;  left radial approach  Has [facility] [employer] ICD    pace maker   10/06/2014    PERICARDIAL WINDOW  12-15 years ago    tonsillectomy      TONSILLECTOMY      VARICOCELECTOMY         Social History     Tobacco Use    Smoking status: Never    Smokeless tobacco: Never   Substance Use Topics    Alcohol use: Yes     Comment: occasional glass of wine on social occasions    Drug use: No       Family History   Problem Relation Age of Onset    Hyperlipidemia Mother      Arrhythmia Mother     Arthritis Mother     Asthma Mother     COPD Mother     Heart disease Father 48    Heart attack Father 48    Hypertension Father     Arthritis Father     Diabetes Father     Hypertension Maternal Grandmother     Hypertension Maternal Grandfather     Heart disease Paternal Grandmother     Hypertension Paternal Grandmother     Heart attack Paternal Grandmother     Stroke Paternal Grandmother     Heart disease Paternal Grandfather     Hypertension Paternal Grandfather     Heart attack Paternal Grandfather     Cancer Paternal Grandfather        Current Outpatient Medications on File Prior to Visit   Medication Sig Dispense Refill    acetaminophen (TYLENOL) 325 MG tablet Take 650 mg by mouth as needed.       albuterol (PROVENTIL/VENTOLIN HFA) 90 mcg/actuation inhaler Inhale 1 puff into the lungs once daily. Rescue 18 g 4    ALPRAZolam (XANAX) 0.25 MG tablet Take 1 tablet (0.25 mg total) by mouth 2 (two) times daily as needed for Anxiety. 60 tablet 2    amiodarone (PACERONE) 200 MG Tab Take 1.5 tablets (300 mg total) by mouth once daily. 1 tab po bid for 6 weeks then 1.5 tb a day (total dose = 300 mg) 135 tablet 3    bepotastine besilate (BEPREVE) 1.5 % Drop Bepreve 1.5 % eye drops   Apply by ophthalmic route as needed for 50 days.      carvediloL (COREG) 25 MG tablet Take 1 tablet (25 mg total) by mouth 2 (two) times daily. 180 tablet 3    co-enzyme Q-10 30 mg capsule Take 30 mg by mouth once daily.       dapagliflozin (FARXIGA) 10 mg tablet TAKE 1 TABLET BY MOUTH ONCE DAILY 90 tablet 4    ferrous sulfate (FEOSOL) 325 mg (65 mg iron) Tab tablet Take 325 mg by mouth daily with breakfast.      fluticasone furoate (ARNUITY ELLIPTA) 100 mcg/actuation inhaler Inhale 1 puff into the lungs once daily. 30 each 5    furosemide (LASIX) 40 MG tablet Take 1 tablet (40 mg total) by mouth once daily. 90 tablet 3    glucosamine-chondroitin 500-400 mg tablet Take 1 tablet by mouth once daily.       meclizine  (ANTIVERT) 12.5 mg tablet Take 1 tablet (12.5 mg total) by mouth 3 (three) times daily as needed for Dizziness. 30 tablet 0    mexiletine (MEXITIL) 150 MG Cap Take 3 capsules (450 mg total) by mouth every 8 (eight) hours. 270 capsule 0    multivitamin (THERAGRAN) tablet Take 1 tablet by mouth once daily.      ondansetron (ZOFRAN) 4 MG tablet Take 4 mg by mouth every 8 (eight) hours as needed for Nausea.      OZEMPIC 1 mg/dose (4 mg/3 mL) Inject 1 mg into the skin every 7 days. 9 mL 0    pravastatin (PRAVACHOL) 80 MG tablet Take 1 tablet (80 mg total) by mouth once daily. 90 tablet 4    RESTASIS 0.05 % ophthalmic emulsion Place 1 drop into both eyes 2 (two) times daily.      spironolactone (ALDACTONE) 25 MG tablet Take 0.5 tablets (12.5 mg total) by mouth once daily. 15 tablet 0    [DISCONTINUED] ENTRESTO  mg per tablet TAKE 1 TABLET BY MOUTH TWICE DAILY 180 tablet 0     No current facility-administered medications on file prior to visit.      Wt Readings from Last 3 Encounters:   09/13/23 108.7 kg (239 lb 10.2 oz)   09/01/23 108.9 kg (240 lb)   08/25/23 110 kg (242 lb 8.1 oz)     Temp Readings from Last 3 Encounters:   08/19/23 97.5 °F (36.4 °C)   07/07/23 97.5 °F (36.4 °C)   04/19/23 97 °F (36.1 °C)     BP Readings from Last 3 Encounters:   09/13/23 104/76   08/25/23 (!) 88/62   08/24/23 (!) 86/70     Pulse Readings from Last 3 Encounters:   09/13/23 95   08/25/23 77   08/24/23 79        Review of Systems   Constitutional: Negative.   HENT: Negative.     Eyes: Negative.    Cardiovascular: Negative.    Respiratory: Negative.     Skin: Negative.    Musculoskeletal: Negative.    Gastrointestinal: Negative.    Genitourinary: Negative.    Neurological: Negative.    Psychiatric/Behavioral: Negative.         Objective:   Physical Exam  Vitals and nursing note reviewed.   Constitutional:       Appearance: Normal appearance.   HENT:      Head: Normocephalic and atraumatic.   Eyes:      General:         Right eye: No  discharge.         Left eye: No discharge.      Pupils: Pupils are equal, round, and reactive to light.   Cardiovascular:      Rate and Rhythm: Normal rate and regular rhythm.      Heart sounds: S1 normal and S2 normal. No murmur heard.     No friction rub.   Pulmonary:      Effort: Pulmonary effort is normal. No respiratory distress.      Breath sounds: Normal breath sounds. No rales.   Abdominal:      Palpations: Abdomen is soft.      Tenderness: There is no abdominal tenderness.   Musculoskeletal:      Cervical back: Neck supple.      Right lower leg: No edema.      Left lower leg: No edema.   Skin:     General: Skin is warm and dry.   Neurological:      General: No focal deficit present.      Mental Status: He is alert and oriented to person, place, and time.   Psychiatric:         Mood and Affect: Mood normal.         Behavior: Behavior normal.         Thought Content: Thought content normal.         Lab Results   Component Value Date    CHOL 184 09/29/2022    CHOL 171 08/23/2021    CHOL 170 07/23/2021     Lab Results   Component Value Date    HDL 38 (L) 09/29/2022    HDL 41 08/23/2021    HDL 39 (L) 07/23/2021     Lab Results   Component Value Date    LDLCALC 105.6 09/29/2022    LDLCALC 104.4 08/23/2021    LDLCALC 93.6 07/23/2021     Lab Results   Component Value Date    TRIG 202 (H) 09/29/2022    TRIG 128 08/23/2021    TRIG 187 (H) 07/23/2021     Lab Results   Component Value Date    CHOLHDL 20.7 09/29/2022    CHOLHDL 24.0 08/23/2021    CHOLHDL 22.9 07/23/2021       Chemistry        Component Value Date/Time     (L) 08/25/2023 1416    K 4.2 08/25/2023 1416    CL 97 08/25/2023 1416    CO2 26 08/25/2023 1416    BUN 12 08/25/2023 1416    CREATININE 1.1 08/25/2023 1416    GLU 67 (L) 08/25/2023 1416        Component Value Date/Time    CALCIUM 9.2 08/25/2023 1416    ALKPHOS 37 (L) 08/19/2023 0557    AST 22 08/19/2023 0557    ALT 39 08/19/2023 0557    BILITOT 0.5 08/19/2023 0557    ESTGFRAFRICA >60.0 10/01/2021  1353    EGFRNONAA >60.0 10/01/2021 1353          Lab Results   Component Value Date    TSH 2.417 07/07/2023     Lab Results   Component Value Date    INR 1.0 07/06/2018     @RESUFAST(WBC,HGB,HCT,MCV,PLT)  @LABRCNTIP(BNP,BNPTRIAGEBLO)@  CrCl cannot be calculated (Patient's most recent lab result is older than the maximum 7 days allowed.).     Results for orders placed during the hospital encounter of 04/17/23    Echo    Interpretation Summary  · The left ventricle is moderately enlarged with eccentric hypertrophy and low normal systolic function.  · The estimated ejection fraction is 50-55%  · Normal left ventricular diastolic function.  · Normal right ventricular size.  · The aortic valve appears structurally normal. There is normal leaflet mobility.  · Mild mitral regurgitation.  · Mild tricuspid regurgitation.  · Normal central venous pressure (3 mmHg).  · The estimated PA systolic pressure is 18 mmHg.  · Considerable improvement in systolic function compared to study of 1-     Results for orders placed during the hospital encounter of 07/21/21    CPX Study    Interpretation Summary  · The patient exercised for 8 minutes and 29 seconds on a medium ramp protocol, achieving a peak heart rate of 120 bpm, which is 79% of the age predicted maximum heart rate.  · There were no arrhythmias during stress.  · There was no ST segment deviation noted during stress.  · The peak VO2 was 16.5 ml/kg/min which is 49.85% of predicted equating to a functional capacity of 4.71 METS indicating moderate functional impairment.  · The anaerobic threshold (AT), which occurred at a heart rate of 95bpm, was 13.9 ml/kg/min, which is 41.99% of the predicted VO2 and is borderline reduced.  · Moderate functional impairment associated with a reduced breathing reserve, normal oxygen stauration, suboptimal effort, and a borderline reduced AT. These findings are indicative of functional impairment secondary to circulatory insufficiency,  deconditioning.     Assessment:      1. Chronic combined systolic and diastolic CHF (congestive heart failure)    2. Hypertension associated with diabetes    3. Hyperlipidemia LDL goal <100    4. PAF (paroxysmal atrial fibrillation)    5. Murmur    6. Idiopathic cardiomyopathy    7. ICD (implantable cardioverter-defibrillator) discharge    8. Paroxysmal VT    9. VF (ventricular fibrillation)    10. Acute combined systolic and diastolic congestive heart failure    11. CHF with left ventricular diastolic dysfunction, NYHA class 2        Plan:   Chronic combined systolic and diastolic CHF (congestive heart failure)  -     sacubitriL-valsartan (ENTRESTO)  mg per tablet; Take 1 tablet by mouth 2 (two) times daily.  Dispense: 180 tablet; Refill: 6  -     BASIC METABOLIC PANEL; Future; Expected date: 09/13/2023    Hypertension associated with diabetes    Hyperlipidemia LDL goal <100    PAF (paroxysmal atrial fibrillation)    Murmur  -     BASIC METABOLIC PANEL; Future; Expected date: 09/13/2023    Idiopathic cardiomyopathy    ICD (implantable cardioverter-defibrillator) discharge  -     BASIC METABOLIC PANEL; Future; Expected date: 09/13/2023    Paroxysmal VT    VF (ventricular fibrillation)  -     BASIC METABOLIC PANEL; Future; Expected date: 09/13/2023    Acute combined systolic and diastolic congestive heart failure    CHF with left ventricular diastolic dysfunction, NYHA class 2      Cont current CV medications  RF modification, low Na diet low fat  Daily exercise as tolerated    RTC as scheduled with MD with labs      Courtney Guillot, FNP-C [facility], Cardiology

## 2023-09-16 PROBLEM — E66.9 OBESITY, CLASS I, BMI 30-34.9: Status: ACTIVE | Noted: 2023-09-16

## 2023-09-16 PROBLEM — E66.811 OBESITY, CLASS I, BMI 30-34.9: Status: ACTIVE | Noted: 2023-09-16

## 2023-09-17 NOTE — PROGRESS NOTES
Subjective:   Patient ID:  Randy Yap is a 73 y.o. male     Chief complaint:No chief complaint on file.      HPI  73 year old male who presents to Arrhythmia clinic for 6 month follow up with device check. His current medical conditions include CHF, HFrEF of 30%, PVT on Amiodarone,HTN, HLP, Obesity, HONEY on CPAP  See granular details in my note from 10/1/21     Update 8/8/2022:  Had echo in Sept 2021:  >>  The left ventricle is moderately enlarged with concentric hypertrophy and moderately decreased systolic function.  Mild left atrial enlargement.  The estimated ejection fraction is 30%.  Grade I left ventricular diastolic dysfunction.  There is moderate left ventricular global hypokinesis.  Normal right ventricular size with normal right ventricular systolic function.  Mild mitral regurgitation.      Also, I had his echo reviewed by Dr EDWARDS and had a quantitative EF calculated - it was 38% - which was in keeping with Dr EDWARDS's qualitative estimation  I have reviewed the actual image of the ECG tracing obtained today and it shows NSR with normal intervals and occ PVC.     ICD eval today:  Device and leads implanted 10/6/2014   [employer] ICD Fortify Diego KHALIL 2357-40Q, SN: [id number]   A lead: Bennett Tendril STS 2088TC / 46cm, SN: [id number]   V lead: Bennett Durata 7120Q / 58 cm, SN: [id number]     All in good repair     Last BNP was 55 (10/1/21)     Clinically, he feels as well as he has ever had.  His CHF medications at all at maximized doses.  He has rare and mild lightheadedness.  Due to the extreme eat, has backed off on some of his outdoor exercise but he keeps quite active.  He has some family stress now due to the recent passing of his mother in law and the Alzheimer's of  his sister-in-law     Update 11/28/2022:  He has had issues with recurrent VF and shocks.  The most recent episode occurred on 10/22/2022.  Since then we realized that he was not taking enough amiodarone as the blood level was less than 0.2.   After reviewing drug dosing with him it appeared that he was using the 100 mg tablets in do the 200.  Accordingly, and for a week now, we have increased his amiodarone which to 300 p.o. b.i.d. and will keep this for a total of 6 weeks.    Prior to increasing the amiodarone, he was having repetitive episodes of severe dizziness/near-syncope.  These were associated with palpitations and runs of monomorphic ventricular tachycardia that eventually would either be ATP terminated or terminate spontaneously as they were deliberately left untreated in the monitoring zone.  He now has had no such clinical events for a week or so.    Variation of his ICD diagnostics, suggest that with the addition of amiodarone, he may have now VT that are on detected because they are below the monitoring zone of 160.  Otherwise, now that he does not have the near syncopal events, he feels well.  His wife is wondering whether Ozempic has something to do with his VT VF as was started approximately a month prior to the VF episode.    >>  Change TDI to 123 for the monitoring zone, 160 for the VT 1 zone and 222 for the VF zone.  Otherwise, keep therapies unchanged.  Continue on 600 mg a day of amiodarone until December 31st as of January 1st decrease to 300 mg a day.  Get amiodarone level in 3 months.     Update 03/05/2023 :  Since the last visit, we have had multiple communications via the portal.  Is currently on amiodarone 300 mg per day.  He has not had new ICD shocks.    He has been losing weight (Ozempic).  He is down by 24 lb and currently weighs 248 lb.  This has all happened over the past 2 months.    He was started on dapagliflozin.  Initially he did not want to take it due to cost issues.  This has been resolved.   He feels reasonably well.  He can walk for 2 to 300 ft without issues.  He can now go up the stairs at home without stopping.  These are 16 steps.  He has had a lightheaded spell yesterday.  This was very fast.  ICD  evaluation shows a 2nd VT at the time of the complaint.  ICD evaluation was performed today.  All is in good repair.  The battery life is expected to be about 1.5 years.        Update 08/14/2023 :  Aside from a lot of illnesses in death in the family, he has been doing quite well.  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  He wants to go back and work is in which up.  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  He has no significant cardiovascular symptoms at this time.  He has had no ICD shocks.  ICD evaluation has shown a few nonsustained ventricular tachycardias that were not intervened upon and seemingly asymptomatic to the patient.  Battery longevity appears to be about 10 months to RRT.  Has no side effects from his medications.  He was started on Ozempic.  He is on 1 mg per week dose.  He has not lost much weight.  Has no side effects from semaglutide.      Update 8/25/23 :  After this last visit he went to the emergency room with syncope and ventricular tachycardia.  I saw him in the emergency room and had the following note:  After few years of relatively well since, he started having again ventricular tachycardias during a period of personal stress.  This was related to a VT that was not responsive to ATP and resulted in syncope and a rescue by an ICD shock.    He had another event that had been ATP lead.    He was supposed to see me on 08/18/2023 to deal with the stress.  However, on 08/17/2023, at 9:24 a.m. in the morning, while he was out by his truck, he suddenly passed out.  ICD evaluation revealed another VT episode that was terminated by ATP but only after syncope.  This was initially somewhat polymorphic but then organized somewhat and was responsive to ATP.  Concomitantly, his thoracic impedance is suggestive of some fluid overload.  Patient does not feel more short of breath than usual.  However on physical exam, his breath sounds are somewhat tight.  The for from  the syncopal spell resulted in various bumps and bruises in his back is now hurting him quite a bit with back spasms whenever he moves.     Assessment:  Recurrent VT.  The frequency qualifies as a VT storm.  Potential triggers include a recent increase in his trazodone dose as well as fluid retention.  Of note, his Mexitil level has been quite low at 0.3 despite 300 mg p.o. Q 8 hours.  This dose has not been increased because his VT had been under relative control for a long period of time.     Recommendation:  Admit for a period of observation.  At least 24 hours.  IV Lasix-40 mg, now  Increase Mexitil to 450  Q 8 .  Avoid any QT prolonging drugs including muscle spasm relievers.  Hot packs to bruised areas.  Measure serum phosphate.    The plan was implemented and he was diuresed.  He was discharged after medication adjustment and rhythm quiescence.    Today, he feels okay and is in somewhat better spirits.       Current Outpatient Medications   Medication Sig    acetaminophen (TYLENOL) 325 MG tablet Take 650 mg by mouth as needed.     albuterol (PROVENTIL/VENTOLIN HFA) 90 mcg/actuation inhaler Inhale 1 puff into the lungs once daily. Rescue    amiodarone (PACERONE) 200 MG Tab Take 1.5 tablets (300 mg total) by mouth once daily. 1 tab po bid for 6 weeks then 1.5 tb a day (total dose = 300 mg)    bepotastine besilate (BEPREVE) 1.5 % Drop Bepreve 1.5 % eye drops   Apply by ophthalmic route as needed for 50 days.    carvediloL (COREG) 25 MG tablet Take 1 tablet (25 mg total) by mouth 2 (two) times daily.    co-enzyme Q-10 30 mg capsule Take 30 mg by mouth once daily.     dapagliflozin (FARXIGA) 10 mg tablet TAKE 1 TABLET BY MOUTH ONCE DAILY    ferrous sulfate (FEOSOL) 325 mg (65 mg iron) Tab tablet Take 325 mg by mouth daily with breakfast.    fluticasone furoate (ARNUITY ELLIPTA) 100 mcg/actuation inhaler Inhale 1 puff into the lungs once daily.    furosemide (LASIX) 40 MG tablet Take 1 tablet (40 mg total) by mouth  once daily.    glucosamine-chondroitin 500-400 mg tablet Take 1 tablet by mouth once daily.     meclizine (ANTIVERT) 12.5 mg tablet Take 1 tablet (12.5 mg total) by mouth 3 (three) times daily as needed for Dizziness.    mexiletine (MEXITIL) 150 MG Cap Take 3 capsules (450 mg total) by mouth every 8 (eight) hours.    multivitamin (THERAGRAN) tablet Take 1 tablet by mouth once daily.    ondansetron (ZOFRAN) 4 MG tablet Take 4 mg by mouth every 8 (eight) hours as needed for Nausea.    OZEMPIC 1 mg/dose (4 mg/3 mL) Inject 1 mg into the skin every 7 days.    pravastatin (PRAVACHOL) 80 MG tablet Take 1 tablet (80 mg total) by mouth once daily.    RESTASIS 0.05 % ophthalmic emulsion Place 1 drop into both eyes 2 (two) times daily.    spironolactone (ALDACTONE) 25 MG tablet Take 0.5 tablets (12.5 mg total) by mouth once daily.    ALPRAZolam (XANAX) 0.25 MG tablet Take 1 tablet (0.25 mg total) by mouth 2 (two) times daily as needed for Anxiety.    sacubitriL-valsartan (ENTRESTO)  mg per tablet Take 1 tablet by mouth 2 (two) times daily.     No current facility-administered medications for this visit.       Review of Systems     Constitutional: Reviewed  for decreased appetite, weight gain and weight loss.   HENT: Reviewed for nosebleeds.    Eyes:  Reviewed for blurred vision and visual disturbance.   Cardiovascular: Reviewed for chest pain, claudication, cyanosis,dyspnea on exertion, leg swelling, orthopnea,paroxysmal nocturnal dyspnearregular heartbeats, palpitations, near-syncope, and syncope.   Respiratory: Reviewed for cough, shortness of breath, wheezing, sleep disturbances due to breathing and snoring, .    Endocrine: Reviewed for heat intolerance.   Hematologic/Lymphatic: Reviewed for easy bruisability/bleeding.   Skin: Reviewed for rash.   Musculoskeletal: Reviewed for muscle weakness and myalgias.   Gastrointestinal: Reviewed for abdominal pain, anorexia, melena, nausea and vomiting.   Genitourinary: Reviewed  for hesitancy, frequency, nocturia and incontinence.   Neurological: Reviewed for excessive daytime sleepiness, dizziness, vertigo, weakness, headaches, loss of balance and seizures,   Psychiatric/Behavioral:  Reviewed for insomnia, altered mental status, depression, anxiety and nervousness.       All symptoms reviewed above were negative except for fatigue, irregular heartbeats, urinary frequency and nocturia, lightheadedness.       Social History     Tobacco Use   Smoking Status Never   Smokeless Tobacco Never        Objective:     Physical Exam  Vitals and nursing note reviewed.   Constitutional:       Appearance: Normal appearance. He is well-developed.      Comments: overweight   HENT:      Head: Normocephalic and atraumatic.      Right Ear: External ear normal.      Left Ear: External ear normal.   Eyes:      Conjunctiva/sclera: Conjunctivae normal.      Left eye: Left conjunctiva is not injected. No hemorrhage.     Pupils: Pupils are equal, round, and reactive to light.   Neck:      Thyroid: No thyromegaly.      Vascular: No JVD.   Cardiovascular:      Rate and Rhythm: Normal rate and regular rhythm.      Chest Wall: PMI is not displaced.      Pulses: Intact distal pulses.           Carotid pulses are 2+ on the right side and 2+ on the left side.       Radial pulses are 2+ on the right side and 2+ on the left side.        Dorsalis pedis pulses are 2+ on the right side and 2+ on the left side.        Posterior tibial pulses are 2+ on the right side and 2+ on the left side.      Heart sounds: Normal heart sounds. No midsystolic click and no opening snap. No murmur heard.     No friction rub. No gallop.   Pulmonary:      Effort: Pulmonary effort is normal. No respiratory distress.      Breath sounds: Normal breath sounds. No wheezing or rales.   Chest:      Chest wall: No tenderness.      Comments: Device pocket is in excellent repair.  Abdominal:      Palpations: Abdomen is soft. Abdomen is not rigid. There is  "no hepatomegaly.      Tenderness: There is no abdominal tenderness. There is no guarding.      Comments: Obese abdomen   Musculoskeletal:         General: No tenderness. Normal range of motion.      Cervical back: Neck supple.      Right knee: No swelling.      Left knee: No swelling.      Right lower leg: No swelling.      Left lower leg: No swelling.      Right ankle: No swelling.      Left ankle: No swelling.      Right foot: No swelling.      Left foot: No swelling.   Skin:     General: Skin is warm and dry.      Coloration: Skin is not pale.      Findings: No rash.   Neurological:      General: No focal deficit present.      Mental Status: He is alert and oriented to person, place, and time.      Cranial Nerves: No cranial nerve deficit.      Coordination: Coordination normal.      Deep Tendon Reflexes: Reflexes are normal and symmetric.   Psychiatric:         Mood and Affect: Mood normal.         Behavior: Behavior normal.       BP (!) 88/62   Pulse 77   Ht 5' 10" (1.778 m)   Wt 110 kg (242 lb 8.1 oz)   SpO2 97%   BMI 34.80 kg/m²       Results for orders placed during the hospital encounter of 04/17/23    Echo    Interpretation Summary  · The left ventricle is moderately enlarged with eccentric hypertrophy and low normal systolic function.  · The estimated ejection fraction is 50-55%  · Normal left ventricular diastolic function.  · Normal right ventricular size.  · The aortic valve appears structurally normal. There is normal leaflet mobility.  · Mild mitral regurgitation.  · Mild tricuspid regurgitation.  · Normal central venous pressure (3 mmHg).  · The estimated PA systolic pressure is 18 mmHg.  · Considerable improvement in systolic function compared to study of 1-    WBC   Date Value Ref Range Status   08/17/2023 11.72 3.90 - 12.70 K/uL Final     Hematocrit   Date Value Ref Range Status   08/17/2023 41.0 40.0 - 54.0 % Final     Hemoglobin   Date Value Ref Range Status   08/17/2023 14.1 14.0 - " 18.0 g/dL Final     Lab Results   Component Value Date     08/17/2023     Lab Results   Component Value Date    CREATININE 1.1 08/25/2023    EGFRNORACEVR >60.0 08/25/2023    K 4.2 08/25/2023     Lab Results   Component Value Date    BNP 48 08/25/2023            reports current alcohol use.  Past Medical History:   Diagnosis Date    Asthma     Cardiomyopathy due to systemic disease     Colon polyp 5/2013    repeat 5/2018    Depression, recurrent     Diastolic dysfunction     DJD (degenerative joint disease) of knee 10/14/2013    Hyperlipidemia     Hypertension     Murmur     Paroxysmal VT 5/21/2016    Pericarditis with effusion     Sleep apnea      Past Surgical History:   Procedure Laterality Date    CARDIAC DEFIBRILLATOR PLACEMENT  10/06/2014    COLONOSCOPY N/A 4/19/2023    Procedure: COLONOSCOPY 4/4-cc/bt clearance recedived;  Surgeon: Isabella Del Cid MD;  Location: [facility] ENDO;  Service: Endoscopy;  Laterality: N/A;    COLONOSCOPY W/ POLYPECTOMY  05/21/2013    repeat 5/21/2018    COSMETIC SURGERY      EYE SURGERY  Cataract    HERNIA REPAIR      R inguinal    LEFT HEART CATHETERIZATION Left 07/09/2018    Procedure: CATHETERIZATION, HEART, LEFT;  Surgeon: Macey Dos Santos MD;  Location: [facility] CATH LAB;  Service: Cardiology;  Laterality: Left;  left radial approach  Has [employer] g[employer]el ICD    pace maker   10/06/2014    PERICARDIAL WINDOW  12-15 years ago    tonsillectomy      TONSILLECTOMY      VARICOCELECTOMY       Family History   Problem Relation Age of Onset    Hyperlipidemia Mother     Arrhythmia Mother     Arthritis Mother     Asthma Mother     COPD Mother     Heart disease Father 48    Heart attack Father 48    Hypertension Father     Arthritis Father     Diabetes Father     Hypertension Maternal Grandmother     Hypertension Maternal Grandfather     Heart disease Paternal Grandmother     Hypertension Paternal Grandmother     Heart attack Paternal Grandmother     Stroke Paternal Grandmother     Heart disease  Paternal Grandfather     Hypertension Paternal Grandfather     Heart attack Paternal Grandfather     Cancer Paternal Grandfather        Assessment:    Now stable after a period of VT storm.  1. Paroxysmal VT    2. VF (ventricular fibrillation)    3. ICD (implantable cardioverter-defibrillator) discharge    4. CHF with left ventricular diastolic dysfunction, NYHA class 2    5. PAF (paroxysmal atrial fibrillation)    6. Idiopathic cardiomyopathy    7. Hyperlipidemia LDL goal <100    8. Chronic combined systolic and diastolic CHF (congestive heart failure)    9. Type 2 diabetes mellitus with hyperglycemia, without long-term current use of insulin    10. HONEY (obstructive sleep apnea)    11. At risk for amiodarone toxicity with long term use    12. Obesity, Class I, BMI 30-34.9        Plan:    Continue current treatment with the large dose mexiletine and carvedilol as well as Aldactone and amiodarone.  Continue CHF medications.  Orders Placed This Encounter   Procedures    BNP     Standing Status:   Future     Number of Occurrences:   1     Standing Expiration Date:   10/23/2024    Basic metabolic panel     Standing Status:   Future     Number of Occurrences:   1     Standing Expiration Date:   10/23/2024     He needs to see psych for potential PTSD from recurrent VT/ICD shocks.  Follow-up soon with Virgie Jaramillo.  Repeat visits as required.  Routine follow-up in 6 months.    Follow up if symptoms worsen or fail to improve.    There are no discontinued medications.         Medication List with Changes/Refills   New Medications    ALPRAZOLAM (XANAX) 0.25 MG TABLET    Take 1 tablet (0.25 mg total) by mouth 2 (two) times daily as needed for Anxiety.   Current Medications    ACETAMINOPHEN (TYLENOL) 325 MG TABLET    Take 650 mg by mouth as needed.     ALBUTEROL (PROVENTIL/VENTOLIN HFA) 90 MCG/ACTUATION INHALER    Inhale 1 puff into the lungs once daily. Rescue    AMIODARONE (PACERONE) 200 MG TAB    Take 1.5 tablets (300 mg  total) by mouth once daily. 1 tab po bid for 6 weeks then 1.5 tb a day (total dose = 300 mg)    BEPOTASTINE BESILATE (BEPREVE) 1.5 % DROP    Bepreve 1.5 % eye drops   Apply by ophthalmic route as needed for 50 days.    CARVEDILOL (COREG) 25 MG TABLET    Take 1 tablet (25 mg total) by mouth 2 (two) times daily.    CO-ENZYME Q-10 30 MG CAPSULE    Take 30 mg by mouth once daily.     DAPAGLIFLOZIN (FARXIGA) 10 MG TABLET    TAKE 1 TABLET BY MOUTH ONCE DAILY    FERROUS SULFATE (FEOSOL) 325 MG (65 MG IRON) TAB TABLET    Take 325 mg by mouth daily with breakfast.    FLUTICASONE FUROATE (ARNUITY ELLIPTA) 100 MCG/ACTUATION INHALER    Inhale 1 puff into the lungs once daily.    FUROSEMIDE (LASIX) 40 MG TABLET    Take 1 tablet (40 mg total) by mouth once daily.    GLUCOSAMINE-CHONDROITIN 500-400 MG TABLET    Take 1 tablet by mouth once daily.     MECLIZINE (ANTIVERT) 12.5 MG TABLET    Take 1 tablet (12.5 mg total) by mouth 3 (three) times daily as needed for Dizziness.    MEXILETINE (MEXITIL) 150 MG CAP    Take 3 capsules (450 mg total) by mouth every 8 (eight) hours.    MULTIVITAMIN (THERAGRAN) TABLET    Take 1 tablet by mouth once daily.    ONDANSETRON (ZOFRAN) 4 MG TABLET    Take 4 mg by mouth every 8 (eight) hours as needed for Nausea.    OZEMPIC 1 MG/DOSE (4 MG/3 ML)    Inject 1 mg into the skin every 7 days.    PRAVASTATIN (PRAVACHOL) 80 MG TABLET    Take 1 tablet (80 mg total) by mouth once daily.    RESTASIS 0.05 % OPHTHALMIC EMULSION    Place 1 drop into both eyes 2 (two) times daily.    SPIRONOLACTONE (ALDACTONE) 25 MG TABLET    Take 0.5 tablets (12.5 mg total) by mouth once daily.   Changed and/or Refilled Medications    Modified Medication Previous Medication    SACUBITRIL-VALSARTAN (ENTRESTO)  MG PER TABLET ENTRESTO  mg per tablet       Take 1 tablet by mouth 2 (two) times daily.    TAKE 1 TABLET BY MOUTH TWICE DAILY       This note is at least partially dictated using the M*Modal Fluency Direct word  recognition program. There are word recognition mistakes that are occasionally missed on review.

## 2023-09-26 ENCOUNTER — PATIENT MESSAGE (OUTPATIENT)
Dept: ADMINISTRATIVE | Facility: HOSPITAL | Age: 73
End: 2023-09-26
Payer: MEDICARE

## 2023-09-30 ENCOUNTER — EXTERNAL CHRONIC CARE MANAGEMENT (OUTPATIENT)
Dept: PRIMARY CARE CLINIC | Facility: CLINIC | Age: 73
End: 2023-09-30
Payer: MEDICARE

## 2023-09-30 DIAGNOSIS — E11.65 TYPE 2 DIABETES MELLITUS WITH HYPERGLYCEMIA, WITHOUT LONG-TERM CURRENT USE OF INSULIN: Chronic | ICD-10-CM

## 2023-09-30 PROCEDURE — 99490 CHRNC CARE MGMT STAFF 1ST 20: CPT | Mod: S$PBB,,, | Performed by: FAMILY MEDICINE

## 2023-09-30 PROCEDURE — 99490 CHRNC CARE MGMT STAFF 1ST 20: CPT | Mod: PBBFAC,PO | Performed by: FAMILY MEDICINE

## 2023-09-30 PROCEDURE — 99439 PR CHRONIC CARE MGMT, EA ADDTL 20 MIN: ICD-10-PCS | Mod: S$PBB,,, | Performed by: FAMILY MEDICINE

## 2023-09-30 PROCEDURE — 99439 CHRNC CARE MGMT STAF EA ADDL: CPT | Mod: PBBFAC,27,PO | Performed by: FAMILY MEDICINE

## 2023-09-30 PROCEDURE — 99439 CHRNC CARE MGMT STAF EA ADDL: CPT | Mod: S$PBB,,, | Performed by: FAMILY MEDICINE

## 2023-09-30 PROCEDURE — 99490 PR CHRONIC CARE MGMT, 1ST 20 MIN: ICD-10-PCS | Mod: S$PBB,,, | Performed by: FAMILY MEDICINE

## 2023-09-30 NOTE — TELEPHONE ENCOUNTER
Refill Routing Note   Medication(s) are not appropriate for processing by [facility] for the following reason(s):      Required labs outdated    [facility] action(s):  Defer Care Due:  Labs due            Appointments  past 12m or future 3m with PCP    Date Provider   Last Visit   8/24/2023 Olvin Hutson MD   Next Visit   Visit date not found Olvin Hutson MD   ED visits in past 90 days: 0        Note composed:3:42 PM 09/30/2023

## 2023-09-30 NOTE — TELEPHONE ENCOUNTER
Care Due:                  Date            Visit Type   Department     Provider  --------------------------------------------------------------------------------                                [facility] FAM[facility]  Last Visit: 08-      FOLLOW UP    MEDICINE       Olvin Hutson  Next Visit: None Scheduled  None         None Found                                                            Last  Test          Frequency    Reason                     Performed    Due Date  --------------------------------------------------------------------------------    HBA1C.......  6 months...  OZEMPIC..................  09- 03-    [facility] Embedded Care Due Messages. Reference number: [id number].   9/30/2023 11:58:13 AM CDT

## 2023-10-01 RX ORDER — SEMAGLUTIDE 1.34 MG/ML
INJECTION, SOLUTION SUBCUTANEOUS
Qty: 9 ML | Refills: 0 | Status: SHIPPED | OUTPATIENT
Start: 2023-10-01 | End: 2024-01-23

## 2023-10-03 ENCOUNTER — LAB VISIT (OUTPATIENT)
Dept: LAB | Facility: HOSPITAL | Age: 73
End: 2023-10-03
Attending: FAMILY MEDICINE
Payer: MEDICARE

## 2023-10-03 DIAGNOSIS — Z79.899 ENCOUNTER FOR LONG-TERM (CURRENT) USE OF MEDICATIONS: ICD-10-CM

## 2023-10-03 DIAGNOSIS — Z13.220 ENCOUNTER FOR LIPID SCREENING FOR CARDIOVASCULAR DISEASE: ICD-10-CM

## 2023-10-03 DIAGNOSIS — E11.65 TYPE 2 DIABETES MELLITUS WITH HYPERGLYCEMIA, WITHOUT LONG-TERM CURRENT USE OF INSULIN: ICD-10-CM

## 2023-10-03 DIAGNOSIS — Z13.6 ENCOUNTER FOR LIPID SCREENING FOR CARDIOVASCULAR DISEASE: ICD-10-CM

## 2023-10-03 DIAGNOSIS — Z00.00 WELL ADULT EXAM: ICD-10-CM

## 2023-10-03 LAB
CHOLEST SERPL-MCNC: 158 MG/DL (ref 120–199)
CHOLEST/HDLC SERPL: 3.9 {RATIO} (ref 2–5)
ESTIMATED AVG GLUCOSE: 105 MG/DL (ref 68–131)
HBA1C MFR BLD: 5.3 % (ref 4–5.6)
HDLC SERPL-MCNC: 41 MG/DL (ref 40–75)
HDLC SERPL: 25.9 % (ref 20–50)
LDLC SERPL CALC-MCNC: 82.8 MG/DL (ref 63–159)
NONHDLC SERPL-MCNC: 117 MG/DL
TRIGL SERPL-MCNC: 171 MG/DL (ref 30–150)

## 2023-10-03 PROCEDURE — 80061 LIPID PANEL: CPT | Performed by: FAMILY MEDICINE

## 2023-10-03 PROCEDURE — 83036 HEMOGLOBIN GLYCOSYLATED A1C: CPT | Performed by: FAMILY MEDICINE

## 2023-10-03 PROCEDURE — 36415 COLL VENOUS BLD VENIPUNCTURE: CPT | Mod: PO | Performed by: FAMILY MEDICINE

## 2023-10-06 NOTE — PROGRESS NOTES
Make follow-up lab appointment per recommendation below.  Check to see if patient has seen the results through my chart.  If not then,  #CALL THE PATIENT# to discuss results/see if they have questions and document verification of contact. Make F/U appt if needed. [phone]    #My interpretation that was sent to them through [facility]:  Randy, I have reviewed your recent blood work.     Your cholesterol is improved.  Keep up the great work!  Your hemoglobin A1c is improved.  Excellent job!  This test is gold standard screening test for diabetes.  It is a measures 3 months of your average blood sugar.    =========================  Also please address any outstanding health maintenance that may be due: Foot Exam Never done  Influenza Vaccine(1) due on 09/01/2023  COVID-19 Vaccine(4 - 2023-24 season) due on 09/01/2023  PROSTATE-SPECIFIC ANTIGEN due on 09/29/2023  Eye Exam due on 10/28/2023

## 2023-10-27 ENCOUNTER — PATIENT MESSAGE (OUTPATIENT)
Dept: CARDIOLOGY | Facility: CLINIC | Age: 73
End: 2023-10-27
Payer: MEDICARE

## 2023-10-27 RX ORDER — MEXILETINE HYDROCHLORIDE 150 MG/1
450 CAPSULE ORAL EVERY 8 HOURS
Qty: 270 CAPSULE | Refills: 0 | Status: SHIPPED | OUTPATIENT
Start: 2023-10-27 | End: 2023-11-25

## 2023-10-27 NOTE — TELEPHONE ENCOUNTER
----- Message from Emi Antoine sent at 10/27/2023 11:53 AM CDT -----  Please refill the medication(s) listed below.Please call the patient when the prescription(s) is ready for  at this phone number        Medication #1mexiletine (MEXITIL) 150 MG Cap  Medication #2  Medication #3      Preferred Pharmacy:    [id number] [address] [address]  Phone: [phone] Fax: [phone]              Would the patient rather a call back or a response via [facility]? call    Best Call Back Number:.Telephone Information:  Mobile          [phone]        Additional Information: RX refll, please call pt to discuss dosage change, pt states he is completely out.

## 2023-10-31 ENCOUNTER — EXTERNAL CHRONIC CARE MANAGEMENT (OUTPATIENT)
Dept: PRIMARY CARE CLINIC | Facility: CLINIC | Age: 73
End: 2023-10-31
Payer: MEDICARE

## 2023-10-31 PROCEDURE — 99490 CHRNC CARE MGMT STAFF 1ST 20: CPT | Mod: S$PBB,,, | Performed by: FAMILY MEDICINE

## 2023-10-31 PROCEDURE — 99490 CHRNC CARE MGMT STAFF 1ST 20: CPT | Mod: PBBFAC,PO | Performed by: FAMILY MEDICINE

## 2023-10-31 PROCEDURE — 99490 PR CHRONIC CARE MGMT, 1ST 20 MIN: ICD-10-PCS | Mod: S$PBB,,, | Performed by: FAMILY MEDICINE

## 2023-11-01 ENCOUNTER — PATIENT MESSAGE (OUTPATIENT)
Dept: CARDIOLOGY | Facility: CLINIC | Age: 73
End: 2023-11-01
Payer: MEDICARE

## 2023-11-02 RX ORDER — SPIRONOLACTONE 25 MG/1
25 TABLET ORAL DAILY
COMMUNITY
End: 2023-11-02 | Stop reason: SDUPTHER

## 2023-11-02 RX ORDER — SPIRONOLACTONE 25 MG/1
25 TABLET ORAL DAILY
Qty: 30 TABLET | Refills: 11 | Status: SHIPPED | OUTPATIENT
Start: 2023-11-02

## 2023-11-15 ENCOUNTER — CLINICAL SUPPORT (OUTPATIENT)
Dept: CARDIOLOGY | Facility: HOSPITAL | Age: 73
End: 2023-11-15
Payer: MEDICARE

## 2023-11-15 DIAGNOSIS — Z95.810 PRESENCE OF AUTOMATIC (IMPLANTABLE) CARDIAC DEFIBRILLATOR: ICD-10-CM

## 2023-11-15 PROCEDURE — 93295 CARDIAC DEVICE CHECK - REMOTE: ICD-10-PCS | Mod: ,,, | Performed by: INTERNAL MEDICINE

## 2023-11-15 PROCEDURE — 93295 DEV INTERROG REMOTE 1/2/MLT: CPT | Mod: ,,, | Performed by: INTERNAL MEDICINE

## 2023-11-25 RX ORDER — MEXILETINE HYDROCHLORIDE 150 MG/1
450 CAPSULE ORAL EVERY 8 HOURS
Qty: 270 CAPSULE | Refills: 0 | Status: SHIPPED | OUTPATIENT
Start: 2023-11-25 | End: 2023-11-27

## 2023-11-27 PROBLEM — Z09 HOSPITAL DISCHARGE FOLLOW-UP: Status: RESOLVED | Noted: 2023-08-24 | Resolved: 2023-11-27

## 2023-11-27 RX ORDER — MEXILETINE HYDROCHLORIDE 150 MG/1
450 CAPSULE ORAL EVERY 8 HOURS
Qty: 270 CAPSULE | Refills: 0 | Status: SHIPPED | OUTPATIENT
Start: 2023-11-27 | End: 2023-11-29 | Stop reason: SDUPTHER

## 2023-11-29 ENCOUNTER — PATIENT MESSAGE (OUTPATIENT)
Dept: CARDIOLOGY | Facility: CLINIC | Age: 73
End: 2023-11-29
Payer: MEDICARE

## 2023-11-29 RX ORDER — MEXILETINE HYDROCHLORIDE 150 MG/1
450 CAPSULE ORAL EVERY 8 HOURS
Qty: 270 CAPSULE | Refills: 0 | Status: SHIPPED | OUTPATIENT
Start: 2023-11-29 | End: 2023-12-26

## 2023-11-30 ENCOUNTER — EXTERNAL CHRONIC CARE MANAGEMENT (OUTPATIENT)
Dept: PRIMARY CARE CLINIC | Facility: CLINIC | Age: 73
End: 2023-11-30
Payer: MEDICARE

## 2023-11-30 PROCEDURE — 99439 CHRNC CARE MGMT STAF EA ADDL: CPT | Mod: S$PBB,,, | Performed by: FAMILY MEDICINE

## 2023-11-30 PROCEDURE — 99490 CHRNC CARE MGMT STAFF 1ST 20: CPT | Mod: S$PBB,,, | Performed by: FAMILY MEDICINE

## 2023-11-30 PROCEDURE — 99439 CHRNC CARE MGMT STAF EA ADDL: CPT | Mod: PBBFAC,PO | Performed by: FAMILY MEDICINE

## 2023-11-30 PROCEDURE — 99490 CHRNC CARE MGMT STAFF 1ST 20: CPT | Mod: PBBFAC,PO | Performed by: FAMILY MEDICINE

## 2023-11-30 PROCEDURE — 99439 PR CHRONIC CARE MGMT, EA ADDTL 20 MIN: ICD-10-PCS | Mod: S$PBB,,, | Performed by: FAMILY MEDICINE

## 2023-11-30 PROCEDURE — 99490 PR CHRONIC CARE MGMT, 1ST 20 MIN: ICD-10-PCS | Mod: S$PBB,,, | Performed by: FAMILY MEDICINE

## 2023-12-13 ENCOUNTER — TELEPHONE (OUTPATIENT)
Dept: PSYCHIATRY | Facility: CLINIC | Age: 73
End: 2023-12-13
Payer: MEDICARE

## 2023-12-14 ENCOUNTER — TELEPHONE (OUTPATIENT)
Dept: FAMILY MEDICINE | Facility: CLINIC | Age: 73
End: 2023-12-14
Payer: MEDICARE

## 2023-12-14 ENCOUNTER — OFFICE VISIT (OUTPATIENT)
Dept: PSYCHIATRY | Facility: CLINIC | Age: 73
End: 2023-12-14
Payer: MEDICARE

## 2023-12-14 DIAGNOSIS — Z63.8 FAMILY CONFLICT: ICD-10-CM

## 2023-12-14 DIAGNOSIS — F43.9 STRESS: ICD-10-CM

## 2023-12-14 DIAGNOSIS — F41.9 ANXIETY: Primary | ICD-10-CM

## 2023-12-14 PROCEDURE — 99211 OFF/OP EST MAY X REQ PHY/QHP: CPT | Mod: PBBFAC | Performed by: SOCIAL WORKER

## 2023-12-14 PROCEDURE — 90791 PSYCH DIAGNOSTIC EVALUATION: CPT | Mod: ,,, | Performed by: SOCIAL WORKER

## 2023-12-14 PROCEDURE — 99999 PR PBB SHADOW E&M-EST. PATIENT-LVL I: CPT | Mod: PBBFAC,,, | Performed by: SOCIAL WORKER

## 2023-12-14 SDOH — SOCIAL DETERMINANTS OF HEALTH (SDOH): OTHER SPECIFIED PROBLEMS RELATED TO PRIMARY SUPPORT GROUP: Z63.8

## 2023-12-15 RX ORDER — AMIODARONE HYDROCHLORIDE 200 MG/1
TABLET ORAL
Qty: 180 TABLET | Refills: 0 | Status: SHIPPED | OUTPATIENT
Start: 2023-12-15 | End: 2024-03-25

## 2023-12-15 NOTE — PROGRESS NOTES
Psychiatry Initial Visit (PhD/LCSW)  Diagnostic Interview - CPT 25567    Date: 12/14/2023    Site: [facility]    Referral source: LON Schilling NP    Clinical status of patient: Outpatient    Randy Yap, a 73 y.o. male, for initial evaluation visit.  Met with patient.    Chief complaint/reason for encounter: depression and anxiety        12/11/2023     2:52 PM 11/13/2023     7:07 PM   PHQ-9 Depression Patient Health Questionnaire   Patient agreed to terms: Yes Yes   Little interest or pleasure in doing things 1 1   Feeling down, depressed, or hopeless 1 1   Trouble falling or staying asleep, or sleeping too much 2 2   Feeling tired or having little energy 1 1   Poor appetite or overeating 0 1   Feeling bad about yourself - or that you are a failure or have let yourself or your family down 0 0   Trouble concentrating on things, such as reading the newspaper or watching television 0 0   Moving or speaking so slowly that other people could have noticed. Or the opposite - being so fidgety or restless that you have been moving around a lot more than usual 0 0   Thoughts that you would be better off dead, or of hurting yourself in some way 0 0   PHQ-9 Total Score 5 6   If you checked off any problems, how difficult have these problems made it for you to do your work, take care of things at home, or get along with other people? Not difficult at all Somewhat difficult   Interpretation Mild Mild            No data to display                History of present illness:  Met with patient for his initial visit appointment in the clinic.  The patient was on time for his appointment alert and oriented.  He stated that he was coming for counseling appointments due to family stress stated that he has been  to his wife Shayna for 50 years and they are happened .  They have 2 adult children and 5 grandchildren.  He stated that recently he blacked out 1 1 day and found out that he was dehydrated and that his  medication was not compatible.  This patient has a history of heart problems and has a defibrillator and a pacemaker.  He feels that the family watches him all the time but he still wants to live his life.    The family however is very distressed thinking that he is sick and needs to be seen for depression and anxiety.  He appreciates support they give him but does not want to be kept because of his potential for having a heart attack.  The patient stated that he would make a follow-up appointment after [place] and we will continue to discuss these issues.    Pain: noncontributory    Symptoms:   Mood: depressed mood  Anxiety: excessive anxiety/worry and restlessness/keyed up  Substance abuse: denied  Cognitive functioning: denied  Health behaviors: noncontributory    Psychiatric history: psychotropic management by PCP    Medical history:   Past Medical History:   Diagnosis Date    Asthma     Cardiomyopathy due to systemic disease     Colon polyp 5/2013    repeat 5/2018    Depression, recurrent     Diastolic dysfunction     DJD (degenerative joint disease) of knee 10/14/2013    Hyperlipidemia     Hypertension     Murmur     Paroxysmal VT 5/21/2016    Pericarditis with effusion     Sleep apnea        Family history of psychiatric illness:   Family History   Problem Relation Age of Onset    Hyperlipidemia Mother     Arrhythmia Mother     Arthritis Mother     Asthma Mother     COPD Mother     Heart disease Father 48    Heart attack Father 48    Hypertension Father     Arthritis Father     Diabetes Father     Hypertension Maternal Grandmother     Hypertension Maternal Grandfather     Heart disease Paternal Grandmother     Hypertension Paternal Grandmother     Heart attack Paternal Grandmother     Stroke Paternal Grandmother     Heart disease Paternal Grandfather     Hypertension Paternal Grandfather     Heart attack Paternal Grandfather     Cancer Paternal Grandfather         Social history (marriage, employment,  etc.): This patient is retired,  to [employer] for 50 years. He has 2 adult children and 5 grandchildren.   Social History     Social History Narrative    Not on file        Substance use:     Social History     Tobacco Use    Smoking status: Never    Smokeless tobacco: Never   Substance Use Topics    Alcohol use: Yes     Comment: occasional glass of wine on social occasions        Current medications and drug reactions (include OTC, herbal):    Current Outpatient Medications:     acetaminophen (TYLENOL) 325 MG tablet, Take 650 mg by mouth as needed. , Disp: , Rfl:     albuterol (PROVENTIL/VENTOLIN HFA) 90 mcg/actuation inhaler, Inhale 1 puff into the lungs once daily. Rescue, Disp: 18 g, Rfl: 4    ALPRAZolam (XANAX) 0.25 MG tablet, Take 1 tablet (0.25 mg total) by mouth 2 (two) times daily as needed for Anxiety., Disp: 60 tablet, Rfl: 2    amiodarone (PACERONE) 200 MG Tab, TAKE 1 TABLET (200MG) BY MOUTH 2  TIMES DAILY FOR 6 WEEKS THEN TAKE 1 AND 1/2 TABLETS A DAY (300 MG), Disp: 180 tablet, Rfl: 0    bepotastine besilate (BEPREVE) 1.5 % Drop, Bepreve 1.5 % eye drops  Apply by ophthalmic route as needed for 50 days., Disp: , Rfl:     carvediloL (COREG) 25 MG tablet, Take 1 tablet (25 mg total) by mouth 2 (two) times daily., Disp: 180 tablet, Rfl: 3    co-enzyme Q-10 30 mg capsule, Take 30 mg by mouth once daily. , Disp: , Rfl:     dapagliflozin (FARXIGA) 10 mg tablet, TAKE 1 TABLET BY MOUTH ONCE DAILY, Disp: 90 tablet, Rfl: 4    ferrous sulfate (FEOSOL) 325 mg (65 mg iron) Tab tablet, Take 325 mg by mouth daily with breakfast., Disp: , Rfl:     fluticasone furoate (ARNUITY ELLIPTA) 100 mcg/actuation inhaler, Inhale 1 puff into the lungs once daily., Disp: 30 each, Rfl: 5    furosemide (LASIX) 40 MG tablet, Take 1 tablet (40 mg total) by mouth once daily., Disp: 90 tablet, Rfl: 3    glucosamine-chondroitin 500-400 mg tablet, Take 1 tablet by mouth once daily. , Disp: , Rfl:     meclizine (ANTIVERT) 12.5 mg tablet,  Take 1 tablet (12.5 mg total) by mouth 3 (three) times daily as needed for Dizziness., Disp: 30 tablet, Rfl: 0    mexiletine (MEXITIL) 150 MG Cap, Take 3 capsules (450 mg total) by mouth every 8 (eight) hours., Disp: 270 capsule, Rfl: 0    multivitamin (THERAGRAN) tablet, Take 1 tablet by mouth once daily., Disp: , Rfl:     ondansetron (ZOFRAN) 4 MG tablet, Take 4 mg by mouth every 8 (eight) hours as needed for Nausea., Disp: , Rfl:     OZEMPIC 1 mg/dose (4 mg/3 mL), INJECT 1 MG INTO THE SKIN EVERY 7 DAYS., Disp: 9 mL, Rfl: 0    pravastatin (PRAVACHOL) 80 MG tablet, Take 1 tablet (80 mg total) by mouth once daily., Disp: 90 tablet, Rfl: 4    RESTASIS 0.05 % ophthalmic emulsion, Place 1 drop into both eyes 2 (two) times daily., Disp: , Rfl:     sacubitriL-valsartan (ENTRESTO)  mg per tablet, Take 1 tablet by mouth 2 (two) times daily., Disp: 180 tablet, Rfl: 6    spironolactone (ALDACTONE) 25 MG tablet, Take 1 tablet (25 mg total) by mouth once daily., Disp: 30 tablet, Rfl: 11      Strengths and liabilities: Strength: Patient accepts guidance/feedback, Liability: Patient lacks coping skills.    Current Evaluation:     Mental Status Exam:  General Appearance:  unremarkable, age appropriate   Speech: normal tone, normal rate, normal pitch, normal volume      Level of Cooperation: cooperative      Thought Processes: normal and logical   Mood: anxious      Thought Content: normal, no suicidality, no homicidality, delusions, or paranoia   Affect: congruent and appropriate   Orientation: Oriented x3   Memory: recent >  intact   Attention Span & Concentration: intact   Fund of General Knowledge: intact and appropriate to age and level of education   Abstract Reasoning: interpretation of similarities was abstract   Judgment & Insight: fair     Language  intact     Diagnostic Impression - Plan:       ICD-10-CM ICD-9-CM   1. Anxiety  F41.9 300.00   2. Stress  F43.9 V62.89   3. Family conflict  Z63.8 V61.9        Plan:individual psychotherapy    Return to Clinic: as scheduled    Length of Service (minutes): 45       Misty Barajas LCSW  01/01/2024   10:54 AM

## 2023-12-26 RX ORDER — MEXILETINE HYDROCHLORIDE 150 MG/1
450 CAPSULE ORAL EVERY 8 HOURS
Qty: 270 CAPSULE | Refills: 0 | Status: SHIPPED | OUTPATIENT
Start: 2023-12-26 | End: 2024-01-23 | Stop reason: SDUPTHER

## 2023-12-31 ENCOUNTER — EXTERNAL CHRONIC CARE MANAGEMENT (OUTPATIENT)
Dept: PRIMARY CARE CLINIC | Facility: CLINIC | Age: 73
End: 2023-12-31
Payer: MEDICARE

## 2023-12-31 PROCEDURE — 99490 CHRNC CARE MGMT STAFF 1ST 20: CPT | Mod: PBBFAC,PO | Performed by: FAMILY MEDICINE

## 2023-12-31 PROCEDURE — 99490 CHRNC CARE MGMT STAFF 1ST 20: CPT | Mod: S$PBB,,, | Performed by: FAMILY MEDICINE

## 2023-12-31 PROCEDURE — 99439 CHRNC CARE MGMT STAF EA ADDL: CPT | Mod: S$PBB,,, | Performed by: FAMILY MEDICINE

## 2023-12-31 PROCEDURE — 99439 CHRNC CARE MGMT STAF EA ADDL: CPT | Mod: PBBFAC,PO | Performed by: FAMILY MEDICINE

## 2024-01-10 ENCOUNTER — PATIENT MESSAGE (OUTPATIENT)
Dept: FAMILY MEDICINE | Facility: CLINIC | Age: 74
End: 2024-01-10
Payer: MEDICARE

## 2024-01-11 DIAGNOSIS — Z00.00 ENCOUNTER FOR MEDICARE ANNUAL WELLNESS EXAM: ICD-10-CM

## 2024-01-22 ENCOUNTER — TELEPHONE (OUTPATIENT)
Dept: CARDIOLOGY | Facility: HOSPITAL | Age: 74
End: 2024-01-22
Payer: MEDICARE

## 2024-01-23 ENCOUNTER — PATIENT MESSAGE (OUTPATIENT)
Dept: FAMILY MEDICINE | Facility: CLINIC | Age: 74
End: 2024-01-23

## 2024-01-23 ENCOUNTER — OFFICE VISIT (OUTPATIENT)
Dept: FAMILY MEDICINE | Facility: CLINIC | Age: 74
End: 2024-01-23
Payer: MEDICARE

## 2024-01-23 VITALS — WEIGHT: 239 LBS | BODY MASS INDEX: 34.22 KG/M2 | HEIGHT: 70 IN

## 2024-01-23 DIAGNOSIS — G47.00 INSOMNIA, UNSPECIFIED TYPE: Primary | Chronic | ICD-10-CM

## 2024-01-23 DIAGNOSIS — Z12.5 ENCOUNTER FOR PROSTATE CANCER SCREENING: ICD-10-CM

## 2024-01-23 DIAGNOSIS — Z79.899 ENCOUNTER FOR LONG-TERM (CURRENT) USE OF MEDICATIONS: ICD-10-CM

## 2024-01-23 DIAGNOSIS — E11.65 TYPE 2 DIABETES MELLITUS WITH HYPERGLYCEMIA, WITHOUT LONG-TERM CURRENT USE OF INSULIN: Chronic | ICD-10-CM

## 2024-01-23 DIAGNOSIS — I47.29 PAROXYSMAL VT: ICD-10-CM

## 2024-01-23 DIAGNOSIS — I42.9 IDIOPATHIC CARDIOMYOPATHY: ICD-10-CM

## 2024-01-23 DIAGNOSIS — I15.2 HYPERTENSION ASSOCIATED WITH DIABETES: ICD-10-CM

## 2024-01-23 DIAGNOSIS — E11.59 HYPERTENSION ASSOCIATED WITH DIABETES: ICD-10-CM

## 2024-01-23 DIAGNOSIS — I50.30 CHF WITH LEFT VENTRICULAR DIASTOLIC DYSFUNCTION, NYHA CLASS 2: ICD-10-CM

## 2024-01-23 PROBLEM — I50.41 ACUTE COMBINED SYSTOLIC AND DIASTOLIC CONGESTIVE HEART FAILURE: Status: RESOLVED | Noted: 2020-11-03 | Resolved: 2024-01-23

## 2024-01-23 PROBLEM — J06.9 ACUTE URI: Status: RESOLVED | Noted: 2023-07-21 | Resolved: 2024-01-23

## 2024-01-23 PROBLEM — Z01.810 PREOP CARDIOVASCULAR EXAM: Status: RESOLVED | Noted: 2020-07-31 | Resolved: 2024-01-23

## 2024-01-23 PROBLEM — I49.01 VF (VENTRICULAR FIBRILLATION): Status: RESOLVED | Noted: 2018-08-12 | Resolved: 2024-01-23

## 2024-01-23 PROBLEM — L98.9 SKIN LESION: Status: RESOLVED | Noted: 2020-08-02 | Resolved: 2024-01-23

## 2024-01-23 PROBLEM — A49.9 BACTERIAL INFECTION: Status: RESOLVED | Noted: 2022-03-07 | Resolved: 2024-01-23

## 2024-01-23 PROCEDURE — 99214 OFFICE O/P EST MOD 30 MIN: CPT | Mod: 95,,, | Performed by: FAMILY MEDICINE

## 2024-01-23 RX ORDER — SEMAGLUTIDE 0.68 MG/ML
0.5 INJECTION, SOLUTION SUBCUTANEOUS
Qty: 9 ML | Refills: 1 | Status: SHIPPED | OUTPATIENT
Start: 2024-01-23 | End: 2024-07-09

## 2024-01-23 RX ORDER — MEXILETINE HYDROCHLORIDE 150 MG/1
450 CAPSULE ORAL EVERY 8 HOURS
Qty: 270 CAPSULE | Refills: 0 | Status: SHIPPED | OUTPATIENT
Start: 2024-01-23 | End: 2024-03-04

## 2024-01-23 NOTE — ASSESSMENT & PLAN NOTE
ER precautions for severe symptoms.  Follow-up with Cardiology.  Continue current medications and management by Cardiology.

## 2024-01-23 NOTE — ASSESSMENT & PLAN NOTE
I advised patient to discuss with Cardiology as he may be able to change his scheduled for taking these medications so he will not be waking up at night to take medication.    Discussed insomnia condition course.  Advised of first-line medications for this condition.  Also discussed sleep hygiene.  Information was given below.  Good sleep habits (sometimes referred to as sleep hygiene) can help you get a good nights sleep.    Some habits that can improve your sleep health:  -Be consistent. Go to bed at the same time each night and get up at the same time each morning, including on the weekends  -Make sure your bedroom is quiet, dark, relaxing, and at a comfortable temperature  -Remove electronic devices, such as TVs, computers, and smart phones, from the bedroom  -Avoid large meals, caffeine, and alcohol before bedtime  -Get some exercise. Being physically active during the day can help you fall asleep more easily at night.

## 2024-01-23 NOTE — ASSESSMENT & PLAN NOTE
Heart failure precautions.  Continue current medications.  Follow-up cardiology regularly.  ER precautions.

## 2024-01-23 NOTE — ASSESSMENT & PLAN NOTE
Chronic.  Stable per patient.  Patient following with Cardiology.  Reports compliance with medications.  He is having some constipation however believes this may be coming from increase dosage of Ozempic.    ER precautions.

## 2024-01-23 NOTE — ASSESSMENT & PLAN NOTE
Decrease Ozempic back to 0.5 milligram weekly.  Update labs.  Follow-up in three months.  We will plan to monitor hemoglobin A1c at designated intervals 3 to 6 months.  I recommend ongoing Education for diabetic diet and exercise protocol.  We will continue to monitor for side effects.    Please be advised of symptoms to monitor for and to notify me immediately if persistent or worsening.  Follow up with Ophthalmology/Optometry and Podiatry at least annually.

## 2024-01-23 NOTE — ASSESSMENT & PLAN NOTE
Counseled on importance of hypertension disease course, I recommend ongoing Education for DASH-diet and exercise.  Counseled on medication regimen importance of treating high blood pressure.  Please be advised of risk of untreated blood pressure as discussed.  Please advised of ER precautions were given for symptoms of hypertensive urgency and emergency.

## 2024-01-23 NOTE — PATIENT INSTRUCTIONS
Follow up in about 6 months (around 7/23/2024), or if symptoms worsen or fail to improve, for Med refills.     Dear patient,   As a result of recent federal legislation (The Federal Cures Act), you may receive lab or pathology results from your visit in your [facility] account before your physician is able to contact you. Your physician or their representative will relay the results to you with their recommendations at their soonest availability.     If no improvement in symptoms or symptoms worsen, please be advised to call MD, follow-up at clinic and/or go to ER if becomes severe.    Olvin Hutson M.D.        We Offer TELEHEALTH & Same Day Appointments!   Book your Telehealth appointment with me through my nurse or   Clinic appointments on mobileo!    [address] [place] [address]    Office: [phone]   FAX: [phone]    Check out my Facebook Page and Follow Me at: [email or web address]    Check out my website at [employer] by clicking on: [email or web address]    [email or web address] Schedule appointments online, go to mobileo: [email or web address]

## 2024-01-23 NOTE — PROGRESS NOTES
Primary Care Telemedicine Note  The patient location is:  Patient's Home - [place]  The chief complaint leading to consultation is:   Chief Complaint   Patient presents with    Follow-up     Talk about the ozempic      Total time:  see MDM below. The total time spent on the encounter, which includes face to face time and non-face to face time preparing to see the patient (eg, review of previous medical records, tests), Obtaining and/or reviewing separately obtained history, documenting clinical information in the electronic or other health record, independently interpreting results (not separately reported)/communicating results to the patient/family/caregiver, and/or care coordination (not separately reported).    Visit type: Virtual visit with synchronous audio and video  Each patient to whom he or she provides medical services by telemedicine is:  (1) informed of the relationship between the physician and patient and the respective role of any other health care provider with respect to management of the patient; and (2) notified that he or she may decline to receive medical services by telemedicine and may withdraw from such care at any time.  =================================================================  PLAN:      Problem List Items Addressed This Visit       Hypertension associated with diabetes (Chronic)     Counseled on importance of hypertension disease course, I recommend ongoing Education for DASH-diet and exercise.  Counseled on medication regimen importance of treating high blood pressure.  Please be advised of risk of untreated blood pressure as discussed.  Please advised of ER precautions were given for symptoms of hypertensive urgency and emergency.               Relevant Medications    semaglutide (OZEMPIC) 0.25 mg or 0.5 mg (2 mg/3 mL) pen injector    Other Relevant Orders    CBC Without Differential    Comprehensive Metabolic Panel    TSH    Hemoglobin A1C    Lipid Panel    Idiopathic  cardiomyopathy (Chronic)     Chronic.  Stable per patient.  Patient following with Cardiology.  Reports compliance with medications.  He is having some constipation however believes this may be coming from increase dosage of Ozempic.    ER precautions.         Relevant Orders    CBC Without Differential    Comprehensive Metabolic Panel    TSH    Hemoglobin A1C    Lipid Panel    Paroxysmal VT (Chronic)     ER precautions for severe symptoms.  Follow-up with Cardiology.  Continue current medications and management by Cardiology.           Relevant Orders    CBC Without Differential    Comprehensive Metabolic Panel    TSH    Hemoglobin A1C    Lipid Panel    CHF with left ventricular diastolic dysfunction, NYHA class 2 (Chronic)     Heart failure precautions.  Continue current medications.  Follow-up cardiology regularly.  ER precautions.         Relevant Orders    CBC Without Differential    Comprehensive Metabolic Panel    TSH    Hemoglobin A1C    Lipid Panel    Type 2 diabetes mellitus with hyperglycemia, without long-term current use of insulin (Chronic)     Decrease Ozempic back to 0.5 milligram weekly.  Update labs.  Follow-up in three months.  We will plan to monitor hemoglobin A1c at designated intervals 3 to 6 months.  I recommend ongoing Education for diabetic diet and exercise protocol.  We will continue to monitor for side effects.    Please be advised of symptoms to monitor for and to notify me immediately if persistent or worsening.  Follow up with Ophthalmology/Optometry and Podiatry at least annually.           Relevant Medications    semaglutide (OZEMPIC) 0.25 mg or 0.5 mg (2 mg/3 mL) pen injector    Other Relevant Orders    CBC Without Differential    Comprehensive Metabolic Panel    TSH    Hemoglobin A1C    Lipid Panel    Encounter for long-term (current) use of medications (Chronic)     Complete history and limited telemedicine physical was completed today.  Complete and thorough medication  reconciliation was performed.  Discussed risks and benefits of medications.  Advised patient on orders and health maintenance.  We discussed old records and old labs if available.  Will request any records not available through epic.  Continue current medications listed on your summary sheet.           Relevant Orders    CBC Without Differential    Comprehensive Metabolic Panel    TSH    Hemoglobin A1C    Lipid Panel    Insomnia - Primary (Chronic)     I advised patient to discuss with Cardiology as he may be able to change his scheduled for taking these medications so he will not be waking up at night to take medication.    Discussed insomnia condition course.  Advised of first-line medications for this condition.  Also discussed sleep hygiene.  Information was given below.  Good sleep habits (sometimes referred to as sleep hygiene) can help you get a good nights sleep.    Some habits that can improve your sleep health:  -Be consistent. Go to bed at the same time each night and get up at the same time each morning, including on the weekends  -Make sure your bedroom is quiet, dark, relaxing, and at a comfortable temperature  -Remove electronic devices, such as TVs, computers, and smart phones, from the bedroom  -Avoid large meals, caffeine, and alcohol before bedtime  -Get some exercise. Being physically active during the day can help you fall asleep more easily at night.           Relevant Orders    CBC Without Differential    Comprehensive Metabolic Panel    TSH    Hemoglobin A1C    Lipid Panel     Other Visit Diagnoses       Encounter for prostate cancer screening        Relevant Orders    PSA, Screening          Future Appointments       Date Provider Specialty Appt Notes    2/6/2024 Misty Barajas LCSW Psychiatry Therapy    2/19/2024  Cardiology sjm/icd/fas/6mos    2/19/2024 Nirav Baldwin MD Cardiology 6 month follow up    3/7/2024 Misty Barajas LCSW Psychiatry Therapy    4/9/2024  Misty Barajas, [facility] Psychiatry Therapy           Medication Management for assessment above:   Medication List with Changes/Refills   New Medications    SEMAGLUTIDE (OZEMPIC) 0.25 MG OR 0.5 MG (2 MG/3 ML) PEN INJECTOR    Inject 0.5 mg into the skin every 7 days.   Current Medications    ACETAMINOPHEN (TYLENOL) 325 MG TABLET    Take 650 mg by mouth as needed.     ALBUTEROL (PROVENTIL/VENTOLIN HFA) 90 MCG/ACTUATION INHALER    Inhale 1 puff into the lungs once daily. Rescue    ALPRAZOLAM (XANAX) 0.25 MG TABLET    Take 1 tablet (0.25 mg total) by mouth 2 (two) times daily as needed for Anxiety.    AMIODARONE (PACERONE) 200 MG TAB    TAKE 1 TABLET (200MG) BY MOUTH 2  TIMES DAILY FOR 6 WEEKS THEN TAKE 1 AND 1/2 TABLETS A DAY (300 MG)    BEPOTASTINE BESILATE (BEPREVE) 1.5 % DROP    Bepreve 1.5 % eye drops   Apply by ophthalmic route as needed for 50 days.    CARVEDILOL (COREG) 25 MG TABLET    Take 1 tablet (25 mg total) by mouth 2 (two) times daily.    CO-ENZYME Q-10 30 MG CAPSULE    Take 30 mg by mouth once daily.     DAPAGLIFLOZIN (FARXIGA) 10 MG TABLET    TAKE 1 TABLET BY MOUTH ONCE DAILY    FERROUS SULFATE (FEOSOL) 325 MG (65 MG IRON) TAB TABLET    Take 325 mg by mouth daily with breakfast.    FLUTICASONE FUROATE (ARNUITY ELLIPTA) 100 MCG/ACTUATION INHALER    Inhale 1 puff into the lungs once daily.    FUROSEMIDE (LASIX) 40 MG TABLET    Take 1 tablet (40 mg total) by mouth once daily.    GLUCOSAMINE-CHONDROITIN 500-400 MG TABLET    Take 1 tablet by mouth once daily.     MECLIZINE (ANTIVERT) 12.5 MG TABLET    Take 1 tablet (12.5 mg total) by mouth 3 (three) times daily as needed for Dizziness.    MEXILETINE (MEXITIL) 150 MG CAP    Take 3 capsules (450 mg total) by mouth every 8 (eight) hours.    MULTIVITAMIN (THERAGRAN) TABLET    Take 1 tablet by mouth once daily.    ONDANSETRON (ZOFRAN) 4 MG TABLET    Take 4 mg by mouth every 8 (eight) hours as needed for Nausea.    PRAVASTATIN (PRAVACHOL) 80 MG TABLET    Take 1  tablet (80 mg total) by mouth once daily.    RESTASIS 0.05 % OPHTHALMIC EMULSION    Place 1 drop into both eyes 2 (two) times daily.    SACUBITRIL-VALSARTAN (ENTRESTO)  MG PER TABLET    Take 1 tablet by mouth 2 (two) times daily.    SPIRONOLACTONE (ALDACTONE) 25 MG TABLET    Take 1 tablet (25 mg total) by mouth once daily.   Discontinued Medications    OZEMPIC 1 MG/DOSE (4 MG/3 ML)    INJECT 1 MG INTO THE SKIN EVERY 7 DAYS.       Olvin Hutson M.D.  ==========================================================================  Subjective:   Patient ID: Randy Yap is a 74 y.o. male.  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   Chief Complaint: Follow-up (Talk about the ozempic)      Problem List Items Addressed This Visit       Hypertension associated with diabetes (Chronic)    Overview     January 2024: reports insomnia at night due to getting up and taking his medications.   Hypertension Medications               carvediloL (COREG) 25 MG tablet Take 1 tablet (25 mg total) by mouth 2 (two) times daily.    furosemide (LASIX) 40 MG tablet Take 1 tablet (40 mg total) by mouth once daily.    sacubitriL-valsartan (ENTRESTO)  mg per tablet Take 1 tablet by mouth 2 (two) times daily.    spironolactone (ALDACTONE) 25 MG tablet Take 1 tablet (25 mg total) by mouth once daily.        CHRONIC. STABLE. BP Reviewed.  Compliant with BP medications. No SE reported.   March 2022:  Patient continues on clinical trial with [facility].  (-) CP, SOB, palpitations, dizziness, lightheadedness, HA, arm numbness, tingling or weakness, syncope.  Creatinine   Date Value Ref Range Status   08/25/2023 1.1 0.5 - 1.4 mg/dL Final     Results for orders  placed or performed during the hospital encounter of 08/17/23   EKG 12-lead    Collection Time: 08/17/23  1:15 PM    Narrative    Test Reason : R55,    Vent. Rate : 079 BPM     Atrial Rate : 080 BPM     P-R Int : 246 ms          QRS Dur : 130 ms      QT Int : 432 ms       P-R-T Axes : 083 -43 041 degrees     QTc Int : 495 ms    Atrial-paced rhythm with prolonged AV conduction  Left axis deviation  Nonspecific intraventricular block  Inferior infarct (cited on or before 07-JUL-2023)  Abnormal ECG  When compared with ECG of 07-JUL-2023 08:32,  Nonspecific T wave abnormality, improved in Anterior-lateral leads  Confirmed by ANGELINA AVILA, OLIMPIA ([id number]) on 8/17/2023 8:53:05 PM    Referred By: DEMOND   SELF           Confirmed By:OLIMPIA ALFARO MD            Current Assessment & Plan     Counseled on importance of hypertension disease course, I recommend ongoing Education for DASH-diet and exercise.  Counseled on medication regimen importance of treating high blood pressure.  Please be advised of risk of untreated blood pressure as discussed.  Please advised of ER precautions were given for symptoms of hypertensive urgency and emergency.               Idiopathic cardiomyopathy (Chronic)    Overview     EF 40% by echo 5/2013         Current Assessment & Plan     Chronic.  Stable per patient.  Patient following with Cardiology.  Reports compliance with medications.  He is having some constipation however believes this may be coming from increase dosage of Ozempic.    ER precautions.         Paroxysmal VT (Chronic)    Overview     Chronic.  Stable.  Patient following closely with Cardiology.         Current Assessment & Plan     ER precautions for severe symptoms.  Follow-up with Cardiology.  Continue current medications and management by Cardiology.           CHF with left ventricular diastolic dysfunction, NYHA class 2 (Chronic)    Overview     Chronic.  Stable.  Patient following with Cardiology regularly.Interpretation  Summary  · The left ventricle is moderately enlarged with concentric hypertrophy and moderately decreased systolic function.  · Mild left atrial enlargement.  · The estimated ejection fraction is 30%.  · Grade I left ventricular diastolic dysfunction.  · There is moderate left ventricular global hypokinesis.  · Normal right ventricular size with normal right ventricular systolic function.  · Mild mitral regurgitation.         Current Assessment & Plan     Heart failure precautions.  Continue current medications.  Follow-up cardiology regularly.  ER precautions.         Type 2 diabetes mellitus with hyperglycemia, without long-term current use of insulin (Chronic)    Overview     January 2024:  Patient again having issues with constipation.  He is having some insomnia also.  He recently had these issues after going up on Ozempic to 1 milligram.  He has lost weight.  BMI Readings from Last 10 Encounters:   01/23/24 34.29 kg/m²   09/13/23 34.38 kg/m²   09/01/23 34.44 kg/m²   08/25/23 34.80 kg/m²   08/24/23 35.15 kg/m²   08/18/23 35.81 kg/m²   08/14/23 36.69 kg/m²   07/21/23 36.09 kg/m²   07/07/23 36.36 kg/m²   04/19/23 35.44 kg/m²     August 2023:    Diabetes Medications               dapagliflozin (FARXIGA) 10 mg tablet TAKE 1 TABLET BY MOUTH ONCE DAILY    OZEMPIC 1 mg/dose (4 mg/3 mL) Inject 1 mg into the skin every 7 days.        March 2023:  Patient doing well on Ozempic 1 milligram weekly.  Patient denies any history of thyroid cancer, pancreatitis, MEN syndrome.   Diabetes Management StatusPatient doing well on Farxiga with comorbid heart failure.    Diabetes Management Status    Statin: Taking  ACE/ARB: Not taking    Screening or Prevention Patient's value Goal Complete/Controlled?   HgA1C Testing and Control   Lab Results   Component Value Date    HGBA1C 5.3 10/03/2023      Annually/Less than 8% Yes   Lipid profile : 10/03/2023 Annually Yes   LDL control Lab Results   Component Value Date    LDLCALC 82.8  "10/03/2023    Annually/Less than 100 mg/dl  Yes   Nephropathy screening Lab Results   Component Value Date    LABMICR <5.0 03/24/2023     Lab Results   Component Value Date    PROTEINUA Negative 06/11/2012     No results found for: "UTPCR"   Annually Yes   Blood pressure BP Readings from Last 1 Encounters:   09/13/23 104/76    Less than 140/90 Yes   Dilated retinal exam : 10/28/2022 Annually No   Foot exam   Most Recent Foot Exam Date: Not Found Annually No            Current Assessment & Plan     Decrease Ozempic back to 0.5 milligram weekly.  Update labs.  Follow-up in three months.  We will plan to monitor hemoglobin A1c at designated intervals 3 to 6 months.  I recommend ongoing Education for diabetic diet and exercise protocol.  We will continue to monitor for side effects.    Please be advised of symptoms to monitor for and to notify me immediately if persistent or worsening.  Follow up with Ophthalmology/Optometry and Podiatry at least annually.           Encounter for long-term (current) use of medications (Chronic)    Overview     January 2024: Reviewed labs. August 2023: Reviewed labs.  March 2023:  CHRONIC. Stable. Compliant with medications for managed conditions. See medication list. No SE reported.   Routine lab analysis is being monitored. Refills were addressed.  Lab Results   Component Value Date    WBC 11.72 08/17/2023    HGB 14.1 08/17/2023    HCT 41.0 08/17/2023    MCV 95 08/17/2023     08/17/2023       Chemistry        Component Value Date/Time     (L) 08/25/2023 1416    K 4.2 08/25/2023 1416    CL 97 08/25/2023 1416    CO2 26 08/25/2023 1416    BUN 12 08/25/2023 1416    CREATININE 1.1 08/25/2023 1416    GLU 67 (L) 08/25/2023 1416        Component Value Date/Time    CALCIUM 9.2 08/25/2023 1416    ALKPHOS 37 (L) 08/19/2023 0557    AST 22 08/19/2023 0557    ALT 39 08/19/2023 0557    BILITOT 0.5 08/19/2023 0557    ESTGFRAFRICA >60.0 10/01/2021 1353    EGFRNONAA >60.0 10/01/2021 1353    "     Lab Results   Component Value Date    TSH 2.417 07/07/2023            Current Assessment & Plan     Complete history and limited telemedicine physical was completed today.  Complete and thorough medication reconciliation was performed.  Discussed risks and benefits of medications.  Advised patient on orders and health maintenance.  We discussed old records and old labs if available.  Will request any records not available through epic.  Continue current medications listed on your summary sheet.           Insomnia - Primary (Chronic)    Overview     January 2024: Patient reports he is getting up at night to take his medication.  He states that he has to take it every 8 hours.    August 2023:  Patient is not sleeping well.  Patient reports he was taken off of trazodone 300 milligrams.  There was some concern that this may have led to a flare up of his arrhythmia.  Chronic.  Intermittent control.  Patient with increased stress and anxiety lately.  Has been taking 1-1/2 trazodone to help with sleep.  Denies any SI HI or hallucinations.         Current Assessment & Plan     I advised patient to discuss with Cardiology as he may be able to change his scheduled for taking these medications so he will not be waking up at night to take medication.    Discussed insomnia condition course.  Advised of first-line medications for this condition.  Also discussed sleep hygiene.  Information was given below.  Good sleep habits (sometimes referred to as sleep hygiene) can help you get a good nights sleep.    Some habits that can improve your sleep health:  -Be consistent. Go to bed at the same time each night and get up at the same time each morning, including on the weekends  -Make sure your bedroom is quiet, dark, relaxing, and at a comfortable temperature  -Remove electronic devices, such as TVs, computers, and smart phones, from the bedroom  -Avoid large meals, caffeine, and alcohol before bedtime  -Get some exercise.  Being physically active during the day can help you fall asleep more easily at night.            Other Visit Diagnoses       Encounter for prostate cancer screening                 Review of patient's allergies indicates:  No Known Allergies  Current Outpatient Medications   Medication Instructions    acetaminophen (TYLENOL) 650 mg, Oral, As needed (PRN)    albuterol (PROVENTIL/VENTOLIN HFA) 90 mcg/actuation inhaler 1 puff, Inhalation, Daily, Rescue    ALPRAZolam (XANAX) 0.25 mg, Oral, 2 times daily PRN    amiodarone (PACERONE) 200 MG Tab TAKE 1 TABLET (200MG) BY MOUTH 2  TIMES DAILY FOR 6 WEEKS THEN TAKE 1 AND 1/2 TABLETS A DAY (300 MG)    bepotastine besilate (BEPREVE) 1.5 % Drop Bepreve 1.5 % eye drops   Apply by ophthalmic route as needed for 50 days.    carvediloL (COREG) 25 mg, Oral, 2 times daily    co-enzyme Q-10 30 mg, Oral, Daily    dapagliflozin (FARXIGA) 10 mg tablet TAKE 1 TABLET BY MOUTH ONCE DAILY    ferrous sulfate (FEOSOL) 325 mg, Oral, With breakfast    fluticasone furoate (ARNUITY ELLIPTA) 100 mcg/actuation inhaler 1 puff, Inhalation, Daily    furosemide (LASIX) 40 mg, Oral, Daily    glucosamine-chondroitin 500-400 mg tablet 1 tablet, Oral, Daily    meclizine (ANTIVERT) 12.5 mg, Oral, 3 times daily PRN    mexiletine (MEXITIL) 450 mg, Oral, Every 8 hours    multivitamin (THERAGRAN) tablet 1 tablet, Oral, Daily,      ondansetron (ZOFRAN) 4 mg, Oral, Every 8 hours PRN    OZEMPIC 0.5 mg, Subcutaneous, Every 7 days    pravastatin (PRAVACHOL) 80 mg, Oral, Daily    RESTASIS 0.05 % ophthalmic emulsion 1 drop, Both Eyes, 2 times daily    sacubitriL-valsartan (ENTRESTO)  mg per tablet 1 tablet, Oral, 2 times daily    spironolactone (ALDACTONE) 25 mg, Oral, Daily      I have reviewed the PMH, social history, FamilyHx, surgical history, allergies and medications documented / confirmed by the patient at the time of this visit.  Review of Systems   Constitutional:  Positive for activity change. Negative  "for unexpected weight change.   HENT:  Negative for hearing loss, rhinorrhea and trouble swallowing.    Eyes:  Negative for discharge and visual disturbance.   Respiratory:  Negative for chest tightness and wheezing.    Cardiovascular:  Negative for chest pain and palpitations.   Gastrointestinal:  Positive for constipation, diarrhea and vomiting. Negative for blood in stool.   Endocrine: Negative for polydipsia and polyuria.   Genitourinary:  Negative for difficulty urinating, hematuria and urgency.   Musculoskeletal:  Negative for arthralgias, joint swelling and neck pain.   Neurological:  Negative for weakness and headaches.   Psychiatric/Behavioral:  Positive for dysphoric mood. Negative for confusion.      Objective:   Ht 5' 10" (1.778 m)   Wt 108.4 kg (239 lb)   BMI 34.29 kg/m²   Physical Exam  Constitutional:       General: He is not in acute distress.     Appearance: He is well-developed. He is not diaphoretic.   HENT:      Head: Normocephalic and atraumatic.   Eyes:      Extraocular Movements: Extraocular movements intact.      Pupils: Pupils are equal, round, and reactive to light.   Pulmonary:      Effort: Pulmonary effort is normal.   Musculoskeletal:         General: Normal range of motion.      Cervical back: Normal range of motion.   Skin:     Findings: No rash.   Neurological:      Mental Status: He is alert and oriented to person, place, and time.   Psychiatric:         Attention and Perception: He is attentive.         Mood and Affect: Mood normal. Mood is not anxious or depressed. Affect is not labile, blunt, angry or inappropriate.         Speech: He is communicative. Speech is not rapid and pressured, delayed, slurred or tangential.         Behavior: Behavior normal. Behavior is not agitated, slowed, aggressive, withdrawn, hyperactive or combative.         Thought Content: Thought content normal. Thought content is not paranoid or delusional. Thought content does not include homicidal or " suicidal ideation. Thought content does not include homicidal or suicidal plan.         Cognition and Memory: Memory is not impaired.         Judgment: Judgment normal. Judgment is not impulsive or inappropriate.         Assessment:     1. Insomnia, unspecified type    2. Hypertension associated with diabetes    3. CHF with left ventricular diastolic dysfunction, NYHA class 2    4. Paroxysmal VT    5. Idiopathic cardiomyopathy    6. Type 2 diabetes mellitus with hyperglycemia, without long-term current use of insulin    7. Encounter for long-term (current) use of medications    8. Encounter for prostate cancer screening      MDM:   Moderate medical complexity.  Moderate risk.  Total time: 32 minutes.  This includes total time spent on the encounter, which includes face to face time and non-face to face time preparing to see the patient (eg, review of previous medical records, tests), Obtaining and/or reviewing separately obtained history, documenting clinical information in the electronic or other health record, independently interpreting results (not separately reported)/communicating results to the patient/family/caregiver, and/or care coordination (not separately reported).    I have Reviewed and summarized old records.  I have performed thorough medication reconciliation today and discussed risk and benefits of medications.  I have reviewed labs and discussed with patient.  All questions were answered.  I am requesting old records and will review them once they are available.  Cardiology    I have signed for the following orders AND/OR meds.  Orders Placed This Encounter   Procedures    CBC Without Differential     Standing Status:   Future     Standing Expiration Date:   3/23/2025    Comprehensive Metabolic Panel     Standing Status:   Future     Standing Expiration Date:   3/23/2025    TSH     Standing Status:   Future     Standing Expiration Date:   3/23/2025    Hemoglobin A1C     Standing Status:   Future      Standing Expiration Date:   3/23/2025    Lipid Panel     Standing Status:   Future     Standing Expiration Date:   3/23/2025    PSA, Screening     Standing Status:   Future     Standing Expiration Date:   3/23/2025     Medications Ordered This Encounter   Medications    semaglutide (OZEMPIC) 0.25 mg or 0.5 mg (2 mg/3 mL) pen injector     Sig: Inject 0.5 mg into the skin every 7 days.     Dispense:  9 mL     Refill:  1        Follow up in about 6 months (around 7/23/2024), or if symptoms worsen or fail to improve, for Med refills.  Future Appointments       Date Provider Specialty Appt Notes    2/6/2024 Misty Barajas, NAE Psychiatry Therapy    2/19/2024  Cardiology sjm/icd/fas/6mos    2/19/2024 Nirav Baldwin MD Cardiology 6 month follow up    3/7/2024 Misty Barajas, [facility] Psychiatry Therapy    4/9/2024 Misty Barajas, [facility] Psychiatry Therapy          If no improvement in symptoms or symptoms worsen, advised to call/follow-up at clinic or go to ER. Patient voiced understanding and all questions/concerns were addressed.   DISCLAIMER: This note was compiled by using a speech recognition dictation system and therefore please be aware that typographical / speech recognition errors can and do occur.  Please contact me if you see any errors specifically.    Olvin Hutson M.D.       Office: [phone] [address] [place] [address]  FAX: [phone]

## 2024-01-25 ENCOUNTER — TELEPHONE (OUTPATIENT)
Dept: PSYCHIATRY | Facility: CLINIC | Age: 74
End: 2024-01-25
Payer: MEDICARE

## 2024-01-26 ENCOUNTER — LAB VISIT (OUTPATIENT)
Dept: LAB | Facility: HOSPITAL | Age: 74
End: 2024-01-26
Attending: FAMILY MEDICINE
Payer: MEDICARE

## 2024-01-26 ENCOUNTER — PATIENT MESSAGE (OUTPATIENT)
Dept: CARDIOLOGY | Facility: CLINIC | Age: 74
End: 2024-01-26
Payer: MEDICARE

## 2024-01-26 DIAGNOSIS — Z00.01 ENCOUNTER FOR GENERAL ADULT MEDICAL EXAMINATION WITH ABNORMAL FINDINGS: ICD-10-CM

## 2024-01-26 DIAGNOSIS — I42.9 IDIOPATHIC CARDIOMYOPATHY: ICD-10-CM

## 2024-01-26 DIAGNOSIS — E11.65 TYPE 2 DIABETES MELLITUS WITH HYPERGLYCEMIA, WITHOUT LONG-TERM CURRENT USE OF INSULIN: Chronic | ICD-10-CM

## 2024-01-26 DIAGNOSIS — E11.59 HYPERTENSION ASSOCIATED WITH DIABETES: ICD-10-CM

## 2024-01-26 DIAGNOSIS — I47.29 PAROXYSMAL VT: ICD-10-CM

## 2024-01-26 DIAGNOSIS — G47.00 INSOMNIA, UNSPECIFIED TYPE: Chronic | ICD-10-CM

## 2024-01-26 DIAGNOSIS — Z12.5 ENCOUNTER FOR PROSTATE CANCER SCREENING: ICD-10-CM

## 2024-01-26 DIAGNOSIS — I50.30 CHF WITH LEFT VENTRICULAR DIASTOLIC DYSFUNCTION, NYHA CLASS 2: ICD-10-CM

## 2024-01-26 DIAGNOSIS — I15.2 HYPERTENSION ASSOCIATED WITH DIABETES: ICD-10-CM

## 2024-01-26 DIAGNOSIS — Z79.899 ENCOUNTER FOR LONG-TERM (CURRENT) USE OF MEDICATIONS: ICD-10-CM

## 2024-01-26 LAB
ALBUMIN SERPL BCP-MCNC: 3.7 G/DL (ref 3.5–5.2)
ALP SERPL-CCNC: 44 U/L (ref 55–135)
ALT SERPL W/O P-5'-P-CCNC: 29 U/L (ref 10–44)
ANION GAP SERPL CALC-SCNC: 10 MMOL/L (ref 8–16)
AST SERPL-CCNC: 21 U/L (ref 10–40)
BILIRUB SERPL-MCNC: 0.7 MG/DL (ref 0.1–1)
BUN SERPL-MCNC: 14 MG/DL (ref 8–23)
CALCIUM SERPL-MCNC: 9.2 MG/DL (ref 8.7–10.5)
CHLORIDE SERPL-SCNC: 104 MMOL/L (ref 95–110)
CHOLEST SERPL-MCNC: 159 MG/DL (ref 120–199)
CHOLEST/HDLC SERPL: 3.7 {RATIO} (ref 2–5)
CO2 SERPL-SCNC: 22 MMOL/L (ref 23–29)
COMPLEXED PSA SERPL-MCNC: 1.9 NG/ML (ref 0–4)
CREAT SERPL-MCNC: 0.9 MG/DL (ref 0.5–1.4)
ERYTHROCYTE [DISTWIDTH] IN BLOOD BY AUTOMATED COUNT: 13.2 % (ref 11.5–14.5)
EST. GFR  (NO RACE VARIABLE): >60 ML/MIN/1.73 M^2
ESTIMATED AVG GLUCOSE: 111 MG/DL (ref 68–131)
GLUCOSE SERPL-MCNC: 95 MG/DL (ref 70–110)
HBA1C MFR BLD: 5.5 % (ref 4–5.6)
HCT VFR BLD AUTO: 41.8 % (ref 40–54)
HDLC SERPL-MCNC: 43 MG/DL (ref 40–75)
HDLC SERPL: 27 % (ref 20–50)
HGB BLD-MCNC: 13.7 G/DL (ref 14–18)
LDLC SERPL CALC-MCNC: 91.8 MG/DL (ref 63–159)
MCH RBC QN AUTO: 32.3 PG (ref 27–31)
MCHC RBC AUTO-ENTMCNC: 32.8 G/DL (ref 32–36)
MCV RBC AUTO: 99 FL (ref 82–98)
NONHDLC SERPL-MCNC: 116 MG/DL
PLATELET # BLD AUTO: 355 K/UL (ref 150–450)
PMV BLD AUTO: 8.7 FL (ref 9.2–12.9)
POTASSIUM SERPL-SCNC: 3.8 MMOL/L (ref 3.5–5.1)
PROT SERPL-MCNC: 6.9 G/DL (ref 6–8.4)
RBC # BLD AUTO: 4.24 M/UL (ref 4.6–6.2)
SODIUM SERPL-SCNC: 136 MMOL/L (ref 136–145)
TRIGL SERPL-MCNC: 121 MG/DL (ref 30–150)
TSH SERPL DL<=0.005 MIU/L-ACNC: 2.08 UIU/ML (ref 0.4–4)
WBC # BLD AUTO: 7.59 K/UL (ref 3.9–12.7)

## 2024-01-26 PROCEDURE — 83036 HEMOGLOBIN GLYCOSYLATED A1C: CPT | Performed by: FAMILY MEDICINE

## 2024-01-26 PROCEDURE — 84153 ASSAY OF PSA TOTAL: CPT | Performed by: FAMILY MEDICINE

## 2024-01-26 PROCEDURE — 36415 COLL VENOUS BLD VENIPUNCTURE: CPT | Mod: PO | Performed by: FAMILY MEDICINE

## 2024-01-26 PROCEDURE — 80053 COMPREHEN METABOLIC PANEL: CPT | Performed by: FAMILY MEDICINE

## 2024-01-26 PROCEDURE — 80061 LIPID PANEL: CPT | Performed by: FAMILY MEDICINE

## 2024-01-26 PROCEDURE — 85027 COMPLETE CBC AUTOMATED: CPT | Performed by: FAMILY MEDICINE

## 2024-01-26 PROCEDURE — 84443 ASSAY THYROID STIM HORMONE: CPT | Performed by: FAMILY MEDICINE

## 2024-01-26 RX ORDER — ALBUTEROL SULFATE 90 UG/1
1 AEROSOL, METERED RESPIRATORY (INHALATION) DAILY
Qty: 54 G | Refills: 3 | Status: SHIPPED | OUTPATIENT
Start: 2024-01-26

## 2024-01-26 NOTE — TELEPHONE ENCOUNTER
Refill Routing Note   Medication(s) are not appropriate for processing by [facility] for the following reason(s):        Due for refill >6 months ago    [facility] action(s):  Defer     Requires labs : Yes             Appointments  past 12m or future 3m with PCP    Date Provider   Last Visit   1/23/2024 Olvin Hutson MD   Next Visit   Visit date not found Olvin Hutson MD   ED visits in past 90 days: 0        Note composed:1:11 PM 01/26/2024

## 2024-01-26 NOTE — TELEPHONE ENCOUNTER
Care Due:                  Date            Visit Type   Department     Provider  --------------------------------------------------------------------------------                                ESTABLISHED                              PATIENT -    [facility] FAMILY  Last Visit: 01-      [employer]      MEDICINE       Olvin Hutson  Next Visit: None Scheduled  None         None Found                                                            Last  Test          Frequency    Reason                     Performed    Due Date  --------------------------------------------------------------------------------    HBA1C.......  6 months...  semaglutide..............  10-   04-    [facility] Embedded Care Due Messages. Reference number: [id number].   1/26/2024 9:05:11 AM CST

## 2024-01-27 NOTE — PROGRESS NOTES
Make follow-up lab appointment per recommendation below.  Check to see if patient has seen the results through my chart.  If not then,  #CALL THE PATIENT# to discuss results/see if they have questions and document verification of contact. Make F/U appt if needed. [phone]    #My interpretation that was sent to them through [employer]:  Randy, I have reviewed your recent blood work.     PSA screening for prostate cancer is within normal limits.  Repeat annually.  Your complete blood count is stable.    Your metabolic panel which shows your glucose, kidney function, electrolytes, and liver function is stable.   Thyroid study is normal.   Your cholesterol is stable.    Your hemoglobin A1c is normal.  This test is gold standard screening test for diabetes.  It is a measures 3 months of your average blood sugar.    =========================  Also please address any outstanding health maintenance that may be due: Foot Exam Never done  Eye Exam due on 10/28/2023  TETANUS VACCINE due on 11/19/2023

## 2024-01-30 ENCOUNTER — CLINICAL SUPPORT (OUTPATIENT)
Dept: CARDIOLOGY | Facility: HOSPITAL | Age: 74
End: 2024-01-30
Payer: MEDICARE

## 2024-01-30 ENCOUNTER — TELEPHONE (OUTPATIENT)
Dept: CARDIOLOGY | Facility: HOSPITAL | Age: 74
End: 2024-01-30
Payer: MEDICARE

## 2024-01-30 DIAGNOSIS — Z95.810 PRESENCE OF AUTOMATIC (IMPLANTABLE) CARDIAC DEFIBRILLATOR: ICD-10-CM

## 2024-01-31 ENCOUNTER — EXTERNAL CHRONIC CARE MANAGEMENT (OUTPATIENT)
Dept: PRIMARY CARE CLINIC | Facility: CLINIC | Age: 74
End: 2024-01-31
Payer: MEDICARE

## 2024-01-31 PROCEDURE — 99490 CHRNC CARE MGMT STAFF 1ST 20: CPT | Mod: PBBFAC,PO | Performed by: FAMILY MEDICINE

## 2024-01-31 PROCEDURE — 99490 CHRNC CARE MGMT STAFF 1ST 20: CPT | Mod: S$PBB,,, | Performed by: FAMILY MEDICINE

## 2024-02-02 ENCOUNTER — TELEPHONE (OUTPATIENT)
Dept: PSYCHIATRY | Facility: CLINIC | Age: 74
End: 2024-02-02
Payer: MEDICARE

## 2024-02-02 ENCOUNTER — TELEPHONE (OUTPATIENT)
Dept: CARDIOLOGY | Facility: CLINIC | Age: 74
End: 2024-02-02
Payer: MEDICARE

## 2024-02-02 NOTE — TELEPHONE ENCOUNTER
Initiated PA through [employer]eds for Mexiletine HCl 150MG         The patient currently has access to the requested medication and a Prior Authorization is not needed for the patient/medication.

## 2024-02-06 ENCOUNTER — OFFICE VISIT (OUTPATIENT)
Dept: PSYCHIATRY | Facility: CLINIC | Age: 74
End: 2024-02-06
Payer: MEDICARE

## 2024-02-06 DIAGNOSIS — F41.9 ANXIETY: ICD-10-CM

## 2024-02-06 DIAGNOSIS — Z63.8 FAMILY CONFLICT: ICD-10-CM

## 2024-02-06 DIAGNOSIS — F43.9 STRESS: Primary | ICD-10-CM

## 2024-02-06 PROCEDURE — 90834 PSYTX W PT 45 MINUTES: CPT | Mod: 95,,, | Performed by: SOCIAL WORKER

## 2024-02-06 SDOH — SOCIAL DETERMINANTS OF HEALTH (SDOH): OTHER SPECIFIED PROBLEMS RELATED TO PRIMARY SUPPORT GROUP: Z63.8

## 2024-02-06 NOTE — PROGRESS NOTES
Individual Psychotherapy (PhD/LCSW)    Due to the nature of this visit type, a virtual visit with synchronous audio and video, each patient to whom this provider administers behavioral health services by telemedicine is: (1) informed of the relationship between the provider and patient and the respective role of any other health care provider with respect to management of the patient; and (2) notified that he or she may decline to receive services by telemedicine and may withdraw from such care at any time. If technological issues occur, at the professional discretion of the clinical provider, synchronous audio only services may be utilized after unsuccessful attempt(s) to connect via audiovisual services; similarly, if audio only visit occurs, patient's verbal consent will be obtained prior to receipt of service. Prevailing clinical standards of care are upheld despite service methodology; having said this, if the clinical provider is unable to meet the prevailing standards of care, the patient will be rescheduled for the provider's soonest availability - as clinically appropriate.     The patient was informed of the following:     Provider's contact info:  [facility] [address], [address], [address]  [place] [address]  (Phone) [phone]    If technology issues occur, call office phone: Ph: [phone]  If crisis: Dial 911 or go to nearest Emergency Room (ER)  If questions related to privacy practices: contact [facility] Information [facility]: [phone]    For security purposes, the pt identified that they were at [address] [place] [address] during today's session and contact number is [phone].    The pt's emergency contact(s) is Extended Emergency Contact Information  Primary Emergency Contact: Tran Yap  Address: [id number] [place] [address] .           [place], [place] [address] [place] [address] of Candace  Home Phone: [phone]  Mobile  Phone: [phone]  Relation: Spouse.    Crisis Disclaimer: Patient was informed that due to the virtual nature of the visit, that if a crisis develops, protocols will be implemented to ensure patient safety, including but not limited to: 1) Initiating a welfare check with local law enforcement and/or 2) Calling 911.    2/6/2024    Site:  Telemed         Therapeutic Intervention: Met with patient.  Outpatient - Insight oriented psychotherapy 45 min - CPT code 19701    Chief complaint/reason for encounter: depression and anxiety        2/6/2024    10:46 AM 12/11/2023     2:52 PM 11/13/2023     7:07 PM   PHQ-9 Depression Patient Health Questionnaire   Patient agreed to terms: Yes Yes Yes   Little interest or pleasure in doing things 1 1 1   Feeling down, depressed, or hopeless 1 1 1   Trouble falling or staying asleep, or sleeping too much 1 2 2   Feeling tired or having little energy 1 1 1   Poor appetite or overeating 1 0 1   Feeling bad about yourself - or that you are a failure or have let yourself or your family down 0 0 0   Trouble concentrating on things, such as reading the newspaper or watching television 0 0 0   Moving or speaking so slowly that other people could have noticed. Or the opposite - being so fidgety or restless that you have been moving around a lot more than usual 0 0 0   Thoughts that you would be better off dead, or of hurting yourself in some way 0 0 0   PHQ-9 Total Score 5 5 6   If you checked off any problems, how difficult have these problems made it for you to do your work, take care of things at home, or get along with other people? Not difficult at all Not difficult at all Somewhat difficult   Interpretation Mild Mild Mild            No data to display                 Interval history and content of current session: Met with this patient for his online follow-up appointment.  Randy was in his home throughout the appointment, alert and oriented.  He stated that there had been several  developments in his life since he came to his 1st appointment.  The 1st of which was the near completion of his workshop in his backyard.  This is something he has been wanting to do for many years and the building has almost been completed but his tractor got stuck in the mud and there have been several glitches in finishing it.  He still felt hopeful that it would get done soon and he can see the end result and start to imagine himself using it.  He has lots of plans of projects he wants to do in the workshop.  He shared a story in which he stated he had learned a lesson.   He had ordered doors for the workshop from a company in [place].  He thought it was a legitimate company, stated they even had a very good website and he spoke to them several times on the phone.  They asked him to send a check for 4000 dollars and he never heard from them again.  When investigating them on the [employer] business [employer], he came to find out that other people had reported them as an illegitimate company.  He stated that he was very angry, upset and even depressed when it 1st happened.  But he and his wife discussed the situation and he decided to see it as a lesson learned and to let it go.  But he did report it to everyone he thought it should be reported to.  Discussed his health and he stated that he is becoming more aware of when he overdoes it.   He stated that he tried to get the tractor out of the mud himself and recognized that he had gone beyond his capabilities before he stopped and came in the house.  That evening he felt his pacemaker trying to reset from his fast heart rate and then the defibrillator did go off.  He stated he felt better after that as it reset his heart to a normal rhythm.  We both commented on what medical miracles there are today when it comes to Cardiology.  He stated that he had seen a quotation on Facebook that he is thinking of today.  It goes like this:  Attend to business; nurture your  life.   Discussed the meaning of this quotation and how he can apply it to his own life.  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  He stated that he would come for his next appointment.    Treatment plan:  Target symptoms: depression, anxiety   Why chosen therapy is appropriate versus another modality: patient responds to this modality  Outcome monitoring methods: self-report  Therapeutic intervention type: insight oriented psychotherapy    Risk parameters:  Patient reports no suicidal ideation  Patient reports no homicidal ideation  Patient reports no self-injurious behavior  Patient reports no violent behavior    Verbal deficits: None    Patient's response to intervention:  The patient's response to intervention is accepting.    Progress toward goals and other mental status changes:  The patient's progress toward goals is fair .    Diagnosis:     ICD-10-CM ICD-9-CM   1. Stress  F43.9 V62.89   2. Anxiety  F41.9 300.00   3. Family conflict  Z63.8 V61.9       Plan:  individual psychotherapy    Return to clinic: as scheduled    Length of Service (minutes): 45       Misty Barajas LCSW  02/06/2024   12:13 PM

## 2024-02-18 ENCOUNTER — PATIENT MESSAGE (OUTPATIENT)
Dept: CARDIOLOGY | Facility: CLINIC | Age: 74
End: 2024-02-18
Payer: MEDICARE

## 2024-02-18 DIAGNOSIS — I50.42 CHRONIC COMBINED SYSTOLIC AND DIASTOLIC CHF (CONGESTIVE HEART FAILURE): Primary | ICD-10-CM

## 2024-02-19 ENCOUNTER — HOSPITAL ENCOUNTER (OUTPATIENT)
Dept: CARDIOLOGY | Facility: HOSPITAL | Age: 74
Discharge: HOME | End: 2024-02-19
Attending: INTERNAL MEDICINE
Payer: MEDICARE

## 2024-02-19 ENCOUNTER — OFFICE VISIT (OUTPATIENT)
Dept: CARDIOLOGY | Facility: CLINIC | Age: 74
End: 2024-02-19
Payer: MEDICARE

## 2024-02-19 VITALS
WEIGHT: 229.25 LBS | BODY MASS INDEX: 32.82 KG/M2 | SYSTOLIC BLOOD PRESSURE: 116 MMHG | HEIGHT: 70 IN | OXYGEN SATURATION: 94 % | DIASTOLIC BLOOD PRESSURE: 68 MMHG | HEART RATE: 75 BPM

## 2024-02-19 DIAGNOSIS — E66.9 OBESITY, CLASS I, BMI 30-34.9: ICD-10-CM

## 2024-02-19 DIAGNOSIS — Z45.02 ICD (IMPLANTABLE CARDIOVERTER-DEFIBRILLATOR) DISCHARGE: ICD-10-CM

## 2024-02-19 DIAGNOSIS — I50.42 CHRONIC COMBINED SYSTOLIC AND DIASTOLIC CHF (CONGESTIVE HEART FAILURE): ICD-10-CM

## 2024-02-19 DIAGNOSIS — I50.30 CHF WITH LEFT VENTRICULAR DIASTOLIC DYSFUNCTION, NYHA CLASS 2: Chronic | ICD-10-CM

## 2024-02-19 DIAGNOSIS — Z91.89 AT RISK FOR AMIODARONE TOXICITY WITH LONG TERM USE: Chronic | ICD-10-CM

## 2024-02-19 DIAGNOSIS — I49.01 VF (VENTRICULAR FIBRILLATION): ICD-10-CM

## 2024-02-19 DIAGNOSIS — I50.42 CHRONIC COMBINED SYSTOLIC AND DIASTOLIC CHF (CONGESTIVE HEART FAILURE): Chronic | ICD-10-CM

## 2024-02-19 DIAGNOSIS — I47.29 PAROXYSMAL VT: Primary | Chronic | ICD-10-CM

## 2024-02-19 DIAGNOSIS — G47.33 OSA (OBSTRUCTIVE SLEEP APNEA): ICD-10-CM

## 2024-02-19 DIAGNOSIS — I48.0 PAF (PAROXYSMAL ATRIAL FIBRILLATION): Chronic | ICD-10-CM

## 2024-02-19 DIAGNOSIS — I48.0 PAROXYSMAL ATRIAL FIBRILLATION: Primary | ICD-10-CM

## 2024-02-19 DIAGNOSIS — I49.02 VENTRICULAR FLUTTER: ICD-10-CM

## 2024-02-19 DIAGNOSIS — I47.29 PAROXYSMAL VT: ICD-10-CM

## 2024-02-19 DIAGNOSIS — I50.31 ACUTE DIASTOLIC HEART FAILURE: ICD-10-CM

## 2024-02-19 DIAGNOSIS — I50.42 CHRONIC COMBINED SYSTOLIC AND DIASTOLIC HEART FAILURE: ICD-10-CM

## 2024-02-19 DIAGNOSIS — Z79.899 AT RISK FOR AMIODARONE TOXICITY WITH LONG TERM USE: Chronic | ICD-10-CM

## 2024-02-19 DIAGNOSIS — I48.3 TYPICAL ATRIAL FLUTTER: ICD-10-CM

## 2024-02-19 PROCEDURE — 93283 PRGRMG EVAL IMPLANTABLE DFB: CPT | Mod: 26,,, | Performed by: INTERNAL MEDICINE

## 2024-02-19 PROCEDURE — 99215 OFFICE O/P EST HI 40 MIN: CPT | Mod: S$PBB,,, | Performed by: INTERNAL MEDICINE

## 2024-02-19 PROCEDURE — 93283 PRGRMG EVAL IMPLANTABLE DFB: CPT

## 2024-02-19 PROCEDURE — 99999 PR PBB SHADOW E&M-EST. PATIENT-LVL IV: CPT | Mod: PBBFAC,,, | Performed by: INTERNAL MEDICINE

## 2024-02-19 PROCEDURE — 99214 OFFICE O/P EST MOD 30 MIN: CPT | Mod: PBBFAC,25 | Performed by: INTERNAL MEDICINE

## 2024-02-19 NOTE — PROGRESS NOTES
Subjective:   Patient ID:  Randy Yap is a 74 y.o. male     Chief complaint:  Recent ICD shock    HPI  73 year old male who presents to Arrhythmia clinic for 6 month follow up with device check. His current medical conditions include CHF, HFrEF of 30%, PVT on Amiodarone,HTN, HLP, Obesity, HONEY on CPAP  See granular details in my note from 10/1/21     Update 8/8/2022:  Had echo in Sept 2021:  >>  The left ventricle is moderately enlarged with concentric hypertrophy and moderately decreased systolic function.  Mild left atrial enlargement.  The estimated ejection fraction is 30%.  Grade I left ventricular diastolic dysfunction.  There is moderate left ventricular global hypokinesis.  Normal right ventricular size with normal right ventricular systolic function.  Mild mitral regurgitation.      Also, I had his echo reviewed by Dr EDWARDS and had a quantitative EF calculated - it was 38% - which was in keeping with Dr EDWARDS's qualitative estimation  I have reviewed the actual image of the ECG tracing obtained today and it shows NSR with normal intervals and occ PVC.     ICD eval today:  Device and leads implanted 10/6/2014   [employer] ICD Fortify Diego KHALIL 2357-40Q, SN: [id number]   A lead: Bennett Tendril STS 2088TC / 46cm, SN: [id number]   V lead: Bennett Durata 7120Q / 58 cm, SN: [id number]     All in good repair     Last BNP was 55 (10/1/21)     Clinically, he feels as well as he has ever had.  His CHF medications at all at maximized doses.  He has rare and mild lightheadedness.  Due to the extreme eat, has backed off on some of his outdoor exercise but he keeps quite active.  He has some family stress now due to the recent passing of his mother in law and the Alzheimer's of  his sister-in-law     Update 11/28/2022:  He has had issues with recurrent VF and shocks.  The most recent episode occurred on 10/22/2022.  Since then we realized that he was not taking enough amiodarone as the blood level was less than 0.2.  After  reviewing drug dosing with him it appeared that he was using the 100 mg tablets in do the 200.  Accordingly, and for a week now, we have increased his amiodarone which to 300 p.o. b.i.d. and will keep this for a total of 6 weeks.    Prior to increasing the amiodarone, he was having repetitive episodes of severe dizziness/near-syncope.  These were associated with palpitations and runs of monomorphic ventricular tachycardia that eventually would either be ATP terminated or terminate spontaneously as they were deliberately left untreated in the monitoring zone.  He now has had no such clinical events for a week or so.    Variation of his ICD diagnostics, suggest that with the addition of amiodarone, he may have now VT that are on detected because they are below the monitoring zone of 160.  Otherwise, now that he does not have the near syncopal events, he feels well.  His wife is wondering whether Ozempic has something to do with his VT VF as was started approximately a month prior to the VF episode.    >>  Change TDI to 123 for the monitoring zone, 160 for the VT 1 zone and 222 for the VF zone.  Otherwise, keep therapies unchanged.  Continue on 600 mg a day of amiodarone until December 31st as of January 1st decrease to 300 mg a day.  Get amiodarone level in 3 months.     Update 03/05/2023 :  Since the last visit, we have had multiple communications via the portal.  Is currently on amiodarone 300 mg per day.  He has not had new ICD shocks.    He has been losing weight (Ozempic).  He is down by 24 lb and currently weighs 248 lb.  This has all happened over the past 2 months.    He was started on dapagliflozin.  Initially he did not want to take it due to cost issues.  This has been resolved.   He feels reasonably well.  He can walk for 2 to 300 ft without issues.  He can now go up the stairs at home without stopping.  These are 16 steps.  He has had a lightheaded spell yesterday.  This was very fast.  ICD evaluation  shows a 2nd VT at the time of the complaint.  ICD evaluation was performed today.  All is in good repair.  The battery life is expected to be about 1.5 years.        Update 08/14/2023 :  Aside from a lot of illnesses in death in the family, he has been doing quite well.  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  He wants to go back and work is in which up.  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  He has no significant cardiovascular symptoms at this time.  He has had no ICD shocks.  ICD evaluation has shown a few nonsustained ventricular tachycardias that were not intervened upon and seemingly asymptomatic to the patient.  Battery longevity appears to be about 10 months to RRT.  Has no side effects from his medications.  He was started on Ozempic.  He is on 1 mg per week dose.  He has not lost much weight.  Has no side effects from semaglutide.       Update 8/25/23 :  After this last visit he went to the emergency room with syncope and ventricular tachycardia.  I saw him in the emergency room and had the following note:  After few years of relatively well since, he started having again ventricular tachycardias during a period of personal stress.  This was related to a VT that was not responsive to ATP and resulted in syncope and a rescue by an ICD shock.    He had another event that had been ATP lead.    He was supposed to see me on 08/18/2023 to deal with the stress.  However, on 08/17/2023, at 9:24 a.m. in the morning, while he was out by his truck, he suddenly passed out.  ICD evaluation revealed another VT episode that was terminated by ATP but only after syncope.  This was initially somewhat polymorphic but then organized somewhat and was responsive to ATP.  Concomitantly, his thoracic impedance is suggestive of some fluid overload.  Patient does not feel more short of breath than usual.  However on physical exam, his breath sounds are somewhat tight.  The for from the  syncopal spell resulted in various bumps and bruises in his back is now hurting him quite a bit with back spasms whenever he moves.     Assessment:  Recurrent VT.  The frequency qualifies as a VT storm.  Potential triggers include a recent increase in his trazodone dose as well as fluid retention.  Of note, his Mexitil level has been quite low at 0.3 despite 300 mg p.o. Q 8 hours.  This dose has not been increased because his VT had been under relative control for a long period of time.     Recommendation:  Admit for a period of observation.  At least 24 hours.  IV Lasix-40 mg, now  Increase Mexitil to 450  Q 8 .  Avoid any QT prolonging drugs including muscle spasm relievers.  Hot packs to bruised areas.  Measure serum phosphate.     The plan was implemented and he was diuresed.  He was discharged after medication adjustment and rhythm quiescence.     Today, he feels okay and is in somewhat better spirits.     >>  Now stable after a period of VT storm.   >>  Continue current treatment with the large dose mexiletine and carvedilol as well as Aldactone and amiodarone.  Continue CHF medications.      Update 02/19/2024 :  Has been doing great.  He has no cardiovascular complaints, no dyspnea on exertion.  He says he is unlimited.  As a result he significantly over exerted 3 weeks ago and ended up having ventricular tachycardia that had to be treated initially with ATP and then with a shock.  Currently, his only complaint is constipation.  This is likely related to amiodarone.  Patient's device (VVED)was fully evaluated today under my direct supervision and real-time feedback.  Summary of findings are as listed below:  Device is in good repair.   The battery is at MARTINEZ.MARTINEZ triggered 2/13/24.   The sensing and pacing thresholds are favorable with well maintained safety margins.   There was a treated VT episode on 1/29/24. @ 7:28pm. . -330ms, ATP x3, third one accelerates rhythm, CL now 290-300ms, 25J shock converts  to ST 120s.   Also, @ 3 hrs earlier, 2 SVT on 1/29/24- ST 130s, total duration 6m 47s onset 4:23pm.  There were  device data indicating possible ongoing fluid retention .Jan 19-28 now back to normal.    :   Recommendation:  Device follow up as per clinic routine with remote and in house checks    Current Outpatient Medications   Medication Sig    acetaminophen (TYLENOL) 325 MG tablet Take 650 mg by mouth as needed.     albuterol (PROVENTIL/VENTOLIN HFA) 90 mcg/actuation inhaler Inhale 1 puff into the lungs once daily. rescue    amiodarone (PACERONE) 200 MG Tab TAKE 1 TABLET (200MG) BY MOUTH 2  TIMES DAILY FOR 6 WEEKS THEN TAKE 1 AND 1/2 TABLETS A DAY (300 MG)    bepotastine besilate (BEPREVE) 1.5 % Drop Bepreve 1.5 % eye drops   Apply by ophthalmic route as needed for 50 days.    carvediloL (COREG) 25 MG tablet Take 1 tablet (25 mg total) by mouth 2 (two) times daily.    co-enzyme Q-10 30 mg capsule Take 30 mg by mouth once daily.     dapagliflozin (FARXIGA) 10 mg tablet TAKE 1 TABLET BY MOUTH ONCE DAILY    ferrous sulfate (FEOSOL) 325 mg (65 mg iron) Tab tablet Take 325 mg by mouth daily with breakfast.    fluticasone furoate (ARNUITY ELLIPTA) 100 mcg/actuation inhaler Inhale 1 puff into the lungs once daily.    furosemide (LASIX) 40 MG tablet Take 1 tablet (40 mg total) by mouth once daily.    glucosamine-chondroitin 500-400 mg tablet Take 1 tablet by mouth once daily.     meclizine (ANTIVERT) 12.5 mg tablet Take 1 tablet (12.5 mg total) by mouth 3 (three) times daily as needed for Dizziness.    mexiletine (MEXITIL) 150 MG Cap Take 3 capsules (450 mg total) by mouth every 8 (eight) hours.    multivitamin (THERAGRAN) tablet Take 1 tablet by mouth once daily.    ondansetron (ZOFRAN) 4 MG tablet Take 4 mg by mouth every 8 (eight) hours as needed for Nausea.    pravastatin (PRAVACHOL) 80 MG tablet Take 1 tablet (80 mg total) by mouth once daily.    RESTASIS 0.05 % ophthalmic emulsion Place 1 drop into both eyes 2  (two) times daily.    sacubitriL-valsartan (ENTRESTO)  mg per tablet Take 1 tablet by mouth 2 (two) times daily.    semaglutide (OZEMPIC) 0.25 mg or 0.5 mg (2 mg/3 mL) pen injector Inject 0.5 mg into the skin every 7 days.    spironolactone (ALDACTONE) 25 MG tablet Take 1 tablet (25 mg total) by mouth once daily.    ALPRAZolam (XANAX) 0.25 MG tablet Take 1 tablet (0.25 mg total) by mouth 2 (two) times daily as needed for Anxiety.     No current facility-administered medications for this visit.       Review of Systems     Constitutional: Reviewed  for decreased appetite, weight gain and weight loss.   HENT: Reviewed for nosebleeds.    Eyes:  Reviewed for blurred vision and visual disturbance.   Cardiovascular: Reviewed for chest pain, claudication, cyanosis,dyspnea on exertion, leg swelling, orthopnea,paroxysmal nocturnal dyspnearregular heartbeats, palpitations, near-syncope, and syncope.   Respiratory: Reviewed for cough, shortness of breath, wheezing, sleep disturbances due to breathing and snoring, .    Endocrine: Reviewed for heat intolerance.   Hematologic/Lymphatic: Reviewed for easy bruisability/bleeding.   Skin: Reviewed for rash.   Musculoskeletal: Reviewed for muscle weakness and myalgias.   Gastrointestinal: Reviewed for abdominal pain, anorexia, melena, nausea and vomiting.   Genitourinary: Reviewed for hesitancy, frequency, nocturia and incontinence.   Neurological: Reviewed for excessive daytime sleepiness, dizziness, vertigo, weakness, headaches, loss of balance and seizures,   Psychiatric/Behavioral:  Reviewed for insomnia, altered mental status, depression, anxiety and nervousness.       All symptoms reviewed above were negative except for constant       Social History     Tobacco Use   Smoking Status Never   Smokeless Tobacco Never        Objective:     Physical Exam  Vitals and nursing note reviewed.   Constitutional:       Appearance: Normal appearance. He is well-developed.      Comments:  overweight   HENT:      Head: Normocephalic and atraumatic.      Right Ear: External ear normal.      Left Ear: External ear normal.   Eyes:      Conjunctiva/sclera: Conjunctivae normal.      Left eye: Left conjunctiva is not injected. No hemorrhage.     Pupils: Pupils are equal, round, and reactive to light.   Neck:      Thyroid: No thyromegaly.      Vascular: No JVD.   Cardiovascular:      Rate and Rhythm: Normal rate and regular rhythm.      Chest Wall: PMI is not displaced.      Pulses: Intact distal pulses.           Carotid pulses are 2+ on the right side and 2+ on the left side.       Radial pulses are 2+ on the right side and 2+ on the left side.        Dorsalis pedis pulses are 2+ on the right side and 2+ on the left side.        Posterior tibial pulses are 2+ on the right side and 2+ on the left side.      Heart sounds: Normal heart sounds. No midsystolic click and no opening snap. No murmur heard.     No friction rub. No gallop.   Pulmonary:      Effort: Pulmonary effort is normal. No respiratory distress.      Breath sounds: Normal breath sounds. No wheezing or rales.   Chest:      Chest wall: No tenderness.      Comments: Device pocket is in excellent repair.  Abdominal:      Palpations: Abdomen is soft. Abdomen is not rigid. There is no hepatomegaly.      Tenderness: There is no abdominal tenderness. There is no guarding.      Comments: Obese abdomen   Musculoskeletal:         General: No tenderness. Normal range of motion.      Cervical back: Neck supple.      Right knee: No swelling.      Left knee: No swelling.      Right lower leg: No swelling.      Left lower leg: No swelling.      Right ankle: No swelling.      Left ankle: No swelling.      Right foot: No swelling.      Left foot: No swelling.   Skin:     General: Skin is warm and dry.      Coloration: Skin is not pale.      Findings: No rash.   Neurological:      General: No focal deficit present.      Mental Status: He is alert and oriented to  "person, place, and time.      Cranial Nerves: No cranial nerve deficit.      Coordination: Coordination normal.      Deep Tendon Reflexes: Reflexes are normal and symmetric.   Psychiatric:         Mood and Affect: Mood normal.         Behavior: Behavior normal.     /68   Pulse 75   Ht 5' 10" (1.778 m)   Wt 104 kg (229 lb 4.5 oz)   SpO2 (!) 94%   BMI 32.90 kg/m²       Results for orders placed during the hospital encounter of 04/17/23    Echo    Interpretation Summary  · The left ventricle is moderately enlarged with eccentric hypertrophy and low normal systolic function.  · The estimated ejection fraction is 50-55%  · Normal left ventricular diastolic function.  · Normal right ventricular size.  · The aortic valve appears structurally normal. There is normal leaflet mobility.  · Mild mitral regurgitation.  · Mild tricuspid regurgitation.  · Normal central venous pressure (3 mmHg).  · The estimated PA systolic pressure is 18 mmHg.  · Considerable improvement in systolic function compared to study of 1-    WBC   Date Value Ref Range Status   01/26/2024 7.59 3.90 - 12.70 K/uL Final     Hematocrit   Date Value Ref Range Status   01/26/2024 41.8 40.0 - 54.0 % Final     Hemoglobin   Date Value Ref Range Status   01/26/2024 13.7 (L) 14.0 - 18.0 g/dL Final     Lab Results   Component Value Date     01/26/2024     Lab Results   Component Value Date    CREATININE 0.9 01/26/2024    EGFRNORACEVR >60.0 01/26/2024    K 3.8 01/26/2024     Lab Results   Component Value Date    BNP 48 08/25/2023            reports current alcohol use.  Past Medical History:   Diagnosis Date    Asthma     Cardiomyopathy due to systemic disease     Colon polyp 5/2013    repeat 5/2018    Depression, recurrent     Diastolic dysfunction     DJD (degenerative joint disease) of knee 10/14/2013    Hyperlipidemia     Hypertension     Murmur     Paroxysmal VT 5/21/2016    Pericarditis with effusion     Sleep apnea      Past Surgical " History:   Procedure Laterality Date    CARDIAC DEFIBRILLATOR PLACEMENT  10/06/2014    COLONOSCOPY N/A 4/19/2023    Procedure: COLONOSCOPY 4/4-cc/bt clearance recedived;  Surgeon: Isabella Del Cid MD;  Location: [facility] ENDO;  Service: Endoscopy;  Laterality: N/A;    COLONOSCOPY W/ POLYPECTOMY  05/21/2013    repeat 5/21/2018    COSMETIC SURGERY      EYE SURGERY  Cataract    HERNIA REPAIR      R inguinal    LEFT HEART CATHETERIZATION Left 07/09/2018    Procedure: CATHETERIZATION, HEART, LEFT;  Surgeon: Macey Dos Santos MD;  Location: [facility] CATH LAB;  Service: Cardiology;  Laterality: Left;  left radial approach  Has [employer] ICD    pace maker   10/06/2014    PERICARDIAL WINDOW  12-15 years ago    tonsillectomy      TONSILLECTOMY      VARICOCELECTOMY       Family History   Problem Relation Age of Onset    Hyperlipidemia Mother     Arrhythmia Mother     Arthritis Mother     Asthma Mother     COPD Mother     Heart disease Father 48    Heart attack Father 48    Hypertension Father     Arthritis Father     Diabetes Father     Hypertension Maternal Grandmother     Hypertension Maternal Grandfather     Heart disease Paternal Grandmother     Hypertension Paternal Grandmother     Heart attack Paternal Grandmother     Stroke Paternal Grandmother     Heart disease Paternal Grandfather     Hypertension Paternal Grandfather     Heart attack Paternal Grandfather     Cancer Paternal Grandfather        Assessment:    SVT and VT related to over exertion.  ICD at MARTINEZ.  Recently triggered.  1. Paroxysmal VT    2. CHF with left ventricular diastolic dysfunction, NYHA class 2    3. ICD (implantable cardioverter-defibrillator) discharge    4. PAF (paroxysmal atrial fibrillation)    5. Obesity, Class I, BMI 30-34.9    6. At risk for amiodarone toxicity with long term use    7. HONEY (obstructive sleep apnea)    8. Chronic combined systolic and diastolic CHF (congestive heart failure)        Plan:    For now will not change antiarrhythmic  therapy.    He will need elective ICD replacement.  I have discussed the ICD replacement procedure in detail with the patient. I described its benefits and risks. I reviewed alternative therapies and discussed their potential value. The patient was given ample opportunity to express concerns and ask questions and I provided appropriate responses and  answers to such.The patient understands and agrees to proceed.  Consent form was signed  by patient and myself and appropriately witnessed.     No orders of the defined types were placed in this encounter.      Follow up in about 3 months (around 5/19/2024), or if symptoms worsen or fail to improve, for post op.    There are no discontinued medications.         Medication List with Changes/Refills   Current Medications    ACETAMINOPHEN (TYLENOL) 325 MG TABLET    Take 650 mg by mouth as needed.     ALBUTEROL (PROVENTIL/VENTOLIN HFA) 90 MCG/ACTUATION INHALER    Inhale 1 puff into the lungs once daily. rescue    ALPRAZOLAM (XANAX) 0.25 MG TABLET    Take 1 tablet (0.25 mg total) by mouth 2 (two) times daily as needed for Anxiety.    AMIODARONE (PACERONE) 200 MG TAB    TAKE 1 TABLET (200MG) BY MOUTH 2  TIMES DAILY FOR 6 WEEKS THEN TAKE 1 AND 1/2 TABLETS A DAY (300 MG)    BEPOTASTINE BESILATE (BEPREVE) 1.5 % DROP    Bepreve 1.5 % eye drops   Apply by ophthalmic route as needed for 50 days.    CARVEDILOL (COREG) 25 MG TABLET    Take 1 tablet (25 mg total) by mouth 2 (two) times daily.    CO-ENZYME Q-10 30 MG CAPSULE    Take 30 mg by mouth once daily.     DAPAGLIFLOZIN (FARXIGA) 10 MG TABLET    TAKE 1 TABLET BY MOUTH ONCE DAILY    FERROUS SULFATE (FEOSOL) 325 MG (65 MG IRON) TAB TABLET    Take 325 mg by mouth daily with breakfast.    FLUTICASONE FUROATE (ARNUITY ELLIPTA) 100 MCG/ACTUATION INHALER    Inhale 1 puff into the lungs once daily.    FUROSEMIDE (LASIX) 40 MG TABLET    Take 1 tablet (40 mg total) by mouth once daily.    GLUCOSAMINE-CHONDROITIN 500-400 MG TABLET    Take  1 tablet by mouth once daily.     MECLIZINE (ANTIVERT) 12.5 MG TABLET    Take 1 tablet (12.5 mg total) by mouth 3 (three) times daily as needed for Dizziness.    MEXILETINE (MEXITIL) 150 MG CAP    Take 3 capsules (450 mg total) by mouth every 8 (eight) hours.    MULTIVITAMIN (THERAGRAN) TABLET    Take 1 tablet by mouth once daily.    ONDANSETRON (ZOFRAN) 4 MG TABLET    Take 4 mg by mouth every 8 (eight) hours as needed for Nausea.    PRAVASTATIN (PRAVACHOL) 80 MG TABLET    Take 1 tablet (80 mg total) by mouth once daily.    RESTASIS 0.05 % OPHTHALMIC EMULSION    Place 1 drop into both eyes 2 (two) times daily.    SACUBITRIL-VALSARTAN (ENTRESTO)  MG PER TABLET    Take 1 tablet by mouth 2 (two) times daily.    SEMAGLUTIDE (OZEMPIC) 0.25 MG OR 0.5 MG (2 MG/3 ML) PEN INJECTOR    Inject 0.5 mg into the skin every 7 days.    SPIRONOLACTONE (ALDACTONE) 25 MG TABLET    Take 1 tablet (25 mg total) by mouth once daily.       This note is at least partially dictated using the M*Modal Fluency Direct word recognition program. There are word recognition mistakes that are occasionally missed on review.

## 2024-02-22 ENCOUNTER — PATIENT MESSAGE (OUTPATIENT)
Dept: CARDIOLOGY | Facility: CLINIC | Age: 74
End: 2024-02-22
Payer: MEDICARE

## 2024-02-27 LAB
OHS CV DC REMOTE DEVICE TYPE: NORMAL
OHS CV RV PACING PERCENT: 0 %

## 2024-02-29 ENCOUNTER — EXTERNAL CHRONIC CARE MANAGEMENT (OUTPATIENT)
Dept: PRIMARY CARE CLINIC | Facility: CLINIC | Age: 74
End: 2024-02-29
Payer: MEDICARE

## 2024-02-29 PROCEDURE — 99490 CHRNC CARE MGMT STAFF 1ST 20: CPT | Mod: S$PBB,,, | Performed by: FAMILY MEDICINE

## 2024-02-29 PROCEDURE — 99490 CHRNC CARE MGMT STAFF 1ST 20: CPT | Mod: PBBFAC,PO | Performed by: FAMILY MEDICINE

## 2024-03-01 ENCOUNTER — PATIENT MESSAGE (OUTPATIENT)
Dept: CARDIOLOGY | Facility: CLINIC | Age: 74
End: 2024-03-01
Payer: MEDICARE

## 2024-03-04 RX ORDER — MEXILETINE HYDROCHLORIDE 150 MG/1
450 CAPSULE ORAL EVERY 8 HOURS
Qty: 270 CAPSULE | Refills: 0 | Status: SHIPPED | OUTPATIENT
Start: 2024-03-04 | End: 2024-04-03

## 2024-03-04 RX ORDER — MEXILETINE HYDROCHLORIDE 150 MG/1
CAPSULE ORAL
Qty: 270 CAPSULE | Refills: 0 | Status: SHIPPED | OUTPATIENT
Start: 2024-03-04 | End: 2024-04-02

## 2024-03-07 ENCOUNTER — OFFICE VISIT (OUTPATIENT)
Dept: PSYCHIATRY | Facility: CLINIC | Age: 74
End: 2024-03-07
Payer: MEDICARE

## 2024-03-07 DIAGNOSIS — F32.0 CURRENT MILD EPISODE OF MAJOR DEPRESSIVE DISORDER, UNSPECIFIED WHETHER RECURRENT: Primary | ICD-10-CM

## 2024-03-07 DIAGNOSIS — Z63.8 FAMILY CONFLICT: ICD-10-CM

## 2024-03-07 DIAGNOSIS — F41.9 ANXIETY: ICD-10-CM

## 2024-03-07 PROCEDURE — 90834 PSYTX W PT 45 MINUTES: CPT | Mod: ,,, | Performed by: SOCIAL WORKER

## 2024-03-07 SDOH — SOCIAL DETERMINANTS OF HEALTH (SDOH): OTHER SPECIFIED PROBLEMS RELATED TO PRIMARY SUPPORT GROUP: Z63.8

## 2024-03-07 NOTE — PROGRESS NOTES
Individual Psychotherapy Follow-up Visit Progress Note (PhD/LCSW)     Outpatient Psychotherapy - 45 minutes with patient (38-52 minutes) - [id number]    Date: 3/7/2024    Visit Type: In Person        3/7/2024  MRN: [id number]  Primary Care Provider: Olvin Hutson MD    Randy Yap is a 74 y.o. male who presents today for follow-up of depression, anxiety, adjustment problems, and relational problem. Met with patient.      Preferred Name: Randy   Transgender Identity Form    Gender Identity Form  Transition Summary         Subjective:     Last encounter (with this provider): 2/6/2024     Content of Current Session: Met with this patient for his follow up appointment in the Behavioral health clinic.  He stated that things had been going well for him but he was concerned about his wife Shayna and thinks that she may be having some cognitive difficulties of her own.  He stated that she has lost interest in many of the things that she used to do. And he also stated that she is angry most of the time and sleeps on the sofa.  Encouraged him to make her an appointment with her primary care physician to discuss this issue.  He has 2 children who were both adopted at birth because he and his wife were never able to have children of their own.  He discussed his relationship with his adopted children.  Randy has a deep appreciation for life and enjoys every day.  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  He continues to complain about his brother-in-law who he says is very unclean and does not take care of his daily living needs.  Most of the time the brother-in-law has health problems and needs caretaking but his wife resists having him go live in an assisted living. She feels compelled to care for him, even though it is difficult for her and she complains about  it.  Randy is hoping that he can gain better communication skills in order to have conversations that are difficult without having arguments.  The patient practiced some communication exercises and gained knowledge and insight from those he stated he would make a follow-up appointment.    Therapeutic Interventions Utilized During Current Session: Acceptance and Commitment Therapy, Cognitive Behavioral Therapy         No data to display               0-4 = Minimal anxiety  5-9 = Mild anxiety  10-14 = Moderate anxiety  15-21 = Severe anxiety         3/4/2024     2:27 PM 2/6/2024    10:46 AM 12/11/2023     2:52 PM 11/13/2023     7:07 PM   PHQ-9 Depression Patient Health Questionnaire   Patient agreed to terms: Yes Yes Yes Yes   Little interest or pleasure in doing things 1 1 1 1   Feeling down, depressed, or hopeless 1 1 1 1   Trouble falling or staying asleep, or sleeping too much 3 1 2 2   Feeling tired or having little energy 0 1 1 1   Poor appetite or overeating 1 1 0 1   Feeling bad about yourself - or that you are a failure or have let yourself or your family down 0 0 0 0   Trouble concentrating on things, such as reading the newspaper or watching television 0 0 0 0   Moving or speaking so slowly that other people could have noticed. Or the opposite - being so fidgety or restless that you have been moving around a lot more than usual 0 0 0 0   Thoughts that you would be better off dead, or of hurting yourself in some way 0 0 0 0   PHQ-9 Total Score 6 5 5 6   If you checked off any problems, how difficult have these problems made it for you to do your work, take care of things at home, or get along with other people? Not difficult at all Not difficult at all Not difficult at all Somewhat difficult   Interpretation Mild Mild Mild Mild     0-4 = No intervention  5 to 9 = Mild  10 to 14 = Moderate  15 to 19 = Moderately severe  ?20 = Severe      Objective:       Mental Status Evaluation  Appearance: unremarkable,  age appropriate  Behavior: normal, cooperative  Speech: normal tone, normal rate, normal pitch, normal volume  Mood: steady, anxious, depressed, irritable  Affect: congruent and appropriate  Thought Process: normal and logical  Thought Content: normal, no suicidality, no homicidality, delusions, or paranoia  Sensorium: grossly intact  Cognition: grossly intact  Insight: fair  Judgment: adequate to circumstances    Risk parameters:  Patient reports no suicidal ideation  Patient reports no homicidal ideation  Patient reports no self-injurious behavior  Patient reports no violent behavior      Assessment & Plan:     The patient's response to the interventions is accepting    The patient's progress toward treatment goals is fair     Homework assigned: practice relaxation skills daily, implement sleep hygiene routine, and create a coping card     Treatment plan:   A. Target symptoms: Depression and Anxiety   B. Therapeutic modalities: insight oriented psychotherapy  C. Why chosen therapy is appropriate versus another modality: patient responds to this modality   D. Outcome monitoring methods: self report, observation, rating scales, feedback from clinical staff      Visit Diagnosis:   1. Current mild episode of major depressive disorder, unspecified whether recurrent    2. Family conflict    3. Anxiety        Follow-up: individual psychotherapy    Return to Clinic: as scheduled  Pt Reported to Schedule Self via Epic EMR MyChart Application and/or Department Support Staff      Misty Barajas LCSW  3/7/2024  12:22 PM

## 2024-03-10 ENCOUNTER — CLINICAL SUPPORT (OUTPATIENT)
Dept: CARDIOLOGY | Facility: HOSPITAL | Age: 74
End: 2024-03-10

## 2024-03-10 DIAGNOSIS — I50.42 CHRONIC COMBINED SYSTOLIC (CONGESTIVE) AND DIASTOLIC (CONGESTIVE) HEART FAILURE: ICD-10-CM

## 2024-03-10 DIAGNOSIS — Z95.810 PRESENCE OF AUTOMATIC (IMPLANTABLE) CARDIAC DEFIBRILLATOR: ICD-10-CM

## 2024-03-19 ENCOUNTER — TELEPHONE (OUTPATIENT)
Dept: PSYCHIATRY | Facility: CLINIC | Age: 74
End: 2024-03-19
Payer: MEDICARE

## 2024-03-22 DIAGNOSIS — Z00.01 ENCOUNTER FOR GENERAL ADULT MEDICAL EXAMINATION WITH ABNORMAL FINDINGS: ICD-10-CM

## 2024-03-25 RX ORDER — AMIODARONE HYDROCHLORIDE 200 MG/1
TABLET ORAL
Qty: 180 TABLET | Refills: 0 | Status: SHIPPED | OUTPATIENT
Start: 2024-03-25

## 2024-03-26 RX ORDER — FLUTICASONE FUROATE 100 UG/1
1 POWDER RESPIRATORY (INHALATION) DAILY
Qty: 30 EACH | Refills: 0 | Status: SHIPPED | OUTPATIENT
Start: 2024-03-26 | End: 2024-04-25

## 2024-03-27 DIAGNOSIS — Z00.01 ENCOUNTER FOR GENERAL ADULT MEDICAL EXAMINATION WITH ABNORMAL FINDINGS: ICD-10-CM

## 2024-03-31 ENCOUNTER — EXTERNAL CHRONIC CARE MANAGEMENT (OUTPATIENT)
Dept: PRIMARY CARE CLINIC | Facility: CLINIC | Age: 74
End: 2024-03-31
Payer: MEDICARE

## 2024-03-31 PROCEDURE — 99490 CHRNC CARE MGMT STAFF 1ST 20: CPT | Mod: PBBFAC,PO | Performed by: FAMILY MEDICINE

## 2024-03-31 PROCEDURE — 99490 CHRNC CARE MGMT STAFF 1ST 20: CPT | Mod: S$PBB,,, | Performed by: FAMILY MEDICINE

## 2024-04-01 RX ORDER — FLUTICASONE FUROATE 100 UG/1
1 POWDER RESPIRATORY (INHALATION) DAILY
Qty: 30 EACH | Refills: 0 | OUTPATIENT
Start: 2024-04-01

## 2024-04-01 RX ORDER — DAPAGLIFLOZIN 10 MG/1
TABLET, FILM COATED ORAL
Qty: 90 TABLET | Refills: 0 | Status: SHIPPED | OUTPATIENT
Start: 2024-04-01

## 2024-04-02 RX ORDER — MEXILETINE HYDROCHLORIDE 150 MG/1
CAPSULE ORAL
Qty: 270 CAPSULE | Refills: 0 | Status: SHIPPED | OUTPATIENT
Start: 2024-04-02 | End: 2024-05-01

## 2024-04-03 DIAGNOSIS — Z00.01 ENCOUNTER FOR GENERAL ADULT MEDICAL EXAMINATION WITH ABNORMAL FINDINGS: ICD-10-CM

## 2024-04-04 RX ORDER — CARVEDILOL 25 MG/1
25 TABLET ORAL 2 TIMES DAILY
Qty: 180 TABLET | Refills: 3 | Status: SHIPPED | OUTPATIENT
Start: 2024-04-04

## 2024-04-04 NOTE — TELEPHONE ENCOUNTER
Refill Decision Note   Randy Yap  is requesting a refill authorization.  Brief Assessment and Rationale for Refill:  Approve     Medication Therapy Plan:  PCP previously prescribed rx.      Pharmacist review requested: Yes   Extended chart review required: Yes   Comments:     Note composed:10:45 AM 04/04/2024

## 2024-04-04 NOTE — TELEPHONE ENCOUNTER
Refill Routing Note   Medication(s) are not appropriate for processing by [facility] for the following reason(s):        Drug-disease interaction    [facility] action(s):  Defer        Medication Therapy Plan: carvediloL and Mild persistent asthma with acute exacerbation    Pharmacist review requested: Yes     Appointments  past 12m or future 3m with PCP    Date Provider   Last Visit   1/23/2024 Olvin Hutson MD   Next Visit   Visit date not found Olvin Hutson MD   ED visits in past 90 days: 0        Note composed:9:38 AM 04/04/2024

## 2024-04-04 NOTE — TELEPHONE ENCOUNTER
No care due was identified.  [facility] Embedded Care Due Messages. Reference number: [id number].   4/04/2024 9:46:19 AM CDT

## 2024-04-16 ENCOUNTER — TELEPHONE (OUTPATIENT)
Dept: PSYCHIATRY | Facility: CLINIC | Age: 74
End: 2024-04-16
Payer: MEDICARE

## 2024-04-17 ENCOUNTER — TELEPHONE (OUTPATIENT)
Dept: PSYCHIATRY | Facility: CLINIC | Age: 74
End: 2024-04-17
Payer: MEDICARE

## 2024-04-19 ENCOUNTER — OFFICE VISIT (OUTPATIENT)
Dept: PSYCHIATRY | Facility: CLINIC | Age: 74
End: 2024-04-19
Payer: MEDICARE

## 2024-04-19 DIAGNOSIS — F32.0 CURRENT MILD EPISODE OF MAJOR DEPRESSIVE DISORDER, UNSPECIFIED WHETHER RECURRENT: Primary | ICD-10-CM

## 2024-04-19 DIAGNOSIS — Z63.8 FAMILY CONFLICT: ICD-10-CM

## 2024-04-19 DIAGNOSIS — F41.9 ANXIETY: ICD-10-CM

## 2024-04-19 PROCEDURE — 90834 PSYTX W PT 45 MINUTES: CPT | Mod: 95,,, | Performed by: SOCIAL WORKER

## 2024-04-19 SDOH — SOCIAL DETERMINANTS OF HEALTH (SDOH): OTHER SPECIFIED PROBLEMS RELATED TO PRIMARY SUPPORT GROUP: Z63.8

## 2024-04-24 DIAGNOSIS — Z00.01 ENCOUNTER FOR GENERAL ADULT MEDICAL EXAMINATION WITH ABNORMAL FINDINGS: ICD-10-CM

## 2024-04-25 RX ORDER — FLUTICASONE FUROATE 100 UG/1
1 POWDER RESPIRATORY (INHALATION) DAILY
Qty: 30 EACH | Refills: 0 | Status: SHIPPED | OUTPATIENT
Start: 2024-04-25 | End: 2024-05-20

## 2024-04-26 ENCOUNTER — PATIENT MESSAGE (OUTPATIENT)
Dept: CARDIOLOGY | Facility: CLINIC | Age: 74
End: 2024-04-26
Payer: MEDICARE

## 2024-04-26 ENCOUNTER — TELEPHONE (OUTPATIENT)
Dept: CARDIOLOGY | Facility: CLINIC | Age: 74
End: 2024-04-26
Payer: MEDICARE

## 2024-04-26 NOTE — TELEPHONE ENCOUNTER
Called the pt left a message to make sure all his labs and consents or in order for the procedure he has coming up on  pb---- Message from Lulu Sloan RN sent at 2/19/2024  5:58 PM CST -----  Regarding: FW: Contact Pt for Procedure Date/Time    ----- Message -----  From: Uma Ya LPN  Sent: 2/19/2024   5:42 PM CST  To: Nicolasa MACK Staff  Subject: Contact Pt for Procedure Date/Time               Contact pt for Procedure Date and Time     PT was made aware of instructions, given Hibiclens and Signed consent in Office.      Defib Gen Change Dr. Baldwin   May 7th for 930am      Thanks  Uma

## 2024-04-26 NOTE — TELEPHONE ENCOUNTER
Rtn call left a message pb----- Message from Faye Israel sent at 4/26/2024  2:31 PM CDT -----  Contact: Patient  Patient is returning a missed call regarding an upcoming procedure. Please call patient at [phone]

## 2024-04-29 ENCOUNTER — LAB VISIT (OUTPATIENT)
Dept: LAB | Facility: HOSPITAL | Age: 74
End: 2024-04-29
Attending: INTERNAL MEDICINE
Payer: MEDICARE

## 2024-04-29 ENCOUNTER — TELEPHONE (OUTPATIENT)
Dept: CARDIOLOGY | Facility: CLINIC | Age: 74
End: 2024-04-29
Payer: MEDICARE

## 2024-04-29 DIAGNOSIS — I49.02 VENTRICULAR FLUTTER: ICD-10-CM

## 2024-04-29 DIAGNOSIS — I48.3 TYPICAL ATRIAL FLUTTER: ICD-10-CM

## 2024-04-29 DIAGNOSIS — I50.42 CHRONIC COMBINED SYSTOLIC AND DIASTOLIC HEART FAILURE: ICD-10-CM

## 2024-04-29 DIAGNOSIS — I50.31 ACUTE DIASTOLIC HEART FAILURE: ICD-10-CM

## 2024-04-29 DIAGNOSIS — I48.0 PAROXYSMAL ATRIAL FIBRILLATION: ICD-10-CM

## 2024-04-29 LAB
ANION GAP SERPL CALC-SCNC: 10 MMOL/L (ref 8–16)
APTT PPP: 30.2 SEC (ref 21–32)
BASOPHILS # BLD AUTO: 0.06 K/UL (ref 0–0.2)
BASOPHILS NFR BLD: 0.7 % (ref 0–1.9)
BUN SERPL-MCNC: 15 MG/DL (ref 8–23)
CALCIUM SERPL-MCNC: 9.4 MG/DL (ref 8.7–10.5)
CHLORIDE SERPL-SCNC: 106 MMOL/L (ref 95–110)
CO2 SERPL-SCNC: 20 MMOL/L (ref 23–29)
CREAT SERPL-MCNC: 0.9 MG/DL (ref 0.5–1.4)
DIFFERENTIAL METHOD BLD: ABNORMAL
EOSINOPHIL # BLD AUTO: 0.1 K/UL (ref 0–0.5)
EOSINOPHIL NFR BLD: 0.9 % (ref 0–8)
ERYTHROCYTE [DISTWIDTH] IN BLOOD BY AUTOMATED COUNT: 12.9 % (ref 11.5–14.5)
EST. GFR  (NO RACE VARIABLE): >60 ML/MIN/1.73 M^2
GLUCOSE SERPL-MCNC: 93 MG/DL (ref 70–110)
HCT VFR BLD AUTO: 43.9 % (ref 40–54)
HGB BLD-MCNC: 14.3 G/DL (ref 14–18)
IMM GRANULOCYTES # BLD AUTO: 0.03 K/UL (ref 0–0.04)
IMM GRANULOCYTES NFR BLD AUTO: 0.3 % (ref 0–0.5)
INR PPP: 1.1 (ref 0.8–1.2)
LYMPHOCYTES # BLD AUTO: 2.1 K/UL (ref 1–4.8)
LYMPHOCYTES NFR BLD: 24.2 % (ref 18–48)
MCH RBC QN AUTO: 32.1 PG (ref 27–31)
MCHC RBC AUTO-ENTMCNC: 32.6 G/DL (ref 32–36)
MCV RBC AUTO: 98 FL (ref 82–98)
MONOCYTES # BLD AUTO: 1 K/UL (ref 0.3–1)
MONOCYTES NFR BLD: 11.9 % (ref 4–15)
NEUTROPHILS # BLD AUTO: 5.4 K/UL (ref 1.8–7.7)
NEUTROPHILS NFR BLD: 62 % (ref 38–73)
NRBC BLD-RTO: 0 /100 WBC
PLATELET # BLD AUTO: 325 K/UL (ref 150–450)
PMV BLD AUTO: 8.8 FL (ref 9.2–12.9)
POTASSIUM SERPL-SCNC: 4.3 MMOL/L (ref 3.5–5.1)
PROTHROMBIN TIME: 12.3 SEC (ref 9–12.5)
RBC # BLD AUTO: 4.46 M/UL (ref 4.6–6.2)
SODIUM SERPL-SCNC: 136 MMOL/L (ref 136–145)
WBC # BLD AUTO: 8.73 K/UL (ref 3.9–12.7)

## 2024-04-29 PROCEDURE — 85610 PROTHROMBIN TIME: CPT | Performed by: INTERNAL MEDICINE

## 2024-04-29 PROCEDURE — 80048 BASIC METABOLIC PNL TOTAL CA: CPT | Performed by: INTERNAL MEDICINE

## 2024-04-29 PROCEDURE — 36415 COLL VENOUS BLD VENIPUNCTURE: CPT | Mod: PO | Performed by: INTERNAL MEDICINE

## 2024-04-29 PROCEDURE — 85730 THROMBOPLASTIN TIME PARTIAL: CPT | Performed by: INTERNAL MEDICINE

## 2024-04-29 PROCEDURE — 85025 COMPLETE CBC W/AUTO DIFF WBC: CPT | Performed by: INTERNAL MEDICINE

## 2024-04-29 NOTE — TELEPHONE ENCOUNTER
Already addressed pb----- Message from Liza Joseph LPN sent at 4/29/2024  9:17 AM CDT -----  Contact: Patient    ----- Message -----  From: Liza Joseph LPN  Sent: 4/26/2024   3:47 PM CDT  To: Liza Joseph LPN      ----- Message -----  From: Faye Israel  Sent: 4/26/2024   2:48 PM CDT  To: Nicolasa MACK Staff    Patient is returning a missed call. Please call patient at [phone].

## 2024-04-30 ENCOUNTER — EXTERNAL CHRONIC CARE MANAGEMENT (OUTPATIENT)
Dept: PRIMARY CARE CLINIC | Facility: CLINIC | Age: 74
End: 2024-04-30
Payer: MEDICARE

## 2024-04-30 PROCEDURE — 99490 CHRNC CARE MGMT STAFF 1ST 20: CPT | Mod: S$PBB,,, | Performed by: FAMILY MEDICINE

## 2024-04-30 PROCEDURE — 99439 CHRNC CARE MGMT STAF EA ADDL: CPT | Mod: S$PBB,,, | Performed by: FAMILY MEDICINE

## 2024-04-30 PROCEDURE — 99439 CHRNC CARE MGMT STAF EA ADDL: CPT | Mod: PBBFAC,PO | Performed by: FAMILY MEDICINE

## 2024-04-30 PROCEDURE — 99490 CHRNC CARE MGMT STAFF 1ST 20: CPT | Mod: PBBFAC,PO | Performed by: FAMILY MEDICINE

## 2024-05-01 DIAGNOSIS — Z45.02 IMPLANTABLE CARDIOVERTER-DEFIBRILLATOR (ICD) AT END OF BATTERY LIFE: Primary | ICD-10-CM

## 2024-05-01 DIAGNOSIS — I50.42 CHRONIC COMBINED SYSTOLIC (CONGESTIVE) AND DIASTOLIC (CONGESTIVE) HEART FAILURE: ICD-10-CM

## 2024-05-01 DIAGNOSIS — Z95.810 PRESENCE OF AUTOMATIC (IMPLANTABLE) CARDIAC DEFIBRILLATOR: ICD-10-CM

## 2024-05-01 DIAGNOSIS — I48.0 PAROXYSMAL ATRIAL FIBRILLATION: ICD-10-CM

## 2024-05-01 RX ORDER — CEFAZOLIN SODIUM 2 G/50ML
2 SOLUTION INTRAVENOUS
Status: CANCELLED | OUTPATIENT
Start: 2024-05-07

## 2024-05-01 RX ORDER — MEXILETINE HYDROCHLORIDE 150 MG/1
CAPSULE ORAL
Qty: 270 CAPSULE | Refills: 0 | Status: SHIPPED | OUTPATIENT
Start: 2024-05-01

## 2024-05-02 ENCOUNTER — PATIENT MESSAGE (OUTPATIENT)
Dept: CARDIOLOGY | Facility: CLINIC | Age: 74
End: 2024-05-02
Payer: MEDICARE

## 2024-05-02 ENCOUNTER — TELEPHONE (OUTPATIENT)
Dept: CARDIOLOGY | Facility: CLINIC | Age: 74
End: 2024-05-02
Payer: MEDICARE

## 2024-05-02 NOTE — TELEPHONE ENCOUNTER
Telephoned patient and sent portal message     ----- Message from Nirav Baldwin MD sent at 5/1/2024 10:04 PM CDT -----  Pre-op labs are OK to proceed with the intended procedure in the next few days.  Thanks

## 2024-05-07 ENCOUNTER — ANESTHESIA (OUTPATIENT)
Dept: CARDIOLOGY | Facility: HOSPITAL | Age: 74
End: 2024-05-07
Payer: MEDICARE

## 2024-05-07 ENCOUNTER — HOSPITAL ENCOUNTER (OUTPATIENT)
Facility: HOSPITAL | Age: 74
Discharge: HOME | End: 2024-05-07
Attending: INTERNAL MEDICINE | Admitting: INTERNAL MEDICINE
Payer: MEDICARE

## 2024-05-07 ENCOUNTER — ANESTHESIA EVENT (OUTPATIENT)
Dept: CARDIOLOGY | Facility: HOSPITAL | Age: 74
End: 2024-05-07
Payer: MEDICARE

## 2024-05-07 DIAGNOSIS — I48.0 PAROXYSMAL ATRIAL FIBRILLATION: ICD-10-CM

## 2024-05-07 DIAGNOSIS — I42.9 IDIOPATHIC CARDIOMYOPATHY: ICD-10-CM

## 2024-05-07 DIAGNOSIS — I49.9 ARRHYTHMIA: ICD-10-CM

## 2024-05-07 DIAGNOSIS — I50.42 CHRONIC COMBINED SYSTOLIC (CONGESTIVE) AND DIASTOLIC (CONGESTIVE) HEART FAILURE: ICD-10-CM

## 2024-05-07 DIAGNOSIS — Z45.02 IMPLANTABLE CARDIOVERTER-DEFIBRILLATOR (ICD) AT END OF BATTERY LIFE: ICD-10-CM

## 2024-05-07 DIAGNOSIS — I50.42 CHRONIC COMBINED SYSTOLIC AND DIASTOLIC CHF (CONGESTIVE HEART FAILURE): ICD-10-CM

## 2024-05-07 DIAGNOSIS — Z95.810 ICD (IMPLANTABLE CARDIOVERTER-DEFIBRILLATOR) IN PLACE: ICD-10-CM

## 2024-05-07 DIAGNOSIS — Z95.810 PRESENCE OF AUTOMATIC (IMPLANTABLE) CARDIAC DEFIBRILLATOR: ICD-10-CM

## 2024-05-07 LAB
OHS QRS DURATION: 158 MS
OHS QRS DURATION: 166 MS
OHS QTC CALCULATION: 527 MS
OHS QTC CALCULATION: 536 MS
POCT GLUCOSE: 99 MG/DL (ref 70–110)

## 2024-05-07 PROCEDURE — 93010 ELECTROCARDIOGRAM REPORT: CPT | Mod: 76,,, | Performed by: STUDENT IN AN ORGANIZED HEALTH CARE EDUCATION/TRAINING PROGRAM

## 2024-05-07 PROCEDURE — 63600175 PHARM REV CODE 636 W HCPCS: Performed by: INTERNAL MEDICINE

## 2024-05-07 PROCEDURE — 33263 RMVL & RPLCMT DFB GEN 2 LEAD: CPT | Performed by: INTERNAL MEDICINE

## 2024-05-07 PROCEDURE — 27201423 OPTIME MED/SURG SUP & DEVICES STERILE SUPPLY: Performed by: INTERNAL MEDICINE

## 2024-05-07 PROCEDURE — 37000008 HC ANESTHESIA 1ST 15 MINUTES: Performed by: INTERNAL MEDICINE

## 2024-05-07 PROCEDURE — 25000003 PHARM REV CODE 250: Performed by: STUDENT IN AN ORGANIZED HEALTH CARE EDUCATION/TRAINING PROGRAM

## 2024-05-07 PROCEDURE — 37000009 HC ANESTHESIA EA ADD 15 MINS: Performed by: INTERNAL MEDICINE

## 2024-05-07 PROCEDURE — 63600175 PHARM REV CODE 636 W HCPCS: Performed by: STUDENT IN AN ORGANIZED HEALTH CARE EDUCATION/TRAINING PROGRAM

## 2024-05-07 PROCEDURE — 33263 RMVL & RPLCMT DFB GEN 2 LEAD: CPT | Mod: ,,, | Performed by: INTERNAL MEDICINE

## 2024-05-07 PROCEDURE — C1721 AICD, DUAL CHAMBER: HCPCS | Performed by: INTERNAL MEDICINE

## 2024-05-07 PROCEDURE — 25000003 PHARM REV CODE 250: Performed by: INTERNAL MEDICINE

## 2024-05-07 PROCEDURE — 93005 ELECTROCARDIOGRAM TRACING: CPT

## 2024-05-07 PROCEDURE — 99214 OFFICE O/P EST MOD 30 MIN: CPT | Mod: ,,, | Performed by: INTERNAL MEDICINE

## 2024-05-07 DEVICE — SLIT SUTURE SLEEVE (4.3 - 4.7F)
Type: IMPLANTABLE DEVICE | Site: HEART | Status: FUNCTIONAL
Brand: SJM™

## 2024-05-07 DEVICE — IMPLANTABLE CARDIOVERTER DEFIBRILLATOR
Type: IMPLANTABLE DEVICE | Site: CHEST | Status: FUNCTIONAL
Brand: GALLANT™

## 2024-05-07 RX ORDER — VANCOMYCIN HYDROCHLORIDE 1 G/20ML
INJECTION, POWDER, LYOPHILIZED, FOR SOLUTION INTRAVENOUS
Status: DISCONTINUED | OUTPATIENT
Start: 2024-05-07 | End: 2024-05-07 | Stop reason: HOSPADM

## 2024-05-07 RX ORDER — LIDOCAINE HYDROCHLORIDE 20 MG/ML
INJECTION, SOLUTION EPIDURAL; INFILTRATION; INTRACAUDAL; PERINEURAL
Status: DISCONTINUED | OUTPATIENT
Start: 2024-05-07 | End: 2024-05-07 | Stop reason: HOSPADM

## 2024-05-07 RX ORDER — MIDAZOLAM HYDROCHLORIDE 1 MG/ML
INJECTION INTRAMUSCULAR; INTRAVENOUS
Status: DISCONTINUED | OUTPATIENT
Start: 2024-05-07 | End: 2024-05-07

## 2024-05-07 RX ORDER — PROPOFOL 10 MG/ML
VIAL (ML) INTRAVENOUS CONTINUOUS PRN
Status: DISCONTINUED | OUTPATIENT
Start: 2024-05-07 | End: 2024-05-07

## 2024-05-07 RX ORDER — BUPIVACAINE HYDROCHLORIDE 5 MG/ML
INJECTION, SOLUTION EPIDURAL; INTRACAUDAL
Status: DISCONTINUED | OUTPATIENT
Start: 2024-05-07 | End: 2024-05-07 | Stop reason: HOSPADM

## 2024-05-07 RX ORDER — DEXAMETHASONE SODIUM PHOSPHATE 4 MG/ML
INJECTION, SOLUTION INTRA-ARTICULAR; INTRALESIONAL; INTRAMUSCULAR; INTRAVENOUS; SOFT TISSUE
Status: DISCONTINUED | OUTPATIENT
Start: 2024-05-07 | End: 2024-05-07

## 2024-05-07 RX ORDER — HYDROCODONE BITARTRATE AND ACETAMINOPHEN 10; 325 MG/1; MG/1
1 TABLET ORAL EVERY 6 HOURS PRN
Qty: 28 TABLET | Refills: 0 | Status: SHIPPED | OUTPATIENT
Start: 2024-05-07 | End: 2024-05-15

## 2024-05-07 RX ORDER — HYDROCODONE BITARTRATE AND ACETAMINOPHEN 5; 325 MG/1; MG/1
1 TABLET ORAL EVERY 4 HOURS PRN
Status: DISCONTINUED | OUTPATIENT
Start: 2024-05-07 | End: 2024-05-07 | Stop reason: HOSPADM

## 2024-05-07 RX ORDER — LIDOCAINE HYDROCHLORIDE 20 MG/ML
INJECTION, SOLUTION EPIDURAL; INFILTRATION; INTRACAUDAL; PERINEURAL
Status: DISCONTINUED | OUTPATIENT
Start: 2024-05-07 | End: 2024-05-07

## 2024-05-07 RX ORDER — HYDROCODONE BITARTRATE AND ACETAMINOPHEN 10; 325 MG/1; MG/1
1 TABLET ORAL EVERY 4 HOURS PRN
Status: DISCONTINUED | OUTPATIENT
Start: 2024-05-07 | End: 2024-05-07 | Stop reason: HOSPADM

## 2024-05-07 RX ORDER — FENTANYL CITRATE 50 UG/ML
INJECTION, SOLUTION INTRAMUSCULAR; INTRAVENOUS
Status: DISCONTINUED | OUTPATIENT
Start: 2024-05-07 | End: 2024-05-07

## 2024-05-07 RX ORDER — KETAMINE HCL IN 0.9 % NACL 50 MG/5 ML
SYRINGE (ML) INTRAVENOUS
Status: DISCONTINUED | OUTPATIENT
Start: 2024-05-07 | End: 2024-05-07

## 2024-05-07 RX ORDER — ACETAMINOPHEN 325 MG/1
650 TABLET ORAL EVERY 4 HOURS PRN
Status: DISCONTINUED | OUTPATIENT
Start: 2024-05-07 | End: 2024-05-07 | Stop reason: HOSPADM

## 2024-05-07 RX ORDER — CEFAZOLIN SODIUM 2 G/50ML
2 SOLUTION INTRAVENOUS
Status: COMPLETED | OUTPATIENT
Start: 2024-05-07 | End: 2024-05-07

## 2024-05-07 RX ORDER — CEFADROXIL 500 MG/1
CAPSULE ORAL
Qty: 12 CAPSULE | Refills: 0 | Status: SHIPPED | OUTPATIENT
Start: 2024-05-07

## 2024-05-07 RX ORDER — ONDANSETRON HYDROCHLORIDE 2 MG/ML
INJECTION, SOLUTION INTRAVENOUS
Status: DISCONTINUED | OUTPATIENT
Start: 2024-05-07 | End: 2024-05-07

## 2024-05-07 RX ADMIN — CEFAZOLIN SODIUM 2 G: 2 SOLUTION INTRAVENOUS at 10:05

## 2024-05-07 RX ADMIN — MIDAZOLAM HYDROCHLORIDE 2 MG: 1 INJECTION, SOLUTION INTRAMUSCULAR; INTRAVENOUS at 10:05

## 2024-05-07 RX ADMIN — DEXAMETHASONE SODIUM PHOSPHATE 4 MG: 4 INJECTION, SOLUTION INTRA-ARTICULAR; INTRALESIONAL; INTRAMUSCULAR; INTRAVENOUS; SOFT TISSUE at 10:05

## 2024-05-07 RX ADMIN — FENTANYL CITRATE 50 MCG: 50 INJECTION, SOLUTION INTRAMUSCULAR; INTRAVENOUS at 10:05

## 2024-05-07 RX ADMIN — LIDOCAINE HYDROCHLORIDE 50 MG: 20 INJECTION, SOLUTION EPIDURAL; INFILTRATION; INTRACAUDAL; PERINEURAL at 10:05

## 2024-05-07 RX ADMIN — Medication 25 MG: at 10:05

## 2024-05-07 RX ADMIN — ONDANSETRON 4 MG: 2 INJECTION INTRAMUSCULAR; INTRAVENOUS at 10:05

## 2024-05-07 RX ADMIN — Medication 25 MG: at 11:05

## 2024-05-07 RX ADMIN — PROPOFOL 20 MCG/KG/MIN: 10 INJECTION, EMULSION INTRAVENOUS at 10:05

## 2024-05-07 RX ADMIN — SODIUM CHLORIDE, SODIUM LACTATE, POTASSIUM CHLORIDE, AND CALCIUM CHLORIDE: .6; .31; .03; .02 INJECTION, SOLUTION INTRAVENOUS at 10:05

## 2024-05-07 NOTE — NURSING
DC inst reviewed w/ wife and patient and copy given to them.  Verbalized understanding.  Sling and swathe also provided.

## 2024-05-07 NOTE — Clinical Note
A dressing was applied to the incision. - follow ID recs  Medication: cefepime  Dose: 2g  Route: IV  Frequency: q24h   Duration: 3 days  - C/w vancomycine 750 mg   - Follow blood culture (currently negative), follow urine culture CKD 4 with baseline Cr ~1.5 Thought to be T cell process (per discussion with Dr. Canas), though bone marrow biopsy results are not available yet.  - Monitor CBC w/ diff daily and CMP   - Transfuse for hb <7, Plt count <10K, <20K + febrile, <50K+ bleeding   - Start Filgrastim (300 mg)   - High risk for infection given severe neutropenia. If he spikes a temp, send sepsis workup and start broad spectrum antibiotics.   - Would favor inpatient monitoring until we have prelim results on bone marrow biopsy.

## 2024-05-07 NOTE — DISCHARGE INSTRUCTIONS
ACTIVITY: Avoid heavy lifting or straining for 6 weeks          You may shower after 2 days          No tub bath until incision site is healed                     No reaching movements above the affected shoulder for 7 days                     Wear sling and swathe for ____ days.                      WOUND CARE: It is not unusual to have tenderness at the incision site. Remove the dressing as instructed by your physician    DISCOMFORT: For general discomfort at the incision site, ou may take over the counter acetaminophen as directed on the bottle.    SIGNS AND SYMPTOMS TO REPORT: Call your physician for the following:          Fever greater than 101          Pain not relieved by medication          Swelling, drainage, warmth, or redness at incision site          Shortness of breath                     Hiccoughs within the first 48 hours that are persistent                     Increased fatigue with normal activity          Drowsiness that doesn't get better                 Weakness or dizziness that doesn't get better                 Repeated vomiting      WHEN TO SEEK EMERGENCY ASSISTANCE                      AFTER ICD CALL 911 FOR THE FOLLOWING:                     If you still have symptoms after shock                     If you have 3 or more shocks in a row                     If you have symptoms, but don't feel a shock, or if you feel faint or dizzy or                      Pass out, someone should call 911    IF YOU RECEIVED SEDATION TODAY, PLEASE FOLLOW THE  INSTRUCTIONS BELOW:     For the next 8 hours, you should be watched by a responsible adult. This person should make sure your condition is not getting worse.  Don't drink any alcohol for the next 24 hours.  Don't drive, operate dangerous machinery, or make important business or personal decisions during the next 24 hours.  Your healthcare provider may tell you not to take any medicine by mouth for pain or sleep in the next 4 hours. These medicines  may react with the medicines you were given in the hospital. This could cause a much stronger response than usual.

## 2024-05-07 NOTE — NURSING
Discharged to home via wc to main entrance.  Swathe to l c wall to keep ice bag in place.  Left in care of wife to go home.  L CW site benign.

## 2024-05-07 NOTE — TRANSFER OF CARE
"Anesthesia Transfer of Care Note    Patient: Randy Yap    Procedure(s) Performed: Procedure(s) (LRB):  REPLACEMENT, ICD GENERATOR (Left)    Patient location: Cath Lab    Anesthesia Type: MAC    Transport from OR: Transported from OR on room air with adequate spontaneous ventilation    Post pain: adequate analgesia    Post assessment: no apparent anesthetic complications and tolerated procedure well    Post vital signs: stable    Level of consciousness: responds to stimulation, awake and alert    Nausea/Vomiting: no nausea/vomiting    Complications: none    Transfer of care protocol was followedComments: Report given to Cath Lab RN at bedside. Hand off tool used. RN given opportunity to ask questions or clarify concerns. No Concerns verbalized. RN was asked if ready to assume care of patient. RN verbally confirmed. Pt. Left in stable condition. SV. Vital Signs Return to Near Baseline. No s/s of distress noted.     Last vitals: Visit Vitals  /74   Pulse 74   Temp 36.7 °C (98 °F) (Temporal)   Resp 16   Ht 5' 10" (1.778 m)   Wt 103.9 kg (229 lb)   SpO2 96%   BMI 32.86 kg/m²     "

## 2024-05-07 NOTE — ANESTHESIA POSTPROCEDURE EVALUATION
Anesthesia Post Evaluation    Patient: Randy Yap    Procedure(s) Performed: Procedure(s) (LRB):  REPLACEMENT, ICD GENERATOR (Left)    Final Anesthesia Type: MAC      Patient location during evaluation: Cath Lab  Patient participation: Yes- Able to Participate  Level of consciousness: awake and alert  Post-procedure vital signs: reviewed and stable  Pain management: adequate  Airway patency: patent  HONEY mitigation strategies: Multimodal analgesia and Extubation and recovery carried out in lateral, semiupright, or other nonsupine position  PONV status at discharge: No PONV  Anesthetic complications: no      Cardiovascular status: hemodynamically stable, blood pressure returned to baseline and stable  Respiratory status: unassisted, spontaneous ventilation and room air  Hydration status: euvolemic  Follow-up not needed.  Comments: Report given to Cath Lab RN at bedside. Hand off tool used. RN given opportunity to ask questions or clarify concerns. No Concerns verbalized. RN was asked if ready to assume care of patient. RN verbally confirmed. Pt. left in stable condition. SV. Vital Signs WNL when compared to pt. baseline. No s/s of distress noted.             Vitals Value Taken Time   /74 05/07/24 1245   Temp 36.7 °C (98 °F) 05/07/24 1150   Pulse 74 05/07/24 1245     05/07/24 1344   SpO2 96 % 05/07/24 1245         No case tracking events are documented in the log.      Pain/Armand Score: Armand Score: 10 (5/7/2024 11:50 AM)

## 2024-05-07 NOTE — ANESTHESIA PREPROCEDURE EVALUATION
05/07/2024  Randy Yap is a 74 y.o., male.      Pre-op Assessment    I have reviewed the Patient Summary Reports.     I have reviewed the Nursing Notes. I have reviewed the NPO Status.   I have reviewed the Medications.     Review of Systems  Anesthesia Hx:  No problems with previous Anesthesia             Denies Family Hx of Anesthesia complications.    Denies Personal Hx of Anesthesia complications.                    Social:  Social Alcohol Use, Non-Smoker       Hematology/Oncology:    Oncology Normal    -- Anemia:                                  Cardiovascular:    Pacemaker Hypertension Valvular problems/Murmurs  Denies MI.     Denies CABG/stent. Dysrhythmias atrial fibrillation  CHF    hyperlipidemia   ECG has been reviewed. ekg 2/2023:  Atrial-paced rhythm with prolonged AV conduction 77  Right bundle branch block   Left anterior fascicular block    Bifascicular block   Abnormal ECG   When compared with ECG of 29-OCT-2022 17:52,   No significant change was found    Echo 4/2023:  ? The left ventricle is moderately enlarged with eccentric hypertrophy and low normal systolic function.  ? The estimated ejection fraction is 50-55%  ? Normal left ventricular diastolic function.  ? Normal right ventricular size.  ? The aortic valve appears structurally normal. There is normal leaflet mobility.  ? Mild mitral regurgitation.  ? Mild tricuspid regurgitation.  ? Normal central venous pressure (3 mmHg).  ? The estimated PA systolic pressure is 18 mmHg.  ? 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 Asthma    Sleep Apnea                Renal/:  Renal/ Normal                 Hepatic/GI:  Bowel Prep.    Denies GERD. Denies Liver Disease.  Denies Hepatitis.            Musculoskeletal:     DJD            Neurological:    Denies CVA.    Denies Seizures.                                Endocrine:  Diabetes Denies Hypothyroidism.  Denies Hyperthyroidism.       Obesity / BMI > 30  Psych:    depression Insomnia             Physical Exam  General: Well nourished, Cooperative, Alert and Oriented    Airway:  Mallampati: II / II  Mouth Opening: Normal  TM Distance: Normal  Tongue: Normal  Neck ROM: Normal ROM    Dental:  Intact    Chest/Lungs:  Clear to auscultation, Normal Respiratory Rate    Heart:  Rate: Normal  Rhythm: Regular Rhythm      Anesthesia Plan  Type of Anesthesia, risks & benefits discussed:    Anesthesia Type: MAC  Intra-op Monitoring Plan: Standard ASA Monitors  Induction:  IV  Informed Consent: Informed consent signed with the Patient and all parties understand the risks and agree with anesthesia plan.  All questions answered.   ASA Score: 3  Day of Surgery Review of History & Physical: H&P Update referred to the surgeon/provider.    Ready For Surgery From Anesthesia Perspective.     .

## 2024-05-07 NOTE — Clinical Note
There was an existing generator. The generator was removed. The generator was returned to . The generator was returned due to MARTINEZ/EOL

## 2024-05-07 NOTE — H&P
Patient ID:  Randy Yap is a 74 y.o. male      Chief complaint:  Recent ICD shock     HPI  73 year old male who presents to Arrhythmia clinic for 6 month follow up with device check. His current medical conditions include CHF, HFrEF of 30%, PVT on Amiodarone,HTN, HLP, Obesity, HONEY on CPAP  See granular details in my note from 10/1/21     Update 8/8/2022:  Had echo in Sept 2021:  >>  The left ventricle is moderately enlarged with concentric hypertrophy and moderately decreased systolic function.  Mild left atrial enlargement.  The estimated ejection fraction is 30%.  Grade I left ventricular diastolic dysfunction.  There is moderate left ventricular global hypokinesis.  Normal right ventricular size with normal right ventricular systolic function.  Mild mitral regurgitation.      Also, I had his echo reviewed by Dr EDWARDS and had a quantitative EF calculated - it was 38% - which was in keeping with Dr EDWARDS's qualitative estimation  I have reviewed the actual image of the ECG tracing obtained today and it shows NSR with normal intervals and occ PVC.     ICD eval today:  Device and leads implanted 10/6/2014   [employer] ICD Fortify Assura DR 2357-40Q, SN: [id number]   A lead: Bennett Tendril STS 2088TC / 46cm, SN: [id number]   V lead: Bennett Durata 7120Q / 58 cm, SN: [id number]     All in good repair     Last BNP was 55 (10/1/21)     Clinically, he feels as well as he has ever had.  His CHF medications at all at maximized doses.  He has rare and mild lightheadedness.  Due to the extreme eat, has backed off on some of his outdoor exercise but he keeps quite active.  He has some family stress now due to the recent passing of his mother in law and the Alzheimer's of  his sister-in-law     Update 11/28/2022:  He has had issues with recurrent VF and shocks.  The most recent episode occurred on 10/22/2022.  Since then we realized that he was not taking enough amiodarone as the blood level was less than 0.2.  After reviewing drug  dosing with him it appeared that he was using the 100 mg tablets in do the 200.  Accordingly, and for a week now, we have increased his amiodarone which to 300 p.o. b.i.d. and will keep this for a total of 6 weeks.    Prior to increasing the amiodarone, he was having repetitive episodes of severe dizziness/near-syncope.  These were associated with palpitations and runs of monomorphic ventricular tachycardia that eventually would either be ATP terminated or terminate spontaneously as they were deliberately left untreated in the monitoring zone.  He now has had no such clinical events for a week or so.    Variation of his ICD diagnostics, suggest that with the addition of amiodarone, he may have now VT that are on detected because they are below the monitoring zone of 160.  Otherwise, now that he does not have the near syncopal events, he feels well.  His wife is wondering whether Ozempic has something to do with his VT VF as was started approximately a month prior to the VF episode.    >>  Change TDI to 123 for the monitoring zone, 160 for the VT 1 zone and 222 for the VF zone.  Otherwise, keep therapies unchanged.  Continue on 600 mg a day of amiodarone until December 31st as of January 1st decrease to 300 mg a day.  Get amiodarone level in 3 months.     Update 03/05/2023 :  Since the last visit, we have had multiple communications via the portal.  Is currently on amiodarone 300 mg per day.  He has not had new ICD shocks.    He has been losing weight (Ozempic).  He is down by 24 lb and currently weighs 248 lb.  This has all happened over the past 2 months.    He was started on dapagliflozin.  Initially he did not want to take it due to cost issues.  This has been resolved.   He feels reasonably well.  He can walk for 2 to 300 ft without issues.  He can now go up the stairs at home without stopping.  These are 16 steps.  He has had a lightheaded spell yesterday.  This was very fast.  ICD evaluation shows a 2nd VT at  the time of the complaint.  ICD evaluation was performed today.  All is in good repair.  The battery life is expected to be about 1.5 years.        Update 08/14/2023 :  Aside from a lot of illnesses in death in the family, he has been doing quite well.  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  He wants to go back and work is in which up.  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  He has no significant cardiovascular symptoms at this time.  He has had no ICD shocks.  ICD evaluation has shown a few nonsustained ventricular tachycardias that were not intervened upon and seemingly asymptomatic to the patient.  Battery longevity appears to be about 10 months to RRT.  Has no side effects from his medications.  He was started on Ozempic.  He is on 1 mg per week dose.  He has not lost much weight.  Has no side effects from semaglutide.       Update 8/25/23 :  After this last visit he went to the emergency room with syncope and ventricular tachycardia.  I saw him in the emergency room and had the following note:  After few years of relatively well since, he started having again ventricular tachycardias during a period of personal stress.  This was related to a VT that was not responsive to ATP and resulted in syncope and a rescue by an ICD shock.    He had another event that had been ATP lead.    He was supposed to see me on 08/18/2023 to deal with the stress.  However, on 08/17/2023, at 9:24 a.m. in the morning, while he was out by his truck, he suddenly passed out.  ICD evaluation revealed another VT episode that was terminated by ATP but only after syncope.  This was initially somewhat polymorphic but then organized somewhat and was responsive to ATP.  Concomitantly, his thoracic impedance is suggestive of some fluid overload.  Patient does not feel more short of breath than usual.  However on physical exam, his breath sounds are somewhat tight.  The for from the syncopal spell resulted  in various bumps and bruises in his back is now hurting him quite a bit with back spasms whenever he moves.     Assessment:  Recurrent VT.  The frequency qualifies as a VT storm.  Potential triggers include a recent increase in his trazodone dose as well as fluid retention.  Of note, his Mexitil level has been quite low at 0.3 despite 300 mg p.o. Q 8 hours.  This dose has not been increased because his VT had been under relative control for a long period of time.     Recommendation:  Admit for a period of observation.  At least 24 hours.  IV Lasix-40 mg, now  Increase Mexitil to 450  Q 8 .  Avoid any QT prolonging drugs including muscle spasm relievers.  Hot packs to bruised areas.  Measure serum phosphate.     The plan was implemented and he was diuresed.  He was discharged after medication adjustment and rhythm quiescence.     Today, he feels okay and is in somewhat better spirits.     >>  Now stable after a period of VT storm.   >>  Continue current treatment with the large dose mexiletine and carvedilol as well as Aldactone and amiodarone.  Continue CHF medications.        Update 02/19/2024 :  Has been doing great.  He has no cardiovascular complaints, no dyspnea on exertion.  He says he is unlimited.  As a result he significantly over exerted 3 weeks ago and ended up having ventricular tachycardia that had to be treated initially with ATP and then with a shock.  Currently, his only complaint is constipation.  This is likely related to amiodarone.  Patient's device (VVED)was fully evaluated today under my direct supervision and real-time feedback.  Summary of findings are as listed below:  Device is in good repair.   The battery is at MARTINEZ.MARTINEZ triggered 2/13/24.   The sensing and pacing thresholds are favorable with well maintained safety margins.   There was a treated VT episode on 1/29/24. @ 7:28pm. . -330ms, ATP x3, third one accelerates rhythm, CL now 290-300ms, 25J shock converts to ST 120s.   Also, @  3 hrs earlier, 2 SVT on 1/29/24- ST 130s, total duration 6m 47s onset 4:23pm.  There were  device data indicating possible ongoing fluid retention .Jan 19-28 now back to normal.    :   Recommendation:  Schedule elective replacement          Current Outpatient Medications   Medication Sig    acetaminophen (TYLENOL) 325 MG tablet Take 650 mg by mouth as needed.     albuterol (PROVENTIL/VENTOLIN HFA) 90 mcg/actuation inhaler Inhale 1 puff into the lungs once daily. rescue    amiodarone (PACERONE) 200 MG Tab TAKE 1 TABLET (200MG) BY MOUTH 2  TIMES DAILY FOR 6 WEEKS THEN TAKE 1 AND 1/2 TABLETS A DAY (300 MG)    bepotastine besilate (BEPREVE) 1.5 % Drop Bepreve 1.5 % eye drops   Apply by ophthalmic route as needed for 50 days.    carvediloL (COREG) 25 MG tablet Take 1 tablet (25 mg total) by mouth 2 (two) times daily.    co-enzyme Q-10 30 mg capsule Take 30 mg by mouth once daily.     dapagliflozin (FARXIGA) 10 mg tablet TAKE 1 TABLET BY MOUTH ONCE DAILY    ferrous sulfate (FEOSOL) 325 mg (65 mg iron) Tab tablet Take 325 mg by mouth daily with breakfast.    fluticasone furoate (ARNUITY ELLIPTA) 100 mcg/actuation inhaler Inhale 1 puff into the lungs once daily.    furosemide (LASIX) 40 MG tablet Take 1 tablet (40 mg total) by mouth once daily.    glucosamine-chondroitin 500-400 mg tablet Take 1 tablet by mouth once daily.     meclizine (ANTIVERT) 12.5 mg tablet Take 1 tablet (12.5 mg total) by mouth 3 (three) times daily as needed for Dizziness.    mexiletine (MEXITIL) 150 MG Cap Take 3 capsules (450 mg total) by mouth every 8 (eight) hours.    multivitamin (THERAGRAN) tablet Take 1 tablet by mouth once daily.    ondansetron (ZOFRAN) 4 MG tablet Take 4 mg by mouth every 8 (eight) hours as needed for Nausea.    pravastatin (PRAVACHOL) 80 MG tablet Take 1 tablet (80 mg total) by mouth once daily.    RESTASIS 0.05 % ophthalmic emulsion Place 1 drop into both eyes 2 (two) times daily.    sacubitriL-valsartan (ENTRESTO)   mg per tablet Take 1 tablet by mouth 2 (two) times daily.    semaglutide (OZEMPIC) 0.25 mg or 0.5 mg (2 mg/3 mL) pen injector Inject 0.5 mg into the skin every 7 days.    spironolactone (ALDACTONE) 25 MG tablet Take 1 tablet (25 mg total) by mouth once daily.    ALPRAZolam (XANAX) 0.25 MG tablet Take 1 tablet (0.25 mg total) by mouth 2 (two) times daily as needed for Anxiety.      No current facility-administered medications for this visit.         Review of Systems      Constitutional: Reviewed  for decreased appetite, weight gain and weight loss.   HENT: Reviewed for nosebleeds.    Eyes:  Reviewed for blurred vision and visual disturbance.   Cardiovascular: Reviewed for chest pain, claudication, cyanosis,dyspnea on exertion, leg swelling, orthopnea,paroxysmal nocturnal dyspnearregular heartbeats, palpitations, near-syncope, and syncope.   Respiratory: Reviewed for cough, shortness of breath, wheezing, sleep disturbances due to breathing and snoring, .    Endocrine: Reviewed for heat intolerance.   Hematologic/Lymphatic: Reviewed for easy bruisability/bleeding.   Skin: Reviewed for rash.   Musculoskeletal: Reviewed for muscle weakness and myalgias.   Gastrointestinal: Reviewed for abdominal pain, anorexia, melena, nausea and vomiting.   Genitourinary: Reviewed for hesitancy, frequency, nocturia and incontinence.   Neurological: Reviewed for excessive daytime sleepiness, dizziness, vertigo, weakness, headaches, loss of balance and seizures,   Psychiatric/Behavioral:  Reviewed for insomnia, altered mental status, depression, anxiety and nervousness.        All symptoms reviewed above were negative except for constant        Tobacco Use History   Social History          Tobacco Use   Smoking Status Never   Smokeless Tobacco Never            Objective:      Physical Exam  Vitals and nursing note reviewed.   Constitutional:       Appearance: Normal appearance. He is well-developed.      Comments: overweight   HENT:       Head: Normocephalic and atraumatic.      Right Ear: External ear normal.      Left Ear: External ear normal.   Eyes:      Conjunctiva/sclera: Conjunctivae normal.      Left eye: Left conjunctiva is not injected. No hemorrhage.     Pupils: Pupils are equal, round, and reactive to light.   Neck:      Thyroid: No thyromegaly.      Vascular: No JVD.   Cardiovascular:      Rate and Rhythm: Normal rate and regular rhythm.      Chest Wall: PMI is not displaced.      Pulses: Intact distal pulses.           Carotid pulses are 2+ on the right side and 2+ on the left side.       Radial pulses are 2+ on the right side and 2+ on the left side.        Dorsalis pedis pulses are 2+ on the right side and 2+ on the left side.        Posterior tibial pulses are 2+ on the right side and 2+ on the left side.      Heart sounds: Normal heart sounds. No midsystolic click and no opening snap. No murmur heard.     No friction rub. No gallop.   Pulmonary:      Effort: Pulmonary effort is normal. No respiratory distress.      Breath sounds: Normal breath sounds. No wheezing or rales.   Chest:      Chest wall: No tenderness.      Comments: Device pocket is in excellent repair.  Abdominal:      Palpations: Abdomen is soft. Abdomen is not rigid. There is no hepatomegaly.      Tenderness: There is no abdominal tenderness. There is no guarding.      Comments: Obese abdomen   Musculoskeletal:         General: No tenderness. Normal range of motion.      Cervical back: Neck supple.      Right knee: No swelling.      Left knee: No swelling.      Right lower leg: No swelling.      Left lower leg: No swelling.      Right ankle: No swelling.      Left ankle: No swelling.      Right foot: No swelling.      Left foot: No swelling.   Skin:     General: Skin is warm and dry.      Coloration: Skin is not pale.      Findings: No rash.   Neurological:      General: No focal deficit present.      Mental Status: He is alert and oriented to person, place, and time.  "     Cranial Nerves: No cranial nerve deficit.      Coordination: Coordination normal.      Deep Tendon Reflexes: Reflexes are normal and symmetric.   Psychiatric:         Mood and Affect: Mood normal.         Behavior: Behavior normal.      /68   Pulse 75   Ht 5' 10" (1.778 m)   Wt 104 kg (229 lb 4.5 oz)   SpO2 (!) 94%   BMI 32.90 kg/m²         Results for orders placed during the hospital encounter of 04/17/23     Echo     Interpretation Summary  · The left ventricle is moderately enlarged with eccentric hypertrophy and low normal systolic function.  · The estimated ejection fraction is 50-55%  · Normal left ventricular diastolic function.  · Normal right ventricular size.  · The aortic valve appears structurally normal. There is normal leaflet mobility.  · Mild mitral regurgitation.  · Mild tricuspid regurgitation.  · Normal central venous pressure (3 mmHg).  · The estimated PA systolic pressure is 18 mmHg.  · Considerable improvement in systolic function compared to study of 1-           WBC   Date Value Ref Range Status   01/26/2024 7.59 3.90 - 12.70 K/uL Final            Hematocrit   Date Value Ref Range Status   01/26/2024 41.8 40.0 - 54.0 % Final            Hemoglobin   Date Value Ref Range Status   01/26/2024 13.7 (L) 14.0 - 18.0 g/dL Final            Lab Results   Component Value Date      01/26/2024            Lab Results   Component Value Date     CREATININE 0.9 01/26/2024     EGFRNORACEVR >60.0 01/26/2024     K 3.8 01/26/2024            Lab Results   Component Value Date     BNP 48 08/25/2023             reports current alcohol use.       Past Medical History:   Diagnosis Date    Asthma      Cardiomyopathy due to systemic disease      Colon polyp 5/2013     repeat 5/2018    Depression, recurrent      Diastolic dysfunction      DJD (degenerative joint disease) of knee 10/14/2013    Hyperlipidemia      Hypertension      Murmur      Paroxysmal VT 5/21/2016    Pericarditis with " effusion      Sleep apnea              Past Surgical History:   Procedure Laterality Date    CARDIAC DEFIBRILLATOR PLACEMENT   10/06/2014    COLONOSCOPY N/A 4/19/2023     Procedure: COLONOSCOPY 4/4-cc/bt clearance recedived;  Surgeon: Isabella Del Cid MD;  Location: [facility] ENDO;  Service: Endoscopy;  Laterality: N/A;    COLONOSCOPY W/ POLYPECTOMY   05/21/2013     repeat 5/21/2018    COSMETIC SURGERY        EYE SURGERY   Cataract    HERNIA REPAIR         R inguinal    LEFT HEART CATHETERIZATION Left 07/09/2018     Procedure: CATHETERIZATION, HEART, LEFT;  Surgeon: Macey Dos Santos MD;  Location: [facility] CATH LAB;  Service: Cardiology;  Laterality: Left;  left radial approach  Has [employer] gissel ICD    pace maker    10/06/2014    PERICARDIAL WINDOW   12-15 years ago    tonsillectomy        TONSILLECTOMY        VARICOCELECTOMY                Family History   Problem Relation Age of Onset    Hyperlipidemia Mother      Arrhythmia Mother      Arthritis Mother      Asthma Mother      COPD Mother      Heart disease Father 48    Heart attack Father 48    Hypertension Father      Arthritis Father      Diabetes Father      Hypertension Maternal Grandmother      Hypertension Maternal Grandfather      Heart disease Paternal Grandmother      Hypertension Paternal Grandmother      Heart attack Paternal Grandmother      Stroke Paternal Grandmother      Heart disease Paternal Grandfather      Hypertension Paternal Grandfather      Heart attack Paternal Grandfather      Cancer Paternal Grandfather           Assessment:    SVT and VT related to over exertion.  ICD at MARTINEZ.  Recently triggered.  1. Paroxysmal VT    2. CHF with left ventricular diastolic dysfunction, NYHA class 2    3. ICD (implantable cardioverter-defibrillator) discharge    4. PAF (paroxysmal atrial fibrillation)    5. Obesity, Class I, BMI 30-34.9    6. At risk for amiodarone toxicity with long term use    7. HONEY (obstructive sleep apnea)    8. Chronic combined systolic and  diastolic CHF (congestive heart failure)          Plan:    For now will not change antiarrhythmic therapy.    He will need elective ICD replacement.  I have discussed the ICD replacement procedure in detail with the patient. I described its benefits and risks. I reviewed alternative therapies and discussed their potential value. The patient was given ample opportunity to express concerns and ask questions and I provided appropriate responses and  answers to such.The patient understands and agrees to proceed.  Consent form was signed  by patient and myself and appropriately witnessed.      No orders of the defined types were placed in this encounter.        Follow up in about 3 months (around 5/19/2024), or if symptoms worsen or fail to improve, for post op.     There are no discontinued medications.             Medication List with Changes/Refills   Current Medications     ACETAMINOPHEN (TYLENOL) 325 MG TABLET    Take 650 mg by mouth as needed.      ALBUTEROL (PROVENTIL/VENTOLIN HFA) 90 MCG/ACTUATION INHALER    Inhale 1 puff into the lungs once daily. rescue     ALPRAZOLAM (XANAX) 0.25 MG TABLET    Take 1 tablet (0.25 mg total) by mouth 2 (two) times daily as needed for Anxiety.     AMIODARONE (PACERONE) 200 MG TAB    TAKE 1 TABLET (200MG) BY MOUTH 2  TIMES DAILY FOR 6 WEEKS THEN TAKE 1 AND 1/2 TABLETS A DAY (300 MG)     BEPOTASTINE BESILATE (BEPREVE) 1.5 % DROP    Bepreve 1.5 % eye drops   Apply by ophthalmic route as needed for 50 days.     CARVEDILOL (COREG) 25 MG TABLET    Take 1 tablet (25 mg total) by mouth 2 (two) times daily.     CO-ENZYME Q-10 30 MG CAPSULE    Take 30 mg by mouth once daily.      DAPAGLIFLOZIN (FARXIGA) 10 MG TABLET    TAKE 1 TABLET BY MOUTH ONCE DAILY     FERROUS SULFATE (FEOSOL) 325 MG (65 MG IRON) TAB TABLET    Take 325 mg by mouth daily with breakfast.     FLUTICASONE FUROATE (ARNUITY ELLIPTA) 100 MCG/ACTUATION INHALER    Inhale 1 puff into the lungs once daily.     FUROSEMIDE (LASIX)  40 MG TABLET    Take 1 tablet (40 mg total) by mouth once daily.     GLUCOSAMINE-CHONDROITIN 500-400 MG TABLET    Take 1 tablet by mouth once daily.      MECLIZINE (ANTIVERT) 12.5 MG TABLET    Take 1 tablet (12.5 mg total) by mouth 3 (three) times daily as needed for Dizziness.     MEXILETINE (MEXITIL) 150 MG CAP    Take 3 capsules (450 mg total) by mouth every 8 (eight) hours.     MULTIVITAMIN (THERAGRAN) TABLET    Take 1 tablet by mouth once daily.     ONDANSETRON (ZOFRAN) 4 MG TABLET    Take 4 mg by mouth every 8 (eight) hours as needed for Nausea.     PRAVASTATIN (PRAVACHOL) 80 MG TABLET    Take 1 tablet (80 mg total) by mouth once daily.     RESTASIS 0.05 % OPHTHALMIC EMULSION    Place 1 drop into both eyes 2 (two) times daily.     SACUBITRIL-VALSARTAN (ENTRESTO)  MG PER TABLET    Take 1 tablet by mouth 2 (two) times daily.     SEMAGLUTIDE (OZEMPIC) 0.25 MG OR 0.5 MG (2 MG/3 ML) PEN INJECTOR    Inject 0.5 mg into the skin every 7 days.     SPIRONOLACTONE (ALDACTONE) 25 MG TABLET    Take 1 tablet (25 mg total) by mouth once daily.

## 2024-05-09 NOTE — DISCHARGE SUMMARY
[facility] - Cath Lab (Hospital)  Discharge Note  Short Stay    Procedure(s) (LRB):  REPLACEMENT, ICD GENERATOR (Left)      OUTCOME: Patient tolerated treatment/procedure well without complication and is now ready for discharge.    DISPOSITION: Home or Self Care    FINAL DIAGNOSIS:  Now sp ICD replacement (VVED)    FOLLOWUP: In clinic    DISCHARGE INSTRUCTIONS:    Discharge Procedure Orders   Diet general        TIME SPENT ON DISCHARGE: 15 minutes

## 2024-05-10 ENCOUNTER — CLINICAL SUPPORT (OUTPATIENT)
Dept: CARDIOLOGY | Facility: HOSPITAL | Age: 74
End: 2024-05-10
Attending: INTERNAL MEDICINE
Payer: MEDICARE

## 2024-05-10 DIAGNOSIS — I50.42 CHRONIC COMBINED SYSTOLIC AND DIASTOLIC CHF (CONGESTIVE HEART FAILURE): ICD-10-CM

## 2024-05-10 DIAGNOSIS — I42.9 IDIOPATHIC CARDIOMYOPATHY: ICD-10-CM

## 2024-05-10 DIAGNOSIS — Z45.02 ICD (IMPLANTABLE CARDIOVERTER-DEFIBRILLATOR) DISCHARGE: Primary | ICD-10-CM

## 2024-05-10 DIAGNOSIS — Z45.02 ICD (IMPLANTABLE CARDIOVERTER-DEFIBRILLATOR) DISCHARGE: ICD-10-CM

## 2024-05-10 DIAGNOSIS — I49.01 VF (VENTRICULAR FIBRILLATION): ICD-10-CM

## 2024-05-10 DIAGNOSIS — I47.29 PAROXYSMAL VT: ICD-10-CM

## 2024-05-10 DIAGNOSIS — I48.0 PAROXYSMAL ATRIAL FIBRILLATION: ICD-10-CM

## 2024-05-10 DIAGNOSIS — Z95.810 PRESENCE OF AUTOMATIC (IMPLANTABLE) CARDIAC DEFIBRILLATOR: ICD-10-CM

## 2024-05-10 PROCEDURE — 99999 PR PBB SHADOW E&M-EST. PATIENT-LVL I: CPT | Mod: PBBFAC,,,

## 2024-05-10 PROCEDURE — 99211 OFF/OP EST MAY X REQ PHY/QHP: CPT | Mod: PBBFAC,25

## 2024-05-10 PROCEDURE — 93283 PRGRMG EVAL IMPLANTABLE DFB: CPT

## 2024-05-15 ENCOUNTER — LAB VISIT (OUTPATIENT)
Dept: LAB | Facility: HOSPITAL | Age: 74
End: 2024-05-15
Attending: PHYSICIAN ASSISTANT
Payer: MEDICARE

## 2024-05-15 ENCOUNTER — OFFICE VISIT (OUTPATIENT)
Dept: FAMILY MEDICINE | Facility: CLINIC | Age: 74
End: 2024-05-15
Payer: MEDICARE

## 2024-05-15 VITALS
DIASTOLIC BLOOD PRESSURE: 67 MMHG | SYSTOLIC BLOOD PRESSURE: 102 MMHG | WEIGHT: 222 LBS | HEART RATE: 75 BPM | TEMPERATURE: 99 F | HEIGHT: 70 IN | BODY MASS INDEX: 31.78 KG/M2

## 2024-05-15 VITALS
DIASTOLIC BLOOD PRESSURE: 74 MMHG | TEMPERATURE: 98 F | BODY MASS INDEX: 32.78 KG/M2 | OXYGEN SATURATION: 96 % | HEIGHT: 70 IN | SYSTOLIC BLOOD PRESSURE: 134 MMHG | WEIGHT: 229 LBS | HEART RATE: 74 BPM | RESPIRATION RATE: 16 BRPM

## 2024-05-15 DIAGNOSIS — E66.9 OBESITY, CLASS I, BMI 30-34.9: ICD-10-CM

## 2024-05-15 DIAGNOSIS — I15.2 HYPERTENSION ASSOCIATED WITH DIABETES: Chronic | ICD-10-CM

## 2024-05-15 DIAGNOSIS — I48.0 PAF (PAROXYSMAL ATRIAL FIBRILLATION): Chronic | ICD-10-CM

## 2024-05-15 DIAGNOSIS — Z00.00 ENCOUNTER FOR MEDICARE ANNUAL WELLNESS EXAM: Primary | ICD-10-CM

## 2024-05-15 DIAGNOSIS — I50.30 CHF WITH LEFT VENTRICULAR DIASTOLIC DYSFUNCTION, NYHA CLASS 2: Chronic | ICD-10-CM

## 2024-05-15 DIAGNOSIS — E78.5 HYPERLIPIDEMIA LDL GOAL <100: Chronic | ICD-10-CM

## 2024-05-15 DIAGNOSIS — I47.29 PAROXYSMAL VT: Chronic | ICD-10-CM

## 2024-05-15 DIAGNOSIS — J45.31 MILD PERSISTENT ASTHMA WITH ACUTE EXACERBATION: ICD-10-CM

## 2024-05-15 DIAGNOSIS — K57.30 DIVERTICULOSIS OF COLON: ICD-10-CM

## 2024-05-15 DIAGNOSIS — E11.65 TYPE 2 DIABETES MELLITUS WITH HYPERGLYCEMIA, WITHOUT LONG-TERM CURRENT USE OF INSULIN: Chronic | ICD-10-CM

## 2024-05-15 DIAGNOSIS — E11.59 HYPERTENSION ASSOCIATED WITH DIABETES: Chronic | ICD-10-CM

## 2024-05-15 DIAGNOSIS — Z86.010 HISTORY OF COLON POLYPS: ICD-10-CM

## 2024-05-15 DIAGNOSIS — G47.33 OSA (OBSTRUCTIVE SLEEP APNEA): ICD-10-CM

## 2024-05-15 DIAGNOSIS — I42.9 IDIOPATHIC CARDIOMYOPATHY: Chronic | ICD-10-CM

## 2024-05-15 DIAGNOSIS — M17.10 PRIMARY OSTEOARTHRITIS OF KNEE, UNSPECIFIED LATERALITY: ICD-10-CM

## 2024-05-15 DIAGNOSIS — F51.01 PRIMARY INSOMNIA: Chronic | ICD-10-CM

## 2024-05-15 DIAGNOSIS — D53.9 NUTRITIONAL ANEMIA: ICD-10-CM

## 2024-05-15 DIAGNOSIS — I50.42 CHRONIC COMBINED SYSTOLIC (CONGESTIVE) AND DIASTOLIC (CONGESTIVE) HEART FAILURE: ICD-10-CM

## 2024-05-15 DIAGNOSIS — Z45.02 IMPLANTABLE CARDIOVERTER-DEFIBRILLATOR (ICD) AT END OF BATTERY LIFE: ICD-10-CM

## 2024-05-15 DIAGNOSIS — F32.0 CURRENT MILD EPISODE OF MAJOR DEPRESSIVE DISORDER, UNSPECIFIED WHETHER RECURRENT: ICD-10-CM

## 2024-05-15 LAB
ALBUMIN/CREAT UR: ABNORMAL UG/MG (ref 0–30)
CREAT UR-MCNC: 11 MG/DL (ref 23–375)
ESTIMATED AVG GLUCOSE: 108 MG/DL (ref 68–131)
HBA1C MFR BLD: 5.4 % (ref 4–5.6)
MICROALBUMIN UR DL<=1MG/L-MCNC: <5 UG/ML

## 2024-05-15 PROCEDURE — 82043 UR ALBUMIN QUANTITATIVE: CPT | Performed by: PHYSICIAN ASSISTANT

## 2024-05-15 PROCEDURE — 36415 COLL VENOUS BLD VENIPUNCTURE: CPT | Mod: PO | Performed by: PHYSICIAN ASSISTANT

## 2024-05-15 PROCEDURE — 83036 HEMOGLOBIN GLYCOSYLATED A1C: CPT | Performed by: PHYSICIAN ASSISTANT

## 2024-05-15 PROCEDURE — G0439 PPPS, SUBSEQ VISIT: HCPCS | Mod: ,,, | Performed by: PHYSICIAN ASSISTANT

## 2024-05-15 PROCEDURE — 99999 PR PBB SHADOW E&M-EST. PATIENT-LVL V: CPT | Mod: PBBFAC,,, | Performed by: PHYSICIAN ASSISTANT

## 2024-05-15 PROCEDURE — 99215 OFFICE O/P EST HI 40 MIN: CPT | Mod: PBBFAC,PO | Performed by: PHYSICIAN ASSISTANT

## 2024-05-15 NOTE — PROGRESS NOTES
"  Randy Yap presented for a follow-up Medicare AWV today. The following components were reviewed and updated:    Medical history  Family History  Social history  Allergies and Current Medications  Health Risk Assessment  Health Maintenance  Care Team    **See Completed Assessments for Annual Wellness visit with in the encounter summary    The following assessments were completed:  Depression Screening  Cognitive function Screening  Timed Get Up Test  Whisper Test          Opioid documentation:      Patient does not have a current opioid prescription.          Vitals:    05/15/24 1100   BP: 102/67   Pulse: 75   Temp: 98.5 °F (36.9 °C)   Weight: 100.7 kg (222 lb)   Height: 5' 10" (1.778 m)     Body mass index is 31.85 kg/m².       Physical Exam  Constitutional:       General: He is not in acute distress.     Appearance: Normal appearance.   HENT:      Head: Normocephalic and atraumatic.   Eyes:      Pupils: Pupils are equal, round, and reactive to light.   Cardiovascular:      Rate and Rhythm: Normal rate and regular rhythm.      Pulses: Normal pulses.      Heart sounds: Normal heart sounds. No murmur heard.  Pulmonary:      Effort: Pulmonary effort is normal. No respiratory distress.      Breath sounds: Normal breath sounds.   Abdominal:      General: Bowel sounds are normal. There is no distension.      Palpations: Abdomen is soft.      Tenderness: There is no abdominal tenderness.   Musculoskeletal:         General: Normal range of motion.      Cervical back: Normal range of motion and neck supple.   Skin:     General: Skin is warm and dry.      Findings: No rash.   Neurological:      General: No focal deficit present.      Mental Status: He is alert and oriented to person, place, and time.   Psychiatric:         Mood and Affect: Mood normal.         Behavior: Behavior normal.           Diagnoses and health risks identified today and associated recommendations/orders:  1. Encounter for Medicare annual " wellness exam  Complete history and physical was completed today. Complete and thorough medication reconciliation was performed. Discussed risks and benefits of medications. Advised patient on orders and health maintenance. Continue current medications listed on your summary sheet.  - Ambulatory Referral/Consult to Enhanced Annual Wellness Visit (eAWV)    2. Current mild episode of major depressive disorder, unspecified whether recurrent  Chronic. Stable  Remains on PRN Xanax   Continue current medications and plan of care per PCP and specialists     3. Mild persistent asthma with acute exacerbation  Chronic. Stable  Remains on PRN Albuterol and Arnuity  Followed by pulmonology  Continue current medications and plan of care per PCP and specialists     4. CHF with left ventricular diastolic dysfunction, NYHA class 2  Chronic. Stable  Appears compensated on exam  Remains on Entresto, Lasix, Aldactone, Farxiga and Coreg  Followed by cardiology  Continue current medications and plan of care per PCP and specialists     5. Hyperlipidemia LDL goal <100  Chronic. Stable  Remains on Pravastatin  Continue current medications and plan of care per PCP and specialists  Most recent labs listed below:  Lab Results   Component Value Date    CHOL 159 01/26/2024     Lab Results   Component Value Date    HDL 43 01/26/2024     Lab Results   Component Value Date    LDLCALC 91.8 01/26/2024     Lab Results   Component Value Date    TRIG 121 01/26/2024     Lab Results   Component Value Date    ALT 29 01/26/2024    AST 21 01/26/2024    ALKPHOS 44 (L) 01/26/2024    BILITOT 0.7 01/26/2024     6. Hypertension associated with diabetes  Chronic. Stable  BP at goal today  Continue lifestyle modification with low sodium diet and exercise   Continue current medications and plan of care per PCP and specialists   Hypertension Medications               carvediloL (COREG) 25 MG tablet TAKE ONE TABLET BY MOUTH TWICE DAILY    furosemide (LASIX) 40 MG  tablet Take 1 tablet (40 mg total) by mouth once daily.    sacubitriL-valsartan (ENTRESTO)  mg per tablet Take 1 tablet by mouth 2 (two) times daily.    spironolactone (ALDACTONE) 25 MG tablet Take 1 tablet (25 mg total) by mouth once daily.     7. Idiopathic cardiomyopathy  Chronic. Stable  Followed by cardiology  Continue current medications and plan of care per PCP and specialists     8. PAF (paroxysmal atrial fibrillation)  Chronic. Stable  Remains on Coreg  Followed by cardiology  Continue current medications and plan of care per PCP and specialists     9. Paroxysmal VT  Chronic. Stable  Remains on Amiodarone and Mexitil   Has ICD in place  Followed by cardiology  Continue current medications and plan of care per PCP and specialists     10. Chronic combined systolic (congestive) and diastolic (congestive) heart failure  Chronic. Stable  Appears compensated on exam  Remains on Entresto, Lasix, Aldactone, Farxiga and Coreg  Followed by cardiology  Continue current medications and plan of care per PCP and specialists     11. Implantable cardioverter-defibrillator (ICD) at end of battery life  Chronic. Stable  Followed by cardiology  Continue current medications and plan of care per PCP and specialists     12. Nutritional anemia  Chronic. Stable  Continue current medications and plan of care per PCP and specialists   Lab Results   Component Value Date    WBC 8.73 04/29/2024    HGB 14.3 04/29/2024    HCT 43.9 04/29/2024    MCV 98 04/29/2024     04/29/2024     13. Type 2 diabetes mellitus with hyperglycemia, without long-term current use of insulin  Chronic. Stable  Remains on Ozempic and Farxiga  Due for A1c and microalbumin urine test  Counseling provided on importance of diabetic diet and medication compliance in order to treat diabetes  Discussed diabetes disease course and potential complications  Continue current medications and plan of care per PCP and specialists   Lab Results   Component Value  Date    HGBA1C 5.5 01/26/2024   - Microalbumin/Creatinine Ratio, Urine  - Hemoglobin A1C; Future    14. Obesity, Class I, BMI 30-34.9  Chronic. Stable  General weight loss/lifestyle modification strategies discussed (elicit support from others; identify saboteurs; non-food rewards, etc).  Informal exercise measures discussed, e.g. taking stairs instead of elevator.  Regular aerobic exercise program discussed.  Continue current medications and plan of care per PCP and specialists     15. Diverticulosis of colon  Chronic. Stable  Noted on c-scope (4/2023)  Continue current medications and plan of care per PCP and specialists     16. History of colon polyps  Chronic. Stable  C-scope 4/2024 (+) for polyps  Repeat colonoscopy in 3 years for surveillance   Continue current medications and plan of care per PCP and specialists     17. Primary osteoarthritis of knee, unspecified laterality  Chronic. Stable  Remains on PRN anti-inflammatory medication  Continue current medications and plan of care per PCP and specialists     18. Primary insomnia  Chronic. Stable  Discussed importance of good sleep hygiene practices  Continue current medications and plan of care per PCP and specialists     19. HONEY (obstructive sleep apnea)  Chronic. Stable  Compliant with CPAP  Continue current medications and plan of care per PCP and specialists       Provided Randy with a 5-10 year written screening schedule and personal prevention plan. Recommendations were developed using the USPSTF age appropriate recommendations. Education, counseling, and referrals were provided as needed.  After Visit Summary printed and given to patient which includes a list of additional screenings\tests needed.    Follow up for with PCP.      Tamy Card PA-C

## 2024-05-16 NOTE — PROGRESS NOTES
Results have been released via [employer]. Please verify that these have been viewed by patient. If not, please call patient with results.     I have sent a message to them with the following interpretation (see below).    I have reviewed your recent blood work.     A1C NORMAL-The A1c is at goal. Repeat an a1c in 6 months.     URINE MICROALBUMIN NORMAL-The urine microalbumin screen for protein was normal. Repeat this annually for screening for kidney damage due to diabetes.      Please do not hesitate to call or message with any additional questions or concerns.    Tamy Card PA-C

## 2024-05-18 ENCOUNTER — PATIENT MESSAGE (OUTPATIENT)
Dept: FAMILY MEDICINE | Facility: CLINIC | Age: 74
End: 2024-05-18
Payer: MEDICARE

## 2024-05-20 DIAGNOSIS — E78.5 HYPERLIPIDEMIA LDL GOAL <100: Chronic | ICD-10-CM

## 2024-05-20 DIAGNOSIS — Z00.01 ENCOUNTER FOR GENERAL ADULT MEDICAL EXAMINATION WITH ABNORMAL FINDINGS: ICD-10-CM

## 2024-05-20 RX ORDER — PRAVASTATIN SODIUM 80 MG/1
80 TABLET ORAL DAILY
Qty: 90 TABLET | Refills: 2 | Status: SHIPPED | OUTPATIENT
Start: 2024-05-20

## 2024-05-20 RX ORDER — FLUTICASONE FUROATE 100 UG/1
1 POWDER RESPIRATORY (INHALATION) DAILY
Qty: 30 EACH | Refills: 0 | Status: SHIPPED | OUTPATIENT
Start: 2024-05-20 | End: 2024-06-17

## 2024-05-20 NOTE — TELEPHONE ENCOUNTER
No care due was identified.  [facility] Embedded Care Due Messages. Reference number: [id number].   5/20/2024 1:31:37 AM CDT

## 2024-05-20 NOTE — TELEPHONE ENCOUNTER
Refill Decision Note   Randy Yap  is requesting a refill authorization.  Brief Assessment and Rationale for Refill:  Approve     Medication Therapy Plan:         Comments:     Note composed:3:09 PM 05/20/2024

## 2024-05-29 NOTE — PROGRESS NOTES
Individual Psychotherapy Follow-up Visit Progress Note (PhD/LCSW)     Outpatient Psychotherapy - 45 minutes with patient (38-52 minutes) - [id number]    Date: 4/19/2024    Visit Type: telemed    Due to the nature of this visit type, a virtual visit with synchronous audio and video, each patient to whom this provider administers behavioral health services by telemedicine is: (1) informed of the relationship between the provider and patient and the respective role of any other health care provider with respect to management of the patient; and (2) notified that he or she may decline to receive services by telemedicine and may withdraw from such care at any time. If technological issues occur, at the professional discretion of the clinical provider, synchronous audio only services may be utilized after unsuccessful attempt(s) to connect via audiovisual services; similarly, if audio only visit occurs, patient's verbal consent will be obtained prior to receipt of service. Prevailing clinical standards of care are upheld despite service methodology; having said this, if the clinical provider is unable to meet the prevailing standards of care, the patient will be rescheduled for the provider's soonest availability - as clinically appropriate.     The patient was informed of the following:     Provider's contact info:  [facility]  [address], [address], [address]  [place] [address]  (Phone) [phone]    If technology issues occur, call office phone: Ph: [phone]  If crisis: Dial 911 or go to nearest Emergency Room (ER)  If questions related to privacy practices: contact [facility] Information Department: [phone]    For security purposes, the pt identified that they were at [id number] [place] [id number] during today's session and contact number is [phone].    The pt's emergency contact(s) is Extended Emergency Contact Information  Primary  Emergency Contact: Tran Yap  Address: [address] .           [place] [address] [place]  Home Phone: [phone]  Mobile Phone: [phone]  Relation: Spouse.    Crisis Disclaimer: Patient was informed that due to the virtual nature of the visit, that if a crisis develops, protocols will be implemented to ensure patient safety, including but not limited to: 1) Initiating a welfare check with local law enforcement and/or 2) Calling 911.        4/19/2024  MRN: [id number]  Primary Care Provider: Olvin Hutson MD    Randy Yap is a 74 y.o. male who presents today for follow-up of depression, anxiety, adjustment problems, and relational problem. Met with patient.      Preferred Name: Randy   Transgender Identity Form    Gender Identity Form  Transition Summary         Subjective:     Last encounter (with this provider): 2/6/2024     Content of Current Session:  Met with this patient for his online virtual follow-up appointment.  The patient was in his home throughout the appointment, alert and oriented.  He stated that there had been some family disagreements and dysfunction lately and he was having some negative feelings and disturbing thoughts about it.  He continues to be concerned about his wife's decline and her cognitive decline.  But he stated that his son and daughter-in-law have been having arguments as well.  He has almost finished building his shop/garage in his backyard.  And it will be his man cave where he can spend time working on cars and building things.  This is his escape from the world when things are difficult for him.  He continued to discuss his relationship with his granddaughter, his children and his wife.  He has an outwardly positive attitude about life, but inwardly he states that he feels very depressed and anxious about the future.  This could be because of his extensive heart condition.  Suggested that he continue to express gratitude for things  in his life and to be verbally open with his family about his feelings toward them.  He agreed and stated he would make a follow-up appointment.    Previous Note: Met with this patient for his follow up appointment in the Behavioral health clinic.  He stated that things had been going well for him but he was concerned about his wife Shayna and thinks that she may be having some cognitive difficulties of her own.  He stated that she has lost interest in many of the things that she used to do. And he also stated that she is angry most of the time and sleeps on the sofa.  Encouraged him to make her an appointment with her primary care physician to discuss this issue.  He has 2 children who were both adopted at birth because he and his wife were never able to have children of their own.  He discussed his relationship with his adopted children.  Randy has a deep appreciation for life and enjoys every day.  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  He continues to complain about his brother-in-law who he says is very unclean and does not take care of his daily living needs.  Most of the time the brother-in-law has health problems and needs caretaking but his wife resists having him go live in an assisted living. She feels compelled to care for him, even though it is difficult for her and she complains about it.  Randy is hoping that he can gain better communication skills in order to have conversations that are difficult without having arguments.  The patient practiced some communication exercises and gained knowledge and insight from those he stated he would make a follow-up appointment.    Therapeutic Interventions Utilized During Current Session: Acceptance and Commitment Therapy, Cognitive Behavioral Therapy         No data to display               0-4 =  Minimal anxiety  5-9 = Mild anxiety  10-14 = Moderate anxiety  15-21 = Severe anxiety         4/19/2024     9:52 AM 3/4/2024     2:27 PM 2/6/2024    10:46 AM 12/11/2023     2:52 PM 11/13/2023     7:07 PM   PHQ-9 Depression Patient Health Questionnaire   Patient agreed to terms: Yes Yes Yes Yes Yes   Little interest or pleasure in doing things 1 1 1 1 1   Feeling down, depressed, or hopeless 1 1 1 1 1   Trouble falling or staying asleep, or sleeping too much 3 3 1 2 2   Feeling tired or having little energy 1 0 1 1 1   Poor appetite or overeating 0 1 1 0 1   Feeling bad about yourself - or that you are a failure or have let yourself or your family down 0 0 0 0 0   Trouble concentrating on things, such as reading the newspaper or watching television 0 0 0 0 0   Moving or speaking so slowly that other people could have noticed. Or the opposite - being so fidgety or restless that you have been moving around a lot more than usual 0 0 0 0 0   Thoughts that you would be better off dead, or of hurting yourself in some way 0 0 0 0 0   PHQ-9 Total Score 6 6 5 5 6   If you checked off any problems, how difficult have these problems made it for you to do your work, take care of things at home, or get along with other people? Somewhat difficult Not difficult at all Not difficult at all Not difficult at all Somewhat difficult   Interpretation Mild Mild Mild Mild Mild     0-4 = No intervention  5 to 9 = Mild  10 to 14 = Moderate  15 to 19 = Moderately severe  ?20 = Severe      Objective:       Mental Status Evaluation  Appearance: unremarkable, age appropriate  Behavior: normal, cooperative  Speech: normal tone, normal rate, normal pitch, normal volume  Mood: steady, anxious, depressed, irritable  Affect: congruent and appropriate  Thought Process: normal and logical  Thought Content: normal, no suicidality, no homicidality, delusions, or paranoia  Sensorium: grossly intact  Cognition: grossly intact  Insight: fair  Judgment:  adequate to circumstances    Risk parameters:  Patient reports no suicidal ideation  Patient reports no homicidal ideation  Patient reports no self-injurious behavior  Patient reports no violent behavior      Assessment & Plan:     The patient's response to the interventions is accepting    The patient's progress toward treatment goals is fair     Homework assigned: practice relaxation skills daily, implement sleep hygiene routine, and create a coping card     Treatment plan:   A. Target symptoms: Depression and Anxiety   B. Therapeutic modalities: insight oriented psychotherapy  C. Why chosen therapy is appropriate versus another modality: patient responds to this modality   D. Outcome monitoring methods: self report, observation, rating scales, feedback from clinical staff      Visit Diagnosis:   1. Current mild episode of major depressive disorder, unspecified whether recurrent    2. Family conflict    3. Anxiety          Follow-up: individual psychotherapy    Return to Clinic: as scheduled  Pt Reported to Schedule Self via Epic EMR MyChart Application and/or Department Support Staff      Misty Barajas LCSW  4/19/2024  12:22 PM

## 2024-05-31 ENCOUNTER — EXTERNAL CHRONIC CARE MANAGEMENT (OUTPATIENT)
Dept: PRIMARY CARE CLINIC | Facility: CLINIC | Age: 74
End: 2024-05-31
Payer: MEDICARE

## 2024-05-31 DIAGNOSIS — I48.0 PAF (PAROXYSMAL ATRIAL FIBRILLATION): ICD-10-CM

## 2024-05-31 PROCEDURE — 99490 CHRNC CARE MGMT STAFF 1ST 20: CPT | Mod: S$PBB,,, | Performed by: FAMILY MEDICINE

## 2024-05-31 PROCEDURE — 99490 CHRNC CARE MGMT STAFF 1ST 20: CPT | Mod: PBBFAC,PO | Performed by: FAMILY MEDICINE

## 2024-05-31 RX ORDER — FUROSEMIDE 40 MG/1
40 TABLET ORAL
Qty: 90 TABLET | Refills: 0 | Status: SHIPPED | OUTPATIENT
Start: 2024-05-31

## 2024-06-17 DIAGNOSIS — Z00.01 ENCOUNTER FOR GENERAL ADULT MEDICAL EXAMINATION WITH ABNORMAL FINDINGS: ICD-10-CM

## 2024-06-17 RX ORDER — FLUTICASONE FUROATE 100 UG/1
1 POWDER RESPIRATORY (INHALATION) DAILY
Qty: 30 EACH | Refills: 0 | Status: SHIPPED | OUTPATIENT
Start: 2024-06-17

## 2024-06-27 RX ORDER — DAPAGLIFLOZIN 10 MG/1
TABLET, FILM COATED ORAL
Qty: 90 TABLET | Refills: 0 | Status: SHIPPED | OUTPATIENT
Start: 2024-06-27

## 2024-06-30 ENCOUNTER — EXTERNAL CHRONIC CARE MANAGEMENT (OUTPATIENT)
Dept: PRIMARY CARE CLINIC | Facility: CLINIC | Age: 74
End: 2024-06-30
Payer: MEDICARE

## 2024-06-30 PROCEDURE — 99490 CHRNC CARE MGMT STAFF 1ST 20: CPT | Mod: PBBFAC,PO | Performed by: FAMILY MEDICINE

## 2024-06-30 PROCEDURE — 99439 CHRNC CARE MGMT STAF EA ADDL: CPT | Mod: PBBFAC,PO | Performed by: FAMILY MEDICINE

## 2024-06-30 PROCEDURE — 99439 CHRNC CARE MGMT STAF EA ADDL: CPT | Mod: S$PBB,,, | Performed by: FAMILY MEDICINE

## 2024-06-30 PROCEDURE — 99490 CHRNC CARE MGMT STAFF 1ST 20: CPT | Mod: S$PBB,,, | Performed by: FAMILY MEDICINE

## 2024-07-08 RX ORDER — MEXILETINE HYDROCHLORIDE 150 MG/1
CAPSULE ORAL
Qty: 270 CAPSULE | Refills: 3 | Status: SHIPPED | OUTPATIENT
Start: 2024-07-08

## 2024-07-21 NOTE — TELEPHONE ENCOUNTER
Called the pt he is in the er. He passed out and hit his head. He was unconscious for about 4-5 minutes. He states he does not remember the defib going off. I have alerted dr reza christie and taco brown----- Message from Yudi Schafer sent at 8/17/2023  3:00 PM CDT -----  Contact: Randy  Type:  Needs Medical Advice    Who Called: Randy  Symptoms (please be specific): Heart event/Injury to head due to fall   How long has patient had these symptoms:  Today  Pharmacy name and phone #:    [id number] [address] [facility]  [address] [place] [address]  Phone: [phone] Fax: [phone]   Would the patient rather a call back or a response via [facility]? call  Best Call Back Number: [phone]  Additional Information: Patient reports having a heart event today and fell and hit their head. Patient was unconscious. Patient reports currently in the ER room at [facility] and request assistance.   Thank you,  GH       History  Chief Complaint   Patient presents with    Shoulder Injury     Pt c/o left shoulder pain after slipping in the driveway, pt denies hitting his head, -BT     69-year-old male no significant reported past history presenting with shoulder pain.  Patient reports mechanical trip and fall backwards in his driveway landing on his elbow.  Patient reports most of the pain anterior left shoulder.  Reports limited range of motion left shoulder.  Denies head strike LOC.  Denies blood thinning medications.  Denies neck or back pain.  Denies chest pain shortness of breath.  Right-hand-dominant.  Denies any other complaints.  Chart reviewed.    History reviewed. No pertinent past medical history.  Family History: non-contributory  Social History          None       History reviewed. No pertinent past medical history.    Past Surgical History:   Procedure Laterality Date    SINUS SURGERY      TONSILLECTOMY         History reviewed. No pertinent family history.  I have reviewed and agree with the history as documented.    E-Cigarette/Vaping     E-Cigarette/Vaping Substances     Social History     Tobacco Use    Smoking status: Never    Smokeless tobacco: Never       Review of Systems   Constitutional:  Negative for appetite change, chills, diaphoresis, fever and unexpected weight change.   HENT:  Negative for congestion and rhinorrhea.    Eyes:  Negative for photophobia and visual disturbance.   Respiratory:  Negative for cough, chest tightness and shortness of breath.    Cardiovascular:  Negative for chest pain, palpitations and leg swelling.   Gastrointestinal:  Negative for abdominal distention, abdominal pain, blood in stool, constipation, diarrhea, nausea and vomiting.   Genitourinary:  Negative for dysuria and hematuria.   Musculoskeletal:  Positive for arthralgias. Negative for back pain, joint swelling, neck pain and neck stiffness.   Skin:  Negative for color change, pallor, rash and wound.   Neurological:   Negative for dizziness, syncope, weakness, light-headedness and headaches.   Psychiatric/Behavioral:  Negative for agitation.    All other systems reviewed and are negative.      Physical Exam  Physical Exam  Vitals and nursing note reviewed.   Constitutional:       General: He is not in acute distress.     Appearance: Normal appearance. He is well-developed. He is not ill-appearing, toxic-appearing or diaphoretic.   HENT:      Head: Normocephalic and atraumatic.      Nose: Nose normal. No congestion or rhinorrhea.      Mouth/Throat:      Mouth: Mucous membranes are moist.      Pharynx: Oropharynx is clear. No oropharyngeal exudate or posterior oropharyngeal erythema.   Eyes:      General: No scleral icterus.        Right eye: No discharge.         Left eye: No discharge.      Extraocular Movements: Extraocular movements intact.      Conjunctiva/sclera: Conjunctivae normal.      Pupils: Pupils are equal, round, and reactive to light.   Neck:      Vascular: No JVD.      Trachea: No tracheal deviation.      Comments: Supple. Normal range of motion.   Cardiovascular:      Rate and Rhythm: Normal rate and regular rhythm.      Heart sounds: Normal heart sounds. No murmur heard.     No friction rub. No gallop.      Comments: Normal rate and regular rhythm  Pulmonary:      Effort: Pulmonary effort is normal. No respiratory distress.      Breath sounds: Normal breath sounds. No stridor. No wheezing or rales.      Comments: Clear to auscultation bilaterally  Chest:      Chest wall: No tenderness.   Abdominal:      General: Bowel sounds are normal. There is no distension.      Palpations: Abdomen is soft.      Tenderness: There is no abdominal tenderness. There is no right CVA tenderness, left CVA tenderness, guarding or rebound.      Comments: Soft, nontender, nondistended.  Normal bowel sounds throughout   Musculoskeletal:         General: Tenderness present. No swelling, deformity or signs of injury. Normal range of  motion.      Cervical back: Normal range of motion and neck supple. No rigidity. No muscular tenderness.      Right lower leg: No edema.      Left lower leg: No edema.      Comments: Left shoulder anterior joint tenderness.  Concern for some blunting of the lateral aspect of the shoulder.  Limited range of motion.  No clavicular or acromion tenderness.  2+ radial pulses.  Able to make okay sign, peace sign, extend all fingers, make a fist.  Sensation intact entirety left upper extremity   Lymphadenopathy:      Cervical: No cervical adenopathy.   Skin:     General: Skin is warm and dry.      Coloration: Skin is not pale.      Findings: No erythema or rash.   Neurological:      General: No focal deficit present.      Mental Status: He is alert. Mental status is at baseline.      Sensory: No sensory deficit.      Motor: No weakness or abnormal muscle tone.      Coordination: Coordination normal.      Gait: Gait normal.      Comments: Alert.  Strength and sensation grossly intact.  Ambulatory without difficulty at baseline.    Psychiatric:         Behavior: Behavior normal.         Thought Content: Thought content normal.         Vital Signs  ED Triage Vitals   Temperature Pulse Respirations Blood Pressure SpO2   07/20/24 2105 07/20/24 2105 07/20/24 2105 07/20/24 2105 07/20/24 2105   97.9 °F (36.6 °C) 71 18 130/81 97 %      Temp src Heart Rate Source Patient Position - Orthostatic VS BP Location FiO2 (%)   -- 07/20/24 2105 07/20/24 2300 07/20/24 2105 --    Monitor Lying Right arm       Pain Score       07/20/24 2200       10 - Worst Possible Pain           Vitals:    07/20/24 2105 07/20/24 2300   BP: 130/81 128/78   Pulse: 71 68   Patient Position - Orthostatic VS:  Lying         Visual Acuity      ED Medications  Medications   LORazepam (ATIVAN) injection 1 mg (1 mg Intravenous Given 7/20/24 2200)   fentaNYL injection 75 mcg (75 mcg Intravenous Given 7/20/24 2200)   HYDROmorphone (DILAUDID) injection 1 mg (1 mg  Intravenous Given 7/20/24 2225)       Diagnostic Studies  Results Reviewed       None                   XR shoulder 2+ views LEFT    (Results Pending)   XR chest 1 view portable    (Results Pending)   XR shoulder 2+ views LEFT    (Results Pending)              Procedures  Procedures         ED Course                                 SBIRT 22yo+      Flowsheet Row Most Recent Value   Initial Alcohol Screen: US AUDIT-C     1. How often do you have a drink containing alcohol? 0 Filed at: 07/20/2024 2107   2. How many drinks containing alcohol do you have on a typical day you are drinking?  0 Filed at: 07/20/2024 2107   3b. FEMALE Any Age, or MALE 65+: How often do you have 4 or more drinks on one occassion? 0 Filed at: 07/20/2024 2107   Audit-C Score 0 Filed at: 07/20/2024 2107   GREG: How many times in the past year have you...    Used an illegal drug or used a prescription medication for non-medical reasons? Never Filed at: 07/20/2024 2107                      Medical Decision Making  69-year-old male no significant reported past history presenting with shoulder pain.  Concern for possible fracture or dislocation versus rotator cuff injury to left shoulder.  Plan for x-rays.  Reassess.    X-rays interpreted by me with head of shoulder which appears to be in place on Y view.  AP view possibly somewhat abnormal.  Possible lightbulb sign.  Given degree of pain, discussion had with patient and will attempt some skeletal traction in case of possible dislocation.  Given multiple IV pain medications with minimal improvement.  Skeletal traction applied with no significant improvement.  Repeat x-rays with shoulder that appears to be in place.  Possible rotator cuff injury.  Sling applied.  Prescription sent to pharmacy.  Work note.  Shoulder injury precautions. Discussed results and recommendations. Advised follow up PCP and ortho. Medication recommendations. Given instructions and return precautions. Patient/family at bedside  acknowledged understanding of all written and verbal instructions and return precautions. Discharged.     Amount and/or Complexity of Data Reviewed  Radiology: ordered.    Risk  Prescription drug management.                 Disposition  Final diagnoses:   Fall from standing, initial encounter   Acute pain of left shoulder   Injury of left shoulder, initial encounter     Time reflects when diagnosis was documented in both MDM as applicable and the Disposition within this note       Time User Action Codes Description Comment    7/20/2024  9:20 PM Erne, Jose Carlos Add [W19.XXXA] Fall from standing, initial encounter     7/20/2024  9:20 PM Erne, Jose Carlos Add [M25.512] Acute pain of left shoulder     7/20/2024  9:20 PM Erne, Jose Carlos Add [S49.92XA] Injury of left shoulder, initial encounter     7/20/2024  9:20 PM Erne, Jose Carlos Modify [W19.XXXA] Fall from standing, initial encounter     7/20/2024  9:20 PM Erne, Jose Carlos Modify [S49.92XA] Injury of left shoulder, initial encounter           ED Disposition       ED Disposition   Discharge    Condition   Stable    Date/Time   Sat Jul 20, 2024 2313    Comment   Garnerliat Garrett discharge to home/self care.                   Follow-up Information       Follow up With Specialties Details Why Contact Info Additional Information    Infolink  Call  for [phone]       [facility] Schedule an appointment as soon as possible for a visit in 1 week  [address] [address] [phone] [facility] [place] [address], [place], [place], [address] [phone]            Patient's Medications   Discharge Prescriptions    METHOCARBAMOL (ROBAXIN) 500 MG TABLET    Take 2 tablets (1,000 mg total) by mouth 3 (three) times a day as needed for muscle spasms (shoulder pain)       Start Date: 7/20/2024 End Date: --       Order Dose: 1,000 mg        Quantity: 30 tablet    Refills: 0       No discharge procedures on file.    PDMP Review       None            ED Provider  Electronically Signed by             Jose Carlos Xiao MD  07/20/24 7012

## 2024-07-29 RX ORDER — AMIODARONE HYDROCHLORIDE 200 MG/1
TABLET ORAL
Qty: 180 TABLET | Refills: 0 | Status: SHIPPED | OUTPATIENT
Start: 2024-07-29

## 2024-07-29 RX ORDER — MECLIZINE HCL 12.5 MG 12.5 MG/1
TABLET ORAL
Qty: 30 TABLET | Refills: 0 | Status: SHIPPED | OUTPATIENT
Start: 2024-07-29

## 2024-07-29 NOTE — TELEPHONE ENCOUNTER
Refill Routing Note   Medication(s) are not appropriate for processing by [facility] for the following reason(s):        Outside of protocol    [facility] action(s):  Route               Appointments  past 12m or future 3m with PCP    Date Provider   Last Visit   1/23/2024 Olvin Hutson MD   Next Visit   Visit date not found Olvin Hutson MD   ED visits in past 90 days: 0        Note composed:8:16 AM 07/29/2024

## 2024-07-31 ENCOUNTER — PATIENT MESSAGE (OUTPATIENT)
Dept: FAMILY MEDICINE | Facility: CLINIC | Age: 74
End: 2024-07-31
Payer: MEDICARE

## 2024-07-31 ENCOUNTER — EXTERNAL CHRONIC CARE MANAGEMENT (OUTPATIENT)
Dept: PRIMARY CARE CLINIC | Facility: CLINIC | Age: 74
End: 2024-07-31
Payer: MEDICARE

## 2024-07-31 PROCEDURE — 99490 CHRNC CARE MGMT STAFF 1ST 20: CPT | Mod: S$PBB,,, | Performed by: FAMILY MEDICINE

## 2024-07-31 PROCEDURE — 99490 CHRNC CARE MGMT STAFF 1ST 20: CPT | Mod: PBBFAC,PO | Performed by: FAMILY MEDICINE

## 2024-08-02 NOTE — PATIENT INSTRUCTIONS
Follow up in about 1 year (around 7/31/2021), or if symptoms worsen or fail to improve, for Annual Wellness Exam.     If no improvement in symptoms or symptoms worsen, please be advised to call MD, follow-up at clinic and/or go to ER if becomes severe.    Olvin Hutson M.D.        We Offer TELEHEALTH & Same Day Appointments!   Book your Telehealth appointment with me through my nurse or   Clinic appointments on [employer]!    [address] [place] [address]    Office: [phone]   FAX: [phone]    Check out my Facebook Page and Follow Me at: [email or web address]    Check out my website at [employer] by clicking on: [email or web address]    To Schedule appointments online, go to [facility] [email or web address]: [email or web address]    

## 2024-08-08 RX ORDER — MECLIZINE HCL 12.5 MG 12.5 MG/1
12.5 TABLET ORAL
Qty: 30 TABLET | Refills: 0 | Status: SHIPPED | OUTPATIENT
Start: 2024-08-08

## 2024-08-09 ENCOUNTER — OFFICE VISIT (OUTPATIENT)
Dept: PULMONOLOGY | Facility: CLINIC | Age: 74
End: 2024-08-09
Payer: MEDICARE

## 2024-08-09 VITALS
BODY MASS INDEX: 31.68 KG/M2 | SYSTOLIC BLOOD PRESSURE: 130 MMHG | OXYGEN SATURATION: 97 % | HEART RATE: 75 BPM | HEIGHT: 70 IN | RESPIRATION RATE: 18 BRPM | DIASTOLIC BLOOD PRESSURE: 68 MMHG | WEIGHT: 221.25 LBS

## 2024-08-09 DIAGNOSIS — J45.30 MILD PERSISTENT ASTHMA WITHOUT COMPLICATION: Primary | ICD-10-CM

## 2024-08-09 DIAGNOSIS — G47.33 OSA ON CPAP: ICD-10-CM

## 2024-08-09 PROCEDURE — 99214 OFFICE O/P EST MOD 30 MIN: CPT | Mod: PBBFAC | Performed by: PHYSICIAN ASSISTANT

## 2024-08-09 PROCEDURE — 99999 PR PBB SHADOW E&M-EST. PATIENT-LVL IV: CPT | Mod: PBBFAC,,, | Performed by: PHYSICIAN ASSISTANT

## 2024-08-09 RX ORDER — FLUTICASONE FUROATE 100 UG/1
1 POWDER RESPIRATORY (INHALATION) DAILY
Qty: 30 EACH | Refills: 11 | Status: SHIPPED | OUTPATIENT
Start: 2024-08-09

## 2024-08-09 RX ORDER — ALBUTEROL SULFATE 90 UG/1
1 INHALANT RESPIRATORY (INHALATION) DAILY
Qty: 54 G | Refills: 3 | Status: SHIPPED | OUTPATIENT
Start: 2024-08-09

## 2024-08-15 ENCOUNTER — TELEPHONE (OUTPATIENT)
Dept: CARDIOLOGY | Facility: CLINIC | Age: 74
End: 2024-08-15
Payer: MEDICARE

## 2024-08-15 NOTE — TELEPHONE ENCOUNTER
Looking for paperwork - approval for MRI needed- form was faxed to you Thursday morning  Please call pt back to update him on status    Thanks         ----- Message from Jane Isaac sent at 8/15/2024  4:55 PM CDT -----  Contact: marlen  Type:  Patient Returning Call    Who Called:marlen  Who Left Message for Patient:nurse  Does the patient know what this is regarding?:missed call  Would the patient rather a call back or a response via [facility]? call  Best Call Back Number:[phone]   Additional Information:  
no known allergies

## 2024-08-16 ENCOUNTER — PATIENT MESSAGE (OUTPATIENT)
Dept: CARDIOTHORACIC SURGERY | Facility: CLINIC | Age: 74
End: 2024-08-16
Payer: MEDICARE

## 2024-08-16 ENCOUNTER — TELEPHONE (OUTPATIENT)
Dept: CARDIOLOGY | Facility: HOSPITAL | Age: 74
End: 2024-08-16
Payer: MEDICARE

## 2024-08-16 NOTE — TELEPHONE ENCOUNTER
Returned call to patient, the MRI order form has been filled out and will be faxed to [place] once signed by Dr. Yung.  He verbalized understanding, all questions answered.

## 2024-08-16 NOTE — TELEPHONE ENCOUNTER
----- Message from Adeola Gonzalez LPN sent at 8/15/2024  3:37 PM CDT -----  Regarding: PT NEEDS MRI  Pt called stating he is admitted to [place] and needs the MRI form filled out before he can be discharged ./ YING CARRION

## 2024-08-21 ENCOUNTER — OFFICE VISIT (OUTPATIENT)
Dept: FAMILY MEDICINE | Facility: CLINIC | Age: 74
End: 2024-08-21
Payer: MEDICARE

## 2024-08-21 ENCOUNTER — HOSPITAL ENCOUNTER (OUTPATIENT)
Dept: RADIOLOGY | Facility: HOSPITAL | Age: 74
Discharge: HOME | End: 2024-08-21
Attending: PHYSICIAN ASSISTANT
Payer: MEDICARE

## 2024-08-21 VITALS
HEART RATE: 76 BPM | HEIGHT: 70 IN | OXYGEN SATURATION: 100 % | BODY MASS INDEX: 31.71 KG/M2 | WEIGHT: 221.5 LBS | SYSTOLIC BLOOD PRESSURE: 112 MMHG | DIASTOLIC BLOOD PRESSURE: 68 MMHG

## 2024-08-21 DIAGNOSIS — R42 DIZZINESS: ICD-10-CM

## 2024-08-21 DIAGNOSIS — I65.29 STENOSIS OF CAROTID ARTERY, UNSPECIFIED LATERALITY: ICD-10-CM

## 2024-08-21 DIAGNOSIS — Z09 HOSPITAL DISCHARGE FOLLOW-UP: Primary | ICD-10-CM

## 2024-08-21 DIAGNOSIS — H92.01 RIGHT EAR PAIN: ICD-10-CM

## 2024-08-21 DIAGNOSIS — G47.33 OSA ON CPAP: ICD-10-CM

## 2024-08-21 DIAGNOSIS — R26.89 IMBALANCE: ICD-10-CM

## 2024-08-21 DIAGNOSIS — Z87.81 HISTORY OF FRACTURE OF NASAL BONE: ICD-10-CM

## 2024-08-21 DIAGNOSIS — J45.30 MILD PERSISTENT ASTHMA WITHOUT COMPLICATION: ICD-10-CM

## 2024-08-21 DIAGNOSIS — Z87.898 HISTORY OF VERTIGO: ICD-10-CM

## 2024-08-21 DIAGNOSIS — I67.82 SUBCORTICAL MICROVASCULAR ISCHEMIC OCCLUSIVE DISEASE: ICD-10-CM

## 2024-08-21 DIAGNOSIS — J34.2 DEVIATED SEPTUM: ICD-10-CM

## 2024-08-21 DIAGNOSIS — H91.91 DECREASED HEARING OF RIGHT EAR: ICD-10-CM

## 2024-08-21 PROBLEM — E87.1 HYPONATREMIA: Status: ACTIVE | Noted: 2024-08-14

## 2024-08-21 PROCEDURE — 71046 X-RAY EXAM CHEST 2 VIEWS: CPT | Mod: TC,PO

## 2024-08-21 PROCEDURE — 99214 OFFICE O/P EST MOD 30 MIN: CPT | Mod: S$PBB,,, | Performed by: FAMILY MEDICINE

## 2024-08-21 PROCEDURE — 99999 PR PBB SHADOW E&M-EST. PATIENT-LVL V: CPT | Mod: PBBFAC,,, | Performed by: FAMILY MEDICINE

## 2024-08-21 PROCEDURE — G2211 COMPLEX E/M VISIT ADD ON: HCPCS | Mod: S$PBB,,, | Performed by: FAMILY MEDICINE

## 2024-08-21 PROCEDURE — 99215 OFFICE O/P EST HI 40 MIN: CPT | Mod: PBBFAC,PO | Performed by: FAMILY MEDICINE

## 2024-08-21 PROCEDURE — 71046 X-RAY EXAM CHEST 2 VIEWS: CPT | Mod: 26,,, | Performed by: RADIOLOGY

## 2024-08-21 RX ORDER — MECLIZINE HYDROCHLORIDE 25 MG/1
25 TABLET ORAL 3 TIMES DAILY PRN
Qty: 90 TABLET | Refills: 1 | Status: SHIPPED | OUTPATIENT
Start: 2024-08-21

## 2024-08-21 NOTE — ASSESSMENT & PLAN NOTE
Seen on recent MRI.  Referral to Neurology.  Continue to reduce stroke risk factors.  Continue anticoagulation with history of atrial fibrillation.

## 2024-08-21 NOTE — ASSESSMENT & PLAN NOTE
Transitional Care Note    Family and/or Caretaker present at visit?  No  Diagnostic tests reviewed/disposition: I have reviewed all completed as well as pending diagnostic tests at the time of discharge.  Disease/illness education:  Stroke-like symptoms/vertigo  Home health/community services discussion/referrals: Patient does not have home health established from hospital visit.  They do not need home health.  If needed, we will set up home health for the patient.   Establishment or re-establishment of referral orders for community resources: No other necessary community resources.   Discussion with other health care providers: No discussion with other health care providers necessary.

## 2024-08-21 NOTE — PROGRESS NOTES
PLAN:    Assessment & Plan  1. Vertigo.  Symptoms suggest a need for resetting of the inner ear crystals. He reports dizziness, particularly when closing his eyes and looking up. He will be referred to vestibular therapy through physical therapy. An ENT consultation will be arranged to address his ear pain, deviated septum, and vertigo. An audiology referral will be made for a hearing study. He is advised to continue using Flonase. The dosage of meclizine will be increased to 25 mg. If his condition does not improve, a neurology referral will be made. He should inform his ophthalmologist about his visual field deficits. He is advised to stop driving if he feels unable to do so safely. Should he experience any new stroke symptoms, he should immediately visit the ER.    2. Right Ear Pain.  He reports right ear pain with a sensation of fluid in the ear. The ear examination was normal with no signs of infection. An ENT consultation will be arranged to further evaluate the ear pain.    3. Decreased Hearing.  He reports decreased hearing in the right ear for the past 2 weeks. An audiology referral will be made for a hearing study. The ENT consultation will also address this issue.    4. Carotid Artery Plaque.  A CT angiogram of the head and neck showed a little plaque buildup in the carotid artery. No immediate intervention is required. An ultrasound will be scheduled in a year to monitor the plaque buildup.    5. Medication Management.  He is currently taking Xarelto once a day for anticoagulation due to his history of A. fib and chronic anticoagulation. This medication will be added to his list. He is also advised to check his medication list at home to ensure accuracy.    Stroke precautions.  ER precautions.  Follow-up with ophthalmology also for his visual disturbances.    Problem List Items Addressed This Visit       Hospital discharge follow-up - Primary     Transitional Care Note    Family and/or Caretaker present  at visit?  No  Diagnostic tests reviewed/disposition: I have reviewed all completed as well as pending diagnostic tests at the time of discharge.  Disease/illness education:  Stroke-like symptoms/vertigo  Home health/community services discussion/referrals: Patient does not have home health established from hospital visit.  They do not need home health.  If needed, we will set up home health for the patient.   Establishment or re-establishment of referral orders for community resources: No other necessary community resources.   Discussion with other health care providers: No discussion with other health care providers necessary.                  Right ear pain     Referral to ENT for further evaluation and treatment.  MRI brain showed no acute abnormality.  Set audiology consult also.  No infection on exam.  Referral to physical therapy for vestibular therapy.         Relevant Orders    Ambulatory referral/consult to ENT    Dizziness    Relevant Medications    meclizine (ANTIVERT) 25 mg tablet    Other Relevant Orders    Ambulatory referral/consult to ENT    Ambulatory referral/consult to Physical/Occupational Therapy    Ambulatory referral/consult to Neurology    Subcortical microvascular ischemic occlusive disease     Seen on recent MRI.  Referral to Neurology.  Continue to reduce stroke risk factors.  Continue anticoagulation with history of atrial fibrillation.           History of vertigo     Referral to vestibular therapy.  Increase meclizine 25 milligrams.         Relevant Orders    Ambulatory referral/consult to Physical/Occupational Therapy    Stenosis of carotid artery     Noted on CT angiogram of the neck.  Check ultrasound or CT of the neck in one year.  Follow-up sooner if any symptoms of carotid artery stenosis.  ER precautions.         History of fracture of nasal bone     Referral to ENT for further evaluation and treatment.         Deviated septum     Other Visit Diagnoses       Decreased hearing of  right ear        Relevant Orders    Ambulatory referral/consult to Audiology    Imbalance        Relevant Orders    Ambulatory referral/consult to Neurology          Future Appointments       Date Provider Specialty Appt Notes    8/21/2024  Radiology chest xray    8/26/2024  Cardiology   ABT/ICD  3mo post gen change    8/26/2024 Nirav Baldwin MD Cardiology hosp f/u - CVA?? 8/14/24  ABT/ICD  3mo post gen change    8/28/2024 Mattie Simon NP Otolaryngology dizziness    10/21/2024 Nuria Barrientos, DEEPALIP Neurology dizziness           Medication Management for assessment above:   Medication List with Changes/Refills   Current Medications    ACETAMINOPHEN (TYLENOL) 325 MG TABLET    Take 650 mg by mouth as needed.     ALBUTEROL (PROVENTIL/VENTOLIN HFA) 90 MCG/ACTUATION INHALER    Inhale 1 puff into the lungs once daily. rescue    ALPRAZOLAM (XANAX) 0.25 MG TABLET    Take 1 tablet (0.25 mg total) by mouth 2 (two) times daily as needed for Anxiety.    AMIODARONE (PACERONE) 200 MG TAB    TAKE 1 TABLET BY MOUTH TWICE DAILY FOR 6 WEEKS, THEN TAKE 1 AND 1/2 TABLETS DAILY    BEPOTASTINE BESILATE (BEPREVE) 1.5 % DROP    Bepreve 1.5 % eye drops   Apply by ophthalmic route as needed for 50 days.    CARVEDILOL (COREG) 25 MG TABLET    TAKE ONE TABLET BY MOUTH TWICE DAILY    CO-ENZYME Q-10 30 MG CAPSULE    Take 30 mg by mouth once daily.     FARXIGA 10 MG TABLET    TAKE 1 TABLET BY MOUTH ONCE DAILY    FERROUS SULFATE (FEOSOL) 325 MG (65 MG IRON) TAB TABLET    Take 325 mg by mouth daily with breakfast.    FLUTICASONE FUROATE (ARNUITY ELLIPTA) 100 MCG/ACTUATION INHALER    Inhale 1 puff into the lungs once daily.    FUROSEMIDE (LASIX) 40 MG TABLET    TAKE 1 TABLET BY MOUTH ONCE DAILY    GLUCOSAMINE-CHONDROITIN 500-400 MG TABLET    Take 1 tablet by mouth once daily.     MEXILETINE (MEXITIL) 150 MG CAP    Take 3 capsules by mouth every 8 hours.    MULTIVITAMIN (THERAGRAN) TABLET    Take 1 tablet by mouth once daily.     ONDANSETRON (ZOFRAN) 4 MG TABLET    Take 4 mg by mouth every 8 (eight) hours as needed for Nausea.    PRAVASTATIN (PRAVACHOL) 80 MG TABLET    Take 1 tablet (80 mg total) by mouth once daily.    RESTASIS 0.05 % OPHTHALMIC EMULSION    Place 1 drop into both eyes 2 (two) times daily.    RIVAROXABAN (XARELTO) 10 MG TAB    Take 10 mg by mouth daily with dinner or evening meal.    SACUBITRIL-VALSARTAN (ENTRESTO)  MG PER TABLET    Take 1 tablet by mouth 2 (two) times daily.    SPIRONOLACTONE (ALDACTONE) 25 MG TABLET    Take 1 tablet (25 mg total) by mouth once daily.   Changed and/or Refilled Medications    Modified Medication Previous Medication    MECLIZINE (ANTIVERT) 25 MG TABLET meclizine (ANTIVERT) 12.5 mg tablet       Take 1 tablet (25 mg total) by mouth 3 (three) times daily as needed for Dizziness or Nausea.    TAKE ONE TABLET BY MOUTH THREE TIMES DAILY AS NEEDED FOR DIZZINESS   Discontinued Medications    CEFADROXIL (DURICEF) 500 MG CAP    Take 4 capsules tonight at 8pm then 2 capsules every 12 hours until done       Olvin Hutson M.D.  ==========================================================================  Subjective:   Patient ID: Randy Yap is a 74 y.o. male.  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   Chief Complaint: Hospital Follow Up and Dizziness      History of Present Illness  The patient is a 74-year-old male who presents for a hospital follow-up. He visited the emergency room due to dizziness and is currently experiencing right ear pain.    On 08/14/2024, he sought medical attention at [facility] for symptoms resembling a stroke, including dizziness, a history of vertigo, and a pacemaker. He has a history of  "atrial fibrillation and is on chronic anticoagulation. An MRI was conducted due to the stroke alert, which ruled out a stroke but revealed mild chronic microvascular ischemic changes.    He experienced severe dizziness last Wednesday morning, nearly falling while attempting to use the bathroom. Despite taking his vertigo medication and meclizine, the dizziness persisted throughout the day, prompting a visit to the emergency room. A CT scan was performed, which did not show any acute findings. He continues to experience dizziness, although it has improved. He has not yet sought vestibular therapy. He experiences dizziness when closing his eyes and looking up.    He reports decreased hearing in his right ear for the past two weeks and impaired hearing in both ears. Meclizine 12.5 mg does not seem to alleviate his symptoms. He is currently on Xarelto 10 mg once daily and amiodarone. He does not report experiencing headaches or weakness on his right side but notes a slight drooping in the left corner of his mouth. He has an ophthalmologist and does not report any numbness.    He recalls tripping off his mower 2 to 3 months ago and hitting the back of his head.    He has a deviated septum and has had his nose broken several times. He has some sinus issues.    Problem List Items Addressed This Visit       Hospital discharge follow-up - Primary    Overview     There is no formal discharge summary available through epic at this time.  Reviewed most up-to-date progress note.  [facility] PROGRESS NOTE    Subjective  Subjective  No complaints this morning, wife at bedside. Awaiting MRI of brain.    Objective  Objective:  General Appearance: Comfortable, in no acute distress and not in pain.  Vitals /80  Pulse 72  Temp 97.9 °F (36.6 °C) (Axillary)  Resp 18  Ht 5' 10" (1.778 m)  Wt 218 lb 11.1 oz (99.2 kg)  SpO2 98%  BMI 31.38 kg/m² Vital signs are normal. No fever.  Output: Producing urine.  Lungs: Normal effort and " normal respiratory rate.  Heart: Normal rate. Regular rhythm.  Abdomen: Abdomen is soft. Bowel sounds are normal. There is no abdominal tenderness.  Extremities: Normal range of motion.  Neurological: Patient is alert and oriented to person, place and time.  Skin: Warm and dry.    Intake/Output for last 24 Hrs:  Date 08/14/24 0700 - 08/15/24 0659 08/15/24 0700 - 08/16/24 0659  Shift 8020-6971 8971-0463 24 Hour Total 9822-4353 4701-8861 24 Hour Total  INTAKE  P.O. 660 660  Shift Total 660 660  OUTPUT  Urine 700 700 300 300  Urine 700 700 300 300  Shift Total 700 700 300 300  NET -700 -700 360 360    Meds:  Current IV Meds:    Current Scheduled Meds:  amiodarone 50 mg Oral Daily  carvediloL 25 mg Oral BID WBS  enoxaparin 40 mg Subcutaneous Q24H GAUDENCIO  lactulose 20 g Oral Daily  mexiletine 300 mg Oral TID  multivitamin with folic acid 1 tablet Oral Daily  pravastatin 80 mg Oral HS  sodium chloride 0.9 % flush 3 mL Intra-Catheter Q12H GAUDENCIO  spironolactone 25 mg Oral BID  traZODone 300 mg Oral HS    Current Prn Meds:  acetaminophen  alum-mag hydroxide-simethicone  melatonin  ondansetron  sodium chloride 0.9 % flush  sodium chloride 0.9 % flush      Lab Results- Abnormal for last 24 Hrs:  Labs-Abnormals last 24 hr    Order Date Last Updated Procedure  08/14/2024 08/15/2024 0501 CBC with Differential [[id number]]  (Abnormal)  Blood  Component Value Ref Range & Units  WBC 8.4 4.4 - 11.2 10*3/uL  RBC 4.27 4.70 - 6.10 10*6/uL  HGB 14.1 14.0 - 18.0 g/dL  HCT 41.9 42.0 - 52.0 %  MCV 98.1 80.0 - 94.0 fL  MCH 33.0 27.0 - 31.0 pg  MCHC 33.7 33.0 - 37.0 g/dL  RDW 13.2 11.5 - 14.5 %  Platelet Count 330 130 - 375 10*3/uL  MPV 8.3 8.7 - 13.0 fL  Neutrophils Percent 56.0 36.0 - 66.0 %  Lymphocytes Percent 28.3 21.0 - 50.0 %  Monocytes Percent 13.0 2.0 - 10.0 %  Eosinophils Percent 1.0 0.0 - 10.0 %  Basophils Percent 0.7 0.0 - 1.0 %  Immature Granulocyte % 0.6 0.0 - 0.4 %  Neutrophils Absolute 4.7 1.4 - 6.5 10*3/uL  Lymphocytes Absolute  2.4 1.2 - 3.4 10*3/uL  Monocytes Absolute 1.1 0.1 - 1.0 10*3/uL  Eosinophils Absolute 0.1 0.0 - 0.7 10*3/uL  Basophils Absolute 0.1 0.0 - 0.2 10*3/uL  # Immature Granulocyte 0.05 0.00 - 0.03 10*3/uL      2024 08/15/2024 0523 CMP w/ Reflex to Mg [[id number]]  (Abnormal)  Blood  Component Value Ref Range & Units  Glucose 94 65 - 99 mg/dL  Sodium 136 136 - 144 mmol/L  Potassium 4.8 3.6 - 5.1 mmol/L  Chloride 106 101 - 111 mmol/L  CO2 25 22 - 32 mmol/L  BUN 13 8 - 20 mg/dL  Calcium 9.3 8.9 - 10.3 mg/dL  Creatinine 0.92 0.90 - 1.30 mg/dL  Albumin 3.7 3.5 - 4.8 g/dL  Total Bilirubin 0.4 0.4 - 2.0 mg/dL  ALKP 40 28 - 116 U/L  Total Protein 7.0 6.1 - 7.9 g/dL  ALT 25 5 - 56 U/L  AST 21 12 - 40 U/L  Anion Gap 5 7 - 16 mmol/L            Diagnostic Results for last 24 Hrs:  Echo Contrast    Result Date: 8/15/2024  Adult Echocardiogram Name: HARSHA ANTHONY Study Date: 08/15/2024 MRN: [id number] Age: 74 yrs Patient Location: [facility]^[id number] Height: 70 in : 1950 Weight: 218 lb Gender: Male BSA: 2.2 m2 Reason For Study: cva BP: 102/73 mmHg Ordering Physician: JAY HARRISON Performed By: Heidi Perez RDCS Interpretation Summary Definity UEA used to better evaluate LV function. Ejection Fraction = 25-30%. Left ventricular systolic function is moderate to severely reduced. Indeterminate Diastolic Function There is a pacemaker lead in the right ventricle. The right ventricular systolic function is normal. Trace aortic regurgitation. Measurements RVDd: 4.0 cm IVSd: 1.0 cm LVIDd: 6.5 cm LVPWd: 0.96 cm LVIDs: 5.2 cm LVOT diam: 2.4 cm LA dimension: 3.1 cm MMode/2D Measurements & Calculations FS: 19.7 % LVOT area: 4.4 cm2 EDV(Teich): 214.5 ml ESV(Teich): 129.5 ml EF(Teich): 39.6 % LVLd ap4: 10.2 cm EDV(MOD-sp2): 224.0 ml EDV(MOD-sp4): 224.0 ml LVLs ap4: 9.7 cm ESV(MOD-sp4): 175.0 ml EF(MOD-sp4): 21.9 % SV(MOD-sp4): 49.0 ml Ao Sinus Diam_phl: 3.6 cm  ______________________________________________________________________________ Aortic R-R: 0.80 sec RVIDd/LVIDd_phl: 0.62 ______________________________________________________________________________ TAPSE_phl: 1.4 cm Time Measurements Aortic HR: 75.0 BPM MV dec time: 0.12 sec Doppler Measurements & Calculations MV E max dina: 34.7 cm/sec Ao V2 max: 107.0 cm/sec MV A max dina: 82.8 cm/sec Ao max P.6 mmHg MV E/A: 0.42 Ao V2 mean: 71.9 cm/sec Ao mean P.0 mmHg Ao V2 VTI: 13.4 cm KWAKU(I,D): 3.9 cm2 KWAKU(V,D): 3.1 cm2 LV V1 max P.4 mmHg CO(LVOT): 3.9 l/min LV V1 mean P.0 mmHg CI(LVOT): 1.8 l/min/m2 LV V1 max: 76.8 cm/sec SV(LVOT): 52.2 ml LV V1 mean: 46.6 cm/sec LV V1 VTI: 11.9 cm ______________________________________________________________________________ AV VR_phl: 0.72 KWAKU(VTI)/BSA_phl: 1.9 Measurements from QLAB ED Current ()_phl: 60.0 % ED Default (HM)_phl: 60.0 % EDV (HM)_phl: 273.0 ml EF (HM)_phl: 37.0 % ES Current (HM)_phl: 30.0 % ES Default (HM)_phl: 30.0 % ESV (HM)_phl: 171.0 ml HR (HM)_phl: 76.0 BPM LV Length ED (HM)_phl: 109.0 mm LV Length ES (HM)_phl: 97.0 mm SV (HM)_phl: 102.0 ml LEFT VENTRICLE o The left ventricle is severely dilated. o Definity UEA used to better evaluate LV function. o Ejection Fraction = 25-30%. o Left ventricular systolic function is moderate to severely reduced. o There is normal left ventricular wall thickness. DIASTOLOGY o LAESV index = '23' ml/m2. o Lateral e'= '7' cm/s. o Septal e'= '5' cm/s. o E/e' ='6'. o Indeterminate Diastolic Function. RIGHT VENTRICLE o There is a pacemaker lead in the right ventricle. o The right ventricle is normal size. o The right ventricular systolic function is normal. ATRIA o The left atrial size is normal. o Right atrial size is normal. o IVC diameter < 2.1cm with > 50% collapse. MITRAL VALVE o The mitral valve leaflets appear normal. There is no evidence of stenosis, fluttering, or prolapse. o There is no mitral regurgitation noted.  o There is no mitral valve stenosis. TRICUSPID VALVE o The tricuspid valve is normal. o There is no tricuspid stenosis. AORTIC VALVE o Aortic Valve Grossly Normal. o Trace aortic regurgitation. o There is no aortic valvular stenosis. PULMONIC VALVE o The pulmonic valve is not well visualized. GREAT VESSELS o The aortic root is normal size. PERICARDIUM/PLEURAL EFFUSION o There is no pericardial effusion. ______________________________________________________________________________ Electronically signed by: Moses Molina 08/15/2024 11:13 AM InterpretingPhysician: Moses Molina, electronically signed on 2024-08-15 11:13:45.777      Assessment & Plan  Problem List:  Active Hospital Problems  Diagnosis Date Noted  Stroke-like symptoms 08/14/2024  Hyponatremia 08/14/2024  Essential hypertension, benign 03/12/2014  Hyperlipidemia 03/12/2014  HONEY on CPAP 03/12/2014    Resolved Hospital Problems    Assessment & Plan  Principal Problem:  Stroke-like symptoms  CVA protocol initiated  Neurology consulted  Allow permissive hypertension  Monitor on telemetry  Neurochecks every 4 hours  PT/OT/ST consulted  CT of head with no acute intracranial abnormality  CTA of the head and neck shows no acute arterial abnormality.  MRI of brain still pending, waiting for [employer] rep to clear defribrillator  TSH/A1C: within normal limits  Await further recommendations from Neurology    Active Problems:  HONEY on CPAP  Continue CPAP    Essential hypertension, benign  Continue home oral antihypertensive regimen    Hyperlipidemia  Continue statin    Hyponatremia  Sodium 134, mild    DVT Ppx  Lovenox              Electronically signed by Анна Mancia MD at 08/15/2024 6:16 PM CDT     Shar Martin MD - 08/16/2024  Formatting of this note might be different from the original.  REASON FOR EXAM: Stroke Alert    TECHNICAL FACTORS: Multiplanar, multisequence MRI of the brain was performed without administration of intravenous  contrast.    COMPARISON: March 12, 2014 and CT head August 14, 2024    FINDINGS: The ventricles are normal in size and position.  There is no evidence of acute intracranial hemorrhage or infarct.  There is no evidence of mass, mass effect, or midline shift.  T2 hyperintense signal is present in the periventricular and subcortical white matter. Postoperative changes of bilateral lens replacement are present. Mucosal thickening is present in ethmoid air cells. Mucus retention cyst is present left maxillary sinus. Normal flow voids are present.    IMPRESSION:   1.  No acute intracranial abnormality.     2.  Mild chronic microvascular ischemic disease.        Electronically signed by Shar Martin MD on 8/16/2024 2:21 PM  Exam End: 08/16/24 13:54    Specimen Collected: 08/16/24 14:16             Current Assessment & Plan     Transitional Care Note    Family and/or Caretaker present at visit?  No  Diagnostic tests reviewed/disposition: I have reviewed all completed as well as pending diagnostic tests at the time of discharge.  Disease/illness education:  Stroke-like symptoms/vertigo  Home health/community services discussion/referrals: Patient does not have home health established from hospital visit.  They do not need home health.  If needed, we will set up home health for the patient.   Establishment or re-establishment of referral orders for community resources: No other necessary community resources.   Discussion with other health care providers: No discussion with other health care providers necessary.                  Right ear pain    Overview     New problem.  Associated dizziness and decreased hearing.         Current Assessment & Plan     Referral to ENT for further evaluation and treatment.  MRI brain showed no acute abnormality.  Set audiology consult also.  No infection on exam.  Referral to physical therapy for vestibular therapy.         Dizziness    Overview     Associated with right ear pain and  decreased hearing.  Recent hospital visit.  See problem.         Subcortical microvascular ischemic occlusive disease    Overview     Shar Martin MD - 08/16/2024  Formatting of this note might be different from the original.  REASON FOR EXAM: Stroke Alert    TECHNICAL FACTORS: Multiplanar, multisequence MRI of the brain was performed without administration of intravenous contrast.    COMPARISON: March 12, 2014 and CT head August 14, 2024    FINDINGS: The ventricles are normal in size and position.  There is no evidence of acute intracranial hemorrhage or infarct.  There is no evidence of mass, mass effect, or midline shift.  T2 hyperintense signal is present in the periventricular and subcortical white matter. Postoperative changes of bilateral lens replacement are present. Mucosal thickening is present in ethmoid air cells. Mucus retention cyst is present left maxillary sinus. Normal flow voids are present.    IMPRESSION:   1.  No acute intracranial abnormality.     2.  Mild chronic microvascular ischemic disease.        Electronically signed by Shar Martin MD on 8/16/2024 2:21 PM  Exam End: 08/16/24 13:54            Current Assessment & Plan     Seen on recent MRI.  Referral to Neurology.  Continue to reduce stroke risk factors.  Continue anticoagulation with history of atrial fibrillation.           History of vertigo    Overview     Chronic.  Recurrent.  Patient states he did not have any nausea with this current episode of vertigo.  He is taking meclizine 12.5 milligram with no relief.  He has not been to vestibular therapy.         Current Assessment & Plan     Referral to vestibular therapy.  Increase meclizine 25 milligrams.         Stenosis of carotid artery    Overview     REASON FOR EXAM: Stroke Alert     TECHNICAL FACTORS: Multiple contiguous axial CT images were obtained of the head and neck after the administration of intravenous contrast. ASIR was utilized for radiation reduction. 2-D  sagittal, coronal and curvilinear reformatted images were   performed. 3-D volume rendering and MIP images were also obtained with postprocessing performed on an independent workstation under concurrent radiologist supervision.     COMPARISON: None     HEAD FINDINGS: Atherosclerosis of the cavernous and petrous portions internal carotid arteries without significant stenosis. The internal carotid, anterior cerebral, middle cerebral, and anterior communicating arteries are patent without evidence of   focal stenosis. Atherosclerosis of the intracranial vertebral arteries without a 50% stenosis. The basilar and posterior cerebral arteries are patent without evidence of focal stenosis. There is no evidence of aneurysm. Left maxillary sinus retention   cyst.     NECK FINDINGS: Atherosclerosis the proximal internal carotid artery is without significant stenosis. The common carotid are patent without significant stenosis. The vertebral and basilar arteries are patent without significant stenosis. The subclavian   arteries are patent without significant stenosis. Evaluation of the internal carotid arteries for determining clinically significant stenosis was performed by comparing the diameters of the proximal and distal internal carotid arteries.     IMPRESSION:    1.  No acute arterial abnormality.    2.  Atherosclerosis as above.       Electronically signed by Blayne Frias MD on 8/14/2024 12:50 PM          Current Assessment & Plan     Noted on CT angiogram of the neck.  Check ultrasound or CT of the neck in one year.  Follow-up sooner if any symptoms of carotid artery stenosis.  ER precautions.         History of fracture of nasal bone    Overview     Chronic.  Patient reports multiple fractures in the past.  History of nasal septum deviation.         Current Assessment & Plan     Referral to ENT for further evaluation and treatment.         Deviated septum     Other Visit Diagnoses       Decreased hearing of right  ear        Imbalance                 Review of patient's allergies indicates:  No Known Allergies  Current Outpatient Medications   Medication Instructions    acetaminophen (TYLENOL) 650 mg, Oral, As needed (PRN)    albuterol (PROVENTIL/VENTOLIN HFA) 90 mcg/actuation inhaler 1 puff, Inhalation, Daily, rescue    ALPRAZolam (XANAX) 0.25 mg, Oral, 2 times daily PRN    amiodarone (PACERONE) 200 MG Tab TAKE 1 TABLET BY MOUTH TWICE DAILY FOR 6 WEEKS, THEN TAKE 1 AND 1/2 TABLETS DAILY    bepotastine besilate (BEPREVE) 1.5 % Drop Bepreve 1.5 % eye drops   Apply by ophthalmic route as needed for 50 days.    carvediloL (COREG) 25 mg, Oral, 2 times daily    co-enzyme Q-10 30 mg, Oral, Daily    FARXIGA 10 mg tablet TAKE 1 TABLET BY MOUTH ONCE DAILY    ferrous sulfate (FEOSOL) 325 mg, Oral, With breakfast    fluticasone furoate (ARNUITY ELLIPTA) 100 mcg/actuation inhaler 1 puff, Inhalation, Daily    furosemide (LASIX) 40 mg, Oral    glucosamine-chondroitin 500-400 mg tablet 1 tablet, Oral, Daily    meclizine (ANTIVERT) 25 mg, Oral, 3 times daily PRN    mexiletine (MEXITIL) 150 MG Cap Take 3 capsules by mouth every 8 hours.    multivitamin (THERAGRAN) tablet 1 tablet, Oral, Daily,      ondansetron (ZOFRAN) 4 mg, Oral, Every 8 hours PRN    pravastatin (PRAVACHOL) 80 mg, Oral, Daily    RESTASIS 0.05 % ophthalmic emulsion 1 drop, Both Eyes, 2 times daily    rivaroxaban (XARELTO) 10 mg, Oral, With dinner    sacubitriL-valsartan (ENTRESTO)  mg per tablet 1 tablet, Oral, 2 times daily    spironolactone (ALDACTONE) 25 mg, Oral, Daily      I have reviewed the PMH, social history, FamilyHx, surgical history, allergies and medications documented / confirmed by the patient at the time of this visit.  Review of Systems   Constitutional:  Positive for fatigue. Negative for chills, fever and unexpected weight change.   HENT:  Negative for ear pain and sore throat.    Eyes:  Negative for redness and visual disturbance.   Respiratory:   "Negative for cough and shortness of breath.    Cardiovascular:  Negative for chest pain and palpitations.   Gastrointestinal:  Negative for nausea and vomiting.   Endocrine: Negative for cold intolerance and heat intolerance.   Genitourinary:  Negative for difficulty urinating and hematuria.   Musculoskeletal:  Negative for arthralgias and myalgias.   Skin:  Negative for rash and wound.   Allergic/Immunologic: Negative for environmental allergies and food allergies.   Neurological:  Positive for dizziness. Negative for tremors, seizures, syncope, facial asymmetry, speech difficulty, weakness, light-headedness, numbness and headaches.   Hematological:  Negative for adenopathy. Does not bruise/bleed easily.   Psychiatric/Behavioral:  Negative for sleep disturbance. The patient is not nervous/anxious.      Objective:   /68   Pulse 76   Ht 5' 10" (1.778 m)   Wt 100.5 kg (221 lb 8 oz)   SpO2 100%   BMI 31.78 kg/m²   Physical Exam  Vitals and nursing note reviewed.   Constitutional:       General: He is not in acute distress.     Appearance: He is well-developed. He is not diaphoretic.   HENT:      Head: Normocephalic and atraumatic.      Right Ear: Tympanic membrane, ear canal and external ear normal. Decreased hearing noted.      Left Ear: Hearing, tympanic membrane, ear canal and external ear normal.      Nose: Septal deviation present. No laceration, nasal tenderness, mucosal edema, congestion or rhinorrhea.      Right Turbinates: Enlarged.      Left Turbinates: Enlarged.   Eyes:      Extraocular Movements: Extraocular movements intact.      Pupils: Pupils are equal, round, and reactive to light.   Cardiovascular:      Rate and Rhythm: Normal rate.      Pulses: Normal pulses.   Pulmonary:      Effort: Pulmonary effort is normal. No respiratory distress.      Breath sounds: Normal breath sounds.   Abdominal:      General: Bowel sounds are normal.      Palpations: Abdomen is soft.   Musculoskeletal:         " General: Normal range of motion.      Cervical back: Normal range of motion and neck supple.   Skin:     General: Skin is warm and dry.      Capillary Refill: Capillary refill takes less than 2 seconds.      Findings: No rash.   Neurological:      General: No focal deficit present.      Mental Status: He is alert and oriented to person, place, and time.      Cranial Nerves: No cranial nerve deficit or dysarthria.      Sensory: Sensation is intact. No sensory deficit.      Motor: Motor function is intact. No weakness or tremor.      Coordination: Coordination normal.      Gait: Gait normal.      Comments: Slight lid droop on the left side, no other deficits noted.   Psychiatric:         Attention and Perception: He is attentive.         Mood and Affect: Mood normal. Mood is not anxious or depressed. Affect is not labile, blunt, angry or inappropriate.         Speech: He is communicative. Speech is not rapid and pressured, delayed, slurred or tangential.         Behavior: Behavior normal. Behavior is not agitated, slowed, aggressive, withdrawn, hyperactive or combative.         Thought Content: Thought content normal. Thought content is not paranoid or delusional. Thought content does not include homicidal or suicidal ideation. Thought content does not include homicidal or suicidal plan.         Cognition and Memory: Memory is not impaired.         Judgment: Judgment normal. Judgment is not impulsive or inappropriate.       Physical Exam  Nose is completely blocked on one side with a slight deviation. Ears show no signs of fluid or infection.  Lungs are clear.    Results  Imaging  MRI was negative for stroke but showed mild chronic microvascular ischemic changes. CT angiogram of the head and neck was negative, with a little plaque build up in the carotid artery. Chest x-ray showed no acute findings.    Assessment:     1. Hospital discharge follow-up    2. Right ear pain    3. Dizziness    4. Subcortical microvascular  ischemic occlusive disease    5. History of vertigo    6. Stenosis of carotid artery, unspecified laterality    7. History of fracture of nasal bone    8. Deviated septum    9. Decreased hearing of right ear    10. Imbalance      MDM:   Moderate to high medical complexity.  Moderate risk.  Total time: 27 minutes.  This includes total time spent on the encounter, which includes face to face time and non-face to face time preparing to see the patient (eg, review of previous medical records, tests), Obtaining and/or reviewing separately obtained history, documenting clinical information in the electronic or other health record, independently interpreting results (not separately reported)/communicating results to the patient/family/caregiver, and/or care coordination (not separately reported).    I have Reviewed and summarized old records.  I have performed thorough medication reconciliation today and discussed risk and benefits of medications.  I have reviewed labs and discussed with patient.  All questions were answered.  I am requesting old records and will review them once they are available.  [facility] discharge summary  Visit today included increased complexity associated with the care of the episodic problem see above assessment addressed and managing the longitudinal care of the patient due to the serious and/or complex managed problem(s) see above.  I have signed for the following orders AND/OR meds.  Orders Placed This Encounter   Procedures    Ambulatory referral/consult to ENT     Standing Status:   Future     Standing Expiration Date:   9/21/2025     Referral Priority:   Routine     Referral Type:   Consultation     Referral Reason:   Specialty Services Required     Requested Specialty:   Otolaryngology     Number of Visits Requested:   1    Ambulatory referral/consult to Physical/Occupational Therapy     Standing Status:   Future     Standing Expiration Date:   9/21/2025     Referral  Priority:   Urgent     Referral Type:   Physical Medicine     Referral Reason:   Specialty Services Required     Number of Visits Requested:   1    Ambulatory referral/consult to Audiology     Standing Status:   Future     Standing Expiration Date:   9/21/2025     Referral Priority:   Routine     Referral Type:   Audiology Exam     Referral Reason:   Specialty Services Required     Requested Specialty:   Audiology     Number of Visits Requested:   1    Ambulatory referral/consult to Neurology     Standing Status:   Future     Standing Expiration Date:   9/21/2025     Referral Priority:   Routine     Referral Type:   Consultation     Referral Reason:   Specialty Services Required     Requested Specialty:   Neurology     Number of Visits Requested:   1     Medications Ordered This Encounter   Medications    meclizine (ANTIVERT) 25 mg tablet     Sig: Take 1 tablet (25 mg total) by mouth 3 (three) times daily as needed for Dizziness or Nausea.     Dispense:  90 tablet     Refill:  1        Follow up in about 6 months (around 2/21/2025), or if symptoms worsen or fail to improve, for Med refills, LAB RESULTS.  Future Appointments       Date Provider Specialty Appt Notes    8/21/2024  Radiology chest xray    8/26/2024  Cardiology   ABT/ICD  3mo post gen change    8/26/2024 Nirav Baldwin MD Cardiology hosp f/u - CVA?? 8/14/24  ABT/ICD  3mo post gen change    8/28/2024 Mattie Simon NP Otolaryngology dizziness    10/21/2024 Nuria Barrientos, DEEPALIP Neurology dizziness          If no improvement in symptoms or symptoms worsen, advised to call/follow-up at clinic or go to ER. Patient voiced understanding and all questions/concerns were addressed.   DISCLAIMER: This note was compiled by using a speech recognition dictation system and therefore please be aware that typographical / speech recognition errors can and do occur.  Please contact me if you see any errors specifically.  Consent was obtained for YRN  recording system prior to the visit.    Olvin Hutson M.D.       Office: [phone] [address] [place] [address]  FAX: [phone]

## 2024-08-21 NOTE — ASSESSMENT & PLAN NOTE
Noted on CT angiogram of the neck.  Check ultrasound or CT of the neck in one year.  Follow-up sooner if any symptoms of carotid artery stenosis.  ER precautions.

## 2024-08-21 NOTE — PATIENT INSTRUCTIONS
Follow up in about 6 months (around 2/21/2025), or if symptoms worsen or fail to improve, for Med refills, LAB RESULTS.     Dear patient,   As a result of recent federal legislation (The Federal Cures Act), you may receive lab or pathology results from your visit in your [facility] account before your physician is able to contact you. Your physician or their representative will relay the results to you with their recommendations at their soonest availability.     If no improvement in symptoms or symptoms worsen, please be advised to call MD, follow-up at clinic and/or go to ER if becomes severe.    Olvin Hutson M.D.        We Offer TELEHEALTH & Same Day Appointments!   Book your Telehealth appointment with me through my nurse or   Clinic appointments on [employer]!    [address] [place] [address]    Office: [phone]   FAX: [phone]    Check out my Facebook Page and Follow Me at: [email or web address]    Check out my website at [email or web address] by clicking on: [email or web address]    To Schedule appointments online, go to [employer] [email or web address]

## 2024-08-21 NOTE — ASSESSMENT & PLAN NOTE
Referral to ENT for further evaluation and treatment.  MRI brain showed no acute abnormality.  Set audiology consult also.  No infection on exam.  Referral to physical therapy for vestibular therapy.

## 2024-08-23 ENCOUNTER — CLINICAL SUPPORT (OUTPATIENT)
Dept: REHABILITATION | Facility: HOSPITAL | Age: 74
End: 2024-08-23
Attending: FAMILY MEDICINE
Payer: MEDICARE

## 2024-08-23 DIAGNOSIS — R26.89 BALANCE PROBLEM: Primary | ICD-10-CM

## 2024-08-23 DIAGNOSIS — R42 DIZZINESS: ICD-10-CM

## 2024-08-23 DIAGNOSIS — Z87.898 HISTORY OF VERTIGO: ICD-10-CM

## 2024-08-23 PROCEDURE — 97161 PT EVAL LOW COMPLEX 20 MIN: CPT | Mod: PN

## 2024-08-23 PROCEDURE — 97112 NEUROMUSCULAR REEDUCATION: CPT | Mod: PN

## 2024-08-23 RX ORDER — SPIRONOLACTONE 25 MG/1
25 TABLET ORAL
Qty: 30 TABLET | Refills: 0 | Status: SHIPPED | OUTPATIENT
Start: 2024-08-23

## 2024-08-23 NOTE — PLAN OF CARE
[facility]   Physical Therapy Initial Evaluation     Date: 8/23/2024   Name: Randy Yap  [facility] Number: [id number]    Therapy Diagnosis:   Encounter Diagnoses   Name Primary?    Dizziness     History of vertigo     Balance problem Yes     Physician: Olvin Hutson MD    Physician Orders: PT Eval and Treat   Medical Diagnosis from Referral:   R42 (ICD-10-CM) - Dizziness   Z87.898 (ICD-10-CM) - History of vertigo   Evaluation Date: 8/23/2024  Authorization Period Expiration: 8/21/2025  Plan of Care Expiration: 10/23/2024  Progress Note Due: 9/23/2024  Visit # / Visits authorized: 1/ 1   FOTO: 1/ 3     Precautions: Standard and pacemaker    Time In: 10:00 am  Time Out: 11:00 am  Total Appointment Time (timed & untimed codes): 60 minutes      SUBJECTIVE   Date of onset: couple weeks ago    History of current condition - Randy reports: has history of vertigo and dizziness with recent exacerbation.  Patient reports having to go to ER on 8/14/24 due to really bad vertigo that would not dissipate.  Patient underwent several test to clear stroke.  Patient reports he has been taking Meclizine as needed for years and Dr Weldon recently upped his dosage.  He describes his dizziness as spinning and swaying but is different from his normal vertigo symptoms.  Patient feels like he has fluid in right ear, but MD reports no fluid.  Patient reports no specific trigger for his dizziness and his dizziness will last a few minutes to half hour.  He is getting dizzy most days, but not as severe    Falls: no recent, but tripped while getting off lawnmower and reports hitting the back of his head (about 4 months ago)    Imaging, MRI studies:   FINDINGS: The ventricles are normal in size and position.  There is no evidence of acute intracranial hemorrhage or infarct.  There is no evidence of mass, mass effect, or midline shift.  T2 hyperintense signal is present in the periventricular and    subcortical white matter. Postoperative changes of bilateral lens replacement are present. Mucosal thickening is present in ethmoid air cells. Mucus retention cyst is present left maxillary sinus. Normal flow voids are present.     Prior Therapy: no  Social History:  lives with their spouse  Occupation: retired professor   Prior Level of Function: independent  Current Level of Function: limiting his activity due to dizziness    Dizziness:  Current 0/10, worst 10/10, best 0/10   Description: spinning, rocking, swaying, off balance  Aggravating Factors: nothing  Easing Factors: sit still and take Meclizine    Patients goals: get rid of his dizziness     Medical History:   Past Medical History:   Diagnosis Date    Asthma     Cardiomyopathy due to systemic disease     Colon polyp 5/2013    repeat 5/2018    Depression, recurrent     Diastolic dysfunction     DJD (degenerative joint disease) of knee 10/14/2013    Hyperlipidemia     Hypertension     Murmur     Paroxysmal VT 5/21/2016    Pericarditis with effusion     Sleep apnea        Surgical History:   Randy Yap  has a past surgical history that includes Hernia repair; Pericardial window (12-15 years ago); tonsillectomy; Varicocelectomy; Colonoscopy w/ polypectomy (05/21/2013); pace maker  (10/06/2014); Cardiac defibrillator placement (10/06/2014); Left heart catheterization (Left, 07/09/2018); Eye surgery (Cataract); Tonsillectomy; Cosmetic surgery; Colonoscopy (N/A, 4/19/2023); and replacement of implantable cardioverter-defibrillator (icd) generator (Left, 5/7/2024).    Medications:   Randy has a current medication list which includes the following prescription(s): acetaminophen, albuterol, alprazolam, amiodarone, bepotastine besilate, carvedilol, co-enzyme q-10, farxiga, ferrous sulfate, arnuity ellipta, furosemide, glucosamine-chondroitin, meclizine, mexiletine, multivitamin, ondansetron, pravastatin, restasis, rivaroxaban, entresto, and  spironolactone.    Allergies:   Review of patient's allergies indicates:  No Known Allergies       OBJECTIVE     Posture:   Forward head/rounded shoulder posture     CROM:   Forward bendin degrees, increases dizziness (unsteady)  Backward bending: chin to chest no dizziness  Side bending (R): 15  degrees  Side bending (L): 15 degrees  Rotation (R): 55 degrees  Rotation (L): 55 degrees      Lower Extremity Strength  Right LE  Left LE    Knee extension: /5 Knee extension: 5   Knee flexion:  Knee flexion:    Hip flexion:  Hip flexion:    Hip extension:   Hip extension:    Hip abduction:  Hip abduction:    Hip adduction:  Hip adduction    Ankle dorsiflexion:  Ankle dorsiflexion:    Ankle plantarflexion:  Ankle plantarflexion:      Sensation: Light Touch: intact                 Occulomotoer Exam  Spontaneous Nystagmus: absent  Gaze-Evoked Nystagmus: absent  Eye movement range: normal    Central Tests:  Smooth Pursuit: horizontal: normal; vertical: normal  Saccades:    Right: normal   Left: normal   Up: normal   Down:  normal  VOR Cancellation: normal    Peripheral Tests:  Head Impulse Test: positive to right  Dynamic Visual Acuitiy (DVA): not tested  Head Shake Test: positive    BPPV testing:   Posterior canal:  Right Serjio-Hallpike: negative  Left Serjio-Hallpike: negative    Horizontal canal:   Roll Test Right: negative  Roll Test Left: negative    Balance Testing:     Single leg stance:  SLS (R): 1 seconds  SLS (L): 1 seconds    Sharpened Romberg:   EO: 7 seconds      mCTSIB:  Condition 1 (Normal vision, fixed support): 30 seconds  Condition 2 (absent vision, fixed support): 8 seconds  Condition 4 (normal vision, sway-referenced support): 30 seconds  Condition 5 (absent vision, sway-referenced support): 3 seconds    TU seconds    Dynamic Gait Index (DGI):  1. Gait level surface -  3  2. Change in gait speed - 3  3. Gait with horizontal head turns - 2  4. Gait with  vertical head turns - 2  5. Gait and pivot turn - 3  6. Step over obstacle - 2  7. Step around obstacle - 3  8. Steps - 2  Total 20/24 (<19/24 related to falls in older adults (vestibular included)       Intake Outcome Measure for FOTO Vestibular Survey    Therapist reviewed FOTO scores for Randy Yap on 8/23/2024.   FOTO documents entered into [employer] - see Media section.    Intake Score: 54%           TREATMENT     Total Treatment time (time-based codes) separate from Evaluation: 8 minutes     Randy received the treatments listed below:         neuromuscular re-education activities to improve: Balance, Coordination, Kinesthetic, Sense, Proprioception, and Posture for (8)  minutes., including:  - patient education, instruction and demonstration of HEP      PATIENT EDUCATION AND HOME EXERCISES     Education provided:   - role of PT and goals for therapy  - HEP    Written Home Exercises Provided: yes. Exercises were reviewed and Randy was able to demonstrate them prior to the end of the session.  Randy demonstrated good  understanding of the education provided. See EMR under Patient Instructions for exercises provided during therapy sessions.    ASSESSMENT     Randy is a 74 y.o. male referred to outpatient Physical Therapy with a medical diagnosis of dizziness, vertigo.  Patient presents with signs and symptoms consistent with the diagnosis.  Patient is noted to have positive peripheral testing, negative BPPV testing, and decreased balance testing.   Patient would benefit from vestibular therapy, balance training, LE and core strengthening, and canalith repositioning to decrease dizziness, improve balance, and functional mobility during ADL's.       Patient prognosis is Good.   Patientt will benefit from skilled outpatient Physical Therapy to address the deficits stated above and in the chart below, provide patient /family education, and to maximize patientt's level of independence.     Plan of care  discussed with patient: Yes  Patient's spiritual, cultural and educational needs considered and patient is agreeable to the plan of care and goals as stated below:     Anticipated Barriers for therapy: none    Medical Necessity is demonstrated by the following  History  Co-morbidities and personal factors that may impact the plan of care [x] LOW: no personal factors / co-morbidities  [] MODERATE: 1-2 personal factors / co-morbidities  [] HIGH: 3+ personal factors / co-morbidities    Moderate / High Support Documentation:   Co-morbidities affecting plan of care:     Personal Factors:   no deficits     Examination  Body Structures and Functions, activity limitations and participation restrictions that may impact the plan of care [x] LOW: addressing 1-2 elements  [] MODERATE: 3+ elements  [] HIGH: 4+ elements (please support below)    Moderate / High Support Documentation:      Clinical Presentation [x] LOW: stable  [] MODERATE: Evolving  [] HIGH: Unstable     Decision Making/ Complexity Score: low       Goals:  Short Term Goals: 4 weeks   Patient will be independent with initial HEP.  Patient will report a decrease in dizziness to 5/10 during normal daily activities.  Patient will improve condition 5 on mCTSIB from 3 seconds to 15 seconds to demonstrate improved use of vestibular system for balance.    Long Term Goals: 8 weeks   Patient will be compliant with updated HEP to promote the independent management of current diagnosis.  Patient will improve TUG time from 13 seconds to <10 seconds to demonstrate improved functional mobility.  Patient will report a decrease in dizziness to 0/10 during transfers and quick turns.  Patient will improve condition 5 on mCTSIB from 3 seconds to 30 seconds to demonstrate improved use of vestibular system for balance.  Patient will have improved FOTO status from 54 to 62 to demonstrate improved functional mobility.     PLAN   Plan of care Certification: 8/23/2024 to  10/23/2024.    Outpatient Physical Therapy 2 times weekly for 8 weeks to include the following interventions: Gait Training, Manual Therapy, Moist Heat/ Ice, Neuromuscular Re-ed, Patient Education, Therapeutic Activities, Therapeutic Exercise, and vestibular therapy .     Randy Adhikari, PT      I CERTIFY THE NEED FOR THESE SERVICES FURNISHED UNDER THIS PLAN OF TREATMENT AND WHILE UNDER MY CARE   Physician's comments:     Physician's Signature: ___________________________________________________

## 2024-08-26 ENCOUNTER — OFFICE VISIT (OUTPATIENT)
Dept: CARDIOLOGY | Facility: CLINIC | Age: 74
End: 2024-08-26
Attending: INTERNAL MEDICINE
Payer: MEDICARE

## 2024-08-26 ENCOUNTER — HOSPITAL ENCOUNTER (OUTPATIENT)
Dept: CARDIOLOGY | Facility: HOSPITAL | Age: 74
Discharge: HOME | End: 2024-08-26
Attending: INTERNAL MEDICINE
Payer: MEDICARE

## 2024-08-26 VITALS
DIASTOLIC BLOOD PRESSURE: 60 MMHG | BODY MASS INDEX: 31 KG/M2 | OXYGEN SATURATION: 98 % | SYSTOLIC BLOOD PRESSURE: 100 MMHG | WEIGHT: 216.06 LBS | HEART RATE: 75 BPM

## 2024-08-26 DIAGNOSIS — I48.0 PAF (PAROXYSMAL ATRIAL FIBRILLATION): Chronic | ICD-10-CM

## 2024-08-26 DIAGNOSIS — Z79.899 AT RISK FOR AMIODARONE TOXICITY WITH LONG TERM USE: Chronic | ICD-10-CM

## 2024-08-26 DIAGNOSIS — Z45.02 ICD (IMPLANTABLE CARDIOVERTER-DEFIBRILLATOR) DISCHARGE: ICD-10-CM

## 2024-08-26 DIAGNOSIS — R06.02 SOB (SHORTNESS OF BREATH): ICD-10-CM

## 2024-08-26 DIAGNOSIS — Z95.810 ICD (IMPLANTABLE CARDIOVERTER-DEFIBRILLATOR) IN PLACE: ICD-10-CM

## 2024-08-26 DIAGNOSIS — I65.29 STENOSIS OF CAROTID ARTERY, UNSPECIFIED LATERALITY: ICD-10-CM

## 2024-08-26 DIAGNOSIS — E66.9 OBESITY, CLASS I, BMI 30-34.9: ICD-10-CM

## 2024-08-26 DIAGNOSIS — I47.29 PAROXYSMAL VT: ICD-10-CM

## 2024-08-26 DIAGNOSIS — G47.33 OSA ON CPAP: ICD-10-CM

## 2024-08-26 DIAGNOSIS — I48.0 PAF (PAROXYSMAL ATRIAL FIBRILLATION): ICD-10-CM

## 2024-08-26 DIAGNOSIS — I47.29 PAROXYSMAL VT: Primary | Chronic | ICD-10-CM

## 2024-08-26 DIAGNOSIS — E11.65 TYPE 2 DIABETES MELLITUS WITH HYPERGLYCEMIA, WITHOUT LONG-TERM CURRENT USE OF INSULIN: Chronic | ICD-10-CM

## 2024-08-26 DIAGNOSIS — Z91.89 AT RISK FOR AMIODARONE TOXICITY WITH LONG TERM USE: Chronic | ICD-10-CM

## 2024-08-26 DIAGNOSIS — E78.5 HYPERLIPIDEMIA LDL GOAL <100: Chronic | ICD-10-CM

## 2024-08-26 DIAGNOSIS — I48.0 PAF (PAROXYSMAL ATRIAL FIBRILLATION): Primary | Chronic | ICD-10-CM

## 2024-08-26 DIAGNOSIS — Z87.898 HISTORY OF VERTIGO: ICD-10-CM

## 2024-08-26 DIAGNOSIS — I42.9 IDIOPATHIC CARDIOMYOPATHY: Chronic | ICD-10-CM

## 2024-08-26 DIAGNOSIS — I50.30 CHF WITH LEFT VENTRICULAR DIASTOLIC DYSFUNCTION, NYHA CLASS 2: Chronic | ICD-10-CM

## 2024-08-26 DIAGNOSIS — I42.9 IDIOPATHIC CARDIOMYOPATHY: ICD-10-CM

## 2024-08-26 DIAGNOSIS — I50.42 CHRONIC COMBINED SYSTOLIC AND DIASTOLIC CHF (CONGESTIVE HEART FAILURE): ICD-10-CM

## 2024-08-26 PROCEDURE — 99214 OFFICE O/P EST MOD 30 MIN: CPT | Mod: PBBFAC,25 | Performed by: INTERNAL MEDICINE

## 2024-08-26 PROCEDURE — 93010 ELECTROCARDIOGRAM REPORT: CPT | Mod: ,,, | Performed by: INTERNAL MEDICINE

## 2024-08-26 PROCEDURE — 93283 PRGRMG EVAL IMPLANTABLE DFB: CPT

## 2024-08-26 PROCEDURE — 99999 PR PBB SHADOW E&M-EST. PATIENT-LVL IV: CPT | Mod: PBBFAC,,, | Performed by: INTERNAL MEDICINE

## 2024-08-26 PROCEDURE — 93283 PRGRMG EVAL IMPLANTABLE DFB: CPT | Mod: 26,,, | Performed by: INTERNAL MEDICINE

## 2024-08-26 PROCEDURE — 99214 OFFICE O/P EST MOD 30 MIN: CPT | Mod: S$PBB,,, | Performed by: INTERNAL MEDICINE

## 2024-08-26 PROCEDURE — 93005 ELECTROCARDIOGRAM TRACING: CPT

## 2024-08-26 RX ORDER — FUROSEMIDE 40 MG/1
40 TABLET ORAL
Qty: 90 TABLET | Refills: 0 | Status: SHIPPED | OUTPATIENT
Start: 2024-08-26

## 2024-08-26 NOTE — PROGRESS NOTES
Subjective:   Patient ID:  Randy Yap is a 74 y.o. male     Chief complaint:  CHF/VT/ICD    HPI  73 year old male who presents to Arrhythmia clinic for 6 month follow up with device check. His current medical conditions include CHF, HFrEF of 30%, PVT on Amiodarone,HTN, HLP, Obesity, HONEY on CPAP  See granular details in my note from 10/1/21     Update 8/8/2022:  Had echo in Sept 2021:  >>  The left ventricle is moderately enlarged with concentric hypertrophy and moderately decreased systolic function.  Mild left atrial enlargement.  The estimated ejection fraction is 30%.  Grade I left ventricular diastolic dysfunction.  There is moderate left ventricular global hypokinesis.  Normal right ventricular size with normal right ventricular systolic function.  Mild mitral regurgitation.      Also, I had his echo reviewed by Dr EDWARDS and had a quantitative EF calculated - it was 38% - which was in keeping with Dr EDWARDS's qualitative estimation  I have reviewed the actual image of the ECG tracing obtained today and it shows NSR with normal intervals and occ PVC.     ICD eval today:  Device and leads implanted 10/6/2014   Abbott ICD Fortify Diego KHALIL 2357-40Q, SN: [id number]   A lead: Bennett Tendril STS 2088TC / 46cm, SN: [id number]   V lead: Bennett Durata 7120Q / 58 cm, SN: [id number]     All in good repair     Last BNP was 55 (10/1/21)     Clinically, he feels as well as he has ever had.  His CHF medications at all at maximized doses.  He has rare and mild lightheadedness.  Due to the extreme eat, has backed off on some of his outdoor exercise but he keeps quite active.  He has some family stress now due to the recent passing of his mother in law and the Alzheimer's of  his sister-in-law     Update 11/28/2022:  He has had issues with recurrent VF and shocks.  The most recent episode occurred on 10/22/2022.  Since then we realized that he was not taking enough amiodarone as the blood level was less than 0.2.  After reviewing  drug dosing with him it appeared that he was using the 100 mg tablets in do the 200.  Accordingly, and for a week now, we have increased his amiodarone which to 300 p.o. b.i.d. and will keep this for a total of 6 weeks.    Prior to increasing the amiodarone, he was having repetitive episodes of severe dizziness/near-syncope.  These were associated with palpitations and runs of monomorphic ventricular tachycardia that eventually would either be ATP terminated or terminate spontaneously as they were deliberately left untreated in the monitoring zone.  He now has had no such clinical events for a week or so.    Variation of his ICD diagnostics, suggest that with the addition of amiodarone, he may have now VT that are on detected because they are below the monitoring zone of 160.  Otherwise, now that he does not have the near syncopal events, he feels well.  His wife is wondering whether Ozempic has something to do with his VT VF as was started approximately a month prior to the VF episode.    >>  Change TDI to 123 for the monitoring zone, 160 for the VT 1 zone and 222 for the VF zone.  Otherwise, keep therapies unchanged.  Continue on 600 mg a day of amiodarone until December 31st as of January 1st decrease to 300 mg a day.  Get amiodarone level in 3 months.     Update 03/05/2023 :  Since the last visit, we have had multiple communications via the portal.  Is currently on amiodarone 300 mg per day.  He has not had new ICD shocks.    He has been losing weight (Ozempic).  He is down by 24 lb and currently weighs 248 lb.  This has all happened over the past 2 months.    He was started on dapagliflozin.  Initially he did not want to take it due to cost issues.  This has been resolved.   He feels reasonably well.  He can walk for 2 to 300 ft without issues.  He can now go up the stairs at home without stopping.  These are 16 steps.  He has had a lightheaded spell yesterday.  This was very fast.  ICD evaluation shows a 2nd  VT at the time of the complaint.  ICD evaluation was performed today.  All is in good repair.  The battery life is expected to be about 1.5 years.        Update 08/14/2023 :  Aside from a lot of illnesses in death in the family, he has been doing quite well.  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  He wants to go back and work is in which up.  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  He has no significant cardiovascular symptoms at this time.  He has had no ICD shocks.  ICD evaluation has shown a few nonsustained ventricular tachycardias that were not intervened upon and seemingly asymptomatic to the patient.  Battery longevity appears to be about 10 months to RRT.  Has no side effects from his medications.  He was started on Ozempic.  He is on 1 mg per week dose.  He has not lost much weight.  Has no side effects from semaglutide.       Update 8/25/23 :  After this last visit he went to the emergency room with syncope and ventricular tachycardia.  I saw him in the emergency room and had the following note:  After few years of relatively well since, he started having again ventricular tachycardias during a period of personal stress.  This was related to a VT that was not responsive to ATP and resulted in syncope and a rescue by an ICD shock.    He had another event that had been ATP lead.    He was supposed to see me on 08/18/2023 to deal with the stress.  However, on 08/17/2023, at 9:24 a.m. in the morning, while he was out by his truck, he suddenly passed out.  ICD evaluation revealed another VT episode that was terminated by ATP but only after syncope.  This was initially somewhat polymorphic but then organized somewhat and was responsive to ATP.  Concomitantly, his thoracic impedance is suggestive of some fluid overload.  Patient does not feel more short of breath than usual.  However on physical exam, his breath sounds are somewhat tight.  The for from the syncopal spell  resulted in various bumps and bruises in his back is now hurting him quite a bit with back spasms whenever he moves.     Assessment:  Recurrent VT.  The frequency qualifies as a VT storm.  Potential triggers include a recent increase in his trazodone dose as well as fluid retention.  Of note, his Mexitil level has been quite low at 0.3 despite 300 mg p.o. Q 8 hours.  This dose has not been increased because his VT had been under relative control for a long period of time.     Recommendation:  Admit for a period of observation.  At least 24 hours.  IV Lasix-40 mg, now  Increase Mexitil to 450  Q 8 .  Avoid any QT prolonging drugs including muscle spasm relievers.  Hot packs to bruised areas.  Measure serum phosphate.     The plan was implemented and he was diuresed.  He was discharged after medication adjustment and rhythm quiescence.     Today, he feels okay and is in somewhat better spirits.     >>  Now stable after a period of VT storm.   >>  Continue current treatment with the large dose mexiletine and carvedilol as well as Aldactone and amiodarone.  Continue CHF medications.        Update 02/19/2024 :  Has been doing great.  He has no cardiovascular complaints, no dyspnea on exertion.  He says he is unlimited.  As a result he significantly over exerted 3 weeks ago and ended up having ventricular tachycardia that had to be treated initially with ATP and then with a shock.  Currently, his only complaint is constipation.  This is likely related to amiodarone.  Patient's device (VVED)was fully evaluated today under my direct supervision and real-time feedback.  Summary of findings are as listed below:  Device is in good repair.   The battery is at MARTINEZ.MARTINEZ triggered 2/13/24.   The sensing and pacing thresholds are favorable with well maintained safety margins.   There was a treated VT episode on 1/29/24. @ 7:28pm. . -330ms, ATP x3, third one accelerates rhythm, CL now 290-300ms, 25J shock converts to ST 120s.    Also, @ 3 hrs earlier, 2 SVT on 1/29/24- ST 130s, total duration 6m 47s onset 4:23pm.  There were  device data indicating possible ongoing fluid retention .Jan 19-28 now back to normal.    :   Recommendation:  Device follow up as per clinic routine with remote and in house checks  >>  SVT and VT related to over exertion.  ICD at MARTINEZ.  Recently triggered.  >>   For now will not change antiarrhythmic therapy.    He will need elective ICD replacement.      Update 09/03/2024 :  He recently went to [facility] for dizziness.  He was admitted.  An echocardiogram was performed.  It was interpreted as having an ejection fraction 25 30% with fracture tilting was 20% and EF by Teichholz adjustment was 40%  Patient has been doing well from a CV point of view w/o c/o undue dyspnea on exertion, orthopnea, PND, leg edema, abdominal swelling chest pain, palpitations, syncope or near-syncope.      He had an ICD replacement in early May  Patient's device (VVED)was fully evaluated today under my direct supervision and real-time feedback.  Summary of findings are as listed below:  Device is in good repair.   The battery is not near MARTINEZ.  The sensing and pacing thresholds are favorable with well maintained safety margins.   There were no significant tachy-dysrhythmias noted.  There were no device data indicating possible ongoing fluid retention.    Recommendation:  Device follow up as per clinic routine with remote and in house checks    I have reviewed the actual image of the ECG tracing obtained today and it shows AAI pacing at 75 beats per minute with PA interval 230 milliseconds, IVCD/left bundle-branch block with QRS duration 140 milliseconds.      Current Outpatient Medications   Medication Sig    acetaminophen (TYLENOL) 325 MG tablet Take 650 mg by mouth as needed.     albuterol (PROVENTIL/VENTOLIN HFA) 90 mcg/actuation inhaler Inhale 1 puff into the lungs once daily. rescue    amiodarone (PACERONE) 200 MG Tab TAKE 1  TABLET BY MOUTH TWICE DAILY FOR 6 WEEKS, THEN TAKE 1 AND 1/2 TABLETS DAILY    bepotastine besilate (BEPREVE) 1.5 % Drop Bepreve 1.5 % eye drops   Apply by ophthalmic route as needed for 50 days.    carvediloL (COREG) 25 MG tablet TAKE ONE TABLET BY MOUTH TWICE DAILY    co-enzyme Q-10 30 mg capsule Take 30 mg by mouth once daily.     FARXIGA 10 mg tablet TAKE 1 TABLET BY MOUTH ONCE DAILY    ferrous sulfate (FEOSOL) 325 mg (65 mg iron) Tab tablet Take 325 mg by mouth daily with breakfast.    fluticasone furoate (ARNUITY ELLIPTA) 100 mcg/actuation inhaler Inhale 1 puff into the lungs once daily.    glucosamine-chondroitin 500-400 mg tablet Take 1 tablet by mouth once daily.     meclizine (ANTIVERT) 25 mg tablet Take 1 tablet (25 mg total) by mouth 3 (three) times daily as needed for Dizziness or Nausea.    mexiletine (MEXITIL) 150 MG Cap Take 3 capsules by mouth every 8 hours.    multivitamin (THERAGRAN) tablet Take 1 tablet by mouth once daily.    ondansetron (ZOFRAN) 4 MG tablet Take 4 mg by mouth every 8 (eight) hours as needed for Nausea.    pravastatin (PRAVACHOL) 80 MG tablet Take 1 tablet (80 mg total) by mouth once daily.    RESTASIS 0.05 % ophthalmic emulsion Place 1 drop into both eyes 2 (two) times daily.    rivaroxaban (XARELTO) 10 mg Tab Take 10 mg by mouth daily with dinner or evening meal.    sacubitriL-valsartan (ENTRESTO)  mg per tablet Take 1 tablet by mouth 2 (two) times daily.    spironolactone (ALDACTONE) 25 MG tablet TAKE 1 TABLET BY MOUTH ONCE DAILY    ALPRAZolam (XANAX) 0.25 MG tablet TAKE 1 TABLET BY MOUTH TWICE DAILY AS NEEDED FOR ANXIETY    furosemide (LASIX) 40 MG tablet TAKE 1 TABLET BY MOUTH ONCE DAILY     No current facility-administered medications for this visit.       Review of Systems     Constitutional: Reviewed  for decreased appetite, weight gain and weight loss.   HENT: Reviewed for nosebleeds.    Eyes:  Reviewed for blurred vision and visual disturbance.   Cardiovascular:  Reviewed for chest pain, claudication, cyanosis,dyspnea on exertion, leg swelling, orthopnea,paroxysmal nocturnal dyspnearregular heartbeats, palpitations, near-syncope, and syncope.   Respiratory: Reviewed for cough, shortness of breath, wheezing, sleep disturbances due to breathing and snoring, .    Endocrine: Reviewed for heat intolerance.   Hematologic/Lymphatic: Reviewed for easy bruisability/bleeding.   Skin: Reviewed for rash.   Musculoskeletal: Reviewed for muscle weakness and myalgias.   Gastrointestinal: Reviewed for abdominal pain, anorexia, melena, nausea and vomiting.   Genitourinary: Reviewed for hesitancy, frequency, nocturia and incontinence.   Neurological: Reviewed for excessive daytime sleepiness, dizziness, vertigo, weakness, headaches, loss of balance and seizures,   Psychiatric/Behavioral:  Reviewed for insomnia, altered mental status, depression, anxiety and nervousness.       All symptoms reviewed above were negative except for constipation, nocturia and arthritic complaints       Social History     Tobacco Use   Smoking Status Never   Smokeless Tobacco Never        Objective:     Physical Exam  Vitals and nursing note reviewed.   Constitutional:       Appearance: Normal appearance. He is well-developed.      Comments: overweight   HENT:      Head: Normocephalic and atraumatic.      Right Ear: External ear normal.      Left Ear: External ear normal.   Eyes:      Conjunctiva/sclera: Conjunctivae normal.      Left eye: Left conjunctiva is not injected. No hemorrhage.     Pupils: Pupils are equal, round, and reactive to light.   Neck:      Thyroid: No thyromegaly.      Vascular: No JVD.   Cardiovascular:      Rate and Rhythm: Normal rate and regular rhythm.      Chest Wall: PMI is not displaced.      Pulses: Intact distal pulses.           Carotid pulses are 2+ on the right side and 2+ on the left side.       Radial pulses are 2+ on the right side and 2+ on the left side.        Dorsalis pedis  pulses are 2+ on the right side and 2+ on the left side.        Posterior tibial pulses are 2+ on the right side and 2+ on the left side.      Heart sounds: Normal heart sounds. No midsystolic click and no opening snap. No murmur heard.     No friction rub. No gallop.   Pulmonary:      Effort: Pulmonary effort is normal. No respiratory distress.      Breath sounds: Normal breath sounds. No wheezing or rales.   Chest:      Chest wall: No tenderness.      Comments: Device pocket is in excellent repair.  Abdominal:      Palpations: Abdomen is soft. Abdomen is not rigid. There is no hepatomegaly.      Tenderness: There is no abdominal tenderness. There is no guarding.      Comments: Obese abdomen   Musculoskeletal:         General: No tenderness. Normal range of motion.      Cervical back: Neck supple.      Right knee: No swelling.      Left knee: No swelling.      Right lower leg: No swelling.      Left lower leg: No swelling.      Right ankle: No swelling.      Left ankle: No swelling.      Right foot: No swelling.      Left foot: No swelling.   Skin:     General: Skin is warm and dry.      Coloration: Skin is not pale.      Findings: No rash.   Neurological:      General: No focal deficit present.      Mental Status: He is alert and oriented to person, place, and time.      Cranial Nerves: No cranial nerve deficit.      Coordination: Coordination normal.      Deep Tendon Reflexes: Reflexes are normal and symmetric.   Psychiatric:         Mood and Affect: Mood normal.         Behavior: Behavior normal.       /60 (BP Location: Left arm, Patient Position: Sitting)   Pulse 75   Wt 98 kg (216 lb 0.8 oz)   SpO2 98%   BMI 31.00 kg/m²       Results for orders placed during the hospital encounter of 04/17/23    Echo    Interpretation Summary  · The left ventricle is moderately enlarged with eccentric hypertrophy and low normal systolic function.  · The estimated ejection fraction is 50-55%  · Normal left ventricular  diastolic function.  · Normal right ventricular size.  · The aortic valve appears structurally normal. There is normal leaflet mobility.  · Mild mitral regurgitation.  · Mild tricuspid regurgitation.  · Normal central venous pressure (3 mmHg).  · The estimated PA systolic pressure is 18 mmHg.  · Considerable improvement in systolic function compared to study of 1-    WBC   Date Value Ref Range Status   04/29/2024 8.73 3.90 - 12.70 K/uL Final     Hematocrit   Date Value Ref Range Status   04/29/2024 43.9 40.0 - 54.0 % Final     Hemoglobin   Date Value Ref Range Status   04/29/2024 14.3 14.0 - 18.0 g/dL Final     Lab Results   Component Value Date     04/29/2024     Lab Results   Component Value Date    CREATININE 0.77 (L) 08/16/2024    EGFRNORACEVR >60.0 04/29/2024    K 4.5 08/16/2024     Lab Results   Component Value Date    BNP 48 08/25/2023            reports current alcohol use.  Past Medical History:   Diagnosis Date    Asthma     Cardiomyopathy due to systemic disease     Colon polyp 5/2013    repeat 5/2018    Depression, recurrent     Diastolic dysfunction     DJD (degenerative joint disease) of knee 10/14/2013    Hyperlipidemia     Hypertension     Murmur     Paroxysmal VT 5/21/2016    Pericarditis with effusion     Sleep apnea      Past Surgical History:   Procedure Laterality Date    CARDIAC DEFIBRILLATOR PLACEMENT  10/06/2014    COLONOSCOPY N/A 4/19/2023    Procedure: COLONOSCOPY 4/4-cc/bt clearance recedived;  Surgeon: Isabella Del Cid MD;  Location: [facility] ENDO;  Service: Endoscopy;  Laterality: N/A;    COLONOSCOPY W/ POLYPECTOMY  05/21/2013    repeat 5/21/2018    COSMETIC SURGERY      EYE SURGERY  Cataract    HERNIA REPAIR      R inguinal    LEFT HEART CATHETERIZATION Left 07/09/2018    Procedure: CATHETERIZATION, HEART, LEFT;  Surgeon: Macey Dos Santos MD;  Location: [facility] CATH LAB;  Service: Cardiology;  Laterality: Left;  left radial approach  Has St gissel ICD    pace maker   10/06/2014     PERICARDIAL WINDOW  12-15 years ago    REPLACEMENT OF IMPLANTABLE CARDIOVERTER-DEFIBRILLATOR (ICD) GENERATOR Left 5/7/2024    Procedure: REPLACEMENT, ICD GENERATOR;  Surgeon: Nirav Baldwin MD;  Location: [facility] CATH LAB;  Service: Cardiology;  Laterality: Left;  CB  MRI safe  2/27/23-sinus @ 73, intact conduction 200-210ms  Device and leads implanted 10/6/14  Abbott ICD Fortify [address] DR 2357-40Q, [id number]  A lead: Bennett Tendril STS 2088TC/46cm, [id number]  V lead: Bennett Durata 7120Q/58cm, [id number]    tonsillectomy      TONSILLECTOMY      VARICOCELECTOMY       Family History   Problem Relation Name Age of Onset    Hyperlipidemia Mother Arline E Clau     Arrhythmia Mother Arline E [place]     Arthritis Mother Arline E [place]     Asthma Mother Arline E [place]au     COPD Mother Arline E [place]     Heart disease Father John Fracisco 48    Heart attack Father John Fracisco 48    Hypertension Father John Fracisco     Arthritis Father John Fracisco     Diabetes Father John Fracisco     Hypertension Maternal Grandmother Ree Villasenorard     Hypertension Maternal Grandfather Madhav Sandisfield     Heart disease Paternal Grandmother Rozina S Fracisco     Hypertension Paternal Grandmother Rozina S Fracisco     Heart attack Paternal Grandmother Rozina S Fracisoc     Stroke Paternal Grandmother Rozina S Fracisco     Heart disease Paternal Grandfather Jeremy Fracisco     Hypertension Paternal Grandfather Jeremy Fracisco     Heart attack Paternal Grandfather Jeremy Fracisco     Cancer Paternal Grandfather Jeremy Fracisco        Assessment:    No cardiac complaints  EF interpretation from outside records suggest low EF and we have noted in our system.  On the other hand, there are internal discrepancies in that report  1. Paroxysmal VT    2. SOB (shortness of breath)    3. PAF (paroxysmal atrial fibrillation)    4. CHF with left ventricular diastolic dysfunction, NYHA class 2    5. At risk for amiodarone toxicity with  long term use    6. History of vertigo    7. Hyperlipidemia LDL goal <100    8. Idiopathic cardiomyopathy    9. HONEY on CPAP    10. Obesity, Class I, BMI 30-34.9    11. Stenosis of carotid artery, unspecified laterality    12. Type 2 diabetes mellitus with hyperglycemia, without long-term current use of insulin    13. ICD (implantable cardioverter-defibrillator) in place        Plan:    I discussed routine device follow up including quarterly to bi-annual device checks for device function as well as yearly follow up in the EP clinic. The patient  was advised to call with any concerns regarding their device. Device clinic follow up as scheduled. RTC 1y        No orders of the defined types were placed in this encounter.      No follow-ups on file.    There are no discontinued medications.         Medication List with Changes/Refills   Current Medications    ACETAMINOPHEN (TYLENOL) 325 MG TABLET    Take 650 mg by mouth as needed.     ALBUTEROL (PROVENTIL/VENTOLIN HFA) 90 MCG/ACTUATION INHALER    Inhale 1 puff into the lungs once daily. rescue    AMIODARONE (PACERONE) 200 MG TAB    TAKE 1 TABLET BY MOUTH TWICE DAILY FOR 6 WEEKS, THEN TAKE 1 AND 1/2 TABLETS DAILY    BEPOTASTINE BESILATE (BEPREVE) 1.5 % DROP    Bepreve 1.5 % eye drops   Apply by ophthalmic route as needed for 50 days.    CARVEDILOL (COREG) 25 MG TABLET    TAKE ONE TABLET BY MOUTH TWICE DAILY    CO-ENZYME Q-10 30 MG CAPSULE    Take 30 mg by mouth once daily.     FARXIGA 10 MG TABLET    TAKE 1 TABLET BY MOUTH ONCE DAILY    FERROUS SULFATE (FEOSOL) 325 MG (65 MG IRON) TAB TABLET    Take 325 mg by mouth daily with breakfast.    FLUTICASONE FUROATE (ARNUITY ELLIPTA) 100 MCG/ACTUATION INHALER    Inhale 1 puff into the lungs once daily.    GLUCOSAMINE-CHONDROITIN 500-400 MG TABLET    Take 1 tablet by mouth once daily.     MECLIZINE (ANTIVERT) 25 MG TABLET    Take 1 tablet (25 mg total) by mouth 3 (three) times daily as needed for Dizziness or Nausea.     MEXILETINE (MEXITIL) 150 MG CAP    Take 3 capsules by mouth every 8 hours.    MULTIVITAMIN (THERAGRAN) TABLET    Take 1 tablet by mouth once daily.    ONDANSETRON (ZOFRAN) 4 MG TABLET    Take 4 mg by mouth every 8 (eight) hours as needed for Nausea.    PRAVASTATIN (PRAVACHOL) 80 MG TABLET    Take 1 tablet (80 mg total) by mouth once daily.    RESTASIS 0.05 % OPHTHALMIC EMULSION    Place 1 drop into both eyes 2 (two) times daily.    RIVAROXABAN (XARELTO) 10 MG TAB    Take 10 mg by mouth daily with dinner or evening meal.    SACUBITRIL-VALSARTAN (ENTRESTO)  MG PER TABLET    Take 1 tablet by mouth 2 (two) times daily.    SPIRONOLACTONE (ALDACTONE) 25 MG TABLET    TAKE 1 TABLET BY MOUTH ONCE DAILY   Changed and/or Refilled Medications    Modified Medication Previous Medication    ALPRAZOLAM (XANAX) 0.25 MG TABLET ALPRAZolam (XANAX) 0.25 MG tablet       TAKE 1 TABLET BY MOUTH TWICE DAILY AS NEEDED FOR ANXIETY    Take 1 tablet (0.25 mg total) by mouth 2 (two) times daily as needed for Anxiety.    FUROSEMIDE (LASIX) 40 MG TABLET furosemide (LASIX) 40 MG tablet       TAKE 1 TABLET BY MOUTH ONCE DAILY    TAKE 1 TABLET BY MOUTH ONCE DAILY       This note is at least partially dictated using the M*Modal Fluency Direct word recognition program. There are word recognition mistakes that are occasionally missed on review.      Pre-visit chart review: 15 min    Face to face time: 15 min, > 50% of which spent in education    I have reviewed the actual images of all relevant ECG, rhythm, holter and long term monitoring tracings available in EPIC, MUSE  and in [employer] as well as outside records.   I have reviewed the actual images of all relevant CXRays.   I have directly evaluated all relevant data pertaining to any CIED.     Post visit chart documentation and care coordination: 10 min    I

## 2024-08-27 ENCOUNTER — CLINICAL SUPPORT (OUTPATIENT)
Dept: REHABILITATION | Facility: HOSPITAL | Age: 74
End: 2024-08-27
Payer: MEDICARE

## 2024-08-27 DIAGNOSIS — R26.89 BALANCE PROBLEM: ICD-10-CM

## 2024-08-27 DIAGNOSIS — Z87.898 HISTORY OF VERTIGO: ICD-10-CM

## 2024-08-27 DIAGNOSIS — R42 DIZZINESS: Primary | ICD-10-CM

## 2024-08-27 LAB
OHS QRS DURATION: 146 MS
OHS QTC CALCULATION: 513 MS

## 2024-08-27 PROCEDURE — 97112 NEUROMUSCULAR REEDUCATION: CPT | Mod: PN

## 2024-08-27 NOTE — PROGRESS NOTES
"[facility]   Physical Therapy Treatment Note     Name: Randy Yap  Clinic Number: [id number]    Therapy Diagnosis:   Encounter Diagnoses   Name Primary?    Dizziness Yes    History of vertigo     Balance problem      Physician: Olvin Hutson MD    Visit Date: 8/27/2024    Physician Orders: PT Eval and Treat   Medical Diagnosis from Referral:   R42 (ICD-10-CM) - Dizziness   Z87.898 (ICD-10-CM) - History of vertigo   Evaluation Date: 8/23/2024  Authorization Period Expiration: 12/31/2024  Plan of Care Expiration: 10/23/2024  Progress Note Due: 9/23/2024  Visit # / Visits authorized: 1/20 (1/ 1 eval)   FOTO: 1/ 3      Precautions: Standard and pacemaker    Time In: 10:00 am  Time Out: 11:00 am  Total Billable Time: 30 minutes      SUBJECTIVE     Pt reports: he has been performing exercises and not having complaint of dizziness.  He was compliant with home exercise program.  Response to previous treatment: evaluation  Functional change: in progress    Dizziness: 0/10       OBJECTIVE     Objective Measures updated at progress report unless specified.       TREATMENT       Randy received the treatments listed below:       therapeutic exercises to develop strength, endurance, ROM, flexibility, and core stabilization for (0)  minutes including:       manual therapy techniques applied for (0) minutes, including:      neuromuscular re-education activities to improve: Balance, Coordination, Kinesthetic, Sense, Proprioception, and Posture for (30)  minutes., including:  Romberg (head turns, looking up/down, eyes closed) 2 x 30" each  Sharpened Romberg 2 x 30" each way  Single leg balance 3 x 15" each  Tandem gait x 3 laps in parallel bars  Marching gait x 3 laps in parallel bars  Retro gait x 3 laps in parallel bars  Standing hip abduction 2 x 10 each, yellow band around ankles  Standing hip extension 2 x 10 each, yellow band around ankles      dynamic functional therapeutic activities to " improve functional performance for (6)  minutes, including:  Sit to stand from 20 inch box 1 x 10  Step ups 2 x 10 each     *A portion of this treatment session is provided with the assistance of a skilled rehbailitation technician under the supervision of a licensed physical therapist        PATIENT EDUCATION AND HOME EXERCISES     Home Exercises Provided and Patient Education Provided     Education/Self-Care provided:  Patient educated on biomechanical justification for therapeutic exercise and importance of compliance with HEP in order to improve overall impairments and QOL   Patient was educated on all the above exercise prior/during/after for proper posture, positioning, and execution for safe performance with home exercise program.   Patient educated on the importance of strong core and lower extremity musculature in order to improve both static and dynamic balance, improve gait mechanics, reduce fall risk and improve household and community mobility.       Written Home Exercises Provided: Patient instructed to cont prior HEP. Exercises were reviewed and Randy was able to demonstrate them prior to the end of the session.  Randy demonstrated good  understanding of the education provided. See EMR under Patient Instructions for exercises provided during therapy sessions    ASSESSMENT     Randy required frequent verbal cues for core activation to help maintain center of gravity throughout balance exercises.  He has a lot of difficulty with single leg balance as well as marching gait.  Patient will continue with balance and strengthening exercises to improve dynamic balance and decrease fall risk.     Randy Is progressing well towards his goals.   Pt prognosis is Good.     Pt will continue to benefit from skilled outpatient physical therapy to address the deficits listed in the problem list box on initial evaluation, provide pt/family education and to maximize pt's level of independence in the home and  community environment.     Pt's spiritual, cultural and educational needs considered and pt agreeable to plan of care and goals.     Anticipated barriers to physical therapy: none    Goals:   Short Term Goals: 4 weeks   Patient will be independent with initial HEP. In Progress, Not Met  Patient will report a decrease in dizziness to 5/10 during normal daily activities.  In Progress, Not Met  Patient will improve condition 5 on mCTSIB from 3 seconds to 15 seconds to demonstrate improved use of vestibular system for balance.  In Progress, Not Met     Long Term Goals: 8 weeks   Patient will be compliant with updated HEP to promote the independent management of current diagnosis.  In Progress, Not Met  Patient will improve TUG time from 13 seconds to <10 seconds to demonstrate improved functional mobility.  In Progress, Not Met  Patient will report a decrease in dizziness to 0/10 during transfers and quick turns.  In Progress, Not Met  Patient will improve condition 5 on mCTSIB from 3 seconds to 30 seconds to demonstrate improved use of vestibular system for balance.  In Progress, Not Met  Patient will have improved FOTO status from 54 to 62 to demonstrate improved functional mobility.   In Progress, Not Met    PLAN     Continue with PT POC and progress as tolerated.     Randy Adhikari, PT

## 2024-08-28 ENCOUNTER — OFFICE VISIT (OUTPATIENT)
Dept: OTOLARYNGOLOGY | Facility: CLINIC | Age: 74
End: 2024-08-28
Payer: MEDICARE

## 2024-08-28 VITALS — HEIGHT: 70 IN | WEIGHT: 218.94 LBS | BODY MASS INDEX: 31.34 KG/M2

## 2024-08-28 DIAGNOSIS — R42 DIZZINESS: ICD-10-CM

## 2024-08-28 PROCEDURE — 99213 OFFICE O/P EST LOW 20 MIN: CPT | Mod: S$PBB,,, | Performed by: NURSE PRACTITIONER

## 2024-08-28 PROCEDURE — 99999 PR PBB SHADOW E&M-EST. PATIENT-LVL IV: CPT | Mod: PBBFAC,,, | Performed by: NURSE PRACTITIONER

## 2024-08-28 PROCEDURE — 99214 OFFICE O/P EST MOD 30 MIN: CPT | Mod: PBBFAC,PO | Performed by: NURSE PRACTITIONER

## 2024-08-28 RX ORDER — ALPRAZOLAM 0.25 MG/1
0.25 TABLET ORAL 2 TIMES DAILY
Qty: 60 TABLET | Refills: 0 | Status: SHIPPED | OUTPATIENT
Start: 2024-08-28

## 2024-08-28 NOTE — PROGRESS NOTES
Subjective     Patient ID: Randy Yap is a 74 y.o. male.    Chief Complaint: Dizziness and Ear Problem (Pt states might be some blockage in tubes)    HPI  Patient is new to ENT, referred by Dr. Hutson for consultation for dizziness. Comorbidities include atrial and ventricular fibrillation, ventricular tachycardia, anemia, morbid obesity, asthma, HONEY, CHF, Type 2 DM, HTN. Patient went to [facility] 2 weeks ago for dizziness and stroke-like symptoms, and was admitted for observation: CT of head with no acute intracranial abnormality. CTA of the head and neck shows no acute arterial abnormality. MRI of brain no acute intracranial abnormality. TSH/A1C: within normal limits. Neurology was consulted and suspected symptoms were related to vertigo. Patient states 2 weeks ago when he got up to use the restroom, he felt vertigo. Took meclizine. Resolved in about a day. No vertigo or meclizine in the past 1.5 weeks.     Review of Systems   Constitutional: Negative.    HENT: Negative.     Eyes: Negative.    Respiratory: Negative.     Cardiovascular: Negative.    Gastrointestinal: Negative.    Musculoskeletal: Negative.    Integumentary:  Negative.   Neurological: Negative.    Hematological: Negative.    Psychiatric/Behavioral: Negative.            Objective     Physical Exam  Vitals and nursing note reviewed.   Constitutional:       General: He is not in acute distress.     Appearance: He is well-developed. He is not ill-appearing.   HENT:      Head: Normocephalic and atraumatic.      Right Ear: Hearing, tympanic membrane, ear canal and external ear normal. No middle ear effusion. Tympanic membrane is not erythematous.      Left Ear: Hearing, tympanic membrane, ear canal and external ear normal.  No middle ear effusion. Tympanic membrane is not erythematous.      Nose: Nose normal.   Eyes:      General: Lids are normal. No scleral icterus.        Right eye: No discharge.         Left eye: No discharge.   Neck:       Trachea: Trachea normal. No tracheal deviation.   Cardiovascular:      Rate and Rhythm: Normal rate.   Pulmonary:      Effort: Pulmonary effort is normal. No respiratory distress.      Breath sounds: No stridor. No wheezing.   Musculoskeletal:         General: Normal range of motion.      Cervical back: Normal range of motion.   Skin:     General: Skin is warm and dry.   Neurological:      Mental Status: He is alert and oriented to person, place, and time.      Coordination: Coordination is intact.      Gait: Gait normal.   Psychiatric:         Attention and Perception: Attention normal.         Mood and Affect: Mood normal.         Speech: Speech normal.         Behavior: Behavior normal. Behavior is cooperative.     Negative Bristol-Hallpike AU     Assessment and Plan     1. Dizziness  Overview:  Associated with right ear pain and decreased hearing.  Recent hospital visit.  See problem.    Orders:  -     Ambulatory referral/consult to ENT      Patient currently undergoing VRT through PT   No evidence of any BPPV bilaterally at this time  Discussed performing Serjio-Hallpike at home to determine laterality should vertigo resume.   Patient agrees to return to me if vertigo resumes. Briefly discussed VNG at our [place] office with Grecia Meyer if vertigo resumes and negative for BPPV again.        No follow-ups on file.

## 2024-08-29 ENCOUNTER — CLINICAL SUPPORT (OUTPATIENT)
Dept: REHABILITATION | Facility: HOSPITAL | Age: 74
End: 2024-08-29
Payer: MEDICARE

## 2024-08-29 DIAGNOSIS — R42 DIZZINESS: Primary | ICD-10-CM

## 2024-08-29 DIAGNOSIS — R26.89 BALANCE PROBLEM: ICD-10-CM

## 2024-08-29 DIAGNOSIS — Z87.898 HISTORY OF VERTIGO: ICD-10-CM

## 2024-08-29 PROCEDURE — 97112 NEUROMUSCULAR REEDUCATION: CPT | Mod: PN

## 2024-08-29 PROCEDURE — 95992 CANALITH REPOSITIONING PROC: CPT | Mod: PN

## 2024-08-29 NOTE — PROGRESS NOTES
"[facility]   Physical Therapy Treatment Note     Name: Randy Yap  Clinic Number: [id number]    Therapy Diagnosis:   Encounter Diagnoses   Name Primary?    Dizziness Yes    History of vertigo     Balance problem        Physician: Olvin Hutson MD    Visit Date: 8/29/2024    Physician Orders: PT Eval and Treat   Medical Diagnosis from Referral:   R42 (ICD-10-CM) - Dizziness   Z87.898 (ICD-10-CM) - History of vertigo   Evaluation Date: 8/23/2024  Authorization Period Expiration: 12/31/2024  Plan of Care Expiration: 10/23/2024  Progress Note Due: 9/23/2024  Visit # / Visits authorized: 1/20 (1/ 1 eval)   FOTO: 1/ 3      Precautions: Standard and pacemaker    Time In: 10:00 am  Time Out: 11:00 am  Total Billable Time: 30 minutes      SUBJECTIVE     Pt reports: he had some dizziness when he got home following last treatment.  He has some spinning dizziness this morning.  He was compliant with home exercise program.  Response to previous treatment: evaluation  Functional change: in progress    Dizziness: 0/10       OBJECTIVE     Objective Measures updated at progress report unless specified.     Positive lester hallpike to right    TREATMENT       Randy received the treatments listed below:       therapeutic exercises to develop strength, endurance, ROM, flexibility, and core stabilization for (0)  minutes including:       manual therapy techniques applied for (0) minutes, including:      neuromuscular re-education activities to improve: Balance, Coordination, Kinesthetic, Sense, Proprioception, and Posture for (30)  minutes., including:  Romberg (head turns, looking up/down, eyes closed) 2 x 30" each  Sharpened Romberg 2 x 30" each way  Single leg balance 3 x 15" each  Tandem gait x 3 laps in cross walk  Marching gait x 3 laps in cross walk  Elias Daroff exercise x 5 min  Stepping over hurdles on turf x 2 laps  Rocker board x 2 min      dynamic functional therapeutic activities to " improve functional performance for (6)  minutes, including:  Sit to stand from 20 inch box 2 x 10  Step ups 2 x 10 each     Canalith repositioning maneuver to right ear x 1 rep    *A portion of this treatment session is provided with the assistance of a skilled rehbailitation technician under the supervision of a licensed physical therapist        PATIENT EDUCATION AND HOME EXERCISES     Home Exercises Provided and Patient Education Provided     Education/Self-Care provided:  Patient educated on biomechanical justification for therapeutic exercise and importance of compliance with HEP in order to improve overall impairments and QOL   Patient was educated on all the above exercise prior/during/after for proper posture, positioning, and execution for safe performance with home exercise program.   Patient educated on the importance of strong core and lower extremity musculature in order to improve both static and dynamic balance, improve gait mechanics, reduce fall risk and improve household and community mobility.       Written Home Exercises Provided: Patient instructed to cont prior HEP. Exercises were reviewed and Randy was able to demonstrate them prior to the end of the session.  Randy demonstrated good  understanding of the education provided. See EMR under Patient Instructions for exercises provided during therapy sessions    ASSESSMENT     Randy had increased dizziness during Elias Daroff and tandem gait exercises.  He had positive lester hallpike and received canalith repositioning maneuver to right x 1 rep.  Patient has difficulty with progression to Rocker board and continued difficulty with single leg balance.     Randy Is progressing well towards his goals.   Pt prognosis is Good.     Pt will continue to benefit from skilled outpatient physical therapy to address the deficits listed in the problem list box on initial evaluation, provide pt/family education and to maximize pt's level of independence  in the home and community environment.     Pt's spiritual, cultural and educational needs considered and pt agreeable to plan of care and goals.     Anticipated barriers to physical therapy: none    Goals:   Short Term Goals: 4 weeks   Patient will be independent with initial HEP. In Progress, Not Met  Patient will report a decrease in dizziness to 5/10 during normal daily activities.  In Progress, Not Met  Patient will improve condition 5 on mCTSIB from 3 seconds to 15 seconds to demonstrate improved use of vestibular system for balance.  In Progress, Not Met     Long Term Goals: 8 weeks   Patient will be compliant with updated HEP to promote the independent management of current diagnosis.  In Progress, Not Met  Patient will improve TUG time from 13 seconds to <10 seconds to demonstrate improved functional mobility.  In Progress, Not Met  Patient will report a decrease in dizziness to 0/10 during transfers and quick turns.  In Progress, Not Met  Patient will improve condition 5 on mCTSIB from 3 seconds to 30 seconds to demonstrate improved use of vestibular system for balance.  In Progress, Not Met  Patient will have improved FOTO status from 54 to 62 to demonstrate improved functional mobility.   In Progress, Not Met    PLAN     Continue with PT POC and progress as tolerated.     Randy Adhikari, PT

## 2024-08-31 ENCOUNTER — EXTERNAL CHRONIC CARE MANAGEMENT (OUTPATIENT)
Dept: PRIMARY CARE CLINIC | Facility: CLINIC | Age: 74
End: 2024-08-31
Payer: MEDICARE

## 2024-08-31 LAB
OHS CV AF BURDEN PERCENT: < 1
OHS CV DC REMOTE DEVICE TYPE: NORMAL
OHS CV RV PACING PERCENT: 0 %

## 2024-08-31 PROCEDURE — 99490 CHRNC CARE MGMT STAFF 1ST 20: CPT | Mod: PBBFAC,PO | Performed by: FAMILY MEDICINE

## 2024-08-31 PROCEDURE — 99439 CHRNC CARE MGMT STAF EA ADDL: CPT | Mod: PBBFAC,PO | Performed by: FAMILY MEDICINE

## 2024-08-31 PROCEDURE — 99490 CHRNC CARE MGMT STAFF 1ST 20: CPT | Mod: S$PBB,,, | Performed by: FAMILY MEDICINE

## 2024-08-31 PROCEDURE — 99439 CHRNC CARE MGMT STAF EA ADDL: CPT | Mod: S$PBB,,, | Performed by: FAMILY MEDICINE

## 2024-09-03 ENCOUNTER — CLINICAL SUPPORT (OUTPATIENT)
Dept: REHABILITATION | Facility: HOSPITAL | Age: 74
End: 2024-09-03
Payer: MEDICARE

## 2024-09-03 DIAGNOSIS — R26.89 BALANCE PROBLEM: ICD-10-CM

## 2024-09-03 DIAGNOSIS — R42 DIZZINESS: Primary | ICD-10-CM

## 2024-09-03 DIAGNOSIS — Z87.898 HISTORY OF VERTIGO: ICD-10-CM

## 2024-09-03 PROBLEM — Z95.810 ICD (IMPLANTABLE CARDIOVERTER-DEFIBRILLATOR) IN PLACE: Status: ACTIVE | Noted: 2024-09-03

## 2024-09-03 PROCEDURE — 97112 NEUROMUSCULAR REEDUCATION: CPT | Mod: PN

## 2024-09-03 NOTE — PROGRESS NOTES
"[facility]   Physical Therapy Treatment Note     Name: Randy Yap  Clinic Number: [id number]    Therapy Diagnosis:   Encounter Diagnoses   Name Primary?    Dizziness Yes    History of vertigo     Balance problem          Physician: Olvin Hutson MD    Visit Date: 9/3/2024    Physician Orders: PT Eval and Treat   Medical Diagnosis from Referral:   R42 (ICD-10-CM) - Dizziness   Z87.898 (ICD-10-CM) - History of vertigo   Evaluation Date: 8/23/2024  Authorization Period Expiration: 12/31/2024  Plan of Care Expiration: 10/23/2024  Progress Note Due: 9/23/2024  Visit # / Visits authorized: 3/20 (1/ 1 eval)   FOTO: 1/ 3      Precautions: Standard and pacemaker    Time In: 10:00 am  Time Out: 11:00 am  Total Billable Time: 30 minutes      SUBJECTIVE     Pt reports: he had one brief episode of dizziness while walking to his bedroom since last visit.    He was compliant with home exercise program.  Response to previous treatment: no increased dizziness  Functional change: in progress    Dizziness: 0/10       OBJECTIVE     Objective Measures updated at progress report unless specified.     TREATMENT       Randy received the treatments listed below:       therapeutic exercises to develop strength, endurance, ROM, flexibility, and core stabilization for (0)  minutes including:       manual therapy techniques applied for (0) minutes, including:      neuromuscular re-education activities to improve: Balance, Coordination, Kinesthetic, Sense, Proprioception, and Posture for (30)  minutes., including:  Romberg on Airex pad (head turns, looking up/down) 2 x 30" each  Romberg eyes closed 2 x 30"  Sharpened Romberg 2 x 30" each way  Single leg balance 3 x 15" each  Tandem gait x 3 laps in cross walk  Marching gait x 3 laps in cross walk  Rocker board x 2 min  Resisted side stepping x 10 laps in parallel bars with yellow band around ankles  Alternating toe taps onto 8 inch step x 10 " reps      dynamic functional therapeutic activities to improve functional performance for (6)  minutes, including:  Sit to stand from chair 2 x 10  Step ups 2 x 10 each       *A portion of this treatment session is provided with the assistance of a skilled rehbailitation technician under the supervision of a licensed physical therapist        PATIENT EDUCATION AND HOME EXERCISES     Home Exercises Provided and Patient Education Provided     Education/Self-Care provided:  Patient educated on biomechanical justification for therapeutic exercise and importance of compliance with HEP in order to improve overall impairments and QOL   Patient was educated on all the above exercise prior/during/after for proper posture, positioning, and execution for safe performance with home exercise program.   Patient educated on the importance of strong core and lower extremity musculature in order to improve both static and dynamic balance, improve gait mechanics, reduce fall risk and improve household and community mobility.       Written Home Exercises Provided: Patient instructed to cont prior HEP. Exercises were reviewed and Randy was able to demonstrate them prior to the end of the session.  Randy demonstrated good  understanding of the education provided. See EMR under Patient Instructions for exercises provided during therapy sessions    ASSESSMENT     Randy is reporting decreased dizziness, but continues to have trouble with his balance.  He continues to have difficulty maintaining center of gravity during tandem stance and tandem gait as well as difficulty balancing on single leg or with marching gait.  Patient requires frequent rest breaks due to shortness of breath, but reports no episodes of dizziness during today's treatment.       Randy Is progressing well towards his goals.   Pt prognosis is Good.     Pt will continue to benefit from skilled outpatient physical therapy to address the deficits listed in the  problem list box on initial evaluation, provide pt/family education and to maximize pt's level of independence in the home and community environment.     Pt's spiritual, cultural and educational needs considered and pt agreeable to plan of care and goals.     Anticipated barriers to physical therapy: none    Goals:   Short Term Goals: 4 weeks   Patient will be independent with initial HEP. In Progress, Not Met  Patient will report a decrease in dizziness to 5/10 during normal daily activities.  In Progress, Not Met  Patient will improve condition 5 on mCTSIB from 3 seconds to 15 seconds to demonstrate improved use of vestibular system for balance.  In Progress, Not Met     Long Term Goals: 8 weeks   Patient will be compliant with updated HEP to promote the independent management of current diagnosis.  In Progress, Not Met  Patient will improve TUG time from 13 seconds to <10 seconds to demonstrate improved functional mobility.  In Progress, Not Met  Patient will report a decrease in dizziness to 0/10 during transfers and quick turns.  In Progress, Not Met  Patient will improve condition 5 on mCTSIB from 3 seconds to 30 seconds to demonstrate improved use of vestibular system for balance.  In Progress, Not Met  Patient will have improved FOTO status from 54 to 62 to demonstrate improved functional mobility.   In Progress, Not Met    PLAN     Continue with PT POC and progress as tolerated.     Randy Adhikari, PT

## 2024-09-05 ENCOUNTER — CLINICAL SUPPORT (OUTPATIENT)
Dept: REHABILITATION | Facility: HOSPITAL | Age: 74
End: 2024-09-05
Payer: MEDICARE

## 2024-09-05 ENCOUNTER — PATIENT MESSAGE (OUTPATIENT)
Dept: CARDIOLOGY | Facility: HOSPITAL | Age: 74
End: 2024-09-05
Payer: MEDICARE

## 2024-09-05 DIAGNOSIS — R26.89 BALANCE PROBLEM: ICD-10-CM

## 2024-09-05 DIAGNOSIS — R42 DIZZINESS: Primary | ICD-10-CM

## 2024-09-05 DIAGNOSIS — Z87.898 HISTORY OF VERTIGO: ICD-10-CM

## 2024-09-05 PROCEDURE — 97112 NEUROMUSCULAR REEDUCATION: CPT | Mod: PN

## 2024-09-05 PROCEDURE — 97530 THERAPEUTIC ACTIVITIES: CPT | Mod: PN

## 2024-09-05 NOTE — PROGRESS NOTES
"[facility]   Physical Therapy Treatment Note     Name: Randy Yap  Clinic Number: [id number]    Therapy Diagnosis:   Encounter Diagnoses   Name Primary?    Dizziness Yes    History of vertigo     Balance problem          Physician: Olvin Hutson MD    Visit Date: 9/5/2024    Physician Orders: PT Eval and Treat   Medical Diagnosis from Referral:   R42 (ICD-10-CM) - Dizziness   Z87.898 (ICD-10-CM) - History of vertigo   Evaluation Date: 8/23/2024  Authorization Period Expiration: 12/31/2024  Plan of Care Expiration: 10/23/2024  Progress Note Due: 9/23/2024  Visit # / Visits authorized: 4/20 (1/ 1 eval)   FOTO: 1/ 3      Precautions: Standard and pacemaker    Time In: 10:00 am  Time Out: 11:00 am  Total Billable Time: 45 minutes      SUBJECTIVE     Pt reports: he has not had any dizziness since his last visit and has been able to perform yard work and working in his shop.  He was compliant with home exercise program.  Response to previous treatment: no increased dizziness  Functional change: in progress    Dizziness: 0/10       OBJECTIVE     Objective Measures updated at progress report unless specified.     TREATMENT       Randy received the treatments listed below:       therapeutic exercises to develop strength, endurance, ROM, flexibility, and core stabilization for (0)  minutes including:       manual therapy techniques applied for (0) minutes, including:      neuromuscular re-education activities to improve: Balance, Coordination, Kinesthetic, Sense, Proprioception, and Posture for (35)  minutes., including:  Romberg on Airex pad (head turns, looking up/down) 2 x 30" each  Romberg eyes closed 2 x 30"  Sharpened Romberg 2 x 30" each way  Single leg balance 3 x 15" each  Tandem gait x 3 laps in cross walk  Marching gait x 3 laps in cross walk  Retro gait x 3 laps in parallel bars  Standing hip abduction 2 x 10 each, red band around ankles  Standing hip extension 2 x 10 each, " red band around ankles  Resisted side stepping x 5 laps in parallel bars with red band around ankles  Alternating toe taps onto 8 inch step x 10 reps  Walking on [facility] balance beam x 5 laps in parallel bars      dynamic functional therapeutic activities to improve functional performance for (10)  minutes, including:  Step ups 2 x 10 each   Shuttle (B) press 2 x 10, 1 black + 1 red cord  Shuttle (U) press 2 x 10, 1 red cord        PATIENT EDUCATION AND HOME EXERCISES     Home Exercises Provided and Patient Education Provided     Education/Self-Care provided:  Patient educated on biomechanical justification for therapeutic exercise and importance of compliance with HEP in order to improve overall impairments and QOL   Patient was educated on all the above exercise prior/during/after for proper posture, positioning, and execution for safe performance with home exercise program.   Patient educated on the importance of strong core and lower extremity musculature in order to improve both static and dynamic balance, improve gait mechanics, reduce fall risk and improve household and community mobility.       Written Home Exercises Provided: Patient instructed to cont prior HEP. Exercises were reviewed and Randy was able to demonstrate them prior to the end of the session.  Randy demonstrated good  understanding of the education provided. See EMR under Patient Instructions for exercises provided during therapy sessions    ASSESSMENT     Randy was able to work in his yard and in his shop without dizziness or loss of balance.  He continues to have difficulty with static balance in narrow base of support.         Randy Is progressing well towards his goals.   Pt prognosis is Good.     Pt will continue to benefit from skilled outpatient physical therapy to address the deficits listed in the problem list box on initial evaluation, provide pt/family education and to maximize pt's level of independence in the home and  community environment.     Pt's spiritual, cultural and educational needs considered and pt agreeable to plan of care and goals.     Anticipated barriers to physical therapy: none    Goals:   Short Term Goals: 4 weeks   Patient will be independent with initial HEP. In Progress, Not Met  Patient will report a decrease in dizziness to 5/10 during normal daily activities.  In Progress, Not Met  Patient will improve condition 5 on mCTSIB from 3 seconds to 15 seconds to demonstrate improved use of vestibular system for balance.  In Progress, Not Met     Long Term Goals: 8 weeks   Patient will be compliant with updated HEP to promote the independent management of current diagnosis.  In Progress, Not Met  Patient will improve TUG time from 13 seconds to <10 seconds to demonstrate improved functional mobility.  In Progress, Not Met  Patient will report a decrease in dizziness to 0/10 during transfers and quick turns.  In Progress, Not Met  Patient will improve condition 5 on mCTSIB from 3 seconds to 30 seconds to demonstrate improved use of vestibular system for balance.  In Progress, Not Met  Patient will have improved FOTO status from 54 to 62 to demonstrate improved functional mobility.   In Progress, Not Met    PLAN     Continue with PT POC and progress as tolerated.     Randy Adhikari, PT

## 2024-09-09 ENCOUNTER — CLINICAL SUPPORT (OUTPATIENT)
Dept: CARDIOLOGY | Facility: HOSPITAL | Age: 74
End: 2024-09-09
Payer: MEDICARE

## 2024-09-09 DIAGNOSIS — Z95.810 PRESENCE OF AUTOMATIC (IMPLANTABLE) CARDIAC DEFIBRILLATOR: ICD-10-CM

## 2024-09-09 DIAGNOSIS — I48.0 PAROXYSMAL ATRIAL FIBRILLATION: ICD-10-CM

## 2024-09-10 ENCOUNTER — CLINICAL SUPPORT (OUTPATIENT)
Dept: REHABILITATION | Facility: HOSPITAL | Age: 74
End: 2024-09-10
Payer: MEDICARE

## 2024-09-10 DIAGNOSIS — Z87.898 HISTORY OF VERTIGO: ICD-10-CM

## 2024-09-10 DIAGNOSIS — R42 DIZZINESS: Primary | ICD-10-CM

## 2024-09-10 DIAGNOSIS — R26.89 BALANCE PROBLEM: ICD-10-CM

## 2024-09-10 PROCEDURE — 97530 THERAPEUTIC ACTIVITIES: CPT | Mod: PN

## 2024-09-10 PROCEDURE — 97112 NEUROMUSCULAR REEDUCATION: CPT | Mod: PN

## 2024-09-10 NOTE — PROGRESS NOTES
"[facility]   Physical Therapy Treatment Note     Name: Randy Yap  Clinic Number: [id number]    Therapy Diagnosis:   Encounter Diagnoses   Name Primary?    Dizziness Yes    History of vertigo     Balance problem          Physician: Olvin Hutson MD    Visit Date: 9/10/2024    Physician Orders: PT Eval and Treat   Medical Diagnosis from Referral:   R42 (ICD-10-CM) - Dizziness   Z87.898 (ICD-10-CM) - History of vertigo   Evaluation Date: 8/23/2024  Authorization Period Expiration: 12/31/2024  Plan of Care Expiration: 10/23/2024  Progress Note Due: 9/23/2024  Visit # / Visits authorized: 5/20 (1/ 1 eval)   FOTO: 1/ 3      Precautions: Standard and pacemaker    Time In: 10:00 am  Time Out: 11:00 am  Total Billable Time: 30 minutes      SUBJECTIVE     Pt reports: he has not had any dizziness since his last visit.  He was compliant with home exercise program.  Response to previous treatment: no increased dizziness  Functional change: in progress    Dizziness: 0/10       OBJECTIVE     Objective Measures updated at progress report unless specified.     TREATMENT       Randy received the treatments listed below:       therapeutic exercises to develop strength, endurance, ROM, flexibility, and core stabilization for (0)  minutes including:       manual therapy techniques applied for (0) minutes, including:      neuromuscular re-education activities to improve: Balance, Coordination, Kinesthetic, Sense, Proprioception, and Posture for (35)  minutes., including:  Romberg on Airex pad (head turns, looking up/down) 2 x 30" each  Romberg eyes closed 2 x 30"  Sharpened Romberg 2 x 30" each way  Single leg balance 3 x 20" each  Tandem gait x 3 laps in cross walk  Marching gait x 3 laps in cross walk  Retro gait x 3 laps in parallel bars  Standing hip abduction 2 x 10 each, red band around ankles  Standing hip extension 2 x 10 each, red band around ankles  Resisted side stepping x 5 laps in " "parallel bars with red band around ankles  Alternating toe taps onto 2nd step 2 x 10 reps  Walking on Airex balance beam x 5 laps in parallel bars      dynamic functional therapeutic activities to improve functional performance for (10)  minutes, including:  Sit to stand from 20" box standing on Airex  1 x 10, holding 7.5#kb  Step ups 2 x 10 each   Shuttle (B) press 3 x 10, 1 black + 1 red cord  Shuttle (U) press 3 x 10, 1 red cord    *A portion of this treatment session is provided with the assistance of a skilled rehbailitation technician under the supervision of a licensed physical therapist        PATIENT EDUCATION AND HOME EXERCISES     Home Exercises Provided and Patient Education Provided     Education/Self-Care provided:  Patient educated on biomechanical justification for therapeutic exercise and importance of compliance with HEP in order to improve overall impairments and QOL   Patient was educated on all the above exercise prior/during/after for proper posture, positioning, and execution for safe performance with home exercise program.   Patient educated on the importance of strong core and lower extremity musculature in order to improve both static and dynamic balance, improve gait mechanics, reduce fall risk and improve household and community mobility.       Written Home Exercises Provided: Patient instructed to cont prior HEP. Exercises were reviewed and Randy was able to demonstrate them prior to the end of the session.  Randy demonstrated good  understanding of the education provided. See EMR under Patient Instructions for exercises provided during therapy sessions    ASSESSMENT     Randy has no complaint of dizziness recently and improved mobility without loss of balance.  Patient continues to have difficulty with tandem gait and balance with eyes closed.        Randy Is progressing well towards his goals.   Pt prognosis is Good.     Pt will continue to benefit from skilled outpatient " physical therapy to address the deficits listed in the problem list box on initial evaluation, provide pt/family education and to maximize pt's level of independence in the home and community environment.     Pt's spiritual, cultural and educational needs considered and pt agreeable to plan of care and goals.     Anticipated barriers to physical therapy: none    Goals:   Short Term Goals: 4 weeks   Patient will be independent with initial HEP. In Progress, Not Met  Patient will report a decrease in dizziness to 5/10 during normal daily activities.  In Progress, Not Met  Patient will improve condition 5 on mCTSIB from 3 seconds to 15 seconds to demonstrate improved use of vestibular system for balance.  In Progress, Not Met     Long Term Goals: 8 weeks   Patient will be compliant with updated HEP to promote the independent management of current diagnosis.  In Progress, Not Met  Patient will improve TUG time from 13 seconds to <10 seconds to demonstrate improved functional mobility.  In Progress, Not Met  Patient will report a decrease in dizziness to 0/10 during transfers and quick turns.  In Progress, Not Met  Patient will improve condition 5 on mCTSIB from 3 seconds to 30 seconds to demonstrate improved use of vestibular system for balance.  In Progress, Not Met  Patient will have improved FOTO status from 54 to 62 to demonstrate improved functional mobility.   In Progress, Not Met    PLAN     Continue with PT POC and progress as tolerated.     Randy Adhikari, PT

## 2024-09-11 ENCOUNTER — PATIENT MESSAGE (OUTPATIENT)
Dept: CARDIOLOGY | Facility: HOSPITAL | Age: 74
End: 2024-09-11
Payer: MEDICARE

## 2024-09-11 ENCOUNTER — PATIENT MESSAGE (OUTPATIENT)
Dept: FAMILY MEDICINE | Facility: CLINIC | Age: 74
End: 2024-09-11
Payer: MEDICARE

## 2024-09-17 ENCOUNTER — CLINICAL SUPPORT (OUTPATIENT)
Dept: REHABILITATION | Facility: HOSPITAL | Age: 74
End: 2024-09-17
Payer: MEDICARE

## 2024-09-17 DIAGNOSIS — Z87.898 HISTORY OF VERTIGO: ICD-10-CM

## 2024-09-17 DIAGNOSIS — R26.89 BALANCE PROBLEM: ICD-10-CM

## 2024-09-17 DIAGNOSIS — R42 DIZZINESS: Primary | ICD-10-CM

## 2024-09-17 PROCEDURE — 97530 THERAPEUTIC ACTIVITIES: CPT | Mod: PN

## 2024-09-17 PROCEDURE — 97112 NEUROMUSCULAR REEDUCATION: CPT | Mod: PN

## 2024-09-17 NOTE — PROGRESS NOTES
"[facility]   Physical Therapy Treatment Note     Name: Randy Yap  Clinic Number: [id number]    Therapy Diagnosis:   Encounter Diagnoses   Name Primary?    Dizziness Yes    History of vertigo     Balance problem            Physician: Olvin Hutson MD    Visit Date: 9/17/2024    Physician Orders: PT Eval and Treat   Medical Diagnosis from Referral:   R42 (ICD-10-CM) - Dizziness   Z87.898 (ICD-10-CM) - History of vertigo   Evaluation Date: 8/23/2024  Authorization Period Expiration: 12/31/2024  Plan of Care Expiration: 10/23/2024  Progress Note Due: 9/23/2024  Visit # / Visits authorized: 6/20 (1/ 1 eval)   FOTO: 2/ 3      Precautions: Standard and pacemaker    Time In: 10:00 am  Time Out: 11:00 am  Total Billable Time: 55 minutes      SUBJECTIVE     Pt reports: he has only had one episode of dizziness since last visit.  It occurred while he was standing still and was a feeling of light headedness.    He was compliant with home exercise program.  Response to previous treatment: no increased dizziness  Functional change: in progress    Dizziness: 0/10       OBJECTIVE     Objective Measures updated at progress report unless specified.       TREATMENT       Randy received the treatments listed below:       therapeutic exercises to develop strength, endurance, ROM, flexibility, and core stabilization for (0)  minutes including:       manual therapy techniques applied for (0) minutes, including:      neuromuscular re-education activities to improve: Balance, Coordination, Kinesthetic, Sense, Proprioception, and Posture for (40)  minutes., including:  Romberg on Airex pad (head turns, looking up/down) 2 x 30" each  Romberg eyes closed on Airex 2 x 30"  Sharpened Romberg on Airex 2 x 30" each way  Single leg balance 3 x 20" each  Tandem gait x 3 laps in cross walk  Marching gait x 3 laps in cross walk  Retro gait x 3 laps in cross walk  Standing hip abduction 2 x 10 each, green band " "around ankles  Standing hip extension 2 x 10 each, green band around ankles  Resisted side stepping x 5 laps in parallel bars with red band around ankles  Alternating toe taps onto 2nd step 2 x 10 reps  Walking on Airex balance beam x 5 laps in parallel bars  Side stepping on Airex balance beam x 5 laps in parallel bars      dynamic functional therapeutic activities to improve functional performance for (15)  minutes, including:  Sit to stand from 20" box standing on Airex  1 x 10, holding 7.5#kb  Step ups 2 x 10 each   Shuttle (B) press 3 x 10, 1 black + 1 red cord  Shuttle (U) press 3 x 10, 1 red cord        PATIENT EDUCATION AND HOME EXERCISES     Home Exercises Provided and Patient Education Provided     Education/Self-Care provided:  Patient educated on biomechanical justification for therapeutic exercise and importance of compliance with HEP in order to improve overall impairments and QOL   Patient was educated on all the above exercise prior/during/after for proper posture, positioning, and execution for safe performance with home exercise program.   Patient educated on the importance of strong core and lower extremity musculature in order to improve both static and dynamic balance, improve gait mechanics, reduce fall risk and improve household and community mobility.       Written Home Exercises Provided: Patient instructed to cont prior HEP. Exercises were reviewed and Randy was able to demonstrate them prior to the end of the session.  Randy demonstrated good  understanding of the education provided. See EMR under Patient Instructions for exercises provided during therapy sessions    ASSESSMENT     Randy required increased rest breaks due to fatigue.  He required verbal cues for core engagement during dynamic balance exercises to help maintain center of gravity.  Patient had a lot of difficulty with marching gait on this date.  He was progressed with static standing balance exercises.         Randy " Is progressing well towards his goals.   Pt prognosis is Good.     Pt will continue to benefit from skilled outpatient physical therapy to address the deficits listed in the problem list box on initial evaluation, provide pt/family education and to maximize pt's level of independence in the home and community environment.     Pt's spiritual, cultural and educational needs considered and pt agreeable to plan of care and goals.     Anticipated barriers to physical therapy: none    Goals:   Short Term Goals: 4 weeks   Patient will be independent with initial HEP.  Met  Patient will report a decrease in dizziness to 5/10 during normal daily activities. Met  Patient will improve condition 5 on mCTSIB from 3 seconds to 15 seconds to demonstrate improved use of vestibular system for balance.  In Progress, Not Met     Long Term Goals: 8 weeks   Patient will be compliant with updated HEP to promote the independent management of current diagnosis.  In Progress, Not Met  Patient will improve TUG time from 13 seconds to <10 seconds to demonstrate improved functional mobility.  In Progress, Not Met  Patient will report a decrease in dizziness to 0/10 during transfers and quick turns.  In Progress, Not Met  Patient will improve condition 5 on mCTSIB from 3 seconds to 30 seconds to demonstrate improved use of vestibular system for balance.  In Progress, Not Met  Patient will have improved FOTO status from 54 to 62 to demonstrate improved functional mobility.   In Progress, Not Met    PLAN     Continue with PT POC and progress as tolerated.     Randy Adhikari, PT

## 2024-09-19 ENCOUNTER — CLINICAL SUPPORT (OUTPATIENT)
Dept: REHABILITATION | Facility: HOSPITAL | Age: 74
End: 2024-09-19
Payer: MEDICARE

## 2024-09-19 DIAGNOSIS — R26.89 BALANCE PROBLEM: ICD-10-CM

## 2024-09-19 DIAGNOSIS — R42 DIZZINESS: Primary | ICD-10-CM

## 2024-09-19 DIAGNOSIS — Z87.898 HISTORY OF VERTIGO: ICD-10-CM

## 2024-09-19 PROCEDURE — 97112 NEUROMUSCULAR REEDUCATION: CPT | Mod: PN

## 2024-09-19 RX ORDER — SPIRONOLACTONE 25 MG/1
25 TABLET ORAL
Qty: 30 TABLET | Refills: 0 | Status: SHIPPED | OUTPATIENT
Start: 2024-09-19

## 2024-09-19 NOTE — PROGRESS NOTES
"[facility]   Physical Therapy Treatment Note     Name: Randy Yap  Clinic Number: [id number]    Therapy Diagnosis:   Encounter Diagnoses   Name Primary?    Dizziness Yes    History of vertigo     Balance problem            Physician: Olvin Hutson MD    Visit Date: 9/19/2024    Physician Orders: PT Eval and Treat   Medical Diagnosis from Referral:   R42 (ICD-10-CM) - Dizziness   Z87.898 (ICD-10-CM) - History of vertigo   Evaluation Date: 8/23/2024  Authorization Period Expiration: 12/31/2024  Plan of Care Expiration: 10/23/2024  Progress Note Due: 9/23/2024  Visit # / Visits authorized: 7/20 (1/ 1 eval)   FOTO: 2/ 3      Precautions: Standard and pacemaker    Time In: 10:00 am  Time Out: 11:00 am  Total Billable Time: 30 minutes      SUBJECTIVE     Pt reports: he has not had recent dizziness and doing better overall.    He was compliant with home exercise program.  Response to previous treatment: no increased dizziness  Functional change: in progress    Dizziness: 0/10       OBJECTIVE     Objective Measures updated at progress report unless specified.       TREATMENT       Randy received the treatments listed below:       therapeutic exercises to develop strength, endurance, ROM, flexibility, and core stabilization for (0)  minutes including:       manual therapy techniques applied for (0) minutes, including:      neuromuscular re-education activities to improve: Balance, Coordination, Kinesthetic, Sense, Proprioception, and Posture for (40)  minutes., including:  Standing VOR (horizontal and vertical) x 1 min each  Romberg on Airex pad (head turns, looking up/down) 2 x 30" each  Romberg eyes closed on Airex 2 x 30"  Sharpened Romberg on Airex 2 x 30" each way  Single leg balance 3 x 20" each  Tandem gait x 3 laps in cross walk  Marching gait x 3 laps in cross walk  Retro gait x 3 laps in cross walk  Standing hip abduction 2 x 10 each, green band around ankles  Standing hip " "extension 2 x 10 each, green band around ankles  Resisted side stepping x 5 laps in parallel bars with red band around ankles  Alternating toe taps onto 2nd step 2 x 10 reps  Walking on Airex balance beam x 5 laps in parallel bars  Side stepping on Airex balance beam x 5 laps in parallel bars      dynamic functional therapeutic activities to improve functional performance for (15)  minutes, including:  Sit to stand from 20" box standing on Airex  2 x 10, holding 7.5#kb, 1 set standing on Airex  Step ups 2 x 10 each   Shuttle (B) press 3 x 10, 1 black + 1 red cord  Shuttle (U) press 3 x 10, 1 red cord    *A portion of this treatment session is provided with the assistance of a skilled rehbailitation technician under the supervision of a licensed physical therapist          PATIENT EDUCATION AND HOME EXERCISES     Home Exercises Provided and Patient Education Provided     Education/Self-Care provided:  Patient educated on biomechanical justification for therapeutic exercise and importance of compliance with HEP in order to improve overall impairments and QOL   Patient was educated on all the above exercise prior/during/after for proper posture, positioning, and execution for safe performance with home exercise program.   Patient educated on the importance of strong core and lower extremity musculature in order to improve both static and dynamic balance, improve gait mechanics, reduce fall risk and improve household and community mobility.       Written Home Exercises Provided: Patient instructed to cont prior HEP. Exercises were reviewed and Randy was able to demonstrate them prior to the end of the session.  Randy demonstrated good  understanding of the education provided. See EMR under Patient Instructions for exercises provided during therapy sessions    ASSESSMENT     Randy was able to perform VOR exercises in Romberg position with no loss of balance and no increased dizziness reported.  He has been " compliant with HEP and having decreased dizziness.  Patient continues to have some instability while ambulating with narrow base of support and will continue [facility] current treatment plan.         Randy Is progressing well towards his goals.   Pt prognosis is Good.     Pt will continue to benefit from skilled outpatient physical therapy to address the deficits listed in the problem list box on initial evaluation, provide pt/family education and to maximize pt's level of independence in the home and community environment.     Pt's spiritual, cultural and educational needs considered and pt agreeable to plan of care and goals.     Anticipated barriers to physical therapy: none    Goals:   Short Term Goals: 4 weeks   Patient will be independent with initial HEP.  Met  Patient will report a decrease in dizziness to 5/10 during normal daily activities. Met  Patient will improve condition 5 on mCTSIB from 3 seconds to 15 seconds to demonstrate improved use of vestibular system for balance.  In Progress, Not Met     Long Term Goals: 8 weeks   Patient will be compliant with updated HEP to promote the independent management of current diagnosis.  In Progress, Not Met  Patient will improve TUG time from 13 seconds to <10 seconds to demonstrate improved functional mobility.  In Progress, Not Met  Patient will report a decrease in dizziness to 0/10 during transfers and quick turns.  In Progress, Not Met  Patient will improve condition 5 on mCTSIB from 3 seconds to 30 seconds to demonstrate improved use of vestibular system for balance.  In Progress, Not Met  Patient will have improved FOTO status from 54 to 62 to demonstrate improved functional mobility.   In Progress, Not Met    PLAN     Continue with PT POC and progress as tolerated.     Randy Adhikari, PT

## 2024-09-24 ENCOUNTER — CLINICAL SUPPORT (OUTPATIENT)
Dept: REHABILITATION | Facility: HOSPITAL | Age: 74
End: 2024-09-24
Payer: MEDICARE

## 2024-09-24 DIAGNOSIS — R26.89 BALANCE PROBLEM: ICD-10-CM

## 2024-09-24 DIAGNOSIS — R42 DIZZINESS: Primary | ICD-10-CM

## 2024-09-24 DIAGNOSIS — Z87.898 HISTORY OF VERTIGO: ICD-10-CM

## 2024-09-24 PROCEDURE — 97112 NEUROMUSCULAR REEDUCATION: CPT | Mod: PN

## 2024-09-24 PROCEDURE — 97530 THERAPEUTIC ACTIVITIES: CPT | Mod: PN

## 2024-09-24 NOTE — PROGRESS NOTES
"[facility]   Physical Therapy Treatment Note     Name: Randy Yap  Clinic Number: [id number]    Therapy Diagnosis:   Encounter Diagnoses   Name Primary?    Dizziness Yes    History of vertigo     Balance problem            Physician: Olvin Hutson MD    Visit Date: 9/24/2024    Physician Orders: PT Eval and Treat   Medical Diagnosis from Referral:   R42 (ICD-10-CM) - Dizziness   Z87.898 (ICD-10-CM) - History of vertigo   Evaluation Date: 8/23/2024  Authorization Period Expiration: 12/31/2024  Plan of Care Expiration: 10/23/2024  Progress Note Due: 9/23/2024  Visit # / Visits authorized: 8/20 (1/ 1 eval)   FOTO: 2/ 3      Precautions: Standard and pacemaker    Time In: 10:00 am  Time Out: 11:00 am  Total Billable Time: 30 minutes      SUBJECTIVE     Pt reports: he had one dizzy spell that lasted about 30 seconds yesterday, while walking in chiropractor office.  He has not had dizziness since that incident.     He was compliant with home exercise program.  Response to previous treatment: no increased dizziness  Functional change: in progress    Dizziness: 0/10       OBJECTIVE     Objective Measures updated at progress report unless specified.       TREATMENT       Randy received the treatments listed below:       therapeutic exercises to develop strength, endurance, ROM, flexibility, and core stabilization for (0)  minutes including:       manual therapy techniques applied for (0) minutes, including:      neuromuscular re-education activities to improve: Balance, Coordination, Kinesthetic, Sense, Proprioception, and Posture for (40)  minutes., including:  Altered stance with one foot on 10" step (looking up/down, head turns) 2 x 30" each  Romberg eyes closed on Airex 2 x 30"  Sharpened Romberg on Airex 2 x 30" each way  Single leg balance 3 x 20" each  Tandem gait x 3 laps in cross walk  Marching gait x 3 laps in cross walk  Retro gait x 3 laps in cross walk  Alternating toe " "taps onto 2nd step 2 x 10 reps  Walking on Airex balance beam x 5 laps in parallel bars  Side stepping on Airex balance beam x 5 laps in parallel bars      dynamic functional therapeutic activities to improve functional performance for (15)  minutes, including:  Sit to stand from 20" box standing on Airex  2 x 10, holding 7.5#kb  Step ups 2 x 10 each   Shuttle (B) press 3 x 10, 1 black + 1 red cord  Shuttle (U) press 3 x 10, 1 red cord    *A portion of this treatment session is provided with the assistance of a skilled rehbailitation technician under the supervision of a licensed physical therapist          PATIENT EDUCATION AND HOME EXERCISES     Home Exercises Provided and Patient Education Provided     Education/Self-Care provided:  Patient educated on biomechanical justification for therapeutic exercise and importance of compliance with HEP in order to improve overall impairments and QOL   Patient was educated on all the above exercise prior/during/after for proper posture, positioning, and execution for safe performance with home exercise program.   Patient educated on the importance of strong core and lower extremity musculature in order to improve both static and dynamic balance, improve gait mechanics, reduce fall risk and improve household and community mobility.       Written Home Exercises Provided: Patient instructed to cont prior HEP. Exercises were reviewed and Randy was able to demonstrate them prior to the end of the session.  Randy demonstrated good  understanding of the education provided. See EMR under Patient Instructions for exercises provided during therapy sessions    ASSESSMENT     Randy is progressing with balance exercises and overall demonstrating improved standing balance.  He continues to have intermittent dizzy spells, but no specific movement or position brings on dizziness.          Randy Is progressing well towards his goals.   Pt prognosis is Good.     Pt will continue to " benefit from skilled outpatient physical therapy to address the deficits listed in the problem list box on initial evaluation, provide pt/family education and to maximize pt's level of independence in the home and community environment.     Pt's spiritual, cultural and educational needs considered and pt agreeable to plan of care and goals.     Anticipated barriers to physical therapy: none    Goals:   Short Term Goals: 4 weeks   Patient will be independent with initial HEP.  Met  Patient will report a decrease in dizziness to 5/10 during normal daily activities. Met  Patient will improve condition 5 on mCTSIB from 3 seconds to 15 seconds to demonstrate improved use of vestibular system for balance.  In Progress, Not Met     Long Term Goals: 8 weeks   Patient will be compliant with updated HEP to promote the independent management of current diagnosis.  In Progress, Not Met  Patient will improve TUG time from 13 seconds to <10 seconds to demonstrate improved functional mobility.  In Progress, Not Met  Patient will report a decrease in dizziness to 0/10 during transfers and quick turns.  In Progress, Not Met  Patient will improve condition 5 on mCTSIB from 3 seconds to 30 seconds to demonstrate improved use of vestibular system for balance.  In Progress, Not Met  Patient will have improved FOTO status from 54 to 62 to demonstrate improved functional mobility.   In Progress, Not Met    PLAN     Continue with PT POC and progress as tolerated.     Randy Adhikari, PT

## 2024-09-26 ENCOUNTER — CLINICAL SUPPORT (OUTPATIENT)
Dept: REHABILITATION | Facility: HOSPITAL | Age: 74
End: 2024-09-26
Payer: MEDICARE

## 2024-09-26 ENCOUNTER — LAB VISIT (OUTPATIENT)
Dept: LAB | Facility: HOSPITAL | Age: 74
End: 2024-09-26
Attending: INTERNAL MEDICINE
Payer: MEDICARE

## 2024-09-26 DIAGNOSIS — Z87.898 HISTORY OF VERTIGO: ICD-10-CM

## 2024-09-26 DIAGNOSIS — I50.30 CHF WITH LEFT VENTRICULAR DIASTOLIC DYSFUNCTION, NYHA CLASS 2: Chronic | ICD-10-CM

## 2024-09-26 DIAGNOSIS — R26.89 BALANCE PROBLEM: ICD-10-CM

## 2024-09-26 DIAGNOSIS — R42 DIZZINESS: Primary | ICD-10-CM

## 2024-09-26 LAB — BNP SERPL-MCNC: 56 PG/ML (ref 0–99)

## 2024-09-26 PROCEDURE — 97112 NEUROMUSCULAR REEDUCATION: CPT | Mod: PN

## 2024-09-26 PROCEDURE — 83880 ASSAY OF NATRIURETIC PEPTIDE: CPT | Performed by: INTERNAL MEDICINE

## 2024-09-26 PROCEDURE — 36415 COLL VENOUS BLD VENIPUNCTURE: CPT | Mod: PO | Performed by: INTERNAL MEDICINE

## 2024-09-26 NOTE — PROGRESS NOTES
"[facility]   Physical Therapy Treatment Note     Name: Randy Yap  Clinic Number: [id number]    Therapy Diagnosis:   Encounter Diagnoses   Name Primary?    Dizziness Yes    History of vertigo     Balance problem            Physician: Olvin Hutson MD    Visit Date: 9/26/2024    Physician Orders: PT Eval and Treat   Medical Diagnosis from Referral:   R42 (ICD-10-CM) - Dizziness   Z87.898 (ICD-10-CM) - History of vertigo   Evaluation Date: 8/23/2024  Authorization Period Expiration: 12/31/2024  Plan of Care Expiration: 10/23/2024  Progress Note Due: 9/23/2024  Visit # / Visits authorized: 9/20 (1/ 1 eval)   FOTO: 2/ 3      Precautions: Standard and pacemaker    Time In: 10:00 am  Time Out: 11:00 am  Total Billable Time: 30 minutes      SUBJECTIVE     Pt reports: experiencing some dizziness if he stands up too fast.     He was compliant with home exercise program.  Response to previous treatment: no increased dizziness  Functional change: in progress    Dizziness: 0/10       OBJECTIVE     Objective Measures updated at progress report unless specified.       TREATMENT       Randy received the treatments listed below:       therapeutic exercises to develop strength, endurance, ROM, flexibility, and core stabilization for (0)  minutes including:       manual therapy techniques applied for (0) minutes, including:      neuromuscular re-education activities to improve: Balance, Coordination, Kinesthetic, Sense, Proprioception, and Posture for (40)  minutes., including:  Tandem stance  (looking up/down, head turns) 2 x 30" each  Romberg eyes closed on Airex 2 x 30"  Sharpened Romberg on Airex 2 x 30" each way  Single leg balance 3 x 20" each  Tandem gait x 3 laps in cross walk  Marching gait x 3 laps in cross walk  Retro gait x 5 laps in cross walk  tanding hip abduction 2 x 10 each, green band around ankles  Standing hip extension 2 x 10 each, green band around ankles  Alternating toe " taps onto 2nd step 2 x 10 reps  Walking on Airex balance beam x 5 laps in parallel bars  Side stepping on Airex balance beam x 5 laps in parallel bars      dynamic functional therapeutic activities to improve functional performance for (6)  minutes, including:  Sit to stand from chair 2 x 10, holding 8#db  Step ups 2 x 10 each   S  *A portion of this treatment session is provided with the assistance of a skilled rehbailitation technician under the supervision of a licensed physical therapist          PATIENT EDUCATION AND HOME EXERCISES     Home Exercises Provided and Patient Education Provided     Education/Self-Care provided:  Patient educated on biomechanical justification for therapeutic exercise and importance of compliance with HEP in order to improve overall impairments and QOL   Patient was educated on all the above exercise prior/during/after for proper posture, positioning, and execution for safe performance with home exercise program.   Patient educated on the importance of strong core and lower extremity musculature in order to improve both static and dynamic balance, improve gait mechanics, reduce fall risk and improve household and community mobility.       Written Home Exercises Provided: Patient instructed to cont prior HEP. Exercises were reviewed and Randy was able to demonstrate them prior to the end of the session.  Randy demonstrated good  understanding of the education provided. See EMR under Patient Instructions for exercises provided during therapy sessions    ASSESSMENT     Randy continues to have difficulty with balance in narrow base of support and is progressed with tandem stance balance exercises on this date.  Patient required use of upper extremities for balance throughout tandem stance with head turns and looking up/down.           Randy Is progressing well towards his goals.   Pt prognosis is Good.     Pt will continue to benefit from skilled outpatient physical therapy to  address the deficits listed in the problem list box on initial evaluation, provide pt/family education and to maximize pt's level of independence in the home and community environment.     Pt's spiritual, cultural and educational needs considered and pt agreeable to plan of care and goals.     Anticipated barriers to physical therapy: none    Goals:   Short Term Goals: 4 weeks   Patient will be independent with initial HEP.  Met  Patient will report a decrease in dizziness to 5/10 during normal daily activities. Met  Patient will improve condition 5 on mCTSIB from 3 seconds to 15 seconds to demonstrate improved use of vestibular system for balance.  In Progress, Not Met     Long Term Goals: 8 weeks   Patient will be compliant with updated HEP to promote the independent management of current diagnosis.  In Progress, Not Met  Patient will improve TUG time from 13 seconds to <10 seconds to demonstrate improved functional mobility.  In Progress, Not Met  Patient will report a decrease in dizziness to 0/10 during transfers and quick turns.  In Progress, Not Met  Patient will improve condition 5 on mCTSIB from 3 seconds to 30 seconds to demonstrate improved use of vestibular system for balance.  In Progress, Not Met  Patient will have improved FOTO status from 54 to 62 to demonstrate improved functional mobility.   In Progress, Not Met    PLAN     Continue with PT POC and progress as tolerated.     Randy Adhikari, PT

## 2024-09-28 LAB — NT-PROBNP SERPL IA-MCNC: 242 PG/ML

## 2024-09-30 ENCOUNTER — EXTERNAL CHRONIC CARE MANAGEMENT (OUTPATIENT)
Dept: PRIMARY CARE CLINIC | Facility: CLINIC | Age: 74
End: 2024-09-30
Payer: MEDICARE

## 2024-09-30 PROCEDURE — 99490 CHRNC CARE MGMT STAFF 1ST 20: CPT | Mod: PBBFAC,PO | Performed by: FAMILY MEDICINE

## 2024-09-30 PROCEDURE — 99490 CHRNC CARE MGMT STAFF 1ST 20: CPT | Mod: S$PBB,,, | Performed by: FAMILY MEDICINE

## 2024-10-01 ENCOUNTER — CLINICAL SUPPORT (OUTPATIENT)
Dept: REHABILITATION | Facility: HOSPITAL | Age: 74
End: 2024-10-01
Payer: MEDICARE

## 2024-10-01 DIAGNOSIS — Z87.898 HISTORY OF VERTIGO: ICD-10-CM

## 2024-10-01 DIAGNOSIS — R42 DIZZINESS: Primary | ICD-10-CM

## 2024-10-01 DIAGNOSIS — R26.89 BALANCE PROBLEM: ICD-10-CM

## 2024-10-01 PROCEDURE — 97112 NEUROMUSCULAR REEDUCATION: CPT | Mod: PN

## 2024-10-01 NOTE — PROGRESS NOTES
"[facility]   Physical Therapy Treatment Note     Name: Randy Yap  Clinic Number: [id number]    Therapy Diagnosis:   Encounter Diagnoses   Name Primary?    Dizziness Yes    History of vertigo     Balance problem            Physician: Olvin Hutson MD    Visit Date: 10/1/2024    Physician Orders: PT Eval and Treat   Medical Diagnosis from Referral:   R42 (ICD-10-CM) - Dizziness   Z87.898 (ICD-10-CM) - History of vertigo   Evaluation Date: 8/23/2024  Authorization Period Expiration: 12/31/2024  Plan of Care Expiration: 10/23/2024  Progress Note Due: 9/23/2024  Visit # / Visits authorized: 9/20 (1/ 1 eval)   FOTO: 2/ 3      Precautions: Standard and pacemaker    Time In: 10:00 am  Time Out: 11:00 am  Total Billable Time: 30 minutes      SUBJECTIVE     Pt reports: no recent dizziness since his last visit.   He was compliant with home exercise program.  Response to previous treatment: no increased dizziness  Functional change: in progress    Dizziness: 0/10       OBJECTIVE     Objective Measures updated at progress report unless specified.       TREATMENT       Randy received the treatments listed below:       therapeutic exercises to develop strength, endurance, ROM, flexibility, and core stabilization for (0)  minutes including:       manual therapy techniques applied for (0) minutes, including:      neuromuscular re-education activities to improve: Balance, Coordination, Kinesthetic, Sense, Proprioception, and Posture for (40)  minutes., including:  Tandem stance  (looking up/down, head turns) 2 x 30" each  Romberg eyes closed on Airex 2 x 30"  Sharpened Romberg on Airex 2 x 30" each way  Single leg balance 3 x 20" each  Tandem gait x 3 laps in cross walk  Marching gait x 3 laps in cross walk  Retro gait x 5 laps in cross walk  tanding hip abduction 2 x 10 each, green band around ankles  Standing hip extension 2 x 10 each, green band around ankles  Alternating toe taps onto 2nd " step 2 x 10 reps  Walking on Airex balance beam x 5 laps in parallel bars  Side stepping on Airex balance beam x 5 laps in parallel bars      dynamic functional therapeutic activities to improve functional performance for (8)  minutes, including:  Sit to stand from chair 2 x 10, holding 10#kb  Step ups 3 x 10 each     *A portion of this treatment session is provided with the assistance of a skilled rehbailitation technician under the supervision of a licensed physical therapist          PATIENT EDUCATION AND HOME EXERCISES     Home Exercises Provided and Patient Education Provided     Education/Self-Care provided:  Patient educated on biomechanical justification for therapeutic exercise and importance of compliance with HEP in order to improve overall impairments and QOL   Patient was educated on all the above exercise prior/during/after for proper posture, positioning, and execution for safe performance with home exercise program.   Patient educated on the importance of strong core and lower extremity musculature in order to improve both static and dynamic balance, improve gait mechanics, reduce fall risk and improve household and community mobility.       Written Home Exercises Provided: Patient instructed to cont prior HEP. Exercises were reviewed and Randy was able to demonstrate them prior to the end of the session.  Randy demonstrated good  understanding of the education provided. See EMR under Patient Instructions for exercises provided during therapy sessions    ASSESSMENT     Randy has increased fatigue during today's treatment and required frequent rest breaks.  He has continued difficulty with tandem and single stance.             Randy Is progressing well towards his goals.   Pt prognosis is Good.     Pt will continue to benefit from skilled outpatient physical therapy to address the deficits listed in the problem list box on initial evaluation, provide pt/family education and to maximize pt's  level of independence in the home and community environment.     Pt's spiritual, cultural and educational needs considered and pt agreeable to plan of care and goals.     Anticipated barriers to physical therapy: none    Goals:   Short Term Goals: 4 weeks   Patient will be independent with initial HEP.  Met  Patient will report a decrease in dizziness to 5/10 during normal daily activities. Met  Patient will improve condition 5 on mCTSIB from 3 seconds to 15 seconds to demonstrate improved use of vestibular system for balance.  In Progress, Not Met     Long Term Goals: 8 weeks   Patient will be compliant with updated HEP to promote the independent management of current diagnosis.  In Progress, Not Met  Patient will improve TUG time from 13 seconds to <10 seconds to demonstrate improved functional mobility.  In Progress, Not Met  Patient will report a decrease in dizziness to 0/10 during transfers and quick turns.  In Progress, Not Met  Patient will improve condition 5 on mCTSIB from 3 seconds to 30 seconds to demonstrate improved use of vestibular system for balance.  In Progress, Not Met  Patient will have improved FOTO status from 54 to 62 to demonstrate improved functional mobility.   In Progress, Not Met    PLAN     Continue with PT POC and progress as tolerated.     Randy Adhikari, PT

## 2024-10-03 RX ORDER — DAPAGLIFLOZIN 10 MG/1
TABLET, FILM COATED ORAL
Qty: 90 TABLET | Refills: 0 | Status: SHIPPED | OUTPATIENT
Start: 2024-10-03

## 2024-10-04 ENCOUNTER — CLINICAL SUPPORT (OUTPATIENT)
Dept: REHABILITATION | Facility: HOSPITAL | Age: 74
End: 2024-10-04
Payer: MEDICARE

## 2024-10-04 DIAGNOSIS — Z87.898 HISTORY OF VERTIGO: ICD-10-CM

## 2024-10-04 DIAGNOSIS — R26.89 BALANCE PROBLEM: ICD-10-CM

## 2024-10-04 DIAGNOSIS — R42 DIZZINESS: Primary | ICD-10-CM

## 2024-10-04 PROCEDURE — 97112 NEUROMUSCULAR REEDUCATION: CPT | Mod: PN

## 2024-10-04 PROCEDURE — 97530 THERAPEUTIC ACTIVITIES: CPT | Mod: PN

## 2024-10-04 NOTE — PROGRESS NOTES
"[facility]   Physical Therapy Treatment Note     Name: Randy Yap  Clinic Number: [id number]    Therapy Diagnosis:   Encounter Diagnoses   Name Primary?    Dizziness Yes    History of vertigo     Balance problem            Physician: Olvin Hutson MD    Visit Date: 10/4/2024    Physician Orders: PT Eval and Treat   Medical Diagnosis from Referral:   R42 (ICD-10-CM) - Dizziness   Z87.898 (ICD-10-CM) - History of vertigo   Evaluation Date: 8/23/2024  Authorization Period Expiration: 12/31/2024  Plan of Care Expiration: 10/23/2024  Progress Note Due: 9/23/2024  Visit # / Visits authorized: 9/20 (1/ 1 eval)   FOTO: 2/ 3      Precautions: Standard and pacemaker    Time In: 10:00 am  Time Out: 11:00 am  Total Billable Time: 30 minutes      SUBJECTIVE     Pt reports: feeling fatigued today.   He was compliant with home exercise program.  Response to previous treatment: no increased dizziness  Functional change: in progress    Dizziness: 0/10       OBJECTIVE     Objective Measures updated at progress report unless specified.       TREATMENT       Randy received the treatments listed below:       therapeutic exercises to develop strength, endurance, ROM, flexibility, and core stabilization for (0)  minutes including:       manual therapy techniques applied for (0) minutes, including:      neuromuscular re-education activities to improve: Balance, Coordination, Kinesthetic, Sense, Proprioception, and Posture for (40)  minutes., including:  Tandem stance  (looking up/down, head turns) 2 x 30" each  Single leg balance 3 x 20" each  Tandem gait x 3 laps in cross walk  Marching gait x 3 laps in cross walk  Retro gait x 5 laps in cross walk  tanding hip abduction 2 x 10 each, green band around ankles  Standing hip extension 2 x 10 each, green band around ankles  Alternating toe taps onto 2nd step 2 x 10 reps  Walking on Airex balance beam x 5 laps in parallel bars  Side stepping on Airex " balance beam x 5 laps in parallel bars      dynamic functional therapeutic activities to improve functional performance for (8)  minutes, including:  Step ups 3 x 10 each   Shuttle (B) press 3 x 10, 1 black + 1 red cord  Shuttle (U) press 3 x 10, 1 red cord    *A portion of this treatment session is provided with the assistance of a skilled rehbailitation technician under the supervision of a licensed physical therapist          PATIENT EDUCATION AND HOME EXERCISES     Home Exercises Provided and Patient Education Provided     Education/Self-Care provided:  Patient educated on biomechanical justification for therapeutic exercise and importance of compliance with HEP in order to improve overall impairments and QOL   Patient was educated on all the above exercise prior/during/after for proper posture, positioning, and execution for safe performance with home exercise program.   Patient educated on the importance of strong core and lower extremity musculature in order to improve both static and dynamic balance, improve gait mechanics, reduce fall risk and improve household and community mobility.       Written Home Exercises Provided: Patient instructed to cont prior HEP. Exercises were reviewed and Randy was able to demonstrate them prior to the end of the session.  Randy demonstrated good  understanding of the education provided. See EMR under Patient Instructions for exercises provided during therapy sessions    ASSESSMENT     Randy continues to have increased fatigue that limits progression of exercises.  He continues to have decreased LE coordination during tandem gait and difficulty with tandem stance with looking up and down.             Randy Is progressing well towards his goals.   Pt prognosis is Good.     Pt will continue to benefit from skilled outpatient physical therapy to address the deficits listed in the problem list box on initial evaluation, provide pt/family education and to maximize pt's  level of independence in the home and community environment.     Pt's spiritual, cultural and educational needs considered and pt agreeable to plan of care and goals.     Anticipated barriers to physical therapy: none    Goals:   Short Term Goals: 4 weeks   Patient will be independent with initial HEP.  Met  Patient will report a decrease in dizziness to 5/10 during normal daily activities. Met  Patient will improve condition 5 on mCTSIB from 3 seconds to 15 seconds to demonstrate improved use of vestibular system for balance.  In Progress, Not Met     Long Term Goals: 8 weeks   Patient will be compliant with updated HEP to promote the independent management of current diagnosis.  In Progress, Not Met  Patient will improve TUG time from 13 seconds to <10 seconds to demonstrate improved functional mobility.  In Progress, Not Met  Patient will report a decrease in dizziness to 0/10 during transfers and quick turns.  In Progress, Not Met  Patient will improve condition 5 on mCTSIB from 3 seconds to 30 seconds to demonstrate improved use of vestibular system for balance.  In Progress, Not Met  Patient will have improved FOTO status from 54 to 62 to demonstrate improved functional mobility.   In Progress, Not Met    PLAN     Continue with PT POC and progress as tolerated.     Randy Adhikari, PT

## 2024-10-08 ENCOUNTER — CLINICAL SUPPORT (OUTPATIENT)
Dept: REHABILITATION | Facility: HOSPITAL | Age: 74
End: 2024-10-08
Payer: MEDICARE

## 2024-10-08 DIAGNOSIS — R42 DIZZINESS: Primary | ICD-10-CM

## 2024-10-08 DIAGNOSIS — Z87.898 HISTORY OF VERTIGO: ICD-10-CM

## 2024-10-08 DIAGNOSIS — R26.89 BALANCE PROBLEM: ICD-10-CM

## 2024-10-08 PROCEDURE — 97530 THERAPEUTIC ACTIVITIES: CPT | Mod: PN | Performed by: PHYSICAL THERAPIST

## 2024-10-08 PROCEDURE — 97112 NEUROMUSCULAR REEDUCATION: CPT | Mod: PN | Performed by: PHYSICAL THERAPIST

## 2024-10-08 NOTE — PROGRESS NOTES
"[facility]   Physical Therapy Treatment Note     Name: Randy Yap  Clinic Number: [id number]    Therapy Diagnosis:   Encounter Diagnoses   Name Primary?    Dizziness Yes    History of vertigo     Balance problem              Physician: Olvin Hutson MD    Visit Date: 10/8/2024    Physician Orders: PT Eval and Treat   Medical Diagnosis from Referral:   R42 (ICD-10-CM) - Dizziness   Z87.898 (ICD-10-CM) - History of vertigo   Evaluation Date: 8/23/2024  Authorization Period Expiration: 12/31/2024  Plan of Care Expiration: 10/23/2024  Progress Note Due: 9/23/2024  Visit # / Visits authorized: 13/20 (1/ 1 eval)   FOTO: 2/ 3      Precautions: Standard and pacemaker    Time In: 11:03 am  Time Out: 11:56 am  Total Billable Time: 30 minutes      SUBJECTIVE     Pt reports: That he feels like he has improved a lot, but still has a long way to go .   He was compliant with home exercise program.  Response to previous treatment: no increased dizziness  Functional change: in progress    Dizziness: 0/10       OBJECTIVE     Objective Measures updated at progress report unless specified.       TREATMENT       Randy received the treatments listed below:       therapeutic exercises to develop strength, endurance, ROM, flexibility, and core stabilization for (0)  minutes including:       manual therapy techniques applied for (0) minutes, including:      neuromuscular re-education activities to improve: Balance, Coordination, Kinesthetic, Sense, Proprioception, and Posture for (40)  minutes., including:  Standing VOR (horizontal and vertical) x 1 min each  Tandem stance  (looking up/down, head turns) 2 x 30" each  Romberg eyes closed on Airex 2 x 30"  Sharpened Romberg on Airex 2 x 30" each way  Single leg balance 3 x 20" each  Tandem gait x 3 laps in cross walk  Marching gait x 3 laps in cross walk  Retro gait x 5 laps in cross walk  tanding hip abduction 2 x 10 each, green band around " ankles  Standing hip extension 2 x 10 each, green band around ankles  Toe taps (3 cones)  Walking on Airex balance beam x 5 laps in parallel bars  Side stepping on Airex balance beam x 5 laps in parallel bars      dynamic functional therapeutic activities to improve functional performance for (10)  minutes, including:  Step ups 3 x 10 each   Shuttle (B) press 3 x 10, 1 black + 1 red cord  Shuttle (U) press 3 x 10, 1 red cord  Sit to stands 3 x 10 7.5#    *A portion of this treatment session is provided with the assistance of a skilled rehbailitation technician under the supervision of a licensed physical therapist          PATIENT EDUCATION AND HOME EXERCISES     Home Exercises Provided and Patient Education Provided     Education/Self-Care provided:  Patient educated on biomechanical justification for therapeutic exercise and importance of compliance with HEP in order to improve overall impairments and QOL   Patient was educated on all the above exercise prior/during/after for proper posture, positioning, and execution for safe performance with home exercise program.   Patient educated on the importance of strong core and lower extremity musculature in order to improve both static and dynamic balance, improve gait mechanics, reduce fall risk and improve household and community mobility.       Written Home Exercises Provided: Patient instructed to cont prior HEP. Exercises were reviewed and Randy was able to demonstrate them prior to the end of the session.  Randy demonstrated good  understanding of the education provided. See EMR under Patient Instructions for exercises provided during therapy sessions    ASSESSMENT     Randy was able to perform all balance activities without any significant loss of balance. Most difficulty with tandem walking             Randy Is progressing well towards his goals.   Pt prognosis is Good.     Pt will continue to benefit from skilled outpatient physical therapy to address  the deficits listed in the problem list box on initial evaluation, provide pt/family education and to maximize pt's level of independence in the home and community environment.     Pt's spiritual, cultural and educational needs considered and pt agreeable to plan of care and goals.     Anticipated barriers to physical therapy: none    Goals:   Short Term Goals: 4 weeks   Patient will be independent with initial HEP.  Met  Patient will report a decrease in dizziness to 5/10 during normal daily activities. Met  Patient will improve condition 5 on mCTSIB from 3 seconds to 15 seconds to demonstrate improved use of vestibular system for balance.  In Progress, Not Met     Long Term Goals: 8 weeks   Patient will be compliant with updated HEP to promote the independent management of current diagnosis.  In Progress, Not Met  Patient will improve TUG time from 13 seconds to <10 seconds to demonstrate improved functional mobility.  In Progress, Not Met  Patient will report a decrease in dizziness to 0/10 during transfers and quick turns.  In Progress, Not Met  Patient will improve condition 5 on mCTSIB from 3 seconds to 30 seconds to demonstrate improved use of vestibular system for balance.  In Progress, Not Met  Patient will have improved FOTO status from 54 to 62 to demonstrate improved functional mobility.   In Progress, Not Met    PLAN     Continue with PT POC and progress as tolerated.     Waldemar Solorio, PT

## 2024-10-08 NOTE — PROGRESS NOTES
"[facility]   Physical Therapy Treatment Note     Name: Randy Yap  Clinic Number: [id number]    Therapy Diagnosis:   No diagnosis found.          Physician: Olvin Hutson MD    Visit Date: 10/8/2024    Physician Orders: PT Eval and Treat   Medical Diagnosis from Referral:   R42 (ICD-10-CM) - Dizziness   Z87.898 (ICD-10-CM) - History of vertigo   Evaluation Date: 8/23/2024  Authorization Period Expiration: 12/31/2024  Plan of Care Expiration: 10/23/2024  Progress Note Due: 9/23/2024  Visit # / Visits authorized: 13/20 (1/ 1 eval)   FOTO: 2/ 3      Precautions: Standard and pacemaker    Time In: 10:00 am  Time Out: 11:00 am  Total Billable Time: 30 minutes      SUBJECTIVE     Pt reports: ***.   He was compliant with home exercise program.  Response to previous treatment: no increased dizziness  Functional change: in progress    Dizziness: 0/10       OBJECTIVE     Objective Measures updated at progress report unless specified.       TREATMENT       Randy received the treatments listed below:       therapeutic exercises to develop strength, endurance, ROM, flexibility, and core stabilization for (0)  minutes including:       manual therapy techniques applied for (0) minutes, including:      neuromuscular re-education activities to improve: Balance, Coordination, Kinesthetic, Sense, Proprioception, and Posture for (40)  minutes., including:  Standing VOR (horizontal and vertical) x 1 min each  Tandem stance  (looking up/down, head turns) 2 x 30" each  Romberg eyes closed on Airex 2 x 30"  Sharpened Romberg on Airex 2 x 30" each way  Single leg balance 3 x 20" each  Tandem gait x 3 laps in cross walk  Marching gait x 3 laps in cross walk  Retro gait x 5 laps in cross walk  tanding hip abduction 2 x 10 each, green band around ankles  Standing hip extension 2 x 10 each, green band around ankles  Rocker board x 2 min  Resisted side stepping x 5 laps in parallel bars with red band " around ankles  Toe taps (3 cones)  Walking on Airex balance beam x 5 laps in parallel bars  Side stepping on Airex balance beam x 5 laps in parallel bars      dynamic functional therapeutic activities to improve functional performance for (8)  minutes, including:  Step ups 3 x 10 each   Shuttle (B) press 3 x 10, 1 black + 1 red cord  Shuttle (U) press 3 x 10, 1 red cord    *A portion of this treatment session is provided with the assistance of a skilled rehbailitation technician under the supervision of a licensed physical therapist          PATIENT EDUCATION AND HOME EXERCISES     Home Exercises Provided and Patient Education Provided     Education/Self-Care provided:  Patient educated on biomechanical justification for therapeutic exercise and importance of compliance with HEP in order to improve overall impairments and QOL   Patient was educated on all the above exercise prior/during/after for proper posture, positioning, and execution for safe performance with home exercise program.   Patient educated on the importance of strong core and lower extremity musculature in order to improve both static and dynamic balance, improve gait mechanics, reduce fall risk and improve household and community mobility.       Written Home Exercises Provided: Patient instructed to cont prior HEP. Exercises were reviewed and Randy was able to demonstrate them prior to the end of the session.  Randy demonstrated good  understanding of the education provided. See EMR under Patient Instructions for exercises provided during therapy sessions    ASSESSMENT     Randy ***.             Randy Is progressing well towards his goals.   Pt prognosis is Good.     Pt will continue to benefit from skilled outpatient physical therapy to address the deficits listed in the problem list box on initial evaluation, provide pt/family education and to maximize pt's level of independence in the home and community environment.     Pt's spiritual,  cultural and educational needs considered and pt agreeable to plan of care and goals.     Anticipated barriers to physical therapy: none    Goals:   Short Term Goals: 4 weeks   Patient will be independent with initial HEP.  Met  Patient will report a decrease in dizziness to 5/10 during normal daily activities. Met  Patient will improve condition 5 on mCTSIB from 3 seconds to 15 seconds to demonstrate improved use of vestibular system for balance.  In Progress, Not Met     Long Term Goals: 8 weeks   Patient will be compliant with updated HEP to promote the independent management of current diagnosis.  In Progress, Not Met  Patient will improve TUG time from 13 seconds to <10 seconds to demonstrate improved functional mobility.  In Progress, Not Met  Patient will report a decrease in dizziness to 0/10 during transfers and quick turns.  In Progress, Not Met  Patient will improve condition 5 on mCTSIB from 3 seconds to 30 seconds to demonstrate improved use of vestibular system for balance.  In Progress, Not Met  Patient will have improved FOTO status from 54 to 62 to demonstrate improved functional mobility.   In Progress, Not Met    PLAN     Continue with PT POC and progress as tolerated.     Randy Adhikari, PT

## 2024-10-10 ENCOUNTER — CLINICAL SUPPORT (OUTPATIENT)
Dept: REHABILITATION | Facility: HOSPITAL | Age: 74
End: 2024-10-10
Payer: MEDICARE

## 2024-10-10 DIAGNOSIS — R26.89 BALANCE PROBLEM: ICD-10-CM

## 2024-10-10 DIAGNOSIS — Z87.898 HISTORY OF VERTIGO: ICD-10-CM

## 2024-10-10 DIAGNOSIS — R42 DIZZINESS: Primary | ICD-10-CM

## 2024-10-10 PROCEDURE — 97112 NEUROMUSCULAR REEDUCATION: CPT | Mod: PN

## 2024-10-10 NOTE — PROGRESS NOTES
[facility]   Physical Therapy Treatment Note     Name: Randy Yap  [facility] Number: [id number]    Therapy Diagnosis:   Encounter Diagnoses   Name Primary?    Dizziness Yes    History of vertigo     Balance problem              Physician: Olvin Hutson MD    Visit Date: 10/10/2024    Physician Orders: PT Eval and Treat   Medical Diagnosis from Referral:   R42 (ICD-10-CM) - Dizziness   Z87.898 (ICD-10-CM) - History of vertigo   Evaluation Date: 8/23/2024  Authorization Period Expiration: 12/31/2024  Plan of Care Expiration: 12/10/2024  Progress Note Due: 11/10/2024  Visit # / Visits authorized: 13/20 (1/ 1 eval)   FOTO: 2/ 3      Precautions: Standard and pacemaker    Time In: 10:00 am  Time Out: 11:00 am  Total Billable Time: 30 minutes      SUBJECTIVE     Pt reports: he is doing better but continues to have some difficulty with his balance and would like to continue with therapy.  He was compliant with home exercise program.  Response to previous treatment: no increased dizziness  Functional change: in progress    Dizziness: 0/10       OBJECTIVE     Objective Measures updated at progress report unless specified.     Posture:   Forward head/rounded shoulder posture          Lower Extremity Strength  Right LE   Left LE     Knee extension: 5/5 Knee extension: 5/5   Knee flexion: 5/5 Knee flexion: 5/5   Hip flexion: 5/5 Hip flexion: 5/5   Hip extension:  5/5 Hip extension: 5/5   Hip abduction: 5/5 Hip abduction: 5/5   Hip adduction: 5/5 Hip adduction 5/5   Ankle dorsiflexion: 5/5 Ankle dorsiflexion: 5/5   Ankle plantarflexion: 5/5 Ankle plantarflexion: 5/5      Sensation: Light Touch: intact                                Occulomotoer Exam  Spontaneous Nystagmus: absent  Gaze-Evoked Nystagmus: absent  Eye movement range: normal     Central Tests:  Smooth Pursuit: horizontal: normal; vertical: normal  Saccades:               Right: normal              Left: normal              Up:  normal              Down:  normal  VOR Cancellation: normal     Peripheral Tests:  Head Impulse Test: positive to right  Dynamic Visual Acuitiy (DVA): not tested  Head Shake Test: positive     BPPV testing:   Posterior canal:  Right Serjio-Hallpike: negative  Left Serjio-Hallpike: negative     Horizontal canal:   Roll Test Right: negative  Roll Test Left: negative     Balance Testing:      Single leg stance:  SLS (R): 4 seconds  SLS (L): 4 seconds     Sharpened Romberg:   EO: 30 seconds        mCTSIB:  Condition 1 (Normal vision, fixed support): 30 seconds  Condition 2 (absent vision, fixed support): 30 seconds, moderate sway  Condition 4 (normal vision, sway-referenced support): 30 seconds  Condition 5 (absent vision, sway-referenced support): 30 seconds, significant sway     TUG: 10 seconds     Dynamic Gait Index (DGI):  1. Gait level surface -  3  2. Change in gait speed - 3  3. Gait with horizontal head turns - 2  4. Gait with vertical head turns - 2  5. Gait and pivot turn - 3  6. Step over obstacle - 2  7. Step around obstacle - 3  8. Steps - 2  Total 20/24 (<19/24 related to falls in older adults (vestibular included)         Intake Outcome Measure for FOTO Vestibular Survey     Therapist reviewed FOTO scores for Randy Yap on 10/10/2024.   FOTO documents entered into [employer] - see Media section.     Intake Score: 56%              TREATMENT       Randy received the treatments listed below:       therapeutic exercises to develop strength, endurance, ROM, flexibility, and core stabilization for (0)  minutes including:       manual therapy techniques applied for (0) minutes, including:      neuromuscular re-education activities to improve: Balance, Coordination, Kinesthetic, Sense, Proprioception, and Posture for (40)  minutes., including:  Standing VOR (horizontal and vertical) x 1 min each  Walking VOR (horizontal and vertical) with target on wall x 3 laps each  Tandem stance  (looking up/down, head turns) 2 x  "30" each  Romberg eyes closed on Airex 2 x 30"  Sharpened Romberg on Airex 2 x 30" each way  Single leg balance 3 x 20" each  Tandem gait x 3 laps in cross walk  Marching gait x 3 laps in cross walk  Retro gait x 5 laps in cross walk  Rocker board x 2 min  Toe taps (3 cones) x 6 reps each LE  Walking on Airex balance beam x 5 laps in parallel bars  Side stepping on Airex balance beam x 5 laps in parallel bars      dynamic functional therapeutic activities to improve functional performance for (10)  minutes, including:  Step ups 3 x 10 each   Shuttle (B) press 3 x 10, 1 black + 1 red cord  Shuttle (U) press 3 x 10, 1 red cord  Sit to stands 3 x 10 7.5#    *A portion of this treatment session is provided with the assistance of a skilled rehbailitation technician under the supervision of a licensed physical therapist          PATIENT EDUCATION AND HOME EXERCISES     Home Exercises Provided and Patient Education Provided     Education/Self-Care provided:  Patient educated on biomechanical justification for therapeutic exercise and importance of compliance with HEP in order to improve overall impairments and QOL   Patient was educated on all the above exercise prior/during/after for proper posture, positioning, and execution for safe performance with home exercise program.   Patient educated on the importance of strong core and lower extremity musculature in order to improve both static and dynamic balance, improve gait mechanics, reduce fall risk and improve household and community mobility.       Written Home Exercises Provided: Patient instructed to cont prior HEP. Exercises were reviewed and Randy was able to demonstrate them prior to the end of the session.  Randy demonstrated good  understanding of the education provided. See EMR under Patient Instructions for exercises provided during therapy sessions    ASSESSMENT     Randy is noted to have improved FOTO status, improved static standing balance, and " reporting improving balance, but continues to have difficulty with maintaining balance during quick movements or uneven terrain.  Patient would benefit from continued therapy and progress as tolerated in order to erturn to prior level of function.             Randy Is progressing well towards his goals.   Pt prognosis is Good.     Pt will continue to benefit from skilled outpatient physical therapy to address the deficits listed in the problem list box on initial evaluation, provide pt/family education and to maximize pt's level of independence in the home and community environment.     Pt's spiritual, cultural and educational needs considered and pt agreeable to plan of care and goals.     Anticipated barriers to physical therapy: none    Goals:   Short Term Goals: 4 weeks   Patient will be independent with initial HEP.  Met  Patient will report a decrease in dizziness to 5/10 during normal daily activities. Met  Patient will improve condition 5 on mCTSIB from 3 seconds to 15 seconds to demonstrate improved use of vestibular system for balance.  In Progress, Not Met     Long Term Goals: 8 weeks   Patient will be compliant with updated HEP to promote the independent management of current diagnosis.  In Progress, Not Met  Patient will improve TUG time from 13 seconds to <10 seconds to demonstrate improved functional mobility.  In Progress, Not Met  Patient will report a decrease in dizziness to 0/10 during transfers and quick turns.  In Progress, Not Met  Patient will improve condition 5 on mCTSIB from 3 seconds to 30 seconds to demonstrate improved use of vestibular system for balance. Met  Patient will have improved FOTO status from 54 to 62 to demonstrate improved functional mobility.   In Progress, Not Met    PLAN     Continue with PT POC and progress as tolerated.     Randy Adhikari, PT

## 2024-10-10 NOTE — PLAN OF CARE
[facility]  Physical Therapy Plan of Care Note     Name: Randy Yap  Clinic Number: [id number]    Therapy Diagnosis:   Encounter Diagnoses   Name Primary?    Dizziness Yes    History of vertigo     Balance problem      Physician: Olvin Hutson MD    Visit Date: 10/10/2024    Physician Orders: PT Eval and Treat   Medical Diagnosis from Referral:   R42 (ICD-10-CM) - Dizziness   Z87.898 (ICD-10-CM) - History of vertigo   Evaluation Date: 8/23/2024  Authorization Period Expiration: 12/31/2024  Plan of Care Expiration: 12/10/2024  Progress Note Due: 11/10/2024  Visit # / Visits authorized: 13/20 (1/ 1 eval)   FOTO: 2/ 3      Precautions: Standard and pacemaker    SUBJECTIVE     Update: he is doing better but continues to have some difficulty with his balance and would like to continue with therapy.     OBJECTIVE     Update: Posture:   Forward head/rounded shoulder posture          Lower Extremity Strength  Right LE   Left LE     Knee extension: 5/5 Knee extension: 5/5   Knee flexion: 5/5 Knee flexion: 5/5   Hip flexion: 5/5 Hip flexion: 5/5   Hip extension:  5/5 Hip extension: 5/5   Hip abduction: 5/5 Hip abduction: 5/5   Hip adduction: 5/5 Hip adduction 5/5   Ankle dorsiflexion: 5/5 Ankle dorsiflexion: 5/5   Ankle plantarflexion: 5/5 Ankle plantarflexion: 5/5      Sensation: Light Touch: intact                                Occulomotoer Exam  Spontaneous Nystagmus: absent  Gaze-Evoked Nystagmus: absent  Eye movement range: normal     Central Tests:  Smooth Pursuit: horizontal: normal; vertical: normal  Saccades:               Right: normal              Left: normal              Up: normal              Down:  normal  VOR Cancellation: normal     Peripheral Tests:  Head Impulse Test: positive to right  Dynamic Visual Acuitiy (DVA): not tested  Head Shake Test: positive     BPPV testing:   Posterior canal:  Right [place]-Hallpike: negative  Left Serjio-Hallpike: negative     Horizontal  canal:   Roll Test Right: negative  Roll Test Left: negative     Balance Testing:      Single leg stance:  SLS (R): 4 seconds  SLS (L): 4 seconds     Sharpened Romberg:   EO: 30 seconds        mCTSIB:  Condition 1 (Normal vision, fixed support): 30 seconds  Condition 2 (absent vision, fixed support): 30 seconds, moderate sway  Condition 4 (normal vision, sway-referenced support): 30 seconds  Condition 5 (absent vision, sway-referenced support): 30 seconds, significant sway     TUG: 10 seconds     Dynamic Gait Index (DGI):  1. Gait level surface -  3  2. Change in gait speed - 3  3. Gait with horizontal head turns - 2  4. Gait with vertical head turns - 2  5. Gait and pivot turn - 3  6. Step over obstacle - 2  7. Step around obstacle - 3  8. Steps - 2  Total 20/24 (<19/24 related to falls in older adults (vestibular included)         Intake Outcome Measure for FOTO Vestibular Survey     Therapist reviewed FOTO scores for Randy Yap on 10/10/2024.   FOTO documents entered into [employer] - see Media section.     Intake Score: 56%              ASSESSMENT     Update: Randy is noted to have improved FOTO status, improved static standing balance, and reporting improving balance, but continues to have difficulty with maintaining balance during quick movements or uneven terrain.  Patient would benefit from continued therapy and progress as tolerated in order to erturn to prior level of function.         Previous Short Term Goals Status:   not met  New Short Term Goals Status:   met 1 and 2  Long Term Goal Status: continue per initial plan of care.  Reasons for Recertification of Therapy:   continued balance deficits    GOALS  Short Term Goals: 4 weeks   Patient will be independent with initial HEP.  Met  Patient will report a decrease in dizziness to 5/10 during normal daily activities. Met  Patient will improve condition 5 on mCTSIB from 3 seconds to 15 seconds to demonstrate improved use of vestibular system for  balance.  In Progress, Not Met     Long Term Goals: 8 weeks   Patient will be compliant with updated HEP to promote the independent management of current diagnosis.  In Progress, Not Met  Patient will improve TUG time from 13 seconds to <10 seconds to demonstrate improved functional mobility.  In Progress, Not Met  Patient will report a decrease in dizziness to 0/10 during transfers and quick turns.  In Progress, Not Met  Patient will improve condition 5 on mCTSIB from 3 seconds to 30 seconds to demonstrate improved use of vestibular system for balance. Met  Patient will have improved FOTO status from 54 to 62 to demonstrate improved functional mobility.   In Progress, Not Met    PLAN     Updated Certification Period: 10/10/24 to 12/10/24   Recommended Treatment Plan: 2 times per week for 8 weeks:  Gait Training, Manual Therapy, Neuromuscular Re-ed, Patient Education, Therapeutic Activities, Therapeutic Exercise, and vestibular therapy  Other Recommendations: none    Randy Adhikari, PT

## 2024-10-14 DIAGNOSIS — E11.65 TYPE 2 DIABETES MELLITUS WITH HYPERGLYCEMIA, WITHOUT LONG-TERM CURRENT USE OF INSULIN: Chronic | ICD-10-CM

## 2024-10-14 RX ORDER — SEMAGLUTIDE 0.68 MG/ML
0.5 INJECTION, SOLUTION SUBCUTANEOUS
Qty: 9 ML | Refills: 0 | OUTPATIENT
Start: 2024-10-14 | End: 2025-03-31

## 2024-10-14 NOTE — TELEPHONE ENCOUNTER
No care due was identified.  [facility] Embedded Care Due Messages. Reference number: [id number].   10/14/2024 7:57:14 AM CDT

## 2024-10-14 NOTE — TELEPHONE ENCOUNTER
Refill Decision Note   Randy Yap  is requesting a refill authorization.  Brief Assessment and Rationale for Refill:  Quick Discontinue     Medication Therapy Plan:  OZEMPIC 2MG/3ML  0.25 OR 0.5  ON  24      Comments:     Note composed:12:28 PM 10/14/2024

## 2024-10-15 ENCOUNTER — CLINICAL SUPPORT (OUTPATIENT)
Dept: REHABILITATION | Facility: HOSPITAL | Age: 74
End: 2024-10-15
Payer: MEDICARE

## 2024-10-15 DIAGNOSIS — R26.89 BALANCE PROBLEM: ICD-10-CM

## 2024-10-15 DIAGNOSIS — Z87.898 HISTORY OF VERTIGO: ICD-10-CM

## 2024-10-15 DIAGNOSIS — R42 DIZZINESS: Primary | ICD-10-CM

## 2024-10-15 PROCEDURE — 97112 NEUROMUSCULAR REEDUCATION: CPT | Mod: PN

## 2024-10-15 NOTE — PROGRESS NOTES
"[facility]   Physical Therapy Treatment Note     Name: Randy Yap  Clinic Number: [id number]    Therapy Diagnosis:   Encounter Diagnoses   Name Primary?    Dizziness Yes    History of vertigo     Balance problem              Physician: Olvin Hutson MD    Visit Date: 10/15/2024    Physician Orders: PT Eval and Treat   Medical Diagnosis from Referral:   R42 (ICD-10-CM) - Dizziness   Z87.898 (ICD-10-CM) - History of vertigo   Evaluation Date: 8/23/2024  Authorization Period Expiration: 12/31/2024  Plan of Care Expiration: 12/10/2024  Progress Note Due: 11/10/2024  Visit # / Visits authorized: 14/20 (1/ 1 eval)   FOTO: 2/ 3      Precautions: Standard and pacemaker    Time In: 9:50 am  Time Out: 10:50 am  Total Billable Time: 30 minutes      SUBJECTIVE     Pt reports: he has been doing good overall, but having increased difficulty with balance today.    He was compliant with home exercise program.  Response to previous treatment: no increased dizziness  Functional change: in progress    Dizziness: 0/10       OBJECTIVE     Objective Measures updated at progress report unless specified.         TREATMENT       Randy received the treatments listed below:       therapeutic exercises to develop strength, endurance, ROM, flexibility, and core stabilization for (0)  minutes including:       manual therapy techniques applied for (0) minutes, including:      neuromuscular re-education activities to improve: Balance, Coordination, Kinesthetic, Sense, Proprioception, and Posture for (40)  minutes., including:  Standing VOR (horizontal and vertical) x 1 min each  Walking VOR (horizontal and vertical) with target on wall x 3 laps each  Tandem stance  (looking up/down, head turns) 2 x 30" each  Romberg eyes closed on Airex 2 x 30"  Sharpened Romberg on Airex 2 x 30" each way  Single leg balance 3 x 20" each  Tandem gait x 3 laps in cross walk  Marching gait x 3 laps in cross walk  Retro gait x " 5 laps in cross walk  Rocker board x 2 min  Toe taps (3 cones) x 6 reps each LE  Walking on Airex balance beam x 5 laps in parallel bars  Side stepping on Airex balance beam x 5 laps in parallel bars      dynamic functional therapeutic activities to improve functional performance for (10)  minutes, including:  Step ups 3 x 10 each   Shuttle (B) press 3 x 10, 1 black + 1 red cord  Shuttle (U) press 3 x 10, 1 red cord  Sit to stands 3 x 10 7.5#    *A portion of this treatment session is provided with the assistance of a skilled rehbailitation technician under the supervision of a licensed physical therapist          PATIENT EDUCATION AND HOME EXERCISES     Home Exercises Provided and Patient Education Provided     Education/Self-Care provided:  Patient educated on biomechanical justification for therapeutic exercise and importance of compliance with HEP in order to improve overall impairments and QOL   Patient was educated on all the above exercise prior/during/after for proper posture, positioning, and execution for safe performance with home exercise program.   Patient educated on the importance of strong core and lower extremity musculature in order to improve both static and dynamic balance, improve gait mechanics, reduce fall risk and improve household and community mobility.       Written Home Exercises Provided: Patient instructed to cont prior HEP. Exercises were reviewed and Randy was able to demonstrate them prior to the end of the session.  Randy demonstrated good  understanding of the education provided. See EMR under Patient Instructions for exercises provided during therapy sessions    ASSESSMENT     Randy has increased dizziness reported during VOR exercises.  Patient also had increased loss of balance during tandem gait and required min assist to regain center of gravity x 2 during exercise.  Patient receives verbal cues to engage core to help maintain center of gravity.             Randy Is  progressing well towards his goals.   Pt prognosis is Good.     Pt will continue to benefit from skilled outpatient physical therapy to address the deficits listed in the problem list box on initial evaluation, provide pt/family education and to maximize pt's level of independence in the home and community environment.     Pt's spiritual, cultural and educational needs considered and pt agreeable to plan of care and goals.     Anticipated barriers to physical therapy: none    Goals:   Short Term Goals: 4 weeks   Patient will be independent with initial HEP.  Met  Patient will report a decrease in dizziness to 5/10 during normal daily activities. Met  Patient will improve condition 5 on mCTSIB from 3 seconds to 15 seconds to demonstrate improved use of vestibular system for balance.  In Progress, Not Met     Long Term Goals: 8 weeks   Patient will be compliant with updated HEP to promote the independent management of current diagnosis.  In Progress, Not Met  Patient will improve TUG time from 13 seconds to <10 seconds to demonstrate improved functional mobility.  In Progress, Not Met  Patient will report a decrease in dizziness to 0/10 during transfers and quick turns.  In Progress, Not Met  Patient will improve condition 5 on mCTSIB from 3 seconds to 30 seconds to demonstrate improved use of vestibular system for balance. Met  Patient will have improved FOTO status from 54 to 62 to demonstrate improved functional mobility.   In Progress, Not Met    PLAN     Continue with PT POC and progress as tolerated.     Randy Adhikari, PT

## 2024-10-18 ENCOUNTER — CLINICAL SUPPORT (OUTPATIENT)
Dept: REHABILITATION | Facility: HOSPITAL | Age: 74
End: 2024-10-18
Payer: MEDICARE

## 2024-10-18 DIAGNOSIS — R42 DIZZINESS: Primary | ICD-10-CM

## 2024-10-18 DIAGNOSIS — E11.65 TYPE 2 DIABETES MELLITUS WITH HYPERGLYCEMIA, WITHOUT LONG-TERM CURRENT USE OF INSULIN: Chronic | ICD-10-CM

## 2024-10-18 DIAGNOSIS — Z87.898 HISTORY OF VERTIGO: ICD-10-CM

## 2024-10-18 DIAGNOSIS — R26.89 BALANCE PROBLEM: ICD-10-CM

## 2024-10-18 PROCEDURE — 97112 NEUROMUSCULAR REEDUCATION: CPT | Mod: PN

## 2024-10-18 NOTE — PROGRESS NOTES
"[facility]   Physical Therapy Treatment Note     Name: Randy Yap  Clinic Number: [id number]    Therapy Diagnosis:   Encounter Diagnoses   Name Primary?    Dizziness Yes    History of vertigo     Balance problem          Physician: Olvin Hutson MD    Visit Date: 10/18/2024    Physician Orders: PT Eval and Treat   Medical Diagnosis from Referral:   R42 (ICD-10-CM) - Dizziness   Z87.898 (ICD-10-CM) - History of vertigo   Evaluation Date: 8/23/2024  Authorization Period Expiration: 12/31/2024  Plan of Care Expiration: 12/10/2024  Progress Note Due: 11/10/2024  Visit # / Visits authorized: 15/20 (1/ 1 eval)   FOTO: 2/ 3      Precautions: Standard and pacemaker    Time In: 11:00 am  Time Out: 12:00 pm  Total Billable Time: 30 minutes      SUBJECTIVE     Pt reports: he has no new complaints on this date.    He was compliant with home exercise program.  Response to previous treatment: no increased dizziness  Functional change: in progress    Dizziness: 0/10       OBJECTIVE     Objective Measures updated at progress report unless specified.         TREATMENT       Randy received the treatments listed below:       therapeutic exercises to develop strength, endurance, ROM, flexibility, and core stabilization for (0)  minutes including:       manual therapy techniques applied for (0) minutes, including:      neuromuscular re-education activities to improve: Balance, Coordination, Kinesthetic, Sense, Proprioception, and Posture for (40)  minutes., including:  Standing VOR (horizontal and vertical) 2 x 1 min each  Walking VOR (horizontal and vertical) with target on wall x 3 laps each  Tandem stance  (looking up/down, head turns) 2 x 30" each  Romberg eyes closed on Airex 2 x 30"  Sharpened Romberg on Airex 2 x 30" each way  Single leg balance 3 x 20" each  Tandem gait x 3 laps in cross walk  Marching gait x 3 laps in cross walk  Retro gait x 5 laps in cross walk  Rocker board x 2 " min  Toe taps (3 cones) x 6 reps each LE  VOR walking while holding target (vertical and horizontal)  Walking with smooth pursuits (vertical and horizontal)  Walking on Airex balance beam x 5 laps in parallel bars  Side stepping on Airex balance beam x 5 laps in parallel bars      dynamic functional therapeutic activities to improve functional performance for (10)  minutes, including:  Step ups 3 x 10 each   Shuttle (B) press 3 x 10, 2 black cord  Shuttle (U) press 3 x 10, 1 black cord  Sit to stands 3 x 10 7.5#    *A portion of this treatment session is provided with the assistance of a skilled rehbailitation technician under the supervision of a licensed physical therapist          PATIENT EDUCATION AND HOME EXERCISES     Home Exercises Provided and Patient Education Provided     Education/Self-Care provided:  Patient educated on biomechanical justification for therapeutic exercise and importance of compliance with HEP in order to improve overall impairments and QOL   Patient was educated on all the above exercise prior/during/after for proper posture, positioning, and execution for safe performance with home exercise program.   Patient educated on the importance of strong core and lower extremity musculature in order to improve both static and dynamic balance, improve gait mechanics, reduce fall risk and improve household and community mobility.       Written Home Exercises Provided: Patient instructed to cont prior HEP. Exercises were reviewed and Randy was able to demonstrate them prior to the end of the session.  Randy demonstrated good  understanding of the education provided. See EMR under Patient Instructions for exercises provided during therapy sessions    ASSESSMENT     Randy was progressed with vestibular exercises with addition of walking VOR and smooth pursuits.  He is progressing with balance training and will begin progression towards discharge to HEP for maintenance.               Randy Is  progressing well towards his goals.   Pt prognosis is Good.     Pt will continue to benefit from skilled outpatient physical therapy to address the deficits listed in the problem list box on initial evaluation, provide pt/family education and to maximize pt's level of independence in the home and community environment.     Pt's spiritual, cultural and educational needs considered and pt agreeable to plan of care and goals.     Anticipated barriers to physical therapy: none    Goals:   Short Term Goals: 4 weeks   Patient will be independent with initial HEP.  Met  Patient will report a decrease in dizziness to 5/10 during normal daily activities. Met  Patient will improve condition 5 on mCTSIB from 3 seconds to 15 seconds to demonstrate improved use of vestibular system for balance.  In Progress, Not Met     Long Term Goals: 8 weeks   Patient will be compliant with updated HEP to promote the independent management of current diagnosis.  In Progress, Not Met  Patient will improve TUG time from 13 seconds to <10 seconds to demonstrate improved functional mobility.  In Progress, Not Met  Patient will report a decrease in dizziness to 0/10 during transfers and quick turns.  In Progress, Not Met  Patient will improve condition 5 on mCTSIB from 3 seconds to 30 seconds to demonstrate improved use of vestibular system for balance. Met  Patient will have improved FOTO status from 54 to 62 to demonstrate improved functional mobility.   In Progress, Not Met    PLAN     Continue with PT POC and progress as tolerated.     Randy Adhikari, PT

## 2024-10-19 RX ORDER — SPIRONOLACTONE 25 MG/1
25 TABLET ORAL
Qty: 30 TABLET | Refills: 0 | Status: SHIPPED | OUTPATIENT
Start: 2024-10-19

## 2024-10-19 RX ORDER — SEMAGLUTIDE 0.68 MG/ML
0.5 INJECTION, SOLUTION SUBCUTANEOUS
Qty: 9 ML | Refills: 1 | Status: SHIPPED | OUTPATIENT
Start: 2024-10-19 | End: 2025-04-05

## 2024-10-20 NOTE — TELEPHONE ENCOUNTER
No care due was identified.  [facility] Embedded Care Due Messages. Reference number: [id number].   10/18/2024 12:30:06 PM CDT  
Refill Routing Note   Medication(s) are not appropriate for processing by [facility] for the following reason(s):        No active prescription written by provider    [facility] action(s):  Defer               Appointments  past 12m or future 3m with PCP    Date Provider   Last Visit   8/21/2024 Olvin Hutson MD   Next Visit   Visit date not found Olvin Hutson MD   ED visits in past 90 days: 0        Note composed:7:01 PM 10/19/2024          
2017 23:19

## 2024-10-22 ENCOUNTER — CLINICAL SUPPORT (OUTPATIENT)
Dept: REHABILITATION | Facility: HOSPITAL | Age: 74
End: 2024-10-22
Payer: MEDICARE

## 2024-10-22 DIAGNOSIS — Z87.898 HISTORY OF VERTIGO: ICD-10-CM

## 2024-10-22 DIAGNOSIS — R42 DIZZINESS: Primary | ICD-10-CM

## 2024-10-22 DIAGNOSIS — R26.89 BALANCE PROBLEM: ICD-10-CM

## 2024-10-22 PROCEDURE — 97112 NEUROMUSCULAR REEDUCATION: CPT | Mod: PN

## 2024-10-22 NOTE — PROGRESS NOTES
"[facility]   Physical Therapy Treatment Note     Name: Randy Yap  Clinic Number: [id number]    Therapy Diagnosis:   Encounter Diagnoses   Name Primary?    Dizziness Yes    History of vertigo     Balance problem          Physician: Olvin Hutson MD    Visit Date: 10/22/2024    Physician Orders: PT Eval and Treat   Medical Diagnosis from Referral:   R42 (ICD-10-CM) - Dizziness   Z87.898 (ICD-10-CM) - History of vertigo   Evaluation Date: 8/23/2024  Authorization Period Expiration: 12/31/2024  Plan of Care Expiration: 12/10/2024  Progress Note Due: 11/10/2024  Visit # / Visits authorized: 16/20 (1/ 1 eval)   FOTO: 2/ 3      Precautions: Standard and pacemaker    Time In: 9:00 am  Time Out: 10:00 am  Total Billable Time: 45 minutes      SUBJECTIVE     Pt reports: he is feeling fatigued today.    He was compliant with home exercise program.  Response to previous treatment: no increased dizziness  Functional change: in progress    Dizziness: 0/10       OBJECTIVE     Objective Measures updated at progress report unless specified.         TREATMENT       Randy received the treatments listed below:       therapeutic exercises to develop strength, endurance, ROM, flexibility, and core stabilization for (0)  minutes including:       manual therapy techniques applied for (0) minutes, including:      neuromuscular re-education activities to improve: Balance, Coordination, Kinesthetic, Sense, Proprioception, and Posture for (40)  minutes., including:  Standing VOR (horizontal and vertical) 2 x 1 min each (performed one set of each in tandem stance)  Walking VOR (horizontal and vertical) with target on wall x 3 laps each  Tandem stance  (looking up/down, head turns) 2 x 30" each  Romberg eyes closed on Airex 2 x 30"  Sharpened Romberg on Airex 2 x 30" each way  Single leg balance 3 x 20" each  Tandem gait x 3 laps in cross walk  Marching gait x 3 laps in cross walk  Retro gait x 5 laps in " cross walk  Rocker board x 2 min  Toe taps (3 cones) x 6 reps each LE  VOR walking while holding target (vertical and horizontal)  Walking with smooth pursuits (vertical and horizontal)  Walking on Airex balance beam x 5 laps in parallel bars  Side stepping on Airex balance beam x 5 laps in parallel bars      dynamic functional therapeutic activities to improve functional performance for (5)  minutes, including:  Sit to stands from 20 inch box 2 x 10 15#kb      PATIENT EDUCATION AND HOME EXERCISES     Home Exercises Provided and Patient Education Provided     Education/Self-Care provided:  Patient educated on biomechanical justification for therapeutic exercise and importance of compliance with HEP in order to improve overall impairments and QOL   Patient was educated on all the above exercise prior/during/after for proper posture, positioning, and execution for safe performance with home exercise program.   Patient educated on the importance of strong core and lower extremity musculature in order to improve both static and dynamic balance, improve gait mechanics, reduce fall risk and improve household and community mobility.       Written Home Exercises Provided: Patient instructed to cont prior HEP. Exercises were reviewed and Randy was able to demonstrate them prior to the end of the session.  Randy demonstrated good  understanding of the education provided. See EMR under Patient Instructions for exercises provided during therapy sessions    ASSESSMENT     Randy continues with vestibular exercises with progression to tandem stance during VOR exercise.  He was fatigued during today's treatment and required frequent rest breaks and was unable to tolerate full exercise routine.  Patient continues to have unsteady gait and will continue with balance and vestibular exercises.               Randy Is progressing well towards his goals.   Pt prognosis is Good.     Pt will continue to benefit from skilled  outpatient physical therapy to address the deficits listed in the problem list box on initial evaluation, provide pt/family education and to maximize pt's level of independence in the home and community environment.     Pt's spiritual, cultural and educational needs considered and pt agreeable to plan of care and goals.     Anticipated barriers to physical therapy: none    Goals:   Short Term Goals: 4 weeks   Patient will be independent with initial HEP.  Met  Patient will report a decrease in dizziness to 5/10 during normal daily activities. Met  Patient will improve condition 5 on mCTSIB from 3 seconds to 15 seconds to demonstrate improved use of vestibular system for balance.  In Progress, Not Met     Long Term Goals: 8 weeks   Patient will be compliant with updated HEP to promote the independent management of current diagnosis.  In Progress, Not Met  Patient will improve TUG time from 13 seconds to <10 seconds to demonstrate improved functional mobility.  In Progress, Not Met  Patient will report a decrease in dizziness to 0/10 during transfers and quick turns.  In Progress, Not Met  Patient will improve condition 5 on mCTSIB from 3 seconds to 30 seconds to demonstrate improved use of vestibular system for balance. Met  Patient will have improved FOTO status from 54 to 62 to demonstrate improved functional mobility.   In Progress, Not Met    PLAN     Continue with PT POC and progress as tolerated.     Randy Adhikari, PT

## 2024-10-25 ENCOUNTER — CLINICAL SUPPORT (OUTPATIENT)
Dept: REHABILITATION | Facility: HOSPITAL | Age: 74
End: 2024-10-25
Payer: MEDICARE

## 2024-10-25 DIAGNOSIS — R42 DIZZINESS: Primary | ICD-10-CM

## 2024-10-25 DIAGNOSIS — Z79.899 ENCOUNTER FOR LONG-TERM (CURRENT) USE OF MEDICATIONS: Primary | ICD-10-CM

## 2024-10-25 DIAGNOSIS — R26.89 BALANCE PROBLEM: ICD-10-CM

## 2024-10-25 DIAGNOSIS — Z87.898 HISTORY OF VERTIGO: ICD-10-CM

## 2024-10-25 PROCEDURE — 97112 NEUROMUSCULAR REEDUCATION: CPT | Mod: PN

## 2024-10-25 NOTE — PROGRESS NOTES
"[facility]   Physical Therapy Treatment Note     Name: Randy Yap  Clinic Number: [id number]    Therapy Diagnosis:   Encounter Diagnoses   Name Primary?    Dizziness Yes    History of vertigo     Balance problem          Physician: Olvin Hutson MD    Visit Date: 10/25/2024    Physician Orders: PT Eval and Treat   Medical Diagnosis from Referral:   R42 (ICD-10-CM) - Dizziness   Z87.898 (ICD-10-CM) - History of vertigo   Evaluation Date: 8/23/2024  Authorization Period Expiration: 12/31/2024  Plan of Care Expiration: 12/10/2024  Progress Note Due: 11/10/2024  Visit # / Visits authorized: 17/20 (1/ 1 eval)   FOTO: 2/ 3      Precautions: Standard and pacemaker    Time In: 11:00 am  Time Out: 12:00 am  Total Billable Time: 45 minutes      SUBJECTIVE     Pt reports: he has been performing home exercises daily.  He was compliant with home exercise program.  Response to previous treatment: no increased dizziness  Functional change: in progress    Dizziness: 0/10       OBJECTIVE     Objective Measures updated at progress report unless specified.         TREATMENT       Randy received the treatments listed below:       therapeutic exercises to develop strength, endurance, ROM, flexibility, and core stabilization for (0)  minutes including:       manual therapy techniques applied for (0) minutes, including:      neuromuscular re-education activities to improve: Balance, Coordination, Kinesthetic, Sense, Proprioception, and Posture for (40)  minutes., including:  Standing (tandem stance) VOR (horizontal and vertical) 2 x 1 min each  Walking VOR (horizontal and vertical) with target on wall x 5 laps each  Romberg eyes closed on Airex 2 x 30"  Sharpened Romberg on Airex 2 x 30" each way  Single leg balance on Airex 3 x 15" each  Tandem gait x 3 laps in cross walk  Marching gait x 3 laps in cross walk  Retro gait x 5 laps in cross walk  Rocker board x 2 min  Toe taps (3 cones) x 6 reps each " LE  VOR walking while holding target (vertical and horizontal)  Walking with smooth pursuits (vertical and horizontal)  Walking on Airex balance beam x 5 laps in parallel bars  Side stepping on Airex balance beam x 5 laps in parallel bars      dynamic functional therapeutic activities to improve functional performance for (5)  minutes, including:  Sit to stands from 20 inch box 2 x 10 15#kb      PATIENT EDUCATION AND HOME EXERCISES     Home Exercises Provided and Patient Education Provided     Education/Self-Care provided:  Patient educated on biomechanical justification for therapeutic exercise and importance of compliance with HEP in order to improve overall impairments and QOL   Patient was educated on all the above exercise prior/during/after for proper posture, positioning, and execution for safe performance with home exercise program.   Patient educated on the importance of strong core and lower extremity musculature in order to improve both static and dynamic balance, improve gait mechanics, reduce fall risk and improve household and community mobility.       Written Home Exercises Provided: Patient instructed to cont prior HEP. Exercises were reviewed and Randy was able to demonstrate them prior to the end of the session.  Randy demonstrated good  understanding of the education provided. See EMR under Patient Instructions for exercises provided during therapy sessions    ASSESSMENT     Randy continues with progression of vestibular exercises with ability to perform standing VOR in tandem stance.  He is noted to have improved stability during tandem gait and marching gait.               Randy Is progressing well towards his goals.   Pt prognosis is Good.     Pt will continue to benefit from skilled outpatient physical therapy to address the deficits listed in the problem list box on initial evaluation, provide pt/family education and to maximize pt's level of independence in the home and community  environment.     Pt's spiritual, cultural and educational needs considered and pt agreeable to plan of care and goals.     Anticipated barriers to physical therapy: none    Goals:   Short Term Goals: 4 weeks   Patient will be independent with initial HEP.  Met  Patient will report a decrease in dizziness to 5/10 during normal daily activities. Met  Patient will improve condition 5 on mCTSIB from 3 seconds to 15 seconds to demonstrate improved use of vestibular system for balance.  In Progress, Not Met     Long Term Goals: 8 weeks   Patient will be compliant with updated HEP to promote the independent management of current diagnosis.  In Progress, Not Met  Patient will improve TUG time from 13 seconds to <10 seconds to demonstrate improved functional mobility.  In Progress, Not Met  Patient will report a decrease in dizziness to 0/10 during transfers and quick turns.  In Progress, Not Met  Patient will improve condition 5 on mCTSIB from 3 seconds to 30 seconds to demonstrate improved use of vestibular system for balance. Met  Patient will have improved FOTO status from 54 to 62 to demonstrate improved functional mobility.   In Progress, Not Met    PLAN     Continue with PT POC and progress as tolerated.     Randy Adhikari, PT

## 2024-10-28 ENCOUNTER — TELEPHONE (OUTPATIENT)
Dept: FAMILY MEDICINE | Facility: CLINIC | Age: 74
End: 2024-10-28
Payer: MEDICARE

## 2024-10-28 RX ORDER — MEXILETINE HYDROCHLORIDE 150 MG/1
CAPSULE ORAL
Qty: 270 CAPSULE | Refills: 0 | Status: SHIPPED | OUTPATIENT
Start: 2024-10-28

## 2024-10-28 RX ORDER — ALPRAZOLAM 0.25 MG/1
0.25 TABLET ORAL 2 TIMES DAILY
Qty: 60 TABLET | Refills: 0 | Status: SHIPPED | OUTPATIENT
Start: 2024-10-28

## 2024-10-30 ENCOUNTER — CLINICAL SUPPORT (OUTPATIENT)
Dept: REHABILITATION | Facility: HOSPITAL | Age: 74
End: 2024-10-30
Payer: MEDICARE

## 2024-10-30 DIAGNOSIS — Z87.898 HISTORY OF VERTIGO: ICD-10-CM

## 2024-10-30 DIAGNOSIS — R26.89 BALANCE PROBLEM: ICD-10-CM

## 2024-10-30 DIAGNOSIS — R42 DIZZINESS: Primary | ICD-10-CM

## 2024-10-30 PROCEDURE — 97112 NEUROMUSCULAR REEDUCATION: CPT | Mod: PN

## 2024-10-31 ENCOUNTER — EXTERNAL CHRONIC CARE MANAGEMENT (OUTPATIENT)
Dept: PRIMARY CARE CLINIC | Facility: CLINIC | Age: 74
End: 2024-10-31
Payer: MEDICARE

## 2024-10-31 PROCEDURE — 99490 CHRNC CARE MGMT STAFF 1ST 20: CPT | Mod: S$PBB,,, | Performed by: FAMILY MEDICINE

## 2024-10-31 PROCEDURE — 99439 CHRNC CARE MGMT STAF EA ADDL: CPT | Mod: PBBFAC,PO | Performed by: FAMILY MEDICINE

## 2024-10-31 PROCEDURE — 99490 CHRNC CARE MGMT STAFF 1ST 20: CPT | Mod: PBBFAC,PO | Performed by: FAMILY MEDICINE

## 2024-10-31 PROCEDURE — 99439 CHRNC CARE MGMT STAF EA ADDL: CPT | Mod: S$PBB,,, | Performed by: FAMILY MEDICINE

## 2024-11-01 ENCOUNTER — CLINICAL SUPPORT (OUTPATIENT)
Dept: REHABILITATION | Facility: HOSPITAL | Age: 74
End: 2024-11-01
Payer: MEDICARE

## 2024-11-01 DIAGNOSIS — Z87.898 HISTORY OF VERTIGO: ICD-10-CM

## 2024-11-01 DIAGNOSIS — R26.89 BALANCE PROBLEM: ICD-10-CM

## 2024-11-01 DIAGNOSIS — R42 DIZZINESS: Primary | ICD-10-CM

## 2024-11-01 PROCEDURE — 97112 NEUROMUSCULAR REEDUCATION: CPT | Mod: PN

## 2024-11-05 ENCOUNTER — CLINICAL SUPPORT (OUTPATIENT)
Dept: REHABILITATION | Facility: HOSPITAL | Age: 74
End: 2024-11-05
Payer: MEDICARE

## 2024-11-05 DIAGNOSIS — R26.89 BALANCE PROBLEM: ICD-10-CM

## 2024-11-05 DIAGNOSIS — R42 DIZZINESS: Primary | ICD-10-CM

## 2024-11-05 DIAGNOSIS — Z87.898 HISTORY OF VERTIGO: ICD-10-CM

## 2024-11-05 PROCEDURE — 97112 NEUROMUSCULAR REEDUCATION: CPT | Mod: PN

## 2024-11-05 NOTE — PROGRESS NOTES
[facility]   Physical Therapy Treatment Note     Name: Randy Yap  [facility] Number: [id number]    Therapy Diagnosis:   Encounter Diagnoses   Name Primary?    Dizziness Yes    History of vertigo     Balance problem          Physician: Olvin Hutson MD    Visit Date: 11/5/2024    Physician Orders: PT Eval and Treat   Medical Diagnosis from Referral:   R42 (ICD-10-CM) - Dizziness   Z87.898 (ICD-10-CM) - History of vertigo   Evaluation Date: 8/23/2024  Authorization Period Expiration: 12/31/2024  Plan of Care Expiration: 12/10/2024  Progress Note Due: 11/10/2024  Visit # / Visits authorized: 20/20 (1/ 1 eval)   FOTO: 2/ 3      Precautions: Standard and pacemaker    Time In: 10:00 am  Time Out: 11:00 am  Total Billable Time: 45 minutes      SUBJECTIVE     Pt reports: he has been performing home exercises and reports overall improved  balance since beginning therapy.  He was compliant with home exercise program.  Response to previous treatment: no increased dizziness  Functional change: in progress    Dizziness: 0/10       OBJECTIVE     Objective Measures updated at progress report unless specified.    Posture: Forward head/rounded shoulder posture          Lower Extremity Strength  Right LE   Left LE     Knee extension: 5/5 Knee extension: 5/5   Knee flexion: 5/5 Knee flexion: 5/5   Hip flexion: 5/5 Hip flexion: 5/5   Hip extension:  5/5 Hip extension: 5/5   Hip abduction: 5/5 Hip abduction: 5/5   Hip adduction: 5/5 Hip adduction 5/5   Ankle dorsiflexion: 5/5 Ankle dorsiflexion: 5/5   Ankle plantarflexion: 5/5 Ankle plantarflexion: 5/5      Sensation: Light Touch: intact                                Occulomotoer Exam  Spontaneous Nystagmus: absent  Gaze-Evoked Nystagmus: absent  Eye movement range: normal     Central Tests:  Smooth Pursuit: horizontal: normal; vertical: normal  Saccades:               Right: normal              Left: normal              Up: normal              Down:   "normal  VOR Cancellation: normal     Peripheral Tests:  Head Impulse Test: positive to right  Dynamic Visual Acuitiy (DVA): not tested  Head Shake Test: positive     BPPV testing:   Posterior canal:  Right Serjio-Hallpike: negative  Left Serjio-Hallpike: negative     Horizontal canal:   Roll Test Right: negative  Roll Test Left: negative     Balance Testing:      Single leg stance:  SLS (R): 5 seconds  SLS (L): 4 seconds     Sharpened Romberg:   EO: 30 seconds        mCTSIB:  Condition 1 (Normal vision, fixed support): 30 seconds  Condition 2 (absent vision, fixed support): 30 seconds, moderate sway  Condition 4 (normal vision, sway-referenced support): 30 seconds  Condition 5 (absent vision, sway-referenced support): 30 seconds, significant sway     TU seconds     Dynamic Gait Index (DGI):  1. Gait level surface -  3  2. Change in gait speed - 3  3. Gait with horizontal head turns - 3  4. Gait with vertical head turns - 3  5. Gait and pivot turn - 3  6. Step over obstacle - 3  7. Step around obstacle - 3  8. Steps - 2  Total 23/24 (<19/24 related to falls in older adults (vestibular included)         Intake Outcome Measure for FOTO Vestibular Survey     Therapist reviewed FOTO scores for Randy Yap on 2024.   FOTO documents entered into [employer] - see Media section.     Intake Score: 60%              TREATMENT       Randy received the treatments listed below:       therapeutic exercises to develop strength, endurance, ROM, flexibility, and core stabilization for (0)  minutes including:       manual therapy techniques applied for (0) minutes, including:      neuromuscular re-education activities to improve: Balance, Coordination, Kinesthetic, Sense, Proprioception, and Posture for (40)  minutes., including:  Standing (tandem stance) VOR (horizontal and vertical) 2 x 1 min each  Walking VOR (horizontal and vertical) with target on wall x 5 laps each  Romberg eyes closed on Airex 2 x 30"  Sharpened Romberg on " "Airex 2 x 30" each way  Single leg balance on Airex 3 x 20" each  Tandem gait x 3 laps in cross walk  Marching gait x 3 laps in cross walk  Retro gait x 5 laps in cross walk  Rocker board x 2 min  Toe taps (3 cones) x 6 reps each LE  VOR walking while holding target (vertical and horizontal) x 2 laps each in crosswalk  Walking with smooth pursuits (vertical and horizontal) x 2 laps each in crosswalk  Walking on Airex balance beam x 5 laps in parallel bars  Side stepping on Airex balance beam x 5 laps in parallel bars  [place] x 2 laps in cross walk      dynamic functional therapeutic activities to improve functional performance for (5)  minutes, including:  Sit to stands from 20 inch box 2 x 10 15#kb      PATIENT EDUCATION AND HOME EXERCISES     Home Exercises Provided and Patient Education Provided     Education/Self-Care provided:  Patient educated on biomechanical justification for therapeutic exercise and importance of compliance with HEP in order to improve overall impairments and QOL   Patient was educated on all the above exercise prior/during/after for proper posture, positioning, and execution for safe performance with home exercise program.   Patient educated on the importance of strong core and lower extremity musculature in order to improve both static and dynamic balance, improve gait mechanics, reduce fall risk and improve household and community mobility.       Written Home Exercises Provided: Patient instructed to cont prior HEP. Exercises were reviewed and Randy was able to demonstrate them prior to the end of the session.  Randy demonstrated good  understanding of the education provided. See EMR under Patient Instructions for exercises provided during therapy sessions    ASSESSMENT     Randy has improved FOTO status, improved balance and lower extremity strength.  He has been compliant with HEP and will be discharged from PT to continue with HEP for maintenance.               Randy Is " progressing well towards his goals.   Pt prognosis is Good.     Pt will continue to benefit from skilled outpatient physical therapy to address the deficits listed in the problem list box on initial evaluation, provide pt/family education and to maximize pt's level of independence in the home and community environment.     Pt's spiritual, cultural and educational needs considered and pt agreeable to plan of care and goals.     Anticipated barriers to physical therapy: none    Goals:   Short Term Goals: 4 weeks   Patient will be independent with initial HEP.  Met  Patient will report a decrease in dizziness to 5/10 during normal daily activities. Met  Patient will improve condition 5 on mCTSIB from 3 seconds to 15 seconds to demonstrate improved use of vestibular system for balance. Met     Long Term Goals: 8 weeks   Patient will be compliant with updated HEP to promote the independent management of current diagnosis. Met  Patient will improve TUG time from 13 seconds to <10 seconds to demonstrate improved functional mobility.  Met  Patient will report a decrease in dizziness to 0/10 during transfers and quick turns.  Met  Patient will improve condition 5 on mCTSIB from 3 seconds to 30 seconds to demonstrate improved use of vestibular system for balance. Met  Patient will have improved FOTO status from 54 to 62 to demonstrate improved functional mobility.   Not Met    PLAN     Discharge patient from PT to HEP for maintenance.    Randy Adhikari, PT

## 2024-11-05 NOTE — PLAN OF CARE
[facility]  Physical Therapy Discharge Note    Name: Randy Yap  [facility] Number: [id number]    Therapy Diagnosis:   Encounter Diagnoses   Name Primary?    Dizziness Yes    History of vertigo     Balance problem      Physician: Olvin Hutson MD    Physician Orders: PT Eval and Treat   Medical Diagnosis from Referral:   R42 (ICD-10-CM) - Dizziness   Z87.898 (ICD-10-CM) - History of vertigo   Evaluation Date: 8/23/2024      Date of Last visit: 11/5/2024  Total Visits Received: 20    ASSESSMENT      Randy has improved FOTO status, improved balance and lower extremity strength.  He has been compliant with HEP and will be discharged from PT to continue with HEP for maintenance.      Discharge reason: Patient has reached the maximum rehab potential for the present time    Discharge FOTO Score: 60    Goals:   Short Term Goals: 4 weeks   Patient will be independent with initial HEP.  Met  Patient will report a decrease in dizziness to 5/10 during normal daily activities. Met  Patient will improve condition 5 on mCTSIB from 3 seconds to 15 seconds to demonstrate improved use of vestibular system for balance. Met     Long Term Goals: 8 weeks   Patient will be compliant with updated HEP to promote the independent management of current diagnosis. Met  Patient will improve TUG time from 13 seconds to <10 seconds to demonstrate improved functional mobility.  Met  Patient will report a decrease in dizziness to 0/10 during transfers and quick turns.  Met  Patient will improve condition 5 on mCTSIB from 3 seconds to 30 seconds to demonstrate improved use of vestibular system for balance. Met  Patient will have improved FOTO status from 54 to 62 to demonstrate improved functional mobility.   Not Met    PLAN   This patient is discharged from Physical Therapy      Randy Adhikari, EDILBERTO

## 2024-11-06 ENCOUNTER — PATIENT MESSAGE (OUTPATIENT)
Dept: FAMILY MEDICINE | Facility: CLINIC | Age: 74
End: 2024-11-06

## 2024-11-06 ENCOUNTER — OFFICE VISIT (OUTPATIENT)
Dept: FAMILY MEDICINE | Facility: CLINIC | Age: 74
End: 2024-11-06
Payer: MEDICARE

## 2024-11-06 DIAGNOSIS — I15.2 HYPERTENSION ASSOCIATED WITH DIABETES: Primary | Chronic | ICD-10-CM

## 2024-11-06 DIAGNOSIS — Z79.899 ENCOUNTER FOR LONG-TERM (CURRENT) USE OF MEDICATIONS: ICD-10-CM

## 2024-11-06 DIAGNOSIS — M62.50 MUSCULAR ATROPHY, UNSPECIFIED SITE: ICD-10-CM

## 2024-11-06 DIAGNOSIS — Z87.898 HISTORY OF VERTIGO: ICD-10-CM

## 2024-11-06 DIAGNOSIS — E11.59 HYPERTENSION ASSOCIATED WITH DIABETES: Primary | Chronic | ICD-10-CM

## 2024-11-06 PROCEDURE — G2211 COMPLEX E/M VISIT ADD ON: HCPCS | Mod: 95,,, | Performed by: FAMILY MEDICINE

## 2024-11-06 PROCEDURE — 99214 OFFICE O/P EST MOD 30 MIN: CPT | Mod: 95,,, | Performed by: FAMILY MEDICINE

## 2024-11-06 NOTE — ASSESSMENT & PLAN NOTE
Check testosterone level.  Caution testosterone replacement therapy given.  Patient will start increasing weight-bearing exercise increase his protein intake.

## 2024-11-06 NOTE — PATIENT INSTRUCTIONS
Follow up in about 6 months (around 5/6/2025), or if symptoms worsen or fail to improve, for Med refills, LAB RESULTS, 3 month labs fasting.     Dear patient,   As a result of recent federal legislation (The Federal Cures Act), you may receive lab or pathology results from your visit in your [facility] account before your physician is able to contact you. Your physician or their representative will relay the results to you with their recommendations at their soonest availability.     If no improvement in symptoms or symptoms worsen, please be advised to call MD, follow-up at clinic and/or go to ER if becomes severe.    Olvin Hutson M.D.        We Offer TELEHEALTH & Same Day Appointments!   Book your Telehealth appointment with me through my nurse or   Clinic appointments on [employer]!    [address] [place] [address]    Office: [phone]   FAX: [phone]    Check out my Facebook Page and Follow Me at: [email or web address]    Check out my website at [employer] by clicking on: [email or web address]    To Schedule appointments [email or web address] go to [employer]: [email or web address]   
n/a

## 2024-11-06 NOTE — PROGRESS NOTES
Primary Care Telemedicine Note  The patient location is:  Patient's Home - [place]  The chief complaint leading to consultation is:   Chief Complaint   Patient presents with    Follow-up      Total time:  see MDM below. The total time spent on the encounter, which includes face to face time and non-face to face time preparing to see the patient (eg, review of previous medical records, tests), Obtaining and/or reviewing separately obtained history, documenting clinical information in the electronic or other health record, independently interpreting results (not separately reported)/communicating results to the patient/family/caregiver, and/or care coordination (not separately reported).    Visit type: Virtual visit with synchronous audio and video  Each patient to whom he or she provides medical services by telemedicine is:  (1) informed of the relationship between the physician and patient and the respective role of any other health care provider with respect to management of the patient; and (2) notified that he or she may decline to receive medical services by telemedicine and may withdraw from such care at any time.  =================================================================  PLAN:      Assessment & Plan  1. Insomnia.  The patient is currently taking Xanax at night for sleep, previously prescribed by his cardiologist. He uses it 4-5 times per week. He has tried melatonin and trazodone in the past, but trazodone had a negative impact on his cardiac medications. He will continue using Xanax as needed. He is advised to book an appointment every 6 months for refills.    2. Muscle atrophy and fatigue.  He reports muscle atrophy and weakness, potentially due to low testosterone levels. A testosterone level test will be ordered and linked to his next lab work in February. He is advised to increase protein intake and engage in weight training to stimulate muscle growth and maintain bone strength.    3.  Dizziness.  The patient has completed physical therapy for dizziness and reports significant improvement. He will continue the exercises at home. Meclizine is used only when he experiences severe nausea, approximately once every two months.        Problem List Items Addressed This Visit       Hypertension associated with diabetes - Primary (Chronic)     Counseled on importance of hypertension disease course, I recommend ongoing Education for DASH-diet and exercise.  Counseled on medication regimen importance of treating high blood pressure.  Please be advised of risk of untreated blood pressure as discussed.  Please advised of ER precautions were given for symptoms of hypertensive urgency and emergency.               Encounter for long-term (current) use of medications (Chronic)     Complete history and physical was completed today.  Complete and thorough medication reconciliation was performed.  Discussed risks and benefits of medications.  Advised patient on orders and health maintenance.  We discussed old records and old labs if available.  Will request any records not available through epic.  Continue current medications listed on your summary sheet.           History of vertigo (Chronic)     Continue vestibular therapy.  Continue meclizine as needed.         Muscular atrophy (Chronic)     Check testosterone level.  Caution testosterone replacement therapy given.  Patient will start increasing weight-bearing exercise increase his protein intake.         Relevant Orders    Testosterone     Future Appointments       Date Provider Specialty Appt Notes    12/2/2024 Nirav Baldwin MD Cardiology 3MOS    12/4/2024 Nuria Barrientos, [facility] dizziness    2/21/2025  Lab     2/24/2025  Cardiology ABT/ICD-6 MOS CK           Medication Management for assessment above:   Medication List with Changes/Refills   Current Medications    ACETAMINOPHEN (TYLENOL) 325 MG TABLET    Take 650 mg by mouth as needed.      ALBUTEROL (PROVENTIL/VENTOLIN HFA) 90 MCG/ACTUATION INHALER    Inhale 1 puff into the lungs once daily. rescue    ALPRAZOLAM (XANAX) 0.25 MG TABLET    TAKE 1 TABLET BY MOUTH TWICE DAILY AS NEEDED FOR ANXIETY    AMIODARONE (PACERONE) 200 MG TAB    TAKE 1 TABLET BY MOUTH TWICE DAILY FOR 6 WEEKS, THEN TAKE 1 AND 1/2 TABLETS DAILY    BEPOTASTINE BESILATE (BEPREVE) 1.5 % DROP    Bepreve 1.5 % eye drops   Apply by ophthalmic route as needed for 50 days.    CARVEDILOL (COREG) 25 MG TABLET    TAKE ONE TABLET BY MOUTH TWICE DAILY    CO-ENZYME Q-10 30 MG CAPSULE    Take 30 mg by mouth once daily.     FARXIGA 10 MG TABLET    TAKE 1 TABLET BY MOUTH ONCE DAILY    FERROUS SULFATE (FEOSOL) 325 MG (65 MG IRON) TAB TABLET    Take 325 mg by mouth daily with breakfast.    FLUTICASONE FUROATE (ARNUITY ELLIPTA) 100 MCG/ACTUATION INHALER    Inhale 1 puff into the lungs once daily.    FUROSEMIDE (LASIX) 40 MG TABLET    TAKE 1 TABLET BY MOUTH ONCE DAILY    GLUCOSAMINE-CHONDROITIN 500-400 MG TABLET    Take 1 tablet by mouth once daily.     MECLIZINE (ANTIVERT) 25 MG TABLET    Take 1 tablet (25 mg total) by mouth 3 (three) times daily as needed for Dizziness or Nausea.    MEXILETINE (MEXITIL) 150 MG CAP    Take 3 capsules by mouth every 8 hours.    MULTIVITAMIN (THERAGRAN) TABLET    Take 1 tablet by mouth once daily.    ONDANSETRON (ZOFRAN) 4 MG TABLET    Take 4 mg by mouth every 8 (eight) hours as needed for Nausea.    OZEMPIC 0.25 MG OR 0.5 MG (2 MG/3 ML) PEN INJECTOR    Inject 0.5 mg into the skin every 7 days.    PRAVASTATIN (PRAVACHOL) 80 MG TABLET    Take 1 tablet (80 mg total) by mouth once daily.    RESTASIS 0.05 % OPHTHALMIC EMULSION    Place 1 drop into both eyes 2 (two) times daily.    RIVAROXABAN (XARELTO) 10 MG TAB    Take 10 mg by mouth daily with dinner or evening meal.    SACUBITRIL-VALSARTAN (ENTRESTO)  MG PER TABLET    Take 1 tablet by mouth 2 (two) times daily.    SPIRONOLACTONE (ALDACTONE) 25 MG TABLET    TAKE 1  TABLET BY MOUTH ONCE DAILY       Olvin Hutson M.D.  ==========================================================================  Subjective:   Patient ID: Randy Yap is a 74 y.o. male.  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   Chief Complaint: Follow-up      History of Present Illness  The patient is a 74-year-old male being seen by telemedicine to discuss medication management.    He has been prescribed Xanax by his cardiologist, which he takes at night to aid sleep. He uses Xanax approximately 4 or 5 times a week and is seeking an alternative medication to help calm his mind at night. He has previously tried trazodone, but it had a negative interaction with his cardiac medication.    He reports that his blood pressure is well-controlled at home.    He has recently completed physical therapy for dizziness, which has significantly improved. He only takes meclizine when he experiences severe nausea, which is infrequent, approximately once every 2 months.    He has noticed a decrease in his muscle strength and is concerned about potential low testosterone levels. He is considering joining a gym to maintain his exercise routine.    Problem List Items Addressed This Visit       Hypertension associated with diabetes - Primary (Chronic)    Overview     November 2024:  Blood pressure well controlled at home.  Hypertension Medications               carvediloL (COREG) 25 MG tablet TAKE ONE TABLET BY MOUTH TWICE DAILY    furosemide (LASIX) 40 MG tablet TAKE 1 TABLET BY MOUTH ONCE DAILY    sacubitriL-valsartan (ENTRESTO)  mg per tablet Take 1 tablet by mouth 2 (two) times daily.    spironolactone (ALDACTONE) 25 MG tablet TAKE 1 TABLET BY MOUTH  ONCE DAILY          January 2024: reports insomnia at night due to getting up and taking his medications.   Hypertension Medications               carvediloL (COREG) 25 MG tablet Take 1 tablet (25 mg total) by mouth 2 (two) times daily.    furosemide (LASIX) 40 MG tablet Take 1 tablet (40 mg total) by mouth once daily.    sacubitriL-valsartan (ENTRESTO)  mg per tablet Take 1 tablet by mouth 2 (two) times daily.    spironolactone (ALDACTONE) 25 MG tablet Take 1 tablet (25 mg total) by mouth once daily.        CHRONIC. STABLE. BP Reviewed.  Compliant with BP medications. No SE reported.   March 2022:  Patient continues on clinical trial with [facility].  (-) CP, SOB, palpitations, dizziness, lightheadedness, HA, arm numbness, tingling or weakness, syncope.  Creatinine   Date Value Ref Range Status   08/25/2023 1.1 0.5 - 1.4 mg/dL Final     Results for orders placed or performed during the hospital encounter of 08/17/23   EKG 12-lead    Collection Time: 08/17/23  1:15 PM    Narrative    Test Reason : R55,    Vent. Rate : 079 BPM     Atrial Rate : 080 BPM     P-R Int : 246 ms          QRS Dur : 130 ms      QT Int : 432 ms       P-R-T Axes : 083 -43 041 degrees     QTc Int : 495 ms    Atrial-paced rhythm with prolonged AV conduction  Left axis deviation  Nonspecific intraventricular block  Inferior infarct (cited on or before 07-JUL-2023)  Abnormal ECG  When compared with ECG of 07-JUL-2023 08:32,  Nonspecific T wave abnormality, improved in Anterior-lateral leads  Confirmed by OLIMPIA ALFARO MD ([id number]) on 8/17/2023 8:53:05 PM    Referred By: DEMOND   SELF           Confirmed By:OLIMPIA ALFARO MD              Current Assessment & Plan     Counseled on importance of hypertension disease course, I recommend ongoing Education for DASH-diet and exercise.  Counseled on medication regimen importance of treating high blood pressure.  Please be advised of risk of untreated blood pressure as discussed.  Please advised  of ER precautions were given for symptoms of hypertensive urgency and emergency.               Encounter for long-term (current) use of medications (Chronic)    Overview     November 2024:  Reviewed labs.  January 2024: Reviewed labs. August 2023: Reviewed labs.  March 2023:  CHRONIC. Stable. Compliant with medications for managed conditions. See medication list. No SE reported.   Routine lab analysis is being monitored. Refills were addressed.  Lab Results   Component Value Date    WBC 8.73 04/29/2024    HGB 14.3 04/29/2024    HCT 43.9 04/29/2024    MCV 98 04/29/2024     04/29/2024         Chemistry        Component Value Date/Time     (L) 08/16/2024 0606     04/29/2024 0851    K 4.5 08/16/2024 0606    K 4.3 04/29/2024 0851     04/29/2024 0851    CO2 25 08/16/2024 0606    CO2 20 (L) 04/29/2024 0851    BUN 10 08/16/2024 0606    BUN 15 04/29/2024 0851    CREATININE 0.77 (L) 08/16/2024 0606    CREATININE 0.9 04/29/2024 0851    GLU 93 04/29/2024 0851        Component Value Date/Time    CALCIUM 9.4 08/16/2024 0606    CALCIUM 9.4 04/29/2024 0851    ALKPHOS 38 08/16/2024 0606    ALKPHOS 44 (L) 01/26/2024 0753    AST 21 08/16/2024 0606    AST 21 01/26/2024 0753    ALT 25 08/16/2024 0606    ALT 29 01/26/2024 0753    BILITOT 0.6 08/16/2024 0606    BILITOT 0.7 01/26/2024 0753    ESTGFRAFRICA >60.0 10/01/2021 1353    EGFRNONAA >60.0 10/01/2021 1353          Lab Results   Component Value Date    TSH 2.075 01/26/2024              Current Assessment & Plan     Complete history and physical was completed today.  Complete and thorough medication reconciliation was performed.  Discussed risks and benefits of medications.  Advised patient on orders and health maintenance.  We discussed old records and old labs if available.  Will request any records not available through epic.  Continue current medications listed on your summary sheet.           History of vertigo (Chronic)    Overview     November 2024:   Patient reports he is doing much better with vestibular therapy.    Chronic.  Recurrent.  Patient states he did not have any nausea with this current episode of vertigo.  He is taking meclizine 12.5 milligram with no relief.  He has not been to vestibular therapy.         Current Assessment & Plan     Continue vestibular therapy.  Continue meclizine as needed.         Muscular atrophy (Chronic)    Overview     Chronic.  Associated with fatigue.  Patient is on Ozempic doing well for his blood sugar however reports losing muscle mass he would like it testosterone checked.    Last testosterone check in 2013 was normal 578         Current Assessment & Plan     Check testosterone level.  Caution testosterone replacement therapy given.  Patient will start increasing weight-bearing exercise increase his protein intake.             Review of patient's allergies indicates:  No Known Allergies  Current Outpatient Medications   Medication Instructions    acetaminophen (TYLENOL) 650 mg, As needed (PRN)    albuterol (PROVENTIL/VENTOLIN HFA) 90 mcg/actuation inhaler 1 puff, Inhalation, Daily, rescue    ALPRAZolam (XANAX) 0.25 mg, Oral, 2 times daily, as needed for anxiety.    amiodarone (PACERONE) 200 MG Tab TAKE 1 TABLET BY MOUTH TWICE DAILY FOR 6 WEEKS, THEN TAKE 1 AND 1/2 TABLETS DAILY    bepotastine besilate (BEPREVE) 1.5 % Drop Bepreve 1.5 % eye drops   Apply by ophthalmic route as needed for 50 days.    carvediloL (COREG) 25 mg, Oral, 2 times daily    co-enzyme Q-10 30 mg, Daily    FARXIGA 10 mg tablet TAKE 1 TABLET BY MOUTH ONCE DAILY    ferrous sulfate (FEOSOL) 325 mg, With breakfast    fluticasone furoate (ARNUITY ELLIPTA) 100 mcg/actuation inhaler 1 puff, Inhalation, Daily    furosemide (LASIX) 40 mg, Oral    glucosamine-chondroitin 500-400 mg tablet 1 tablet, Daily    meclizine (ANTIVERT) 25 mg, Oral, 3 times daily PRN    mexiletine (MEXITIL) 150 MG Cap Take 3 capsules by mouth every 8 hours.    multivitamin  (THERAGRAN) tablet 1 tablet, Daily    ondansetron (ZOFRAN) 4 mg, Every 8 hours PRN    OZEMPIC 0.5 mg, Subcutaneous, Every 7 days    pravastatin (PRAVACHOL) 80 mg, Oral, Daily    RESTASIS 0.05 % ophthalmic emulsion 1 drop, 2 times daily    rivaroxaban (XARELTO) 10 mg, With dinner    sacubitriL-valsartan (ENTRESTO)  mg per tablet 1 tablet, Oral, 2 times daily    spironolactone (ALDACTONE) 25 mg, Oral      I have reviewed the PMH, social history, FamilyHx, surgical history, allergies and medications documented / confirmed by the patient at the time of this visit.  Review of Systems   Constitutional:  Positive for fatigue. Negative for activity change and unexpected weight change.   HENT:  Negative for hearing loss, rhinorrhea and trouble swallowing.    Eyes:  Negative for discharge and visual disturbance.   Respiratory:  Negative for chest tightness and wheezing.    Cardiovascular:  Negative for chest pain and palpitations.   Gastrointestinal:  Positive for constipation. Negative for blood in stool, diarrhea and vomiting.   Endocrine: Negative for polydipsia and polyuria.   Genitourinary:  Positive for urgency. Negative for difficulty urinating and hematuria.   Musculoskeletal:  Negative for arthralgias, joint swelling and neck pain.   Neurological:  Negative for weakness and headaches.   Psychiatric/Behavioral:  Negative for confusion and dysphoric mood.      Objective:   There were no vitals taken for this visit.  Physical Exam  Constitutional:       General: He is not in acute distress.     Appearance: He is well-developed. He is not diaphoretic.   HENT:      Head: Normocephalic and atraumatic.   Eyes:      Extraocular Movements: Extraocular movements intact.      Pupils: Pupils are equal, round, and reactive to light.   Pulmonary:      Effort: Pulmonary effort is normal.   Musculoskeletal:         General: Normal range of motion.      Cervical back: Normal range of motion.   Skin:     Findings: No rash.    Neurological:      Mental Status: He is alert and oriented to person, place, and time.   Psychiatric:         Attention and Perception: He is attentive.         Mood and Affect: Mood normal. Mood is not anxious or depressed. Affect is not labile, blunt, angry or inappropriate.         Speech: He is communicative. Speech is not rapid and pressured, delayed, slurred or tangential.         Behavior: Behavior normal. Behavior is not agitated, slowed, aggressive, withdrawn, hyperactive or combative.         Thought Content: Thought content normal. Thought content is not paranoid or delusional. Thought content does not include homicidal or suicidal ideation. Thought content does not include homicidal or suicidal plan.         Cognition and Memory: Memory is not impaired.         Judgment: Judgment normal. Judgment is not impulsive or inappropriate.       Physical Exam      Results      Assessment:     1. Hypertension associated with diabetes    2. History of vertigo    3. Encounter for long-term (current) use of medications    4. Muscular atrophy, unspecified site      MDM:   Moderate medical complexity.  Moderate risk.  Total time: 22 minutes.  This includes total time spent on the encounter, which includes face to face time and non-face to face time preparing to see the patient (eg, review of previous medical records, tests), Obtaining and/or reviewing separately obtained history, documenting clinical information in the electronic or other health record, independently interpreting results (not separately reported)/communicating results to the patient/family/caregiver, and/or care coordination (not separately reported).    I have Reviewed and summarized old records.  I have performed thorough medication reconciliation today and discussed risk and benefits of medications.  I have reviewed labs and discussed with patient.  All questions were answered.  I am requesting old records and will review them once they are  available.  Cardiology  Visit today included increased complexity associated with the care of the episodic problem see above assessment addressed and managing the longitudinal care of the patient due to the serious and/or complex managed problem(s) see above.  I have signed for the following orders AND/OR meds.  Orders Placed This Encounter   Procedures    Testosterone     Standing Status:   Future     Standing Expiration Date:   2/4/2026     Order Specific Question:   Send normal result to authorizing provider's In Basket if patient is active on MyChart:     Answer:   Yes           Follow up in about 6 months (around 5/6/2025), or if symptoms worsen or fail to improve, for Med refills, LAB RESULTS, 3 month labs fasting.  Future Appointments       Date Provider Specialty Appt Notes    12/2/2024 Nirav Baldwin MD Cardiology 3MOS    12/4/2024 Nuria Barrientos, [facility] Neurology dizziness    2/21/2025  Lab     2/24/2025  Cardiology ABT/ICD-6 MOS CK          If no improvement in symptoms or symptoms worsen, advised to call/follow-up at clinic or go to ER. Patient voiced understanding and all questions/concerns were addressed.   DISCLAIMER: This note was compiled by using a speech recognition dictation system and therefore please be aware that typographical / speech recognition errors can and do occur.  Please contact me if you see any errors specifically.  Consent was obtained for YRN recording system prior to the visit.    Olvin Hutson M.D.       Office: [phone]   [address] [place] [address]  FAX: [phone]

## 2024-11-07 VITALS — SYSTOLIC BLOOD PRESSURE: 117 MMHG | DIASTOLIC BLOOD PRESSURE: 76 MMHG

## 2024-11-11 DIAGNOSIS — I50.42 CHRONIC COMBINED SYSTOLIC AND DIASTOLIC CHF (CONGESTIVE HEART FAILURE): Chronic | ICD-10-CM

## 2024-11-11 RX ORDER — SACUBITRIL AND VALSARTAN 97; 103 MG/1; MG/1
1 TABLET, FILM COATED ORAL 2 TIMES DAILY
Qty: 180 TABLET | Refills: 3 | Status: SHIPPED | OUTPATIENT
Start: 2024-11-11

## 2024-11-11 NOTE — TELEPHONE ENCOUNTER
LOV 08/26/24. F/U 12/02/24 ([facility])    Please see the attached refill request.

## 2024-11-14 ENCOUNTER — OFFICE VISIT (OUTPATIENT)
Dept: FAMILY MEDICINE | Facility: CLINIC | Age: 74
End: 2024-11-14
Payer: MEDICARE

## 2024-11-14 ENCOUNTER — HOSPITAL ENCOUNTER (OUTPATIENT)
Dept: RADIOLOGY | Facility: HOSPITAL | Age: 74
Discharge: HOME | End: 2024-11-14
Attending: NURSE PRACTITIONER
Payer: MEDICARE

## 2024-11-14 VITALS
HEART RATE: 76 BPM | SYSTOLIC BLOOD PRESSURE: 110 MMHG | TEMPERATURE: 98 F | HEIGHT: 70 IN | RESPIRATION RATE: 16 BRPM | OXYGEN SATURATION: 96 % | WEIGHT: 223 LBS | DIASTOLIC BLOOD PRESSURE: 60 MMHG | BODY MASS INDEX: 31.92 KG/M2

## 2024-11-14 DIAGNOSIS — M25.511 ACUTE PAIN OF RIGHT SHOULDER: ICD-10-CM
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
## 2024-11-14 PROCEDURE — 73030 X-RAY EXAM OF SHOULDER: CPT | Mod: 26,RT,, | Performed by: RADIOLOGY

## 2024-11-14 PROCEDURE — 99999 PR PBB SHADOW E&M-EST. PATIENT-LVL V: CPT | Mod: PBBFAC,,, | Performed by: NURSE PRACTITIONER

## 2024-11-14 PROCEDURE — 99215 OFFICE O/P EST HI 40 MIN: CPT | Mod: PBBFAC,25,PO | Performed by: NURSE PRACTITIONER

## 2024-11-14 PROCEDURE — 73030 X-RAY EXAM OF SHOULDER: CPT | Mod: TC,PO,RT

## 2024-11-14 RX ORDER — TRAMADOL HYDROCHLORIDE 50 MG/1
50 TABLET ORAL EVERY 8 HOURS PRN
Qty: 15 TABLET | Refills: 0 | Status: SHIPPED | OUTPATIENT
Start: 2024-11-14 | End: 2024-11-20

## 2024-11-14 NOTE — PROGRESS NOTES
Assessment/Plan:  Problem List Items Addressed This Visit    None  Visit Diagnoses       Fall, initial encounter    -  Primary    Acute pain of right shoulder        Relevant Medications    traMADoL (ULTRAM) 50 mg tablet    Other Relevant Orders    X-ray Shoulder 2 or More Views Right        Will obtain x-ray today   Continue tylenol for mild pain  Start tramadol for more severe pain   reviewed. Discussed medication risks  Supportive care- rest, ice, heat, avoid heavy lifting, limit strenuous activity.   Follow up if symptoms worsen or fail to improve.  ER precautions for severe or worsening symptoms.     Belinda Yang NP  _____________________________________________________________________________________________________________________________________________________    CC: shoulder pain     HPI: Patient is a 74-year-old male who presents in clinic today as an established patient here for shoulder pain. He reports a fall yesterday landing on the right shoulder. Pain is localized to the right shoulder area with a level of 3-4 at rest, increasing to 11/10 with movement. He experiences limited range of motion in the affected shoulder. He reports pain with very minimal activity, such as pulling up the covers last night. He denies any prior shoulder issues or surgeries. He has attempted to manage pain with Tylenol and heat application, but reports these interventions have not provided significant relief. He takes Xarelto and avoids NSAIDs. He did not hit his head during the fall. There was no loss of consciousness. He reports no other areas of pain from the fall.       Past Medical History:  Past Medical History:   Diagnosis Date    Asthma     Cardiomyopathy due to systemic disease     Colon polyp 5/2013    repeat 5/2018    Depression, recurrent     Diastolic dysfunction     DJD (degenerative joint disease) of knee 10/14/2013    Hyperlipidemia     Hypertension     Murmur     Paroxysmal VT 5/21/2016     Pericarditis with effusion     Sleep apnea      Past Surgical History:   Procedure Laterality Date    CARDIAC DEFIBRILLATOR PLACEMENT  10/06/2014    COLONOSCOPY N/A 4/19/2023    Procedure: COLONOSCOPY 4/4-cc/bt clearance recedived;  Surgeon: Isabella Del Cid MD;  Location: [facility] ENDO;  Service: Endoscopy;  Laterality: N/A;    COLONOSCOPY W/ POLYPECTOMY  05/21/2013    repeat 5/21/2018    COSMETIC SURGERY      EYE SURGERY  Cataract    HERNIA REPAIR      R inguinal    LEFT HEART CATHETERIZATION Left 07/09/2018    Procedure: CATHETERIZATION, HEART, LEFT;  Surgeon: Macey Dos Santos MD;  Location: [facility] CATH LAB;  Service: Cardiology;  Laterality: Left;  left radial approach  Has [employer] g[employer] ICD    pace maker   10/06/2014    PERICARDIAL WINDOW  12-15 years ago    REPLACEMENT OF IMPLANTABLE CARDIOVERTER-DEFIBRILLATOR (ICD) GENERATOR Left 5/7/2024    Procedure: REPLACEMENT, ICD GENERATOR;  Surgeon: Nirav Baldwin MD;  Location: [facility] CATH LAB;  Service: Cardiology;  Laterality: Left;  [facility]  MRI safe  2/27/23-sinus @ 73, intact conduction 200-210ms  Device and leads implanted 10/6/14  [employer] ICD Fortify [place] KHALIL 2357-40Q, [id number]  A lead: Bennett Tendril STS 2088TC/46cm, [id number]  V lead: Bennett Durata 7120Q/58cm, [id number]    tonsillectomy      TONSILLECTOMY      VARICOCELECTOMY       Review of patient's allergies indicates:  No Known Allergies  Social History     Tobacco Use    Smoking status: Never    Smokeless tobacco: Never   Substance Use Topics    Alcohol use: Yes     Comment: occasional glass of wine on social occasions    Drug use: No     Family History   Problem Relation Name Age of Onset    Hyperlipidemia Mother Arline E Woodbury     Arrhythmia Mother Arline E Clau     Arthritis Mother Arline E Clau     Asthma Mother Arline E [place]     COPD Mother Arline E Clau     Heart disease Father John Yap 48    Heart attack Father John Yap 48    Hypertension Father John Yap     Arthritis  Father John Yap     Diabetes Father John Yap     Hypertension Maternal Grandmother Ree Bartlett     Hypertension Maternal Grandfather Madhav Bartlett     Heart disease Paternal Grandmother Rozina VIZCARRA Fracisco     Hypertension Paternal Grandmother Rozina VIZCARRA Fracisco     Heart attack Paternal Grandmother Rozina VIZCARRA Fracisco     Stroke Paternal Grandmother Rozina VIZCARRA Fracisco     Heart disease Paternal Grandfather Jeremy Yap     Hypertension Paternal Grandfather Jeremy Yap     Heart attack Paternal Grandfather Jeremy Yap     Cancer Paternal Grandfather Jeremy Yap      Current Outpatient Medications on File Prior to Visit   Medication Sig Dispense Refill    acetaminophen (TYLENOL) 325 MG tablet Take 650 mg by mouth as needed.       albuterol (PROVENTIL/VENTOLIN HFA) 90 mcg/actuation inhaler Inhale 1 puff into the lungs once daily. rescue 54 g 3    ALPRAZolam (XANAX) 0.25 MG tablet TAKE 1 TABLET BY MOUTH TWICE DAILY AS NEEDED FOR ANXIETY 60 tablet 0    amiodarone (PACERONE) 200 MG Tab TAKE 1 TABLET BY MOUTH TWICE DAILY FOR 6 WEEKS, THEN TAKE 1 AND 1/2 TABLETS DAILY 180 tablet 0    bepotastine besilate (BEPREVE) 1.5 % Drop Bepreve 1.5 % eye drops   Apply by ophthalmic route as needed for 50 days.      carvediloL (COREG) 25 MG tablet TAKE ONE TABLET BY MOUTH TWICE DAILY 180 tablet 3    co-enzyme Q-10 30 mg capsule Take 30 mg by mouth once daily.       FARXIGA 10 mg tablet TAKE 1 TABLET BY MOUTH ONCE DAILY 90 tablet 0    ferrous sulfate (FEOSOL) 325 mg (65 mg iron) Tab tablet Take 325 mg by mouth daily with breakfast.      fluticasone furoate (ARNUITY ELLIPTA) 100 mcg/actuation inhaler Inhale 1 puff into the lungs once daily. 30 each 11    furosemide (LASIX) 40 MG tablet TAKE 1 TABLET BY MOUTH ONCE DAILY 90 tablet 0    glucosamine-chondroitin 500-400 mg tablet Take 1 tablet by mouth once daily.       meclizine (ANTIVERT) 25 mg tablet Take 1 tablet (25 mg total) by mouth 3 (three) times daily as needed  "for Dizziness or Nausea. 90 tablet 1    mexiletine (MEXITIL) 150 MG Cap Take 3 capsules by mouth every 8 hours. 270 capsule 0    multivitamin (THERAGRAN) tablet Take 1 tablet by mouth once daily.      ondansetron (ZOFRAN) 4 MG tablet Take 4 mg by mouth every 8 (eight) hours as needed for Nausea.      OZEMPIC 0.25 mg or 0.5 mg (2 mg/3 mL) pen injector Inject 0.5 mg into the skin every 7 days. 9 mL 1    pravastatin (PRAVACHOL) 80 MG tablet Take 1 tablet (80 mg total) by mouth once daily. 90 tablet 2    RESTASIS 0.05 % ophthalmic emulsion Place 1 drop into both eyes 2 (two) times daily.      rivaroxaban (XARELTO) 10 mg Tab Take 10 mg by mouth daily with dinner or evening meal.      sacubitriL-valsartan (ENTRESTO)  mg per tablet TAKE 1 TABLET BY MOUTH TWICE DAILY 180 tablet 3    spironolactone (ALDACTONE) 25 MG tablet TAKE 1 TABLET BY MOUTH ONCE DAILY 30 tablet 0     No current facility-administered medications on file prior to visit.     Review of Systems   Constitutional:  Negative for appetite change, chills, fatigue and fever.   HENT:  Negative for congestion, rhinorrhea and sore throat.    Eyes:  Negative for visual disturbance.   Respiratory:  Negative for cough and shortness of breath.    Cardiovascular:  Negative for chest pain, palpitations and leg swelling.   Gastrointestinal:  Negative for abdominal pain, diarrhea and vomiting.   Genitourinary:  Negative for difficulty urinating, dysuria and hematuria.   Musculoskeletal:  Positive for arthralgias. Negative for myalgias.   Skin:  Negative for rash and wound.   Neurological:  Negative for dizziness and headaches.   Psychiatric/Behavioral:  Negative for behavioral problems. The patient is not nervous/anxious.      Vitals:    11/14/24 0839   BP: 110/60   Pulse: 76   Resp: 16   Temp: 97.9 °F (36.6 °C)   TempSrc: Oral   SpO2: 96%   Weight: 101.2 kg (223 lb)   Height: 5' 10" (1.778 m)       Wt Readings from Last 3 Encounters:   11/14/24 101.2 kg (223 lb) "   09/09/24 98 kg (216 lb)   08/28/24 99.3 kg (218 lb 14.7 oz)     Physical Exam  Vitals reviewed.   Constitutional:       General: He is not in acute distress.     Appearance: Normal appearance. He is not ill-appearing.   HENT:      Head: Normocephalic and atraumatic.      Right Ear: External ear normal.      Left Ear: External ear normal.      Nose: Nose normal.   Eyes:      Extraocular Movements: Extraocular movements intact.      Conjunctiva/sclera: Conjunctivae normal.   Cardiovascular:      Rate and Rhythm: Normal rate.      Heart sounds: Normal heart sounds.   Pulmonary:      Effort: Pulmonary effort is normal. No respiratory distress.      Breath sounds: Normal breath sounds.   Abdominal:      General: Abdomen is flat. There is no distension.   Musculoskeletal:      Left shoulder: Tenderness and bony tenderness present. No swelling. Decreased range of motion.      Cervical back: Normal range of motion.   Skin:     General: Skin is warm and dry.      Capillary Refill: Capillary refill takes less than 2 seconds.      Coloration: Skin is not pale.      Findings: Bruising (scattered) present. No rash.   Neurological:      General: No focal deficit present.      Mental Status: He is alert and oriented to person, place, and time. Mental status is at baseline.   Psychiatric:         Mood and Affect: Mood normal.         Speech: Speech normal. Speech is not rapid and pressured, delayed or slurred.         Behavior: Behavior normal. Behavior is not agitated, slowed, aggressive, withdrawn, hyperactive or combative. Behavior is cooperative.         Thought Content: Thought content normal.         Judgment: Judgment normal.       Health Maintenance   Topic Date Due    Foot Exam  Never done    PROSTATE-SPECIFIC ANTIGEN  01/26/2025    Hemoglobin A1c  02/15/2025    Eye Exam  02/23/2025    Lipid Panel  08/15/2025    Colorectal Cancer Screening  04/19/2026    TETANUS VACCINE  06/17/2034    Hepatitis C Screening  Completed     Shingles Vaccine  Completed     DISCLAIMER: This note was compiled by using a speech recognition dictation system and therefore please be aware that typographical / speech recognition errors can and do occur.  Please contact me if you see any errors specifically.  Consent was obtained for [employer] recording system prior to the visit.

## 2024-11-25 ENCOUNTER — TELEPHONE (OUTPATIENT)
Dept: FAMILY MEDICINE | Facility: CLINIC | Age: 74
End: 2024-11-25
Payer: MEDICARE

## 2024-11-25 RX ORDER — MEXILETINE HYDROCHLORIDE 150 MG/1
CAPSULE ORAL
Qty: 270 CAPSULE | Refills: 3 | Status: SHIPPED | OUTPATIENT
Start: 2024-11-25

## 2024-11-25 NOTE — TELEPHONE ENCOUNTER
----- Message from Jane sent at 11/25/2024  4:19 PM CST -----  Contact: [facility]  Type:  Pharmacy Calling to Clarify an RX    Name of Caller:Corey  Pharmacy Name:[facility]  Prescription Name:tramadol 50mg  What do they need to clarify?:drug interaction with xanax  Best Call Back Number:[phone]  Additional Information:

## 2024-11-25 NOTE — TELEPHONE ENCOUNTER
Call returned. Spoke w/Corey at [facility] and he stated that the pt was prescribed Tramadol 50 mg on 11/14/2024 per IRENE Trevino and that it could be a drug interaction w/pt Xanax. Corey was advised that the Tramadol is a temporary supply and that pt medications where all reviewed at the time of hid apt. Ok to fill pt medication.

## 2024-11-26 DIAGNOSIS — I48.0 PAF (PAROXYSMAL ATRIAL FIBRILLATION): ICD-10-CM

## 2024-11-26 RX ORDER — FUROSEMIDE 40 MG/1
40 TABLET ORAL DAILY
Qty: 90 TABLET | Refills: 3 | Status: SHIPPED | OUTPATIENT
Start: 2024-11-26

## 2024-11-30 ENCOUNTER — EXTERNAL CHRONIC CARE MANAGEMENT (OUTPATIENT)
Dept: PRIMARY CARE CLINIC | Facility: CLINIC | Age: 74
End: 2024-11-30
Payer: MEDICARE

## 2024-11-30 PROCEDURE — 99490 CHRNC CARE MGMT STAFF 1ST 20: CPT | Mod: S$PBB,,, | Performed by: FAMILY MEDICINE

## 2024-11-30 PROCEDURE — 99439 CHRNC CARE MGMT STAF EA ADDL: CPT | Mod: PBBFAC,PO | Performed by: FAMILY MEDICINE

## 2024-11-30 PROCEDURE — 99439 CHRNC CARE MGMT STAF EA ADDL: CPT | Mod: S$PBB,,, | Performed by: FAMILY MEDICINE

## 2024-11-30 PROCEDURE — 99490 CHRNC CARE MGMT STAFF 1ST 20: CPT | Mod: PBBFAC,PO | Performed by: FAMILY MEDICINE

## 2024-12-04 ENCOUNTER — OFFICE VISIT (OUTPATIENT)
Dept: NEUROLOGY | Facility: CLINIC | Age: 74
End: 2024-12-04
Payer: MEDICARE

## 2024-12-04 VITALS
SYSTOLIC BLOOD PRESSURE: 83 MMHG | BODY MASS INDEX: 32.09 KG/M2 | HEART RATE: 75 BPM | DIASTOLIC BLOOD PRESSURE: 56 MMHG | WEIGHT: 223.69 LBS

## 2024-12-04 DIAGNOSIS — R26.89 IMBALANCE: ICD-10-CM

## 2024-12-04 DIAGNOSIS — R42 DIZZINESS: Primary | ICD-10-CM

## 2024-12-04 PROCEDURE — 99999 PR PBB SHADOW E&M-EST. PATIENT-LVL V: CPT | Mod: PBBFAC,,, | Performed by: NURSE PRACTITIONER

## 2024-12-04 PROCEDURE — 99215 OFFICE O/P EST HI 40 MIN: CPT | Mod: PBBFAC,PO | Performed by: NURSE PRACTITIONER

## 2024-12-04 NOTE — ASSESSMENT & PLAN NOTE
Pt with history of vertigo   In August he had persistent vertigo and presented to ED. No associated symptoms  Neuro w/u unrevealing   Diagnosed with vertigo  Pt did f/u with ENT but symptoms had already resolved  Pt also completed VRT which did offer much aid  No further symptoms  Neuro exam non focal   No further neuro testing warranted  MRI brain scan discussed with pt at length

## 2024-12-04 NOTE — PROGRESS NOTES
NEUROLOGY  Outpatient Consultation Visit     [facility]  [address] [facility], [place] [address]  [phone] (office) / [phone] (fax)    Patient Name:  Randy Yap  :  1950  MR #:  [id number]  Acct #:  [id number]    Date of Neurology Consult: 2024  Name of Provider: DIANA Guzman    Other Physicians:  Olvin Hutson MD (Primary Care Physician); Olvin Hutson MD (Referring)      Chief Complaint: MRI review       History of Present Illness (HPI):  Randy Yap is a right handed  74 y.o. male with a PMHX of atrial and ventricular fibrillation, ventricular tachycardia, anemia, morbid obesity, asthma, HONEY, CHF, Type 2 DM, HTN     Patient presents today for dizziness. He does have a history of vertigo. In August he presented to [facility] for sudden onset of vertigo-like symptoms that would not go away. Spouse insisted he go to the ED.  He described the dizziness during the occasion as spinning and lightheaded without nausea or vomiting. Neuro workup was largely unrevealing.   Patient did see ENT in August several weeks following his ED admission. ENT did not report BPPV at the time of his visit. His symptoms were also resolved at that time. He did complete VRT which did offer a lot of aid. He has not had any further vertigo symptoms.   His last fall was 2 weeks ago tripping over some lumber in his garage. He fell on his right shoulder and now seeing a chiropractor.       ED report from [facility] 2024:  Patient presents to the ED complaining of dizziness since this morning. Patient states he suffers chronically with vertigo but this morning his vertigo was not relieved with meclizine and became progressively worse.         Past Medical, Surgical, Family & Social History:   Past Medical History:   Diagnosis Date    Asthma     Cardiomyopathy due to systemic disease     Colon polyp 2013    repeat 2018    Depression, recurrent     Diastolic dysfunction     DJD  (degenerative joint disease) of knee 10/14/2013    Hyperlipidemia     Hypertension     Murmur     Paroxysmal VT 5/21/2016    Pericarditis with effusion     Sleep apnea      Past Surgical History:   Procedure Laterality Date    CARDIAC DEFIBRILLATOR PLACEMENT  10/06/2014    COLONOSCOPY N/A 4/19/2023    Procedure: COLONOSCOPY 4/4-cc/bt clearance recedived;  Surgeon: Isabella Del Cid MD;  Location: [facility] ENDO;  Service: Endoscopy;  Laterality: N/A;    COLONOSCOPY W/ POLYPECTOMY  05/21/2013    repeat 5/21/2018    COSMETIC SURGERY      EYE SURGERY  Cataract    HERNIA REPAIR      R inguinal    LEFT HEART CATHETERIZATION Left 07/09/2018    Procedure: CATHETERIZATION, HEART, LEFT;  Surgeon: Macey Dos Santos MD;  Location: [facility] CATH LAB;  Service: Cardiology;  Laterality: Left;  left radial approach  Has [employer] gissel ICD    pace maker   10/06/2014    PERICARDIAL WINDOW  12-15 years ago    REPLACEMENT OF IMPLANTABLE CARDIOVERTER-DEFIBRILLATOR (ICD) GENERATOR Left 5/7/2024    Procedure: REPLACEMENT, ICD GENERATOR;  Surgeon: Nirav Baldwin MD;  Location: [facility] CATH LAB;  Service: Cardiology;  Laterality: Left;  [facility]  MRI safe  2/27/23-sinus @ 73, intact conduction 200-210ms  Device and leads implanted 10/6/14  [employer] ICD Fortify Assura DR 2357-40Q, [id number]  A lead: Bennett Tendril STS 2088TC/46cm, [id number]  V lead: Bennett Durata 7120Q/58cm, [id number]    tonsillectomy      TONSILLECTOMY      VARICOCELECTOMY       Family History   Problem Relation Name Age of Onset    Hyperlipidemia Mother Arline E Clau     Arrhythmia Mother Arline E Clau     Arthritis Mother Arline E Clau     Asthma Mother Arline E [place]     COPD Mother Arline E [place]     Heart disease Father John Fracisco 48    Heart attack Father John Fracisco 48    Hypertension Father John Fracisco     Arthritis Father John Fracisco     Diabetes Father John Fracisco     Hypertension Maternal Grandmother Ree [facility]     Hypertension Maternal  Grandfather Madhav [facility]     Heart disease Paternal Grandmother Rozina VIZCARRA Fracisco     Hypertension Paternal Grandmother Rozina VIZCARRA Fracisco     Heart attack Paternal Grandmother Rozina VIZCARRA Fracisco     Stroke Paternal Grandmother Rozina VIZCARRA Fracisco     Heart disease Paternal Grandfather Jeremy Fracisco     Hypertension Paternal Grandfather Jeremy Yap     Heart attack Paternal Grandfather Jeremy Fracisco     Cancer Paternal Grandfather Jeremy Yap      Alcohol use:  reports current alcohol use.   (Of note, 0.6 oz = 1 beer or 6 oz = 10 beers).  Tobacco use:  reports that he has never smoked. He has never used smokeless tobacco.  Street drug use:  reports no history of drug use.  Allergies: Patient has no known allergies..    Home Medications:     Current Outpatient Medications:     acetaminophen (TYLENOL) 325 MG tablet, Take 650 mg by mouth as needed. , Disp: , Rfl:     albuterol (PROVENTIL/VENTOLIN HFA) 90 mcg/actuation inhaler, Inhale 1 puff into the lungs once daily. rescue, Disp: 54 g, Rfl: 3    ALPRAZolam (XANAX) 0.25 MG tablet, TAKE 1 TABLET BY MOUTH TWICE DAILY AS NEEDED FOR ANXIETY, Disp: 60 tablet, Rfl: 0    amiodarone (PACERONE) 200 MG Tab, TAKE 1 TABLET BY MOUTH TWICE DAILY FOR 6 WEEKS, THEN TAKE 1 AND 1/2 TABLETS DAILY, Disp: 180 tablet, Rfl: 0    bepotastine besilate (BEPREVE) 1.5 % Drop, Bepreve 1.5 % eye drops  Apply by ophthalmic route as needed for 50 days., Disp: , Rfl:     carvediloL (COREG) 25 MG tablet, TAKE ONE TABLET BY MOUTH TWICE DAILY, Disp: 180 tablet, Rfl: 3    co-enzyme Q-10 30 mg capsule, Take 30 mg by mouth once daily. , Disp: , Rfl:     FARXIGA 10 mg tablet, TAKE 1 TABLET BY MOUTH ONCE DAILY, Disp: 90 tablet, Rfl: 0    ferrous sulfate (FEOSOL) 325 mg (65 mg iron) Tab tablet, Take 325 mg by mouth daily with breakfast., Disp: , Rfl:     fluticasone furoate (ARNUITY ELLIPTA) 100 mcg/actuation inhaler, Inhale 1 puff into the lungs once daily., Disp: 30 each, Rfl: 11    furosemide (LASIX)  40 MG tablet, Take 1 tablet (40 mg total) by mouth once daily., Disp: 90 tablet, Rfl: 3    glucosamine-chondroitin 500-400 mg tablet, Take 1 tablet by mouth once daily. , Disp: , Rfl:     meclizine (ANTIVERT) 25 mg tablet, Take 1 tablet (25 mg total) by mouth 3 (three) times daily as needed for Dizziness or Nausea., Disp: 90 tablet, Rfl: 1    mexiletine (MEXITIL) 150 MG Cap, Take 3 capsules by mouth every 8 hours., Disp: 270 capsule, Rfl: 3    multivitamin (THERAGRAN) tablet, Take 1 tablet by mouth once daily., Disp: , Rfl:     ondansetron (ZOFRAN) 4 MG tablet, Take 4 mg by mouth every 8 (eight) hours as needed for Nausea., Disp: , Rfl:     OZEMPIC 0.25 mg or 0.5 mg (2 mg/3 mL) pen injector, Inject 0.5 mg into the skin every 7 days., Disp: 9 mL, Rfl: 1    pravastatin (PRAVACHOL) 80 MG tablet, Take 1 tablet (80 mg total) by mouth once daily., Disp: 90 tablet, Rfl: 2    RESTASIS 0.05 % ophthalmic emulsion, Place 1 drop into both eyes 2 (two) times daily., Disp: , Rfl:     rivaroxaban (XARELTO) 10 mg Tab, Take 10 mg by mouth daily with dinner or evening meal., Disp: , Rfl:     sacubitriL-valsartan (ENTRESTO)  mg per tablet, TAKE 1 TABLET BY MOUTH TWICE DAILY, Disp: 180 tablet, Rfl: 3    spironolactone (ALDACTONE) 25 MG tablet, TAKE 1 TABLET BY MOUTH ONCE DAILY, Disp: 30 tablet, Rfl: 0    Physical Examination:  BP (!) 83/56 (BP Location: Left arm, Patient Position: Sitting)   Pulse 75   Wt 101.5 kg (223 lb 10.5 oz)   BMI 32.09 kg/m²     GENERAL:  General appearance: Well, non-toxic appearing.  No apparent distress.  Neck: supple.    MENTAL STATUS:  Alertness, attention span & concentration: normal.  Language: normal.  Orientation to self, place & time:  normal.  Memory, recent & remote: normal.  Fund of knowledge: normal.      SPEECH:  Clear and fluent.  Follows complex commands.      CRANIAL NERVES:  Cranial Nerves II-XII were examined.  II - Visual fields: normal.  III, IV, VI: PERRL, EOMI, No ptosis, No  nystagmus.  V - Facial sensation: normal.  VII - Face symmetry & mobility: normal.  VIII - Hearing: normal  IX, X - Palate: mobile & midline.  XI - Shoulder shrug: normal.  XII - Tongue protrusion: normal.        GROSS MOTOR:  Gait & station: able to rise from chair with arms crossed over chest; antalgic from right hip pain   Tone: normal.  Abnormal movements: none.  Finger-nose: normal.  Rapid alternating movements: normal.  Pronator drift: normal      MUSCLE STRENGTH:     RIGHT    LEFT   5 Deltoids 5   5 Biceps 5   5 Triceps 5   5 Forearm.Pr. 5        5 Iliopsoas flex    5   5 Hip Abduct 5   5 Hip Adduct 5   4+ Quads 5   5 Hams 5   5 Dorsiflex 5   5 Plantar Flex 5   5 Ankle Jaziel 5   5 Ankle Invert 5         REFLEXES:    RIGHT Reflex   LEFT   2 Biceps 2   2 Brachiorad. 2        2 Patellar 2         Diagnostic Data Reviewed:   I have personally reviewed provider notes, labs and imaging made available to me today.     Imaging:  Outside records [facility]:  MRI brain 8/2024:  FINDINGS: The ventricles are normal in size and position.  There is no evidence of acute intracranial hemorrhage or infarct.  There is no evidence of mass, mass effect, or midline shift.  T2 hyperintense signal is present in the periventricular and   subcortical white matter. Postoperative changes of bilateral lens replacement are present. Mucosal thickening is present in ethmoid air cells. Mucus retention cyst is present left maxillary sinus. Normal flow voids are present.   Impression     1.  No acute intracranial abnormality.     2.  Mild chronic microvascular ischemic disease.    CTA 8/2024:  HEAD FINDINGS: Atherosclerosis of the cavernous and petrous portions internal carotid arteries without significant stenosis. The internal carotid, anterior cerebral, middle cerebral, and anterior communicating arteries are patent without evidence of   focal stenosis. Atherosclerosis of the intracranial vertebral arteries without a 50% stenosis. The basilar and  "posterior cerebral arteries are patent without evidence of focal stenosis. There is no evidence of aneurysm. Left maxillary sinus retention   cyst.     NECK FINDINGS: Atherosclerosis the proximal internal carotid artery is without significant stenosis. The common carotid are patent without significant stenosis. The vertebral and basilar arteries are patent without significant stenosis. The subclavian   arteries are patent without significant stenosis. Evaluation of the internal carotid arteries for determining clinically significant stenosis was performed by comparing the diameters of the proximal and distal internal carotid arteries.     IMPRESSION:    1.  No acute arterial abnormality.    2.  Atherosclerosis as above.   Cardiac:    Labs:  Lab Results   Component Value Date    WBC 8.73 04/29/2024    HGB 14.3 04/29/2024    HCT 43.9 04/29/2024     04/29/2024    MCV 98 04/29/2024    RDW 12.9 04/29/2024     Lab Results   Component Value Date     (L) 08/16/2024    K 4.5 08/16/2024     04/29/2024    CO2 25 08/16/2024    BUN 10 08/16/2024    CREATININE 0.77 (L) 08/16/2024    GLU 93 04/29/2024    CALCIUM 9.4 08/16/2024    MG 2.2 08/19/2023    PHOS 3.7 08/19/2023     Lab Results   Component Value Date    PROT 6.0 (L) 08/16/2024    ALBUMIN 3.7 08/16/2024    BILITOT 0.6 08/16/2024    AST 21 08/16/2024    ALKPHOS 38 08/16/2024    ALT 25 08/16/2024     Lab Results   Component Value Date    INR 1.1 04/29/2024     Lab Results   Component Value Date    CHOL 146 08/15/2024    HDL 43 08/15/2024    LDLCALC 80 08/15/2024    TRIG 114 08/15/2024    CHOLHDL 3.4 08/15/2024     Lab Results   Component Value Date    HGBA1C 5.3 08/15/2024      Lab Results   Component Value Date    [id number] 643 07/12/2023     Lab Results   Component Value Date    FOLATE 14.5 07/12/2023     Lab Results   Component Value Date    TSH 2.075 01/26/2024     No results found for: "VITAMINB1"  No results found for: "RPR"  No results found for: " ""JAVIER"  No components found for: "HEPATITISCANTIBODY"  No components found for: "HIV 1/2 AG/AB"  No results found for: "CRP"  No components found for: "SEDIMENTATIONRATE"      Assessment and Plan:  Randy Yap is a 74 y.o. male.        Problem List Items Addressed This Visit          Other    Dizziness - Primary    Overview     Associated with right ear pain and decreased hearing.  Recent hospital visit.  See problem.         Current Assessment & Plan     Pt with history of vertigo   In August he had persistent vertigo and presented to ED. 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            The patient will return to clinic as needed        All questions were answered and patient is comfortable with the plan.       Thank you very much for the opportunity to assist in this patient's care.    If you have any questions or concerns, please do not hesitate to contact me at any time.    Sincerely,     DIANA Guzman  [facility] [place]         I spent a total of 35 minutes on the day of the visit.This includes face to face time and non-face to face time preparing to see the patient (eg, review of tests), Obtaining and/or reviewing separately obtained history, Documenting clinical information in the electronic " or other health record, Independently interpreting resultsand communicating results to the patient/family/caregiver, or Care coordination.

## 2024-12-05 ENCOUNTER — TELEPHONE (OUTPATIENT)
Dept: CARDIOLOGY | Facility: CLINIC | Age: 74
End: 2024-12-05
Payer: MEDICARE

## 2024-12-05 ENCOUNTER — OFFICE VISIT (OUTPATIENT)
Dept: CARDIOLOGY | Facility: CLINIC | Age: 74
End: 2024-12-05
Payer: MEDICARE

## 2024-12-05 VITALS
BODY MASS INDEX: 32.65 KG/M2 | OXYGEN SATURATION: 98 % | DIASTOLIC BLOOD PRESSURE: 72 MMHG | HEART RATE: 80 BPM | WEIGHT: 227.5 LBS | SYSTOLIC BLOOD PRESSURE: 112 MMHG

## 2024-12-05 DIAGNOSIS — I15.2 HYPERTENSION ASSOCIATED WITH DIABETES: Chronic | ICD-10-CM

## 2024-12-05 DIAGNOSIS — I48.0 PAF (PAROXYSMAL ATRIAL FIBRILLATION): Chronic | ICD-10-CM

## 2024-12-05 DIAGNOSIS — E66.811 CLASS 1 OBESITY DUE TO EXCESS CALORIES WITH SERIOUS COMORBIDITY AND BODY MASS INDEX (BMI) OF 32.0 TO 32.9 IN ADULT: ICD-10-CM

## 2024-12-05 DIAGNOSIS — I50.30 CHF WITH LEFT VENTRICULAR DIASTOLIC DYSFUNCTION, NYHA CLASS 2: Chronic | ICD-10-CM

## 2024-12-05 DIAGNOSIS — E66.09 CLASS 1 OBESITY DUE TO EXCESS CALORIES WITH SERIOUS COMORBIDITY AND BODY MASS INDEX (BMI) OF 32.0 TO 32.9 IN ADULT: ICD-10-CM

## 2024-12-05 DIAGNOSIS — Z91.89 AT RISK FOR AMIODARONE TOXICITY WITH LONG TERM USE: Chronic | ICD-10-CM

## 2024-12-05 DIAGNOSIS — Z79.899 AT RISK FOR AMIODARONE TOXICITY WITH LONG TERM USE: Chronic | ICD-10-CM

## 2024-12-05 DIAGNOSIS — E78.5 HYPERLIPIDEMIA LDL GOAL <100: Chronic | ICD-10-CM

## 2024-12-05 DIAGNOSIS — Z95.810 ICD (IMPLANTABLE CARDIOVERTER-DEFIBRILLATOR) IN PLACE: ICD-10-CM

## 2024-12-05 DIAGNOSIS — I47.29 PAROXYSMAL VT: Primary | Chronic | ICD-10-CM

## 2024-12-05 DIAGNOSIS — E11.65 TYPE 2 DIABETES MELLITUS WITH HYPERGLYCEMIA, WITHOUT LONG-TERM CURRENT USE OF INSULIN: Chronic | ICD-10-CM

## 2024-12-05 DIAGNOSIS — E11.59 HYPERTENSION ASSOCIATED WITH DIABETES: Chronic | ICD-10-CM

## 2024-12-05 PROCEDURE — 99214 OFFICE O/P EST MOD 30 MIN: CPT | Mod: PBBFAC | Performed by: NURSE PRACTITIONER

## 2024-12-05 PROCEDURE — 99214 OFFICE O/P EST MOD 30 MIN: CPT | Mod: S$PBB,,, | Performed by: NURSE PRACTITIONER

## 2024-12-05 PROCEDURE — 99999 PR PBB SHADOW E&M-EST. PATIENT-LVL IV: CPT | Mod: PBBFAC,,, | Performed by: NURSE PRACTITIONER

## 2024-12-05 NOTE — PROGRESS NOTES
Subjective:   Patient ID:  Randy Yap is a 74 y.o. male who presents for evaluation of No chief complaint on file.      HPI      Randy Yap is a 74 year old male who presents to Arrhythmia clinic for 3 month follow up. His current medical conditions include pVT, PAF on Xarelto, CHF, HFrEF of 25-30%, s/p DC ICD, HTN, HLP, DM, HONEY on CPAP. He returns today and states he is doing ok.     Has been more active recently without any arrhythmias.  Last remote without any noted arrhythmias.     Reports compliance with medications and dietary restrictions.   BP stable today in office.     Denies chest pain or anginal equivalents. No shortness of breath, ALDANA or palpitations. Denies orthopnea, PND or abdominal bloating. Reports regular walking without any issues lately. NO leg swelling or claudications. No recent falls, syncope or near syncopal events. Reports compliance with medications and dietary restrictions. NO CNS complaints to suggest TIA or CVA today. No signs of abnormal bleeding on Xarelto.    No signs of acute CHF on exam today.           Past Medical History:   Diagnosis Date    Asthma     Cardiomyopathy due to systemic disease     Colon polyp 5/2013    repeat 5/2018    Depression, recurrent     Diastolic dysfunction     DJD (degenerative joint disease) of knee 10/14/2013    Hyperlipidemia     Hypertension     Murmur     Paroxysmal VT 5/21/2016    Pericarditis with effusion     Sleep apnea        Past Surgical History:   Procedure Laterality Date    CARDIAC DEFIBRILLATOR PLACEMENT  10/06/2014    COLONOSCOPY N/A 4/19/2023    Procedure: COLONOSCOPY 4/4-cc/bt clearance recedived;  Surgeon: Isabella Del Cid MD;  Location: [facility];  Service: Endoscopy;  Laterality: N/A;    COLONOSCOPY W/ POLYPECTOMY  05/21/2013    repeat 5/21/2018    COSMETIC SURGERY      EYE SURGERY  Cataract    HERNIA REPAIR      R inguinal    LEFT HEART CATHETERIZATION Left 07/09/2018    Procedure: CATHETERIZATION, HEART, LEFT;   Surgeon: Macey Dos Santos MD;  Location: [facility] CATH LAB;  Service: Cardiology;  Laterality: Left;  left radial approach  Has [employer] g[employer]el ICD    pace maker   10/06/2014    PERICARDIAL WINDOW  12-15 years ago    REPLACEMENT OF IMPLANTABLE CARDIOVERTER-DEFIBRILLATOR (ICD) GENERATOR Left 5/7/2024    Procedure: REPLACEMENT, ICD GENERATOR;  Surgeon: Nirav Baldwin MD;  Location: [facility] CATH LAB;  Service: Cardiology;  Laterality: Left;  SJM  MRI safe  2/27/23-sinus @ 73, intact conduction 200-210ms  Device and leads implanted 10/6/14  [employer] ICD Fortify [place] KHALIL 2357-40Q, [id number]  A lead: Bnenett Tendril STS 2088TC/46cm, [id number]  V lead: Bennett Durata 7120Q/58cm, [id number]    tonsillectomy      TONSILLECTOMY      VARICOCELECTOMY         Social History     Tobacco Use    Smoking status: Never    Smokeless tobacco: Never   Substance Use Topics    Alcohol use: Yes     Comment: occasional glass of wine on social occasions    Drug use: No       Family History   Problem Relation Name Age of Onset    Hyperlipidemia Mother Arline E Caddo     Arrhythmia Mother Arline E Caddo     Arthritis Mother Arline E Clau     Asthma Mother Arline E Caddo     COPD Mother Arline E Caddo     Heart disease Father John Martinezis 48    Heart attack Father John Martinezis 48    Hypertension Father John Fracisco     Arthritis Father John MancillaFracisco     Diabetes Father John Fracisco     Hypertension Maternal Grandmother Reerenana HarrisonDhruv     Hypertension Maternal Grandfather Madhav Dhruv     Heart disease Paternal Grandmother Rozina S Fracisco     Hypertension Paternal Grandmother Rozina S Fracisco     Heart attack Paternal Grandmother Rozina S Fracisco     Stroke Paternal Grandmother Rozina S Fracisco     Heart disease Paternal Grandfather Jeremy Fracisco     Hypertension Paternal Grandfather Jeremy Fracisco     Heart attack Paternal Grandfather Jeremy Fracisco     Cancer Paternal Grandfather Jeremy Fracisco        Wt Readings from Last 3  Encounters:   12/05/24 103.2 kg (227 lb 8.2 oz)   12/04/24 101.5 kg (223 lb 10.5 oz)   11/14/24 101.2 kg (223 lb)     Temp Readings from Last 3 Encounters:   11/14/24 97.9 °F (36.6 °C) (Oral)   05/15/24 98.5 °F (36.9 °C)   05/07/24 98 °F (36.7 °C) (Temporal)     BP Readings from Last 3 Encounters:   12/05/24 112/72   12/04/24 (!) 83/56   11/14/24 110/60     Pulse Readings from Last 3 Encounters:   12/05/24 80   12/04/24 75   11/14/24 76       Current Outpatient Medications on File Prior to Visit   Medication Sig Dispense Refill    acetaminophen (TYLENOL) 325 MG tablet Take 650 mg by mouth as needed.       albuterol (PROVENTIL/VENTOLIN HFA) 90 mcg/actuation inhaler Inhale 1 puff into the lungs once daily. rescue 54 g 3    ALPRAZolam (XANAX) 0.25 MG tablet TAKE 1 TABLET BY MOUTH TWICE DAILY AS NEEDED FOR ANXIETY 60 tablet 0    amiodarone (PACERONE) 200 MG Tab TAKE 1 TABLET BY MOUTH TWICE DAILY FOR 6 WEEKS, THEN TAKE 1 AND 1/2 TABLETS DAILY 180 tablet 0    bepotastine besilate (BEPREVE) 1.5 % Drop Bepreve 1.5 % eye drops   Apply by ophthalmic route as needed for 50 days.      carvediloL (COREG) 25 MG tablet TAKE ONE TABLET BY MOUTH TWICE DAILY 180 tablet 3    co-enzyme Q-10 30 mg capsule Take 30 mg by mouth once daily.       FARXIGA 10 mg tablet TAKE 1 TABLET BY MOUTH ONCE DAILY 90 tablet 0    ferrous sulfate (FEOSOL) 325 mg (65 mg iron) Tab tablet Take 325 mg by mouth daily with breakfast.      fluticasone furoate (ARNUITY ELLIPTA) 100 mcg/actuation inhaler Inhale 1 puff into the lungs once daily. 30 each 11    furosemide (LASIX) 40 MG tablet Take 1 tablet (40 mg total) by mouth once daily. 90 tablet 3    glucosamine-chondroitin 500-400 mg tablet Take 1 tablet by mouth once daily.       meclizine (ANTIVERT) 25 mg tablet Take 1 tablet (25 mg total) by mouth 3 (three) times daily as needed for Dizziness or Nausea. 90 tablet 1    mexiletine (MEXITIL) 150 MG Cap Take 3 capsules by mouth every 8 hours. 270 capsule 3     multivitamin (THERAGRAN) tablet Take 1 tablet by mouth once daily.      ondansetron (ZOFRAN) 4 MG tablet Take 4 mg by mouth every 8 (eight) hours as needed for Nausea.      OZEMPIC 0.25 mg or 0.5 mg (2 mg/3 mL) pen injector Inject 0.5 mg into the skin every 7 days. 9 mL 1    pravastatin (PRAVACHOL) 80 MG tablet Take 1 tablet (80 mg total) by mouth once daily. 90 tablet 2    RESTASIS 0.05 % ophthalmic emulsion Place 1 drop into both eyes 2 (two) times daily.      rivaroxaban (XARELTO) 10 mg Tab Take 10 mg by mouth daily with dinner or evening meal.      sacubitriL-valsartan (ENTRESTO)  mg per tablet TAKE 1 TABLET BY MOUTH TWICE DAILY 180 tablet 3    spironolactone (ALDACTONE) 25 MG tablet TAKE 1 TABLET BY MOUTH ONCE DAILY 30 tablet 0     No current facility-administered medications on file prior to visit.       No cardiac monitor results found for the past 12 months    Results for orders placed during the hospital encounter of 04/17/23    Echo    Interpretation Summary  · The left ventricle is moderately enlarged with eccentric hypertrophy and low normal systolic function.  · The estimated ejection fraction is 50-55%  · Normal left ventricular diastolic function.  · Normal right ventricular size.  · The aortic valve appears structurally normal. There is normal leaflet mobility.  · Mild mitral regurgitation.  · Mild tricuspid regurgitation.  · Normal central venous pressure (3 mmHg).  · The estimated PA systolic pressure is 18 mmHg.  · Considerable improvement in systolic function compared to study of 1-    Results for orders placed during the hospital encounter of 07/21/21    CPX Study    Interpretation Summary  · The patient exercised for 8 minutes and 29 seconds on a medium ramp protocol, achieving a peak heart rate of 120 bpm, which is 79% of the age predicted maximum heart rate.  · There were no arrhythmias during stress.  · There was no ST segment deviation noted during stress.  · The peak VO2 was  16.5 ml/kg/min which is 49.85% of predicted equating to a functional capacity of 4.71 METS indicating moderate functional impairment.  · The anaerobic threshold (AT), which occurred at a heart rate of 95bpm, was 13.9 ml/kg/min, which is 41.99% of the predicted VO2 and is borderline reduced.  · Moderate functional impairment associated with a reduced breathing reserve, normal oxygen stauration, suboptimal effort, and a borderline reduced AT. These findings are indicative of functional impairment secondary to circulatory insufficiency, deconditioning.        Results for orders placed or performed during the hospital encounter of 08/26/24   SCHEDULED EKG 12-LEAD (to Muse)    Collection Time: 08/26/24  2:57 PM   Result Value Ref Range    QRS Duration 146 ms    [facility] QTC Calculation 513 ms    Narrative    Test Reason : I48.0,    Vent. Rate : 075 BPM     Atrial Rate : 075 BPM     P-R Int : 230 ms          QRS Dur : 146 ms      QT Int : 460 ms       P-R-T Axes : 000 -43 067 degrees     QTc Int : 513 ms    Atrial-paced rhythm with prolonged AV conduction  Left axis deviation  Left bundle branch block  Abnormal ECG  When compared with ECG of 07-MAY-2024 11:50,  Electronic atrial pacemaker has replaced Sinus rhythm  Confirmed by LUANN JAMES MD ([id number]) on 8/27/2024 10:55:19 AM    Referred By: CHRIS FLOWER           Confirmed By:LUANN JAMES MD         Review of Systems   Constitutional: Positive for malaise/fatigue.   HENT:  Negative for hearing loss and hoarse voice.    Eyes:  Negative for blurred vision and visual disturbance.   Cardiovascular:  Positive for dyspnea on exertion. Negative for chest pain, claudication, irregular heartbeat, leg swelling, near-syncope, orthopnea, palpitations, paroxysmal nocturnal dyspnea and syncope.   Respiratory:  Negative for cough, hemoptysis, shortness of breath, sleep disturbances due to breathing, snoring and wheezing.    Endocrine: Negative for cold intolerance and heat  intolerance.   Hematologic/Lymphatic: Bruises/bleeds easily (on xarelto).   Skin:  Negative for color change, dry skin and nail changes.   Musculoskeletal:  Positive for arthritis and back pain. Negative for joint pain and myalgias.   Gastrointestinal:  Negative for bloating, abdominal pain, constipation, nausea and vomiting.   Genitourinary:  Negative for dysuria, flank pain, hematuria and hesitancy.   Neurological:  Negative for headaches, light-headedness, loss of balance, numbness, paresthesias and weakness.   Psychiatric/Behavioral:  Negative for altered mental status.    Allergic/Immunologic: Negative for environmental allergies.         Objective:/72 (BP Location: Left arm, Patient Position: Sitting)   Pulse 80   Wt 103.2 kg (227 lb 8.2 oz)   SpO2 98%   BMI 32.65 kg/m²      Physical Exam  Vitals and nursing note reviewed.   Constitutional:       General: He is not in acute distress.     Appearance: Normal appearance. He is well-developed. He is obese. He is not ill-appearing.   HENT:      Head: Normocephalic and atraumatic.      Nose: Nose normal.      Mouth/Throat:      Mouth: Mucous membranes are moist.   Eyes:      Pupils: Pupils are equal, round, and reactive to light.   Neck:      Thyroid: No thyromegaly.      Vascular: No JVD.      Trachea: No tracheal deviation.   Cardiovascular:      Rate and Rhythm: Normal rate and regular rhythm.      Chest Wall: PMI is not displaced.      Pulses: Intact distal pulses.           Radial pulses are 2+ on the right side and 2+ on the left side.        Dorsalis pedis pulses are 2+ on the right side and 2+ on the left side.      Heart sounds: S1 normal and S2 normal. Heart sounds not distant. No murmur heard.     Comments: DC ICD in excellent repair.   Pulmonary:      Effort: Pulmonary effort is normal. No respiratory distress.      Breath sounds: Normal breath sounds. No wheezing.   Abdominal:      General: Bowel sounds are normal. There is no distension.       Palpations: Abdomen is soft.      Tenderness: There is no abdominal tenderness.   Musculoskeletal:         General: No swelling. Normal range of motion.      Cervical back: Full passive range of motion without pain, normal range of motion and neck supple.      Right lower leg: No edema.      Left lower leg: No edema.      Right ankle: No swelling.      Left ankle: No swelling.   Skin:     General: Skin is warm and dry.      Capillary Refill: Capillary refill takes less than 2 seconds.      Nails: There is no clubbing.   Neurological:      General: No focal deficit present.      Mental Status: He is alert and oriented to person, place, and time.      Motor: No weakness.   Psychiatric:         Speech: Speech normal.         Behavior: Behavior normal.         Thought Content: Thought content normal.         Judgment: Judgment normal.         Lab Results   Component Value Date    CHOL 146 08/15/2024    CHOL 159 01/26/2024    CHOL 158 10/03/2023     Lab Results   Component Value Date    HDL 43 08/15/2024    HDL 43 01/26/2024    HDL 41 10/03/2023     Lab Results   Component Value Date    LDLCALC 80 08/15/2024    LDLCALC 91.8 01/26/2024    LDLCALC 82.8 10/03/2023     Lab Results   Component Value Date    TRIG 114 08/15/2024    TRIG 121 01/26/2024    TRIG 171 (H) 10/03/2023     Lab Results   Component Value Date    CHOLHDL 3.4 08/15/2024    CHOLHDL 27.0 01/26/2024    CHOLHDL 25.9 10/03/2023       Chemistry        Component Value Date/Time     (L) 08/16/2024 0606     04/29/2024 0851    K 4.5 08/16/2024 0606    K 4.3 04/29/2024 0851     04/29/2024 0851    CO2 25 08/16/2024 0606    CO2 20 (L) 04/29/2024 0851    BUN 10 08/16/2024 0606    BUN 15 04/29/2024 0851    CREATININE 0.77 (L) 08/16/2024 0606    CREATININE 0.9 04/29/2024 0851    GLU 93 04/29/2024 0851        Component Value Date/Time    CALCIUM 9.4 08/16/2024 0606    CALCIUM 9.4 04/29/2024 0851    ALKPHOS 38 08/16/2024 0606    ALKPHOS 44 (L) 01/26/2024  0753    AST 21 08/16/2024 0606    AST 21 01/26/2024 0753    ALT 25 08/16/2024 0606    ALT 29 01/26/2024 0753    BILITOT 0.6 08/16/2024 0606    BILITOT 0.7 01/26/2024 0753    ESTGFRAFRICA >60.0 10/01/2021 1353    EGFRNONAA >60.0 10/01/2021 1353          Lab Results   Component Value Date    TSH 2.075 01/26/2024     Lab Results   Component Value Date    INR 1.1 04/29/2024    INR 1.0 07/06/2018     Lab Results   Component Value Date    WBC 8.73 04/29/2024    HGB 14.3 04/29/2024    HCT 43.9 04/29/2024    MCV 98 04/29/2024     04/29/2024          Assessment:      1. Paroxysmal VT    2. CHF with left ventricular diastolic dysfunction, NYHA class 2    3. ICD (implantable cardioverter-defibrillator) in place    4. PAF (paroxysmal atrial fibrillation)    5. Hyperlipidemia LDL goal <100    6. Hypertension associated with diabetes    7. Type 2 diabetes mellitus with hyperglycemia, without long-term current use of insulin    8. Class 1 obesity due to excess calories with serious comorbidity and body mass index (BMI) of 32.0 to 32.9 in adult    9. At risk for amiodarone toxicity with long term use        Plan:     CHF SYNOPSIS:    GDMT:  ACE/ARB/ARNI: Entresto 97/103 BID  Beta Blocker: Coreg 25 mg BID  MRA: Aldactone 25 mg daily  SGLT2: Farxiga 10 mg daily  Diuretic: Lasix 40 mg daily    Continue Amiodarone 300 mg daily  Continue Mexitil 450 mg TID    Continue home device monitoring and bi-annual in office interrogation    RTC in 3 m with device check    Nicole May, FNP-C [facility]

## 2024-12-05 NOTE — TELEPHONE ENCOUNTER
Appt scheduled.       ----- Message from DIANA Gutierrez sent at 12/5/2024 10:41 AM CST -----  Can we arrange return visit with me same day as device check  next year.    Thanks  Virgie

## 2024-12-08 ENCOUNTER — CLINICAL SUPPORT (OUTPATIENT)
Dept: CARDIOLOGY | Facility: HOSPITAL | Age: 74
End: 2024-12-08

## 2024-12-08 DIAGNOSIS — I48.0 PAROXYSMAL ATRIAL FIBRILLATION: ICD-10-CM

## 2024-12-08 DIAGNOSIS — Z95.810 PRESENCE OF AUTOMATIC (IMPLANTABLE) CARDIAC DEFIBRILLATOR: ICD-10-CM

## 2024-12-18 DIAGNOSIS — Z79.899 ENCOUNTER FOR LONG-TERM (CURRENT) USE OF MEDICATIONS: ICD-10-CM

## 2024-12-18 RX ORDER — ALPRAZOLAM 0.25 MG/1
0.25 TABLET ORAL 2 TIMES DAILY
Qty: 60 TABLET | Refills: 0 | Status: SHIPPED | OUTPATIENT
Start: 2024-12-18

## 2024-12-31 ENCOUNTER — EXTERNAL CHRONIC CARE MANAGEMENT (OUTPATIENT)
Dept: PRIMARY CARE CLINIC | Facility: CLINIC | Age: 74
End: 2024-12-31
Payer: MEDICARE

## 2024-12-31 PROCEDURE — 99490 CHRNC CARE MGMT STAFF 1ST 20: CPT | Mod: PBBFAC,PO | Performed by: FAMILY MEDICINE

## 2025-01-31 ENCOUNTER — EXTERNAL CHRONIC CARE MANAGEMENT (OUTPATIENT)
Dept: PRIMARY CARE CLINIC | Facility: CLINIC | Age: 75
End: 2025-01-31
Payer: MEDICARE

## 2025-01-31 PROCEDURE — 99439 CHRNC CARE MGMT STAF EA ADDL: CPT | Mod: PBBFAC,PO | Performed by: FAMILY MEDICINE

## 2025-01-31 PROCEDURE — 99490 CHRNC CARE MGMT STAFF 1ST 20: CPT | Mod: S$PBB,,, | Performed by: FAMILY MEDICINE

## 2025-01-31 PROCEDURE — 99439 CHRNC CARE MGMT STAF EA ADDL: CPT | Mod: S$PBB,,, | Performed by: FAMILY MEDICINE

## 2025-01-31 PROCEDURE — 99490 CHRNC CARE MGMT STAFF 1ST 20: CPT | Mod: PBBFAC,PO | Performed by: FAMILY MEDICINE

## 2025-02-03 DIAGNOSIS — E78.5 HYPERLIPIDEMIA LDL GOAL <100: Chronic | ICD-10-CM

## 2025-02-03 RX ORDER — PRAVASTATIN SODIUM 80 MG/1
80 TABLET ORAL
Qty: 90 TABLET | Refills: 1 | Status: SHIPPED | OUTPATIENT
Start: 2025-02-03

## 2025-02-03 NOTE — TELEPHONE ENCOUNTER
Care Due:                  Date            Visit Type   Department     Provider  --------------------------------------------------------------------------------                                ESTABLISHED                              PATIENT -    [facility] FAMILY  Last Visit: 11-      "[employer]"      MEDICINE       Olvin Hutson  Next Visit: None Scheduled  None         None Found                                                            Last  Test          Frequency    Reason                     Performed    Due Date  --------------------------------------------------------------------------------    HBA1C.......  6 months...  OZEMPIC..................  05- 11-    [facility] Embedded Care Due Messages. Reference number: [id number].   2/03/2025 9:47:46 AM CST

## 2025-02-03 NOTE — TELEPHONE ENCOUNTER
Refill Decision Note   Randy Yap  is requesting a refill authorization.  Brief Assessment and Rationale for Refill:  Approve     Medication Therapy Plan:  FLOS      Comments:     Note composed:10:43 AM 02/03/2025

## 2025-02-12 ENCOUNTER — PATIENT MESSAGE (OUTPATIENT)
Dept: CARDIOLOGY | Facility: CLINIC | Age: 75
End: 2025-02-12
Payer: MEDICARE

## 2025-02-12 RX ORDER — SPIRONOLACTONE 25 MG/1
25 TABLET ORAL DAILY
Qty: 30 TABLET | Refills: 11 | Status: SHIPPED | OUTPATIENT
Start: 2025-02-12

## 2025-02-18 ENCOUNTER — PATIENT OUTREACH (OUTPATIENT)
Dept: ADMINISTRATIVE | Facility: HOSPITAL | Age: 75
End: 2025-02-18
Payer: MEDICARE

## 2025-02-21 ENCOUNTER — RESULTS FOLLOW-UP (OUTPATIENT)
Dept: FAMILY MEDICINE | Facility: CLINIC | Age: 75
End: 2025-02-21

## 2025-02-21 ENCOUNTER — LAB VISIT (OUTPATIENT)
Dept: LAB | Facility: HOSPITAL | Age: 75
End: 2025-02-21
Attending: FAMILY MEDICINE
Payer: MEDICARE

## 2025-02-21 ENCOUNTER — OFFICE VISIT (OUTPATIENT)
Dept: FAMILY MEDICINE | Facility: CLINIC | Age: 75
End: 2025-02-21
Payer: MEDICARE

## 2025-02-21 DIAGNOSIS — E11.65 TYPE 2 DIABETES MELLITUS WITH HYPERGLYCEMIA, WITHOUT LONG-TERM CURRENT USE OF INSULIN: ICD-10-CM

## 2025-02-21 DIAGNOSIS — Z79.899 ENCOUNTER FOR LONG-TERM (CURRENT) USE OF MEDICATIONS: ICD-10-CM

## 2025-02-21 DIAGNOSIS — M25.511 CHRONIC RIGHT SHOULDER PAIN: ICD-10-CM

## 2025-02-21 DIAGNOSIS — Z13.220 ENCOUNTER FOR LIPID SCREENING FOR CARDIOVASCULAR DISEASE: ICD-10-CM

## 2025-02-21 DIAGNOSIS — F51.01 PRIMARY INSOMNIA: Primary | Chronic | ICD-10-CM

## 2025-02-21 DIAGNOSIS — E11.59 HYPERTENSION ASSOCIATED WITH DIABETES: ICD-10-CM

## 2025-02-21 DIAGNOSIS — Z13.6 ENCOUNTER FOR LIPID SCREENING FOR CARDIOVASCULAR DISEASE: ICD-10-CM

## 2025-02-21 DIAGNOSIS — G89.29 CHRONIC RIGHT SHOULDER PAIN: ICD-10-CM

## 2025-02-21 DIAGNOSIS — M62.50 MUSCULAR ATROPHY, UNSPECIFIED SITE: ICD-10-CM

## 2025-02-21 DIAGNOSIS — Z12.5 ENCOUNTER FOR PROSTATE CANCER SCREENING: ICD-10-CM

## 2025-02-21 DIAGNOSIS — Z00.00 WELL ADULT EXAM: ICD-10-CM

## 2025-02-21 DIAGNOSIS — I15.2 HYPERTENSION ASSOCIATED WITH DIABETES: ICD-10-CM

## 2025-02-21 LAB
ALBUMIN SERPL BCP-MCNC: 3.9 G/DL (ref 3.5–5.2)
ALP SERPL-CCNC: 46 U/L (ref 40–150)
ALT SERPL W/O P-5'-P-CCNC: 32 U/L (ref 10–44)
ANION GAP SERPL CALC-SCNC: 10 MMOL/L (ref 8–16)
AST SERPL-CCNC: 44 U/L (ref 10–40)
BILIRUB SERPL-MCNC: 0.5 MG/DL (ref 0.1–1)
BUN SERPL-MCNC: 11 MG/DL (ref 8–23)
CALCIUM SERPL-MCNC: 9.4 MG/DL (ref 8.7–10.5)
CHLORIDE SERPL-SCNC: 104 MMOL/L (ref 95–110)
CHOLEST SERPL-MCNC: 159 MG/DL (ref 120–199)
CHOLEST/HDLC SERPL: 3.4 {RATIO} (ref 2–5)
CO2 SERPL-SCNC: 23 MMOL/L (ref 23–29)
COMPLEXED PSA SERPL-MCNC: 1.8 NG/ML (ref 0–4)
CREAT SERPL-MCNC: 0.9 MG/DL (ref 0.5–1.4)
EST. GFR  (NO RACE VARIABLE): >60 ML/MIN/1.73 M^2
ESTIMATED AVG GLUCOSE: 108 MG/DL (ref 68–131)
GLUCOSE SERPL-MCNC: 93 MG/DL (ref 70–110)
HBA1C MFR BLD: 5.4 % (ref 4–5.6)
HDLC SERPL-MCNC: 47 MG/DL (ref 40–75)
HDLC SERPL: 29.6 % (ref 20–50)
HGB BLD-MCNC: 14.9 G/DL (ref 14–18)
LDLC SERPL CALC-MCNC: 96.6 MG/DL (ref 63–159)
NONHDLC SERPL-MCNC: 112 MG/DL
POTASSIUM SERPL-SCNC: 4.5 MMOL/L (ref 3.5–5.1)
PROT SERPL-MCNC: 7.4 G/DL (ref 6–8.4)
SODIUM SERPL-SCNC: 137 MMOL/L (ref 136–145)
TESTOST SERPL-MCNC: 374 NG/DL (ref 304–1227)
TRIGL SERPL-MCNC: 77 MG/DL (ref 30–150)

## 2025-02-21 PROCEDURE — 80053 COMPREHEN METABOLIC PANEL: CPT | Performed by: FAMILY MEDICINE

## 2025-02-21 PROCEDURE — 83036 HEMOGLOBIN GLYCOSYLATED A1C: CPT | Performed by: FAMILY MEDICINE

## 2025-02-21 PROCEDURE — 85018 HEMOGLOBIN: CPT | Mod: PO | Performed by: FAMILY MEDICINE

## 2025-02-21 PROCEDURE — 84403 ASSAY OF TOTAL TESTOSTERONE: CPT | Performed by: FAMILY MEDICINE

## 2025-02-21 PROCEDURE — 80061 LIPID PANEL: CPT | Performed by: FAMILY MEDICINE

## 2025-02-21 PROCEDURE — 36415 COLL VENOUS BLD VENIPUNCTURE: CPT | Mod: PO | Performed by: FAMILY MEDICINE

## 2025-02-21 PROCEDURE — 84153 ASSAY OF PSA TOTAL: CPT | Performed by: FAMILY MEDICINE

## 2025-02-21 RX ORDER — ALPRAZOLAM 0.25 MG/1
0.25 TABLET ORAL 2 TIMES DAILY PRN
Qty: 60 TABLET | Refills: 5 | Status: SHIPPED | OUTPATIENT
Start: 2025-02-21

## 2025-02-21 NOTE — ASSESSMENT & PLAN NOTE
reviewed. Discussed medication risks. I have refilled Xanax 6 months    Discussed insomnia condition course.  Advised of first-line medications for this condition.  Also discussed sleep hygiene.  Information was given below.  Good sleep habits (sometimes referred to as sleep hygiene) can help you get a good nights sleep.    Some habits that can improve your sleep health:  -Be consistent. Go to bed at the same time each night and get up at the same time each morning, including on the weekends  -Make sure your bedroom is quiet, dark, relaxing, and at a comfortable temperature  -Remove electronic devices, such as TVs, computers, and smart phones, from the bedroom  -Avoid large meals, caffeine, and alcohol before bedtime  -Get some exercise. Being physically active during the day can help you fall asleep more easily at night.

## 2025-02-21 NOTE — PROGRESS NOTES
Primary Care Telemedicine Note  The patient location is:  Patient's Home - [place]  The chief complaint leading to consultation is:   Chief Complaint   Patient presents with    Insomnia           Shoulder Pain      Total time:  see MDM below. The total time spent on the encounter, which includes face to face time and non-face to face time preparing to see the patient (eg, review of previous medical records, tests), Obtaining and/or reviewing separately obtained history, documenting clinical information in the electronic or other health record, independently interpreting results (not separately reported)/communicating results to the patient/family/caregiver, and/or care coordination (not separately reported).    Visit type: Virtual visit with synchronous audio only and video  Each patient to whom he or she provides medical services by telemedicine is:  (1) informed of the relationship between the physician and patient and the respective role of any other health care provider with respect to management of the patient; and (2) notified that he or she may decline to receive medical services by telemedicine and may withdraw from such care at any time.  =================================================================    Assessment/Plan:  Problem List Items Addressed This Visit          Psychiatric    Encounter for long-term (current) use of medications (Chronic)    Overview   Feb 2025: reviewed labs.  November 2024:  Reviewed labs.  January 2024: Reviewed labs. August 2023: Reviewed labs.  March 2023:  CHRONIC. Stable. Compliant with medications for managed conditions. See medication list. No SE reported.   Routine lab analysis is being monitored. Refills were addressed.  Lab Results   Component Value Date    WBC 8.73 04/29/2024    HGB 14.9 02/21/2025    HCT 43.9 04/29/2024    MCV 98 04/29/2024     04/29/2024         Chemistry        Component Value Date/Time     (L) 08/16/2024 0606     04/29/2024  0851    K 4.5 08/16/2024 0606    K 4.3 04/29/2024 0851     04/29/2024 0851    CO2 25 08/16/2024 0606    CO2 20 (L) 04/29/2024 0851    BUN 10 08/16/2024 0606    BUN 15 04/29/2024 0851    CREATININE 0.77 (L) 08/16/2024 0606    CREATININE 0.9 04/29/2024 0851    GLU 93 04/29/2024 0851        Component Value Date/Time    CALCIUM 9.4 08/16/2024 0606    CALCIUM 9.4 04/29/2024 0851    ALKPHOS 38 08/16/2024 0606    ALKPHOS 44 (L) 01/26/2024 0753    AST 21 08/16/2024 0606    AST 21 01/26/2024 0753    ALT 25 08/16/2024 0606    ALT 29 01/26/2024 0753    BILITOT 0.6 08/16/2024 0606    BILITOT 0.7 01/26/2024 0753    ESTGFRAFRICA >60.0 10/01/2021 1353    EGFRNONAA >60.0 10/01/2021 1353          Lab Results   Component Value Date    TSH 2.075 01/26/2024              Current Assessment & Plan   Complete history and physical was completed today.  Complete and thorough medication reconciliation was performed.  Discussed risks and benefits of medications.  Advised patient on orders and health maintenance.  We discussed old records and old labs if available.  Will request any records not available through epic.  Continue current medications listed on your summary sheet.         Relevant Medications    ALPRAZolam (XANAX) 0.25 MG tablet       Cardiac/Vascular    Hypertension associated with diabetes (Chronic)    Overview   Chronic. Stable.     Hypertension Medications               carvediloL (COREG) 25 MG tablet TAKE ONE TABLET BY MOUTH TWICE DAILY    furosemide (LASIX) 40 MG tablet TAKE 1 TABLET BY MOUTH ONCE DAILY    sacubitriL-valsartan (ENTRESTO)  mg per tablet Take 1 tablet by mouth 2 (two) times daily.    spironolactone (ALDACTONE) 25 MG tablet TAKE 1 TABLET BY MOUTH ONCE DAILY          January 2024: reports insomnia at night due to getting up and taking his medications.   Hypertension Medications               carvediloL (COREG) 25 MG tablet Take 1 tablet (25 mg total) by mouth 2 (two) times daily.    furosemide  (LASIX) 40 MG tablet Take 1 tablet (40 mg total) by mouth once daily.    sacubitriL-valsartan (ENTRESTO)  mg per tablet Take 1 tablet by mouth 2 (two) times daily.    spironolactone (ALDACTONE) 25 MG tablet Take 1 tablet (25 mg total) by mouth once daily.        CHRONIC. STABLE. BP Reviewed.  Compliant with BP medications. No SE reported.   March 2022:  Patient continues on clinical trial with [facility].  (-) CP, SOB, palpitations, dizziness, lightheadedness, HA, arm numbness, tingling or weakness, syncope.  Creatinine   Date Value Ref Range Status   08/16/2024 0.77 (L) 0.90 - 1.30 mg/dL Final   04/29/2024 0.9 0.5 - 1.4 mg/dL Final     Results for orders placed or performed during the hospital encounter of 08/26/24   SCHEDULED EKG 12-LEAD (to Muse)    Collection Time: 08/26/24  2:57 PM   Result Value Ref Range    QRS Duration 146 ms    [facility] QTC Calculation 513 ms    Narrative    Test Reason : I48.0,    Vent. Rate : 075 BPM     Atrial Rate : 075 BPM     P-R Int : 230 ms          QRS Dur : 146 ms      QT Int : 460 ms       P-R-T Axes : 000 -43 067 degrees     QTc Int : 513 ms    Atrial-paced rhythm with prolonged AV conduction  Left axis deviation  Left bundle branch block  Abnormal ECG  When compared with ECG of 07-MAY-2024 11:50,  Electronic atrial pacemaker has replaced Sinus rhythm  Confirmed by LUANN JAMES MD ([id number]) on 8/27/2024 10:55:19 AM    Referred By: CHRIS FLOWER           Confirmed By:LUANN JAMES MD            Current Assessment & Plan   Continue current blood pressure medication regimen.Counseled on importance of hypertension disease course, I recommend ongoing Education for DASH-diet and exercise. Counseled on medication regimen importance of treating high blood pressure. Please be advised of risk of untreated blood pressure as discussed. Please advised of ER precautions were given for symptoms of hypertensive urgency and emergency.             Orthopedic    Chronic right  shoulder pain    Overview   Chronic. Ongoing since a fall in November. The pain is worse with movement. He reports limitation in ROM. He has had previous imaging. Unable to take NSAIDs on Xarelto.     X-ray Shoulder 2 or More Views Right  Narrative: EXAM: XR SHOULDER COMPLETE 2 OR MORE VIEWS RIGHT    CLINICAL HISTORY: Right shoulder pain.    FINDINGS: Glenohumeral and acromioclavicular alignment is normal. No fracture or other acute osseous abnormality is seen.  Moderate degenerative changes of the AC joint and glenohumeral joint.  No evidence of rotator cuff or bursal calcium deposition.  Impression:   No acute radiographic abnormality of the right shoulder.    Finalized on: 11/14/2024 11:04 AM By:  Stuart Benton MD  [facility]# [id number]      2024-11-14 11:06:31.249    [facility]             Current Assessment & Plan   Referral to ortho for further evaluation.          Relevant Orders    Ambulatory referral/consult to Orthopedics       Other    Insomnia - Primary (Chronic)    Overview   Chronic. Stable. He stays asleep once he falls asleep. He does have sleep apnea and is compliant with CPAP. The problem started several years ago.  Symptoms include difficulty falling asleep associated with daytime somnolence and fatigue. He has no problems staying asleep. Treatment currently includes Xanax. The therapies have been successful. No SE reported.     January 2024: Patient reports he is getting up at night to take his medication.  He states that he has to take it every 8 hours.    August 2023:  Patient is not sleeping well.  Patient reports he was taken off of trazodone 300 milligrams.  There was some concern that this may have led to a flare up of his arrhythmia.  Chronic.  Intermittent control.  Patient with increased stress and anxiety lately.  Has been taking 1-1/2 trazodone to help with sleep.  Denies any SI HI or hallucinations.         Current Assessment & Plan    reviewed. Discussed medication risks. I have refilled Xanax  6 months    Discussed insomnia condition course.  Advised of first-line medications for this condition.  Also discussed sleep hygiene.  Information was given below.  Good sleep habits (sometimes referred to as sleep hygiene) can help you get a good nights sleep.    Some habits that can improve your sleep health:  -Be consistent. Go to bed at the same time each night and get up at the same time each morning, including on the weekends  -Make sure your bedroom is quiet, dark, relaxing, and at a comfortable temperature  -Remove electronic devices, such as TVs, computers, and smart phones, from the bedroom  -Avoid large meals, caffeine, and alcohol before bedtime  -Get some exercise. Being physically active during the day can help you fall asleep more easily at night.          Follow up in about 6 months (around 8/21/2025), or if symptoms worsen or fail to improve.  ER precautions for severe or worsening symptoms.     Belinda Yang NP  _____________________________________________________________________________________________________________________________________________________    CC: insomnia     HPI: Patient is a 75-year-old male who presents virtually today as an established patient here for insomnia. He has chronic insomnia. He is not able to take trazodone reporting he has cardiac issues with this medication in the past. He is taking xanax at night which does work well for him. He stays asleep once he falls asleep. He does have sleep apnea and is compliant with CPAP.    The patient has had a problem with insomnia.  The problem started several years ago.  Symptoms include difficulty falling asleep associated with daytime somnolence and fatigue. He has no problems staying asleep. Treatment currently includes Xanax. The therapies have been successful. No SE reported.     He also continues to have right shoulder pain. Ongoing since a fall in November. The pain is worse with movement. He reports limitation in  ROM. He has had previous imaging.     X-ray Shoulder 2 or More Views Right  Narrative: EXAM: XR SHOULDER COMPLETE 2 OR MORE VIEWS RIGHT    CLINICAL HISTORY: Right shoulder pain.    FINDINGS: Glenohumeral and acromioclavicular alignment is normal. No fracture or other acute osseous abnormality is seen.  Moderate degenerative changes of the AC joint and glenohumeral joint.  No evidence of rotator cuff or bursal calcium deposition.  Impression:   No acute radiographic abnormality of the right shoulder.    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     Colon polyp 5/2013    repeat 5/2018    Depression, recurrent     Diastolic dysfunction     DJD (degenerative joint disease) of knee 10/14/2013    Hyperlipidemia     Hypertension     Murmur     Paroxysmal VT 5/21/2016    Pericarditis with effusion     Sleep apnea      Past Surgical History:   Procedure Laterality Date    CARDIAC DEFIBRILLATOR PLACEMENT  10/06/2014    COLONOSCOPY N/A 4/19/2023    Procedure: COLONOSCOPY 4/4-cc/bt clearance recedived;  Surgeon: Isabella Del Cid MD;  Location: [facility] ENDO;  Service: Endoscopy;  Laterality: N/A;    COLONOSCOPY W/ POLYPECTOMY  05/21/2013    repeat 5/21/2018    COSMETIC SURGERY      EYE SURGERY  Cataract    HERNIA REPAIR      R inguinal    LEFT HEART CATHETERIZATION Left 07/09/2018    Procedure: CATHETERIZATION, HEART, LEFT;  Surgeon: Macey Dos Santos MD;  Location: [facility] CATH LAB;  Service: Cardiology;  Laterality: Left;  left radial approach  Has [employer] g[employer]el ICD    pace maker   10/06/2014    PERICARDIAL WINDOW  12-15 years ago    REPLACEMENT OF IMPLANTABLE CARDIOVERTER-DEFIBRILLATOR (ICD) GENERATOR Left 5/7/2024    Procedure: REPLACEMENT, ICD GENERATOR;  Surgeon: Nirav Baldwin MD;  Location: [facility] CATH LAB;  Service: Cardiology;  Laterality:  Left;  SJM  MRI safe  2/27/23-sinus @ 73, intact conduction 200-210ms  Device and leads implanted 10/6/14  [employer] ICD Fortify Assura DR 2357-40Q, [id number]  A lead: Bennett Tendril STS 2088TC/46cm, [id number]  V lead: Bennett Durata 7120Q/58cm, [id number]    tonsillectomy      TONSILLECTOMY      VARICOCELECTOMY       Review of patient's allergies indicates:  No Known Allergies  Social History[1]  Family History   Problem Relation Name Age of Onset    Hyperlipidemia Mother Arline E [place]     Arrhythmia Mother Arline E [place]     Arthritis Mother Arline E [place]     Asthma Mother Arline E [place]     COPD Mother Arline E [place]     Heart disease Father John Fracisco 48    Heart attack Father John Fracisco 48    Hypertension Father John Fracisco     Arthritis Father John Fracisco     Diabetes Father John Fracisco     Hypertension Maternal Grandmother Ree HarrisonDhruv     Hypertension Maternal Grandfather Madhav Dhruv     Heart disease Paternal Grandmother Rozina S Fracisco     Hypertension Paternal Grandmother Rozina S Fracisco     Heart attack Paternal Grandmother Rozina S Fracisco     Stroke Paternal Grandmother Rozina S Fracisco     Heart disease Paternal Grandfather Jeremy Fracisco     Hypertension Paternal Grandfather Jeremy Fracisco     Heart attack Paternal Grandfather Jeremy Fracisco     Cancer Paternal Grandfather Jeremy Fracisco      Medications Ordered Prior to Encounter[2]    Review of Systems   Constitutional:  Negative for activity change, appetite change, chills, fatigue, fever and unexpected weight change.   HENT:  Negative for congestion, hearing loss, rhinorrhea, sore throat and trouble swallowing.    Eyes:  Negative for discharge and visual disturbance.   Respiratory:  Negative for cough, chest tightness, shortness of breath and wheezing.    Cardiovascular:  Negative for chest pain, palpitations and leg swelling.   Gastrointestinal:  Negative for abdominal pain, blood in stool, constipation,  diarrhea and vomiting.   Endocrine: Negative for polydipsia and polyuria.   Genitourinary:  Negative for difficulty urinating, dysuria, hematuria and urgency.   Musculoskeletal:  Positive for arthralgias (R shoulder). Negative for joint swelling, myalgias and neck pain.   Skin:  Negative for rash and wound.   Neurological:  Negative for dizziness, weakness and headaches.   Psychiatric/Behavioral:  Positive for sleep disturbance. Negative for behavioral problems, confusion and dysphoric mood. The patient is not nervous/anxious.      There were no vitals filed for this visit.    Wt Readings from Last 3 Encounters:   12/05/24 103.2 kg (227 lb 8.2 oz)   12/04/24 101.5 kg (223 lb 10.5 oz)   11/14/24 101.2 kg (223 lb)     Physical Exam  Vitals reviewed.   Constitutional:       General: He is awake. He is not in acute distress.     Appearance: Normal appearance. He is not ill-appearing.   HENT:      Head: Normocephalic and atraumatic.      Right Ear: External ear normal.      Left Ear: External ear normal.      Nose: Nose normal.   Eyes:      Extraocular Movements: Extraocular movements intact.      Conjunctiva/sclera: Conjunctivae normal.   Pulmonary:      Effort: Pulmonary effort is normal. No respiratory distress.   Musculoskeletal:      Cervical back: Normal range of motion.   Skin:     Coloration: Skin is not pale.      Findings: No rash.   Neurological:      General: No focal deficit present.      Mental Status: He is alert and oriented to person, place, and time. Mental status is at baseline.      GCS: GCS eye subscore is 4. GCS verbal subscore is 5. GCS motor subscore is 6.   Psychiatric:         Mood and Affect: Mood and affect normal. Mood is not anxious, depressed or elated. Affect is not labile, blunt, flat, angry or tearful.         Speech: Speech normal. Speech is not rapid and pressured, delayed or slurred.         Behavior: Behavior normal. Behavior is not agitated, slowed, aggressive, withdrawn or  hyperactive. Behavior is cooperative.         Thought Content: Thought content normal.         Judgment: Judgment normal.         Health Maintenance   Topic Date Due    Foot Exam  Never done    PROSTATE-SPECIFIC ANTIGEN  01/26/2025    Hemoglobin A1c  02/15/2025    Diabetes Urine Screening  05/15/2025    Lipid Panel  08/15/2025    Diabetic Eye Exam  11/22/2025    Colorectal Cancer Screening  04/19/2026    TETANUS VACCINE  06/17/2034    Hepatitis C Screening  Completed    Shingles Vaccine  Completed    Influenza Vaccine  Completed    COVID-19 Vaccine  Completed    RSV Vaccine (Age 60+ and Pregnant patients)  Completed    Pneumococcal Vaccines (Age 50+)  Completed     Visit today included increased complexity associated with the care of the episodic problem - see above- addressed and managing the longitudinal care of the patient due to the serious and/or complex managed problem(s) - see above.           [1]   Social History  Tobacco Use    Smoking status: Never    Smokeless tobacco: Never   Substance Use Topics    Alcohol use: Yes     Comment: occasional glass of wine on social occasions    Drug use: No   [2]   Current Outpatient Medications on File Prior to Visit   Medication Sig Dispense Refill    acetaminophen (TYLENOL) 325 MG tablet Take 650 mg by mouth as needed.       albuterol (PROVENTIL/VENTOLIN HFA) 90 mcg/actuation inhaler Inhale 1 puff into the lungs once daily. rescue 54 g 3    amiodarone (PACERONE) 200 MG Tab TAKE 1 TABLET BY MOUTH TWICE DAILY FOR 6 WEEKS, THEN TAKE 1 AND 1/2 TABLETS DAILY 180 tablet 0    bepotastine besilate (BEPREVE) 1.5 % Drop Bepreve 1.5 % eye drops   Apply by ophthalmic route as needed for 50 days.      carvediloL (COREG) 25 MG tablet TAKE ONE TABLET BY MOUTH TWICE DAILY 180 tablet 3    co-enzyme Q-10 30 mg capsule Take 30 mg by mouth once daily.       FARXIGA 10 mg tablet TAKE 1 TABLET BY MOUTH ONCE DAILY 90 tablet 0    ferrous sulfate (FEOSOL) 325 mg (65 mg iron) Tab tablet Take 325  mg by mouth daily with breakfast.      fluticasone furoate (ARNUITY ELLIPTA) 100 mcg/actuation inhaler Inhale 1 puff into the lungs once daily. 30 each 11    furosemide (LASIX) 40 MG tablet Take 1 tablet (40 mg total) by mouth once daily. 90 tablet 3    glucosamine-chondroitin 500-400 mg tablet Take 1 tablet by mouth once daily.       meclizine (ANTIVERT) 25 mg tablet Take 1 tablet (25 mg total) by mouth 3 (three) times daily as needed for Dizziness or Nausea. 90 tablet 1    mexiletine (MEXITIL) 150 MG Cap Take 3 capsules by mouth every 8 hours. 270 capsule 3    multivitamin (THERAGRAN) tablet Take 1 tablet by mouth once daily.      ondansetron (ZOFRAN) 4 MG tablet Take 4 mg by mouth every 8 (eight) hours as needed for Nausea.      OZEMPIC 0.25 mg or 0.5 mg (2 mg/3 mL) pen injector Inject 0.5 mg into the skin every 7 days. 9 mL 1    pravastatin (PRAVACHOL) 80 MG tablet TAKE 1 TABLET BY MOUTH ONCE DAILY 90 tablet 1    RESTASIS 0.05 % ophthalmic emulsion Place 1 drop into both eyes 2 (two) times daily.      rivaroxaban (XARELTO) 10 mg Tab Take 10 mg by mouth daily with dinner or evening meal.      sacubitriL-valsartan (ENTRESTO)  mg per tablet TAKE 1 TABLET BY MOUTH TWICE DAILY 180 tablet 3    spironolactone (ALDACTONE) 25 MG tablet Take 1 tablet (25 mg total) by mouth once daily. 30 tablet 11    [DISCONTINUED] ALPRAZolam (XANAX) 0.25 MG tablet TAKE 1 TABLET BY MOUTH TWICE DAILY AS NEEDED FOR ANXIETY 60 tablet 0     No current facility-administered medications on file prior to visit.

## 2025-02-22 NOTE — PROGRESS NOTES
Make follow-up lab appointment per recommendation below.  Check to see if patient has seen the results through my chart.  If not then,  #CALL THE PATIENT# to discuss results/see if they have questions and document verification of contact. Make F/U appt if needed. [phone]      #My interpretation that was sent to them through [facility]:  Randy, I have reviewed your recent blood work.     Testosterone level is at lower limits of normal.  PSA screening for prostate cancer is within normal limits.  Repeat annually.  Your hemoglobin is stable.  Your metabolic panel which shows your glucose, kidney function, electrolytes, and liver function is mostly within normal limits.  Mild elevation of AST liver enzyme.  Will monitor on routine blood work.  Your cholesterol is well-controlled.    Hyperlipidemia Medications              pravastatin (PRAVACHOL) 80 MG tablet TAKE 1 TABLET BY MOUTH ONCE DAILY            Your hemoglobin A1c is normal.  This test is gold standard screening test for diabetes. It is a measures 3 months of your average blood sugar.  =========================  Also please address any outstanding health maintenance that may be due:   Health Maintenance Due   Topic Date Due    Foot Exam  Never done

## 2025-02-24 ENCOUNTER — OFFICE VISIT (OUTPATIENT)
Dept: CARDIOLOGY | Facility: CLINIC | Age: 75
End: 2025-02-24
Payer: MEDICARE

## 2025-02-24 ENCOUNTER — HOSPITAL ENCOUNTER (OUTPATIENT)
Dept: CARDIOLOGY | Facility: HOSPITAL | Age: 75
Discharge: HOME | End: 2025-02-24
Attending: INTERNAL MEDICINE
Payer: MEDICARE

## 2025-02-24 VITALS
BODY MASS INDEX: 31.72 KG/M2 | SYSTOLIC BLOOD PRESSURE: 104 MMHG | OXYGEN SATURATION: 97 % | WEIGHT: 221.56 LBS | DIASTOLIC BLOOD PRESSURE: 80 MMHG | HEART RATE: 75 BPM | HEIGHT: 70 IN

## 2025-02-24 DIAGNOSIS — Z95.810 ICD (IMPLANTABLE CARDIOVERTER-DEFIBRILLATOR) IN PLACE: Primary | ICD-10-CM

## 2025-02-24 DIAGNOSIS — Z95.810 ICD (IMPLANTABLE CARDIOVERTER-DEFIBRILLATOR) IN PLACE: ICD-10-CM

## 2025-02-24 DIAGNOSIS — I42.9 IDIOPATHIC CARDIOMYOPATHY: Primary | Chronic | ICD-10-CM

## 2025-02-24 DIAGNOSIS — G47.33 OSA ON CPAP: ICD-10-CM

## 2025-02-24 DIAGNOSIS — I42.9 IDIOPATHIC CARDIOMYOPATHY: ICD-10-CM

## 2025-02-24 DIAGNOSIS — I50.30 CHF WITH LEFT VENTRICULAR DIASTOLIC DYSFUNCTION, NYHA CLASS 2: ICD-10-CM

## 2025-02-24 DIAGNOSIS — I47.29 PAROXYSMAL VT: Chronic | ICD-10-CM

## 2025-02-24 DIAGNOSIS — I47.29 PAROXYSMAL VT: ICD-10-CM

## 2025-02-24 DIAGNOSIS — Z45.02 ICD (IMPLANTABLE CARDIOVERTER-DEFIBRILLATOR) DISCHARGE: ICD-10-CM

## 2025-02-24 DIAGNOSIS — I48.0 PAF (PAROXYSMAL ATRIAL FIBRILLATION): ICD-10-CM

## 2025-02-24 DIAGNOSIS — E11.65 TYPE 2 DIABETES MELLITUS WITH HYPERGLYCEMIA, WITHOUT LONG-TERM CURRENT USE OF INSULIN: Chronic | ICD-10-CM

## 2025-02-24 DIAGNOSIS — I50.30 CHF WITH LEFT VENTRICULAR DIASTOLIC DYSFUNCTION, NYHA CLASS 2: Chronic | ICD-10-CM

## 2025-02-24 DIAGNOSIS — I50.42 CHRONIC COMBINED SYSTOLIC AND DIASTOLIC CHF (CONGESTIVE HEART FAILURE): ICD-10-CM

## 2025-02-24 DIAGNOSIS — I48.0 PAF (PAROXYSMAL ATRIAL FIBRILLATION): Chronic | ICD-10-CM

## 2025-02-24 DIAGNOSIS — Z95.810 PRESENCE OF AUTOMATIC (IMPLANTABLE) CARDIAC DEFIBRILLATOR: ICD-10-CM

## 2025-02-24 DIAGNOSIS — D53.9 NUTRITIONAL ANEMIA: ICD-10-CM

## 2025-02-24 PROCEDURE — 93283 PRGRMG EVAL IMPLANTABLE DFB: CPT

## 2025-02-24 PROCEDURE — 93283 PRGRMG EVAL IMPLANTABLE DFB: CPT | Mod: 26,,, | Performed by: INTERNAL MEDICINE

## 2025-02-24 PROCEDURE — 99214 OFFICE O/P EST MOD 30 MIN: CPT | Mod: S$PBB,,, | Performed by: NURSE PRACTITIONER

## 2025-02-24 PROCEDURE — 99214 OFFICE O/P EST MOD 30 MIN: CPT | Mod: PBBFAC,25 | Performed by: NURSE PRACTITIONER

## 2025-02-24 PROCEDURE — 99999 PR PBB SHADOW E&M-EST. PATIENT-LVL IV: CPT | Mod: PBBFAC,,, | Performed by: NURSE PRACTITIONER

## 2025-02-24 NOTE — PROGRESS NOTES
Subjective:   Patient ID:  Randy Yap is a 75 y.o. male who presents for evaluation of Follow-up      HPI      Randy Yap is a 74 year old male who presents to Arrhythmia clinic for 3 month follow up. His current medical conditions include pVT, PAF on Xarelto, CHF, HFrEF of 25-30%, s/p DC ICD, HTN, HLP, DM, HONEY on CPAP. He returns today and states he is doing ok.     Has been more active recently without any arrhythmias.  Last remote without any noted arrhythmias.     Reports compliance with medications and dietary restrictions.   BP stable today in office.     Denies chest pain or anginal equivalents. No shortness of breath, ALDANA or palpitations. Denies orthopnea, PND or abdominal bloating. Reports regular walking without any issues lately. NO leg swelling or claudications. No recent falls, syncope or near syncopal events. Reports compliance with medications and dietary restrictions. NO CNS complaints to suggest TIA or CVA today. No signs of abnormal bleeding on Xarelto.    No signs of acute CHF on exam today.     2/24/2025 update    Randy Yap returns for follow up with device check.     Device check well functioning, no recent arrhythmias or ICD shocks.     Denies chest pain or anginal equivalents. No shortness of breath, ALDANA or palpitations. Denies orthopnea, PND or abdominal bloating. Reports regular walking without any issues lately. NO leg swelling or claudications. No recent falls, syncope or near syncopal events. Reports compliance with medications and dietary restrictions. NO CNS complaints to suggest TIA or CVA today. No signs of abnormal bleeding on Xarelto.     Has been doing well CV wise.     Dizziness is improved today.     Reports compliance with medications and dietary restrictions.         Past Medical History:   Diagnosis Date    Asthma     Cardiomyopathy due to systemic disease     Colon polyp 5/2013    repeat 5/2018    Depression, recurrent     Diastolic dysfunction     DJD  (degenerative joint disease) of knee 10/14/2013    Hyperlipidemia     Hypertension     Murmur     Paroxysmal VT 5/21/2016    Pericarditis with effusion     Sleep apnea        Past Surgical History:   Procedure Laterality Date    CARDIAC DEFIBRILLATOR PLACEMENT  10/06/2014    COLONOSCOPY N/A 4/19/2023    Procedure: COLONOSCOPY 4/4-cc/bt clearance recedived;  Surgeon: Isabella Del Cid MD;  Location: [facility];  Service: Endoscopy;  Laterality: N/A;    COLONOSCOPY W/ POLYPECTOMY  05/21/2013    repeat 5/21/2018    COSMETIC SURGERY      EYE SURGERY  Cataract    HERNIA REPAIR      R inguinal    LEFT HEART CATHETERIZATION Left 07/09/2018    Procedure: CATHETERIZATION, HEART, LEFT;  Surgeon: Macey Dos Santos MD;  Location: [facility] CATH LAB;  Service: Cardiology;  Laterality: Left;  left radial approach  Has [employer] ICD    pace maker   10/06/2014    PERICARDIAL WINDOW  12-15 years ago    REPLACEMENT OF IMPLANTABLE CARDIOVERTER-DEFIBRILLATOR (ICD) GENERATOR Left 5/7/2024    Procedure: REPLACEMENT, ICD GENERATOR;  Surgeon: Nirav Baldwin MD;  Location: [facility] CATH LAB;  Service: Cardiology;  Laterality: Left;  [facility]  MRI safe  2/27/23-sinus @ 73, intact conduction 200-210ms  Device and leads implanted 10/6/14  [employer] ICD Fortify Assura DR 2357-40Q, [id number]  A lead: Bennett Tendril STS 2088TC/46cm, [id number]  V lead: Bennett Durata 7120Q/58cm, [id number]    tonsillectomy      TONSILLECTOMY      VARICOCELECTOMY         Social History     Tobacco Use    Smoking status: Never    Smokeless tobacco: Never   Substance Use Topics    Alcohol use: Yes     Comment: occasional glass of wine on social occasions    Drug use: No       Family History   Problem Relation Name Age of Onset    Hyperlipidemia Mother Arline E [place]     Arrhythmia Mother Arline E [place]     Arthritis Mother Arline E [place]     Asthma Mother Arline E [place]     COPD Mother Arline E [place]     Heart disease Father John Yap 48    Heart attack Father  John Lozavais 48    Hypertension Father John Yap     Arthritis Father John Yap     Diabetes Father John Lozavais     Hypertension Maternal Grandmother Ree Bartlett     Hypertension Maternal Grandfather Madhav Bartlett     Heart disease Paternal Grandmother Rozina VIZCARRA Fracisco     Hypertension Paternal Grandmother Rozina VIZCARRA Fracisco     Heart attack Paternal Grandmother Rozina VIZCARRA Fracisco     Stroke Paternal Grandmother Rozina VIZCARRA Fracisco     Heart disease Paternal Grandfather Jeremy Fracisco     Hypertension Paternal Grandfather Jeremy Fracisco     Heart attack Paternal Grandfather Jeremy Fracisco     Cancer Paternal Grandfather Jeremy Lozavais        Wt Readings from Last 3 Encounters:   02/24/25 100.5 kg (221 lb 9 oz)   12/05/24 103.2 kg (227 lb 8.2 oz)   12/04/24 101.5 kg (223 lb 10.5 oz)     Temp Readings from Last 3 Encounters:   11/14/24 97.9 °F (36.6 °C) (Oral)   05/15/24 98.5 °F (36.9 °C)   05/07/24 98 °F (36.7 °C) (Temporal)     BP Readings from Last 3 Encounters:   02/24/25 104/80   12/05/24 112/72   12/04/24 (!) 83/56     Pulse Readings from Last 3 Encounters:   02/24/25 75   12/05/24 80   12/04/24 75       Current Outpatient Medications on File Prior to Visit   Medication Sig Dispense Refill    acetaminophen (TYLENOL) 325 MG tablet Take 650 mg by mouth as needed.       albuterol (PROVENTIL/VENTOLIN HFA) 90 mcg/actuation inhaler Inhale 1 puff into the lungs once daily. rescue 54 g 3    ALPRAZolam (XANAX) 0.25 MG tablet Take 1 tablet (0.25 mg total) by mouth 2 (two) times daily as needed for Anxiety or Insomnia. as needed for anxiety. 60 tablet 5    amiodarone (PACERONE) 200 MG Tab TAKE 1 TABLET BY MOUTH TWICE DAILY FOR 6 WEEKS, THEN TAKE 1 AND 1/2 TABLETS DAILY 180 tablet 0    bepotastine besilate (BEPREVE) 1.5 % Drop Bepreve 1.5 % eye drops   Apply by ophthalmic route as needed for 50 days.      carvediloL (COREG) 25 MG tablet TAKE ONE TABLET BY MOUTH TWICE DAILY 180 tablet 3    co-enzyme Q-10 30 mg  capsule Take 30 mg by mouth once daily.       FARXIGA 10 mg tablet TAKE 1 TABLET BY MOUTH ONCE DAILY 90 tablet 0    ferrous sulfate (FEOSOL) 325 mg (65 mg iron) Tab tablet Take 325 mg by mouth daily with breakfast.      fluticasone furoate (ARNUITY ELLIPTA) 100 mcg/actuation inhaler Inhale 1 puff into the lungs once daily. 30 each 11    furosemide (LASIX) 40 MG tablet Take 1 tablet (40 mg total) by mouth once daily. 90 tablet 3    glucosamine-chondroitin 500-400 mg tablet Take 1 tablet by mouth once daily.       meclizine (ANTIVERT) 25 mg tablet Take 1 tablet (25 mg total) by mouth 3 (three) times daily as needed for Dizziness or Nausea. 90 tablet 1    mexiletine (MEXITIL) 150 MG Cap Take 3 capsules by mouth every 8 hours. 270 capsule 3    multivitamin (THERAGRAN) tablet Take 1 tablet by mouth once daily.      ondansetron (ZOFRAN) 4 MG tablet Take 4 mg by mouth every 8 (eight) hours as needed for Nausea.      OZEMPIC 0.25 mg or 0.5 mg (2 mg/3 mL) pen injector Inject 0.5 mg into the skin every 7 days. 9 mL 1    pravastatin (PRAVACHOL) 80 MG tablet TAKE 1 TABLET BY MOUTH ONCE DAILY 90 tablet 1    RESTASIS 0.05 % ophthalmic emulsion Place 1 drop into both eyes 2 (two) times daily.      rivaroxaban (XARELTO) 10 mg Tab Take 10 mg by mouth daily with dinner or evening meal.      sacubitriL-valsartan (ENTRESTO)  mg per tablet TAKE 1 TABLET BY MOUTH TWICE DAILY 180 tablet 3    spironolactone (ALDACTONE) 25 MG tablet Take 1 tablet (25 mg total) by mouth once daily. 30 tablet 11     No current facility-administered medications on file prior to visit.       No cardiac monitor results found for the past 12 months    Results for orders placed during the hospital encounter of 04/17/23    Echo    Interpretation Summary  · The left ventricle is moderately enlarged with eccentric hypertrophy and low normal systolic function.  · The estimated ejection fraction is 50-55%  · Normal left ventricular diastolic function.  · Normal  right ventricular size.  · The aortic valve appears structurally normal. There is normal leaflet mobility.  · Mild mitral regurgitation.  · Mild tricuspid regurgitation.  · Normal central venous pressure (3 mmHg).  · The estimated PA systolic pressure is 18 mmHg.  · Considerable improvement in systolic function compared to study of 1-    Results for orders placed during the hospital encounter of 07/21/21    CPX Study    Interpretation Summary  · The patient exercised for 8 minutes and 29 seconds on a medium ramp protocol, achieving a peak heart rate of 120 bpm, which is 79% of the age predicted maximum heart rate.  · There were no arrhythmias during stress.  · There was no ST segment deviation noted during stress.  · The peak VO2 was 16.5 ml/kg/min which is 49.85% of predicted equating to a functional capacity of 4.71 METS indicating moderate functional impairment.  · The anaerobic threshold (AT), which occurred at a heart rate of 95bpm, was 13.9 ml/kg/min, which is 41.99% of the predicted VO2 and is borderline reduced.  · Moderate functional impairment associated with a reduced breathing reserve, normal oxygen stauration, suboptimal effort, and a borderline reduced AT. These findings are indicative of functional impairment secondary to circulatory insufficiency, deconditioning.        Results for orders placed or performed during the hospital encounter of 08/26/24   SCHEDULED EKG 12-LEAD (to Muse)    Collection Time: 08/26/24  2:57 PM   Result Value Ref Range    QRS Duration 146 ms    [facility] QTC Calculation 513 ms    Narrative    Test Reason : I48.0,    Vent. Rate : 075 BPM     Atrial Rate : 075 BPM     P-R Int : 230 ms          QRS Dur : 146 ms      QT Int : 460 ms       P-R-T Axes : 000 -43 067 degrees     QTc Int : 513 ms    Atrial-paced rhythm with prolonged AV conduction  Left axis deviation  Left bundle branch block  Abnormal ECG  When compared with ECG of 07-MAY-2024 11:50,  Electronic atrial pacemaker  "has replaced Sinus rhythm  Confirmed by LUANN JAMES MD ([id number]) on 8/27/2024 10:55:19 AM    Referred By: CHRIS FLOWER           Confirmed By:LUANN JAMES MD         Review of Systems   Constitutional: Positive for malaise/fatigue.   HENT:  Negative for hearing loss and hoarse voice.    Eyes:  Negative for blurred vision and visual disturbance.   Cardiovascular:  Negative for chest pain, claudication, dyspnea on exertion, irregular heartbeat, leg swelling, near-syncope, orthopnea, palpitations, paroxysmal nocturnal dyspnea and syncope.   Respiratory:  Negative for cough, hemoptysis, shortness of breath, sleep disturbances due to breathing, snoring and wheezing.    Endocrine: Negative for cold intolerance and heat intolerance.   Hematologic/Lymphatic: Bruises/bleeds easily (on xarelto).   Skin:  Negative for color change, dry skin and nail changes.   Musculoskeletal:  Positive for arthritis and back pain. Negative for joint pain and myalgias.   Gastrointestinal:  Negative for bloating, abdominal pain, constipation, nausea and vomiting.   Genitourinary:  Negative for dysuria, flank pain, hematuria and hesitancy.   Neurological:  Negative for headaches, light-headedness, loss of balance, numbness, paresthesias and weakness.   Psychiatric/Behavioral:  Negative for altered mental status.    Allergic/Immunologic: Negative for environmental allergies.         Objective:/80 (BP Location: Right arm, Patient Position: Sitting)   Pulse 75   Ht 5' 10" (1.778 m)   Wt 100.5 kg (221 lb 9 oz)   SpO2 97%   BMI 31.79 kg/m²      Physical Exam  Vitals and nursing note reviewed.   Constitutional:       General: He is not in acute distress.     Appearance: Normal appearance. He is well-developed. He is obese. He is not ill-appearing.   HENT:      Head: Normocephalic and atraumatic.      Nose: Nose normal.      Mouth/Throat:      Mouth: Mucous membranes are moist.   Eyes:      Pupils: Pupils are equal, round, and " reactive to light.   Neck:      Thyroid: No thyromegaly.      Vascular: No JVD.      Trachea: No tracheal deviation.   Cardiovascular:      Rate and Rhythm: Normal rate and regular rhythm.      Chest Wall: PMI is not displaced.      Pulses: Intact distal pulses.           Radial pulses are 2+ on the right side and 2+ on the left side.        Dorsalis pedis pulses are 2+ on the right side and 2+ on the left side.      Heart sounds: S1 normal and S2 normal. Heart sounds not distant. No murmur heard.     Comments: DC ICD in excellent repair.   Pulmonary:      Effort: Pulmonary effort is normal. No respiratory distress.      Breath sounds: Normal breath sounds. No wheezing.   Abdominal:      General: Bowel sounds are normal. There is no distension.      Palpations: Abdomen is soft.      Tenderness: There is no abdominal tenderness.   Musculoskeletal:         General: No swelling. Normal range of motion.      Cervical back: Full passive range of motion without pain, normal range of motion and neck supple.      Right lower leg: No edema.      Left lower leg: No edema.      Right ankle: No swelling.      Left ankle: No swelling.   Skin:     General: Skin is warm and dry.      Capillary Refill: Capillary refill takes less than 2 seconds.      Nails: There is no clubbing.   Neurological:      General: No focal deficit present.      Mental Status: He is alert and oriented to person, place, and time.      Motor: No weakness.   Psychiatric:         Speech: Speech normal.         Behavior: Behavior normal.         Thought Content: Thought content normal.         Judgment: Judgment normal.         Lab Results   Component Value Date    CHOL 159 02/21/2025    CHOL 146 08/15/2024    CHOL 159 01/26/2024     Lab Results   Component Value Date    HDL 47 02/21/2025    HDL 43 08/15/2024    HDL 43 01/26/2024     Lab Results   Component Value Date    LDLCALC 96.6 02/21/2025    LDLCALC 80 08/15/2024    LDLCALC 91.8 01/26/2024     Lab  Results   Component Value Date    TRIG 77 02/21/2025    TRIG 114 08/15/2024    TRIG 121 01/26/2024     Lab Results   Component Value Date    CHOLHDL 29.6 02/21/2025    CHOLHDL 3.4 08/15/2024    CHOLHDL 27.0 01/26/2024       Chemistry        Component Value Date/Time     02/21/2025 0800    K 4.5 02/21/2025 0800     02/21/2025 0800    CO2 23 02/21/2025 0800    BUN 11 02/21/2025 0800    CREATININE 0.9 02/21/2025 0800    GLU 93 02/21/2025 0800        Component Value Date/Time    CALCIUM 9.4 02/21/2025 0800    ALKPHOS 46 02/21/2025 0800    AST 44 (H) 02/21/2025 0800    ALT 32 02/21/2025 0800    BILITOT 0.5 02/21/2025 0800    ESTGFRAFRICA >60.0 10/01/2021 1353    EGFRNONAA >60.0 10/01/2021 1353          Lab Results   Component Value Date    TSH 2.075 01/26/2024     Lab Results   Component Value Date    INR 1.1 04/29/2024    INR 1.0 07/06/2018     Lab Results   Component Value Date    WBC 8.73 04/29/2024    HGB 14.9 02/21/2025    HCT 43.9 04/29/2024    MCV 98 04/29/2024     04/29/2024          Assessment:      1. Idiopathic cardiomyopathy    2. Paroxysmal VT    3. PAF (paroxysmal atrial fibrillation)    4. CHF with left ventricular diastolic dysfunction, NYHA class 2    5. ICD (implantable cardioverter-defibrillator) in place    6. Type 2 diabetes mellitus with hyperglycemia, without long-term current use of insulin    7. Nutritional anemia    8. HONEY on CPAP          Plan:     CHF SYNOPSIS:    GDMT:  ACE/ARB/ARNI: Entresto 97/103 BID  Beta Blocker: Coreg 25 mg BID  MRA: Aldactone 25 mg daily  SGLT2: Farxiga 10 mg daily  Diuretic: Lasix 40 mg daily    Continue Amiodarone 300 mg daily  Continue Mexitil 450 mg TID    Continue home device monitoring and bi-annual in office interrogation    RTC in 6 m with device check.     Nicole May, FNP-C [facility]

## 2025-02-28 ENCOUNTER — EXTERNAL CHRONIC CARE MANAGEMENT (OUTPATIENT)
Dept: PRIMARY CARE CLINIC | Facility: CLINIC | Age: 75
End: 2025-02-28
Payer: MEDICARE

## 2025-02-28 PROCEDURE — 99490 CHRNC CARE MGMT STAFF 1ST 20: CPT | Mod: S$PBB,,, | Performed by: FAMILY MEDICINE

## 2025-02-28 PROCEDURE — 99490 CHRNC CARE MGMT STAFF 1ST 20: CPT | Mod: PBBFAC,PO | Performed by: FAMILY MEDICINE

## 2025-03-01 LAB
OHS CV AF BURDEN PERCENT: < 1
OHS CV DC REMOTE DEVICE TYPE: NORMAL

## 2025-03-06 ENCOUNTER — PATIENT MESSAGE (OUTPATIENT)
Dept: CARDIOLOGY | Facility: CLINIC | Age: 75
End: 2025-03-06
Payer: MEDICARE

## 2025-03-09 ENCOUNTER — CLINICAL SUPPORT (OUTPATIENT)
Dept: CARDIOLOGY | Facility: HOSPITAL | Age: 75
End: 2025-03-09
Payer: MEDICARE

## 2025-03-09 DIAGNOSIS — Z95.810 PRESENCE OF AUTOMATIC (IMPLANTABLE) CARDIAC DEFIBRILLATOR: ICD-10-CM

## 2025-03-09 DIAGNOSIS — I48.0 PAROXYSMAL ATRIAL FIBRILLATION: ICD-10-CM

## 2025-03-14 ENCOUNTER — OFFICE VISIT (OUTPATIENT)
Dept: ORTHOPEDICS | Facility: CLINIC | Age: 75
End: 2025-03-14
Payer: MEDICARE

## 2025-03-14 VITALS — HEIGHT: 70 IN | BODY MASS INDEX: 31.72 KG/M2 | WEIGHT: 221.56 LBS

## 2025-03-14 DIAGNOSIS — M25.511 RIGHT SHOULDER PAIN, UNSPECIFIED CHRONICITY: ICD-10-CM

## 2025-03-14 DIAGNOSIS — M75.51 SUBACROMIAL BURSITIS OF RIGHT SHOULDER JOINT: ICD-10-CM

## 2025-03-14 DIAGNOSIS — M25.511 CHRONIC RIGHT SHOULDER PAIN: ICD-10-CM

## 2025-03-14 DIAGNOSIS — M19.011 PRIMARY OSTEOARTHRITIS OF RIGHT SHOULDER: ICD-10-CM

## 2025-03-14 DIAGNOSIS — S46.011A TRAUMATIC TEAR OF RIGHT ROTATOR CUFF, UNSPECIFIED TEAR EXTENT, INITIAL ENCOUNTER: Primary | ICD-10-CM

## 2025-03-14 DIAGNOSIS — G89.29 CHRONIC RIGHT SHOULDER PAIN: ICD-10-CM

## 2025-03-14 PROCEDURE — 99999 PR PBB SHADOW E&M-EST. PATIENT-LVL III: CPT | Mod: PBBFAC,,, | Performed by: STUDENT IN AN ORGANIZED HEALTH CARE EDUCATION/TRAINING PROGRAM

## 2025-03-14 PROCEDURE — 99213 OFFICE O/P EST LOW 20 MIN: CPT | Mod: PBBFAC,PO | Performed by: STUDENT IN AN ORGANIZED HEALTH CARE EDUCATION/TRAINING PROGRAM

## 2025-03-14 RX ORDER — METHOCARBAMOL 500 MG/1
500 TABLET, FILM COATED ORAL 4 TIMES DAILY PRN
Qty: 40 TABLET | Refills: 1 | Status: SHIPPED | OUTPATIENT
Start: 2025-03-14 | End: 2025-04-03

## 2025-03-14 NOTE — PROGRESS NOTES
Patient ID: Randy Yap  YOB: 1950  MRN: [id number]    Chief Complaint: Pain of the Right Shoulder      Referred By:Belinda Yang NP    History of Present Illness: Randy Yap is a right-hand dominant 75 y.o. male who presents for shoulder pain. He reports a fall 4 months ago landing on the right shoulder. Pain is localized to the right shoulder area  He experiences limited range of motion in the affected shoulder. He reports pain with very minimal activity, such as pulling up the covers last night. He denies any prior shoulder issues or surgeries. He has attempted to manage pain with Tylenol and heat application. He has been going to Physical Therapy for the last 4 months, he wasn't able to raise arm at all, now since PT he able to lift arm right around chest level. He is experiencing weakness with holding objects. Xrays in patient's chart.     The patient is active in none.  Occupation: retired      Past Medical History:   Past Medical History:   Diagnosis Date    Asthma     Cardiomyopathy due to systemic disease     Colon polyp 5/2013    repeat 5/2018    Depression, recurrent     Diastolic dysfunction     DJD (degenerative joint disease) of knee 10/14/2013    Hyperlipidemia     Hypertension     Murmur     Paroxysmal VT 5/21/2016    Pericarditis with effusion     Sleep apnea      Past Surgical History:   Procedure Laterality Date    CARDIAC DEFIBRILLATOR PLACEMENT  10/06/2014    COLONOSCOPY N/A 4/19/2023    Procedure: COLONOSCOPY 4/4-cc/bt clearance recedived;  Surgeon: Isabella Del Cid MD;  Location: [facility] ENDO;  Service: Endoscopy;  Laterality: N/A;    COLONOSCOPY W/ POLYPECTOMY  05/21/2013    repeat 5/21/2018    COSMETIC SURGERY      EYE SURGERY  Cataract    HERNIA REPAIR      R inguinal    LEFT HEART CATHETERIZATION Left 07/09/2018    Procedure: CATHETERIZATION, HEART, LEFT;  Surgeon: Macey Dos Santos MD;  Location: [facility] CATH LAB;  Service: Cardiology;  Laterality: Left;  left  radial approach  Has [employer] ICD    pace maker   10/06/2014    PERICARDIAL WINDOW  12-15 years ago    REPLACEMENT OF IMPLANTABLE CARDIOVERTER-DEFIBRILLATOR (ICD) GENERATOR Left 5/7/2024    Procedure: REPLACEMENT, ICD GENERATOR;  Surgeon: Nirav Baldwin MD;  Location: [facility] CATH LAB;  Service: Cardiology;  Laterality: Left;  SJM  MRI safe  2/27/23-sinus @ 73, intact conduction 200-210ms  Device and leads implanted 10/6/14  [employer] ICD Fortify Assura DR 2357-40Q, [id number]  A lead: Bennett Tendril STS 2088TC/46cm, [id number]  V lead: Bennett Durata 7120Q/58cm, [id number]    tonsillectomy      TONSILLECTOMY      VARICOCELECTOMY       Family History   Problem Relation Name Age of Onset    Hyperlipidemia Mother Arline E [place]     Arrhythmia Mother Arline E Clau     Arthritis Mother Arline E Clau     Asthma Mother Arline E [place]     COPD Mother Arline E [place]     Heart disease Father John Fracisco 48    Heart attack Father John Fracisco 48    Hypertension Father John Fracisco     Arthritis Father John Fracisco     Diabetes Father John Fracisco     Hypertension Maternal Grandmother Ree HarrisonMunger     Hypertension Maternal Grandfather Madhav Dhruv     Heart disease Paternal Grandmother Rozina S Fracisco     Hypertension Paternal Grandmother Rozina S Fracisco     Heart attack Paternal Grandmother Rozina VIZCARRA Fracisco     Stroke Paternal Grandmother Rozina S Fracisco     Heart disease Paternal Grandfather Jeremy Fracisco     Hypertension Paternal Grandfather Jeremy Fracisco     Heart attack Paternal Grandfather Jeremy Fracisco     Cancer Paternal Grandfather Jeremy Yap      Social History[1]  Medication List with Changes/Refills   New Medications    METHOCARBAMOL (ROBAXIN) 500 MG TAB    Take 1 tablet (500 mg total) by mouth 4 (four) times daily as needed (pain).   Current Medications    ACETAMINOPHEN (TYLENOL) 325 MG TABLET    Take 650 mg by mouth as needed.     ALBUTEROL (PROVENTIL/VENTOLIN HFA) 90  MCG/ACTUATION INHALER    Inhale 1 puff into the lungs once daily. rescue    ALPRAZOLAM (XANAX) 0.25 MG TABLET    Take 1 tablet (0.25 mg total) by mouth 2 (two) times daily as needed for Anxiety or Insomnia. as needed for anxiety.    AMIODARONE (PACERONE) 200 MG TAB    TAKE 1 TABLET BY MOUTH TWICE DAILY FOR 6 WEEKS, THEN TAKE 1 AND 1/2 TABLETS DAILY    BEPOTASTINE BESILATE (BEPREVE) 1.5 % DROP    Bepreve 1.5 % eye drops   Apply by ophthalmic route as needed for 50 days.    CARVEDILOL (COREG) 25 MG TABLET    TAKE ONE TABLET BY MOUTH TWICE DAILY    CO-ENZYME Q-10 30 MG CAPSULE    Take 30 mg by mouth once daily.     FARXIGA 10 MG TABLET    TAKE 1 TABLET BY MOUTH ONCE DAILY    FERROUS SULFATE (FEOSOL) 325 MG (65 MG IRON) TAB TABLET    Take 325 mg by mouth daily with breakfast.    FLUTICASONE FUROATE (ARNUITY ELLIPTA) 100 MCG/ACTUATION INHALER    Inhale 1 puff into the lungs once daily.    FUROSEMIDE (LASIX) 40 MG TABLET    Take 1 tablet (40 mg total) by mouth once daily.    GLUCOSAMINE-CHONDROITIN 500-400 MG TABLET    Take 1 tablet by mouth once daily.     MECLIZINE (ANTIVERT) 25 MG TABLET    Take 1 tablet (25 mg total) by mouth 3 (three) times daily as needed for Dizziness or Nausea.    MEXILETINE (MEXITIL) 150 MG CAP    Take 3 capsules by mouth every 8 hours.    MULTIVITAMIN (THERAGRAN) TABLET    Take 1 tablet by mouth once daily.    ONDANSETRON (ZOFRAN) 4 MG TABLET    Take 4 mg by mouth every 8 (eight) hours as needed for Nausea.    OZEMPIC 0.25 MG OR 0.5 MG (2 MG/3 ML) PEN INJECTOR    Inject 0.5 mg into the skin every 7 days.    PRAVASTATIN (PRAVACHOL) 80 MG TABLET    TAKE 1 TABLET BY MOUTH ONCE DAILY    RESTASIS 0.05 % OPHTHALMIC EMULSION    Place 1 drop into both eyes 2 (two) times daily.    RIVAROXABAN (XARELTO) 10 MG TAB    Take 10 mg by mouth daily with dinner or evening meal.    SACUBITRIL-VALSARTAN (ENTRESTO)  MG PER TABLET    TAKE 1 TABLET BY MOUTH TWICE DAILY    SPIRONOLACTONE (ALDACTONE) 25 MG TABLET     Take 1 tablet (25 mg total) by mouth once daily.     Review of patient's allergies indicates:  No Known Allergies    Physical Exam:   Body mass index is 31.79 kg/m².    GENERAL: Well appearing, in no acute distress.  HEAD: Normocephalic and atraumatic.  ENT: External ears and nose grossly normal.  EYES: EOMI bilaterally  PULMONARY: Respirations are grossly even and non-labored.  NEURO: Awake, alert, and oriented x 3.  SKIN: No obvious rashes appreciated.  PSYCH: Mood & affect are appropriate.    Detailed MSK exam:     Right shoulder exam:   -ROM: abduction 90, forward flexion 90, external rotation 80, internal rotation 70  -empty can test positive, resisted ER positive, belly press pain but no weakness  -kang test negative, neers test negative, whipple test positive  -biceps load test negative, yerguson test negative, [place]'s test negative  -sensation intact, pulses 2+  -TTP: lateral cuff insertion and posterior        Imaging:  X-ray Shoulder 2 or More Views Right  Narrative: EXAM: XR SHOULDER COMPLETE 2 OR MORE VIEWS RIGHT    CLINICAL HISTORY: Right shoulder pain.    FINDINGS: Glenohumeral and acromioclavicular alignment is normal. No fracture or other acute osseous abnormality is seen.  Moderate degenerative changes of the AC joint and glenohumeral joint.  No evidence of rotator cuff or bursal calcium deposition.  Impression:   No acute radiographic abnormality of the right shoulder.    Finalized on: 11/14/2024 11:04 AM By:  Stuart Benton MD  BRR# [id number]      2024-11-14 11:06:31.249    [facility]        Relevant imaging results were reviewed and interpreted by me and per my read shows mild to moderate arthritic changes on radiographs.  This was discussed with the patient and / or family today.     Assessment:  Randy Yap is a 75 y.o. male presenting with chronic right shoulder pain.   History, physical and radiographs are consistent with a likely diagnosis of possible RC tear, OA.   Plan: MRI ordered.  Methocarbamol prescribed. Continue conservative management for pain.   Follow up after MRI to go over results and determine next steps in management. All questions answered.      Traumatic tear of right rotator cuff, unspecified tear extent, initial encounter  -     methocarbamoL (ROBAXIN) 500 MG Tab; Take 1 tablet (500 mg total) by mouth 4 (four) times daily as needed (pain).  Dispense: 40 tablet; Refill: 1  -     Cardiac device check - In Clinic & Hospital; Future  -     MRI Shoulder Without Contrast Right; Future; Expected date: 03/14/2025  -     X-Ray Chest PA And Lateral; Future; Expected date: 03/14/2025    Chronic right shoulder pain  -     Ambulatory referral/consult to Orthopedics    Primary osteoarthritis of right shoulder  -     methocarbamoL (ROBAXIN) 500 MG Tab; Take 1 tablet (500 mg total) by mouth 4 (four) times daily as needed (pain).  Dispense: 40 tablet; Refill: 1    Subacromial bursitis of right shoulder joint  -     methocarbamoL (ROBAXIN) 500 MG Tab; Take 1 tablet (500 mg total) by mouth 4 (four) times daily as needed (pain).  Dispense: 40 tablet; Refill: 1    Right shoulder pain, unspecified chronicity  -     MRI Shoulder Without Contrast Right; Future; Expected date: 03/14/2025         Today's visit is associated with current or anticipated ongoing medical care related to this patient's diagnosis of osteoarthritis.  Currently there is no cure of osteoarthritis and the patient will require regular follow up to manage symptoms and progression.  The patient is to return to the office within a minimum of 3-6 months to review symptoms and function at that time.   CPT code      A copy of today's visit note has been sent to the referring provider.     Electronically signed:  Randy Ch MD, MPH  03/14/2025  9:40 AM         [1]   Social History  Socioeconomic History    Marital status:    Tobacco Use    Smoking status: Never    Smokeless tobacco: Never   Substance and Sexual Activity     Alcohol use: Yes     Comment: occasional glass of wine on social occasions    Drug use: No    Sexual activity: Not Currently     Social Drivers of Health     Financial Resource Strain: Low Risk  (2/21/2025)    Overall Financial Resource Strain (CARDIA)     Difficulty of Paying Living Expenses: Not hard at all   Food Insecurity: No Food Insecurity (2/21/2025)    Hunger Vital Sign     Worried About Running Out of Food in the Last Year: Never true     Ran Out of Food in the Last Year: Never true   Transportation Needs: No Transportation Needs (2/21/2025)    PRAPARE - Transportation     Lack of Transportation (Medical): No     Lack of Transportation (Non-Medical): No   Physical Activity: Sufficiently Active (2/21/2025)    Exercise Vital Sign     Days of Exercise per Week: 2 days     Minutes of Exercise per Session: 150+ min   Stress: Stress Concern Present (2/21/2025)    [employer] [place] [employer] - Occupational Stress Questionnaire     Feeling of Stress : Very much   Housing Stability: Low Risk  (2/21/2025)    Housing Stability Vital Sign     Unable to Pay for Housing in the Last Year: No     Homeless in the Last Year: No

## 2025-03-14 NOTE — PATIENT INSTRUCTIONS
Assessment:  Randy Yap is a 75 y.o. male   Chief Complaint   Patient presents with    Right Shoulder - Pain       Encounter Diagnoses   Name Primary?    Chronic right shoulder pain     Traumatic tear of right rotator cuff, unspecified tear extent, initial encounter Yes    Primary osteoarthritis of right shoulder     Subacromial bursitis of right shoulder joint         Plan:  Stat MRI to right shoulder  Prescription for Methocarbomol    Follow-up: after MRI.    Thank you for choosing [facility] and Dr. Randy Ch for your orthopedic & sports medicine care. It is our goal to provide you with exceptional care that will help keep you healthy, active, and get you back in the game.    Please do not hesitate to reach out to us via email, phone, or MyChart with any questions, concerns, or feedback.    If you felt that you received exemplary care today, please consider leaving us feedback on [facility] at:  [email or web address]    If you are experiencing pain/discomfort ,or have questions after 5pm and would like to be connected to the [facility] the [facility] on-call team, please call this number and specify which Sports Medicine provider is treating you: [phone]

## 2025-03-17 ENCOUNTER — PATIENT MESSAGE (OUTPATIENT)
Dept: ORTHOPEDICS | Facility: CLINIC | Age: 75
End: 2025-03-17
Payer: MEDICARE

## 2025-03-17 RX ORDER — AMIODARONE HYDROCHLORIDE 200 MG/1
TABLET ORAL
Qty: 180 TABLET | Refills: 0 | Status: SHIPPED | OUTPATIENT
Start: 2025-03-17

## 2025-03-21 DIAGNOSIS — Z00.01 ENCOUNTER FOR GENERAL ADULT MEDICAL EXAMINATION WITH ABNORMAL FINDINGS: ICD-10-CM

## 2025-03-21 RX ORDER — CARVEDILOL 25 MG/1
25 TABLET ORAL 2 TIMES DAILY
Qty: 180 TABLET | Refills: 2 | Status: SHIPPED | OUTPATIENT
Start: 2025-03-21

## 2025-03-21 RX ORDER — MEXILETINE HYDROCHLORIDE 150 MG/1
CAPSULE ORAL
Qty: 270 CAPSULE | Refills: 0 | Status: SHIPPED | OUTPATIENT
Start: 2025-03-21

## 2025-03-21 NOTE — TELEPHONE ENCOUNTER
Refill Decision Note   Randy Fracisco  is requesting a refill authorization.  Brief Assessment and Rationale for Refill:  Approve     Medication Therapy Plan:        Pharmacist review requested: Yes   Extended chart review required: Yes   Comments:     Note composed:3:32 PM 03/21/2025

## 2025-03-21 NOTE — TELEPHONE ENCOUNTER
No care due was identified.  [facility] Embedded Care Due Messages. Reference number: [id number].   3/21/2025 9:20:39 AM CDT

## 2025-03-21 NOTE — TELEPHONE ENCOUNTER
Refill Routing Note   Medication(s) are not appropriate for processing by [facility] for the following reason(s):        Drug-disease interactioncarvediloL and Mild persistent asthma without complication     [facility] action(s):  Defer             Pharmacist review requested: Yes     Appointments  past 12m or future 3m with PCP    Date Provider   Last Visit   11/6/2024 Olvin Hutson MD   Next Visit   Visit date not found Olvin Hutson MD   ED visits in past 90 days: 0        Note composed:11:40 AM 03/21/2025

## 2025-03-31 ENCOUNTER — EXTERNAL CHRONIC CARE MANAGEMENT (OUTPATIENT)
Dept: PRIMARY CARE CLINIC | Facility: CLINIC | Age: 75
End: 2025-03-31
Payer: MEDICARE

## 2025-03-31 PROCEDURE — 99490 CHRNC CARE MGMT STAFF 1ST 20: CPT | Mod: PBBFAC,PO | Performed by: FAMILY MEDICINE

## 2025-03-31 PROCEDURE — 99490 CHRNC CARE MGMT STAFF 1ST 20: CPT | Mod: S$PBB,,, | Performed by: FAMILY MEDICINE

## 2025-04-01 ENCOUNTER — HOSPITAL ENCOUNTER (OUTPATIENT)
Dept: RADIOLOGY | Facility: HOSPITAL | Age: 75
Discharge: HOME | End: 2025-04-01
Attending: STUDENT IN AN ORGANIZED HEALTH CARE EDUCATION/TRAINING PROGRAM
Payer: MEDICARE

## 2025-04-01 DIAGNOSIS — S46.011A TRAUMATIC TEAR OF RIGHT ROTATOR CUFF, UNSPECIFIED TEAR EXTENT, INITIAL ENCOUNTER: ICD-10-CM

## 2025-04-01 PROCEDURE — 71046 X-RAY EXAM CHEST 2 VIEWS: CPT | Mod: 26,,, | Performed by: RADIOLOGY

## 2025-04-01 PROCEDURE — 71046 X-RAY EXAM CHEST 2 VIEWS: CPT | Mod: TC,PO

## 2025-04-04 ENCOUNTER — HOSPITAL ENCOUNTER (OUTPATIENT)
Dept: RADIOLOGY | Facility: HOSPITAL | Age: 75
Discharge: HOME | End: 2025-04-04
Attending: STUDENT IN AN ORGANIZED HEALTH CARE EDUCATION/TRAINING PROGRAM
Payer: MEDICARE

## 2025-04-04 ENCOUNTER — TELEPHONE (OUTPATIENT)
Dept: ORTHOPEDICS | Facility: CLINIC | Age: 75
End: 2025-04-04
Payer: MEDICARE

## 2025-04-04 VITALS — OXYGEN SATURATION: 94 % | HEART RATE: 80 BPM

## 2025-04-04 DIAGNOSIS — M25.511 RIGHT SHOULDER PAIN, UNSPECIFIED CHRONICITY: ICD-10-CM

## 2025-04-04 DIAGNOSIS — S46.011A TRAUMATIC TEAR OF RIGHT ROTATOR CUFF, UNSPECIFIED TEAR EXTENT, INITIAL ENCOUNTER: Primary | ICD-10-CM

## 2025-04-04 DIAGNOSIS — S46.011A TRAUMATIC TEAR OF RIGHT ROTATOR CUFF, UNSPECIFIED TEAR EXTENT, INITIAL ENCOUNTER: ICD-10-CM

## 2025-04-04 PROCEDURE — 73221 MRI JOINT UPR EXTREM W/O DYE: CPT | Mod: TC,RT

## 2025-04-04 PROCEDURE — 73221 MRI JOINT UPR EXTREM W/O DYE: CPT | Mod: 26,RT,, | Performed by: RADIOLOGY

## 2025-04-04 NOTE — TELEPHONE ENCOUNTER
Called and spoke with patient regarding MRI results.  Based on results, provider would like patient to have surgical consult with shoulder surgeon.  Placed referral to [place] office for scheduling.

## 2025-04-04 NOTE — NURSING
Patient arrived for scheduled MRI, patient identification verified X 2, allergies reviewed, patient assisted to MRI table without difficulty, cardiac device placed in MRI safe mode per [employer] MRI safe cardiac monitor and pulse ox. applied, heart rate 88, O2 sat. 94% on RA, NAD noted at this time, will continue to monitor patient throughout MRI.

## 2025-04-04 NOTE — NURSING
MRI complete at this time, patient tolerated MRI well, heart rate 88, O2 sat. 96% on RA, cardiac device placed in normal operating mode per [employer] patient discharged in NAD.

## 2025-04-07 ENCOUNTER — TELEPHONE (OUTPATIENT)
Dept: CARDIOLOGY | Facility: CLINIC | Age: 75
End: 2025-04-07
Payer: MEDICARE

## 2025-04-07 ENCOUNTER — PATIENT MESSAGE (OUTPATIENT)
Dept: CARDIOLOGY | Facility: CLINIC | Age: 75
End: 2025-04-07
Payer: MEDICARE

## 2025-04-07 NOTE — TELEPHONE ENCOUNTER
Spoke with pt in regards concerns about medication. Pt stated his mexiletine was denied by [employer] [facility] and he either needed to change medication or have the taco fill out a form for authorization. Pt stated he would sent a kingsky message with paperwork.                 ----- Message from Chelsi sent at 4/7/2025 10:22 AM CDT -----  Contact: [phone]  Type:  Needs Medical AdviceWho Called: HARSHA ANTHONY [[id number]]Symptoms (please be specific): need to talk to the nurse about mexiletine (MEXITIL) 150 MG Cap medication has been denied  by [employer] [facility] How long has patient had these symptoms:  Pharmacy name and phone #:  Would the patient rather a call back or a response via [facility]? CALL [facility] Call Back Number:          [phone] Information: [id number]

## 2025-04-07 NOTE — TELEPHONE ENCOUNTER
----- Message from Tienyonyreanna sent at 4/7/2025 10:22 AM CDT -----  Contact: [phone]  Type:  Needs Medical AdviceWho Called: HARSHA ANTHONY [[id number]]Symptoms (please be specific): need to talk to the nurse about mexiletine (MEXITIL) 150 MG Cap medication has been denied  by [facility] How long has patient had these symptoms:  Pharmacy name and phone #:  Would the patient rather a call back or a response via [facility]? CALL [facility] Call Back Number:  [phone] Information: [id number]

## 2025-04-09 ENCOUNTER — PATIENT MESSAGE (OUTPATIENT)
Dept: CARDIOLOGY | Facility: CLINIC | Age: 75
End: 2025-04-09
Payer: MEDICARE

## 2025-04-09 ENCOUNTER — TELEPHONE (OUTPATIENT)
Dept: CARDIOLOGY | Facility: CLINIC | Age: 75
End: 2025-04-09
Payer: MEDICARE

## 2025-04-09 NOTE — TELEPHONE ENCOUNTER
LVM and portal message sent regarding approval of Mexiletine 150mg through [employer]     Case ID [id number]    Approved from 1/1/2025 through 4/9/2026

## 2025-04-10 NOTE — H&P (VIEW-ONLY)
Orthopaedics Sports Medicine     Shoulder Initial Visit         4/9/2025    Referring MD: Self, Aaareferral    No chief complaint on file.    History of Present Illness:   Randy Yap is a 75 y.o. right-hand dominant male who presents with right shoulder pain and dysfunction. The patient presents today on referral from Dr. Ch who initially saw him for this issue on 3/14/25 at which time he reported for shoulder pain. He reported a fall 4 months prior landing on the right shoulder. Pain was localized to the right shoulder area. He experienced limited range of motion in the affected shoulder. He reported pain with very minimal activity, such as pulling up the covers last night. He denied any prior shoulder issues or surgeries. He had attempted to manage pain with Tylenol and heat application. He has been going to Physical Therapy for the last 4 months, he wasn't able to raise arm at all, now since PT he able to lift arm right around chest level. He was experiencing weakness with holding objects. There was concern for rotator cuff injury and he was sent for MRI for further evaluation and was also prescribed Naproxen. MRI ultimately confirmed rotator cuff tear and he was referred to me for further discussion of treatment options. He denies prior issue with the shoulder prior to this fall. He also reports a recent fall on 4/11 falling onto his left side. He was seen at [facility] on 4/11 for this.     Current symptoms include right shoulder pain and limited mobility.      Pain is aggravated by movement and lifting.       Evaluation to date: X-Ray, MRI     Treatment to date: Rest, activity modification, Tylenol, heat, physical therapy for 4 months, Robaxin     Past Medical History:   Past Medical History:   Diagnosis Date    Asthma     Cardiomyopathy due to systemic disease     Colon polyp 5/2013    repeat 5/2018    Depression, recurrent     Diastolic dysfunction     DJD (degenerative joint disease) of knee  10/14/2013    Hyperlipidemia     Hypertension     Murmur     Paroxysmal VT 5/21/2016    Pericarditis with effusion     Sleep apnea        Past Surgical History:   Past Surgical History:   Procedure Laterality Date    CARDIAC DEFIBRILLATOR PLACEMENT  10/06/2014    COLONOSCOPY N/A 4/19/2023    Procedure: COLONOSCOPY 4/4-cc/bt clearance recedived;  Surgeon: Isabella Del Cid MD;  Location: [facility] ENDO;  Service: Endoscopy;  Laterality: N/A;    COLONOSCOPY W/ POLYPECTOMY  05/21/2013    repeat 5/21/2018    COSMETIC SURGERY      EYE SURGERY  Cataract    HERNIA REPAIR      R inguinal    LEFT HEART CATHETERIZATION Left 07/09/2018    Procedure: CATHETERIZATION, HEART, LEFT;  Surgeon: Macey Dos Santos MD;  Location: [facility] CATH LAB;  Service: Cardiology;  Laterality: Left;  left radial approach  Has [employer] ICD    pace maker   10/06/2014    PERICARDIAL WINDOW  12-15 years ago    REPLACEMENT OF IMPLANTABLE CARDIOVERTER-DEFIBRILLATOR (ICD) GENERATOR Left 5/7/2024    Procedure: REPLACEMENT, ICD GENERATOR;  Surgeon: Nirav Baldwin MD;  Location: [facility] CATH LAB;  Service: Cardiology;  Laterality: Left;  [facility]  MRI safe  2/27/23-sinus @ 73, intact conduction 200-210ms  Device and leads implanted 10/6/14  [employer] ICD Fortify [address] DR 2357-40Q, [id number]  A lead: Bennett Tendril STS 2088TC/46cm, [id number]  V lead: Bennett Durata 7120Q/58cm, [id number]    tonsillectomy      TONSILLECTOMY      VARICOCELECTOMY         Medications:  Patient's Medications   New Prescriptions    No medications on file   Previous Medications    ACETAMINOPHEN (TYLENOL) 325 MG TABLET    Take 650 mg by mouth as needed.     ALBUTEROL (PROVENTIL/VENTOLIN HFA) 90 MCG/ACTUATION INHALER    Inhale 1 puff into the lungs once daily. rescue    ALPRAZOLAM (XANAX) 0.25 MG TABLET    Take 1 tablet (0.25 mg total) by mouth 2 (two) times daily as needed for Anxiety or Insomnia. as needed for anxiety.    AMIODARONE (PACERONE) 200 MG TAB    TAKE 1 TABLET BY MOUTH TWICE DAILY FOR 6  WEEKS, THEN TAKE 1 & 1/2 TABLETS BY MOUTH DAILY    BEPOTASTINE BESILATE (BEPREVE) 1.5 % DROP    Bepreve 1.5 % eye drops   Apply by ophthalmic route as needed for 50 days.    CARVEDILOL (COREG) 25 MG TABLET    TAKE 1 TABLET BY MOUTH TWICE DAILY    CO-ENZYME Q-10 30 MG CAPSULE    Take 30 mg by mouth once daily.     FARXIGA 10 MG TABLET    TAKE 1 TABLET BY MOUTH ONCE DAILY    FERROUS SULFATE (FEOSOL) 325 MG (65 MG IRON) TAB TABLET    Take 325 mg by mouth daily with breakfast.    FLUTICASONE FUROATE (ARNUITY ELLIPTA) 100 MCG/ACTUATION INHALER    Inhale 1 puff into the lungs once daily.    FUROSEMIDE (LASIX) 40 MG TABLET    Take 1 tablet (40 mg total) by mouth once daily.    GLUCOSAMINE-CHONDROITIN 500-400 MG TABLET    Take 1 tablet by mouth once daily.     MECLIZINE (ANTIVERT) 25 MG TABLET    Take 1 tablet (25 mg total) by mouth 3 (three) times daily as needed for Dizziness or Nausea.    MEXILETINE (MEXITIL) 150 MG CAP    TAKE 3 CAPSULES BY MOUTH EVERY 8 HOURS    MULTIVITAMIN (THERAGRAN) TABLET    Take 1 tablet by mouth once daily.    ONDANSETRON (ZOFRAN) 4 MG TABLET    Take 4 mg by mouth every 8 (eight) hours as needed for Nausea.    PRAVASTATIN (PRAVACHOL) 80 MG TABLET    TAKE 1 TABLET BY MOUTH ONCE DAILY    RESTASIS 0.05 % OPHTHALMIC EMULSION    Place 1 drop into both eyes 2 (two) times daily.    RIVAROXABAN (XARELTO) 10 MG TAB    Take 10 mg by mouth daily with dinner or evening meal.    SACUBITRIL-VALSARTAN (ENTRESTO)  MG PER TABLET    TAKE 1 TABLET BY MOUTH TWICE DAILY    SPIRONOLACTONE (ALDACTONE) 25 MG TABLET    Take 1 tablet (25 mg total) by mouth once daily.   Modified Medications    No medications on file   Discontinued Medications    No medications on file       Allergies: Review of patient's allergies indicates:  No Known Allergies    Social History:   Home town: [place], [place]  Occupation: Retired   Alcohol use: He reports current alcohol use.  Tobacco use: He reports that he has never smoked. He has  "never used smokeless tobacco.    Review of systems:  History of recent illness, fevers, shakes, or chills: no  History of cardiac problems or chest pain: Yes  History of pulmonary problems or asthma: no  History of diabetes: Yes  History of prior dvt or clotting problems: no  History of sleep apnea: Yes      Physical Examination:  Estimated body mass index is 31.79 kg/m² as calculated from the following:    Height as of 3/14/25: 5' 10" (1.778 m).    Weight as of 3/14/25: 100.5 kg (221 lb 9 oz).    General  Healthy appearing male in no acute distress  Alert and oriented, normal mood, appropriate affect    Shoulder Examination:  Patient is alert and oriented, no distress. Skin is intact. Neuro is normal with no focal motor or sensory findings.    Cervical exam is unremarkable. Intact cervical ROM. Negative Spurling's test    Physical Exam:  RIGHT    LEFT    Scap Dyskinesis/Winging (-)    (-)    Tenderness:          Greater Tuberosity             +    (-)  Bicipital Groove  (-)    (-)  AC joint   (-)    (-)  Other:     ROM:  Forward Elevation 100    160  Abduction  90    120  ER (at side)  15    60      Strength:   Supraspinatus  3/5    5/5  Infraspinatus  3/5    5/5  Subscap / IR  5/5    5/5     Special Tests:   Neer:    +    (-)   Gilbert:   +    (-)   SS Stress:   +    (-)   Bear Hug:   (-)    (-)   [place]'s:   +    (-)   Resisted Thrower's:   +    (-)   Speed's   +    (-)   Cross Arm Abduction:  (-)    (-)    Neurovascular examination  - Motor grossly intact bilaterally to shoulder abduction, elbow flexion and extension, wrist flexion and extension, and intrinsic hand musculature  - Sensation intact to light touch bilaterally in axillary, median, radial, and ulnar distributions  - Symmetrical radial pulses      Imaging:  XR Results:  Results for orders placed during the hospital encounter of 11/14/24    X-ray Shoulder 2 or More Views Right    Narrative  EXAM: XR SHOULDER COMPLETE 2 OR MORE VIEWS RIGHT    CLINICAL " HISTORY: Right shoulder pain.    FINDINGS: Glenohumeral and acromioclavicular alignment is normal. No fracture or other acute osseous abnormality is seen.  Moderate degenerative changes of the AC joint and glenohumeral joint.  No evidence of rotator cuff or bursal calcium deposition.    Impression  No acute radiographic abnormality of the right shoulder.    Finalized on: 11/14/2024 11:04 AM By:  Stuart Benton MD  [facility]# [id number]      2024-11-14 11:06:31.249    [facility]      MRI Results:  Results for orders placed during the hospital encounter of 04/04/25    MRI Shoulder Without Contrast Right    Narrative  EXAM: MRI SHOULDER WITHOUT CONTRAST RIGHT    CLINICAL HISTORY:  Right rotator cuff tear.  Right shoulder pain and limited range of motion.      COMPARISON:  Right shoulder x-ray on 11/14/2024    TECHNIQUE:  Multiplanar, multisequence MR imaging was performed through the right shoulder without intravenous or intra-articular gadolinium contrast.      FINDINGS:    Severe acromioclavicular arthrosis with synovial hypertrophy, subchondral sclerosis and edema, and distal undersurface clavicular osteophyte formation.  Large amount of fluid in the subacromial/subdeltoid bursa. There is a type 1  acromion.    Complete, chronic supraspinatus tendon tear with associated supraspinatus muscle belly atrophy and edema.  Near-complete full-thickness infraspinatus tendon tear.  The retracted tendons are seen at the glenohumeral joint line.  Large of full-thickness tear at the superior to third fibers of the subscapularis tendon.    High-grade partial-thickness intra-articular biceps tendon tear extending to the biceps labral complex.    Degenerative SLAP tear that propagates to the posterior inferior labrum.  The anterior inferior labrum is grossly intact.    Large joint effusion.  Superior migration humeral head with respect to the glenoid secondary to the full-thickness rotator cuff tears.  Full-thickness chondral loss at the medial  margin of the inferior humeral head.  Marginal osteophyte formation.  Biceps tenosynovitis.  Grade 1 anterior deltoid muscle strain.  Intramuscular edema is also demonstrated in the subscapularis and teres minor muscle bellies.    The glenoid fossa is unremarkable.    Impression  Complete, chronic supraspinatus tendon tear.  Near-complete full-thickness infraspinatus tendon tear.  Grade 1 strains in the rotator cuff muscle bellies and anterior deltoid muscle as above.  High-grade partial-thickness intra-articular biceps tendon tear.  Glenohumeral degenerative joint disease.  Large joint effusion.    Finalized on: 4/4/2025 8:52 AM By:  SADIE Jaramillo MD, MD  [facility]# [id number]      2025-04-04 08:54:19.123     [facility]      CT Results:  No results found for this or any previous visit.      Physician Read: I agree with the above impression.      Impression:  75 y.o. male with right shoulder glenohumeral arthritis, chronic rotator cuff tear with atrophy, long head biceps tendon tear         Plan:  Reviewed imaiging and discussed diagnosis and treatment options with patient today. His MRI shows right shoulder rotator cuff tear with tears to the supraspinatus, infraspinatus, and subscapularis. He has retraction of his supraspinatus to the level of the glenoid and also shows evidence of atrophy consistent with chronic rotator cuff tear. MRI also shows evidence of glenohumeral arthritis and biceps tendon tearing as well.   At this time he has tried rest, activity modification, Tylenol, heat, physical therapy for 4 months, Robaxin. Despite these interventions, he continues to experience symptoms on a daily basis that limit his activities of daily living and diminish his quality of life.   We discussed different surgical treatments including rotator cuff repair, tendon transfer, or shoulder replacement. Discussed that due to his age and the size of his rotator cuff tear, level of retraction, and atrophy present, I do not feel that  his rotator cuff could be reliably repaired. I think his most predictable option would be with reverse total shoulder arthroplasty.     I recommend proceeding with right reverse total shoulder arthroplasty.    We reviewed the proposed procedure in detail, which included discussion of risks and benefits, techniques, and possible complications of the procedure. Risks include infection, bleeding, damage to artery and nerves, continual pain and possible stiffness, and blood clots. We reviewed the post-operative restrictions, recovery period, and rehabilitation.  All patient questions were answered. Despite the risks, he elected to proceed with surgery and the consent was freely signed.  At least 10 minutes were spent instructing the patient in home care following surgery including, but not limited to: sling use, sleeping, hygiene, post-operative exercises, preventing post-operative complications, etc.  All questions were answered.  This service was performed under the direction of Eliezer Ortiz MD.  CPT [id number].  Follow up with me 10-14 days after surgery         Eliezer Ortiz MD    I, Artur Alicea, acted as a scribe for Eliezer Ortiz MD for the duration of this office visit.

## 2025-04-10 NOTE — PROGRESS NOTES
Orthopaedics Sports Medicine     Shoulder Initial Visit         4/9/2025    Referring MD: Self, Aaareferral    No chief complaint on file.    History of Present Illness:   Randy Yap is a 75 y.o. right-hand dominant male who presents with right shoulder pain and dysfunction. The patient presents today on referral from Dr. Ch who initially saw him for this issue on 3/14/25 at which time he reported for shoulder pain. He reported a fall 4 months prior landing on the right shoulder. Pain was localized to the right shoulder area. He experienced limited range of motion in the affected shoulder. He reported pain with very minimal activity, such as pulling up the covers last night. He denied any prior shoulder issues or surgeries. He had attempted to manage pain with Tylenol and heat application. He has been going to Physical Therapy for the last 4 months, he wasn't able to raise arm at all, now since PT he able to lift arm right around chest level. He was experiencing weakness with holding objects. There was concern for rotator cuff injury and he was sent for MRI for further evaluation and was also prescribed Naproxen. MRI ultimately confirmed rotator cuff tear and he was referred to me for further discussion of treatment options. He denies prior issue with the shoulder prior to this fall. He also reports a recent fall on 4/11 falling onto his left side. [place] was [place] on [place]11 for this.     Current symptoms include right shoulder pain and limited mobility.      Pain is aggravated by movement and lifting.       Evaluation to date: X-Ray, MRI     Treatment to date: Rest, activity modification, Tylenol, heat, physical therapy for 4 months, Robaxin     Past Medical History:   Past Medical History:   Diagnosis Date    Asthma     Cardiomyopathy due to systemic disease     Colon polyp 5/2013    repeat 5/2018    Depression, recurrent     Diastolic dysfunction     DJD (degenerative joint disease) of knee  10/14/2013    Hyperlipidemia     Hypertension     Murmur     Paroxysmal VT 5/21/2016    Pericarditis with effusion     Sleep apnea        Past Surgical History:   Past Surgical History:   Procedure Laterality Date    CARDIAC DEFIBRILLATOR PLACEMENT  10/06/2014    COLONOSCOPY N/A 4/19/2023    Procedure: COLONOSCOPY 4/4-cc/bt clearance recedived;  Surgeon: Isabella Del Cid MD;  Location: [facility] ENDO;  Service: Endoscopy;  Laterality: N/A;    COLONOSCOPY W/ POLYPECTOMY  05/21/2013    repeat 5/21/2018    COSMETIC SURGERY      EYE SURGERY  Cataract    HERNIA REPAIR      R inguinal    LEFT HEART CATHETERIZATION Left 07/09/2018    Procedure: CATHETERIZATION, HEART, LEFT;  Surgeon: Macey Dos Santos MD;  Location: [facility] CATH LAB;  Service: Cardiology;  Laterality: Left;  left radial approach  Has [employer] ICD    pace maker   10/06/2014    PERICARDIAL WINDOW  12-15 years ago    REPLACEMENT OF IMPLANTABLE CARDIOVERTER-DEFIBRILLATOR (ICD) GENERATOR Left 5/7/2024    Procedure: REPLACEMENT, ICD GENERATOR;  Surgeon: Nirav Baldwin MD;  Location: [facility] CATH LAB;  Service: Cardiology;  Laterality: Left;  [facility]  MRI safe  2/27/23-sinus @ 73, intact conduction 200-210ms  Device and leads implanted 10/6/14  [employer] ICD Fortify [address] DR 2357-40Q, [id number]  A lead: Bennett Tendril STS 2088TC/46cm, [id number]  V lead: Bennett Durata 7120Q/58cm, [id number]    tonsillectomy      TONSILLECTOMY      VARICOCELECTOMY         Medications:  Patient's Medications   New Prescriptions    No medications on file   Previous Medications    ACETAMINOPHEN (TYLENOL) 325 MG TABLET    Take 650 mg by mouth as needed.     ALBUTEROL (PROVENTIL/VENTOLIN HFA) 90 MCG/ACTUATION INHALER    Inhale 1 puff into the lungs once daily. rescue    ALPRAZOLAM (XANAX) 0.25 MG TABLET    Take 1 tablet (0.25 mg total) by mouth 2 (two) times daily as needed for Anxiety or Insomnia. as needed for anxiety.    AMIODARONE (PACERONE) 200 MG TAB    TAKE 1 TABLET BY MOUTH TWICE DAILY FOR 6  WEEKS, THEN TAKE 1 & 1/2 TABLETS BY MOUTH DAILY    BEPOTASTINE BESILATE (BEPREVE) 1.5 % DROP    Bepreve 1.5 % eye drops   Apply by ophthalmic route as needed for 50 days.    CARVEDILOL (COREG) 25 MG TABLET    TAKE 1 TABLET BY MOUTH TWICE DAILY    CO-ENZYME Q-10 30 MG CAPSULE    Take 30 mg by mouth once daily.     FARXIGA 10 MG TABLET    TAKE 1 TABLET BY MOUTH ONCE DAILY    FERROUS SULFATE (FEOSOL) 325 MG (65 MG IRON) TAB TABLET    Take 325 mg by mouth daily with breakfast.    FLUTICASONE FUROATE (ARNUITY ELLIPTA) 100 MCG/ACTUATION INHALER    Inhale 1 puff into the lungs once daily.    FUROSEMIDE (LASIX) 40 MG TABLET    Take 1 tablet (40 mg total) by mouth once daily.    GLUCOSAMINE-CHONDROITIN 500-400 MG TABLET    Take 1 tablet by mouth once daily.     MECLIZINE (ANTIVERT) 25 MG TABLET    Take 1 tablet (25 mg total) by mouth 3 (three) times daily as needed for Dizziness or Nausea.    MEXILETINE (MEXITIL) 150 MG CAP    TAKE 3 CAPSULES BY MOUTH EVERY 8 HOURS    MULTIVITAMIN (THERAGRAN) TABLET    Take 1 tablet by mouth once daily.    ONDANSETRON (ZOFRAN) 4 MG TABLET    Take 4 mg by mouth every 8 (eight) hours as needed for Nausea.    PRAVASTATIN (PRAVACHOL) 80 MG TABLET    TAKE 1 TABLET BY MOUTH ONCE DAILY    RESTASIS 0.05 % OPHTHALMIC EMULSION    Place 1 drop into both eyes 2 (two) times daily.    RIVAROXABAN (XARELTO) 10 MG TAB    Take 10 mg by mouth daily with dinner or evening meal.    SACUBITRIL-VALSARTAN (ENTRESTO)  MG PER TABLET    TAKE 1 TABLET BY MOUTH TWICE DAILY    SPIRONOLACTONE (ALDACTONE) 25 MG TABLET    Take 1 tablet (25 mg total) by mouth once daily.   Modified Medications    No medications on file   Discontinued Medications    No medications on file       Allergies: Review of patient's allergies indicates:  No Known Allergies    Social History:   Home town: [place], [place]  Occupation: Retired   Alcohol use: He reports current alcohol use.  Tobacco use: He reports that he has never smoked. He has  "never used smokeless tobacco.    Review of systems:  History of recent illness, fevers, shakes, or chills: no  History of cardiac problems or chest pain: Yes  History of pulmonary problems or asthma: no  History of diabetes: Yes  History of prior dvt or clotting problems: no  History of sleep apnea: Yes      Physical Examination:  Estimated body mass index is 31.79 kg/m² as calculated from the following:    Height as of 3/14/25: 5' 10" (1.778 m).    Weight as of 3/14/25: 100.5 kg (221 lb 9 oz).    General  Healthy appearing male in no acute distress  Alert and oriented, normal mood, appropriate affect    Shoulder Examination:  Patient is alert and oriented, no distress. Skin is intact. Neuro is normal with no focal motor or sensory findings.    Cervical exam is unremarkable. Intact cervical ROM. Negative Spurling's test    Physical Exam:  RIGHT    LEFT    Scap Dyskinesis/Winging (-)    (-)    Tenderness:          Greater Tuberosity             +    (-)  Bicipital Groove  (-)    (-)  AC joint   (-)    (-)  Other:     ROM:  Forward Elevation 100    160  Abduction  90    120  ER (at side)  15    60      Strength:   Supraspinatus  3/5    5/5  Infraspinatus  3/5    5/5  Subscap / IR  5/5    5/5     Special Tests:   Neer:    +    (-)   Gilbert:   +    (-)   SS Stress:   +    (-)   Bear Hug:   (-)    (-)   [place]'s:   +    (-)   Resisted Thrower's:   +    (-)   Speed's   +    (-)   Cross Arm Abduction:  (-)    (-)    Neurovascular examination  - Motor grossly intact bilaterally to shoulder abduction, elbow flexion and extension, wrist flexion and extension, and intrinsic hand musculature  - Sensation intact to light touch bilaterally in axillary, median, radial, and ulnar distributions  - Symmetrical radial pulses      Imaging:  XR Results:  Results for orders placed during the hospital encounter of 11/14/24    X-ray Shoulder 2 or More Views Right    Narrative  EXAM: XR SHOULDER COMPLETE 2 OR MORE VIEWS RIGHT    CLINICAL " HISTORY: Right shoulder pain.    FINDINGS: Glenohumeral and acromioclavicular alignment is normal. No fracture or other acute osseous abnormality is seen.  Moderate degenerative changes of the AC joint and glenohumeral joint.  No evidence of rotator cuff or bursal calcium deposition.    Impression  No acute radiographic abnormality of the right shoulder.    Finalized on: 11/14/2024 11:04 AM By:  Stuart Benton MD  [facility]# [id number]      2024-11-14 11:06:31.249    [facility]      MRI Results:  Results for orders placed during the hospital encounter of 04/04/25    MRI Shoulder Without Contrast Right    Narrative  EXAM: MRI SHOULDER WITHOUT CONTRAST RIGHT    CLINICAL HISTORY:  Right rotator cuff tear.  Right shoulder pain and limited range of motion.      COMPARISON:  Right shoulder x-ray on 11/14/2024    TECHNIQUE:  Multiplanar, multisequence MR imaging was performed through the right shoulder without intravenous or intra-articular gadolinium contrast.      FINDINGS:    Severe acromioclavicular arthrosis with synovial hypertrophy, subchondral sclerosis and edema, and distal undersurface clavicular osteophyte formation.  Large amount of fluid in the subacromial/subdeltoid bursa. There is a type 1  acromion.    Complete, chronic supraspinatus tendon tear with associated supraspinatus muscle belly atrophy and edema.  Near-complete full-thickness infraspinatus tendon tear.  The retracted tendons are seen at the glenohumeral joint line.  Large of full-thickness tear at the superior to third fibers of the subscapularis tendon.    High-grade partial-thickness intra-articular biceps tendon tear extending to the biceps labral complex.    Degenerative SLAP tear that propagates to the posterior inferior labrum.  The anterior inferior labrum is grossly intact.    Large joint effusion.  Superior migration humeral head with respect to the glenoid secondary to the full-thickness rotator cuff tears.  Full-thickness chondral loss at the medial  margin of the inferior humeral head.  Marginal osteophyte formation.  Biceps tenosynovitis.  Grade 1 anterior deltoid muscle strain.  Intramuscular edema is also demonstrated in the subscapularis and teres minor muscle bellies.    The glenoid fossa is unremarkable.    Impression  Complete, chronic supraspinatus tendon tear.  Near-complete full-thickness infraspinatus tendon tear.  Grade 1 strains in the rotator cuff muscle bellies and anterior deltoid muscle as above.  High-grade partial-thickness intra-articular biceps tendon tear.  Glenohumeral degenerative joint disease.  Large joint effusion.    Finalized on: 4/4/2025 8:52 AM By:  SADIE Jaramillo MD, MD  [facility]# [id number]      2025-04-04 08:54:19.123     [facility]      CT Results:  No results found for this or any previous visit.      Physician Read: I agree with the above impression.      Impression:  75 y.o. male with right shoulder glenohumeral arthritis, chronic rotator cuff tear with atrophy, long head biceps tendon tear         Plan:  Reviewed imaiging and discussed diagnosis and treatment options with patient today. His MRI shows right shoulder rotator cuff tear with tears to the supraspinatus, infraspinatus, and subscapularis. He has retraction of his supraspinatus to the level of the glenoid and also shows evidence of atrophy consistent with chronic rotator cuff tear. MRI also shows evidence of glenohumeral arthritis and biceps tendon tearing as well.   At this time he has tried rest, activity modification, Tylenol, heat, physical therapy for 4 months, Robaxin. Despite these interventions, he continues to experience symptoms on a daily basis that limit his activities of daily living and diminish his quality of life.   We discussed different surgical treatments including rotator cuff repair, tendon transfer, or shoulder replacement. Discussed that due to his age and the size of his rotator cuff tear, level of retraction, and atrophy present, I do not feel that  his rotator cuff could be reliably repaired. I think his most predictable option would be with reverse total shoulder arthroplasty.     I recommend proceeding with right reverse total shoulder arthroplasty.    We reviewed the proposed procedure in detail, which included discussion of risks and benefits, techniques, and possible complications of the procedure. Risks include infection, bleeding, damage to artery and nerves, continual pain and possible stiffness, and blood clots. We reviewed the post-operative restrictions, recovery period, and rehabilitation.  All patient questions were answered. Despite the risks, he elected to proceed with surgery and the consent was freely signed.  At least 10 minutes were spent instructing the patient in home care following surgery including, but not limited to: sling use, sleeping, hygiene, post-operative exercises, preventing post-operative complications, etc.  All questions were answered.  This service was performed under the direction of Eliezer Ortiz MD.  CPT 31479-XI.  Follow up with me 10-14 days after surgery         Eliezer Ortiz MD    I, Artur Alicea, acted as a scribe for Eliezer Ortiz MD for the duration of this office visit.

## 2025-04-15 ENCOUNTER — OFFICE VISIT (OUTPATIENT)
Dept: FAMILY MEDICINE | Facility: CLINIC | Age: 75
End: 2025-04-15
Payer: MEDICARE

## 2025-04-15 VITALS
WEIGHT: 227.19 LBS | BODY MASS INDEX: 32.52 KG/M2 | DIASTOLIC BLOOD PRESSURE: 70 MMHG | SYSTOLIC BLOOD PRESSURE: 98 MMHG | OXYGEN SATURATION: 98 % | HEIGHT: 70 IN | HEART RATE: 75 BPM

## 2025-04-15 DIAGNOSIS — T14.8XXA BRUISING: ICD-10-CM

## 2025-04-15 DIAGNOSIS — T14.8XXA HEMATOMA: ICD-10-CM

## 2025-04-15 DIAGNOSIS — Z09 HOSPITAL DISCHARGE FOLLOW-UP: Primary | ICD-10-CM

## 2025-04-15 DIAGNOSIS — M79.642 LEFT HAND PAIN: ICD-10-CM

## 2025-04-15 DIAGNOSIS — S22.42XD CLOSED FRACTURE OF MULTIPLE RIBS OF LEFT SIDE WITH ROUTINE HEALING: ICD-10-CM

## 2025-04-15 DIAGNOSIS — Z79.899 ENCOUNTER FOR LONG-TERM (CURRENT) USE OF MEDICATIONS: ICD-10-CM

## 2025-04-15 DIAGNOSIS — S22.42XD CLOSED FRACTURE OF MULTIPLE RIBS OF LEFT SIDE WITH ROUTINE HEALING, SUBSEQUENT ENCOUNTER: ICD-10-CM
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
## 2025-04-15 DIAGNOSIS — I65.23 BILATERAL CAROTID ARTERY STENOSIS: ICD-10-CM

## 2025-04-15 DIAGNOSIS — J34.1 CYST OF MAXILLARY SINUS: ICD-10-CM
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
## 2025-04-15 PROCEDURE — 99215 OFFICE O/P EST HI 40 MIN: CPT | Mod: PBBFAC,PO | Performed by: FAMILY MEDICINE

## 2025-04-15 PROCEDURE — 99999 PR PBB SHADOW E&M-EST. PATIENT-LVL V: CPT | Mod: PBBFAC,,, | Performed by: FAMILY MEDICINE

## 2025-04-15 RX ORDER — KETOROLAC TROMETHAMINE 10 MG/1
10 TABLET, FILM COATED ORAL EVERY 6 HOURS PRN
COMMUNITY
Start: 2025-04-11

## 2025-04-15 RX ORDER — OXYCODONE AND ACETAMINOPHEN 5; 325 MG/1; MG/1
1 TABLET ORAL EVERY 4 HOURS PRN
COMMUNITY
Start: 2025-04-11

## 2025-04-15 NOTE — PROGRESS NOTES
PLAN:      Assessment & Plan  1. Post-fall follow-up.  - Bruising and swelling on the left side of the face, expected to resolve over time.  - Blood pressure readings slightly low today.  - CT scan revealed a mucous retention cyst in the left maxillary sinus, incidental finding.  - Transition from Toradol to ibuprofen and Tylenol for pain management, reserving oxycodone for severe pain or nighttime use; report severe cough, fever, or worsening pain; consult cardiologist regarding carotid artery calcifications; x-ray scheduled in 4 weeks.    2. Rib fractures.  - Fractures of the left sixth, seventh, and eighth ribs with trace left effusion.  - Rib fractures are challenging to heal due to constant movement and risk of pneumonia.  - Monitor for respiratory issues and report severe cough, fever, or worsening pain.  - Pain management includes ibuprofen and Tylenol, with oxycodone reserved for severe pain or nighttime use; x-ray scheduled in 4 weeks to monitor healing.    3. Mucous retention cyst in the left maxillary sinus.  - CT scan revealed a mucous retention cyst in the left maxillary sinus.  - Informed that this is an incidental finding and not the cause of current symptoms.  - Monitor for significant sinus issues and consult an ENT specialist if symptoms worsen.  - No immediate intervention required unless symptoms escalate.    4. Bilateral carotid artery calcifications.  - CT scan revealed bilateral carotid artery calcifications.  - Advised to follow up with cardiologist for proper monitoring and management.  - Consider ultrasound to further evaluate calcifications.  - Ensure cardiologist is aware of findings from the CT scan.    Problem List Items Addressed This Visit       Encounter for long-term (current) use of medications (Chronic)    Complete history and physical was completed today.  Complete and thorough medication reconciliation was performed.  Discussed risks and benefits of medications.  Advised patient  on orders and health maintenance.  We discussed old records and old labs if available.  Will request any records not available through epic.  Continue current medications listed on your summary sheet.         Hospital discharge follow-up - Primary    ER follow-up.   Transitional Care Note    Family and/or Caretaker present at visit?  Yes.  Diagnostic tests reviewed/disposition: I have reviewed all completed as well as pending diagnostic tests at the time of discharge.  Disease/illness education:  Fall with rib fracture  Home health/community services discussion/referrals: Patient does not have home health established from hospital visit.  They do not need home health.  If needed, we will set up home health for the patient.   Establishment or re-establishment of referral orders for community resources: No other necessary community resources.   Discussion with other health care providers: No discussion with other health care providers necessary.                Stenosis of carotid artery    Noted again on CT scan of the neck.  Patient will follow-up with [facility].         Fall    Hematoma    Left hand pain    Closed fracture of multiple ribs of left side with routine healing    Cyst of maxillary sinus     Other Visit Diagnoses         Closed fracture of multiple ribs of left side with routine healing, subsequent encounter        Relevant Orders    X-Ray Ribs 2 View Left          Future Appointments       Date Provider Specialty Appt Notes    4/23/2025 Eliezer Ortiz MD Sports Medicine MRI&XRAYDONE- R shoulder pain    5/13/2025  Radiology .    8/27/2025 Virgie Jaramillo FNP-JOHN Cardiology 6 mon f/u    8/27/2025  Cardiology NM 10  ABT/ICD 6 MOS CK           Medication Management for assessment above:   Medication List with Changes/Refills   Current Medications    ACETAMINOPHEN (TYLENOL) 325 MG TABLET    Take 650 mg by mouth as needed.     ALBUTEROL (PROVENTIL/VENTOLIN HFA) 90 MCG/ACTUATION  INHALER    Inhale 1 puff into the lungs once daily. rescue    ALPRAZOLAM (XANAX) 0.25 MG TABLET    Take 1 tablet (0.25 mg total) by mouth 2 (two) times daily as needed for Anxiety or Insomnia. as needed for anxiety.    AMIODARONE (PACERONE) 200 MG TAB    TAKE 1 TABLET BY MOUTH TWICE DAILY FOR 6 WEEKS, THEN TAKE 1 & 1/2 TABLETS BY MOUTH DAILY    BEPOTASTINE BESILATE (BEPREVE) 1.5 % DROP    Bepreve 1.5 % eye drops   Apply by ophthalmic route as needed for 50 days.    CARVEDILOL (COREG) 25 MG TABLET    TAKE 1 TABLET BY MOUTH TWICE DAILY    CO-ENZYME Q-10 30 MG CAPSULE    Take 30 mg by mouth once daily.     FARXIGA 10 MG TABLET    TAKE 1 TABLET BY MOUTH ONCE DAILY    FERROUS SULFATE (FEOSOL) 325 MG (65 MG IRON) TAB TABLET    Take 325 mg by mouth daily with breakfast.    FLUTICASONE FUROATE (ARNUITY ELLIPTA) 100 MCG/ACTUATION INHALER    Inhale 1 puff into the lungs once daily.    FUROSEMIDE (LASIX) 40 MG TABLET    Take 1 tablet (40 mg total) by mouth once daily.    GLUCOSAMINE-CHONDROITIN 500-400 MG TABLET    Take 1 tablet by mouth once daily.     KETOROLAC (TORADOL) 10 MG TABLET    Take 10 mg by mouth every 6 (six) hours as needed.    MECLIZINE (ANTIVERT) 25 MG TABLET    Take 1 tablet (25 mg total) by mouth 3 (three) times daily as needed for Dizziness or Nausea.    MEXILETINE (MEXITIL) 150 MG CAP    TAKE 3 CAPSULES BY MOUTH EVERY 8 HOURS    MULTIVITAMIN (THERAGRAN) TABLET    Take 1 tablet by mouth once daily.    ONDANSETRON (ZOFRAN) 4 MG TABLET    Take 4 mg by mouth every 8 (eight) hours as needed for Nausea.    OXYCODONE-ACETAMINOPHEN (PERCOCET) 5-325 MG PER TABLET    Take 1 tablet by mouth every 4 (four) hours as needed.    PRAVASTATIN (PRAVACHOL) 80 MG TABLET    TAKE 1 TABLET BY MOUTH ONCE DAILY    RESTASIS 0.05 % OPHTHALMIC EMULSION    Place 1 drop into both eyes 2 (two) times daily.    RIVAROXABAN (XARELTO) 10 MG TAB    Take 10 mg by mouth daily with dinner or evening meal.    SACUBITRIL-VALSARTAN (ENTRESTO)   MG PER TABLET    TAKE 1 TABLET BY MOUTH TWICE DAILY    SPIRONOLACTONE (ALDACTONE) 25 MG TABLET    Take 1 tablet (25 mg total) by mouth once daily.       Olvin Hutson M.D.  ==========================================================================  Subjective:   Patient ID: Randy Yap is a 75 y.o. male.  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   Chief Complaint: Follow-up (Fall )      History of Present Illness  The patient is a 75-year-old male who presents for an ER follow-up after a fall.    The fall occurred on Sunday when he was carrying an object, and his feet became entangled, causing him to fall onto a concrete floor. He landed on his forehead, resulting in a brush burn from the concrete. Vision was temporarily affected but has since returned to normal. Swelling in the left hand was noted, although full functionality is retained. No facial injuries were reported, but rib fractures were confirmed. Pain management has been achieved with Toradol, which he reports as effective, and only two doses of oxycodone have been required. An upcoming appointment with an orthopedic specialist next Wednesday is scheduled for shoulder evaluation due to occasional tremors since the fall. Mild discomfort in the lower ribs during deep inhalation is reported.    A history of arthritis in the neck is noted, and he is under the care of a cardiologist, although it has been some time since the last ultrasound examination.    Problem List Items Addressed This Visit       Encounter for long-term (current) use of medications (Chronic)    Overview   April 2025:  Reviewed labs.  Feb 2025:  "reviewed labs.  November 2024:  Reviewed labs.  January 2024: Reviewed labs. August 2023: Reviewed labs.  March 2023:  CHRONIC. Stable. Compliant with medications for managed conditions. See medication list. No SE reported.   Routine lab analysis is being monitored. Refills were addressed.  Lab Results   Component Value Date    WBC 8.73 04/29/2024    HGB 14.9 02/21/2025    HCT 43.9 04/29/2024    MCV 98 04/29/2024     04/29/2024         Chemistry        Component Value Date/Time     02/21/2025 0800    K 4.5 02/21/2025 0800     02/21/2025 0800    CO2 23 02/21/2025 0800    BUN 11 02/21/2025 0800    CREATININE 0.9 02/21/2025 0800    GLU 93 02/21/2025 0800        Component Value Date/Time    CALCIUM 9.4 02/21/2025 0800    ALKPHOS 46 02/21/2025 0800    AST 44 (H) 02/21/2025 0800    ALT 32 02/21/2025 0800    BILITOT 0.5 02/21/2025 0800    ESTGFRAFRICA >60.0 10/01/2021 1353    EGFRNONAA >60.0 10/01/2021 1353          Lab Results   Component Value Date    TSH 2.075 01/26/2024              Current Assessment & Plan   Complete history and physical was completed today.  Complete and thorough medication reconciliation was performed.  Discussed risks and benefits of medications.  Advised patient on orders and health maintenance.  We discussed old records and old labs if available.  Will request any records not available through epic.  Continue current medications listed on your summary sheet.         Hospital discharge follow-up - Primary    Overview   There is no formal discharge summary available through epic at this time.  Reviewed most up-to-date progress note.  [facility] PROGRESS NOTE    Subjective  Subjective  No complaints this morning, wife at bedside. Awaiting MRI of brain.    Objective  Objective:  General Appearance: Comfortable, in no acute distress and not in pain.  Vitals /80  Pulse 72  Temp 97.9 °F (36.6 °C) (Axillary)  Resp 18  Ht 5' 10" (1.778 m)  Wt 218 lb 11.1 oz (99.2 kg)  SpO2 98%  " BMI 31.38 kg/m² Vital signs are normal. No fever.  Output: Producing urine.  Lungs: Normal effort and normal respiratory rate.  Heart: Normal rate. Regular rhythm.  Abdomen: Abdomen is soft. Bowel sounds are normal. There is no abdominal tenderness.  Extremities: Normal range of motion.  Neurological: Patient is alert and oriented to person, place and time.  Skin: Warm and dry.    Intake/Output for last 24 Hrs:  Date 08/14/24 0700 - 08/15/24 0659 08/15/24 0700 - 08/16/24 0659  Shift [id number] 1900-0659 24 Hour Total [id number] 1900-0659 24 Hour Total  INTAKE  P.O. 660 660  Shift Total 660 660  OUTPUT  Urine 700 700 300 300  Urine 700 700 300 300  Shift Total 700 700 300 300  NET -700 -700 360 360    Meds:  Current IV Meds:    Current Scheduled Meds:  amiodarone 50 mg Oral Daily  carvediloL 25 mg Oral BID WBS  enoxaparin 40 mg Subcutaneous Q24H GAUDENCIO  lactulose 20 g Oral Daily  mexiletine 300 mg Oral TID  multivitamin with folic acid 1 tablet Oral Daily  pravastatin 80 mg Oral HS  sodium chloride 0.9 % flush 3 mL Intra-Catheter Q12H GAUDENCIO  spironolactone 25 mg Oral BID  traZODone 300 mg Oral HS    Current Prn Meds:  acetaminophen  alum-mag hydroxide-simethicone  melatonin  ondansetron  sodium chloride 0.9 % flush  sodium chloride 0.9 % flush      Lab Results- Abnormal for last 24 Hrs:  Labs-Abnormals last 24 hr    Order Date Last Updated Procedure  08/14/2024 08/15/2024 0501 CBC with Differential [[id number]]  (Abnormal)  Blood  Component Value Ref Range & Units  WBC 8.4 4.4 - 11.2 10*3/uL  RBC 4.27 4.70 - 6.10 10*6/uL  HGB 14.1 14.0 - 18.0 g/dL  HCT 41.9 42.0 - 52.0 %  MCV 98.1 80.0 - 94.0 fL  MCH 33.0 27.0 - 31.0 pg  MCHC 33.7 33.0 - 37.0 g/dL  RDW 13.2 11.5 - 14.5 %  Platelet Count 330 130 - 375 10*3/uL  MPV 8.3 8.7 - 13.0 fL  Neutrophils Percent 56.0 36.0 - 66.0 %  Lymphocytes Percent 28.3 21.0 - 50.0 %  Monocytes Percent 13.0 2.0 - 10.0 %  Eosinophils Percent 1.0 0.0 - 10.0 %  Basophils Percent 0.7 0.0 - 1.0  %  Immature Granulocyte % 0.6 0.0 - 0.4 %  Neutrophils Absolute 4.7 1.4 - 6.5 10*3/uL  Lymphocytes Absolute 2.4 1.2 - 3.4 10*3/uL  Monocytes Absolute 1.1 0.1 - 1.0 10*3/uL  Eosinophils Absolute 0.1 0.0 - 0.7 10*3/uL  Basophils Absolute 0.1 0.0 - 0.2 10*3/uL  # Immature Granulocyte 0.05 0.00 - 0.03 10*3/uL      2024 08/15/2024 0523 CMP w/ Reflex to Mg [[id number]]  (Abnormal)  Blood  Component Value Ref Range & Units  Glucose 94 65 - 99 mg/dL  Sodium 136 136 - 144 mmol/L  Potassium 4.8 3.6 - 5.1 mmol/L  Chloride 106 101 - 111 mmol/L  CO2 25 22 - 32 mmol/L  BUN 13 8 - 20 mg/dL  Calcium 9.3 8.9 - 10.3 mg/dL  Creatinine 0.92 0.90 - 1.30 mg/dL  Albumin 3.7 3.5 - 4.8 g/dL  Total Bilirubin 0.4 0.4 - 2.0 mg/dL  ALKP 40 28 - 116 U/L  Total Protein 7.0 6.1 - 7.9 g/dL  ALT 25 5 - 56 U/L  AST 21 12 - 40 U/L  Anion Gap 5 7 - 16 mmol/L            Diagnostic Results for last 24 Hrs:  Echo Contrast    Result Date: 8/15/2024  Adult Echocardiogram Name: HARSHA ANTHONY Study Date: 08/15/2024 MRN: [id number] Age: 74 yrs Patient [place] [facility] [place]^[id number] Height: 70 in : 1950 Weight: 218 lb Gender: Male BSA: 2.2 m2 Reason For Study: cva BP: 102/73 mmHg Ordering Physician: JAY HARRISON Performed By: Heidi Perez RDCS Interpretation Summary Definity UEA used to better evaluate LV function. Ejection Fraction = 25-30%. Left ventricular systolic function is moderate to severely reduced. Indeterminate Diastolic Function There is a pacemaker lead in the right ventricle. The right ventricular systolic function is normal. Trace aortic regurgitation. Measurements RVDd: 4.0 cm IVSd: 1.0 cm LVIDd: 6.5 cm LVPWd: 0.96 cm LVIDs: 5.2 cm LVOT diam: 2.4 cm LA dimension: 3.1 cm MMode/2D Measurements & Calculations FS: 19.7 % LVOT area: 4.4 cm2 EDV(Teich): 214.5 ml ESV(Teich): 129.5 ml EF(Teich): 39.6 % LVLd ap4: 10.2 cm EDV(MOD-sp2): 224.0 ml EDV(MOD-sp4): 224.0 ml LVLs ap4: 9.7 cm ESV(MOD-sp4): 175.0 ml EF(MOD-sp4): 21.9 % SV(MOD-sp4):  49.0 ml Ao Sinus Diam_phl: 3.6 cm ______________________________________________________________________________ Aortic R-R: 0.80 sec RVIDd/LVIDd_phl: 0.62 ______________________________________________________________________________ TAPSE_phl: 1.4 cm Time Measurements Aortic HR: 75.0 BPM MV dec time: 0.12 sec Doppler Measurements & Calculations MV E max dina: 34.7 cm/sec Ao V2 max: 107.0 cm/sec MV A max dina: 82.8 cm/sec Ao max P.6 mmHg MV E/A: 0.42 Ao V2 mean: 71.9 cm/sec Ao mean P.0 mmHg Ao V2 VTI: 13.4 cm KWAKU(I,D): 3.9 cm2 KWAKU(V,D): 3.1 cm2 LV V1 max P.4 mmHg CO(LVOT): 3.9 l/min LV V1 mean P.0 mmHg CI(LVOT): 1.8 l/min/m2 LV V1 max: 76.8 cm/sec SV(LVOT): 52.2 ml LV V1 mean: 46.6 cm/sec LV V1 VTI: 11.9 cm ______________________________________________________________________________ AV VR_phl: 0.72 KWAKU(VTI)/BSA_phl: 1.9 Measurements from QLAB ED Current (HM)_phl: 60.0 % ED Default (HM)_phl: 60.0 % EDV (HM)_phl: 273.0 ml EF (HM)_phl: 37.0 % ES Current (HM)_phl: 30.0 % ES Default (HM)_phl: 30.0 % ESV (HM)_phl: 171.0 ml HR (HM)_phl: 76.0 BPM LV Length ED (HM)_phl: 109.0 mm LV Length ES (HM)_phl: 97.0 mm SV (HM)_phl: 102.0 ml LEFT VENTRICLE o The left ventricle is severely dilated. o Definity UEA used to better evaluate LV function. o Ejection Fraction = 25-30%. o Left ventricular systolic function is moderate to severely reduced. o There is normal left ventricular wall thickness. DIASTOLOGY o LAESV index = '23' ml/m2. o Lateral e'= '7' cm/s. o Septal e'= '5' cm/s. o E/e' ='6'. o Indeterminate Diastolic Function. RIGHT VENTRICLE o There is a pacemaker lead in the right ventricle. o The right ventricle is normal size. o The right ventricular systolic function is normal. ATRIA o The left atrial size is normal. o Right atrial size is normal. o IVC diameter < 2.1cm with > 50% collapse. MITRAL VALVE o The mitral valve leaflets appear normal. There is no evidence of stenosis, fluttering, or prolapse. o There  is no mitral regurgitation noted. o There is no mitral valve stenosis. TRICUSPID VALVE o The tricuspid valve is normal. o There is no tricuspid stenosis. AORTIC VALVE o Aortic Valve Grossly Normal. o Trace aortic regurgitation. o There is no aortic valvular stenosis. PULMONIC VALVE o The pulmonic valve is not well visualized. GREAT VESSELS o The aortic root is normal size. PERICARDIUM/PLEURAL EFFUSION o There is no pericardial effusion. ______________________________________________________________________________ Electronically signed by: Moses Molina 08/15/2024 11:13 AM InterpretingPhysician: Moses Molina, electronically signed on 2024-08-15 11:13:45.777      Assessment & Plan  Problem List:  Active Hospital Problems  Diagnosis Date Noted  Stroke-like symptoms 08/14/2024  Hyponatremia 08/14/2024  Essential hypertension, benign 03/12/2014  Hyperlipidemia 03/12/2014  HONEY on CPAP 03/12/2014    Resolved Hospital Problems    Assessment & Plan  Principal Problem:  Stroke-like symptoms  CVA protocol initiated  Neurology consulted  Allow permissive hypertension  Monitor on telemetry  Neurochecks every 4 hours  PT/OT/ST consulted  CT of head with no acute intracranial abnormality  CTA of the head and neck shows no acute arterial abnormality.  MRI of brain still pending, waiting for [employer] rep to clear defribrillator  TSH/A1C: within normal limits  Await further recommendations from Neurology    Active Problems:  HONEY on CPAP  Continue CPAP    Essential hypertension, benign  Continue home oral antihypertensive regimen    Hyperlipidemia  Continue statin    Hyponatremia  Sodium 134, mild    DVT Ppx  Lovenox              Electronically signed by Анна Mancia MD at 08/15/2024 6:16 PM CDT     Shar Martin MD - 08/16/2024  Formatting of this note might be different from the original.  REASON FOR EXAM: Stroke Alert    TECHNICAL FACTORS: Multiplanar, multisequence MRI of the brain was performed without administration  of intravenous contrast.    COMPARISON: March 12, 2014 and CT head August 14, 2024    FINDINGS: The ventricles are normal in size and position.  There is no evidence of acute intracranial hemorrhage or infarct.  There is no evidence of mass, mass effect, or midline shift.  T2 hyperintense signal is present in the periventricular and subcortical white matter. Postoperative changes of bilateral lens replacement are present. Mucosal thickening is present in ethmoid air cells. Mucus retention cyst is present left maxillary sinus. Normal flow voids are present.    IMPRESSION:   1.  No acute intracranial abnormality.     2.  Mild chronic microvascular ischemic disease.        Electronically signed by Shar Martin MD on 8/16/2024 2:21 PM  Exam End: 08/16/24 13:54    Specimen Collected: 08/16/24 14:16             Current Assessment & Plan   ER follow-up.   Transitional Care Note    Family and/or Caretaker present at visit?  Yes.  Diagnostic tests reviewed/disposition: I have reviewed all completed as well as pending diagnostic tests at the time of discharge.  Disease/illness education:  Fall with rib fracture  Home health/community services discussion/referrals: Patient does not have home health established from hospital visit.  They do not need home health.  If needed, we will set up home health for the patient.   Establishment or re-establishment of referral orders for community resources: No other necessary community resources.   Discussion with other health care providers: No discussion with other health care providers necessary.                Stenosis of carotid artery    Overview   REASON FOR EXAM: Stroke Alert     TECHNICAL FACTORS: Multiple contiguous axial CT images were obtained of the head and neck after the administration of intravenous contrast. ASIR was utilized for radiation reduction. 2-D sagittal, coronal and curvilinear reformatted images were   performed. 3-D volume rendering and MIP images were  also obtained with postprocessing performed on an independent workstation under concurrent radiologist supervision.     COMPARISON: None     HEAD FINDINGS: Atherosclerosis of the cavernous and petrous portions internal carotid arteries without significant stenosis. The internal carotid, anterior cerebral, middle cerebral, and anterior communicating arteries are patent without evidence of   focal stenosis. Atherosclerosis of the intracranial vertebral arteries without a 50% stenosis. The basilar and posterior cerebral arteries are patent without evidence of focal stenosis. There is no evidence of aneurysm. Left maxillary sinus retention   cyst.     NECK FINDINGS: Atherosclerosis the proximal internal carotid artery is without significant stenosis. The common carotid are patent without significant stenosis. The vertebral and basilar arteries are patent without significant stenosis. The subclavian   arteries are patent without significant stenosis. Evaluation of the internal carotid arteries for determining clinically significant stenosis was performed by comparing the diameters of the proximal and distal internal carotid arteries.     IMPRESSION:    1.  No acute arterial abnormality.    2.  Atherosclerosis as above.       Electronically signed by Blayne Frias MD on 8/14/2024 12:50 PM          Current Assessment & Plan   Noted again on CT scan of the neck.  Patient will follow-up with [facility].         Fall    Hematoma    Left hand pain    Closed fracture of multiple ribs of left side with routine healing    Cyst of maxillary sinus     Other Visit Diagnoses         Closed fracture of multiple ribs of left side with routine healing, subsequent encounter                 Review of patient's allergies indicates:  No Known Allergies  Current Outpatient Medications   Medication Instructions    acetaminophen (TYLENOL) 650 mg, As needed (PRN)    albuterol (PROVENTIL/VENTOLIN HFA) 90 mcg/actuation inhaler 1  puff, Inhalation, Daily, rescue    ALPRAZolam (XANAX) 0.25 mg, Oral, 2 times daily PRN, as needed for anxiety.    amiodarone (PACERONE) 200 MG Tab TAKE 1 TABLET BY MOUTH TWICE DAILY FOR 6 WEEKS, THEN TAKE 1 & 1/2 TABLETS BY MOUTH DAILY    bepotastine besilate (BEPREVE) 1.5 % Drop Bepreve 1.5 % eye drops   Apply by ophthalmic route as needed for 50 days.    carvediloL (COREG) 25 mg, Oral, 2 times daily    co-enzyme Q-10 30 mg, Daily    FARXIGA 10 mg tablet TAKE 1 TABLET BY MOUTH ONCE DAILY    ferrous sulfate (FEOSOL) 325 mg, With breakfast    fluticasone furoate (ARNUITY ELLIPTA) 100 mcg/actuation inhaler 1 puff, Inhalation, Daily    furosemide (LASIX) 40 mg, Oral, Daily    glucosamine-chondroitin 500-400 mg tablet 1 tablet, Daily    ketorolac (TORADOL) 10 mg, Every 6 hours PRN    meclizine (ANTIVERT) 25 mg, Oral, 3 times daily PRN    mexiletine (MEXITIL) 150 MG Cap TAKE 3 CAPSULES BY MOUTH EVERY 8 HOURS    multivitamin (THERAGRAN) tablet 1 tablet, Daily    ondansetron (ZOFRAN) 4 mg, Every 8 hours PRN    oxyCODONE-acetaminophen (PERCOCET) 5-325 mg per tablet 1 tablet, Every 4 hours PRN    pravastatin (PRAVACHOL) 80 mg, Oral    RESTASIS 0.05 % ophthalmic emulsion 1 drop, 2 times daily    rivaroxaban (XARELTO) 10 mg, With dinner    sacubitriL-valsartan (ENTRESTO)  mg per tablet 1 tablet, Oral, 2 times daily    spironolactone (ALDACTONE) 25 mg, Oral, Daily      I have reviewed the PMH, social history, FamilyHx, surgical history, allergies and medications documented / confirmed by the patient at the time of this visit.  Review of Systems   Constitutional:  Positive for activity change. Negative for unexpected weight change.   HENT:  Negative for hearing loss, rhinorrhea and trouble swallowing.    Eyes:  Negative for discharge and visual disturbance.   Respiratory:  Negative for chest tightness and wheezing.    Cardiovascular:  Negative for chest pain and palpitations.   Gastrointestinal:  Positive for constipation.  "Negative for blood in stool, diarrhea and vomiting.   Endocrine: Negative for polydipsia and polyuria.   Genitourinary:  Negative for difficulty urinating, hematuria and urgency.   Musculoskeletal:  Negative for arthralgias, joint swelling and neck pain.   Neurological:  Negative for weakness and headaches.   Psychiatric/Behavioral:  Negative for confusion and dysphoric mood.      Objective:   BP 98/70   Pulse 75   Ht 5' 10" (1.778 m)   Wt 103.1 kg (227 lb 3.2 oz)   SpO2 98%   BMI 32.60 kg/m²   Physical Exam  Vitals and nursing note reviewed.   Constitutional:       General: He is not in acute distress.     Appearance: He is well-developed. He is not diaphoretic.   HENT:      Head: Normocephalic and atraumatic.      Right Ear: External ear normal.      Left Ear: External ear normal.      Nose: Nose normal. No rhinorrhea.   Eyes:      Extraocular Movements: Extraocular movements intact.      Pupils: Pupils are equal, round, and reactive to light.   Neck:      Vascular: No carotid bruit.   Cardiovascular:      Rate and Rhythm: Normal rate.      Pulses: Normal pulses.   Pulmonary:      Effort: Pulmonary effort is normal. No respiratory distress.      Breath sounds: Normal breath sounds.   Abdominal:      General: Bowel sounds are normal.      Palpations: Abdomen is soft.   Musculoskeletal:         General: Deformity and signs of injury present. Normal range of motion.      Cervical back: Normal range of motion and neck supple.   Lymphadenopathy:      Cervical: No cervical adenopathy.   Skin:     General: Skin is warm and dry.      Capillary Refill: Capillary refill takes less than 2 seconds.      Findings: Bruising present. No rash.   Neurological:      General: No focal deficit present.      Mental Status: He is alert and oriented to person, place, and time.      Cranial Nerves: No cranial nerve deficit.      Motor: No weakness.      Gait: Gait normal.   Psychiatric:         Attention and Perception: He is " attentive.         Mood and Affect: Mood normal. Mood is not anxious or depressed. Affect is not labile, blunt, angry or inappropriate.         Speech: He is communicative. Speech is not rapid and pressured, delayed, slurred or tangential.         Behavior: Behavior normal. Behavior is not agitated, slowed, aggressive, withdrawn, hyperactive or combative.         Thought Content: Thought content normal. Thought content is not paranoid or delusional. Thought content does not include homicidal or suicidal ideation. Thought content does not include homicidal or suicidal plan.         Cognition and Memory: Memory is not impaired.         Judgment: Judgment normal. Judgment is not impulsive or inappropriate.                 Physical Exam  Respiratory: Clear to auscultation, no wheezing, rales or rhonchi  Cardiovascular: Regular rate and rhythm, no murmurs, rubs, or gallops  Extremities: Swelling in the left hand    Results  Imaging   - CT scan of the chest: Fracture of the left sixth, seventh, eighth rib, trace left effusion.   - CT scan of the neck: No cervical spine findings, no fracture, some arthritis in the neck, bilateral carotid artery calcifications.   - CT scan of the face: No acute facial fractures, left periorbital swelling, mucous retention cyst in the left maxillary sinus.   - CT scan of the head: No evidence of acute intracranial process, no hemorrhage, mass effect or midline shift, calvarium intact.    Assessment:     1. Hospital discharge follow-up    2. Encounter for long-term (current) use of medications    3. Fall, subsequent encounter    4. Bruising    5. Left hand pain    6. Closed fracture of multiple ribs of left side with routine healing, subsequent encounter    7. Bilateral carotid artery stenosis    8. Closed fracture of multiple ribs of left side with routine healing    9. Cyst of maxillary sinus    10. Hematoma      MDM:   Moderate medical complexity.  Moderate risk.  Total time: 32 minutes.   This includes total time spent on the encounter, which includes face to face time and non-face to face time preparing to see the patient (eg, review of previous medical records, tests), Obtaining and/or reviewing separately obtained history, documenting clinical information in the electronic or other health record, independently interpreting results (not separately reported)/communicating results to the patient/family/caregiver, and/or care coordination (not separately reported).    I have Reviewed and summarized old records.  I have performed thorough medication reconciliation today and discussed risk and benefits of medications.  I have reviewed labs and discussed with patient.  All questions were answered.  I am requesting old records and will review them once they are available.  Visit today included increased complexity associated with the care of the episodic problem see above assessment addressed and managing the longitudinal care of the patient due to the serious and/or complex managed problem(s) see above.    I have signed for the following orders AND/OR meds.  Orders Placed This Encounter   Procedures    X-Ray Ribs 2 View Left     Standing Status:   Future     Expected Date:   4/15/2025     Expiration Date:   4/15/2026     May the Radiologist modify the order per protocol to meet the clinical needs of the patient?:   Yes     Release to patient:   Immediate           Follow up in about 6 months (around 10/15/2025), or if symptoms worsen or fail to improve.  Future Appointments       Date Provider Specialty Appt Notes    4/23/2025 Eliezer Ortiz MD Sports Medicine MRI&XRAYDONE- R shoulder pain    5/13/2025  Radiology .    8/27/2025 Virgie Jaramillo FNP-JOHN Cardiology 6 mon f/u    8/27/2025  Cardiology NM 10  ABT/ICD 6 MOS CK          If no improvement in symptoms or symptoms worsen, advised to call/follow-up at clinic or go to ER. Patient voiced understanding and all questions/concerns were  addressed.   DISCLAIMER: This note was compiled by using a speech recognition dictation system and therefore please be aware that typographical / speech recognition errors can and do occur.  Please contact me if you see any errors specifically.  Consent was obtained for [facility] recording system prior to the visit.    This note was generated with the assistance of ambient listening technology. Verbal consent was obtained by the patient and accompanying visitor(s) for the recording of patient appointment to facilitate this note. I attest to having reviewed and edited the generated note for accuracy, though some syntax or spelling errors may persist. Please contact the author of this note for any clarification.    Olvin Hutson M.D.       Office: [phone]   [id number] [place] [address]  FAX: [phone]

## 2025-04-15 NOTE — ASSESSMENT & PLAN NOTE
ER follow-up.   Transitional Care Note    Family and/or Caretaker present at visit?  Yes.  Diagnostic tests reviewed/disposition: I have reviewed all completed as well as pending diagnostic tests at the time of discharge.  Disease/illness education:  Fall with rib fracture  Home health/community services discussion/referrals: Patient does not have home health established from hospital visit.  They do not need home health.  If needed, we will set up home health for the patient.   Establishment or re-establishment of referral orders for community resources: No other necessary community resources.   Discussion with other health care providers: No discussion with other health care providers necessary.

## 2025-04-15 NOTE — PATIENT INSTRUCTIONS
Baron Padilla,     If you are due for any health screening(s) below please notify me so we can arrange them to be ordered and scheduled. Most healthy patients at your age complete them, but you are free to accept or refuse.     If you can't do it, I'll definitely understand. If you can, I'd certainly appreciate it!    All of your core healthy metrics are met.

## 2025-04-21 RX ORDER — MEXILETINE HYDROCHLORIDE 150 MG/1
CAPSULE ORAL
Qty: 270 CAPSULE | Refills: 0 | Status: SHIPPED | OUTPATIENT
Start: 2025-04-21

## 2025-04-23 ENCOUNTER — OFFICE VISIT (OUTPATIENT)
Dept: SPORTS MEDICINE | Facility: CLINIC | Age: 75
End: 2025-04-23
Payer: MEDICARE

## 2025-04-23 VITALS — RESPIRATION RATE: 17 BRPM | WEIGHT: 227.31 LBS | HEIGHT: 70 IN | BODY MASS INDEX: 32.54 KG/M2

## 2025-04-23 DIAGNOSIS — M75.121 COMPLETE TEAR OF RIGHT ROTATOR CUFF, UNSPECIFIED WHETHER TRAUMATIC: ICD-10-CM

## 2025-04-23 DIAGNOSIS — M19.011 GLENOHUMERAL ARTHRITIS, RIGHT: ICD-10-CM

## 2025-04-23 DIAGNOSIS — M67.921 TENDINOPATHY OF RIGHT BICEPS TENDON: ICD-10-CM

## 2025-04-23 DIAGNOSIS — Z01.818 PREOPERATIVE CLEARANCE: ICD-10-CM

## 2025-04-23 DIAGNOSIS — M12.811 ROTATOR CUFF ARTHROPATHY OF RIGHT SHOULDER: Primary | ICD-10-CM

## 2025-04-23 PROCEDURE — 99999 PR PBB SHADOW E&M-EST. PATIENT-LVL V: CPT | Mod: PBBFAC,,, | Performed by: STUDENT IN AN ORGANIZED HEALTH CARE EDUCATION/TRAINING PROGRAM

## 2025-04-23 PROCEDURE — 99215 OFFICE O/P EST HI 40 MIN: CPT | Mod: PBBFAC | Performed by: STUDENT IN AN ORGANIZED HEALTH CARE EDUCATION/TRAINING PROGRAM

## 2025-04-23 PROCEDURE — 97110 THERAPEUTIC EXERCISES: CPT | Mod: PBBFAC | Performed by: STUDENT IN AN ORGANIZED HEALTH CARE EDUCATION/TRAINING PROGRAM

## 2025-04-23 NOTE — PATIENT INSTRUCTIONS
In preparation for you upcoming surgery, here are some things to keep in mind leading up to and after your surgery:    PRE-ADMIT APPOINTMENT  We have a department that will review your chart for any health conditions or other issues to make sure that it is safe from an anesthesia standpoint to undergo surgery.   If they have any concerns they may schedule an appointment for you to be evaluated and have any further testing done. This may include but is not limited to bloodwork, EKG, chest X-ray, referral to cardiologist for additional testing/clearance, referral to pulmonologist for additional testing/clearance, or referral to any other needed specialties for additional testing/clearance.  If only basic testing is needed this appointment may be scheduled a few days before your actually surgery. Unless any new concerns or issues arise, this typically does not affect the date of your surgery.   If you are on any medications, at this appointment they will also review and discuss/provide instructions on when to stop or start taking these medications before and after surgery.   INSTRUCTIONS FOR SURGERY   The day before your surgery (usually between 1-3 PM), someone will call you to give you the scheduled time for you surgery, what time you need to arrive, what time to not eat/drink past, and any other final instructions  If your surgery is scheduled for Monday then they will call you on Friday afternoon.   If you do not receive this call please reach out to our office before the end of the day (4 PM) so that we may assist you.   HOME EXERCISES AFTER SURGERY        PHYSICAL THERAPY  A referral for physical therapy will be placed to the location we discussed today. If you would like to make changes to this, please give us a call or send us a [employer] message as soon as possible so we may coordinate these changes.   We will send that referral to the desired location and also provide them with the rehabilitation protocol that  will be followed to make sure you are progressed appropriately after surgery.   They will also be provided with the start date of your PT after surgery. You will likely start PT prior to you first post-op appointment. Depending on the procedure you have performed this may be as soon as 3 days after surgery or up to 2 weeks after surgery. Below are a few examples of some common time frames for certain procedures:  Rotator cuff surgery- 10-11 days after surgery  Shoulder labrum surgery- 3-5 days after surgery   Shoulder replacement- 3-5 days after surgery  ACL Reconstruction- 3-5 days after surgery  Hip scope- 3-5 days after surgery  Distal biceps tendon repair- once post-op splint is removed/per Dr. Ortiz recommendation  Fracture- once post-op splint is removed/per Dr. Ortiz recommendation   If you ever have any problems or issues with your physical therapy or wish to change locations at any point, please let us know and we are happy to assist with that change.   POST-OP APPOINTMENTS  Post-op appointments will be scheduled at 2 weeks, 6 weeks, and 3 months from the date of your surgery. We will schedule these appointments prior to your surgery and they will be able to be viewed in [employer].  2 week post-op appointment  This appointment will be with Araceli Dominguez who is Dr. Ortiz's physician assistant (PA). At this appointment we will be removing your sutures, checking for any signs of infection or other concerning issues, and checking to make sure your range of motion is appropriate.   Dr. Ortiz will also be in clinic on the same day as this appointment and can step in to see you if there is anything of concern that needs to be addressed.   You may also have X-rays scheduled at this appointment, depending on the procedure you had performed (shoulder replacement, ACL surgery, surgery for a fracture, etc.)  6 week post-op appointment  This appointment will be with Dr. Ortiz. He will make sure  everything is progressing well and may also review your pictures from surgery if any were taken.   You may also have X-rays at this appointment, depending on the procedure you had performed (shoulder replacement, surgery for a fracture, etc.)  3 month post-op appointment  This appointment will be with Dr. Ortiz. He will make sure you continue to progress appropriately.  Any follow-ups after this visit will be at the discretion of Dr. Ortiz based upon your recovery/progress, procedure performed, etc.   FMLA OR SHORT TERM DISABILITY PAPERWORK  If you have any paperwork that needs to be filled out in regards to FMLA leave or short term disability leave, you may drop these forms off at the [facility] or attachment them to a patient message via [employer].   These forms will be filled out within a few days of your actual surgery being performed in case your surgery date is rescheduled or changed and the forms would need to potentially be filled out again.   Please try to provide these forms to our office in a timely manner, as we ask for 5-7 business days for them to be completed.       REVERSE TOTAL SHOULDER REPLACMENT    This information does not include all information related to shoulder instablity. For more information please visit the [employer] website using the following link: Reverse Total Shoulder Replacement    Every year, thousands of conventional total shoulder replacements are successfully done in the [place] for patients with shoulder arthritis.    However, this type of procedure is not as beneficial for patients with large rotator cuff tears who have developed a complex type of shoulder arthritis called cuff tear arthropathy. For these patients, conventional total shoulder replacement may result in pain and limited motion, and reverse total shoulder replacement is a better option.    Description  A conventional shoulder replacement device mimics the normal anatomy of the shoulder:  A plastic cup is fitted into the shoulder socket (glenoid), and a metal ball is attached to the top of the upper arm bone (humerus).    In a reverse total shoulder replacement, the socket and metal ball are switched. The metal ball is fixed to the socket, and the plastic cup is fixed to the upper end of the humerus. A reverse total shoulder replacement works better for people with cuff tear arthropathy because it relies on different muscles to move the arm.    In a healthy shoulder, the rotator cuff muscles help position and power the arm during range of motion. A conventional replacement device also uses the rotator cuff muscles to function properly.In a patient with a large rotator cuff tear and cuff tear arthropathy, these muscles no longer function. The reverse total shoulder replacement relies on the deltoid muscle, instead of the rotator cuff, to power and position the arm. It essentially re-creates the function of the torn rotator cuff.    This surgery was originally designed in the 1980s in [place]. The Food and Drug Administration (FDA) approved its use in the [place] in 2003.        Candidates for Surgery  Reverse total shoulder replacement may be recommended if you have:  A completely torn rotator cuff that cannot be repaired  Cuff tear arthropathy  A previous shoulder replacement that was unsuccessful  Severe shoulder pain and difficulty lifting your arm away from your side or over your head  A complex fracture of the shoulder joint  A chronic shoulder dislocation  A tumor of the shoulder joint    Your doctor may also recommend surgery if nonsurgical treatments, such as rest, medications, cortisone injections, and/or physical therapy, have not relieved your shoulder pain    Preparing for Surgery  Your orthopaedic surgeon will help you plan and prepare for your shoulder surgery.    Medical Evaluation  Most patients must have a complete physical by their primary care doctor or internist before surgery. This is  needed to make sure you are healthy enough to have the surgery and complete the recovery.    Many patients with chronic medical conditions, like heart disease, must also be evaluated by a specialist, such a cardiologist, before the surgery.    Medications  Be sure to talk to your orthopaedic surgeon about the medications you take. Some medications may need to be stopped before surgery. For example, the following over-the-counter medicines may cause excessive bleeding and should be stopped 2 weeks before surgery:    Non-steroidal anti-inflammatory medications, such as aspirin, ibuprofen, and naproxen  Some arthritis medications    If you take blood thinners, either your primary care doctor or cardiologist will advise you about stopping these medications before surgery. Additionally, certain types of rheumatoid arthritis medication may need to be stopped for a period of time.     Home Planning  Making simple changes in your home before surgery can make your recovery period easier.For the first several weeks after your surgery, it will be hard to reach high shelves and cupboards. Before your surgery, be sure to go through your home and place any items you may need afterwards on low shelves. When you come home from the hospital, you will need help for a few weeks with some daily tasks like dressing, bathing, cooking, and laundry. If you will not have any support at home immediately after surgery, you may need a short stay in a rehabilitation facility until you become more independent.    Learn more: Preparing for Joint Replacement Surgery    Your Surgery    Before Your Operation  Wear loose-fitting clothes and a button-front shirt when you go to the hospital for your surgery. After surgery, you will be wearing a sling and will have limited use of your arm.    Nerve Block  Will receive a nerve block for your surgery to help control your pain after surgery. This cab cause your arm (down to your hand) to feel numb for up  to 3 days after surgery. However, it may wear off sooner.      Surgical Procedure  The procedure to replace your shoulder joint with an artificial device usually takes about 2-2.5 hours. After surgery, you will be moved to the recovery room, where you will remain  while your recovery from anesthesia is monitored. Barring any complications you will go home the day of your surgery.   A video of what reverse total shoulder replacement looks like can be found at the following link: Reverse Total Shoulder Replacement Video        After Your Surgery    Immobilization  When you leave the hospital, your arm will be in a sling. You will need the sling to support and protect your shoulder for the first 2 to 6 weeks after surgery, depending on the complexity of your surgery and your surgeon's preference.    Dressing and Wound Care  Keep the dressing clean and dry. It is normal for there to be some drainage after surgery since the shoulder was irrigated with large amounts of fluid. Reinforce with additional gauze as necessary.  Remove the dressing the 7th day after surgery and begin changing daily with clean gauze or Band-Aids®. Keep your incisions covered until you follow up in clinic.  If you have Steri-Strips in place of stitches, allow them to stay in place as long as possible. Steri-Strips are made of a fabric material that can get wet in the shower and pat dry with a towel. They usually fall off on their own within 7 to 10 days. You may trim the edges as they begin to curl.    You may bathe or shower on the 7th day after surgery, but do not scrub or soak the incisions. Dry the area by gently blotting it with a gauze or towel. After it is completely dry, cover the wound with clean gauze or Band-Aids®. Do NOT submerge the incisions (bath/swim) until after the sutures are removed and the wound has completely healed.     Post-Op Medications    Pain Control  After surgery, you will feel pain. However, it is important to stay  ahead of pain as it becomes challenging to get under control if you fall behind. Ice and elevation can help and should be used as much as possible in the first few days.   Narcotic pain medications, such as tramadol and oxycodone, should be taken as prescribed. The Tramadol is intended to be taken first as the primary medicine and then oxycodone taken for breakthrough pain. Wean off as soon as possible. Take these with food to decrease the chances of nausea and vomiting. Do not drink alcohol, drive a vehicle, or use heavy machinery while taking narcotic pain medications.   NSAID medications are used for pain control and to decrease inflammation. You may be prescribed an NSAID such as celebrex. Take as instructed. Other NSAID medications such as ibuprofen, Motrin, Advil, naproxen, or Aleve can be used once you have finished the celebrex, or if a prescription for celebrex was not provided.   Acetaminophen (Tylenol) is an effective over-the-counter pain medication that can be used with NSAID medications and non-acetaminophen containing narcotics such as plain oxycodone.     Blood Clot Prevention  You should take one 81 mg baby aspirin twice daily for two weeks starting the evening of the day you have surgery unless instructed otherwise or taking a different blood thinner such as enoxaparin or warfarin. If you are aware that you are at high risk for a blood clot, notify your physician as soon as possible.   Take aspirin at least 30 minutes before taking ibuprofen or Toradol.    Constipation Prevention  Anesthesia and pain medications, changes in eating and drinking, and less activity can all lead to constipation after surgery. To prevent or reduce constipation, take an over-the-counter stool softener (brands include Colace and Miralax). Follow the directions on the bottle. Drink plenty of water and eat high fiber foods including whole grains, fresh fruits, vegetables, beans, prunes or prune juice.    Physical  Therapy  Exercise is a critical component of home care, particularly during the first few weeks after surgery and this will include physical therapy, which will play a vital role in getting you back to your daily activities by helping you regain shoulder strength and motion. Physical therapy will start 3-4 days after your surgery (it can start before your first post-op visit with your doctor). It will progress as follows  Passive exercise: Even though your tear has been repaired, the muscles around your arm remain weak. Once your surgeon decides it is safe for you to move your arm and shoulder, a therapist will help you with passive exercises to improve range of motion in your shoulder. With passive exercise, your therapist supports your arm and moves it in different positions. In most cases, passive exercise is begun within the first 4 to 6 weeks after surgery.  Active exercise: After 6 weeks, you will progress to doing active exercises without the help of your therapist. Moving your muscles on your own will gradually increase your strength and improve your arm control. At 8 to 12 weeks, your therapist will start you on a strengthening exercise program.    Driving  Your physician will provide recommendations on when it is safe to drive after surgery. At minimum you must NOT be taking any narcotic/opoid medications and feel you are capable of driving. It is NOT recommended that you drive while wearing a sling.     Expectations  Your surgeon will address what appropriate expectations following the surgery will be. You can expect to have functional range of motion which includes the ability to reach the back of your head to be able to wash or scratch it and to be able to pull up your pants. You WILL NOT be able to reach and scratch or wash your back following shoulder replacement surgery.     Reverse total shoulder replacement provides outstanding pain relief and patient satisfaction is typically very high. Early and  mid-term studies of the results of this surgery have been very promising.    Complications  Your orthopaedic surgeon will explain the potential risks and complications of shoulder joint replacement, including those related to the surgery itself and those that can occur over time after your surgery.When complications occur, most are successfully treatable. Possible complications include the following.    Infection: Infection is a complication of any surgery. In shoulder joint replacement, infection may occur in the wound or deep around the prosthesis. It may happen while in the hospital or after you go home. It may even occur years later. Minor infections in the wound area are generally treated with antibiotics. Major or deep infections may require more surgery and removal of the prosthesis. Any infection in your body can spread to your joint replacement.  Prosthesis Problems: Although prosthesis designs and materials, as well as surgical techniques, continue to advance, the prosthesis may wear down and the components may loosen. The components of a shoulder replacement may also dislocate. Excessive wear, loosening, or dislocation may require additional surgery (revision procedure).  Nerve damage: Nerves in the vicinity of the joint replacement may be damaged during surgery, although this type of injury is infrequent. Over time, these nerve injuries often improve and may completely recover.    Do's and Don'ts  The success of your surgery will depend largely on how well you follow your orthopaedic surgeon's instructions at home during the first few weeks after surgery. Here are some common do's and don'ts for when you return home:    DON'T use the arm to push yourself up in bed or from a chair because this requires forceful contraction of muscles.  Do follow the program of exercises prescribed for you by your surgeon and/or physical therapist.. You may need to do the exercises 2 to 3 times a day for a month or  more.  DON'T overdo it! If your shoulder pain was severe before the surgery, the experience of pain-free motion may lull you into thinking that you can do more than is prescribed. Early overuse of the shoulder may result in severe limitations in motion.  DON'T lift anything heavier than a glass of water for the first 2 to 4 weeks after surgery.  Do ask for assistance. Your physician may be able to recommend an agency or facility to help if you do not have home support.  DON'T participate in contact sports or do any repetitive heavy lifting after your shoulder replacement.  Do avoid placing your arm in any extreme position, such as straight out to the side or behind your body for the first 6 weeks after surgery.    Many thousands of patients have experienced an improved quality of life after shoulder joint replacement surgery. They experience less pain, improved motion and strength, and better function.    Links      Reverse Total Shoulder Replacement  Preparing for Joint Replacement Surgery  Reverse Total Shoulder Replacement Video  [employer] Clinical Practice Guideline on the Management of Glenohumeral Joint Arthritis

## 2025-04-28 DIAGNOSIS — Z00.00 ENCOUNTER FOR MEDICARE ANNUAL WELLNESS EXAM: ICD-10-CM

## 2025-04-30 ENCOUNTER — EXTERNAL CHRONIC CARE MANAGEMENT (OUTPATIENT)
Dept: PRIMARY CARE CLINIC | Facility: CLINIC | Age: 75
End: 2025-04-30
Payer: MEDICARE

## 2025-04-30 PROCEDURE — 99439 CHRNC CARE MGMT STAF EA ADDL: CPT | Mod: PBBFAC,PO | Performed by: FAMILY MEDICINE

## 2025-04-30 PROCEDURE — 99490 CHRNC CARE MGMT STAFF 1ST 20: CPT | Mod: PBBFAC,PO | Performed by: FAMILY MEDICINE

## 2025-05-07 ENCOUNTER — HOSPITAL ENCOUNTER (OUTPATIENT)
Dept: CARDIOLOGY | Facility: HOSPITAL | Age: 75
Discharge: HOME | End: 2025-05-07
Attending: STUDENT IN AN ORGANIZED HEALTH CARE EDUCATION/TRAINING PROGRAM
Payer: MEDICARE

## 2025-05-07 ENCOUNTER — PATIENT MESSAGE (OUTPATIENT)
Dept: CARDIOLOGY | Facility: HOSPITAL | Age: 75
End: 2025-05-07
Payer: MEDICARE

## 2025-05-07 ENCOUNTER — OFFICE VISIT (OUTPATIENT)
Dept: INTERNAL MEDICINE | Facility: CLINIC | Age: 75
End: 2025-05-07
Payer: MEDICARE

## 2025-05-07 VITALS
TEMPERATURE: 98 F | DIASTOLIC BLOOD PRESSURE: 63 MMHG | HEART RATE: 76 BPM | OXYGEN SATURATION: 94 % | SYSTOLIC BLOOD PRESSURE: 98 MMHG | RESPIRATION RATE: 18 BRPM

## 2025-05-07 DIAGNOSIS — I50.42 CHRONIC COMBINED SYSTOLIC AND DIASTOLIC CONGESTIVE HEART FAILURE: ICD-10-CM

## 2025-05-07 DIAGNOSIS — J45.30 MILD PERSISTENT ASTHMA WITHOUT COMPLICATION: ICD-10-CM

## 2025-05-07 DIAGNOSIS — I47.20 PAROXYSMAL VT: Chronic | ICD-10-CM

## 2025-05-07 DIAGNOSIS — M19.011 GLENOHUMERAL ARTHRITIS, RIGHT: Primary | ICD-10-CM

## 2025-05-07 DIAGNOSIS — S22.42XD CLOSED FRACTURE OF MULTIPLE RIBS OF LEFT SIDE WITH ROUTINE HEALING: ICD-10-CM

## 2025-05-07 DIAGNOSIS — I15.2 HYPERTENSION ASSOCIATED WITH DIABETES: Chronic | ICD-10-CM

## 2025-05-07 DIAGNOSIS — E11.65 TYPE 2 DIABETES MELLITUS WITH HYPERGLYCEMIA, WITHOUT LONG-TERM CURRENT USE OF INSULIN: Chronic | ICD-10-CM

## 2025-05-07 DIAGNOSIS — I42.9 IDIOPATHIC CARDIOMYOPATHY: Chronic | ICD-10-CM

## 2025-05-07 DIAGNOSIS — G47.33 OSA ON CPAP: ICD-10-CM

## 2025-05-07 DIAGNOSIS — Z87.898 HISTORY OF VERTIGO: Chronic | ICD-10-CM

## 2025-05-07 DIAGNOSIS — Z01.818 PREOPERATIVE CLEARANCE: ICD-10-CM

## 2025-05-07 DIAGNOSIS — E11.59 HYPERTENSION ASSOCIATED WITH DIABETES: Chronic | ICD-10-CM

## 2025-05-07 DIAGNOSIS — E78.5 HYPERLIPIDEMIA LDL GOAL <100: Chronic | ICD-10-CM

## 2025-05-07 DIAGNOSIS — I48.0 PAF (PAROXYSMAL ATRIAL FIBRILLATION): Chronic | ICD-10-CM

## 2025-05-07 PROBLEM — I50.30 CHF WITH LEFT VENTRICULAR DIASTOLIC DYSFUNCTION, NYHA CLASS 2: Chronic | Status: RESOLVED | Noted: 2021-02-09 | Resolved: 2025-05-07

## 2025-05-07 PROBLEM — T14.8XXA HEMATOMA: Status: RESOLVED | Noted: 2025-04-15 | Resolved: 2025-05-07

## 2025-05-07 PROCEDURE — 99999 PR PBB SHADOW E&M-EST. PATIENT-LVL IV: CPT | Mod: PBBFAC,,, | Performed by: NURSE PRACTITIONER

## 2025-05-07 PROCEDURE — 99214 OFFICE O/P EST MOD 30 MIN: CPT | Mod: PBBFAC,25 | Performed by: NURSE PRACTITIONER

## 2025-05-07 PROCEDURE — 93010 ELECTROCARDIOGRAM REPORT: CPT | Mod: ,,, | Performed by: INTERNAL MEDICINE

## 2025-05-07 PROCEDURE — 93005 ELECTROCARDIOGRAM TRACING: CPT

## 2025-05-07 NOTE — PRE ADMISSION SCREENING
Pre op instructions reviewed with patient during Clinic Visit with Provider.    To confirm, Surgery is scheduled on Thurs, 5/22/25. We will call you late afternoon the business day prior to surgery with your arrival time.    *Please report to the [facility] (1st Floor) located off of [facility] (2nd Entrance/Building on the left, in front of the flag pole). Address: [address] [place] [address] [place] [id number]       INSTRUCTIONS IMPORTANT!!!  DO NOT Eat, Drink, or Smoke after 12 midnight unless instructed otherwise by your Surgeon. OK to brush teeth, no gum, candy or mints!    Do not eat after 12 midnight, Do not smoke or use chewing tobacco after 12 midnight!  OK to brush teeth, but no gum, candy, or mints!       Patients should stop full meals at midnight, but they can consume clear liquids up to 3 hours prior to scheduled arrival time.  Clear liquids include Gatorade, water, soda, black coffee, jello or tea (no milk or creamer), and clear juices.  Clear liquids do NOT include anything with pulp or food particles (Chicken broth, ice cream, yogurt etc.)  !!!!!! NOTHING RED OR PURPLE !!!!      MORNING OF SURGERY, drink small sip of water with the following medications instructed by Pre-Admit Provider:  Amiodarone, Carvedilol, Mexiletine, Pravastatin, Entresto    *Additional Medication Instructions:  Last dose of Ozempic was 15th    *Blood Thinners: Stop taking Xarelto 48 hours before surgery-last dose 5/19/25.    Diabetic Patients: If you take diabetic or weight loss medication, Do NOT take morning of surgery unless instructed by Doctor. Metformin to be stopped 24 hrs prior to surgery.     DO NOT take long-acting insulin the evening before surgery. Blood sugars will be checked in pre-op by Nurse.    If you take Ozempic/ Mounjaro / Wegovy / Trulicity / Semaglutide, any weight loss injections OR PHENTERMINE --->>> PLEASE LET US KNOW IMMEDIATELY, as these medications need to be stopped 7 days prior to  surgery!    *Patients should HOLD all vitamins, herbal supplements, weight loss medication, aspirin products & NSAIDS 7 days prior to surgery, as these can thin the blood. Ok to take Tylenol.    ____  Avoid Alcoholic beverages 3 days prior to surgery, as it can thin the blood.  ____  NO Acrylic/fake nails or nail polish worn day of surgery (specifically hand/arm & foot surgeries).  ____  NO powder, lotions, deodorants, oils or cream on body.  ____  Remove all jewelry, piercings, & foreign objects prior to arrival and leave at home.  ____  Remove Dentures, Hearing Aids & Contact Lens prior to surgery.  ____  Bring photo ID and insurance information to hospital (Leave Valuables at Home).  ____  If going home the same day, arrange for a ride home. You will not be able to drive for 24 hrs if Anesthesia was used.   ____  Females (ages 11-60): may need to give a urine sample the morning of surgery; please see Pre op Nurse prior to using the restroom.  ____  Males: Stop ED medications (Viagra, Cialis) 24 hrs prior to surgery.  ____  Wear clean, loose fitting clothing to allow for dressings/ bandages.    Bathing Instructions:    -Shower with anti-bacterial Soap (ex: Hibiclens or Dial) the night before surgery and the morning of.   -Do not use Hibiclens on your face or genitals.   -Apply clean clothes after shower.  -Do not shave your face morning of surgery   -Do not shave your body 3 days prior to surgery unless instructed otherwise by your Surgeon.    [facility]/Ride Policy:  Only 2 adults allowed in pre op/recovery area during your procedure. You MUST HAVE A RIDE HOME from a responsible adult that you know and trust. Medical Transport, Uber or Lyft can ONLY be used if patient has a responsible adult to accompany them during ride home.       *Signs and symptoms of Infection Before or After Surgery:               !!!If you experience any fever, chills, nausea/ vomiting, foul odor/ excessive drainage from surgical  site, flu-like symptoms, new wounds or cuts, PLEASE CALL THE SURGEON OFFICE at [phone] or SEND MESSAGE THROUGH [facility] PORTAL!!!     *If you are running late day of surgery, please call the Surgery Dept @ [phone].    *Billing question, please call  [phone]     Thank you,  -[facility] Pre Admit Dept.  [phone]   or [phone]  M-F 7:30 am-4:00 pm (Closed Major Holidays)

## 2025-05-07 NOTE — ASSESSMENT & PLAN NOTE
The patient is scheduled for a right reverse total shoulder arthroplasty on 5/22/25 by Dr. Ortiz    Known risk factors for perioperative complications:     pVT  PAF on Xarelto  HFrEF of 25-30%  Idiopathic cardiomyopathy s/p DC ICD  HTN  DM  HONEY on CPAP     Difficulty with intubation is not anticipated.    Cardiac Risk Estimation: Based on the Revised Cardiac Risk index, patient is a Class 1 risk with a 6% risk of a major cardiac event in a low risk procedure.    Notified nursing staff that pt will require Preop/E visit from Cardiology prior to surgery       1.) Preoperative workup as follows: ECG, hemoglobin, hematocrit, electrolytes, creatinine, glucose.  2.) Change in medication regimen before surgery:     --Morning of Procedure medications: Entresto, Coreg, Amiodarone, Mexiletine, Pravastatin   --Hold all other medications morning of surgery   --Resume all medications post-operatively  --Hold Xarelto 48 hours prior to surgery- Last dose 5/19/25  --Hold ASA, NSAIDs and all vitamins/supplements 7 days prior to procedure  --Hold oral diabetes medications morning of surgery   --Hold metformin 24 hours prior to surgery   --Hold Ozempic 7 days prior to surgery -Last dose 5/15/25  --Hold Adderall 48 hours prior to surgery     3.) Prophylaxis for cardiac events with perioperative beta-blockers: cont Coreg.  4.) Invasive hemodynamic monitoring perioperatively: should be considered.  5.) Deep vein thrombosis prophylaxis postoperatively: regimen to be chosen by surgical team.  6.) Surveillance for postoperative MI with ECG immediately postoperatively and on postoperati ve days 1 and 2 AND troponin levels 24 hours postoperatively and on day 4 or hospital discharge (whichever comes first): not indicated.  7.) Current medications which may produce withdrawal symptoms if withheld perioperatively: none  8.) Other measures: Preoperative alcohol abstention x 30 days.  Turn off AICD immediately before surgery (NOTE: MAY REQUIRE  SCHEDULE COORDINATION WITH EPS LAB).  Careful attention to perioperative glycemic control (accu check pre/post op).  Postoperative hypertension management with IV hydralazine until able to take oral medications.  Postoperative incentive spirometry to prevent pneumonia.

## 2025-05-07 NOTE — ASSESSMENT & PLAN NOTE
Pt had a mechanical fall 4/11/25 and was seen in ED with left forehead hematoma and multiple left sided rib fractures  Pt reports symptoms of rib fractures have mostly resolved. Pain improved, No SOB/cough.

## 2025-05-07 NOTE — PRE-PROCEDURE INSTRUCTIONS
Pre op instructions reviewed with patient during Clinic Visit with Provider.    To confirm, Surgery is scheduled on Thurs, 5/22/25. We will call you late afternoon the business day prior to surgery with your arrival time.    *Please report to the [facility] (1st Floor) located off of [facility] (2nd Entrance/Building on the left, in front of the flag pole). Address: [address] [place] [address] [place] [id number]       INSTRUCTIONS IMPORTANT!!!  DO NOT Eat, Drink, or Smoke after 12 midnight unless instructed otherwise by your Surgeon. OK to brush teeth, no gum, candy or mints!    Do not eat after 12 midnight, Do not smoke or use chewing tobacco after 12 midnight!  OK to brush teeth, but no gum, candy, or mints!       Patients should stop full meals at midnight, but they can consume clear liquids up to 3 hours prior to scheduled arrival time.  Clear liquids include Gatorade, water, soda, black coffee, jello or tea (no milk or creamer), and clear juices.  Clear liquids do NOT include anything with pulp or food particles (Chicken broth, ice cream, yogurt etc.)  !!!!!! NOTHING RED OR PURPLE !!!!      MORNING OF SURGERY, drink small sip of water with the following medications instructed by Pre-Admit Provider:  Amiodarone, Carvedilol, Mexiletine, Pravastatin, Entresto    *Additional Medication Instructions:  Last dose of Ozempic was 15th    *Blood Thinners: Stop taking Xarelto 48 hours before surgery-last dose 5/19/25.    Diabetic Patients: If you take diabetic or weight loss medication, Do NOT take morning of surgery unless instructed by Doctor. Metformin to be stopped 24 hrs prior to surgery.     DO NOT take long-acting insulin the evening before surgery. Blood sugars will be checked in pre-op by Nurse.    If you take Ozempic/ Mounjaro / Wegovy / Trulicity / Semaglutide, any weight loss injections OR PHENTERMINE --->>> PLEASE LET US KNOW IMMEDIATELY, as these medications need to be stopped 7 days prior to  surgery!    *Patients should HOLD all vitamins, herbal supplements, weight loss medication, aspirin products & NSAIDS 7 days prior to surgery, as these can thin the blood. Ok to take Tylenol.    ____  Avoid Alcoholic beverages 3 days prior to surgery, as it can thin the blood.  ____  NO Acrylic/fake nails or nail polish worn day of surgery (specifically hand/arm & foot surgeries).  ____  NO powder, lotions, deodorants, oils or cream on body.  ____  Remove all jewelry, piercings, & foreign objects prior to arrival and leave at home.  ____  Remove Dentures, Hearing Aids & Contact Lens prior to surgery.  ____  Bring photo ID and insurance information to hospital (Leave Valuables at Home).  ____  If going home the same day, arrange for a ride home. You will not be able to drive for 24 hrs if Anesthesia was used.   ____  Females (ages 11-60): may need to give a urine sample the morning of surgery; please see Pre op Nurse prior to using the restroom.  ____  Males: Stop ED medications (Viagra, Cialis) 24 hrs prior to surgery.  ____  Wear clean, loose fitting clothing to allow for dressings/ bandages.    Bathing Instructions:    -Shower with anti-bacterial Soap (ex: Hibiclens or Dial) the night before surgery and the morning of.   -Do not use Hibiclens on your face or genitals.   -Apply clean clothes after shower.  -Do not shave your face morning of surgery   -Do not shave your body 3 days prior to surgery unless instructed otherwise by your Surgeon.    [facility]/Ride Policy:  Only 2 adults allowed in pre op/recovery area during your procedure. You MUST HAVE A RIDE HOME from a responsible adult that you know and trust. Medical Transport, Uber or Lyft can ONLY be used if patient has a responsible adult to accompany them during ride home.       *Signs and symptoms of Infection Before or After Surgery:               !!!If you experience any fever, chills, nausea/ vomiting, foul odor/ excessive drainage from surgical  site, flu-like symptoms, new wounds or cuts, PLEASE CALL THE SURGEON OFFICE at [phone] or SEND MESSAGE THROUGH [employer] PORTAL!!!     *If you are running late day of surgery, please call the Surgery Dept @ [phone].    *Billing question, please call  [phone]     Thank you,  -[facility] Pre Admit Dept.  [phone]   or [phone]  M-F 7:30 am-4:00 pm (Closed Major Holidays)

## 2025-05-07 NOTE — ASSESSMENT & PLAN NOTE
Patient has Combined Systolic and Diastolic heart failure that is Chronic. On presentation their CHF was well compensated.   Echo Interpretation Summary  · The left ventricle is moderately enlarged with eccentric hypertrophy and low normal systolic function.  · The estimated ejection fraction is 50-55%  · Normal left ventricular diastolic function.  · Normal right ventricular size.  · The aortic valve appears structurally normal. There is normal leaflet mobility.  · Mild mitral regurgitation.  · Mild tricuspid regurgitation.  · Normal central venous pressure (3 mmHg).  · The estimated PA systolic pressure is 18 mmHg.  · Considerable improvement in systolic function compared to study of 1-    Current Heart Failure Medications   Coreg, Entresto, Lasix, Spironolactone, Farxiga     Plan  - Monitor strict I&Os and daily weights.    - Place on telemetry  - Low sodium diet  - Place on fluid restriction of 1.5 L.   - The patient's volume status is at their baseline  - Notified nursing staff that pt will require Cardiology preop/e-consult visit prior to surgery  Hold Lasix and Spironolactone

## 2025-05-07 NOTE — ASSESSMENT & PLAN NOTE
Stable   Followed by EPS/Cardiology regularly   Continue Amiodarone, Mexiletine, Coreg, Xarelto   Hold Xarelto 48 hours prior to surgery

## 2025-05-07 NOTE — PROGRESS NOTES
Preoperative History and Physical                                                              Hospital Medicine                                                                      Chief Complaint: Preoperative evaluation     History of Present Illness:      Randy Yap is a 75 y.o. male who presents to the office today for a preoperative consultation at the request of Dr. Ortiz who plans on performing Right reverse total shoulder arthroplasty on 5/22/25    Functional Status:      The patient is able to climb a flight of stairs. The patient is able to ambulate several blocks without difficulty. The patient's functional status is affected by the surgical problem. The patient's functional status is not affected by shortness of breath, chest pain, dyspnea on exertion and fatigue.    MET score greater than 4    Past Medical History:      Past Medical History:   Diagnosis Date    Asthma     Cardiomyopathy due to systemic disease     Colon polyp 05/2013    repeat 5/2018    Depression, recurrent     Diastolic dysfunction     DJD (degenerative joint disease) of knee 10/14/2013    Facial trauma     Nose broken several times    Ganglion cyst     Hyperlipidemia     Hypertension     Murmur     Obesity     Paroxysmal VT 05/21/2016    Pericarditis with effusion     Seasonal allergies     Sleep apnea         Past Surgical History:      Past Surgical History:   Procedure Laterality Date    CARDIAC DEFIBRILLATOR PLACEMENT  10/06/2014    COLONOSCOPY N/A 4/19/2023    Procedure: COLONOSCOPY 4/4-cc/bt clearance recedived;  Surgeon: Isabella Del Cid MD;  Location: [facility];  Service: Endoscopy;  Laterality: N/A;    COLONOSCOPY W/ POLYPECTOMY  05/21/2013    repeat 5/21/2018    COSMETIC SURGERY      EYE SURGERY  Cataract    HERNIA REPAIR      R inguinal    LEFT HEART CATHETERIZATION Left 07/09/2018    Procedure: CATHETERIZATION, HEART, LEFT;  Surgeon: Macey Dos Santos MD;  Location:  [facility] CATH LAB;  Service: Cardiology;  Laterality: Left;  left radial approach  Has St gissel ICD    pace maker   10/06/2014    PERICARDIAL WINDOW  12-15 years ago    REPLACEMENT OF IMPLANTABLE CARDIOVERTER-DEFIBRILLATOR (ICD) GENERATOR Left 5/7/2024    Procedure: REPLACEMENT, ICD GENERATOR;  Surgeon: Nirav Baldwin MD;  Location: [facility] CATH LAB;  Service: Cardiology;  Laterality: Left;  SJM  MRI safe  2/27/23-sinus @ 73, intact conduction 200-210ms  Device and leads implanted 10/6/14  [employer] ICD Fortify Assura DR 2357-40Q, [id number]  A lead: Bennett Tendril STS 2088TC/46cm, [id number]  V lead: Bennett Durata 7120Q/58cm, [id number]    tonsillectomy      TONSILLECTOMY      VARICOCELECTOMY          Social History:      Social History     Socioeconomic History    Marital status:    Tobacco Use    Smoking status: Never    Smokeless tobacco: Never   Substance and Sexual Activity    Alcohol use: Yes     Comment: occasional glass of wine on social occasions    Drug use: No    Sexual activity: Not Currently     Social Drivers of Health     Financial Resource Strain: Low Risk  (2/21/2025)    Overall Financial Resource Strain (CARDIA)     Difficulty of Paying Living Expenses: Not hard at all   Food Insecurity: No Food Insecurity (2/21/2025)    Hunger Vital Sign     Worried About Running Out of Food in the Last Year: Never true     Ran Out of Food in the Last Year: Never true   Transportation Needs: No Transportation Needs (2/21/2025)    PRAPARE - Transportation     Lack of Transportation (Medical): No     Lack of Transportation (Non-Medical): No   Physical Activity: Sufficiently Active (2/21/2025)    Exercise Vital Sign     Days of Exercise per Week: 2 days     Minutes of Exercise per Session: 150+ min   Stress: Stress Concern Present (2/21/2025)    [employer] - Occupational Stress Questionnaire     Feeling of Stress : Very much   Housing Stability: Low Risk  (2/21/2025)    Housing Stability  Vital Sign     Unable to Pay for Housing in the Last Year: No     Homeless in the Last Year: No        Family History:      Family History   Problem Relation Name Age of Onset    Hyperlipidemia Mother Arline E [place]     Arrhythmia Mother Arline E [place]     Arthritis Mother Arline E [place]     Asthma Mother Arline E [place]     COPD Mother Arline E [place]     Heart disease Father John Fracisco 48    Heart attack Father John Fracisco 48    Hypertension Father John Fracisco     Arthritis Father John Fracisco     Diabetes Father John Fracisco     Hypertension Maternal Grandmother Ree Bartlett     Hypertension Maternal Grandfather Madhav Bartlett     Heart disease Paternal Grandmother Rozina VIZCARRA Fracisco     Hypertension Paternal Grandmother Rozina VIZCARRA Fracisco     Heart attack Paternal Grandmother Rozina VIZCARRA Fracisco     Stroke Paternal Grandmother Rozina VIZCARRA Fracisco     Heart disease Paternal Grandfather Jeremy Fracisco     Hypertension Paternal Grandfather Jeremy Fracisco     Heart attack Paternal Grandfather Jeremy Yap     Cancer Paternal Grandfather Jeremy Yap        Allergies:      Review of patient's allergies indicates:  No Known Allergies    Medications:      Current Outpatient Medications   Medication Sig    acetaminophen (TYLENOL) 325 MG tablet Take 650 mg by mouth as needed.     albuterol (PROVENTIL/VENTOLIN HFA) 90 mcg/actuation inhaler Inhale 1 puff into the lungs once daily. rescue    ALPRAZolam (XANAX) 0.25 MG tablet Take 1 tablet (0.25 mg total) by mouth 2 (two) times daily as needed for Anxiety or Insomnia. as needed for anxiety.    amiodarone (PACERONE) 200 MG Tab TAKE 1 TABLET BY MOUTH TWICE DAILY FOR 6 WEEKS, THEN TAKE 1 & 1/2 TABLETS BY MOUTH DAILY    bepotastine besilate (BEPREVE) 1.5 % Drop Bepreve 1.5 % eye drops   Apply by ophthalmic route as needed for 50 days.    carvediloL (COREG) 25 MG tablet TAKE 1 TABLET BY MOUTH TWICE DAILY    co-enzyme Q-10 30 mg capsule Take 30 mg by  mouth once daily.     FARXIGA 10 mg tablet TAKE 1 TABLET BY MOUTH ONCE DAILY    ferrous sulfate (FEOSOL) 325 mg (65 mg iron) Tab tablet Take 325 mg by mouth daily with breakfast.    fluticasone furoate (ARNUITY ELLIPTA) 100 mcg/actuation inhaler Inhale 1 puff into the lungs once daily.    furosemide (LASIX) 40 MG tablet Take 1 tablet (40 mg total) by mouth once daily.    glucosamine-chondroitin 500-400 mg tablet Take 1 tablet by mouth once daily.     meclizine (ANTIVERT) 25 mg tablet Take 1 tablet (25 mg total) by mouth 3 (three) times daily as needed for Dizziness or Nausea.    mexiletine (MEXITIL) 150 MG Cap TAKE 3 CAPSULES BY MOUTH EVERY 8 HOURS    multivitamin (THERAGRAN) tablet Take 1 tablet by mouth once daily.    ondansetron (ZOFRAN) 4 MG tablet Take 4 mg by mouth every 8 (eight) hours as needed for Nausea.    pravastatin (PRAVACHOL) 80 MG tablet TAKE 1 TABLET BY MOUTH ONCE DAILY    RESTASIS 0.05 % ophthalmic emulsion Place 1 drop into both eyes 2 (two) times daily.    rivaroxaban (XARELTO) 10 mg Tab Take 10 mg by mouth daily with dinner or evening meal.    sacubitriL-valsartan (ENTRESTO)  mg per tablet TAKE 1 TABLET BY MOUTH TWICE DAILY    spironolactone (ALDACTONE) 25 MG tablet Take 1 tablet (25 mg total) by mouth once daily.    semaglutide (OZEMPIC SUBQ) Inject into the skin every 7 days.     No current facility-administered medications for this visit.       Vitals:      Vitals:    05/07/25 1104   BP: 98/63   Pulse: 76   Resp: 18   Temp: 97.9 °F (36.6 °C)       Review of Systems:        Constitutional: Negative for fever, chills, weight loss, malaise/fatigue and diaphoresis.   HENT: Negative for hearing loss, ear pain, nosebleeds, congestion, sore throat, neck pain, tinnitus and ear discharge.  Small hematoma left lower forehead   Eyes: Negative for blurred vision, double vision, photophobia, pain, discharge and redness.   Respiratory: Negative for cough, hemoptysis, sputum production, shortness of  breath, wheezing and stridor.    Cardiovascular: Negative for chest pain, palpitations, orthopnea, claudication, leg swelling and PND.   Gastrointestinal: Negative for heartburn, nausea, vomiting, abdominal pain, diarrhea, constipation, blood in stool and melena.   Genitourinary: Negative for dysuria, urgency, frequency, hematuria and flank pain.   Musculoskeletal: +right shoulder pain Negative for myalgias, back pain, joint pain and falls.   Skin: Negative for itching and rash.   Neurological: Negative for dizziness, tingling, tremors, sensory change, speech change, focal weakness, seizures, loss of consciousness, weakness and headaches.   Endo/Heme/Allergies: Negative for environmental allergies and polydipsia. Does not bruise/bleed easily.   Psychiatric/Behavioral: Negative for depression, suicidal ideas, hallucinations, memory loss and substance abuse. The patient is not nervous/anxious and does not have insomnia.    All 14 systems reviewed and negative except as noted above.    Physical Exam:      Constitutional: Appears well-developed, well-nourished and in no acute distress.  Patient is oriented to person, place, and time.   Head: Normocephalic and atraumatic. Mucous membranes moist. Small hematoma left lower forehead   Neck: Neck supple no mass.   Cardiovascular: Normal rate and regular rhythm.  S1 S2 appreciated by ascultation.  Pulmonary/Chest: Effort normal and clear to auscultation bilaterally. No respiratory distress.   Abdomen: Soft. Non-tender and non-distended. Bowel sounds are normal.   Neurological: Patient is alert and oriented to person, place and time. Moves all extremities.  Skin: Warm and dry. No lesions.  Extremities: No clubbing, cyanosis or edema. Limited ROM right shoulder     Laboratory data:      Reviewed and noted in plan where applicable. Please see chart for full laboratory data.    @EYUCJFXNW38(cpk,cpkmb,troponini,mb)@ @OPQAMXEGS47(poctglucose)@     Lab Results   Component Value Date     INR 1.1 2024    INR 1.0 2018       Lab Results   Component Value Date    WBC 8.73 2024    HGB 14.9 2025    HCT 43.9 2024    MCV 98 2024     2024       @ZYOAVUZTU40(GLU,NA,K,Cl,CO2,BUN,Creatinine,Calcium,MG)@    Predictors of intubation difficulty:       Morbid obesity? no   Anatomically abnormal facies? no   Prominent incisors? no   Receding mandible? no   Short, thick neck? no   Neck range of motion: normal   Dentition: No chipped, loose, or missing teeth.  Based on the Modified Mallampati, patient is a mallampati score: I (soft palate, uvula, fauces, and tonsillar pillars visible)    Cardiographics:      EC25  Atrial-paced rhythm with prolonged AV conduction   Left axis deviation   Nonspecific intraventricular block   Possible Inferior infarct ,age undetermined   Cannot rule out Anterior infarct ,age undetermined   Abnormal ECG   No previous ECGs available     Echocardiogram 8/15/24  Definity UEA used to better evaluate LV function.   Ejection Fraction = 25-30%.   Left ventricular systolic function is moderate to severely reduced.   Indeterminate Diastolic Function   There is a pacemaker lead in the right ventricle.   The right ventricular systolic function is normal.   Trace aortic regurgitation.     Imaging:      CT chest 25 (after fall)  Left chest wall pacemaker.  Heart is enlarged without pericardial effusion.  Moderately calcified coronary arteries. No adenopathy.  Bilateral gynecomastia.  Lungs are clear.  Trace left effusion.  No pneumothorax.  Dependent atelectasis.  Nondisplaced fracture of the left sixth, seventh and eighth rib.     Assessment and Plan:      Glenohumeral arthritis, right  The patient is scheduled for a right reverse total shoulder arthroplasty on 25 by Dr. Ortiz    Known risk factors for perioperative complications:     pVT  PAF on Xarelto  HFrEF of 25-30%  Idiopathic cardiomyopathy s/p DC ICD  HTN  DM  HONEY on  CPAP     Difficulty with intubation is not anticipated.    Cardiac Risk Estimation: Based on the Revised Cardiac Risk index, patient is a Class 2 risk with a 6% risk of a major cardiac event in a low risk procedure.    Notified nursing staff that pt will require Preop/E-visit from Cardiology prior to surgery       1.) Preoperative workup as follows: ECG, hemoglobin, hematocrit, electrolytes, creatinine, glucose.  2.) Change in medication regimen before surgery:     --Morning of Procedure medications: Entresto, Coreg, Amiodarone, Mexiletine, Pravastatin   --Hold all other medications morning of surgery   --Resume all medications post-operatively  --Hold Xarelto 48 hours prior to surgery- Last dose 5/19/25  --Hold ASA, NSAIDs and all vitamins/supplements 7 days prior to procedure  --Hold oral diabetes medications morning of surgery   --Hold metformin 24 hours prior to surgery   --Hold Ozempic 7 days prior to surgery -Last dose 5/15/25  --Hold Adderall 48 hours prior to surgery     3.) Prophylaxis for cardiac events with perioperative beta-blockers: cont Coreg.  4.) Invasive hemodynamic monitoring perioperatively: should be considered.  5.) Deep vein thrombosis prophylaxis postoperatively: regimen to be chosen by surgical team.  6.) Surveillance for postoperative MI with ECG immediately postoperatively and on postoperati ve days 1 and 2 AND troponin levels 24 hours postoperatively and on day 4 or hospital discharge (whichever comes first): not indicated.  7.) Current medications which may produce withdrawal symptoms if withheld perioperatively: none  8.) Other measures: Preoperative alcohol abstention x 30 days.  Turn off AICD immediately before surgery (NOTE: MAY REQUIRE SCHEDULE COORDINATION WITH EPS LAB).  Careful attention to perioperative glycemic control (accu check pre/post op).  Postoperative hypertension management with IV hydralazine until able to take oral medications.  Postoperative incentive spirometry to  prevent pneumonia.     Chronic combined systolic and diastolic congestive heart failure  Patient has Combined Systolic and Diastolic heart failure that is Chronic. On presentation their CHF was well compensated.   Echo Interpretation Summary  · The left ventricle is moderately enlarged with eccentric hypertrophy and low normal systolic function.  · The estimated ejection fraction is 50-55%  · Normal left ventricular diastolic function.  · Normal right ventricular size.  · The aortic valve appears structurally normal. There is normal leaflet mobility.  · Mild mitral regurgitation.  · Mild tricuspid regurgitation.  · Normal central venous pressure (3 mmHg).  · The estimated PA systolic pressure is 18 mmHg.  · Considerable improvement in systolic function compared to study of 1-    Current Heart Failure Medications   Coreg, Entresto, Lasix, Spironolactone, Farxiga     Plan  - Monitor strict I&Os and daily weights.    - Place on telemetry  - Low sodium diet  - Place on fluid restriction of 1.5 L.   - The patient's volume status is at their baseline  - Notified nursing staff that pt will require Cardiology preop/e-consult visit prior to surgery  Hold Lasix and Spironolactone        PAF (paroxysmal atrial fibrillation)  Stable   Followed by EPS/Cardiology regularly   Continue Amiodarone, Mexiletine, Coreg, Xarelto   Hold Xarelto 48 hours prior to surgery     Paroxysmal VT  Stable   S/p ICD  Cont Amiodarone, Mexiletine, and Coreg     Idiopathic cardiomyopathy  S/p ICD  Followed closely by EPS/Cardiology     Hypertension associated with diabetes  BP stable   Cont home meds Coreg, Lasix, Spironolactone, and Entresto     Type 2 diabetes mellitus with hyperglycemia, without long-term current use of insulin   Latest Reference Range & Units 02/21/25 08:00   Hemoglobin A1C External 4.0 - 5.6 % 5.4   Estimated Avg Glucose 68 - 131 mg/dL 108     On Ozempic- last dose until after surgery 5/15/25     HONEY on CPAP  CPAP 14 CMWP  -Compliant with CPAP and Benefits from use    Hyperlipidemia LDL goal <100  Stable   Cont Pravastatin     Mild persistent asthma without complication  Well controlled- not using inhalers  No exacerbations in several years   Norm al PFTs 9/2023     History of vertigo  Pt has been through vestibular therapy  No recent exacerbations - takes Meclizine prn     Closed fracture of multiple ribs of left side with routine healing  Pt had a mechanical fall 4/11/25 and was seen in ED with left forehead hematoma and multiple left sided rib fractures  Pt reports symptoms of rib fractures have mostly resolved. Pain improved, No SOB/cough.         Electronically signed by Mandy Mcpherson NP on 5/7/2025 at 12:25 PM.

## 2025-05-07 NOTE — ASSESSMENT & PLAN NOTE
Latest Reference Range & Units 02/21/25 08:00   Hemoglobin A1C External 4.0 - 5.6 % 5.4   Estimated Avg Glucose 68 - 131 mg/dL 108     On Ozempic- last dose until after surgery 5/15/25

## 2025-05-08 ENCOUNTER — E-CONSULT (OUTPATIENT)
Dept: CARDIOLOGY | Facility: CLINIC | Age: 75
End: 2025-05-08
Payer: MEDICARE

## 2025-05-08 DIAGNOSIS — G47.33 OSA ON CPAP: Primary | ICD-10-CM

## 2025-05-08 DIAGNOSIS — E78.5 HYPERLIPIDEMIA LDL GOAL <100: Chronic | ICD-10-CM

## 2025-05-08 DIAGNOSIS — I48.0 PAF (PAROXYSMAL ATRIAL FIBRILLATION): Chronic | ICD-10-CM

## 2025-05-08 DIAGNOSIS — I15.2 HYPERTENSION ASSOCIATED WITH DIABETES: Chronic | ICD-10-CM

## 2025-05-08 DIAGNOSIS — I50.42 CHRONIC COMBINED SYSTOLIC AND DIASTOLIC CONGESTIVE HEART FAILURE: ICD-10-CM

## 2025-05-08 DIAGNOSIS — E11.59 HYPERTENSION ASSOCIATED WITH DIABETES: Chronic | ICD-10-CM

## 2025-05-08 DIAGNOSIS — Z95.810 ICD (IMPLANTABLE CARDIOVERTER-DEFIBRILLATOR) IN PLACE: ICD-10-CM

## 2025-05-08 PROCEDURE — 99451 NTRPROF PH1/NTRNET/EHR 5/>: CPT | Mod: S$PBB,,, | Performed by: INTERNAL MEDICINE

## 2025-05-08 NOTE — CONSULTS
[facility] - Cardiology  Response for E-Consult     Patient Name: Randy Yap  MRN: [id number]  Primary Care Provider: Olvin Hutson MD   Requesting Provider: Mandy Mcpherson NP  Consults    Recommendation: Pt cleared for procedure/surgery at moderate CV risk . Pt can hold xarelto 48 hours before procedure.    Additional future steps to consider: 74 y/o male with PMHx for pVT, PAF on Xarelto, CHF, HFrEF of 25-30%, s/p DC ICD, HTN, HLP, DM, HONEY on CPAP referred for CV clearance for R shoulder arthroplasty.Pt was seen recently in Cv clinic 2-25 with stable CV status. EKG 5-25 shows no changes, atrial pacing.t cleared for procedure/surgery at moderate CV risk . Pt can hold xarelto 48 hours before procedure.      Total time of Consultation: 10 minute    I did not speak to the requesting provider verbally about this.     *This eConsult is based on the clinical data available to me and is furnished without benefit of a physical examination. The eConsult will need to be interpreted in light of any clinical issues or changes in patient status not available to me at the time of filing this eConsults. Significant changes in patient condition or level of acuity should result in immediate formal consultation and reevaluation. Please alert me if you have further questions.    Thank you for this eConsult referral.     Ambrosio Ya MD  [facility] Cardiology

## 2025-05-12 ENCOUNTER — ANESTHESIA EVENT (OUTPATIENT)
Dept: SURGERY | Facility: HOSPITAL | Age: 75
End: 2025-05-12
Payer: MEDICARE

## 2025-05-13 ENCOUNTER — HOSPITAL ENCOUNTER (OUTPATIENT)
Dept: RADIOLOGY | Facility: HOSPITAL | Age: 75
Discharge: HOME | End: 2025-05-13
Attending: FAMILY MEDICINE
Payer: MEDICARE

## 2025-05-13 ENCOUNTER — RESULTS FOLLOW-UP (OUTPATIENT)
Dept: FAMILY MEDICINE | Facility: CLINIC | Age: 75
End: 2025-05-13

## 2025-05-13 DIAGNOSIS — S22.42XD CLOSED FRACTURE OF MULTIPLE RIBS OF LEFT SIDE WITH ROUTINE HEALING, SUBSEQUENT ENCOUNTER: ICD-10-CM

## 2025-05-13 PROCEDURE — 71100 X-RAY EXAM RIBS UNI 2 VIEWS: CPT | Mod: 26,LT,, | Performed by: RADIOLOGY

## 2025-05-13 PROCEDURE — 71100 X-RAY EXAM RIBS UNI 2 VIEWS: CPT | Mod: TC,PO,LT

## 2025-05-13 NOTE — PROGRESS NOTES
1st check to see if patient has seen the results.  If not then  CALL patient with results and Document verification.  Schedule follow-up if needed.  [phone]    Foot Exam Never done  Diabetes Urine Screening due on 05/15/2025      X-ray of the left ribs reviewed by radiology.  There is no evidence of fracture.  Incidental finding of arthritis noted.  Also athero sclerosis noted.  Follow-up with Cardiology specialist.  Follow-up sooner if having any persistent or worsening symptoms.

## 2025-05-20 ENCOUNTER — HOSPITAL ENCOUNTER (OUTPATIENT)
Dept: RADIOLOGY | Facility: HOSPITAL | Age: 75
Discharge: HOME | End: 2025-05-20
Attending: NURSE PRACTITIONER
Payer: MEDICARE

## 2025-05-20 ENCOUNTER — HOSPITAL ENCOUNTER (OUTPATIENT)
Dept: CARDIOLOGY | Facility: HOSPITAL | Age: 75
Discharge: HOME | End: 2025-05-20
Attending: NURSE PRACTITIONER
Payer: MEDICARE

## 2025-05-20 DIAGNOSIS — Z01.810 PREOP CARDIOVASCULAR EXAM: ICD-10-CM

## 2025-05-20 DIAGNOSIS — I50.42 CHRONIC COMBINED SYSTOLIC AND DIASTOLIC CONGESTIVE HEART FAILURE: ICD-10-CM

## 2025-05-20 LAB
CV STRESS BASE HR: 75 BPM
DIASTOLIC BLOOD PRESSURE: 72 MMHG
NUC REST EJECTION FRACTION: 32
NUC STRESS EJECTION FRACTION: 32 %
OHS CV CPX 85 PERCENT MAX PREDICTED HEART RATE MALE: 125
OHS CV CPX ESTIMATED METS: 1
OHS CV CPX MAX PREDICTED HEART RATE: 147
OHS CV CPX PATIENT IS FEMALE: 0
OHS CV CPX PATIENT IS MALE: 1
OHS CV CPX PEAK DIASTOLIC BLOOD PRESSURE: 72 MMHG
OHS CV CPX PEAK HEAR RATE: 76 BPM
OHS CV CPX PEAK RATE PRESSURE PRODUCT: 6840
OHS CV CPX PEAK SYSTOLIC BLOOD PRESSURE: 90 MMHG
OHS CV CPX PERCENT MAX PREDICTED HEART RATE ACHIEVED: 52
OHS CV CPX RATE PRESSURE PRODUCT PRESENTING: 6675
OHS CV INITIAL DOSE: 9.8 MCG/KG/MIN
OHS CV PEAK DOSE: 30.5 MCG/KG/MIN
STRESS ECHO POST EXERCISE DUR SEC: 57 SECONDS
SYSTOLIC BLOOD PRESSURE: 89 MMHG

## 2025-05-20 PROCEDURE — 78452 HT MUSCLE IMAGE SPECT MULT: CPT

## 2025-05-20 PROCEDURE — 78452 HT MUSCLE IMAGE SPECT MULT: CPT | Mod: 26,,, | Performed by: INTERNAL MEDICINE

## 2025-05-20 PROCEDURE — A9502 TC99M TETROFOSMIN: HCPCS | Performed by: NURSE PRACTITIONER

## 2025-05-20 PROCEDURE — 93016 CV STRESS TEST SUPVJ ONLY: CPT | Mod: ,,, | Performed by: INTERNAL MEDICINE

## 2025-05-20 PROCEDURE — 63600175 PHARM REV CODE 636 W HCPCS: Performed by: NURSE PRACTITIONER

## 2025-05-20 PROCEDURE — 93018 CV STRESS TEST I&R ONLY: CPT | Mod: ,,, | Performed by: INTERNAL MEDICINE

## 2025-05-20 PROCEDURE — 93017 CV STRESS TEST TRACING ONLY: CPT

## 2025-05-20 RX ORDER — REGADENOSON 0.08 MG/ML
0.4 INJECTION, SOLUTION INTRAVENOUS
Status: COMPLETED | OUTPATIENT
Start: 2025-05-20 | End: 2025-05-20

## 2025-05-20 RX ADMIN — TETROFOSMIN 30.5 MILLICURIE: 1.38 INJECTION, POWDER, LYOPHILIZED, FOR SOLUTION INTRAVENOUS at 11:05

## 2025-05-20 RX ADMIN — REGADENOSON 0.4 MG: 0.08 INJECTION, SOLUTION INTRAVENOUS at 10:05

## 2025-05-20 RX ADMIN — TETROFOSMIN 9.8 MILLICURIE: 1.38 INJECTION, POWDER, LYOPHILIZED, FOR SOLUTION INTRAVENOUS at 09:05

## 2025-05-21 ENCOUNTER — RESULTS FOLLOW-UP (OUTPATIENT)
Dept: CARDIOLOGY | Facility: CLINIC | Age: 75
End: 2025-05-21

## 2025-05-21 ENCOUNTER — TELEPHONE (OUTPATIENT)
Dept: CARDIOLOGY | Facility: CLINIC | Age: 75
End: 2025-05-21
Payer: MEDICARE

## 2025-05-21 DIAGNOSIS — E11.9 TYPE 2 DIABETES MELLITUS WITHOUT COMPLICATION: ICD-10-CM

## 2025-05-21 NOTE — DISCHARGE INSTRUCTIONS
TOTAL SHOULDER ARTHROPLASTY    Contact the [facility] at [phone] if you have questions about your instructions or follow-up appointment.    DIET:   Start with clear liquids and light foods to minimize nausea. Once these are tolerated, advance to a regular diet.     DRESSING AND WOUND CARE:   Keep the dressing clean and dry. It is normal for there to be some drainage after surgery since the shoulder was irrigated with large amounts of fluid. Reinforce with additional gauze as necessary.  Remove the dressing the 7th day after surgery and begin changing daily with clean gauze or Band-Aids®. Keep your incisions covered until you follow up in clinic.   If you have Steri-Strips in place of stitches, allow them to stay in place as long as possible. Steri-Strips are made of a fabric material that can get wet in the shower and pat dry with a towel. They usually fall off on their own within 7 to 10 days. You may trim the edges as they begin to curl.      BATHING:   You may bathe or shower on the 7th day after surgery, but do not scrub or soak the incisions. Dry the area by gently blotting it with a gauze or towel. After it is completely dry, cover the wound with clean gauze or Band-Aids®. Do NOT submerge the incisions (bath/swim) until after the sutures are removed and the wound has completely healed.     ACTIVITY:    Ice should be applied to the shoulder for 20-30 minutes, 5-6 times a day, to help control pain and swelling. Apply additional times as needed, especially after exercise, for the first 3-4 weeks. Do not apply ice directly to the skin; use a thin barrier in between. Also, do not use heat.    Elevate the shoulder by sleeping as upright as possible using extra pillows or a recliner. Do this for the first few days to help decrease pain and swelling.   Wear the sling at all times for 6 weeks including while sleeping. The only time you may remove the sling is for bathing and exercises. Do not lean  or put your body weight on your arm.   When your block wears off, start the following exercises:  Remove the sling for 5-10 minutes, 3 times a day, to do the following exercises:  Fully bend & straighten your fingers, your wrist, and your elbow several times.   Lean forward, bracing yourself on a table/counter with your normal arm. Let your surgical arm relax and hang straight down. Shift your weight so that your arm moves side to side, front to back, and in gentle circles like a pendulum or elephant's trunk. Use your body to generate the movement for this, NOT your surgical shoulder's muscles. (see drawing below)     Physical therapy should be started within 3 days of surgery. Take your therapy prescription to the PT clinic of your choice. If you have not received a therapy prescription, please contact your surgeon's office to have a script sent to your physical therapist.     PAIN CONTROL:    It is important to stay ahead of pain as it becomes challenging to get under control if you fall behind. Ice and elevation can help and should be used as much as possible in the first few days.   Narcotic pain medications, such as tramadol and oxycodone, should be taken as prescribed. The Tramadol is intended to be taken first as the primary medicine and then oxycodone taken for breakthrough pain. Wean off as soon as possible. Take these with food to decrease the chances of nausea and vomiting. Do not drink alcohol, drive a vehicle, or use heavy machinery while taking narcotic pain medications.    NSAID medications are used for pain control and to decrease inflammation. You may be prescribed an NSAID such as celebrex. Take as instructed. Other NSAID medications such as ibuprofen, Motrin, Advil, naproxen, or Aleve can be used once you have finished the celebrex, or if a prescription for celebrex was not provided.    Acetaminophen (Tylenol) is an effective over-the-counter pain medication that can be used with NSAID medications  and non-acetaminophen containing narcotics such as plain oxycodone.    ASPIRIN FOR PREVENTION OF BLOOD CLOTS:   You should take one 81 mg baby aspirin twice daily for two weeks starting the evening of the day you have surgery unless instructed otherwise or taking a different blood thinner such as enoxaparin or warfarin. If you are aware that you are at high risk for a blood clot, notify your physician as soon as possible.   Take aspirin at least 30 minutes before taking ibuprofen or Toradol.    CONSTIPATION PREVENTION:   Anesthesia and pain medications, changes in eating and drinking, and less activity can all lead to constipation after surgery. To prevent or reduce constipation, take an over-the-counter stool softener (brands include Colace and Miralax).  Follow the directions on the bottle. Drink plenty of water and eat high fiber foods including whole grains, fresh fruits, vegetables, beans, prunes or prune juice.     PROBLEMS TO REPORT:  Persistent bloody drainage that soaks through reinforced dressings.  Fever greater than 101F or 38C.   Incision that is very red, swollen, draining pus, shows red streaks, or feels hot.  Inability to urinate within 8 hours of surgery (a rare effect of the anesthesia).  If you develop a rash, generalized itching or swelling from the medications, STOP the medication and call the clinic or the orthopedic surgery resident on call.    Daytime calls should be directed to the Sports Medicine Clinic at [phone].   Night-time and weekend calls should be directed to the [facility] nurse line at [phone].       FREQUENTLY ASKED QUESTIONS     WHAT DAILY ACTIVITIES CAN I DO?  After shoulder surgery, you may do what you feel comfortable doing in the sling.  Do not lift anything with your operative arm or put yourself at risk of falling.    CAN I DRIVE OR RIDE BY CAR/ TRAIN/ PLANE?   You should not drive while using a sling. There are no forced restrictions regarding  operating a motor vehicle, however you must always be the  of whether you are able to operate it safely. You should not drive while taking narcotic pain medications. You may ride in a car after surgery as needed. You may take a train or even fly the day after your surgery as long as you feel secure and comfortable.    WHAT ABOUT WORK?   You may return to an office-type job or to school whenever comfortable. For most patients this occurs 1-2 weeks after surgery. For more active jobs that require some lifting, you can wait until after your follow-up appointment. Any other unusual types of jobs should be discussed to determine a date for return to work.    WHAT ABOUT SWELLING?   Expect swelling as a normal process after surgery. Ice, elevation, and other treatments provided at physical therapy will allow this to improve in time. Some swelling may remain for up to 8 weeks, and this is normal.     WHAT IF IT REALLY HURTS TOO MUCH?   Surgery hurts and you cannot expect to be pain free, but our goal is for it to be tolerable. Try to use all available pain therapies such as narcotics, NSAIDS, and acetaminophen. Always try more ice and elevation. If the pain is not tolerable, call the clinic or the after hours nursing line.

## 2025-05-21 NOTE — PRE ADMISSION SCREENING
Dr. Ya sent a secure chat per recommendation from Dr. Stapleton to clarify it pt is cleared from cardiac standpoint. Dr. Ya states he has already cleared pt via e-consult and report is in [place]. Dr. Stapleton notified. No new orders obtained at this time.

## 2025-05-21 NOTE — TELEPHONE ENCOUNTER
Pt was contacted about results:     Please phone patient     Stress test reviewed  Fixed defect noted consistent with scar  No reversible ischemia noted     Thanks  Virgie    Pt verbalized understanding with no questions or concerns.      ----- Message from DIANA Gutierrez sent at 5/21/2025  8:06 AM CDT -----  Please phone patient    Stress test reviewed  Fixed defect noted consistent with scar  No reversible ischemia noted    Thanks  Virgie  ----- Message -----  From: Olvin Purcell MD  Sent: 5/20/2025   3:10 PM CDT  To: DIANA Chapman

## 2025-05-21 NOTE — PRE-PROCEDURE INSTRUCTIONS
Called and spoke with Randy about the following:     Please arrive to [facility] ([facility]) at 1100 on 5/22/25 for your scheduled procedure.  Address: [address] [place] [address] [place] [id number] ([address] Building on left, 1st Floor Lobby)    !!!NO FOOD after midnight! You may have clear liquids up to 3 hrs before your arrival to the Hospital!!!  Clear liquids include Gatorade, water, soda, black coffee or tea (no milk or creamer), and clear juices.  Clear liquids do NOT include anything with pulp or food particles (Chicken broth, ice cream, yogurt, Jello, etc.)    Thank you,  -[facility] Pre [facility]mit [facility].  Mon-Fri 7:30 am - 4 pm [phone]

## 2025-05-22 ENCOUNTER — HOSPITAL ENCOUNTER (OUTPATIENT)
Facility: HOSPITAL | Age: 75
Discharge: HOME | End: 2025-05-22
Attending: STUDENT IN AN ORGANIZED HEALTH CARE EDUCATION/TRAINING PROGRAM | Admitting: STUDENT IN AN ORGANIZED HEALTH CARE EDUCATION/TRAINING PROGRAM
Payer: MEDICARE

## 2025-05-22 ENCOUNTER — ANESTHESIA (OUTPATIENT)
Dept: SURGERY | Facility: HOSPITAL | Age: 75
End: 2025-05-22
Payer: MEDICARE

## 2025-05-22 VITALS
OXYGEN SATURATION: 94 % | DIASTOLIC BLOOD PRESSURE: 83 MMHG | TEMPERATURE: 98 F | RESPIRATION RATE: 22 BRPM | WEIGHT: 223.56 LBS | HEIGHT: 70 IN | SYSTOLIC BLOOD PRESSURE: 128 MMHG | BODY MASS INDEX: 32.01 KG/M2 | HEART RATE: 70 BPM

## 2025-05-22 DIAGNOSIS — M19.011 GLENOHUMERAL ARTHRITIS, RIGHT: ICD-10-CM

## 2025-05-22 DIAGNOSIS — M12.811 ROTATOR CUFF ARTHROPATHY OF RIGHT SHOULDER: Primary | ICD-10-CM

## 2025-05-22 LAB — POCT GLUCOSE: 101 MG/DL (ref 70–110)

## 2025-05-22 PROCEDURE — 23472 RECONSTRUCT SHOULDER JOINT: CPT | Mod: RT,,, | Performed by: STUDENT IN AN ORGANIZED HEALTH CARE EDUCATION/TRAINING PROGRAM

## 2025-05-22 PROCEDURE — 71000039 HC RECOVERY, EACH ADD'L HOUR: Performed by: STUDENT IN AN ORGANIZED HEALTH CARE EDUCATION/TRAINING PROGRAM

## 2025-05-22 PROCEDURE — C1776 JOINT DEVICE (IMPLANTABLE): HCPCS | Performed by: STUDENT IN AN ORGANIZED HEALTH CARE EDUCATION/TRAINING PROGRAM

## 2025-05-22 PROCEDURE — 71000033 HC RECOVERY, INTIAL HOUR: Performed by: STUDENT IN AN ORGANIZED HEALTH CARE EDUCATION/TRAINING PROGRAM

## 2025-05-22 PROCEDURE — C1713 ANCHOR/SCREW BN/BN,TIS/BN: HCPCS | Performed by: STUDENT IN AN ORGANIZED HEALTH CARE EDUCATION/TRAINING PROGRAM

## 2025-05-22 PROCEDURE — 23472 RECONSTRUCT SHOULDER JOINT: CPT | Mod: AS,RT,, | Performed by: PHYSICIAN ASSISTANT

## 2025-05-22 PROCEDURE — 63600175 PHARM REV CODE 636 W HCPCS: Performed by: NURSE ANESTHETIST, CERTIFIED REGISTERED

## 2025-05-22 PROCEDURE — 64415 NJX AA&/STRD BRCH PLXS IMG: CPT | Performed by: STUDENT IN AN ORGANIZED HEALTH CARE EDUCATION/TRAINING PROGRAM

## 2025-05-22 PROCEDURE — 25000003 PHARM REV CODE 250: Performed by: ANESTHESIOLOGY

## 2025-05-22 PROCEDURE — 27201423 OPTIME MED/SURG SUP & DEVICES STERILE SUPPLY: Performed by: STUDENT IN AN ORGANIZED HEALTH CARE EDUCATION/TRAINING PROGRAM

## 2025-05-22 PROCEDURE — 63600175 PHARM REV CODE 636 W HCPCS: Performed by: STUDENT IN AN ORGANIZED HEALTH CARE EDUCATION/TRAINING PROGRAM

## 2025-05-22 PROCEDURE — 71000015 HC POSTOP RECOV 1ST HR: Performed by: STUDENT IN AN ORGANIZED HEALTH CARE EDUCATION/TRAINING PROGRAM

## 2025-05-22 PROCEDURE — C1769 GUIDE WIRE: HCPCS | Performed by: STUDENT IN AN ORGANIZED HEALTH CARE EDUCATION/TRAINING PROGRAM

## 2025-05-22 PROCEDURE — 36000711: Performed by: STUDENT IN AN ORGANIZED HEALTH CARE EDUCATION/TRAINING PROGRAM

## 2025-05-22 PROCEDURE — 37000009 HC ANESTHESIA EA ADD 15 MINS: Performed by: STUDENT IN AN ORGANIZED HEALTH CARE EDUCATION/TRAINING PROGRAM

## 2025-05-22 PROCEDURE — 25000003 PHARM REV CODE 250: Performed by: PHYSICIAN ASSISTANT

## 2025-05-22 PROCEDURE — 36000710: Performed by: STUDENT IN AN ORGANIZED HEALTH CARE EDUCATION/TRAINING PROGRAM

## 2025-05-22 PROCEDURE — 25000003 PHARM REV CODE 250: Performed by: NURSE ANESTHETIST, CERTIFIED REGISTERED

## 2025-05-22 PROCEDURE — 37000008 HC ANESTHESIA 1ST 15 MINUTES: Performed by: STUDENT IN AN ORGANIZED HEALTH CARE EDUCATION/TRAINING PROGRAM

## 2025-05-22 DEVICE — RSP BASEPLATE, 30MM, W/P2 COATING
Type: IMPLANTABLE DEVICE | Site: SHOULDER | Status: FUNCTIONAL
Brand: DJO SURGICAL

## 2025-05-22 RX ORDER — SODIUM CHLORIDE 9 MG/ML
INJECTION, SOLUTION INTRAVENOUS CONTINUOUS
Status: DISCONTINUED | OUTPATIENT
Start: 2025-05-22 | End: 2025-05-22 | Stop reason: HOSPADM

## 2025-05-22 RX ORDER — DEXAMETHASONE SODIUM PHOSPHATE 4 MG/ML
INJECTION, SOLUTION INTRA-ARTICULAR; INTRALESIONAL; INTRAMUSCULAR; INTRAVENOUS; SOFT TISSUE
Status: DISCONTINUED | OUTPATIENT
Start: 2025-05-22 | End: 2025-05-22

## 2025-05-22 RX ORDER — TRANEXAMIC ACID 10 MG/ML
1000 INJECTION, SOLUTION INTRAVENOUS
Status: COMPLETED | OUTPATIENT
Start: 2025-05-22 | End: 2025-05-22

## 2025-05-22 RX ORDER — ROPIVACAINE HYDROCHLORIDE 5 MG/ML
INJECTION, SOLUTION EPIDURAL; INFILTRATION; PERINEURAL
Status: COMPLETED | OUTPATIENT
Start: 2025-05-22 | End: 2025-05-22

## 2025-05-22 RX ORDER — ACETAMINOPHEN 500 MG
1000 TABLET ORAL EVERY 8 HOURS PRN
Qty: 60 TABLET | Refills: 0 | Status: SHIPPED | OUTPATIENT
Start: 2025-05-22

## 2025-05-22 RX ORDER — ONDANSETRON HYDROCHLORIDE 2 MG/ML
4 INJECTION, SOLUTION INTRAVENOUS DAILY PRN
Status: DISCONTINUED | OUTPATIENT
Start: 2025-05-22 | End: 2025-05-22 | Stop reason: HOSPADM

## 2025-05-22 RX ORDER — FENTANYL CITRATE 50 UG/ML
INJECTION, SOLUTION INTRAMUSCULAR; INTRAVENOUS
Status: DISCONTINUED | OUTPATIENT
Start: 2025-05-22 | End: 2025-05-22

## 2025-05-22 RX ORDER — PROPOFOL 10 MG/ML
VIAL (ML) INTRAVENOUS
Status: DISCONTINUED | OUTPATIENT
Start: 2025-05-22 | End: 2025-05-22

## 2025-05-22 RX ORDER — HYDROMORPHONE HYDROCHLORIDE 2 MG/ML
0.2 INJECTION, SOLUTION INTRAMUSCULAR; INTRAVENOUS; SUBCUTANEOUS EVERY 5 MIN PRN
Status: DISCONTINUED | OUTPATIENT
Start: 2025-05-22 | End: 2025-05-22 | Stop reason: HOSPADM

## 2025-05-22 RX ORDER — CEFAZOLIN SODIUM 1 G/3ML
INJECTION, POWDER, FOR SOLUTION INTRAMUSCULAR; INTRAVENOUS
Status: DISCONTINUED | OUTPATIENT
Start: 2025-05-22 | End: 2025-05-22

## 2025-05-22 RX ORDER — OXYCODONE AND ACETAMINOPHEN 5; 325 MG/1; MG/1
1 TABLET ORAL
Status: DISCONTINUED | OUTPATIENT
Start: 2025-05-22 | End: 2025-05-22 | Stop reason: HOSPADM

## 2025-05-22 RX ORDER — LIDOCAINE HYDROCHLORIDE 10 MG/ML
INJECTION, SOLUTION EPIDURAL; INFILTRATION; INTRACAUDAL; PERINEURAL
Status: DISCONTINUED | OUTPATIENT
Start: 2025-05-22 | End: 2025-05-22

## 2025-05-22 RX ORDER — PHENYLEPHRINE HYDROCHLORIDE 10 MG/ML
INJECTION INTRAVENOUS
Status: DISCONTINUED | OUTPATIENT
Start: 2025-05-22 | End: 2025-05-22

## 2025-05-22 RX ORDER — CEFAZOLIN SODIUM 1 G/3ML
1 INJECTION, POWDER, FOR SOLUTION INTRAMUSCULAR; INTRAVENOUS ONCE
Status: COMPLETED | OUTPATIENT
Start: 2025-05-22 | End: 2025-05-22

## 2025-05-22 RX ORDER — CHLORHEXIDINE GLUCONATE ORAL RINSE 1.2 MG/ML
10 SOLUTION DENTAL
Status: DISCONTINUED | OUTPATIENT
Start: 2025-05-22 | End: 2025-05-22 | Stop reason: HOSPADM

## 2025-05-22 RX ORDER — GLUCAGON 1 MG
1 KIT INJECTION
Status: DISCONTINUED | OUTPATIENT
Start: 2025-05-22 | End: 2025-05-22 | Stop reason: HOSPADM

## 2025-05-22 RX ORDER — OXYCODONE HYDROCHLORIDE 5 MG/1
5 TABLET ORAL
Qty: 28 TABLET | Refills: 0 | Status: SHIPPED | OUTPATIENT
Start: 2025-05-22

## 2025-05-22 RX ORDER — ROCURONIUM BROMIDE 10 MG/ML
INJECTION, SOLUTION INTRAVENOUS
Status: DISCONTINUED | OUTPATIENT
Start: 2025-05-22 | End: 2025-05-22

## 2025-05-22 RX ORDER — CEFAZOLIN 2 G/1
2 INJECTION, POWDER, FOR SOLUTION INTRAMUSCULAR; INTRAVENOUS
Status: DISCONTINUED | OUTPATIENT
Start: 2025-05-22 | End: 2025-05-22 | Stop reason: HOSPADM

## 2025-05-22 RX ORDER — MIDAZOLAM HYDROCHLORIDE 1 MG/ML
INJECTION INTRAMUSCULAR; INTRAVENOUS
Status: DISCONTINUED | OUTPATIENT
Start: 2025-05-22 | End: 2025-05-22

## 2025-05-22 RX ORDER — TRAMADOL HYDROCHLORIDE 50 MG/1
50 TABLET, FILM COATED ORAL
Qty: 36 TABLET | Refills: 0 | Status: SHIPPED | OUTPATIENT
Start: 2025-05-22

## 2025-05-22 RX ADMIN — PHENYLEPHRINE HYDROCHLORIDE 40 MCG: 10 INJECTION INTRAVENOUS at 02:05

## 2025-05-22 RX ADMIN — SODIUM CHLORIDE, SODIUM LACTATE, POTASSIUM CHLORIDE, AND CALCIUM CHLORIDE: .6; .31; .03; .02 INJECTION, SOLUTION INTRAVENOUS at 01:05

## 2025-05-22 RX ADMIN — SUGAMMADEX 200 MG: 100 INJECTION, SOLUTION INTRAVENOUS at 03:05

## 2025-05-22 RX ADMIN — TRANEXAMIC ACID 1000 MG: 10 INJECTION, SOLUTION INTRAVENOUS at 02:05

## 2025-05-22 RX ADMIN — MIDAZOLAM HYDROCHLORIDE 2 MG: 1 INJECTION, SOLUTION INTRAMUSCULAR; INTRAVENOUS at 12:05

## 2025-05-22 RX ADMIN — DEXAMETHASONE SODIUM PHOSPHATE 4 MG: 4 INJECTION, SOLUTION INTRA-ARTICULAR; INTRALESIONAL; INTRAMUSCULAR; INTRAVENOUS; SOFT TISSUE at 12:05

## 2025-05-22 RX ADMIN — CEFAZOLIN 1 G: 330 INJECTION, POWDER, FOR SOLUTION INTRAMUSCULAR; INTRAVENOUS at 04:05

## 2025-05-22 RX ADMIN — PHENYLEPHRINE HYDROCHLORIDE 20 MCG/MIN: 10 INJECTION INTRAVENOUS at 02:05

## 2025-05-22 RX ADMIN — ROPIVACAINE HYDROCHLORIDE 20 ML: 5 INJECTION, SOLUTION EPIDURAL; INFILTRATION; PERINEURAL at 12:05

## 2025-05-22 RX ADMIN — FENTANYL CITRATE 50 MCG: 50 INJECTION, SOLUTION INTRAMUSCULAR; INTRAVENOUS at 01:05

## 2025-05-22 RX ADMIN — DEXAMETHASONE SODIUM PHOSPHATE 8 MG: 4 INJECTION, SOLUTION INTRA-ARTICULAR; INTRALESIONAL; INTRAMUSCULAR; INTRAVENOUS; SOFT TISSUE at 02:05

## 2025-05-22 RX ADMIN — PHENYLEPHRINE HYDROCHLORIDE 80 MCG: 10 INJECTION INTRAVENOUS at 02:05

## 2025-05-22 RX ADMIN — ROCURONIUM BROMIDE 50 MG: 10 SOLUTION INTRAVENOUS at 01:05

## 2025-05-22 RX ADMIN — FENTANYL CITRATE 50 MCG: 50 INJECTION, SOLUTION INTRAMUSCULAR; INTRAVENOUS at 03:05

## 2025-05-22 RX ADMIN — PROPOFOL 180 MG: 10 INJECTION, EMULSION INTRAVENOUS at 01:05

## 2025-05-22 RX ADMIN — LIDOCAINE HYDROCHLORIDE 100 MG: 10 SOLUTION INTRAVENOUS at 01:05

## 2025-05-22 RX ADMIN — ROCURONIUM BROMIDE 10 MG: 10 SOLUTION INTRAVENOUS at 02:05

## 2025-05-22 RX ADMIN — TRANEXAMIC ACID 1000 MG: 10 INJECTION, SOLUTION INTRAVENOUS at 03:05

## 2025-05-22 RX ADMIN — ROCURONIUM BROMIDE 20 MG: 10 SOLUTION INTRAVENOUS at 02:05

## 2025-05-22 RX ADMIN — CEFAZOLIN 2 G: 330 INJECTION, POWDER, FOR SOLUTION INTRAMUSCULAR; INTRAVENOUS at 02:05

## 2025-05-22 NOTE — ANESTHESIA PROCEDURE NOTES
Intubation    Date/Time: 5/22/2025 1:35 PM    Performed by: Jasmyne Carranza CRNA  Authorized by: Kei Hanks MD    Intubation:     Induction:  Intravenous    Intubated:  Postinduction    Mask Ventilation:  Easy mask    Attempted By:  CRNA    Method of Intubation:  Direct    Blade:  Finney 2    Laryngeal View Grade: Grade I - full view of cords      Difficult Airway Encountered?: No      Complications:  None    Airway Device:  Oral endotracheal tube    Airway Device Size:  7.5    Style/Cuff Inflation:  Cuffed    Tube secured:  23    Secured at:  The lips    Placement Verified By:  Capnometry    Complicating Factors:  Long mandible    Findings Post-Intubation:  BS equal bilateral and atraumatic/condition of teeth unchanged

## 2025-05-22 NOTE — ANESTHESIA PROCEDURE NOTES
Arterial    Diagnosis: low EF    Patient location during procedure: done in OR  Timeout: 5/22/2025 1:05 PM  Procedure end time: 5/22/2025 1:15 PM    Staffing  Authorizing Provider: Kei Hanks MD  Performing Provider: Kei Hanks MD    Staffing  Performed by: Kei Hanks MD  Authorized by: Kei Hanks MD    Anesthesiologist was present at the time of the procedure.    Preanesthetic Checklist  Completed: patient identified, IV checked, site marked, risks and benefits discussed, surgical consent, monitors and equipment checked, pre-op evaluation, timeout performed and anesthesia consent givenArterial  Skin Prep: chlorhexidine gluconate  Local Infiltration: lidocaine  Orientation: left  Location: radial    Catheter Size: 20 G  Catheter placement by Ultrasound guidance. Heme positive aspiration all ports.   Vessel Caliber: medium, patent, compressibility normal  Vascular Doppler:  not done  Needle advanced into vessel with real time Ultrasound guidance.Insertion Attempts: 2  Assessment  Dressing: secured with tape and tegaderm  Patient: Tolerated well

## 2025-05-22 NOTE — ANESTHESIA PROCEDURE NOTES
Peripheral Block    Patient location during procedure: pre-op   Block not for primary anesthetic.  Reason for block: at surgeon's request and post-op pain management   Post-op Pain Location: RUE   Start time: 5/22/2025 12:54 PM  Timeout: 5/22/2025 12:53 PM   End time: 5/22/2025 12:59 PM    Staffing  Authorizing Provider: Kei Hanks MD  Performing Provider: Kei Hanks MD    Staffing  Performed by: Kei Hanks MD  Authorized by: Kei Hanks MD    Preanesthetic Checklist  Completed: patient identified, IV checked, site marked, risks and benefits discussed, surgical consent, monitors and equipment checked, pre-op evaluation and timeout performed  Peripheral Block  Patient position: sitting  Prep: ChloraPrep  Patient monitoring: heart rate, cardiac monitor, continuous pulse ox and frequent blood pressure checks  Block type: interscalene  Laterality: right  Injection technique: single shot  Needle  Needle type: Stimuplex   Needle gauge: 22 G  Needle length: 4 in  Needle localization: anatomical landmarks and ultrasound guidance   -ultrasound image captured on disc.  Assessment  Injection assessment: negative aspiration, negative parasthesia and local visualized surrounding nerve  Paresthesia pain: none  Heart rate change: no  Slow fractionated injection: yes  Pain Tolerance: comfortable throughout block and no complaints  Medications:    Medications: ropivacaine (NAROPIN) injection 0.5% - Perineural   20 mL - 5/22/2025 12:59:00 PM    Additional Notes  VSS.  [facility] RN monitoring vitals throughout procedure.  Patient tolerated procedure well.    4mg decadron added to 20cc 0.5% Ropiv to extend block duration

## 2025-05-22 NOTE — TRANSFER OF CARE
"Anesthesia Transfer of Care Note    Patient: Randy Yap    Procedure(s) Performed: Procedure(s) (LRB):  ARTHROPLASTY, SHOULDER, TOTAL, REVERSE (Right)    Patient location: PACU    Anesthesia Type: general    Transport from OR: Transported from OR on room air with adequate spontaneous ventilation    Post pain: adequate analgesia    Post assessment: no apparent anesthetic complications    Post vital signs: stable    Level of consciousness: awake    Nausea/Vomiting: no nausea/vomiting    Complications: none    Transfer of care protocol was followed      Last vitals: Visit Vitals  BP (!) 142/82   Pulse 69   Temp 36.7 °C (98.1 °F) (Tympanic)   Resp 18   Ht 5' 10" (1.778 m)   Wt 101.4 kg (223 lb 8.7 oz)   SpO2 97%   BMI 32.08 kg/m²     "

## 2025-05-22 NOTE — DISCHARGE SUMMARY
[facility] (Hospital)  Discharge Note  Short Stay    Procedure(s) (LRB):  ARTHROPLASTY, SHOULDER, TOTAL, REVERSE (Right)      OUTCOME: Patient tolerated treatment/procedure well without complication and is now ready for discharge.    DISPOSITION: Home or Self Care    FINAL DIAGNOSIS:  right shoulder rotator cuff tear arthropathy    FOLLOWUP: In clinic    DISCHARGE INSTRUCTIONS:  No discharge procedures on file.     TIME SPENT ON DISCHARGE: 10 minutes

## 2025-05-22 NOTE — ANESTHESIA PREPROCEDURE EVALUATION
05/22/2025  Randy Yap is a 75 y.o., male with PMHx for pVT, PAF on Xarelto, CHF, HFrEF of 25-30%, s/p DC ICD, HTN, HLP, DM, HONEY on CPAP    Past Medical History:   Diagnosis Date    Asthma     Cardiomyopathy due to systemic disease     Colon polyp 05/2013    repeat 5/2018    Depression, recurrent     Diastolic dysfunction     DJD (degenerative joint disease) of knee 10/14/2013    Facial trauma     Nose broken several times    Ganglion cyst     Hyperlipidemia     Hypertension     Murmur     Obesity     Paroxysmal VT 05/21/2016    Pericarditis with effusion     Seasonal allergies     Sleep apnea      Past Surgical History:   Procedure Laterality Date    CARDIAC DEFIBRILLATOR PLACEMENT  10/06/2014    COLONOSCOPY N/A 4/19/2023    Procedure: COLONOSCOPY 4/4-cc/bt clearance recedived;  Surgeon: Isabella Del Cid MD;  Location: [facility] ENDO;  Service: Endoscopy;  Laterality: N/A;    COLONOSCOPY W/ POLYPECTOMY  05/21/2013    repeat 5/21/2018    COSMETIC SURGERY      EYE SURGERY  Cataract    HERNIA REPAIR      R inguinal    LEFT HEART CATHETERIZATION Left 07/09/2018    Procedure: CATHETERIZATION, HEART, LEFT;  Surgeon: Macey Dos Santos MD;  Location: [facility] CATH LAB;  Service: Cardiology;  Laterality: Left;  left radial approach  Has [employer] gissel ICD    pace maker   10/06/2014    PERICARDIAL WINDOW  12-15 years ago    REPLACEMENT OF IMPLANTABLE CARDIOVERTER-DEFIBRILLATOR (ICD) GENERATOR Left 5/7/2024    Procedure: REPLACEMENT, ICD GENERATOR;  Surgeon: Nirav Baldwin MD;  Location: [facility] CATH LAB;  Service: Cardiology;  Laterality: Left;  [facility]  MRI safe  2/27/23-sinus @ 73, intact conduction 200-210ms  Device and leads implanted 10/6/14  [employer] ICD Fortify Assura DR 2357-40Q, [id number]  A lead: Bennett Tendril STS 2088TC/46cm, [id number]  V lead: Bennett Durata 7120Q/58cm, [id number]    tonsillectomy      TONSILLECTOMY       VARICOCELECTOMY         Chemistry        Component Value Date/Time     (L) 05/07/2025 1053     02/21/2025 0800    K 3.9 05/07/2025 1053    K 4.5 02/21/2025 0800     05/07/2025 1053     02/21/2025 0800    CO2 25 05/07/2025 1053    CO2 23 02/21/2025 0800    BUN 17 05/07/2025 1053    CREATININE 0.9 05/07/2025 1053    GLU 81 05/07/2025 1053    GLU 93 02/21/2025 0800        Component Value Date/Time    CALCIUM 9.0 05/07/2025 1053    CALCIUM 9.4 02/21/2025 0800    ALKPHOS 52 05/07/2025 1053    ALKPHOS 46 02/21/2025 0800    AST 27 05/07/2025 1053    AST 44 (H) 02/21/2025 0800    ALT 42 05/07/2025 1053    ALT 32 02/21/2025 0800    BILITOT 0.6 05/07/2025 1053    BILITOT 0.5 02/21/2025 0800    ESTGFRAFRICA >60.0 10/01/2021 1353    EGFRNONAA >60.0 10/01/2021 1353        Lab Results   Component Value Date    WBC 8.27 05/07/2025    HGB 12.5 (L) 05/07/2025    HCT 37.6 (L) 05/07/2025    MCV 97 05/07/2025     05/07/2025     *Cleared for procedure on 5/8/25 by Dr. Ya):  Recommendation: Pt cleared for procedure/surgery at moderate CV risk . Pt can hold xarelto 48 hours before procedure.    ECHO (8/15/2024):  LEFT VENTRICLE        o The left ventricle is severely dilated.      o Definity UEA used to better evaluate LV function.      o Ejection Fraction = 25-30%.      o Left ventricular systolic function is moderate to severely reduced.      o There is normal left ventricular wall thickness.     Nuc Stress (5/20/25):  Interpretation Summary  Show Result Comparison     Abnormal myocardial perfusion scan.    There is a moderate intensity, fixed perfusion abnormality consistent with scar in the basal to apical inferolateral wall(s).    The gated perfusion images showed an ejection fraction of 32% at rest. The gated perfusion images showed an ejection fraction of 32% post stress.    The ECG portion of the study is negative for ischemia.    The patient reported no chest pain during the stress test.     There were no arrhythmias during stress.        Pre-op Assessment    I have reviewed the Patient Summary Reports.    I have reviewed the NPO Status.   I have reviewed the Medications.     Review of Systems  Anesthesia Hx:  No problems with previous Anesthesia   History of prior surgery of interest to airway management or planning:  Previous anesthesia: General, MAC          Denies Personal Hx of Anesthesia complications.                    Social:  Non-Smoker       Hematology/Oncology:  Hematology Normal   Oncology Normal                Hematology Comments: 12.5/37.6                    Cardiovascular:    Pacemaker (ICD) Hypertension       CHF       ECG has been reviewed. Bilateral carotid artery   calcification.     Carotid U/S (2014):  IMPRESSION:   No significant abnormality. Trace plaque formation within the right    extracranial carotid artery system without flow-limiting stenosis.    Stenosis is less than 50% diameter reduction.         Atrial-paced rhythm with prolonged AV conduction   Left axis deviation   Nonspecific intraventricular block   Possible Inferior infarct ,age undetermined   Cannot rule out Anterior infarct ,age undetermined   Abnormal ECG   No previous ECGs available   Confirmed by Macey Dos Santos ([id number]) on 5/8/2025 12:42:46 AM                              Pulmonary:    Asthma  Shortness of breath  Sleep Apnea, CPAP                Renal/:  Renal/ Normal                 Musculoskeletal:  Arthritis   DJD        Spine Disorders:  Chronic Pain           Neurological:    Neuromuscular Disease,       Hx of muscular atrophy                             Endocrine:  Diabetes, type 2   BMI 33        Psych:    depression                Physical Exam  General: Well nourished, Cooperative, Alert and Oriented    Airway:  Mallampati: II   Mouth Opening: Normal  TM Distance: Normal  Tongue: Normal  Neck ROM: Normal ROM    Dental:  Intact  Patient denies any currently loose or chipped teeth; Patient denies any  removable dental appliances        Anesthesia Plan  Type of Anesthesia, risks & benefits discussed:    Anesthesia Type: Gen ETT, Regional  Intra-op Monitoring Plan: Standard ASA Monitors and Art Line  Post Op Pain Control Plan: multimodal analgesia and IV/PO Opioids PRN  Induction:  IV  Airway Plan: Direct, Post-Induction  Informed Consent: Informed consent signed with the Patient and all parties understand the risks and agree with anesthesia plan.  All questions answered.   ASA Score: 3  Day of Surgery Review of History & Physical: H&P Update referred to the surgeon/provider.    Ready For Surgery From Anesthesia Perspective.     .

## 2025-05-22 NOTE — OP NOTE
ORTHOPAEDIC SURGERY OPERATIVE REPORT    Patient Name: Randy Yap    Date of Surgery: 5/22/2025    Medical Record #: [id number]     Pre-operative Diagnosis:  Right Rotator Cuff Arthropathy    Post-operative Diagnosis:  Right Rotator Cuff Arthropathy    Procedure:  Right Reverse Total Shoulder Arthroplasty     Primary Surgeon: Eliezer Ortiz MD    First Assistant: Araceli Dominguez PA-C. Skilled assistance was medically necessary for this case to help with extremity positioning, soft tissue retractions and instrumentation.       Implants Utilized:    Implant Name Type Inv. Item Serial No.  Lot No. LRB No. Used Action   2.4 mm Tap Cannulated    DJO  Right 1 Implanted and Explanted   2.4 mm Guidewire    DJO  Right 1 Implanted and Explanted   Fluted Bone Pins    DJO  Right 1 Implanted and Explanted   BASEPLATE GLENOID REV RSP P2 - [id number]  BASEPLATE GLENOID REV RSP P2  DJO [id number] Right 1 Implanted   RSP Glenoid head with retaining screw    DJO [id number] Right 1 Implanted   Altivate reverse torx peripheral screw, 14mm    DJO [id number] Right 2 Implanted   Altivate reverse torx peripheral screw    DJO [id number] Right 1 Implanted   Altivate reverse torx peripheral screw    DJO [id number] Right 1 Implanted   RSP humeral socket insert    DJO [id number] Right 1 Implanted   humeral stem, std. shell    DJO [id number] Right 1 Implanted       Anesthesia:  General endotracheal anesthetic with accompanying regional block.    Complications:  None    Estimated Blood Loss: 300 mL    Indications for Procedure:   Randy Yap is a 75 y.o. year old male who presents at this time with recalcitrant pain and dysfunction secondary to rotator cuff tear arthropathy.  They have persistent pain and activity derived symptoms with deterioration in function and limitations in quality of life.  They have failed conservative options at this time and present today for elective reverse total shoulder  replacement.    Description of Procedure:  This patient was taken to the operating room and placed in a supine position on the operating table.  The patient was correctly identified upon entry and the operative site had been marked by the operating surgeon, and this was verified.  Intravenous antibiotics were administered prior to skin incision.  At this time general endotracheal anesthesia was initiated by the attending anesthesiologist and after successful induction of anesthetic, the patient was then positioned in the modified upright beach chair position.  The head was placed at neutral, all bony prominences were well padded, and the arm was supported in an articulated arm positioner.  After timeout was called the operative extremity was correctly identified by the operating surgeon and was subsequently sterilely prepped and draped in the usual surgical fashion and the procedure was then commenced.    A delto-pectoral approach was carried out in the standard fashion.  After an oblique skin incision was made, dissection was carried down to the deep tissues.  Hemostasis was achieved.  The deltopectoral interval was identified and the cephalic vein was mobilized laterally and protected.  The deltoid and pectoralis major muscles were mobilized and a deep self-retaining retractor was placed.  Adhesions were released up into the subacromial space and the rotator cuff tissue was visualized and found to be deficient.  The conjoint tendon was mobilized medially and protected throughout the case.  The long head of the biceps was identified and soft tissue tenodesis was performed to the upper border of the pectoralis major. The axillary nerve was identified and also protected throughout the procedure. A subscapularis peel was performed to release the residual anterior subscap and anterior capsule with release around the inferior humeral neck with care being taken to avoid damage to surrounding structures.  Osteophytes were  then removed circumferentially from the humeral head.  The humerus was then positioned for preparation and retractors were placed circumferentially.  A provisional humeral cut was made using an extramedullary guide and a protective plate put onto the humeral surface.     The glenoid was circumferentially exposed in the usual fashion.  The subscapularis remnant was mobilized with release of the rotator interval.  Any anterior adhesions as well as deep adhesions to the anterior capsule were incised inferiorly to approximately the apex of the glenoid.  Retractors were placed and the humerus was carefully retracted posteroinferiorly. Direct en face exposure of the glenoid was achieved and glenoid preparation was carried out. A guidepin was placed into the apex of the glenoid vault with a startpoint to allow inferior positioning of the baseplate and approximately 10 degrees of inferior tilt. The glenoid face was reamed to a flat surface with minimal medialization. We measured the screw depth.  We then tapped for the central screw.  The baseplate/screw construct was then opened and screwed into the glenoid with excellent purchase.  Four peripheral locking screws were drilled and placed.  A lateralized glenosphere was then impacted over the baseplate and a securing screw placed.    We then returned to the humerus.  We first reamed for our inlay humeral tray. The humeral canal was then entered through the superior head and medullary reaming was carried out up to 14 mm at which point appropriate chatter was noted. Next broaching was carried out with the final broach left in place. The humerus was visualized and a standard tray was trialed. Subsequent increased offset trays were trialed until appropriate soft tissue tensioning was noted, with care being taken to achieve optimal deltoid tension, stability, and range of motion. The humeral trials and broach were then removed and the appropriate sized humeral stem was impacted  into place. The final implantable tray was then positioned and impacted into place. The joint was reduced and a final check was carried out to confirm satisfactory deltoid tension, stability, and ROM. The axillary nerve was palpated and intact. Satisfied with our arthroplasty, closure was then carried out.    The subscapularis tissue was repaired to the humeral tray using 2 #5 FiberWire sutures in a Bishop edwina configuration. After copious irrigation we proceeded with superficial closure.  A layered closure was carried out utilizing 0 Ethibond sutures to re-approximate the delto-pectoral interval followed by 0 and 2-0 Vicryl sutures in a layered fashion for the skin and a sub-cuticular 3-0 prolene suture for final closure.  Steri-strips were placed and a sterile dressing was placed subsequently.  The patient was then placed in an ultra-sling device and brought into the supine position where he was successfully awakened from anesthetic, transferred to a gurney, and taken in stable condition to the recovery room.      There were no intraoperative complications. Needles and sponge counts were correct. IV antibiotics were started and completed within 30 min of skin incision.    POSTOPERATIVE PLAN:    Reverse Total Shoulder Arthroplasty protocol  NWB RUE in sling  Ok to be out of sling for pendulums, elbow, wrist ROM  Restart xarelto POD#1 for DVT ppx  F/u 10-14 days for wound check/suture removal      Eliezer Ortiz MD

## 2025-05-26 ENCOUNTER — CLINICAL SUPPORT (OUTPATIENT)
Dept: REHABILITATION | Facility: HOSPITAL | Age: 75
End: 2025-05-26
Payer: MEDICARE

## 2025-05-26 DIAGNOSIS — M75.121 COMPLETE TEAR OF RIGHT ROTATOR CUFF, UNSPECIFIED WHETHER TRAUMATIC: ICD-10-CM

## 2025-05-26 DIAGNOSIS — M67.921 TENDINOPATHY OF RIGHT BICEPS TENDON: ICD-10-CM

## 2025-05-26 DIAGNOSIS — M25.611 DECREASED ROM OF RIGHT SHOULDER: Primary | ICD-10-CM

## 2025-05-26 DIAGNOSIS — M62.81 MUSCLE WEAKNESS OF RIGHT ARM: ICD-10-CM

## 2025-05-26 DIAGNOSIS — M19.011 GLENOHUMERAL ARTHRITIS, RIGHT: ICD-10-CM

## 2025-05-26 PROCEDURE — 97110 THERAPEUTIC EXERCISES: CPT | Mod: PN

## 2025-05-26 PROCEDURE — 97161 PT EVAL LOW COMPLEX 20 MIN: CPT | Mod: PN

## 2025-05-26 NOTE — ANESTHESIA POSTPROCEDURE EVALUATION
Anesthesia Post Evaluation    Patient: Randy Yap    Procedure(s) Performed: Procedure(s) (LRB):  ARTHROPLASTY, SHOULDER, TOTAL, REVERSE (Right)    Final Anesthesia Type: general (+ Regional)      Patient location during evaluation: PACU  Patient participation: Yes- Able to Participate  Level of consciousness: awake and alert and oriented  Post-procedure vital signs: reviewed and stable  Pain management: adequate  Airway patency: patent  HONEY mitigation strategies: Verification of full reversal of neuromuscular block  PONV status at discharge: No PONV  Anesthetic complications: no      Cardiovascular status: blood pressure returned to baseline and hemodynamically stable  Respiratory status: unassisted  Hydration status: euvolemic  Follow-up not needed.              Vitals Value Taken Time   /78 05/22/25 16:29   Temp     Pulse 70 05/22/25 16:33   Resp 12 05/22/25 16:32   SpO2 94 % 05/22/25 16:33   Vitals shown include unfiled device data.      Event Time   Out of Recovery 16:36:00         Pain/Armand Score: No data recorded

## 2025-05-26 NOTE — PROGRESS NOTES
Outpatient Rehab    Physical Therapy Evaluation    Patient Name: Randy Yap  MRN: [id number]  YOB: 1950  Encounter Date: 5/26/2025    Therapy Diagnosis:   Encounter Diagnoses   Name Primary?    Glenohumeral arthritis, right     Complete tear of right rotator cuff, unspecified whether traumatic     Tendinopathy of right biceps tendon     Decreased ROM of right shoulder Yes    Muscle weakness of right arm      Physician: Eliezer Ortiz    Physician Orders: Eval and Treat  Medical Diagnosis: Glenohumeral arthritis, right  Complete tear of right rotator cuff, unspecified whether traumatic  Tendinopathy of right biceps tendon    Visit # / Visits Authorized:  1 / 1  Insurance Authorization Period: 4/23/2025 to 4/23/2026  Date of Evaluation: 5/26/2025  Plan of Care Certification: 5/26/2025 to 8/18/2025     Time In: 1100   Time Out: 1145  Total Time (in minutes): 45   Total Billable Time (in minutes): 45    Intake Outcome Measure for FOTO Survey    Therapist reviewed FOTO scores for Randy Yap on 5/26/2025.   FOTO report - see Media section or FOTO account episode details.     Intake Score: 7%    Precautions:       Subjective   History of Present Illness  Randy is a 75 y.o. male who reports to physical therapy with a chief concern of shoulder pain, decreased functional mobility.     The patient reports a medical diagnosis of M19.011 (ICD-10-CM) - Glenohumeral arthritis, right  M75.121 (ICD-10-CM) - Complete tear of right rotator cuff, unspecified whether traumatic  M67.921 (ICD-10-CM) - Tendinopathy of right biceps tendon.    Diagnostic tests related to this condition: X-ray.   X-Ray Details: Impression:  FINDINGS/  Right shoulder replacement without dislocation or loosening.  Question surgical drain on AP view which may reflect overlying structures.  Air within the soft tissues thought to be postoperative.  Moderate AC joint degenerative change.  Multiple leads project over the  chest.    Dominant Hand: Right  History of Present Condition/Illness: Patient reports he was working in his shop and tripped over some cords and injured right shoulder.  Patient reports he received some treatment from chiropractor with some exercises.  Patient had continued weakness, pain, and limited mobility of right arm and underwent surgery on 5/22/2025.  Patient has been able to tolerate home exercises and has been able to control shoulder pain with medication.  Patient reports he is sleeping on reclining sofa and has been compliant with wearing his sling.  Patient reports he has post op follow up next week 6/3/25.    Activities of Daily Living  Social history was obtained from Patient.               Previously independent with activities of daily living? Yes     Currently independent with activities of daily living? No  Activities currently needing assistance include Bathing, Dressing - lower body, and Dressing - upper body.            Pain     Patient reports a current pain level of 2/10. Pain at best is reported as 2/10. Pain at worst is reported as 8/10.   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    Randy Yap  has a past surgical history that  includes Hernia repair; Pericardial window (12-15 years ago); tonsillectomy; Varicocelectomy; Colonoscopy w/ polypectomy (05/21/2013); pace maker  (10/06/2014); Cardiac defibrillator placement (10/06/2014); Left heart catheterization (Left, 07/09/2018); Eye surgery (Cataract); Tonsillectomy; Cosmetic surgery; Colonoscopy (N/A, 4/19/2023); replacement of implantable cardioverter-defibrillator (icd) generator (Left, 5/7/2024); and Reverse total shoulder arthroplasty (Right, 5/22/2025).    Randy has a current medication list which includes the following prescription(s): acetaminophen, acetaminophen, albuterol, alprazolam, amiodarone, bepotastine besilate, carvedilol, co-enzyme q-10, farxiga, ferrous sulfate, arnuity ellipta, furosemide, glucosamine-chondroitin, meclizine, mexiletine, multivitamin, ondansetron, oxycodone, pravastatin, restasis, rivaroxaban, entresto, semaglutide, spironolactone, and tramadol.    Review of patient's allergies indicates:  No Known Allergies     Objective   Bracing  Patient presents with a Right shoulder brace.   DonJoy Ultrasling III        Posture  Patient presents with a Forward head position.     Shoulders are Rounded.             Upper Extremity Sensation  Right Upper Extremity Sensation  Intact: Light Touch                     Shoulder Palpation     Tenderness noted over upper trapezius, posterior rotator cuff musculature, deltoids, pec major, bicep, brachioradialis                Shoulder Range of Motion  Right Shoulder   Active (deg) Passive (deg) Pain   Flexion   55     Extension         Scaption         ABduction   55     ADduction         Horizontal ABduction         Horizontal ADduction         External Rotation (Shoulder ABducted 0 degrees)         External Rotation (Shoulder ABducted 45 degrees)   25     External Rotation (Shoulder ABducted 90 degrees)         Internal Rotation (Shoulder ABducted 0 degrees)         Internal Rotation (Shoulder ABducted 45 degrees)          Internal Rotation (Shoulder ABducted 90 degrees)           Left Shoulder   Active (deg) Passive (deg) Pain   Flexion 155 155     Extension         Scaption         ABduction 160 160     ADduction         Horizontal ABduction         Horizontal ADduction         External Rotation (Shoulder ABducted 0 degrees)         External Rotation (Shoulder ABducted 45 degrees)         External Rotation (Shoulder ABducted 90 degrees)         Internal Rotation (Shoulder ABducted 0 degrees)         Internal Rotation (Shoulder ABducted 45 degrees)         Internal Rotation (Shoulder ABducted 90 degrees)                  Elbow/Forearm Range of Motion   Right Elbow/Forearm   Active (deg) Passive (deg) Pain   Flexion   125     Extension   -8     Forearm Pronation   80     Forearm Supination   80                   Shoulder Strength - Planes of Motion   Right Strength Right Pain Left Strength Left  Pain   Flexion           Extension           ABduction           ADduction           Horizontal ABduction           Horizontal ADduction           Internal Rotation 0°           Internal Rotation 90°           External Rotation 0°           External Rotation 90°               Shoulder Strength Details  Manual muscle testing deferred due to post op restrictions                 Treatment:  Therapeutic Exercise  TE 1: patient education, review of home exercise program and adjustment of sling  Manual Therapy  MT 1: passive range of right shoulder into flexion abduction and rotation within post op protocol      Time Entry(in minutes):  PT Evaluation (Low) Time Entry: 30  Therapeutic Exercise Time Entry: 15    Assessment & Plan   Assessment  Randy presents with a condition of Moderate complexity.   Presentation of Symptoms: Stable       Functional Limitations: Activity tolerance, Carrying objects, Completing work/school activities, Decreased ambulation distance/endurance, Driving, Disrupted sleep pattern, Functional mobility, Getting off the  floor, Maintaining balance, Increased risk of fall, Manipulating objects, Pain when reaching, Pain with ADLs/IADLs, Participating in leisure activities, Performing household chores, Range of motion, Reaching  Impairments: Abnormal or restricted range of motion, Activity intolerance, Impaired balance, Impaired physical strength, Pain with functional activity  Personal Factors Affecting Prognosis: Pain    Patient Goal for Therapy (PT): improve strength and mobility of right shoulder/arm  Prognosis: Good  Assessment Details: Patient is a 75 year old male referred to physical therapy following reverse total shoulder surgery on 5/22/2025.  Patient presents with signs and symptoms consistent with the diagnosis.  Pt is noted to have forward head, rounded shoulder posture, decreased ROM, decreased shoulder strength, decreased scapular stabilization strength, and tight tender surrounding musculature.  Patient is unable to use right arm at this time due to post surgical restrictions which limits his ability to perform ADL's.  Pt would benefit from manual therapy, UE stretching and strengthening, scapular stabilization exercises, posture exercises, and modalities to decrease pain, improve functional mobility, and return to prior level of function.      Plan  From a physical therapy perspective, the patient would benefit from: Skilled Rehab Services    Planned therapy interventions include: Therapeutic exercise, Therapeutic activities, Neuromuscular re-education, and Manual therapy.    Planned modalities to include: Cryotherapy (cold pack).        Visit Frequency: 2 times Per Week for 12 Weeks.       This plan was discussed with Patient.   Discussion participants: Agreed Upon Plan of Care             The patient's spiritual, cultural, and educational needs were considered, and the patient is agreeable to the plan of care and goals.     Education  Education was done with Patient. The patient's learning style includes  Demonstration, Listening, and Pictures/video. The patient Demonstrates understanding and Verbalizes understanding.                 Goals:   Active       Long Term Goals       Patient will have improved passive right shoulder flexion to 140 degrees       Start:  05/26/25    Expected End:  06/23/25            Patient will be compliant with HEP to promote the independent management of current diagnosis.        Start:  05/26/25    Expected End:  07/21/25            Patient will improve active shoulder flexion to 140 degrees to improve reaching overhead during ADL's.       Start:  05/26/25    Expected End:  07/21/25            Patient will improve strength of right shoulder to 4/5 to improve tolerance to normal daily activities.        Start:  05/26/25    Expected End:  07/21/25            Patient will improve FOTO status from 7 to 53       Start:  05/26/25    Expected End:  07/21/25                Randy Adhikari PT

## 2025-05-28 ENCOUNTER — CLINICAL SUPPORT (OUTPATIENT)
Dept: REHABILITATION | Facility: HOSPITAL | Age: 75
End: 2025-05-28
Payer: MEDICARE

## 2025-05-28 DIAGNOSIS — M25.611 DECREASED ROM OF RIGHT SHOULDER: Primary | ICD-10-CM

## 2025-05-28 DIAGNOSIS — M62.81 MUSCLE WEAKNESS OF RIGHT ARM: ICD-10-CM

## 2025-05-28 PROCEDURE — 97110 THERAPEUTIC EXERCISES: CPT | Mod: PN

## 2025-05-28 PROCEDURE — 97112 NEUROMUSCULAR REEDUCATION: CPT | Mod: PN

## 2025-05-28 PROCEDURE — 97140 MANUAL THERAPY 1/> REGIONS: CPT | Mod: PN

## 2025-05-28 NOTE — PROGRESS NOTES
"  Outpatient Rehab    Physical Therapy Visit    Patient Name: Randy Yap  MRN: [id number]  YOB: 1950  Encounter Date: 5/28/2025    Therapy Diagnosis:   Encounter Diagnoses   Name Primary?    Decreased ROM of right shoulder Yes    Muscle weakness of right arm      Physician: Eliezer Ortiz    Physician Orders: Eval and Treat  Medical Diagnosis: Glenohumeral arthritis, right  Complete tear of right rotator cuff, unspecified whether traumatic  Tendinopathy of right biceps tendon    Visit # / Visits Authorized:  1 / 12  Insurance Authorization Period: 5/26/2025 to 12/31/2025  Date of Evaluation: 5/26/2025  Plan of Care Certification: 5/26/2025 to 8/18/2025      PT/PTA:     Number of PTA visits since last PT visit:   Time In: 1000   Time Out: 1045  Total Time (in minutes): 45   Total Billable Time (in minutes): 40    FOTO:  Intake Score:  %  Survey Score 2:  %  Survey Score 3:  %    Precautions:       Subjective   He has been compliant with wearing sling and performing home exercises.  He does not have pain, but stiffness and post surgical soreness..  Pain reported as 2/10. right shoulder    Objective            Treatment:  Therapeutic Exercise  TE 1: patient education, review of home exercise program and adjustment of sling  TE 2: Codman's (front/back and side/side) x 2 min each  TE 3: right wrist AROM 3 x 10 each  Manual Therapy  MT 1: passive range of right shoulder into flexion abduction and rotation within post op protocol  MT 2: prom of right elbow  Balance/Neuromuscular Re-Education  NMR 1: scapular retractions 3 x 10, 3" holds  NMR 2: right shoulder deltoid isometrics 3 x 10 each, 3" holds    Time Entry(in minutes):  Manual Therapy Time Entry: 15  Neuromuscular Re-Education Time Entry: 10  Therapeutic Exercise Time Entry: 15    Assessment & Plan   Assessment: Patient has been compliant with home exercises and wearing sling.  He is noted to have significant improvement in passive " shoulder flexion as compared to evaluation with today's PROM into flexion being 115 degrees.  Patient able to perform above exercises and tolerated manual therapy without increased pain reported.  He will continue with progression per post op protocol.  Evaluation/Treatment Tolerance: Patient tolerated treatment well    The patient will continue to benefit from skilled outpatient physical therapy in order to address the deficits listed in the problem list on the initial evaluation, provide patient and family education, and maximize the patients level of independence in the home and community environments.     The patient's spiritual, cultural, and educational needs were considered, and the patient is agreeable to the plan of care and goals.           Plan: Continue with PT POC and progress per post op protocol    Goals:   Active       Long Term Goals       Patient will have improved passive right shoulder flexion to 140 degrees (Progressing)       Start:  05/26/25    Expected End:  06/23/25            Patient will be compliant with HEP to promote the independent management of current diagnosis.  (Progressing)       Start:  05/26/25    Expected End:  07/21/25            Patient will improve active shoulder flexion to 140 degrees to improve reaching overhead during ADL's. (Progressing)       Start:  05/26/25    Expected End:  07/21/25            Patient will improve strength of right shoulder to 4/5 to improve tolerance to normal daily activities.  (Progressing)       Start:  05/26/25    Expected End:  07/21/25            Patient will improve FOTO status from 7 to 53 (Progressing)       Start:  05/26/25    Expected End:  07/21/25                Randy Adhikari, PT

## 2025-05-31 ENCOUNTER — EXTERNAL CHRONIC CARE MANAGEMENT (OUTPATIENT)
Dept: PRIMARY CARE CLINIC | Facility: CLINIC | Age: 75
End: 2025-05-31
Payer: MEDICARE

## 2025-05-31 PROCEDURE — 99490 CHRNC CARE MGMT STAFF 1ST 20: CPT | Mod: S$PBB,,, | Performed by: FAMILY MEDICINE

## 2025-05-31 PROCEDURE — 99439 CHRNC CARE MGMT STAF EA ADDL: CPT | Mod: S$PBB,,, | Performed by: FAMILY MEDICINE

## 2025-05-31 PROCEDURE — 99439 CHRNC CARE MGMT STAF EA ADDL: CPT | Mod: PBBFAC,PO | Performed by: FAMILY MEDICINE

## 2025-05-31 PROCEDURE — 99490 CHRNC CARE MGMT STAFF 1ST 20: CPT | Mod: PBBFAC,PO | Performed by: FAMILY MEDICINE

## 2025-06-02 ENCOUNTER — CLINICAL SUPPORT (OUTPATIENT)
Dept: REHABILITATION | Facility: HOSPITAL | Age: 75
End: 2025-06-02
Payer: MEDICARE

## 2025-06-02 DIAGNOSIS — M25.611 DECREASED ROM OF RIGHT SHOULDER: Primary | ICD-10-CM

## 2025-06-02 DIAGNOSIS — M62.81 MUSCLE WEAKNESS OF RIGHT ARM: ICD-10-CM

## 2025-06-02 PROCEDURE — 97110 THERAPEUTIC EXERCISES: CPT | Mod: PN

## 2025-06-02 PROCEDURE — 97140 MANUAL THERAPY 1/> REGIONS: CPT | Mod: PN

## 2025-06-02 PROCEDURE — 97112 NEUROMUSCULAR REEDUCATION: CPT | Mod: PN

## 2025-06-03 ENCOUNTER — HOSPITAL ENCOUNTER (OUTPATIENT)
Dept: RADIOLOGY | Facility: HOSPITAL | Age: 75
Discharge: HOME | End: 2025-06-03
Attending: STUDENT IN AN ORGANIZED HEALTH CARE EDUCATION/TRAINING PROGRAM
Payer: MEDICARE

## 2025-06-03 ENCOUNTER — OFFICE VISIT (OUTPATIENT)
Dept: ORTHOPEDICS | Facility: CLINIC | Age: 75
End: 2025-06-03
Payer: MEDICARE

## 2025-06-03 VITALS — BODY MASS INDEX: 32.01 KG/M2 | WEIGHT: 223.56 LBS | HEIGHT: 70 IN

## 2025-06-03 DIAGNOSIS — Z96.611 STATUS POST REVERSE TOTAL ARTHROPLASTY OF RIGHT SHOULDER: Primary | ICD-10-CM

## 2025-06-03 DIAGNOSIS — M19.011 GLENOHUMERAL ARTHRITIS, RIGHT: ICD-10-CM

## 2025-06-03 PROCEDURE — 73030 X-RAY EXAM OF SHOULDER: CPT | Mod: TC,RT

## 2025-06-03 PROCEDURE — 99999 PR PBB SHADOW E&M-EST. PATIENT-LVL III: CPT | Mod: PBBFAC,,, | Performed by: NURSE PRACTITIONER

## 2025-06-03 PROCEDURE — 73030 X-RAY EXAM OF SHOULDER: CPT | Mod: 26,RT,, | Performed by: RADIOLOGY

## 2025-06-03 PROCEDURE — 99024 POSTOP FOLLOW-UP VISIT: CPT | Mod: POP,,, | Performed by: NURSE PRACTITIONER

## 2025-06-03 PROCEDURE — 99213 OFFICE O/P EST LOW 20 MIN: CPT | Mod: PBBFAC,25 | Performed by: NURSE PRACTITIONER

## 2025-06-04 ENCOUNTER — CLINICAL SUPPORT (OUTPATIENT)
Dept: REHABILITATION | Facility: HOSPITAL | Age: 75
End: 2025-06-04
Payer: MEDICARE

## 2025-06-04 DIAGNOSIS — M62.81 MUSCLE WEAKNESS OF RIGHT ARM: ICD-10-CM

## 2025-06-04 DIAGNOSIS — M25.611 DECREASED ROM OF RIGHT SHOULDER: Primary | ICD-10-CM

## 2025-06-04 PROCEDURE — 97110 THERAPEUTIC EXERCISES: CPT | Mod: PN

## 2025-06-04 PROCEDURE — 97112 NEUROMUSCULAR REEDUCATION: CPT | Mod: PN

## 2025-06-09 ENCOUNTER — CLINICAL SUPPORT (OUTPATIENT)
Dept: CARDIOLOGY | Facility: HOSPITAL | Age: 75
End: 2025-06-09
Payer: MEDICARE

## 2025-06-09 DIAGNOSIS — Z95.810 PRESENCE OF AUTOMATIC (IMPLANTABLE) CARDIAC DEFIBRILLATOR: ICD-10-CM

## 2025-06-09 DIAGNOSIS — I48.0 PAROXYSMAL ATRIAL FIBRILLATION: ICD-10-CM

## 2025-06-10 ENCOUNTER — CLINICAL SUPPORT (OUTPATIENT)
Dept: REHABILITATION | Facility: HOSPITAL | Age: 75
End: 2025-06-10
Payer: MEDICARE

## 2025-06-10 DIAGNOSIS — M62.81 MUSCLE WEAKNESS OF RIGHT ARM: ICD-10-CM

## 2025-06-10 DIAGNOSIS — M25.611 DECREASED ROM OF RIGHT SHOULDER: Primary | ICD-10-CM

## 2025-06-10 PROCEDURE — 97110 THERAPEUTIC EXERCISES: CPT | Mod: PN

## 2025-06-10 PROCEDURE — 97112 NEUROMUSCULAR REEDUCATION: CPT | Mod: PN

## 2025-06-12 ENCOUNTER — CLINICAL SUPPORT (OUTPATIENT)
Dept: REHABILITATION | Facility: HOSPITAL | Age: 75
End: 2025-06-12
Payer: MEDICARE

## 2025-06-12 DIAGNOSIS — M62.81 MUSCLE WEAKNESS OF RIGHT ARM: ICD-10-CM

## 2025-06-12 DIAGNOSIS — M25.611 DECREASED ROM OF RIGHT SHOULDER: Primary | ICD-10-CM

## 2025-06-12 PROCEDURE — 97110 THERAPEUTIC EXERCISES: CPT | Mod: PN

## 2025-06-12 PROCEDURE — 97112 NEUROMUSCULAR REEDUCATION: CPT | Mod: PN

## 2025-06-12 NOTE — PROGRESS NOTES
"  Outpatient Rehab    Physical Therapy Visit    Patient Name: Randy Yap  MRN: [id number]  YOB: 1950  Encounter Date: 6/12/2025    Therapy Diagnosis:   Encounter Diagnoses   Name Primary?    Decreased ROM of right shoulder Yes    Muscle weakness of right arm      Physician: Eliezer Ortiz    Physician Orders: Eval and Treat  Medical Diagnosis: Glenohumeral arthritis, right  Complete tear of right rotator cuff, unspecified whether traumatic  Tendinopathy of right biceps tendon  Surgical Diagnosis: Not applicable for this Episode   Surgical Date: Not applicable for this Episode    Visit # / Visits Authorized:  5 / 12  Insurance Authorization Period: 5/26/2025 to 12/31/2025  Date of Evaluation: 5/26/2025  Plan of Care Certification: 5/26/2025 to 8/18/2025      PT/PTA:     Number of PTA visits since last PT visit:   Time In: 1100   Time Out: 1155  Total Time (in minutes): 55   Total Billable Time (in minutes): 45    FOTO:  Intake Score:  %  Survey Score 2:  %  Survey Score 3:  %    Precautions:       Subjective   he was able to push empty shopping cart, but was unable to tolerate once it was weighted down with some potting soil.  He did not have increased soreness following last treatment and has continued with home exercises..  Pain reported as 1/10.      Objective            Treatment:  Therapeutic Exercise  TE 1: UBE x 6 minutes (AAROM of right shoulder)  TE 2: Codman's (front/back and side/side) x 2 min each  TE 3: shoulder ladder x 20 reps  TE 4: shoulder rolls x 30 reps (forward and backward)  TE 5: shoulder pulley x 3 minutes into flexion  TE 6: seated swiss ball roll out x 3 minutes (AAROM flexion)  Manual Therapy  MT 1: passive range of right shoulder into flexion abduction and rotation within post op protocol  MT 2: prom of right elbow  Balance/Neuromuscular Re-Education  NMR 1: scapular retractions 3 x 10, 3" holds, green tubes  NMR 2: Standing right shoulder deltoid isometrics 3 x " "10 each, 3" holds    Time Entry(in minutes):  Manual Therapy Time Entry: 10  Neuromuscular Re-Education Time Entry: 10  Therapeutic Exercise Time Entry: 25    Assessment & Plan   Assessment: Patient has improving ROM of shoulder with decreased pain and muscle guarding noted throughout treatment.  Patient will continue with current treatment plan and progress as tolerated.  Evaluation/Treatment Tolerance: Patient tolerated treatment well    The patient will continue to benefit from skilled outpatient physical therapy in order to address the deficits listed in the problem list on the initial evaluation, provide patient and family education, and maximize the patients level of independence in the home and community environments.     The patient's spiritual, cultural, and educational needs were considered, and the patient is agreeable to the plan of care and goals.           Plan: Continue with PT POC and progress per post op protocol    Goals:   Active       Long Term Goals       Patient will have improved passive right shoulder flexion to 140 degrees (Progressing)       Start:  05/26/25    Expected End:  06/23/25            Patient will be compliant with HEP to promote the independent management of current diagnosis.  (Progressing)       Start:  05/26/25    Expected End:  07/21/25            Patient will improve active shoulder flexion to 140 degrees to improve reaching overhead during ADL's. (Progressing)       Start:  05/26/25    Expected End:  07/21/25            Patient will improve strength of right shoulder to 4/5 to improve tolerance to normal daily activities.  (Progressing)       Start:  05/26/25    Expected End:  07/21/25            Patient will improve FOTO status from 7 to 53 (Progressing)       Start:  05/26/25    Expected End:  07/21/25                Randy Adhikari, PT    "

## 2025-06-17 ENCOUNTER — CLINICAL SUPPORT (OUTPATIENT)
Dept: REHABILITATION | Facility: HOSPITAL | Age: 75
End: 2025-06-17
Payer: MEDICARE

## 2025-06-17 DIAGNOSIS — M62.81 MUSCLE WEAKNESS OF RIGHT ARM: ICD-10-CM

## 2025-06-17 DIAGNOSIS — M25.611 DECREASED ROM OF RIGHT SHOULDER: Primary | ICD-10-CM

## 2025-06-17 PROCEDURE — 97110 THERAPEUTIC EXERCISES: CPT | Mod: PN

## 2025-06-17 PROCEDURE — 97112 NEUROMUSCULAR REEDUCATION: CPT | Mod: PN

## 2025-06-17 NOTE — PROGRESS NOTES
"  Outpatient Rehab    Physical Therapy Visit    Patient Name: Randy Yap  MRN: [id number]  YOB: 1950  Encounter Date: 6/17/2025    Therapy Diagnosis:   Encounter Diagnoses   Name Primary?    Decreased ROM of right shoulder Yes    Muscle weakness of right arm      Physician: Eliezer Ortiz    Physician Orders: Eval and Treat  Medical Diagnosis: Glenohumeral arthritis, right  Complete tear of right rotator cuff, unspecified whether traumatic  Tendinopathy of right biceps tendon  Surgical Diagnosis: Not applicable for this Episode   Surgical Date: Not applicable for this Episode  Days Since Last Surgery: Not applicable for this Episode    Visit # / Visits Authorized:  6 / 12  Insurance Authorization Period: 5/26/2025 to 12/31/2025  Date of Evaluation: 5/26/2025  Plan of Care Certification: 5/26/2025 to 8/18/2025      PT/PTA:     Number of PTA visits since last PT visit:   Time In: 1100   Time Out: 1155  Total Time (in minutes): 55   Total Billable Time (in minutes): 30    FOTO:  Intake Score:  %  Survey Score 2:  %  Survey Score 3:  %    Precautions:       Subjective   He has not been wearing his sling at home, but has some increased soreness towards the end of the day..  Pain reported as 1/10.      Objective            Treatment:  Therapeutic Exercise  TE 1: UBE x 6 minutes (AAROM of right shoulder)  TE 2: Codman's (front/back and side/side) x 2 min each  TE 3: shoulder ladder x 20 reps  TE 4: shoulder rolls x 30 reps (forward and backward)  TE 5: shoulder pulley x 3 minutes into flexion  TE 6: seated swiss ball roll out x 3 minutes (AAROM flexion)  TE 7: patient education on post op precautions and progression  Manual Therapy  MT 1: passive range of right shoulder into flexion abduction and rotation within post op protocol  MT 2: prom of right elbow  Balance/Neuromuscular Re-Education  NMR 1: scapular retractions 3 x 10, 3" holds, green tubes  NMR 2: Standing right shoulder deltoid " "isometrics 3 x 10 each, 3" holds    *A portion of this treatment session is provided with the assistance of a skilled rehbailitation technician under the supervision of a licensed physical therapist        Time Entry(in minutes):  Manual Therapy Time Entry: 10  Neuromuscular Re-Education Time Entry: 10  Therapeutic Exercise Time Entry: 25    Assessment & Plan   Assessment: Patient with decreased pain and muscle guarding noted during manual therapy.  He has been compliant with HEP and reporting ability to use right arm to stir food.  Evaluation/Treatment Tolerance: Patient tolerated treatment well    The patient will continue to benefit from skilled outpatient physical therapy in order to address the deficits listed in the problem list on the initial evaluation, provide patient and family education, and maximize the patients level of independence in the home and community environments.     The patient's spiritual, cultural, and educational needs were considered, and the patient is agreeable to the plan of care and goals.           Plan: Continue with PT POC and progress per post op protocol    Goals:   Active       Long Term Goals       Patient will have improved passive right shoulder flexion to 140 degrees (Progressing)       Start:  05/26/25    Expected End:  06/23/25            Patient will be compliant with HEP to promote the independent management of current diagnosis.  (Progressing)       Start:  05/26/25    Expected End:  07/21/25            Patient will improve active shoulder flexion to 140 degrees to improve reaching overhead during ADL's. (Progressing)       Start:  05/26/25    Expected End:  07/21/25            Patient will improve strength of right shoulder to 4/5 to improve tolerance to normal daily activities.  (Progressing)       Start:  05/26/25    Expected End:  07/21/25            Patient will improve FOTO status from 7 to 53 (Progressing)       Start:  05/26/25    Expected End:  07/21/25       "          Randy Adhikari, PT

## 2025-06-18 RX ORDER — MEXILETINE HYDROCHLORIDE 150 MG/1
CAPSULE ORAL
Qty: 270 CAPSULE | Refills: 3 | Status: SHIPPED | OUTPATIENT
Start: 2025-06-18

## 2025-06-21 NOTE — PROGRESS NOTES
Orthopaedics  Post-operative follow-up    Procedure Performed:  Right Reverse Total Shoulder Arthroplasty      Date of Surgery: 5/22/25    Subjective: Randy Yap is now approximately 6 weeks out from his shoulder surgery.  He has been compliant with post-operative restrictions and has been progressing with PT at [facility].  His pain and function continue to improve.     Exam:  Incision sites healed, C/D/I  No swelling or bruising noted  Fluid ROM with no crepitus  Upright Active ROM: , ABD 90, ER 30  Able to touch top and back of head  Deltoid strength intact on limited testing   Axillary nerve sensation and motor intact  Motor and sensory intact distally  Strong radial pulse, fingers warm and well perfused    Imaging:  X-ray Shoulder 2 or More Views Right  Narrative: EXAMINATION:  XR SHOULDER COMPLETE 2 OR MORE VIEWS RIGHT    CLINICAL HISTORY:  Primary osteoarthritis, right shoulder    TECHNIQUE:  XR SHOULDER COMPLETE 2 OR MORE VIEWS RIGHT    COMPARISON:  06/03/2025.    FINDINGS:  Status post reverse total shoulder arthroplasty.  No evidence of hardware loosening or failure.  No significant change in alignment.  Impression: As above.    Electronically signed by: Ariel Goncalves  Date:    07/02/2025  Time:    12:39        Impression:  S/p Right Reverse Total Shoulder Arthroplasty, second post-operative visit - doing well    Plan:  Overall he is doing well and progressing with range of motion.  Advance PT per protocol  Symptomatic treatment for pain / swelling  May advance activities as reviewed today: Discontinue sling, initiate progression of AAROM and AROM.   Instructed patient to call clinic if questions or concerns    Follow-up in 6 weeks at 3 months post-op (around 8/22/25)          Eliezer Ortiz MD    I, Artur Alicea, acted as a scribe for Eliezer Ortiz MD for the duration of this office visit.

## 2025-06-23 ENCOUNTER — CLINICAL SUPPORT (OUTPATIENT)
Dept: REHABILITATION | Facility: HOSPITAL | Age: 75
End: 2025-06-23
Payer: MEDICARE

## 2025-06-23 DIAGNOSIS — M62.81 MUSCLE WEAKNESS OF RIGHT ARM: ICD-10-CM

## 2025-06-23 DIAGNOSIS — M25.611 DECREASED ROM OF RIGHT SHOULDER: Primary | ICD-10-CM

## 2025-06-23 PROCEDURE — 97110 THERAPEUTIC EXERCISES: CPT | Mod: PN

## 2025-06-23 PROCEDURE — 97112 NEUROMUSCULAR REEDUCATION: CPT | Mod: PN

## 2025-06-23 NOTE — PROGRESS NOTES
"  Outpatient Rehab    Physical Therapy Visit    Patient Name: Randy Yap  MRN: [id number]  YOB: 1950  Encounter Date: 6/23/2025    Therapy Diagnosis:   Encounter Diagnoses   Name Primary?    Decreased ROM of right shoulder Yes    Muscle weakness of right arm      Physician: Eliezer Ortiz    Physician Orders: Eval and Treat  Medical Diagnosis: Glenohumeral arthritis, right  Complete tear of right rotator cuff, unspecified whether traumatic  Tendinopathy of right biceps tendon  Surgical Diagnosis: Not applicable for this Episode   Surgical Date: Not applicable for this Episode  Days Since Last Surgery: Not applicable for this Episode    Visit # / Visits Authorized:  7 / 12  Insurance Authorization Period: 5/26/2025 to 12/31/2025  Date of Evaluation: 5/26/2025  Plan of Care Certification: 5/26/2025 to 8/18/2025      PT/PTA:     Number of PTA visits since last PT visit:   Time In: 1100   Time Out: 1150  Total Time (in minutes): 50   Total Billable Time (in minutes): 30    FOTO:  Intake Score:  %  Survey Score 2:  %  Survey Score 3:  %    Precautions:       Subjective   He has been using his arm a little around his house.  He has not been wearing sling in his house..  Pain reported as 1/10. right shoulder    Objective            Treatment:  Therapeutic Exercise  TE 1: UBE x 6 minutes (AAROM of right shoulder)  TE 2: Codman's (front/back and side/side and circles) x 2 min each  TE 3: shoulder ladder x 2 minutes  TE 4: shoulder rolls x 30 reps (forward and backward)  TE 5: shoulder pulley x 3 minutes into flexion  TE 6: seated swiss ball roll out x 3 minutes (AAROM flexion)  Manual Therapy  MT 1: passive range of right shoulder into flexion abduction and rotation within post op protocol  Balance/Neuromuscular Re-Education  NMR 1: scapular retractions 3 x 10, 3" holds, green tubes  NMR 2: Standing right shoulder deltoid isometrics 3 x 10 each, 3" holds    Time Entry(in minutes):  Manual Therapy " Time Entry: 5  Neuromuscular Re-Education Time Entry: 10  Therapeutic Exercise Time Entry: 25    Assessment & Plan   Assessment: Patient has improved mobility of right shoulder with decreased pain and muscle guarding during manual therapy.  He has improved ROM noted during finger ladder.  Evaluation/Treatment Tolerance: Patient tolerated treatment well    The patient will continue to benefit from skilled outpatient physical therapy in order to address the deficits listed in the problem list on the initial evaluation, provide patient and family education, and maximize the patients level of independence in the home and community environments.     The patient's spiritual, cultural, and educational needs were considered, and the patient is agreeable to the plan of care and goals.           Plan: Continue with PT POC and progress per post op protocol    Goals:   Active       Long Term Goals       Patient will have improved passive right shoulder flexion to 140 degrees (Progressing)       Start:  05/26/25    Expected End:  06/23/25            Patient will be compliant with HEP to promote the independent management of current diagnosis.  (Progressing)       Start:  05/26/25    Expected End:  07/21/25            Patient will improve active shoulder flexion to 140 degrees to improve reaching overhead during ADL's. (Progressing)       Start:  05/26/25    Expected End:  07/21/25            Patient will improve strength of right shoulder to 4/5 to improve tolerance to normal daily activities.  (Progressing)       Start:  05/26/25    Expected End:  07/21/25            Patient will improve FOTO status from 7 to 53 (Progressing)       Start:  05/26/25    Expected End:  07/21/25                Randy Adhikari, PT

## 2025-06-25 ENCOUNTER — CLINICAL SUPPORT (OUTPATIENT)
Dept: REHABILITATION | Facility: HOSPITAL | Age: 75
End: 2025-06-25
Payer: MEDICARE

## 2025-06-25 DIAGNOSIS — M25.611 DECREASED ROM OF RIGHT SHOULDER: Primary | ICD-10-CM

## 2025-06-25 DIAGNOSIS — M62.81 MUSCLE WEAKNESS OF RIGHT ARM: ICD-10-CM

## 2025-06-25 PROCEDURE — 97110 THERAPEUTIC EXERCISES: CPT | Mod: PN

## 2025-06-25 PROCEDURE — 97112 NEUROMUSCULAR REEDUCATION: CPT | Mod: PN

## 2025-06-25 NOTE — PROGRESS NOTES
"  Outpatient Rehab    Physical Therapy Visit    Patient Name: Randy Yap  MRN: [id number]  YOB: 1950  Encounter Date: 6/25/2025    Therapy Diagnosis:   Encounter Diagnoses   Name Primary?    Decreased ROM of right shoulder Yes    Muscle weakness of right arm      Physician: Eliezer Ortiz    Physician Orders: Eval and Treat  Medical Diagnosis: Glenohumeral arthritis, right  Complete tear of right rotator cuff, unspecified whether traumatic  Tendinopathy of right biceps tendon  Surgical Diagnosis: Not applicable for this Episode   Surgical Date: Not applicable for this Episode  Days Since Last Surgery: Not applicable for this Episode    Visit # / Visits Authorized:  8 / 22  Insurance Authorization Period: 5/26/2025 to 12/31/2025  Date of Evaluation: 5/26/2025  Plan of Care Certification: 5/26/2025 to 8/18/2025      PT/PTA:     Number of PTA visits since last PT visit:   Time In: 1100   Time Out: 1150  Total Time (in minutes): 50   Total Billable Time (in minutes): 30    FOTO:  Intake Score:  %  Survey Score 2:  %  Survey Score 3:  %    Precautions:       Subjective   Patient reports moving his arm a little too much yesterday and has some soreness in deltoid region today..  Pain reported as 2/10. right shoulder    Objective            Treatment:  Therapeutic Exercise  TE 1: UBE x 10 minutes (AAROM of right shoulder)  TE 2: Codman's (front/back and side/side and circles) x 2 min each  TE 3: shoulder ladder x 2 minutes  TE 4: shoulder rolls x 30 reps (forward and backward)  TE 5: shoulder pulley x 3 minutes into flexion  TE 6: seated swiss ball roll out x 3 minutes (AAROM flexion)  Manual Therapy  MT 1: passive range of right shoulder into flexion abduction and rotation within post op protocol  Balance/Neuromuscular Re-Education  NMR 1: scapular retractions 3 x 10, 3" holds, green tubes  NMR 2: Standing right shoulder deltoid isometrics 3 x 10 each, 3" holds    Time Entry(in minutes):  Manual " Therapy Time Entry: 5  Neuromuscular Re-Education Time Entry: 10  Therapeutic Exercise Time Entry: 25    Assessment & Plan   Assessment: Patient has been able to increase use of right upper extremity during normal daily activities with only mild soreness reported.  He is noted to have improving mobility of shoulder and improved tolerance to all exercises.   Evaluation/Treatment Tolerance: Patient tolerated treatment well    The patient will continue to benefit from skilled outpatient physical therapy in order to address the deficits listed in the problem list on the initial evaluation, provide patient and family education, and maximize the patients level of independence in the home and community environments.     The patient's spiritual, cultural, and educational needs were considered, and the patient is agreeable to the plan of care and goals.           Plan: Continue with PT POC and progress per post op protocol    Goals:   Active       Long Term Goals       Patient will have improved passive right shoulder flexion to 140 degrees (Progressing)       Start:  05/26/25    Expected End:  06/23/25            Patient will be compliant with HEP to promote the independent management of current diagnosis.  (Progressing)       Start:  05/26/25    Expected End:  07/21/25            Patient will improve active shoulder flexion to 140 degrees to improve reaching overhead during ADL's. (Progressing)       Start:  05/26/25    Expected End:  07/21/25            Patient will improve strength of right shoulder to 4/5 to improve tolerance to normal daily activities.  (Progressing)       Start:  05/26/25    Expected End:  07/21/25            Patient will improve FOTO status from 7 to 53 (Progressing)       Start:  05/26/25    Expected End:  07/21/25                Randy Adhikari, PT

## 2025-06-30 ENCOUNTER — EXTERNAL CHRONIC CARE MANAGEMENT (OUTPATIENT)
Dept: PRIMARY CARE CLINIC | Facility: CLINIC | Age: 75
End: 2025-06-30
Payer: MEDICARE

## 2025-06-30 PROCEDURE — 99439 CHRNC CARE MGMT STAF EA ADDL: CPT | Mod: S$PBB,,, | Performed by: FAMILY MEDICINE

## 2025-06-30 PROCEDURE — 99439 CHRNC CARE MGMT STAF EA ADDL: CPT | Mod: PBBFAC,PO | Performed by: FAMILY MEDICINE

## 2025-06-30 PROCEDURE — 99490 CHRNC CARE MGMT STAFF 1ST 20: CPT | Mod: S$PBB,,, | Performed by: FAMILY MEDICINE

## 2025-06-30 PROCEDURE — 99490 CHRNC CARE MGMT STAFF 1ST 20: CPT | Mod: PBBFAC,PO | Performed by: FAMILY MEDICINE

## 2025-07-01 ENCOUNTER — CLINICAL SUPPORT (OUTPATIENT)
Dept: REHABILITATION | Facility: HOSPITAL | Age: 75
End: 2025-07-01
Payer: MEDICARE

## 2025-07-01 DIAGNOSIS — M62.81 MUSCLE WEAKNESS OF RIGHT ARM: ICD-10-CM

## 2025-07-01 DIAGNOSIS — M25.611 DECREASED ROM OF RIGHT SHOULDER: Primary | ICD-10-CM

## 2025-07-01 PROCEDURE — 97110 THERAPEUTIC EXERCISES: CPT | Mod: PN

## 2025-07-01 PROCEDURE — 97112 NEUROMUSCULAR REEDUCATION: CPT | Mod: PN

## 2025-07-01 NOTE — PROGRESS NOTES
"  Outpatient Rehab    Physical Therapy Visit    Patient Name: Randy Yap  MRN: [id number]  YOB: 1950  Encounter Date: 7/1/2025    Therapy Diagnosis:   Encounter Diagnoses   Name Primary?    Decreased ROM of right shoulder Yes    Muscle weakness of right arm      Physician: Eliezer Ortiz    Physician Orders: Eval and Treat  Medical Diagnosis: Glenohumeral arthritis, right  Complete tear of right rotator cuff, unspecified whether traumatic  Tendinopathy of right biceps tendon  Surgical Diagnosis: Not applicable for this Episode   Surgical Date: Not applicable for this Episode  Days Since Last Surgery: Not applicable for this Episode    Visit # / Visits Authorized:  9 / 22  Insurance Authorization Period: 5/26/2025 to 12/31/2025  Date of Evaluation: 5/26/2025  Plan of Care Certification: 5/26/2025 to 8/18/2025      PT/PTA:     Number of PTA visits since last PT visit:   Time In: 1100   Time Out: 1145  Total Time (in minutes): 45   Total Billable Time (in minutes): 30    FOTO:  Intake Score:  %  Survey Score 2:  %  Survey Score 3:  %    Precautions:       Subjective   He has been able to perform all ADL's with right arm, grooming, washing, dressing activities.  He is only wearing sling while sleeping and riding in vehicle..  Pain reported as 0/10. right shoulder    Objective            Treatment:  Therapeutic Exercise  TE 1: UBE x 10 minutes (AAROM of right shoulder)  TE 2: Codman's (front/back and side/side and circles) x 2 min each  TE 3: shoulder ladder x 2 minutes  TE 4: supine AAROM shoulder flexion with 1#bar 3 x 10  TE 5: shoulder pulley x 3 minutes into flexion  TE 6: wall slides x 20 reps  Manual Therapy  MT 1: passive range of right shoulder into flexion abduction and rotation within post op protocol  Balance/Neuromuscular Re-Education  NMR 1: scapular retractions 3 x 10, 3" holds, green bands  NMR 2: Standing right shoulder deltoid isometrics 3 x 10 each, 3" holds    Time Entry(in " minutes):  Manual Therapy Time Entry: 5  Neuromuscular Re-Education Time Entry: 10  Therapeutic Exercise Time Entry: 25    Assessment & Plan   Assessment: Patient has improved AROM of right shoulder and is able to use right arm to perform all his dressing, grooming, and washing activities.  He is reporting minimal to no pain in shoulder at this time and has been able to progress with AAROM exercises.  He has follow up with MD tomorrow and will begin progression of strengthening exercises as tolerated in order to return to prior level of function.   Evaluation/Treatment Tolerance: Patient tolerated treatment well    The patient will continue to benefit from skilled outpatient physical therapy in order to address the deficits listed in the problem list on the initial evaluation, provide patient and family education, and maximize the patients level of independence in the home and community environments.     The patient's spiritual, cultural, and educational needs were considered, and the patient is agreeable to the plan of care and goals.           Plan: Continue with PT POC and progress per post op protocol    Goals:   Active       Long Term Goals       Patient will have improved passive right shoulder flexion to 140 degrees (Progressing)       Start:  05/26/25    Expected End:  06/23/25            Patient will be compliant with HEP to promote the independent management of current diagnosis.  (Progressing)       Start:  05/26/25    Expected End:  07/21/25            Patient will improve active shoulder flexion to 140 degrees to improve reaching overhead during ADL's. (Progressing)       Start:  05/26/25    Expected End:  07/21/25            Patient will improve strength of right shoulder to 4/5 to improve tolerance to normal daily activities.  (Progressing)       Start:  05/26/25    Expected End:  07/21/25            Patient will improve FOTO status from 7 to 53 (Progressing)       Start:  05/26/25    Expected End:   07/21/25                Randy Adhikari, PT

## 2025-07-02 ENCOUNTER — HOSPITAL ENCOUNTER (OUTPATIENT)
Dept: RADIOLOGY | Facility: HOSPITAL | Age: 75
Discharge: HOME | End: 2025-07-02
Attending: STUDENT IN AN ORGANIZED HEALTH CARE EDUCATION/TRAINING PROGRAM
Payer: MEDICARE

## 2025-07-02 ENCOUNTER — OFFICE VISIT (OUTPATIENT)
Dept: SPORTS MEDICINE | Facility: CLINIC | Age: 75
End: 2025-07-02
Payer: MEDICARE

## 2025-07-02 VITALS — WEIGHT: 223.56 LBS | HEIGHT: 70 IN | BODY MASS INDEX: 32.01 KG/M2

## 2025-07-02 DIAGNOSIS — M19.011 GLENOHUMERAL ARTHRITIS, RIGHT: ICD-10-CM

## 2025-07-02 DIAGNOSIS — Z96.611 STATUS POST REVERSE TOTAL ARTHROPLASTY OF RIGHT SHOULDER: Primary | ICD-10-CM

## 2025-07-02 PROCEDURE — 73030 X-RAY EXAM OF SHOULDER: CPT | Mod: 26,RT,, | Performed by: STUDENT IN AN ORGANIZED HEALTH CARE EDUCATION/TRAINING PROGRAM

## 2025-07-02 PROCEDURE — 99212 OFFICE O/P EST SF 10 MIN: CPT | Mod: PBBFAC,25 | Performed by: STUDENT IN AN ORGANIZED HEALTH CARE EDUCATION/TRAINING PROGRAM

## 2025-07-02 PROCEDURE — 73030 X-RAY EXAM OF SHOULDER: CPT | Mod: TC,RT

## 2025-07-02 PROCEDURE — 99999 PR PBB SHADOW E&M-EST. PATIENT-LVL II: CPT | Mod: PBBFAC,,, | Performed by: STUDENT IN AN ORGANIZED HEALTH CARE EDUCATION/TRAINING PROGRAM

## 2025-07-02 PROCEDURE — 99024 POSTOP FOLLOW-UP VISIT: CPT | Mod: POP,,, | Performed by: STUDENT IN AN ORGANIZED HEALTH CARE EDUCATION/TRAINING PROGRAM

## 2025-07-03 ENCOUNTER — CLINICAL SUPPORT (OUTPATIENT)
Dept: REHABILITATION | Facility: HOSPITAL | Age: 75
End: 2025-07-03
Payer: MEDICARE

## 2025-07-03 DIAGNOSIS — M62.81 MUSCLE WEAKNESS OF RIGHT ARM: ICD-10-CM

## 2025-07-03 DIAGNOSIS — M25.611 DECREASED ROM OF RIGHT SHOULDER: Primary | ICD-10-CM

## 2025-07-03 PROCEDURE — 97110 THERAPEUTIC EXERCISES: CPT | Mod: PN

## 2025-07-03 NOTE — PROGRESS NOTES
Outpatient Rehab    Physical Therapy Progress Note    Patient Name: Randy Yap  MRN: [id number]  YOB: 1950  Encounter Date: 7/3/2025    Therapy Diagnosis:   Encounter Diagnoses   Name Primary?    Decreased ROM of right shoulder Yes    Muscle weakness of right arm      Physician: Eliezer Ortiz    Physician Orders: Eval and Treat  Medical Diagnosis: Glenohumeral arthritis, right  Complete tear of right rotator cuff, unspecified whether traumatic  Tendinopathy of right biceps tendon  Surgical Diagnosis: Not applicable for this Episode   Surgical Date: Not applicable for this Episode  Days Since Last Surgery: Not applicable for this Episode    Visit # / Visits Authorized:  10 / 22  Insurance Authorization Period: 5/26/2025 to 12/31/2025  Date of Evaluation: 5/26/2025  Plan of Care Certification: 5/26/2025 to 8/18/2025      PT/PTA:     Number of PTA visits since last PT visit:   Time In: 1100   Time Out: 1150  Total Time (in minutes): 50   Total Billable Time (in minutes): 30    FOTO:  Intake Score:  %  Survey Score 2:  %  Survey Score 3:  %    Precautions:       Subjective   Had MD follow up and he was pleased with progress.  Patient reports he has been going without sling and trying to use arm during ADL's..  Pain reported as 0/10.      Objective      Shoulder Range of Motion  Right Shoulder   Active (deg) Passive (deg) Pain   Flexion 100       Extension         Scaption         ABduction 90       ADduction         Horizontal ABduction         Horizontal ADduction         External Rotation (Shoulder ABducted 0 degrees)         External Rotation (Shoulder ABducted 45 degrees)         External Rotation (Shoulder ABducted 90 degrees)         Internal Rotation (Shoulder ABducted 0 degrees)         Internal Rotation (Shoulder ABducted 45 degrees)         Internal Rotation (Shoulder ABducted 90 degrees)                       Shoulder Strength - Planes of Motion   Right Strength Right Pain Left  Strength Left  Pain   Flexion 3         Extension           ABduction 3         ADduction           Horizontal ABduction           Horizontal ADduction           Internal Rotation 0°           Internal Rotation 90°           External Rotation 0°           External Rotation 90°                            Treatment:  Therapeutic Exercise  TE 1: UBE x 10 minutes (AAROM of right shoulder)  TE 2: bicep curls 3 x 10, 3#db  TE 3: tricep extension 3 x 10, GTB  TE 4: supine AAROM shoulder flexion with 1#bar 3 x 10  TE 5: shoulder pulley x 3 minutes into flexion  TE 6: wall slides x 30 reps  TE 7: active shoulder flexion and abduction to 90 degrees 2 x 10 each  TE 8: punch out 4 x 10, YTB  TE 9: single arm row 4 x 10, YTB  TE 10: patient education  Manual Therapy  MT 1: passive range of right shoulder      Time Entry(in minutes):  Manual Therapy Time Entry: 5  Therapeutic Exercise Time Entry: 35    Assessment & Plan   Assessment: Patient begins progression to strengthening and AROM exercises on this date with no increased pain reported during or following today's treatment.  He will continue with gradual progression of exercises in order to improve strength and overall functional mobility of right shoulder.  Evaluation/Treatment Tolerance: Patient tolerated treatment well    The patient will continue to benefit from skilled outpatient physical therapy in order to address the deficits listed in the problem list on the initial evaluation, provide patient and family education, and maximize the patients level of independence in the home and community environments.     The patient's spiritual, cultural, and educational needs were considered, and the patient is agreeable to the plan of care and goals.           Plan: Continue with PT POC and progress AROM and strengthening exercises as tolerated    Goals:   Active       Long Term Goals       Patient will have improved passive right shoulder flexion to 140 degrees (Progressing)        Start:  05/26/25    Expected End:  06/23/25            Patient will be compliant with HEP to promote the independent management of current diagnosis.  (Progressing)       Start:  05/26/25    Expected End:  07/21/25            Patient will improve active shoulder flexion to 140 degrees to improve reaching overhead during ADL's. (Progressing)       Start:  05/26/25    Expected End:  07/21/25            Patient will improve strength of right shoulder to 4/5 to improve tolerance to normal daily activities.  (Progressing)       Start:  05/26/25    Expected End:  07/21/25            Patient will improve FOTO status from 7 to 53 (Progressing)       Start:  05/26/25    Expected End:  07/21/25                Randy Adhikari, PT

## 2025-07-14 RX ORDER — AMIODARONE HYDROCHLORIDE 200 MG/1
TABLET ORAL
Qty: 180 TABLET | Refills: 0 | Status: SHIPPED | OUTPATIENT
Start: 2025-07-14

## 2025-07-15 ENCOUNTER — CLINICAL SUPPORT (OUTPATIENT)
Dept: REHABILITATION | Facility: HOSPITAL | Age: 75
End: 2025-07-15
Payer: MEDICARE

## 2025-07-15 DIAGNOSIS — M25.611 DECREASED ROM OF RIGHT SHOULDER: Primary | ICD-10-CM

## 2025-07-15 DIAGNOSIS — M62.81 MUSCLE WEAKNESS OF RIGHT ARM: ICD-10-CM

## 2025-07-15 PROCEDURE — 97140 MANUAL THERAPY 1/> REGIONS: CPT | Mod: PN

## 2025-07-15 PROCEDURE — 97110 THERAPEUTIC EXERCISES: CPT | Mod: PN

## 2025-07-15 NOTE — PROGRESS NOTES
Outpatient Rehab    Physical Therapy Visit    Patient Name: Randy Yap  MRN: [id number]  YOB: 1950  Encounter Date: 7/15/2025    Therapy Diagnosis:   Encounter Diagnoses   Name Primary?    Decreased ROM of right shoulder Yes    Muscle weakness of right arm      Physician: Eliezer Ortiz    Physician Orders: Eval and Treat  Medical Diagnosis: Glenohumeral arthritis, right  Complete tear of right rotator cuff, unspecified whether traumatic  Tendinopathy of right biceps tendon  Surgical Diagnosis: Not applicable for this Episode   Surgical Date: Not applicable for this Episode  Days Since Last Surgery: Not applicable for this Episode    Visit # / Visits Authorized:  11 / 22  Insurance Authorization Period: 5/26/2025 to 12/31/2025  Date of Evaluation: 5/26/2025  Plan of Care Certification: 5/26/2025 to 8/18/2025      PT/PTA:     Number of PTA visits since last PT visit:   Time In: 1100   Time Out: 1150  Total Time (in minutes): 50   Total Billable Time (in minutes): 45    FOTO:  Intake Score: 7%  Survey Score 2: Not applicable for this Episode%  Survey Score 3: Not applicable for this Episode%    Precautions:         Subjective   He has been very busy with babysitting his grandchildren as well as helping his son move.  He picked up on something a little to heavy the other day and has some soreness in right arm..  Pain reported as 1/10. right shoulder    Objective            Treatment:  Therapeutic Exercise  TE 1: UBE x 10 minutes, level 2  TE 2: bicep curls 3 x 10, 3#db  TE 3: tricep extension 3 x 10, GTB  TE 4: supine AAROM shoulder flexion with 2#bar 3 x 10  TE 5: shoulder pulley x 3 minutes into flexion  TE 6: wall slides x 30 reps  TE 7: active shoulder flexion and abduction to 90 degrees 3 x 10 each  TE 8: punch out 4 x 10, RTB  TE 9: single arm row 4 x 10, RTB  Manual Therapy  MT 1: passive range of right shoulder      Time Entry(in minutes):  Manual Therapy Time Entry:  10  Therapeutic Exercise Time Entry: 35    Assessment & Plan   Assessment: Patient able to continue with gradual progression of strengthening exercises.  He has been using his arm a lot around his house and keeping his grandchildren.  Patient has improving strength and mobility of shoulder.  Evaluation/Treatment Tolerance: Patient tolerated treatment well    The patient will continue to benefit from skilled outpatient physical therapy in order to address the deficits listed in the problem list on the initial evaluation, provide patient and family education, and maximize the patients level of independence in the home and community environments.     The patient's spiritual, cultural, and educational needs were considered, and the patient is agreeable to the plan of care and goals.           Plan: Continue with PT POC and progress AROM and strengthening exercises as tolerated    Goals:   Active       Long Term Goals       Patient will have improved passive right shoulder flexion to 140 degrees (Progressing)       Start:  05/26/25    Expected End:  06/23/25            Patient will be compliant with HEP to promote the independent management of current diagnosis.  (Progressing)       Start:  05/26/25    Expected End:  07/21/25            Patient will improve active shoulder flexion to 140 degrees to improve reaching overhead during ADL's. (Progressing)       Start:  05/26/25    Expected End:  07/21/25            Patient will improve strength of right shoulder to 4/5 to improve tolerance to normal daily activities.  (Progressing)       Start:  05/26/25    Expected End:  07/21/25            Patient will improve FOTO status from 7 to 53 (Progressing)       Start:  05/26/25    Expected End:  07/21/25                Randy Adhikari, PT

## 2025-07-17 ENCOUNTER — CLINICAL SUPPORT (OUTPATIENT)
Dept: REHABILITATION | Facility: HOSPITAL | Age: 75
End: 2025-07-17
Payer: MEDICARE

## 2025-07-17 DIAGNOSIS — M62.81 MUSCLE WEAKNESS OF RIGHT ARM: ICD-10-CM

## 2025-07-17 DIAGNOSIS — M25.611 DECREASED ROM OF RIGHT SHOULDER: Primary | ICD-10-CM

## 2025-07-17 PROCEDURE — 97110 THERAPEUTIC EXERCISES: CPT | Mod: PN

## 2025-07-17 NOTE — PROGRESS NOTES
Outpatient Rehab    Physical Therapy Visit    Patient Name: Randy Yap  MRN: [id number]  YOB: 1950  Encounter Date: 7/17/2025    Therapy Diagnosis:   Encounter Diagnoses   Name Primary?    Decreased ROM of right shoulder Yes    Muscle weakness of right arm      Physician: Eliezer Ortiz    Physician Orders: Eval and Treat  Medical Diagnosis: Glenohumeral arthritis, right  Complete tear of right rotator cuff, unspecified whether traumatic  Tendinopathy of right biceps tendon  Surgical Diagnosis: Not applicable for this Episode   Surgical Date: Not applicable for this Episode  Days Since Last Surgery: Not applicable for this Episode    Visit # / Visits Authorized:  12 / 22  Insurance Authorization Period: 5/26/2025 to 12/31/2025  Date of Evaluation: 5/26/2025  Plan of Care Certification: 5/26/2025 to 8/18/2025      PT/PTA:     Number of PTA visits since last PT visit:   Time In: 1100   Time Out: 1200  Total Time (in minutes): 60   Total Billable Time (in minutes): 30    FOTO:  Intake Score: 7%  Survey Score 2: Not applicable for this Episode%  Survey Score 3: Not applicable for this Episode%    Precautions:         Subjective             Objective            Treatment:  Therapeutic Exercise  TE 1: UBE x 10 minutes, level 2  TE 2: bicep curls 3 x 10, 3#db  TE 3: tricep extension 3 x 10, GTB  TE 4: supine AAROM shoulder flexion with 3#bar 2 x 10  TE 5: shoulder pulley x 3 minutes into flexion  TE 6: wall slides x 30 reps  TE 7: active shoulder flexion and abduction to 90 degrees 3 x 10 each  TE 8: punch out 4 x 10, RTB  TE 9: single arm row 4 x 10, RTB  Manual Therapy  MT 1: passive range of right shoulder      Time Entry(in minutes):  Manual Therapy Time Entry: 10  Therapeutic Exercise Time Entry: 35    Assessment & Plan   Assessment: Patient has improving strength and was able to progress to 3#bar during supine shoulder flexion exercise.  Patient with decreased pain and muscle guarding  noted during passive stretching.  Evaluation/Treatment Tolerance: Patient tolerated treatment well    The patient will continue to benefit from skilled outpatient physical therapy in order to address the deficits listed in the problem list on the initial evaluation, provide patient and family education, and maximize the patients level of independence in the home and community environments.     The patient's spiritual, cultural, and educational needs were considered, and the patient is agreeable to the plan of care and goals.           Plan: Continue with PT POC and progress AROM and strengthening exercises as tolerated    Goals:   Active       Long Term Goals       Patient will have improved passive right shoulder flexion to 140 degrees (Progressing)       Start:  05/26/25    Expected End:  06/23/25            Patient will be compliant with HEP to promote the independent management of current diagnosis.  (Progressing)       Start:  05/26/25    Expected End:  07/21/25            Patient will improve active shoulder flexion to 140 degrees to improve reaching overhead during ADL's. (Progressing)       Start:  05/26/25    Expected End:  07/21/25            Patient will improve strength of right shoulder to 4/5 to improve tolerance to normal daily activities.  (Progressing)       Start:  05/26/25    Expected End:  07/21/25            Patient will improve FOTO status from 7 to 53 (Progressing)       Start:  05/26/25    Expected End:  07/21/25                Randy Adhikari, PT

## 2025-07-24 ENCOUNTER — CLINICAL SUPPORT (OUTPATIENT)
Dept: REHABILITATION | Facility: HOSPITAL | Age: 75
End: 2025-07-24
Payer: MEDICARE

## 2025-07-24 DIAGNOSIS — M62.81 MUSCLE WEAKNESS OF RIGHT ARM: ICD-10-CM

## 2025-07-24 DIAGNOSIS — M25.611 DECREASED ROM OF RIGHT SHOULDER: Primary | ICD-10-CM

## 2025-07-24 PROCEDURE — 97110 THERAPEUTIC EXERCISES: CPT | Mod: PN

## 2025-07-24 PROCEDURE — 97140 MANUAL THERAPY 1/> REGIONS: CPT | Mod: PN

## 2025-07-24 NOTE — PROGRESS NOTES
Outpatient Rehab    Physical Therapy Visit    Patient Name: Randy Yap  MRN: [id number]  YOB: 1950  Encounter Date: 7/24/2025    Therapy Diagnosis:   Encounter Diagnoses   Name Primary?    Decreased ROM of right shoulder Yes    Muscle weakness of right arm      Physician: Eliezer Ortiz    Physician Orders: Eval and Treat  Medical Diagnosis: Glenohumeral arthritis, right  Complete tear of right rotator cuff, unspecified whether traumatic  Tendinopathy of right biceps tendon  Surgical Diagnosis: Not applicable for this Episode   Surgical Date: Not applicable for this Episode  Days Since Last Surgery: Not applicable for this Episode    Visit # / Visits Authorized:  13 / 22  Insurance Authorization Period: 5/26/2025 to 12/31/2025  Date of Evaluation: 5/26/2025  Plan of Care Certification: 5/26/2025 to 8/18/2025      PT/PTA:     Number of PTA visits since last PT visit:   Time In: 1100   Time Out: 1158  Total Time (in minutes): 58   Total Billable Time (in minutes): 45    FOTO:  Intake Score (%): 7  Survey Score 2 (%): 47  Survey Score 3 (%): Not applicable for this Episode    Precautions:         Subjective   He was able to cut grass last week without complaint.  He continues to have some difficulty with lifting objects overhead cabinets..  Pain reported as 1/10. right shoulder    Objective            Treatment:  Therapeutic Exercise  TE 1: UBE x 10 minutes, level 2  TE 2: bicep curls 3 x 10, 3#db  TE 3: tricep extension 3 x 10, GTB  TE 5: shoulder pulley x 3 minutes into flexion  TE 6: wall slides x 30 reps  TE 7: active shoulder flexion and abduction 2 x 10 each, 1#db  TE 8: punch out 3 x 10, GTB  TE 9: single arm row 3 x 10, GTB      Time Entry(in minutes):  Manual Therapy Time Entry: 10  Therapeutic Exercise Time Entry: 35    Assessment & Plan   Assessment: Patient is able to progress strengthening exercises today without complaint of increased pain.  He has improving mobility of  shoulder and has been able to use it more throughout the day.  Evaluation/Treatment Tolerance: Patient tolerated treatment well    The patient will continue to benefit from skilled outpatient physical therapy in order to address the deficits listed in the problem list on the initial evaluation, provide patient and family education, and maximize the patients level of independence in the home and community environments.     The patient's spiritual, cultural, and educational needs were considered, and the patient is agreeable to the plan of care and goals.           Plan: Continue with PT POC and progress AROM and strengthening exercises as tolerated    Goals:   Active       Long Term Goals       Patient will have improved passive right shoulder flexion to 140 degrees (Progressing)       Start:  05/26/25    Expected End:  06/23/25            Patient will be compliant with HEP to promote the independent management of current diagnosis.  (Progressing)       Start:  05/26/25    Expected End:  07/21/25            Patient will improve active shoulder flexion to 140 degrees to improve reaching overhead during ADL's. (Progressing)       Start:  05/26/25    Expected End:  07/21/25            Patient will improve strength of right shoulder to 4/5 to improve tolerance to normal daily activities.  (Progressing)       Start:  05/26/25    Expected End:  07/21/25            Patient will improve FOTO status from 7 to 53 (Progressing)       Start:  05/26/25    Expected End:  07/21/25                Randy Adhikari, PT

## 2025-07-29 ENCOUNTER — CLINICAL SUPPORT (OUTPATIENT)
Dept: REHABILITATION | Facility: HOSPITAL | Age: 75
End: 2025-07-29
Payer: MEDICARE

## 2025-07-29 DIAGNOSIS — M25.611 DECREASED ROM OF RIGHT SHOULDER: Primary | ICD-10-CM

## 2025-07-29 DIAGNOSIS — M62.81 MUSCLE WEAKNESS OF RIGHT ARM: ICD-10-CM

## 2025-07-29 PROCEDURE — 97110 THERAPEUTIC EXERCISES: CPT | Mod: PN

## 2025-07-30 NOTE — PROGRESS NOTES
Outpatient Rehab    Physical Therapy Visit    Patient Name: Randy Yap  MRN: [id number]  YOB: 1950  Encounter Date: 7/29/2025    Therapy Diagnosis:   Encounter Diagnoses   Name Primary?    Decreased ROM of right shoulder Yes    Muscle weakness of right arm      Physician: Eliezer Ortiz    Physician Orders: Eval and Treat  Medical Diagnosis: Glenohumeral arthritis, right  Complete tear of right rotator cuff, unspecified whether traumatic  Tendinopathy of right biceps tendon  Surgical Diagnosis: Not applicable for this Episode   Surgical Date: Not applicable for this Episode  Days Since Last Surgery: Not applicable for this Episode    Visit # / Visits Authorized:  14 / 22  Insurance Authorization Period: 5/26/2025 to 12/31/2025  Date of Evaluation: 5/26/2025  Plan of Care Certification: 5/26/2025 to 8/18/2025      PT/PTA:     Number of PTA visits since last PT visit:   Time In: 1100   Time Out: 1155  Total Time (in minutes): 55   Total Billable Time (in minutes): 30    FOTO:  Intake Score (%): 7  Survey Score 2 (%): 47  Survey Score 3 (%): Not applicable for this Episode    Precautions:         Subjective   Patient has been using his arm a lot recently and having some soreness in deltoid region as well as in thoracic region..  Pain reported as 2/10.      Objective            Treatment:  Therapeutic Exercise  TE 1: UBE x 10 minutes, level 2  TE 2: bicep curls 3 x 10, 4#db  TE 3: tricep extension 3 x 10, GTB  TE 5: shoulder pulley x 3 minutes into flexion  TE 6: wall slides x 30 reps  TE 7: active shoulder flexion and abduction 2 x 10 each, 1#db  TE 8: punch out 3 x 10, GTB  TE 9: single arm row 3 x 10, GTB, standing straight arm shoulder extension 3 x 10, GTB  TE 10: wall push ups 2 x 10    *A portion of this treatment session is provided with the assistance of a skilled rehbailitation technician under the supervision of a licensed physical therapist      Time Entry(in  minutes):  Therapeutic Exercise Time Entry: 45    Assessment & Plan   Assessment: Patient continues with progression of strengthening exercises without complaint.  He is reporting some soreness in deltoid region, but has been using his right arm a lot more throughout all daily activities, playing with grandchildren.  Evaluation/Treatment Tolerance: Patient tolerated treatment well    The patient will continue to benefit from skilled outpatient physical therapy in order to address the deficits listed in the problem list on the initial evaluation, provide patient and family education, and maximize the patients level of independence in the home and community environments.     The patient's spiritual, cultural, and educational needs were considered, and the patient is agreeable to the plan of care and goals.           Plan: Continue with PT POC and progress AROM and strengthening exercises as tolerated    Goals:   Active       Long Term Goals       Patient will have improved passive right shoulder flexion to 140 degrees (Progressing)       Start:  05/26/25    Expected End:  06/23/25            Patient will be compliant with HEP to promote the independent management of current diagnosis.  (Progressing)       Start:  05/26/25    Expected End:  07/21/25            Patient will improve active shoulder flexion to 140 degrees to improve reaching overhead during ADL's. (Progressing)       Start:  05/26/25    Expected End:  07/21/25            Patient will improve strength of right shoulder to 4/5 to improve tolerance to normal daily activities.  (Progressing)       Start:  05/26/25    Expected End:  07/21/25            Patient will improve FOTO status from 7 to 53 (Progressing)       Start:  05/26/25    Expected End:  07/21/25                Randy Adhikari, PT

## 2025-07-31 ENCOUNTER — EXTERNAL CHRONIC CARE MANAGEMENT (OUTPATIENT)
Dept: PRIMARY CARE CLINIC | Facility: CLINIC | Age: 75
End: 2025-07-31
Payer: MEDICARE

## 2025-07-31 DIAGNOSIS — E78.5 HYPERLIPIDEMIA LDL GOAL <100: Chronic | ICD-10-CM

## 2025-07-31 DIAGNOSIS — J45.30 MILD PERSISTENT ASTHMA WITHOUT COMPLICATION: ICD-10-CM

## 2025-07-31 PROCEDURE — 99490 CHRNC CARE MGMT STAFF 1ST 20: CPT | Mod: PBBFAC,PO | Performed by: FAMILY MEDICINE

## 2025-07-31 PROCEDURE — 99490 CHRNC CARE MGMT STAFF 1ST 20: CPT | Mod: S$PBB,,, | Performed by: FAMILY MEDICINE

## 2025-07-31 RX ORDER — PRAVASTATIN SODIUM 80 MG/1
80 TABLET ORAL
Qty: 90 TABLET | Refills: 1 | Status: SHIPPED | OUTPATIENT
Start: 2025-07-31

## 2025-07-31 NOTE — TELEPHONE ENCOUNTER
Refill Routing Note   Medication(s) are not appropriate for processing by [facility] for the following reason(s):        No active prescription written by provider    [facility] action(s):  Defer               Appointments  past 12m or future 3m with PCP    Date Provider   Last Visit   4/15/2025 Olvin Hutson MD   Next Visit   Visit date not found Olvin Hutson MD   ED visits in past 90 days: 0        Note composed:12:25 PM 07/31/2025

## 2025-07-31 NOTE — TELEPHONE ENCOUNTER
No care due was identified.  [facility] Embedded Care Due Messages. Reference number: [id number].   7/31/2025 12:24:36 PM CDT

## 2025-08-01 RX ORDER — FLUTICASONE FUROATE 100 UG/1
1 POWDER RESPIRATORY (INHALATION) DAILY
Qty: 30 EACH | Refills: 0 | Status: SHIPPED | OUTPATIENT
Start: 2025-08-01

## 2025-08-06 ENCOUNTER — CLINICAL SUPPORT (OUTPATIENT)
Dept: REHABILITATION | Facility: HOSPITAL | Age: 75
End: 2025-08-06
Payer: MEDICARE

## 2025-08-06 DIAGNOSIS — M62.81 MUSCLE WEAKNESS OF RIGHT ARM: ICD-10-CM

## 2025-08-06 DIAGNOSIS — M25.611 DECREASED ROM OF RIGHT SHOULDER: Primary | ICD-10-CM

## 2025-08-06 PROCEDURE — 97110 THERAPEUTIC EXERCISES: CPT | Mod: PN

## 2025-08-07 NOTE — PROGRESS NOTES
Outpatient Rehab    Physical Therapy Visit    Patient Name: Randy Yap  MRN: [id number]  YOB: 1950  Encounter Date: 8/6/2025    Therapy Diagnosis:   Encounter Diagnoses   Name Primary?    Decreased ROM of right shoulder Yes    Muscle weakness of right arm      Physician: Eliezer Ortiz    Physician Orders: Eval and Treat  Medical Diagnosis: Glenohumeral arthritis, right  Complete tear of right rotator cuff, unspecified whether traumatic  Tendinopathy of right biceps tendon  Surgical Diagnosis: right total shoulder replacement   Surgical Date: 5/22/2025  Days Since Last Surgery: 77    Visit # / Visits Authorized:  15 / 22  Insurance Authorization Period: 5/26/2025 to 12/31/2025  Date of Evaluation: 5/26/2025  Plan of Care Certification: 5/26/2025 to 8/18/2025      PT/PTA:     Number of PTA visits since last PT visit:   Time In: 1430   Time Out: 1530  Total Time (in minutes): 60   Total Billable Time (in minutes): 30    FOTO:  Intake Score (%): 7  Survey Score 2 (%): 47  Survey Score 3 (%): Not applicable for this Episode    Precautions:         Subjective   Patient reports he continues to use his right arm while playing and swimming with grandchildren.  He has some continued soreness in deltoids and posterior aspect of shoulder..  Pain reported as 2/10. right shoulder    Objective            Treatment:  Therapeutic Exercise  TE 1: UBE x 10 minutes, level 3  TE 2: bicep curls 3 x 10, 4#db  TE 3: tricep extension 3 x 10, GTB  TE 5: shoulder pulley x 3 minutes into flexion  TE 6: wall slides x 30 reps  TE 7: active shoulder flexion and abduction 2 x 10 each, 1#db  TE 8: punch out 3 x 10, GTB  TE 9: single arm row 3 x 10, GTB, standing straight arm shoulder extension 3 x 10, GTB  TE 10: wall push ups 2 x 10  Manual Therapy  MT 1: passive range of right shoulder      Time Entry(in minutes):  Manual Therapy Time Entry: 5  Therapeutic Exercise Time Entry: 45    Assessment & Plan   Assessment:  Patient has improved ROM and strength of right shoulder and reporting improved tolerance to all daily activities.   Evaluation/Treatment Tolerance: Patient tolerated treatment well    The patient will continue to benefit from skilled outpatient physical therapy in order to address the deficits listed in the problem list on the initial evaluation, provide patient and family education, and maximize the patients level of independence in the home and community environments.     The patient's spiritual, cultural, and educational needs were considered, and the patient is agreeable to the plan of care and goals.           Plan: Continue with PT POC and progress AROM and strengthening exercises as tolerated    Goals:   Active       Long Term Goals       Patient will have improved passive right shoulder flexion to 140 degrees (Met)       Start:  05/26/25    Expected End:  09/04/25    Resolved:  08/07/25         Patient will be compliant with HEP to promote the independent management of current diagnosis.  (Met)       Start:  05/26/25    Expected End:  09/04/25    Resolved:  08/07/25         Patient will improve active shoulder flexion to 140 degrees to improve reaching overhead during ADL's. (Progressing)       Start:  05/26/25    Expected End:  09/04/25            Patient will improve strength of right shoulder to 4/5 to improve tolerance to normal daily activities.  (Progressing)       Start:  05/26/25    Expected End:  09/04/25            Patient will improve FOTO status from 7 to 53 (Progressing)       Start:  05/26/25    Expected End:  09/04/25                Randy Adhikari, PT

## 2025-08-11 ENCOUNTER — CLINICAL SUPPORT (OUTPATIENT)
Dept: REHABILITATION | Facility: HOSPITAL | Age: 75
End: 2025-08-11
Payer: MEDICARE

## 2025-08-11 DIAGNOSIS — M25.611 DECREASED ROM OF RIGHT SHOULDER: Primary | ICD-10-CM

## 2025-08-11 DIAGNOSIS — M62.81 MUSCLE WEAKNESS OF RIGHT ARM: ICD-10-CM

## 2025-08-11 PROCEDURE — 97110 THERAPEUTIC EXERCISES: CPT | Mod: PN

## 2025-08-13 ENCOUNTER — CLINICAL SUPPORT (OUTPATIENT)
Dept: REHABILITATION | Facility: HOSPITAL | Age: 75
End: 2025-08-13
Payer: MEDICARE

## 2025-08-13 DIAGNOSIS — M62.81 MUSCLE WEAKNESS OF RIGHT ARM: ICD-10-CM

## 2025-08-13 DIAGNOSIS — M25.611 DECREASED ROM OF RIGHT SHOULDER: Primary | ICD-10-CM

## 2025-08-13 PROCEDURE — 97110 THERAPEUTIC EXERCISES: CPT | Mod: PN

## 2025-08-18 ENCOUNTER — CLINICAL SUPPORT (OUTPATIENT)
Dept: REHABILITATION | Facility: HOSPITAL | Age: 75
End: 2025-08-18
Payer: MEDICARE

## 2025-08-18 DIAGNOSIS — M25.611 DECREASED ROM OF RIGHT SHOULDER: Primary | ICD-10-CM

## 2025-08-18 DIAGNOSIS — M62.81 MUSCLE WEAKNESS OF RIGHT ARM: ICD-10-CM

## 2025-08-18 PROCEDURE — 97110 THERAPEUTIC EXERCISES: CPT | Mod: PN

## 2025-08-18 PROCEDURE — 97530 THERAPEUTIC ACTIVITIES: CPT | Mod: PN

## 2025-08-20 ENCOUNTER — OFFICE VISIT (OUTPATIENT)
Dept: SPORTS MEDICINE | Facility: CLINIC | Age: 75
End: 2025-08-20
Payer: MEDICARE

## 2025-08-20 VITALS — WEIGHT: 223.56 LBS | RESPIRATION RATE: 17 BRPM | HEIGHT: 70 IN | BODY MASS INDEX: 32.01 KG/M2

## 2025-08-20 DIAGNOSIS — Z96.611 STATUS POST REVERSE TOTAL ARTHROPLASTY OF RIGHT SHOULDER: Primary | ICD-10-CM

## 2025-08-20 PROCEDURE — 99999 PR PBB SHADOW E&M-EST. PATIENT-LVL IV: CPT | Mod: PBBFAC,,, | Performed by: STUDENT IN AN ORGANIZED HEALTH CARE EDUCATION/TRAINING PROGRAM

## 2025-08-20 PROCEDURE — 99214 OFFICE O/P EST MOD 30 MIN: CPT | Mod: PBBFAC | Performed by: STUDENT IN AN ORGANIZED HEALTH CARE EDUCATION/TRAINING PROGRAM

## 2025-08-20 PROCEDURE — 99024 POSTOP FOLLOW-UP VISIT: CPT | Mod: POP,,, | Performed by: STUDENT IN AN ORGANIZED HEALTH CARE EDUCATION/TRAINING PROGRAM

## 2025-08-21 ENCOUNTER — CLINICAL SUPPORT (OUTPATIENT)
Dept: REHABILITATION | Facility: HOSPITAL | Age: 75
End: 2025-08-21
Payer: MEDICARE

## 2025-08-21 DIAGNOSIS — M62.81 MUSCLE WEAKNESS OF RIGHT ARM: ICD-10-CM

## 2025-08-21 DIAGNOSIS — M25.611 DECREASED ROM OF RIGHT SHOULDER: Primary | ICD-10-CM

## 2025-08-21 PROCEDURE — 97530 THERAPEUTIC ACTIVITIES: CPT | Mod: PN

## 2025-08-21 PROCEDURE — 97110 THERAPEUTIC EXERCISES: CPT | Mod: PN

## 2025-08-25 ENCOUNTER — CLINICAL SUPPORT (OUTPATIENT)
Dept: REHABILITATION | Facility: HOSPITAL | Age: 75
End: 2025-08-25
Payer: MEDICARE

## 2025-08-25 DIAGNOSIS — M25.611 DECREASED ROM OF RIGHT SHOULDER: Primary | ICD-10-CM

## 2025-08-25 DIAGNOSIS — M62.81 MUSCLE WEAKNESS OF RIGHT ARM: ICD-10-CM

## 2025-08-25 PROCEDURE — 97110 THERAPEUTIC EXERCISES: CPT | Mod: PN

## 2025-08-25 PROCEDURE — 97530 THERAPEUTIC ACTIVITIES: CPT | Mod: PN

## 2025-08-27 ENCOUNTER — OFFICE VISIT (OUTPATIENT)
Dept: CARDIOLOGY | Facility: CLINIC | Age: 75
End: 2025-08-27
Payer: MEDICARE

## 2025-08-27 ENCOUNTER — HOSPITAL ENCOUNTER (OUTPATIENT)
Dept: CARDIOLOGY | Facility: HOSPITAL | Age: 75
Discharge: HOME | End: 2025-08-27
Attending: NURSE PRACTITIONER
Payer: MEDICARE

## 2025-08-27 VITALS
DIASTOLIC BLOOD PRESSURE: 84 MMHG | HEART RATE: 82 BPM | OXYGEN SATURATION: 99 % | WEIGHT: 225.31 LBS | BODY MASS INDEX: 32.33 KG/M2 | SYSTOLIC BLOOD PRESSURE: 124 MMHG

## 2025-08-27 DIAGNOSIS — Z79.899 AT RISK FOR AMIODARONE TOXICITY WITH LONG TERM USE: Chronic | ICD-10-CM

## 2025-08-27 DIAGNOSIS — I50.42 CHRONIC COMBINED SYSTOLIC AND DIASTOLIC CONGESTIVE HEART FAILURE: ICD-10-CM

## 2025-08-27 DIAGNOSIS — I47.20 PAROXYSMAL VT: Chronic | ICD-10-CM

## 2025-08-27 DIAGNOSIS — Z95.810 ICD (IMPLANTABLE CARDIOVERTER-DEFIBRILLATOR) IN PLACE: ICD-10-CM

## 2025-08-27 DIAGNOSIS — I48.0 PAF (PAROXYSMAL ATRIAL FIBRILLATION): Chronic | ICD-10-CM

## 2025-08-27 DIAGNOSIS — Z91.89 AT RISK FOR AMIODARONE TOXICITY WITH LONG TERM USE: Chronic | ICD-10-CM

## 2025-08-27 DIAGNOSIS — I65.23 BILATERAL CAROTID ARTERY STENOSIS: Primary | ICD-10-CM

## 2025-08-27 DIAGNOSIS — I42.9 IDIOPATHIC CARDIOMYOPATHY: Chronic | ICD-10-CM

## 2025-08-27 PROCEDURE — 99214 OFFICE O/P EST MOD 30 MIN: CPT | Mod: S$PBB,,, | Performed by: NURSE PRACTITIONER

## 2025-08-27 PROCEDURE — 93283 PRGRMG EVAL IMPLANTABLE DFB: CPT | Mod: 26,,, | Performed by: INTERNAL MEDICINE

## 2025-08-27 PROCEDURE — 99214 OFFICE O/P EST MOD 30 MIN: CPT | Mod: PBBFAC,25 | Performed by: NURSE PRACTITIONER

## 2025-08-27 PROCEDURE — 93283 PRGRMG EVAL IMPLANTABLE DFB: CPT

## 2025-08-27 PROCEDURE — 99999 PR PBB SHADOW E&M-EST. PATIENT-LVL IV: CPT | Mod: PBBFAC,,, | Performed by: NURSE PRACTITIONER

## 2025-08-27 RX ORDER — SEMAGLUTIDE 0.68 MG/ML
0.5 INJECTION, SOLUTION SUBCUTANEOUS
COMMUNITY
Start: 2025-07-18

## 2025-08-28 ENCOUNTER — CLINICAL SUPPORT (OUTPATIENT)
Dept: REHABILITATION | Facility: HOSPITAL | Age: 75
End: 2025-08-28
Payer: MEDICARE

## 2025-08-28 DIAGNOSIS — M25.611 DECREASED ROM OF RIGHT SHOULDER: Primary | ICD-10-CM

## 2025-08-28 DIAGNOSIS — M62.81 MUSCLE WEAKNESS OF RIGHT ARM: ICD-10-CM

## 2025-08-28 LAB
OHS CV AF BURDEN PERCENT: < 1
OHS CV DC REMOTE DEVICE TYPE: NORMAL

## 2025-08-28 PROCEDURE — 97530 THERAPEUTIC ACTIVITIES: CPT | Mod: PN

## 2025-08-28 PROCEDURE — 97110 THERAPEUTIC EXERCISES: CPT | Mod: PN

## 2025-09-04 ENCOUNTER — HOSPITAL ENCOUNTER (OUTPATIENT)
Dept: CARDIOLOGY | Facility: HOSPITAL | Age: 75
Discharge: HOME | End: 2025-09-04
Attending: NURSE PRACTITIONER
Payer: MEDICARE

## 2025-09-04 VITALS
DIASTOLIC BLOOD PRESSURE: 64 MMHG | SYSTOLIC BLOOD PRESSURE: 106 MMHG | BODY MASS INDEX: 32.28 KG/M2 | HEART RATE: 78 BPM | WEIGHT: 225 LBS

## 2025-09-04 DIAGNOSIS — I65.23 BILATERAL CAROTID ARTERY STENOSIS: ICD-10-CM

## 2025-09-04 LAB
LEFT ARM DIASTOLIC BLOOD PRESSURE: 60 MMHG
LEFT ARM SYSTOLIC BLOOD PRESSURE: 92 MMHG
LEFT CBA DIAS: 15 CM/S
LEFT CBA SYS: 61 CM/S
LEFT CCA DIST DIAS: 20 CM/S
LEFT CCA DIST SYS: 80 CM/S
LEFT CCA MID DIAS: 20 CM/S
LEFT CCA MID SYS: 81 CM/S
LEFT CCA PROX DIAS: 12 CM/S
LEFT CCA PROX SYS: 98 CM/S
LEFT ECA DIAS: 8 CM/S
LEFT ECA SYS: 64 CM/S
LEFT ICA DIST DIAS: 24 CM/S
LEFT ICA DIST SYS: 60 CM/S
LEFT ICA MID DIAS: 22 CM/S
LEFT ICA MID SYS: 61 CM/S
LEFT ICA PROX DIAS: 22 CM/S
LEFT ICA PROX SYS: 63 CM/S
LEFT VERTEBRAL DIAS: 21 CM/S
LEFT VERTEBRAL SYS: 58 CM/S
OHS CV CAROTID RIGHT ICA EDV HIGHEST: 26
OHS CV CAROTID ULTRASOUND LEFT ICA/CCA RATIO: 0.79
OHS CV CAROTID ULTRASOUND RIGHT ICA/CCA RATIO: 0.77
OHS CV PV CAROTID LEFT HIGHEST CCA: 98
OHS CV PV CAROTID LEFT HIGHEST ICA: 63
OHS CV PV CAROTID RIGHT HIGHEST CCA: 83
OHS CV PV CAROTID RIGHT HIGHEST ICA: 64
OHS CV US CAROTID LEFT HIGHEST EDV: 24
RIGHT ARM DIASTOLIC BLOOD PRESSURE: 64 MMHG
RIGHT ARM SYSTOLIC BLOOD PRESSURE: 106 MMHG
RIGHT CBA DIAS: 16 CM/S
RIGHT CBA SYS: 65 CM/S
RIGHT CCA DIST DIAS: 23 CM/S
RIGHT CCA DIST SYS: 83 CM/S
RIGHT CCA MID DIAS: 22 CM/S
RIGHT CCA MID SYS: 72 CM/S
RIGHT CCA PROX DIAS: 11 CM/S
RIGHT CCA PROX SYS: 80 CM/S
RIGHT ECA DIAS: 15 CM/S
RIGHT ECA SYS: 69 CM/S
RIGHT ICA DIST DIAS: 16 CM/S
RIGHT ICA DIST SYS: 45 CM/S
RIGHT ICA MID DIAS: 26 CM/S
RIGHT ICA MID SYS: 64 CM/S
RIGHT ICA PROX DIAS: 20 CM/S
RIGHT ICA PROX SYS: 52 CM/S
RIGHT VERTEBRAL DIAS: 16 CM/S
RIGHT VERTEBRAL SYS: 47 CM/S

## 2025-09-04 PROCEDURE — 93880 EXTRACRANIAL BILAT STUDY: CPT | Mod: PO

## 2025-09-04 PROCEDURE — 93880 EXTRACRANIAL BILAT STUDY: CPT | Mod: 26,,, | Performed by: INTERNAL MEDICINE

## 2025-09-05 ENCOUNTER — TELEPHONE (OUTPATIENT)
Dept: CARDIOLOGY | Facility: CLINIC | Age: 75
End: 2025-09-05
Payer: MEDICARE

## (undated) DEVICE — SYR BULB 60CC IRRIGATION

## (undated) DEVICE — COVER TABLE HVY DTY 60X90IN

## (undated) DEVICE — DRAPE STERI U-SHAPED 47X51IN

## (undated) DEVICE — ELECTRODE REM PLYHSV RETURN 9

## (undated) DEVICE — CABLE PACING ALLGTR CLIP 12

## (undated) DEVICE — COVER LIGHT HANDLE 80/CA

## (undated) DEVICE — SPONGE LAP 18X18 PREWASHED

## (undated) DEVICE — PAD DEFIB CADENCE ADULT R2

## (undated) DEVICE — DRESSING WND HELIX ADH 5X6IN

## (undated) DEVICE — TAPE SURGICAL MICROFOAM 4IN

## (undated) DEVICE — CAUTERY TIP 2 3/4

## (undated) DEVICE — SYR 50ML CATH TIP

## (undated) DEVICE — IMPLANTABLE DEVICE
Type: IMPLANTABLE DEVICE | Site: SHOULDER | Status: NON-FUNCTIONAL
Removed: 2025-05-22

## (undated) DEVICE — PACK BASIC SETUP SC BR

## (undated) DEVICE — YANKAUER FLEX NO VENT REG CAP

## (undated) DEVICE — SUT 3-0 VICRYL / SH (J416)

## (undated) DEVICE — KIT WRENCH

## (undated) DEVICE — CLIPPER BLADE MOD 4406 (CAREF)

## (undated) DEVICE — OIL SILICONE SJM

## (undated) DEVICE — HOOD FLYTE PEELWY STERISHIELD

## (undated) DEVICE — DRAPE U SPLIT SHEET 54X76IN

## (undated) DEVICE — MANIFOLD 4 PORT

## (undated) DEVICE — POUCH INSTRUMENT 2 POCKET

## (undated) DEVICE — SUT VICRYL 2-0 CT-2 VCP269H

## (undated) DEVICE — ADHESIVE DERMABOND ADVANCED

## (undated) DEVICE — PACK PACER PERMANENT OMC

## (undated) DEVICE — SUT TI-CRON BLU 2 30 HOS-10

## (undated) DEVICE — KIT TRIMANO

## (undated) DEVICE — APPLICATOR CHLORAPREP ORN 26ML

## (undated) DEVICE — KIT IRR SUCTION HND PIECE

## (undated) DEVICE — PENCIL SMOKE EVAC ROCKER 70MM

## (undated) DEVICE — SCALPEL SZ 10 RETRACTABLE

## (undated) DEVICE — GOWN POLY REINF BRTH SLV XL

## (undated) DEVICE — SLING ARM ULTRA III PAD MED

## (undated) DEVICE — SOL NACL IRR 1000ML BTL

## (undated) DEVICE — STOCKINET TUBULAR 1 PLY 6X60IN

## (undated) DEVICE — COVER PROXIMA MAYO STAND

## (undated) DEVICE — DRAPE HIP PCH 112X137X89IN

## (undated) DEVICE — PAD GROUNDING DISPER ELECTRODE

## (undated) DEVICE — BLADE PLASMA WIDE SPATULA TIP

## (undated) DEVICE — BNDG COFLEX FOAM LF2 ST 4X5YD

## (undated) DEVICE — Device

## (undated) DEVICE — TOWEL OR DISP STRL BLUE 4/PK

## (undated) DEVICE — NDL PERCUTANEOUS 21G 7CM VASC

## (undated) DEVICE — SOCKINETTE IMPERVIOUS 12X48IN

## (undated) DEVICE — SPONGE COTTON TRAY 4X4IN

## (undated) DEVICE — DRAPE INCISE IOBAN 2 23X33IN

## (undated) DEVICE — GOWN NONREINF SET-IN SLV 2XL

## (undated) DEVICE — BLADE SAW SAGITTAL18X1.19X90MM

## (undated) DEVICE — DRAPE FULL SHEET 70X100IN

## (undated) DEVICE — DRESSING AQUACEL AG ADV 3.5X6

## (undated) DEVICE — DRAPE THREE-QTR REINF 53X77IN

## (undated) DEVICE — TUBE SUCTION FRAZIER VENT 12FR